# Patient Record
Sex: FEMALE | Race: WHITE | NOT HISPANIC OR LATINO | Employment: OTHER | URBAN - METROPOLITAN AREA
[De-identification: names, ages, dates, MRNs, and addresses within clinical notes are randomized per-mention and may not be internally consistent; named-entity substitution may affect disease eponyms.]

---

## 2017-02-08 ENCOUNTER — LAB VISIT (OUTPATIENT)
Dept: LAB | Facility: HOSPITAL | Age: 77
End: 2017-02-08
Attending: INTERNAL MEDICINE
Payer: MEDICARE

## 2017-02-08 DIAGNOSIS — E78.2 MIXED HYPERLIPIDEMIA: ICD-10-CM

## 2017-02-08 LAB
ALBUMIN SERPL BCP-MCNC: 4 G/DL
ALP SERPL-CCNC: 71 U/L
ALT SERPL W/O P-5'-P-CCNC: 15 U/L
ANION GAP SERPL CALC-SCNC: 8 MMOL/L
AST SERPL-CCNC: 25 U/L
BILIRUB SERPL-MCNC: 0.4 MG/DL
BUN SERPL-MCNC: 18 MG/DL
CALCIUM SERPL-MCNC: 9.9 MG/DL
CHLORIDE SERPL-SCNC: 101 MMOL/L
CHOLEST/HDLC SERPL: 3.5 {RATIO}
CO2 SERPL-SCNC: 27 MMOL/L
CREAT SERPL-MCNC: 0.8 MG/DL
EST. GFR  (AFRICAN AMERICAN): >60 ML/MIN/1.73 M^2
EST. GFR  (NON AFRICAN AMERICAN): >60 ML/MIN/1.73 M^2
GLUCOSE SERPL-MCNC: 92 MG/DL
HDL/CHOLESTEROL RATIO: 28.9 %
HDLC SERPL-MCNC: 239 MG/DL
HDLC SERPL-MCNC: 69 MG/DL
LDLC SERPL CALC-MCNC: 152 MG/DL
NONHDLC SERPL-MCNC: 170 MG/DL
POTASSIUM SERPL-SCNC: 4.6 MMOL/L
PROT SERPL-MCNC: 7.8 G/DL
SODIUM SERPL-SCNC: 136 MMOL/L
TRIGL SERPL-MCNC: 90 MG/DL

## 2017-02-08 PROCEDURE — 36415 COLL VENOUS BLD VENIPUNCTURE: CPT | Mod: PO

## 2017-02-08 PROCEDURE — 80061 LIPID PANEL: CPT

## 2017-02-08 PROCEDURE — 80053 COMPREHEN METABOLIC PANEL: CPT

## 2017-02-14 RX ORDER — LATANOPROST 50 UG/ML
SOLUTION/ DROPS OPHTHALMIC
Qty: 2.5 ML | Refills: 12 | Status: SHIPPED | OUTPATIENT
Start: 2017-02-14 | End: 2018-03-05 | Stop reason: SDUPTHER

## 2017-02-15 ENCOUNTER — OFFICE VISIT (OUTPATIENT)
Dept: CARDIOLOGY | Facility: CLINIC | Age: 77
End: 2017-02-15
Payer: MEDICARE

## 2017-02-15 VITALS
HEIGHT: 65 IN | DIASTOLIC BLOOD PRESSURE: 82 MMHG | SYSTOLIC BLOOD PRESSURE: 122 MMHG | BODY MASS INDEX: 31.63 KG/M2 | WEIGHT: 189.81 LBS | OXYGEN SATURATION: 97 % | HEART RATE: 82 BPM

## 2017-02-15 DIAGNOSIS — I10 ESSENTIAL HYPERTENSION: Primary | ICD-10-CM

## 2017-02-15 PROCEDURE — 93005 ELECTROCARDIOGRAM TRACING: CPT | Mod: PBBFAC,PO | Performed by: INTERNAL MEDICINE

## 2017-02-15 PROCEDURE — 93010 ELECTROCARDIOGRAM REPORT: CPT | Mod: ,,, | Performed by: INTERNAL MEDICINE

## 2017-02-15 PROCEDURE — 99999 PR PBB SHADOW E&M-EST. PATIENT-LVL III: CPT | Mod: PBBFAC,,, | Performed by: INTERNAL MEDICINE

## 2017-02-15 PROCEDURE — 99213 OFFICE O/P EST LOW 20 MIN: CPT | Mod: S$PBB,,, | Performed by: INTERNAL MEDICINE

## 2017-02-15 PROCEDURE — 99213 OFFICE O/P EST LOW 20 MIN: CPT | Mod: PBBFAC,PO | Performed by: INTERNAL MEDICINE

## 2017-02-15 NOTE — PROGRESS NOTES
Subjective:   Patient ID:  Maria Del Carmen Spence is a 76 y.o. female who presents for follow up of Hypertension      HPI Comments: 74 yo female, PMH glaucoma, s/p knee surgery. Can not climb the stairs due to knee pain.   DURÁN is the same. Denied chest pain, palpitation, dizziness, syncope, orthopnea, PND, and claudication.   She gained 45 lbs in 9 years. Her TSH normal.  Most of activities limited by back pain and knee pain with 6/10 in severity. She is taking ASA for pain control.    Carotid US normal and JAMIA left 0.98 and right 0.96 in  by [facility] screening.  EKG in : NSR  ECHO in :  1 - Normal left ventricular systolic function (EF 55-60%).   2 - Normal left ventricular diastolic function.   3 - Normal right ventricular systolic function .   4 - Mild tricuspid regurgitation.   5 - Trivial to mild pulmonic regurgitation.         Past Medical History   Diagnosis Date    Arthritis     Chronic LBP      ESIs x 3 with Dr. Hicks, PT at [place], chiropractor    Glaucoma     Hypertension        Past Surgical History   Procedure Laterality Date    Cataract extraction w/  intraocular lens implant  OU    Tubal ligation         Social History   Substance Use Topics    Smoking status: Never Smoker    Smokeless tobacco: Never Used    Alcohol use No       Family History   Problem Relation Age of Onset    Glaucoma Mother     Cancer Mother     Cataracts Mother     Hypertension Mother     Hypertension Father     Diabetes Paternal Aunt     Strabismus Neg Hx     Retinal detachment Neg Hx     Macular degeneration Neg Hx     Blindness Neg Hx     Amblyopia Neg Hx     Stroke Neg Hx     Thyroid disease Neg Hx          Review of Systems   Constitution: Negative for decreased appetite, diaphoresis, fever, weakness, malaise/fatigue and night sweats.   HENT: Negative for headaches and nosebleeds.    Eyes: Negative for blurred vision and double vision.   Cardiovascular: Negative for chest pain, claudication,  dyspnea on exertion, irregular heartbeat, leg swelling, near-syncope, orthopnea, palpitations, paroxysmal nocturnal dyspnea and syncope.   Respiratory: Negative for cough, shortness of breath, sleep disturbances due to breathing, snoring, sputum production and wheezing.    Endocrine: Negative for cold intolerance and polyuria.   Hematologic/Lymphatic: Does not bruise/bleed easily.   Skin: Negative for rash.   Musculoskeletal: Positive for arthritis and joint pain. Negative for back pain, falls, joint swelling and neck pain.   Gastrointestinal: Negative for abdominal pain, heartburn, nausea and vomiting.   Genitourinary: Negative for dysuria, frequency and hematuria.   Neurological: Negative for difficulty with concentration, dizziness, focal weakness, light-headedness, numbness and seizures.   Psychiatric/Behavioral: Negative for depression, memory loss and substance abuse. The patient does not have insomnia.    Allergic/Immunologic: Negative for HIV exposure and hives.       Objective:   Physical Exam   Constitutional: She is oriented to person, place, and time. She appears well-nourished.   HENT:   Head: Normocephalic.   Eyes: Pupils are equal, round, and reactive to light.   Neck: Normal carotid pulses and no JVD present. Carotid bruit is not present. No thyromegaly present.   Cardiovascular: Normal rate, regular rhythm, normal heart sounds and normal pulses.   No extrasystoles are present. PMI is not displaced.  Exam reveals no gallop and no S3.    No murmur heard.  Pulses:       Radial pulses are 2+ on the right side, and 2+ on the left side.   S2 split   Pulmonary/Chest: Breath sounds normal. No stridor. No respiratory distress.   Abdominal: Soft. Bowel sounds are normal. There is no tenderness. There is no rebound.   Musculoskeletal: Normal range of motion.   Neurological: She is alert and oriented to person, place, and time.   Skin: Skin is intact. No rash noted.   Psychiatric: Her behavior is normal.        Lab Results   Component Value Date    CHOL 239 (H) 02/08/2017     Lab Results   Component Value Date    HDL 69 02/08/2017     Lab Results   Component Value Date    LDLCALC 152.0 02/08/2017     Lab Results   Component Value Date    TRIG 90 02/08/2017     Lab Results   Component Value Date    CHOLHDL 28.9 02/08/2017       Chemistry        Component Value Date/Time     02/08/2017 0859    K 4.6 02/08/2017 0859     02/08/2017 0859    CO2 27 02/08/2017 0859    BUN 18 02/08/2017 0859    CREATININE 0.8 02/08/2017 0859    GLU 92 02/08/2017 0859        Component Value Date/Time    CALCIUM 9.9 02/08/2017 0859    ALKPHOS 71 02/08/2017 0859    AST 25 02/08/2017 0859    ALT 15 02/08/2017 0859    BILITOT 0.4 02/08/2017 0859          Lab Results   Component Value Date    TSH 2.568 09/02/2014     No results found for: INR, PROTIME  Lab Results   Component Value Date    WBC 7.08 09/02/2014    HGB 13.1 09/02/2014    HCT 39.4 09/02/2014    MCV 93 09/02/2014     09/02/2014     BMP  Sodium   Date Value Ref Range Status   02/08/2017 136 136 - 145 mmol/L Final     Potassium   Date Value Ref Range Status   02/08/2017 4.6 3.5 - 5.1 mmol/L Final     Chloride   Date Value Ref Range Status   02/08/2017 101 95 - 110 mmol/L Final     CO2   Date Value Ref Range Status   02/08/2017 27 23 - 29 mmol/L Final     BUN, Bld   Date Value Ref Range Status   02/08/2017 18 8 - 23 mg/dL Final     Creatinine   Date Value Ref Range Status   02/08/2017 0.8 0.5 - 1.4 mg/dL Final     Calcium   Date Value Ref Range Status   02/08/2017 9.9 8.7 - 10.5 mg/dL Final     Anion Gap   Date Value Ref Range Status   02/08/2017 8 8 - 16 mmol/L Final     eGFR if    Date Value Ref Range Status   02/08/2017 >60.0 >60 mL/min/1.73 m^2 Final     eGFR if non    Date Value Ref Range Status   02/08/2017 >60.0 >60 mL/min/1.73 m^2 Final     Comment:     Calculation used to obtain the estimated glomerular filtration  rate (eGFR) is  the CKD-EPI equation. Since race is unknown   in our information system, the eGFR values for   -American and Non--American patients are given   for each creatinine result.       Estimated Creatinine Clearance: 63.8 mL/min (based on Cr of 0.8).     Assessment:      1. Essential hypertension        Plan:   Continue current meds.  Recommend heart-healthy diet, weight control and regular exercise.  Heather. Risk modification.   RTC in 1 yr    I have reviewed all pertinent labs and cardiac studies. Plans and recommendations have been formulated under my direct supervision. All questions answered and patient voiced understanding. Patient to continue current medications.

## 2017-02-15 NOTE — MR AVS SNAPSHOT
[facility]  [address]  [facility] [address]  Phone: [phone]  Fax: [phone]                  Maria Del Carmen Spence   2/15/2017 10:00 AM   Office Visit    Description:  Female : 1940   Provider:  Will Rosenthal MD   Department:  [facility]           Reason for Visit     Hypertension           Diagnoses this Visit        Comments    Essential hypertension    -  Primary            To Do List           Future Appointments        Provider Department Dept Phone    2017 9:45 AM Ed Denise MD [facility] [phone]      Goals (5 Years of Data)     None      Follow-Up and Disposition     Return in about 1 year (around 2/15/2018).      [facility] On Call     [facility] On Call Nurse Care Line -  Assistance  Registered nurses in the [facility] On Call Center provide clinical advisement, health education, appointment booking, and other advisory services.  Call for this free service at [phone].             Medications           Message regarding Medications     Verify the changes and/or additions to your medication regime listed below are the same as discussed with your clinician today.  If any of these changes or additions are incorrect, please notify your healthcare provider.             Verify that the below list of medications is an accurate representation of the medications you are currently taking.  If none reported, the list may be blank. If incorrect, please contact your healthcare provider. Carry this list with you in case of emergency.           Current Medications     aspirin 325 MG tablet Take 325 mg by mouth 2 (two) times daily. Patient states that she takes 2 tablets every morning    BACILLUS/PROTEASE/AMYLAS/LIPAS (DIGESTIVE ADVANTAGE INTENS BOW ORAL) Take 1 capsule by mouth once daily.    CALCIUM PHOSPHATE TRIB/VIT D3 (CITRACAL + D ORAL) Take by mouth.    CETIRIZINE HCL (ZYRTEC ORAL) Take by mouth.    fish oil-omega-3  "fatty acids 300-1,000 mg capsule Take 2 g by mouth once daily.    glucosamine-chondroitin 500-400 mg tablet Take 1 tablet by mouth 3 (three) times daily.    latanoprost 0.005 % ophthalmic solution instill ONE DROP BOTH EYES AT BEDTIME    lisinopril 10 MG tablet Take 1 tablet (10 mg total) by mouth once daily.    MULTIVITAMIN W-MINERALS/LUTEIN (CENTRUM SILVER ORAL) Take by mouth.    timolol maleate 0.5% (TIMOPTIC) 0.5 % Drop Place 1 drop into the right eye 2 (two) times daily.           Clinical Reference Information           Your Vitals Were     BP Pulse Height Weight SpO2 BMI    122/82 (BP Location: Right arm) 82 5' 5" (1.651 m) 86.1 kg (189 lb 13.1 oz) 97% 31.59 kg/m2      Blood Pressure          Most Recent Value    BP  122/82      Allergies as of 2/15/2017     No Known Drug Allergies      Immunizations Administered on Date of Encounter - 2/15/2017     None      Orders Placed During Today's Visit      Normal Orders This Visit    IN OFFICE EKG 12-LEAD (to [place])       [facility] Sign-Up     Activating your [facility] account is as easy as 1-2-3!     1) Visit [email or web address], select Sign Up Now, enter this activation code and your date of birth, then select Next.  [email or web address]  Expires: 4/1/2017 10:22 AM      2) Create a username and password to use when you visit [facility] in the future and select a security question in case you lose your password and select Next.    3) Enter your e-mail address and click Sign Up!    Additional Information  If you have questions, please e-mail [email or web address] or call [phone] to talk to our [facility] staff. Remember, [facility] is NOT to be used for urgent needs. For medical emergencies, dial 911.         Language Assistance Services     ATTENTION: Language assistance services are available, free of charge. Please call [phone].      ATENCIÓN: Si habla español, tiene a farah disposición servicios gratuitos de asistencia lingüística. Llame al [phone].     CHÚ " Ý: N?u b?n nói Ti?ng Vi?t, có các d?ch v? h? tr? ngôn ng? mi?n phí dành cho b?n. G?i s? [phone].         [facility] complies with applicable Federal civil rights laws and does not discriminate on the basis of race, color, national origin, age, disability, or sex.

## 2017-04-04 ENCOUNTER — OFFICE VISIT (OUTPATIENT)
Dept: OPHTHALMOLOGY | Facility: CLINIC | Age: 77
End: 2017-04-04
Payer: MEDICARE

## 2017-04-04 DIAGNOSIS — H40.1131 PRIMARY OPEN ANGLE GLAUCOMA OF BOTH EYES, MILD STAGE: Primary | ICD-10-CM

## 2017-04-04 PROCEDURE — 92012 INTRM OPH EXAM EST PATIENT: CPT | Mod: S$PBB,,, | Performed by: OPHTHALMOLOGY

## 2017-04-04 PROCEDURE — 99999 PR PBB SHADOW E&M-EST. PATIENT-LVL II: CPT | Mod: PBBFAC,,, | Performed by: OPHTHALMOLOGY

## 2017-04-04 PROCEDURE — 99212 OFFICE O/P EST SF 10 MIN: CPT | Mod: PBBFAC | Performed by: OPHTHALMOLOGY

## 2017-04-04 NOTE — PROGRESS NOTES
SUBJECTIVE:   Maria Del Carmen Spence is a 76 y.o. female   Corrected distance visual acuity was 20/20 in the right eye and 20/20 in the left eye.   Chief Complaint   Patient presents with    Glaucoma     4 month IOP check        HPI:  HPI     Glaucoma    Additional comments: 4 month IOP check           Comments   1. Mild COAG OD>OS (init 24/20) Goal <17  2. PCIOL OU (Sulcus OD)    Latanoprost QHS OU  Timolol BID OD  O3FO         Last edited by PAUL Davis on 4/4/2017  9:44 AM. (History)        Assessment /Plan :  1. Primary open angle glaucoma of both eyes, mild stage   Doing well, IOP within acceptable range relative to target IOP and no evidence of progression. Continue current treatment. Reviewed importance of continued compliance with treatment and follow up.       Return to clinic in 4 months  or as needed.  With IOP Check, 24-2 HVF, Dilation and SDP's

## 2017-04-04 NOTE — MR AVS SNAPSHOT
[facility]  [address] [place] [address]  Phone: [phone]  Fax: [phone]                  Maria Del Carmen Spence   2017 9:45 AM   Office Visit    Description:  Female : 1940   Provider:  Ed Denise MD   Department:  [facility]           Reason for Visit     Glaucoma           Diagnoses this Visit        Comments    Primary open angle glaucoma of both eyes, mild stage    -  Primary            To Do List           Goals (5 Years of Data)     None      [facility]     [facility] On Call Nurse Care Line -  Assistance  Unless otherwise directed by your provider, please contact [facility], our nurse care line that is available for  assistance.     Registered nurses in the [facility] Center provide: appointment scheduling, clinical advisement, health education, and other advisory services.  Call: [phone] (toll free)               Medications           Message regarding Medications     Verify the changes and/or additions to your medication regime listed below are the same as discussed with your clinician today.  If any of these changes or additions are incorrect, please notify your healthcare provider.             Verify that the below list of medications is an accurate representation of the medications you are currently taking.  If none reported, the list may be blank. If incorrect, please contact your healthcare provider. Carry this list with you in case of emergency.           Current Medications     aspirin 325 MG tablet Take 325 mg by mouth 2 (two) times daily. Patient states that she takes 2 tablets every morning    BACILLUS/PROTEASE/AMYLAS/LIPAS (DIGESTIVE ADVANTAGE INTENS BOW ORAL) Take 1 capsule by mouth once daily.    CALCIUM PHOSPHATE TRIB/VIT D3 (CITRACAL + D ORAL) Take by mouth.    CETIRIZINE HCL (ZYRTEC ORAL) Take by mouth.    fish oil-omega-3 fatty acids 300-1,000 mg capsule Take 2 g by mouth once daily.     glucosamine-chondroitin 500-400 mg tablet Take 1 tablet by mouth 3 (three) times daily.    latanoprost 0.005 % ophthalmic solution instill ONE DROP BOTH EYES AT BEDTIME    lisinopril 10 MG tablet Take 1 tablet (10 mg total) by mouth once daily.    MULTIVITAMIN W-MINERALS/LUTEIN (CENTRUM SILVER ORAL) Take by mouth.    timolol maleate 0.5% (TIMOPTIC) 0.5 % Drop Place 1 drop into the right eye 2 (two) times daily.           Clinical Reference Information           Allergies as of 4/4/2017     No Known Drug Allergies      Immunizations Administered on Date of Encounter - 4/4/2017     None      [facility] Sign-Up     Activating your [facility] account is as easy as 1-2-3!     1) Visit [email or web address], select Sign Up Now, enter this activation code and your date of birth, then select Next.  [email or web address]  Expires: 5/19/2017  9:53 AM      2) Create a username and password to use when you visit [facility] in the future and select a security question in case you lose your password and select Next.    3) Enter your e-mail address and click Sign Up!    Additional Information  If you have questions, please e-mail [email or web address] or call [phone] to talk to our [facility] staff. Remember, [facility] is NOT to be used for urgent needs. For medical emergencies, dial 911.         Language Assistance Services     ATTENTION: Language assistance services are available, free of charge. Please call [phone].      ATENCIÓN: Si habla español, tiene a farah disposición servicios gratuitos de asistencia lingüística. Llame al [phone].     [facility] Ý: N?u b?n nói Ti?ng Vi?t, có các d?ch v? h? tr? ngôn ng? mi?n phí dành cho b?n. G?i s? [phone].         [facility] complies with applicable Federal civil rights laws and does not discriminate on the basis of race, color, national origin, age, disability, or sex.

## 2017-04-11 DIAGNOSIS — H40.1194 INDETERMINATE STAGE CHRONIC OPEN ANGLE GLAUCOMA: ICD-10-CM

## 2017-04-12 RX ORDER — TIMOLOL MALEATE 5 MG/ML
SOLUTION/ DROPS OPHTHALMIC
Qty: 5 ML | Refills: 11 | Status: SHIPPED | OUTPATIENT
Start: 2017-04-12 | End: 2018-06-21 | Stop reason: SDUPTHER

## 2017-04-26 DIAGNOSIS — I10 ESSENTIAL HYPERTENSION: ICD-10-CM

## 2017-04-26 RX ORDER — LISINOPRIL 10 MG/1
10 TABLET ORAL DAILY
Qty: 30 TABLET | Refills: 6 | Status: SHIPPED | OUTPATIENT
Start: 2017-04-26 | End: 2017-06-21 | Stop reason: ALTCHOICE

## 2017-05-17 ENCOUNTER — OFFICE VISIT (OUTPATIENT)
Dept: INTERNAL MEDICINE | Facility: CLINIC | Age: 77
End: 2017-05-17
Payer: MEDICARE

## 2017-05-17 VITALS
SYSTOLIC BLOOD PRESSURE: 138 MMHG | OXYGEN SATURATION: 95 % | DIASTOLIC BLOOD PRESSURE: 72 MMHG | TEMPERATURE: 98 F | WEIGHT: 189.25 LBS | BODY MASS INDEX: 31.5 KG/M2 | HEART RATE: 63 BPM

## 2017-05-17 DIAGNOSIS — M25.561 CHRONIC PAIN OF RIGHT KNEE: ICD-10-CM

## 2017-05-17 DIAGNOSIS — K21.9 GASTROESOPHAGEAL REFLUX DISEASE, ESOPHAGITIS PRESENCE NOT SPECIFIED: Primary | ICD-10-CM

## 2017-05-17 DIAGNOSIS — G89.29 CHRONIC PAIN OF RIGHT KNEE: ICD-10-CM

## 2017-05-17 PROCEDURE — 99213 OFFICE O/P EST LOW 20 MIN: CPT | Mod: PBBFAC,PO | Performed by: FAMILY MEDICINE

## 2017-05-17 PROCEDURE — 99999 PR PBB SHADOW E&M-EST. PATIENT-LVL III: CPT | Mod: PBBFAC,,, | Performed by: FAMILY MEDICINE

## 2017-05-17 PROCEDURE — 99214 OFFICE O/P EST MOD 30 MIN: CPT | Mod: S$PBB,,, | Performed by: FAMILY MEDICINE

## 2017-05-17 NOTE — MR AVS SNAPSHOT
[facility]  [address]  [place] [address]  Phone: [phone]  Fax: [phone]                  Maria Del Carmen Spence   2017 10:40 AM   Office Visit    Description:  Female : 1940   Provider:  Elda Powers MD   Department:  [facility]           Reason for Visit     Heartburn           Diagnoses this Visit        Comments    Gastroesophageal reflux disease, esophagitis presence not specified    -  Primary     Chronic pain of right knee                To Do List           Future Appointments        Provider Department Dept Phone    2017 9:30 AM Elda Powers MD [facility] [phone]    2017 10:30 AM [facility] [phone]    2017 10:00 AM FIELDS, VISUAL-ONE [facility] [phone]    2017 10:45 AM Ed Denise MD [facility] [phone]      Goals (5 Years of Data)     None      Follow-Up and Disposition     Return in about 4 weeks (around 2017).      [facility]     [facility] Nurse Care [facility] -  Assistance  Unless otherwise directed by your provider, please contact [facility], our nurse care line that is available for  assistance.     Registered nurses in the [facility] Center provide: appointment scheduling, clinical advisement, health education, and other advisory services.  Call: [phone] (toll free)               Medications           Message regarding Medications     Verify the changes and/or additions to your medication regime listed below are the same as discussed with your clinician today.  If any of these changes or additions are incorrect, please notify your healthcare provider.             Verify that the below list of medications is an accurate representation of the medications you are currently taking.  If none reported, the list may be blank. If incorrect, please contact your healthcare provider. Carry  this list with you in case of emergency.           Current Medications     aspirin 325 MG tablet Take 325 mg by mouth 2 (two) times daily. Patient states that she takes 2 tablets every morning    BACILLUS/PROTEASE/AMYLAS/LIPAS (DIGESTIVE ADVANTAGE INTENS BOW ORAL) Take 1 capsule by mouth once daily.    CALCIUM PHOSPHATE TRIB/VIT D3 (CITRACAL + D ORAL) Take by mouth.    CETIRIZINE HCL (ZYRTEC ORAL) Take by mouth.    fish oil-omega-3 fatty acids 300-1,000 mg capsule Take 2 g by mouth once daily.    GLUCOSAMINE HCL/CHONDR HOOPER A NA (OSTEO BI-FLEX ORAL) Take by mouth.    latanoprost 0.005 % ophthalmic solution instill ONE DROP BOTH EYES AT BEDTIME    lisinopril 10 MG tablet Take 1 tablet (10 mg total) by mouth once daily.    MULTIVITAMIN W-MINERALS/LUTEIN (CENTRUM SILVER ORAL) Take by mouth.    timolol maleate 0.5% (TIMOPTIC) 0.5 % Drop Place 1 drop into the right eye 2 (two) times daily.    glucosamine-chondroitin 500-400 mg tablet Take 1 tablet by mouth 3 (three) times daily.           Clinical Reference Information           Your Vitals Were     BP Pulse Temp Weight SpO2 BMI    138/72 (BP Location: Right arm, Patient Position: Sitting, BP Method: Automatic) 63 98 °F (36.7 °C) (Tympanic) 85.9 kg (189 lb 4.2 oz) 95% 31.5 kg/m2      Blood Pressure          Most Recent Value    BP  138/72      Allergies as of 5/17/2017     No Known Drug Allergies      Immunizations Administered on Date of Encounter - 5/17/2017     None      Orders Placed During Today's Visit      Normal Orders This Visit    Ambulatory referral to Orthopedics     Future Labs/Procedures Expected by Expires    X-ray Knee Ortho Right  5/17/2017 5/17/2018      [facility] Sign-Up     Activating your [facility] account is as easy as 1-2-3!     1) Visit [email or web address], select Sign Up Now, enter this activation code and your date of birth, then select Next.  [email or web address]  Expires: 5/19/2017  9:53 AM      2) Create a username and password to use when you visit  [email or web address] in the future and select a security question in case you lose your password and select Next.    3) Enter your e-mail address and click Sign Up!    Additional Information  If you have questions, please e-mail [email or web address] or call [phone] to talk to our [facility] staff. Remember, [facility] is NOT to be used for urgent needs. For medical emergencies, dial 911.         Instructions      Lifestyle Changes for Controlling GERD    When you have GERD, stomach acid feels as if its backing up toward your mouth. Whether or not you take medicine to control your GERD, your symptoms can often be improved with lifestyle changes. Talk to your healthcare provider about the following suggestions. These suggestions may help you get relief from your symptoms.  Raise your head  Reflux is more likely to strike when youre lying down flat, because stomach fluid can flow backward more easily. Raising the head of your bed 4 to 6 inches can help. To do this:  · Slide blocks or books under the legs at the head of your bed. Or, place a wedge under the mattress. Many [employer] can make a suitable wedge for you. The wedge should run from your waist to the top of your head.  · Dont just prop your head on several pillows. This increases pressure on your stomach. It can make GERD worse.  Watch your eating habits  Certain foods may increase the acid in your stomach or relax the lower esophageal sphincter. This makes GERD more likely. Its best to avoid the following if they cause you symptoms:  · Coffee, tea, and carbonated drinks (with and without caffeine)  · Fatty, fried, or spicy food  · Mint, chocolate, onions, and tomatoes  · Peppermint  · Any other foods that seem to irritate your stomach or cause you pain  Relieve the pressure  Tips include the following:  · Eat smaller meals, even if you have to eat more often.  · Dont lie down right after you eat. Wait a few hours for your stomach to empty.  · Avoid tight belts  and tight-fitting clothes.  · Lose excess weight.  Tobacco and alcohol  Avoid smoking tobacco and drinking alcohol. They can make GERD symptoms worse.  Date Last Reviewed: 7/1/2016  © 2600-6261 [employer] [address] [place] [address], [place] [address]. All rights reserved. This information is not intended as a substitute for professional medical care. Always follow your healthcare professional's instructions.      Take Pepcid complete one daily recheck in 4 weeks, sooner if worse.         Language Assistance Services     ATTENTION: Language assistance services are available, free of charge. Please call [phone].      ATENCIÓN: Si habla español, tiene a farah disposición servicios gratuitos de asistencia lingüística. Llame al [phone].     ERIKA Ý: N?u b?n nói Ti?ng Vi?t, có các d?ch v? h? tr? ngôn ng? mi?n phí dành cho b?n. G?i s? [phone].         [facility] complies with applicable Federal civil rights laws and does not discriminate on the basis of race, color, national origin, age, disability, or sex.

## 2017-05-17 NOTE — PATIENT INSTRUCTIONS
Lifestyle Changes for Controlling GERD    When you have GERD, stomach acid feels as if its backing up toward your mouth. Whether or not you take medicine to control your GERD, your symptoms can often be improved with lifestyle changes. Talk to your healthcare provider about the following suggestions. These suggestions may help you get relief from your symptoms.  Raise your head  Reflux is more likely to strike when youre lying down flat, because stomach fluid can flow backward more easily. Raising the head of your bed 4 to 6 inches can help. To do this:  · Slide blocks or books under the legs at the head of your bed. Or, place a wedge under the mattress. Many [employer] can make a suitable wedge for you. The wedge should run from your waist to the top of your head.  · Dont just prop your head on several pillows. This increases pressure on your stomach. It can make GERD worse.  Watch your eating habits  Certain foods may increase the acid in your stomach or relax the lower esophageal sphincter. This makes GERD more likely. Its best to avoid the following if they cause you symptoms:  · Coffee, tea, and carbonated drinks (with and without caffeine)  · Fatty, fried, or spicy food  · Mint, chocolate, onions, and tomatoes  · Peppermint  · Any other foods that seem to irritate your stomach or cause you pain  Relieve the pressure  Tips include the following:  · Eat smaller meals, even if you have to eat more often.  · Dont lie down right after you eat. Wait a few hours for your stomach to empty.  · Avoid tight belts and tight-fitting clothes.  · Lose excess weight.  Tobacco and alcohol  Avoid smoking tobacco and drinking alcohol. They can make GERD symptoms worse.  Date Last Reviewed: 7/1/2016  © 8262-4807 [employer]. [address] [place] [address], [place] [address]. All rights reserved. This information is not intended as a substitute for professional medical care. Always follow your healthcare  professional's instructions.      Take Pepcid complete one daily recheck in 4 weeks, sooner if worse.

## 2017-05-17 NOTE — PROGRESS NOTES
Subjective:       Patient ID: Maria Del Carmen Spence is a 76 y.o. female.    Chief Complaint: Heartburn    HPI Comments: She is having heartburn for 4-5 weeks. In the past has had infrequent problems and used rollaids.  She has started using Pepcid Complete but now has started using Nexium for last few weeks. She will take it for 3 days and then stop and it returns.  She has changed some of her eating habits but still has 2 coffees, one reg tea and one green tea daily.   She has significant back pain for which she take two 325 ASA daily in AM.  C/O right knee pain feels like it will give out/not support her.      Heartburn   She complains of chest pain (substernal) and heartburn. This is a new problem. The current episode started more than 1 month ago. The problem occurs frequently. The problem has been waxing and waning. The heartburn duration is more than one hour. The heartburn is located in the substernum. The heartburn is of severe intensity. The heartburn does not wake her from sleep. The heartburn changes with position. Risk factors include caffeine use, obesity and NSAIDs (takes two 325 ASA qAM). She has tried an antacid, a histamine-2 antagonist, a PPI and a diet change for the symptoms. The treatment provided mild relief.     Review of Systems   Cardiovascular: Positive for chest pain (substernal).   Gastrointestinal: Positive for heartburn.       Objective:      Physical Exam   Constitutional: She is oriented to person, place, and time. She appears well-developed and well-nourished.   HENT:   Head: Normocephalic and atraumatic.   Right Ear: External ear normal.   Left Ear: External ear normal.   Mouth/Throat: Oropharynx is clear and moist. No oropharyngeal exudate.   Neck: Normal range of motion. Neck supple.   Cardiovascular: Normal rate, regular rhythm and normal heart sounds.    Pulmonary/Chest: Effort normal and breath sounds normal. She exhibits tenderness (TTp to midline sternum).   Abdominal: Soft. Bowel  sounds are normal. She exhibits no distension. There is no tenderness.   Musculoskeletal:   TTP to joint lines right knee medial>lateral. Mild swelling right compared to left. No erythema, no warmth.   Neurological: She is alert and oriented to person, place, and time.   MS 5/5 F/E B knees, neg SLR B   Skin: Skin is warm and dry.   Psychiatric: She has a normal mood and affect. Her behavior is normal.         Assessment/Plan:     1. Gastroesophageal reflux disease, esophagitis presence not specified     2. Chronic pain of right knee  Ambulatory referral to Orthopedics    X-ray Knee Ortho Right     Take Pepcid complete one daily and recheck in 4 weeks, sooner if worse.

## 2017-06-14 ENCOUNTER — OFFICE VISIT (OUTPATIENT)
Dept: INTERNAL MEDICINE | Facility: CLINIC | Age: 77
End: 2017-06-14
Payer: MEDICARE

## 2017-06-14 ENCOUNTER — APPOINTMENT (OUTPATIENT)
Dept: RADIOLOGY | Facility: HOSPITAL | Age: 77
End: 2017-06-14
Attending: FAMILY MEDICINE
Payer: MEDICARE

## 2017-06-14 VITALS
DIASTOLIC BLOOD PRESSURE: 82 MMHG | OXYGEN SATURATION: 95 % | WEIGHT: 190.38 LBS | TEMPERATURE: 99 F | HEART RATE: 80 BPM | BODY MASS INDEX: 31.68 KG/M2 | SYSTOLIC BLOOD PRESSURE: 134 MMHG

## 2017-06-14 DIAGNOSIS — M25.561 CHRONIC PAIN OF RIGHT KNEE: ICD-10-CM

## 2017-06-14 DIAGNOSIS — Z78.0 ASYMPTOMATIC MENOPAUSAL STATE: ICD-10-CM

## 2017-06-14 DIAGNOSIS — I10 HYPERTENSION, ESSENTIAL: ICD-10-CM

## 2017-06-14 DIAGNOSIS — J06.9 URI, ACUTE: ICD-10-CM

## 2017-06-14 DIAGNOSIS — G89.29 CHRONIC PAIN OF RIGHT KNEE: ICD-10-CM

## 2017-06-14 DIAGNOSIS — I49.9 IRREGULAR HEART BEATS: ICD-10-CM

## 2017-06-14 DIAGNOSIS — K21.9 GASTROESOPHAGEAL REFLUX DISEASE, ESOPHAGITIS PRESENCE NOT SPECIFIED: ICD-10-CM

## 2017-06-14 DIAGNOSIS — I48.91 ATRIAL FIBRILLATION, TRANSIENT: Primary | ICD-10-CM

## 2017-06-14 LAB
ANION GAP SERPL CALC-SCNC: 11 MMOL/L
BASOPHILS # BLD AUTO: 0.02 K/UL
BASOPHILS NFR BLD: 0.2 %
BUN SERPL-MCNC: 15 MG/DL
CALCIUM SERPL-MCNC: 10.3 MG/DL
CHLORIDE SERPL-SCNC: 105 MMOL/L
CO2 SERPL-SCNC: 24 MMOL/L
CREAT SERPL-MCNC: 0.8 MG/DL
DIFFERENTIAL METHOD: ABNORMAL
EOSINOPHIL # BLD AUTO: 0.1 K/UL
EOSINOPHIL NFR BLD: 1.5 %
ERYTHROCYTE [DISTWIDTH] IN BLOOD BY AUTOMATED COUNT: 13.3 %
EST. GFR  (AFRICAN AMERICAN): >60 ML/MIN/1.73 M^2
EST. GFR  (NON AFRICAN AMERICAN): >60 ML/MIN/1.73 M^2
GLUCOSE SERPL-MCNC: 81 MG/DL
HCT VFR BLD AUTO: 40.2 %
HGB BLD-MCNC: 13.1 G/DL
LYMPHOCYTES # BLD AUTO: 4 K/UL
LYMPHOCYTES NFR BLD: 43.9 %
MCH RBC QN AUTO: 31.3 PG
MCHC RBC AUTO-ENTMCNC: 32.6 %
MCV RBC AUTO: 96 FL
MONOCYTES # BLD AUTO: 0.7 K/UL
MONOCYTES NFR BLD: 7.8 %
NEUTROPHILS # BLD AUTO: 4.2 K/UL
NEUTROPHILS NFR BLD: 46.4 %
PLATELET # BLD AUTO: 348 K/UL
PMV BLD AUTO: 11.2 FL
POTASSIUM SERPL-SCNC: 5.3 MMOL/L
RBC # BLD AUTO: 4.19 M/UL
SODIUM SERPL-SCNC: 140 MMOL/L
TSH SERPL DL<=0.005 MIU/L-ACNC: 2.72 UIU/ML
WBC # BLD AUTO: 9.12 K/UL

## 2017-06-14 PROCEDURE — 93010 ELECTROCARDIOGRAM REPORT: CPT | Mod: ,,, | Performed by: INTERNAL MEDICINE

## 2017-06-14 PROCEDURE — 1159F MED LIST DOCD IN RCRD: CPT | Mod: ,,, | Performed by: FAMILY MEDICINE

## 2017-06-14 PROCEDURE — 99999 PR PBB SHADOW E&M-EST. PATIENT-LVL IV: CPT | Mod: PBBFAC,,, | Performed by: FAMILY MEDICINE

## 2017-06-14 PROCEDURE — 73562 X-RAY EXAM OF KNEE 3: CPT | Mod: 26,RT,, | Performed by: RADIOLOGY

## 2017-06-14 PROCEDURE — 1126F AMNT PAIN NOTED NONE PRSNT: CPT | Mod: ,,, | Performed by: FAMILY MEDICINE

## 2017-06-14 PROCEDURE — 73560 X-RAY EXAM OF KNEE 1 OR 2: CPT | Mod: 26,59,LT, | Performed by: RADIOLOGY

## 2017-06-14 PROCEDURE — 99214 OFFICE O/P EST MOD 30 MIN: CPT | Mod: S$PBB,,, | Performed by: FAMILY MEDICINE

## 2017-06-14 PROCEDURE — 73560 X-RAY EXAM OF KNEE 1 OR 2: CPT | Mod: TC,PO,LT

## 2017-06-14 RX ORDER — FAMOTIDINE 20 MG/1
20 TABLET, FILM COATED ORAL DAILY
Qty: 30 TABLET | Refills: 2 | Status: SHIPPED | OUTPATIENT
Start: 2017-06-14 | End: 2017-09-12 | Stop reason: SDUPTHER

## 2017-06-14 NOTE — PROGRESS NOTES
Subjective:       Patient ID: Maria Del Carmen Spence is a 76 y.o. female.    Chief Complaint: F/U on heartburn and right knee    Here for recheck on the GERD, heartburn. She stopped OJ but then restarted it after getting a cold - having URI symptoms for last 10 days. It is improving. Still coughing but less, no nasal congestion, no temps. She is taking her zyrtec daily prophylactically.  Cough/cold medications being taken other than cough drops.    She is taking 10mg BID of the famotidine OTC since last visit a month ago.  She did not feel that 20 mg at one time worked as well.  She has basically had resolution of her heartburn symptoms, even after restarting orange juice over the past week.    On exam, her heart rhythm is irregular.  She denies any symptoms of increased fatigue with exertion, shortness of breath, or sensation of change in her heart rhythm.  Her back pain is her main limiting factor.  She feels the fatigue she normally has is due to her back pain.    She has been seeing Dr. Rosenthal since 2015 initially for complaint of dyspnea on exertion.  Her last visit was in February and there was a normal EKG at that time.      Review of Systems   Constitutional: Positive for fatigue (from back pain). Negative for fever.   HENT: Negative for congestion and sore throat.    Respiratory: Positive for cough. Negative for shortness of breath and wheezing.    Cardiovascular: Negative for chest pain and palpitations.   Musculoskeletal: Positive for back pain.       Objective:      Physical Exam   Constitutional: She is oriented to person, place, and time. She appears well-developed and well-nourished.   HENT:   Head: Normocephalic and atraumatic.   Right Ear: Tympanic membrane, external ear and ear canal normal.   Left Ear: Tympanic membrane, external ear and ear canal normal.   Nose: Nose normal.   Mouth/Throat: Oropharynx is clear and moist. No oropharyngeal exudate.   Eyes: Conjunctivae and EOM are normal.   Neck: Neck  supple. No thyromegaly present.   Cardiovascular: Normal rate and normal heart sounds.    No murmur heard.  Irregular rhythym   Pulmonary/Chest: Effort normal and breath sounds normal. No respiratory distress. She has no wheezes.   Lymphadenopathy:     She has no cervical adenopathy.   Neurological: She is alert and oriented to person, place, and time.   Skin: Skin is warm and dry.   Psychiatric: She has a normal mood and affect. Her behavior is normal.         Assessment/Plan:     1. Atrial fibrillation, transient     2. Irregular heart beats  EKG 12-lead    TSH    CBC auto differential    Basic metabolic panel   3. URI, acute     4. Gastroesophageal reflux disease, esophagitis presence not specified  famotidine (PEPCID) 20 MG tablet   5. Hypertension, essential  Basic metabolic panel   6. Asymptomatic menopausal state  DXA Bone Density Spine And Hip    irregular heart rhythm noted on exam.  She had a regular rhythm at her visit one month ago.  EKG in February 2017 shows normal sinus rhythm.  EKG in office today shows atrial fibrillation.  The patient is asymptomatic. Lab check today and F/U with cards - will consult with Dr Rosenthal re starting anticoagulation.    Addendum: She had a knee x-ray scheduled to follow her clinic visit today (from her May visit).  I saw her later in clinic following her x-ray to discuss the implications of atrial fibrillation with her at that time including anticoagulation considerations.  I then listened to her heart one more time.  She was in normal sinus rhythm.  This was a little more than an hour after the time of the EKG.  No ectopy noted. F/U with cards has been in a week.

## 2017-06-19 ENCOUNTER — APPOINTMENT (OUTPATIENT)
Dept: RADIOLOGY | Facility: CLINIC | Age: 77
End: 2017-06-19
Attending: FAMILY MEDICINE
Payer: MEDICARE

## 2017-06-19 DIAGNOSIS — Z78.0 ASYMPTOMATIC MENOPAUSAL STATE: ICD-10-CM

## 2017-06-19 PROCEDURE — 77080 DXA BONE DENSITY AXIAL: CPT | Mod: TC

## 2017-06-19 PROCEDURE — 77080 DXA BONE DENSITY AXIAL: CPT | Mod: 26,,, | Performed by: INTERNAL MEDICINE

## 2017-06-21 ENCOUNTER — OFFICE VISIT (OUTPATIENT)
Dept: CARDIOLOGY | Facility: CLINIC | Age: 77
End: 2017-06-21
Payer: MEDICARE

## 2017-06-21 VITALS
OXYGEN SATURATION: 96 % | WEIGHT: 189.63 LBS | DIASTOLIC BLOOD PRESSURE: 82 MMHG | HEART RATE: 118 BPM | SYSTOLIC BLOOD PRESSURE: 122 MMHG | BODY MASS INDEX: 31.59 KG/M2 | HEIGHT: 65 IN

## 2017-06-21 DIAGNOSIS — I48.19 PERSISTENT ATRIAL FIBRILLATION: Primary | ICD-10-CM

## 2017-06-21 DIAGNOSIS — I10 ESSENTIAL HYPERTENSION: ICD-10-CM

## 2017-06-21 DIAGNOSIS — I48.0 PAROXYSMAL ATRIAL FIBRILLATION: ICD-10-CM

## 2017-06-21 PROCEDURE — 1159F MED LIST DOCD IN RCRD: CPT | Mod: ,,, | Performed by: INTERNAL MEDICINE

## 2017-06-21 PROCEDURE — 99999 PR PBB SHADOW E&M-EST. PATIENT-LVL III: CPT | Mod: PBBFAC,,, | Performed by: INTERNAL MEDICINE

## 2017-06-21 PROCEDURE — 99213 OFFICE O/P EST LOW 20 MIN: CPT | Mod: PBBFAC,PO | Performed by: INTERNAL MEDICINE

## 2017-06-21 PROCEDURE — 93010 ELECTROCARDIOGRAM REPORT: CPT | Mod: ,,, | Performed by: INTERNAL MEDICINE

## 2017-06-21 PROCEDURE — 99214 OFFICE O/P EST MOD 30 MIN: CPT | Mod: S$PBB,,, | Performed by: INTERNAL MEDICINE

## 2017-06-21 RX ORDER — METOPROLOL TARTRATE 25 MG/1
25 TABLET, FILM COATED ORAL 2 TIMES DAILY
Qty: 60 TABLET | Refills: 11 | Status: SHIPPED | OUTPATIENT
Start: 2017-06-21 | End: 2017-07-20 | Stop reason: SDUPTHER

## 2017-06-21 NOTE — PATIENT INSTRUCTIONS
Understanding Atrial Fibrillation    An arrhythmia is any problem with the speed or pattern of the heartbeat. Atrial fibrillation (AFib) is a common type of arrhythmia. It causes fast, chaotic electrical signals in the atria. This leads to poor functioning of the heart. It also affects how much blood your heart can pump out to the body.  Afib may occur once in a while and go away on its own. Or it may continue for longer periods and need treatment.  AFib can lead to serious problems, such as stroke. Your healthcare provider will need to monitor and manage it.  What happens during atrial fibrillation?   The heart has an electrical system that sends signals to control the heartbeat. As signals move through the heart, they tell the hearts upper chambers (atria) and lower chambers (ventricles) when to squeeze (contract) and relax. This lets blood move through the heart and out to the body and lungs.  With AFib, the atria receive abnormal signals. This causes them to contract in a fast and irregular way, and out of sync with the ventricles. When this happens, the atria also have a harder time moving blood into the ventricles. Blood may then pool in the atria, which increases the risk for blood clots and stroke. The ventricles also may contract too quickly and irregularly. As a result, they may not pump blood to the body and lungs as well as they should. This can weaken the heart muscle over time and cause heart failure.  What causes atrial fibrillation?  AFib is more common in older adults. It has many possible causes including:  · Coronary artery disease  · Heart valve disease  · Heart attack  · Heart surgery  · High blood pressure  · Thyroid disease  · Diabetes  · Lung disease  · Sleep apnea  · Heavy alcohol use  In some cases of AFib, doctors do not know the cause.  What are the symptoms of atrial fibrillation?  AFib may or may not cause symptoms. If symptoms do occur, they may include:  · A fast, pounding,  irregular heartbeat  · Shortness of breath  · Tiredness  · Dizziness or fainting  · Chest pain  How is atrial fibrillation treated?  Treatments for AFib can include any of the options below.  · Medicines. You may be prescribed:  ¨ Heart rate medicines to help slow down the heartbeat  ¨ Heart rhythm medicines to help the heart beat more regularly  ¨ Anti-clotting medicines to help reduce the risk for blood clots and stroke  · Electrical cardioversion. Your healthcare provider uses special pads or paddles to send one or more brief electrical shocks to the heart. This can help reset the heartbeat to normal.  · Ablation. Long, thin tubes called catheters are threaded through a blood vessel to the heart. There, the catheters send out hot or cold energy to the areas causing the abnormal signals. This energy destroys the problem tissue or cells. This improves the chances that your heart will stay in normal rhythm without using medicines. If your heart rate and rhythm cant be controlled, you may need ablation and a pacemaker. These will help control the heart rate and regularity of the heartbeat.  · Surgery. During surgery, your healthcare provider may use different methods to create scar tissue in the areas of the heart causing the abnormal signals. The scar tissue disrupts the abnormal signals and may stop AFib from occurring.  What are the complications of atrial fibrillation?  These can include:  · Blood clots  · Stroke  · Heart failure. This problem occurs when the heart muscle weakens so much that it can no longer pump blood well.  When should I call my healthcare provider?  Call your healthcare provider right away if you have any of these:  · Symptoms that dont get better with treatment, or get worse  · New symptoms   Date Last Reviewed: 5/1/2016  © 3191-8946 [employer]. [address] [place] [address], [place] [address]. All rights reserved. This information is not intended as a substitute for professional  medical care. Always follow your healthcare professional's instructions.

## 2017-06-21 NOTE — PROGRESS NOTES
Subjective:   Patient ID:  Maria Del Carmen Spence is a 76 y.o. female who presents for follow up of Atrial Fibrillation      72 yo female, PMH glaucoma, s/p knee surgery. Can not climb the stairs due to knee pain.   DURÁN is the same. Denied chest pain, palpitation, dizziness, syncope, orthopnea, PND, and claudication.   She gained 45 lbs in 9 years. Her TSH normal.  Most of activities limited by back pain and knee pain with 6/10 in severity. She is taking ASA for pain control.  Cam in today due to paroxysmal afib on EKG done on 06-. She had coughing and heart burning and saw PCP on 06/14 and had EKG.  She denied fluttering sensation, chest pain and dyspnea.  Today, EKG showed Afib    Carotid US normal and JAMIA left 0.98 and right 0.96 in  by Life [facility] screening.  EKG in : NSR  ECHO in :  1 - Normal left ventricular systolic function (EF 55-60%).   2 - Normal left ventricular diastolic function.   3 - Normal right ventricular systolic function .   4 - Mild tricuspid regurgitation.   5 - Trivial to mild pulmonic regurgitation.           Past Medical History:   Diagnosis Date    Arthritis     Chronic LBP     ESIs x 3 with Dr. Hicks, PT at [place], chiropractor    Glaucoma     Hypertension        Past Surgical History:   Procedure Laterality Date    CATARACT EXTRACTION W/  INTRAOCULAR LENS IMPLANT  OU    TUBAL LIGATION         Social History   Substance Use Topics    Smoking status: Never Smoker    Smokeless tobacco: Never Used    Alcohol use No       Family History   Problem Relation Age of Onset    Glaucoma Mother     Cancer Mother     Cataracts Mother     Hypertension Mother     Hypertension Father     Diabetes Paternal Aunt     Strabismus Neg Hx     Retinal detachment Neg Hx     Macular degeneration Neg Hx     Blindness Neg Hx     Amblyopia Neg Hx     Stroke Neg Hx     Thyroid disease Neg Hx          Review of Systems   Constitution: Negative for decreased appetite, diaphoresis,  fever, weakness, malaise/fatigue and night sweats.   HENT: Negative for headaches and nosebleeds.    Eyes: Negative for blurred vision and double vision.   Cardiovascular: Negative for chest pain, claudication, dyspnea on exertion, irregular heartbeat, leg swelling, near-syncope, orthopnea, palpitations, paroxysmal nocturnal dyspnea and syncope.   Respiratory: Negative for cough, shortness of breath, sleep disturbances due to breathing, snoring, sputum production and wheezing.    Endocrine: Negative for cold intolerance and polyuria.   Hematologic/Lymphatic: Does not bruise/bleed easily.   Skin: Negative for rash.   Musculoskeletal: Positive for arthritis and joint pain. Negative for back pain, falls, joint swelling and neck pain.   Gastrointestinal: Negative for abdominal pain, heartburn, nausea and vomiting.   Genitourinary: Negative for dysuria, frequency and hematuria.   Neurological: Negative for difficulty with concentration, dizziness, focal weakness, light-headedness, numbness and seizures.   Psychiatric/Behavioral: Negative for depression, memory loss and substance abuse. The patient does not have insomnia.    Allergic/Immunologic: Negative for HIV exposure and hives.       Objective:   Physical Exam   Constitutional: She is oriented to person, place, and time. She appears well-nourished.   HENT:   Head: Normocephalic.   Eyes: Pupils are equal, round, and reactive to light.   Neck: Normal carotid pulses and no JVD present. Carotid bruit is not present. No thyromegaly present.   Cardiovascular: Normal rate, normal heart sounds and normal pulses.  An irregularly irregular rhythm present.  No extrasystoles are present. PMI is not displaced.  Exam reveals no gallop and no S3.    No murmur heard.  Pulses:       Radial pulses are 2+ on the right side, and 2+ on the left side.   S2 split   Pulmonary/Chest: Breath sounds normal. No stridor. No respiratory distress.   Abdominal: Soft. Bowel sounds are normal. There  is no tenderness. There is no rebound.   Musculoskeletal: Normal range of motion.   Neurological: She is alert and oriented to person, place, and time.   Skin: Skin is intact. No rash noted.   Psychiatric: Her behavior is normal.       Lab Results   Component Value Date    CHOL 239 (H) 02/08/2017     Lab Results   Component Value Date    HDL 69 02/08/2017     Lab Results   Component Value Date    LDLCALC 152.0 02/08/2017     Lab Results   Component Value Date    TRIG 90 02/08/2017     Lab Results   Component Value Date    CHOLHDL 28.9 02/08/2017       Chemistry        Component Value Date/Time     06/14/2017 1116    K 5.3 (H) 06/14/2017 1116     06/14/2017 1116    CO2 24 06/14/2017 1116    BUN 15 06/14/2017 1116    CREATININE 0.8 06/14/2017 1116    GLU 81 06/14/2017 1116        Component Value Date/Time    CALCIUM 10.3 06/14/2017 1116    ALKPHOS 71 02/08/2017 0859    AST 25 02/08/2017 0859    ALT 15 02/08/2017 0859    BILITOT 0.4 02/08/2017 0859          Lab Results   Component Value Date    TSH 2.716 06/14/2017     No results found for: INR, PROTIME  Lab Results   Component Value Date    WBC 9.12 06/14/2017    HGB 13.1 06/14/2017    HCT 40.2 06/14/2017    MCV 96 06/14/2017     06/14/2017     BMP  Sodium   Date Value Ref Range Status   06/14/2017 140 136 - 145 mmol/L Final     Potassium   Date Value Ref Range Status   06/14/2017 5.3 (H) 3.5 - 5.1 mmol/L Final     Chloride   Date Value Ref Range Status   06/14/2017 105 95 - 110 mmol/L Final     CO2   Date Value Ref Range Status   06/14/2017 24 23 - 29 mmol/L Final     BUN, Bld   Date Value Ref Range Status   06/14/2017 15 8 - 23 mg/dL Final     Creatinine   Date Value Ref Range Status   06/14/2017 0.8 0.5 - 1.4 mg/dL Final     Calcium   Date Value Ref Range Status   06/14/2017 10.3 8.7 - 10.5 mg/dL Final     Anion Gap   Date Value Ref Range Status   06/14/2017 11 8 - 16 mmol/L Final     eGFR if    Date Value Ref Range Status    06/14/2017 >60.0 >60 mL/min/1.73 m^2 Final     eGFR if non    Date Value Ref Range Status   06/14/2017 >60.0 >60 mL/min/1.73 m^2 Final     Comment:     Calculation used to obtain the estimated glomerular filtration  rate (eGFR) is the CKD-EPI equation. Since race is unknown   in our information system, the eGFR values for   -American and Non--American patients are given   for each creatinine result.       Estimated Creatinine Clearance: 63.8 mL/min (based on Cr of 0.8).     Assessment:      1. Persistent atrial fibrillation    2.    3. Essential hypertension      Normal TSH  Plan:   Echo  D/c ASA and Lisinopirl  Add Metoprolol 25 mg bid and Eliuis 5 mg bid  Discussed the benefits and risks of the novel oral anticoagulants. Her  had hematuria when he was on Xarelto.   RTC in 4 weeks.

## 2017-06-27 ENCOUNTER — CLINICAL SUPPORT (OUTPATIENT)
Dept: CARDIOLOGY | Facility: CLINIC | Age: 77
End: 2017-06-27
Payer: MEDICARE

## 2017-06-27 DIAGNOSIS — I48.19 PERSISTENT ATRIAL FIBRILLATION: ICD-10-CM

## 2017-06-27 LAB
DIASTOLIC DYSFUNCTION: NO
ESTIMATED PA SYSTOLIC PRESSURE: 34.14
RETIRED EF AND QEF - SEE NOTES: 60 (ref 55–65)
TRICUSPID VALVE REGURGITATION: NORMAL

## 2017-06-27 PROCEDURE — 93306 TTE W/DOPPLER COMPLETE: CPT | Mod: PBBFAC | Performed by: NUCLEAR MEDICINE

## 2017-06-30 ENCOUNTER — OFFICE VISIT (OUTPATIENT)
Dept: INTERNAL MEDICINE | Facility: CLINIC | Age: 77
End: 2017-06-30
Payer: MEDICARE

## 2017-06-30 VITALS
TEMPERATURE: 98 F | OXYGEN SATURATION: 98 % | HEART RATE: 66 BPM | BODY MASS INDEX: 31.81 KG/M2 | HEIGHT: 65 IN | DIASTOLIC BLOOD PRESSURE: 74 MMHG | WEIGHT: 190.94 LBS | SYSTOLIC BLOOD PRESSURE: 139 MMHG

## 2017-06-30 DIAGNOSIS — B37.2 YEAST DERMATITIS: ICD-10-CM

## 2017-06-30 DIAGNOSIS — Z23 IMMUNIZATION DUE: ICD-10-CM

## 2017-06-30 DIAGNOSIS — M85.80 OSTEOPENIA, UNSPECIFIED LOCATION: Primary | ICD-10-CM

## 2017-06-30 LAB — 25(OH)D3+25(OH)D2 SERPL-MCNC: 77 NG/ML

## 2017-06-30 PROCEDURE — 99214 OFFICE O/P EST MOD 30 MIN: CPT | Mod: S$PBB,,, | Performed by: FAMILY MEDICINE

## 2017-06-30 PROCEDURE — 1125F AMNT PAIN NOTED PAIN PRSNT: CPT | Mod: ,,, | Performed by: FAMILY MEDICINE

## 2017-06-30 PROCEDURE — 90670 PCV13 VACCINE IM: CPT | Mod: PBBFAC,PO

## 2017-06-30 PROCEDURE — 99215 OFFICE O/P EST HI 40 MIN: CPT | Mod: PBBFAC,PO | Performed by: FAMILY MEDICINE

## 2017-06-30 PROCEDURE — 99999 PR PBB SHADOW E&M-EST. PATIENT-LVL V: CPT | Mod: PBBFAC,,, | Performed by: FAMILY MEDICINE

## 2017-06-30 PROCEDURE — 82306 VITAMIN D 25 HYDROXY: CPT

## 2017-06-30 PROCEDURE — 1159F MED LIST DOCD IN RCRD: CPT | Mod: ,,, | Performed by: FAMILY MEDICINE

## 2017-06-30 RX ORDER — FLUCONAZOLE 150 MG/1
TABLET ORAL
Qty: 2 TABLET | Refills: 0 | Status: SHIPPED | OUTPATIENT
Start: 2017-06-30 | End: 2017-11-29

## 2017-06-30 RX ORDER — ACETAMINOPHEN 500 MG
1000 TABLET ORAL 3 TIMES DAILY
COMMUNITY
End: 2024-02-26

## 2017-06-30 RX ORDER — CLOTRIMAZOLE AND BETAMETHASONE DIPROPIONATE 10; .64 MG/G; MG/G
CREAM TOPICAL 2 TIMES DAILY
Qty: 15 G | Refills: 0 | Status: SHIPPED | OUTPATIENT
Start: 2017-06-30 | End: 2017-11-29

## 2017-06-30 NOTE — PATIENT INSTRUCTIONS
A & D Ointment  Candida Skin Infection (Adult)  Candida is type of yeast. It grows naturally on the skin and in the mouth. If it grows out of control, it can cause an infection. Candida can cause infections in the genital area, skin folds, in the mouth, and under the breasts. Anyone can get this infection. It is more common in a person with a weak immune system, such as from diabetes, HIV, or cancer. Its also more common in someone who has been on antibiotic therapy. And its more common people who are overweight or who have incontinence. Wearing tight-fitting clothing and taking part in activities with lots of skin-to-skin contact can also put you at risk.  Candida causes the skin to become bright red and inflamed. The border of the infected part of the skin is often raised. The infection causes pain and itching. Sometimes the skin peels and bleeds. In the mouth, candida is called thrush, and may cause white thickened areas.  A Candida rash is most often treated with an antifungal cream or ointment. The rash will clear a few days after starting the medicine. Infections that dont go away may need a prescription medicine. In rare cases, a bacterial infection can also occur.  Home care  Your healthcare provider will recommend an antifungal cream or ointment for the rash. He or she may also prescribe a medicine for the itch. Follow all instructions for using these medicines. Dont use cornstarch powder. Cornstarch can cause the Candida infection to get worse.  General care:  · Keep your skin clean by washing the area twice a day.  · Use the cream as directed until your rash is gone. Once the skin has healed, keep it dry to prevent another infection.   · If you are overweight, talk with your healthcare provider about a plan to lose excess weight.  · Avoid clothes that fit tightly.  Follow-up care  Follow up with your healthcare provider, or as advised. Your rash will clear in 7 to 14 days. Call your healthcare  provider if the rash is not gone after 14 days.  When to seek medical advice  Call your healthcare provider right away if any of these occur:  · Pain or redness that gets worse or spreads  · Fluid coming from the skin  · Yellow crusts on the skin  · Fever of 100.4°F (38°C) or higher, or as directed by your healthcare provider  Date Last Reviewed: 9/1/2016  © 1352-4227 [employer]. [address] [place] [address], [place] [id number]. All rights reserved. This information is not intended as a substitute for professional medical care. Always follow your healthcare professional's instructions.

## 2017-06-30 NOTE — PROGRESS NOTES
Subjective:       Patient ID: Maria Del Carmen Spence is a 76 y.o. female.    Chief Complaint: Rash    She has an itching burning rash to inguinal fold last few weeks - she did have a new pair of underwear which may have started the problem. The elastic was a little different than usual.    Also results of BMD are available - these are reviewed in full      Rash   This is a new problem. The current episode started 1 to 4 weeks ago. The rash is characterized by itchiness, burning and redness. Associated with: new pair of underwear. Pertinent negatives include no diarrhea or fever. Treatments tried: caldecene lotion. The treatment provided moderate relief.     Review of Systems   Constitutional: Negative for fever.   Gastrointestinal: Negative for diarrhea.   Skin: Positive for rash.       Objective:      Physical Exam   Constitutional: She is oriented to person, place, and time. She appears well-developed.   HENT:   Head: Normocephalic and atraumatic.   Cardiovascular: Normal rate, regular rhythm and normal heart sounds.    Pulmonary/Chest: Effort normal and breath sounds normal.   Neurological: She is alert and oriented to person, place, and time.   Skin: Skin is warm and dry.   To B inguinal folds erythematous macular rash with some outlying patches   Psychiatric: She has a normal mood and affect. Her behavior is normal.         Assessment/Plan:     1. Osteopenia, unspecified location  Vitamin D   2. Immunization due  Pneumococcal Conjugate Vaccine (13 Valent) (IM)   3. Yeast dermatitis  fluconazole (DIFLUCAN) 150 MG Tab    clotrimazole-betamethasone 1-0.05% (LOTRISONE) cream   [place], loose clothing. Will give brief course of combination steroid/antifungal due to possible contact dermatitis origin of the rash.  BMD results reviewed in detail - no meds at this time - will repeat in a year on Vitamin D at appropriate level - check today - and calcium, weight bearing exercise.  More than 50% of visit was spent in counseling  regarding disease entity, test results and implications, options, recommendations, medication use, side effects, expectations, and precautions.  Total time spent face-to-face was 25 minutes.

## 2017-07-05 ENCOUNTER — TELEPHONE (OUTPATIENT)
Dept: INTERNAL MEDICINE | Facility: CLINIC | Age: 77
End: 2017-07-05

## 2017-07-05 NOTE — TELEPHONE ENCOUNTER
Spoke to the patient  // notified of the results/ message // patient verbalized understanding// lab results

## 2017-07-05 NOTE — TELEPHONE ENCOUNTER
----- Message from Maryam Sharma sent at 7/5/2017  4:01 PM CDT -----  Patient returning nurse call. Please adv/call [phone].//thanks. cw

## 2017-07-11 ENCOUNTER — TELEPHONE (OUTPATIENT)
Dept: CARDIOLOGY | Facility: CLINIC | Age: 77
End: 2017-07-11

## 2017-07-11 NOTE — TELEPHONE ENCOUNTER
----- Message from Grayson Olson sent at 7/11/2017  9:51 AM CDT -----  Pt is requesting a call from nurse to discuss her medications.          Please call pt back at [phone]

## 2017-07-15 ENCOUNTER — NURSE TRIAGE (OUTPATIENT)
Dept: ADMINISTRATIVE | Facility: CLINIC | Age: 77
End: 2017-07-15

## 2017-07-15 ENCOUNTER — HOSPITAL ENCOUNTER (EMERGENCY)
Facility: HOSPITAL | Age: 77
Discharge: HOME | End: 2017-07-15
Attending: EMERGENCY MEDICINE
Payer: MEDICARE

## 2017-07-15 VITALS
TEMPERATURE: 98 F | SYSTOLIC BLOOD PRESSURE: 165 MMHG | WEIGHT: 189 LBS | HEIGHT: 65 IN | DIASTOLIC BLOOD PRESSURE: 80 MMHG | HEART RATE: 75 BPM | OXYGEN SATURATION: 99 % | BODY MASS INDEX: 31.49 KG/M2 | RESPIRATION RATE: 18 BRPM

## 2017-07-15 DIAGNOSIS — Z86.69 HISTORY OF OPEN-ANGLE GLAUCOMA: ICD-10-CM

## 2017-07-15 DIAGNOSIS — Z79.01 CHRONIC ANTICOAGULATION: ICD-10-CM

## 2017-07-15 DIAGNOSIS — R20.0 NUMBNESS: ICD-10-CM

## 2017-07-15 DIAGNOSIS — H57.13 EYE PAIN, BILATERAL: ICD-10-CM

## 2017-07-15 DIAGNOSIS — R51.9 NONINTRACTABLE EPISODIC HEADACHE, UNSPECIFIED HEADACHE TYPE: Primary | ICD-10-CM

## 2017-07-15 DIAGNOSIS — I10 ESSENTIAL HYPERTENSION: ICD-10-CM

## 2017-07-15 PROCEDURE — 25000003 PHARM REV CODE 250: Performed by: EMERGENCY MEDICINE

## 2017-07-15 PROCEDURE — 99284 EMERGENCY DEPT VISIT MOD MDM: CPT | Mod: 25

## 2017-07-15 PROCEDURE — 93010 ELECTROCARDIOGRAM REPORT: CPT | Mod: ,,, | Performed by: INTERNAL MEDICINE

## 2017-07-15 PROCEDURE — 93005 ELECTROCARDIOGRAM TRACING: CPT

## 2017-07-15 RX ORDER — ASPIRIN 325 MG
325 TABLET ORAL DAILY
COMMUNITY
End: 2017-07-20

## 2017-07-15 RX ORDER — LISINOPRIL 10 MG/1
10 TABLET ORAL DAILY
COMMUNITY
End: 2017-07-20

## 2017-07-15 RX ORDER — TETRACAINE HYDROCHLORIDE 5 MG/ML
2 SOLUTION OPHTHALMIC
Status: COMPLETED | OUTPATIENT
Start: 2017-07-15 | End: 2017-07-15

## 2017-07-15 RX ORDER — BUTALBITAL, ACETAMINOPHEN AND CAFFEINE 50; 325; 40 MG/1; MG/1; MG/1
1 TABLET ORAL EVERY 4 HOURS PRN
Qty: 20 TABLET | Refills: 0 | Status: SHIPPED | OUTPATIENT
Start: 2017-07-15 | End: 2017-08-14

## 2017-07-15 RX ORDER — BUTALBITAL, ACETAMINOPHEN AND CAFFEINE 50; 325; 40 MG/1; MG/1; MG/1
1 TABLET ORAL
Status: COMPLETED | OUTPATIENT
Start: 2017-07-15 | End: 2017-07-15

## 2017-07-15 RX ADMIN — TETRACAINE HYDROCHLORIDE 2 DROP: 5 SOLUTION OPHTHALMIC at 11:07

## 2017-07-15 RX ADMIN — BUTALBITAL, ACETAMINOPHEN, AND CAFFEINE 1 TABLET: 50; 325; 40 TABLET ORAL at 10:07

## 2017-07-15 NOTE — ED PROVIDER NOTES
SCRIBE #1 NOTE: I, Rosalia Castanon, am scribing for, and in the presence of, Gianna Sierra MD. I have scribed the entire note.      History      Chief Complaint   Patient presents with    Headache     with eye pressure, confusion. Started gradually worsening over past 2  weeks. States numbness to face started last night.        Review of patient's allergies indicates:   Allergen Reactions    No known drug allergies         HPI   HPI    7/15/2017, 10:29 AM   History obtained from the patient      History of Present Illness: Maria Del Carmen Spence is a 76 y.o. female patient with PMHx of glaucoma who presents to the Emergency Department for HA which onset gradually 2 weeks ago, reports headache is not severe it is nagging. Symptoms are constant and moderate in severity. Pt describes pain as a pressure. Pt believes she is having a reaction to eliquis but denies any swelling, rash, bleeding from anywhere. No mitigating or exacerbating factors reported. Associated sxs include facial tingling and bilateral eye pain. Patient denies any visual disturbance, blurry vision or double vision, slurred speech, unilateral numbness or weakness, n/v/d, abd pain, CP, SOB, weakness, numbness, dizziness, lightheadedness, back pain, neck pain, trauma, falls, and all other sxs at this time. No further complaints or concerns at this time.       Arrival mode: Personal vehicle    PCP: Elda Powers MD       Past Medical History:  Past Medical History:   Diagnosis Date    A-fib     Arthritis     Chronic LBP     ESIs x 3 with Dr. Hicks, PT at [facility], chiropractor    Eye pain     Frequent headaches     GERD (gastroesophageal reflux disease)     Glaucoma     Hyperlipemia     Hypertension        Past Surgical History:  Past Surgical History:   Procedure Laterality Date    CATARACT EXTRACTION W/  INTRAOCULAR LENS IMPLANT  OU    TUBAL LIGATION           Family History:  Family History   Problem Relation Age of Onset    Glaucoma Mother      Cancer Mother     Cataracts Mother     Hypertension Mother     Hypertension Father     Diabetes Paternal Aunt     Strabismus Neg Hx     Retinal detachment Neg Hx     Macular degeneration Neg Hx     Blindness Neg Hx     Amblyopia Neg Hx     Stroke Neg Hx     Thyroid disease Neg Hx        Social History:  Social History     Social History Main Topics    Smoking status: Never Smoker    Smokeless tobacco: Never Used    Alcohol use No    Drug use: No    Sexual activity: Yes     Partners: Male       ROS   Review of Systems   Constitutional: Negative for fever.   HENT: Negative for sore throat.    Eyes: Positive for pain (bilateral). Negative for photophobia, redness and visual disturbance.   Respiratory: Negative for shortness of breath.    Cardiovascular: Negative for chest pain.   Gastrointestinal: Negative for abdominal pain, blood in stool, constipation, diarrhea, nausea and vomiting.   Genitourinary: Negative for decreased urine volume, difficulty urinating, dysuria, flank pain and hematuria.   Musculoskeletal: Negative for back pain and neck pain.   Skin: Negative for rash.   Neurological: Positive for headaches. Negative for dizziness, weakness, light-headedness and numbness.        (+) facial tingling   Hematological: Does not bruise/bleed easily.       Physical Exam      Initial Vitals [07/15/17 1010]   BP Pulse Resp Temp SpO2   (!) 190/80 87 20 97.9 °F (36.6 °C) 98 %      MAP       116.67          Physical Exam  Nursing Notes and Vital Signs Reviewed.  Constitutional: Patient is in no acute distress. Well-developed and well-nourished.  Head: Atraumatic. Normocephalic.  Eyes: Visual Acuity: Right eye . Left eye .   External: Lids are normal without edema or erythema. No conjunctival injection or edema. Normal sclera. No drainage. No proptosis.   EOM: Normal. Conjugate gaze.   Pupils: PERRL. No photophobia.  Visual fields are intact.  Intraocular Pressure: Right eye 11 mmHg. Left eye 12 mmHg.  "  ENT: Mucous membranes are moist. Oropharynx is clear and symmetric.   Neck: Supple. Full ROM. No lymphadenopathy.  Cardiovascular: Regular rate. Regular rhythm. No murmurs, rubs, or gallops. Distal pulses are 2+ and symmetric.  Pulmonary/Chest: No respiratory distress. Clear to auscultation bilaterally. No wheezing, rales, or rhonchi.  Abdominal: Soft and non-distended.  There is no tenderness.  No rebound, guarding, or rigidity. Good bowel sounds.  Genitourinary: No CVA tenderness  Musculoskeletal: Moves all extremities. No obvious deformities. No edema. No calf tenderness.  Skin: Warm and dry.  Neurological:  Alert, awake, and appropriate. No facial droop.  Normal speech.  No acute focal neurological deficits are appreciated. Equal  strength bilaterally. No pronator drift.  Psychiatric: Normal affect. Good eye contact. Appropriate in content.    ED Course    Procedures  ED Vital Signs:  Vitals:    07/15/17 1010 07/15/17 1020 07/15/17 1035 07/15/17 1050   BP: (!) 190/80 (!) 154/73 (!) 157/69 (!) 164/69   Pulse: 87 76 70 72   Resp: 20 13 18 14   Temp: 97.9 °F (36.6 °C)      TempSrc: Oral      SpO2: 98% 98% 99% 97%   Weight: 85.7 kg (189 lb)      Height: 5' 5" (1.651 m)       07/15/17 1052 07/15/17 1104 07/15/17 1119 07/15/17 1134   BP:  (!) 151/85 (!) 160/87 (!) 165/80   Pulse: 72 75 75 75   Resp: 16 20 17 18   Temp:       TempSrc:       SpO2: 99% 99% 100% 99%   Weight:       Height:        07/15/17 1151   BP:    Pulse:    Resp:    Temp: 97.7 °F (36.5 °C)   TempSrc: Oral   SpO2:    Weight:    Height:        Imaging Results:  Imaging Results          CT Head Without Contrast (Final result)  Result time 07/15/17 11:04:15    Final result by Ryan Eason III, MD (07/15/17 11:04:15)                 Impression:     No bleed or other acute intracranial event suggested.    All CT scans at this facility use dose modulation, iterative reconstruction, and/or weight based dosing when appropriate to reduce radiation " dose to as low as reasonably achievable.      Electronically signed by: JOSE VALDEZ MD  Date:     07/15/17  Time:    11:04              Narrative:    CT of the head without contrast.    Clinical indication: headache.    Standard noncontrast CT scan of the brain. No previous for comparison.    The brain and ventricles are of normal appearance for patient's age. Her No hemorrhage, mass lesion, or hydrocephalus.   No depressed skull fracture.                             The EKG was ordered, reviewed, and independently interpreted by the ED provider.  Interpretation time: 10:24  Rate: 71 BPM  Rhythm: sinus rhythm with occasional premature ventricular complexes  Interpretation: No STEMI.           The Emergency Provider reviewed the vital signs and test results, which are outlined above.    ED Discussion     11:35 AM: Reassessed pt at this time.  Pt states her condition has improved at this time. Discussed with pt all pertinent ED information and results. Discussed pt dx and plan of tx. Gave pt all f/u and return to the ED instructions. All questions and concerns were addressed at this time. Pt expresses understanding of information and instructions, and is comfortable with plan to discharge. Pt is stable for discharge.    ED Medication(s):  Medications   tetracaine HCl (PF) 0.5 % Drop 2 drop (2 drops Both Eyes Given 7/15/17 1112)   butalbital-acetaminophen-caffeine -40 mg per tablet 1 tablet (1 tablet Oral Given 7/15/17 1050)       Discharge Medication List as of 7/15/2017 11:36 AM      START taking these medications    Details   butalbital-acetaminophen-caffeine -40 mg (FIORICET, ESGIC) -40 mg per tablet Take 1 tablet by mouth every 4 (four) hours as needed for Pain or Headaches., Starting Sat 7/15/2017, Until Mon 8/14/2017, Print             Follow-up Information     Elda Powers MD. Schedule an appointment as soon as possible for a visit in 2 days.    Specialty:  Family Medicine  Why:   Return to the Emergency room, If symptoms worsen  Contact information:  [place] [id number]  [phone]                     Medical Decision Making    Medical Decision Making:   Clinical Tests:   Radiological Study: Reviewed and Ordered           Scribe Attestation:   Scribe #1: I performed the above scribed service and the documentation accurately describes the services I performed. I attest to the accuracy of the note.    Attending:   Physician Attestation Statement for Scribe #1: I, Gianna Sierra MD, personally performed the services described in this documentation, as scribed by Rosalia Castanon, in my presence, and it is both accurate and complete.          Clinical Impression       ICD-10-CM ICD-9-CM   1. Nonintractable episodic headache, unspecified headache type R51 784.0   2. Numbness R20.0 782.0   3. Chronic anticoagulation Z79.01 V58.61   4. Essential hypertension I10 401.9   5. Eye pain, bilateral H57.13 379.91   6. History of open-angle glaucoma Z86.69 V12.49       Disposition:   Disposition: Discharged  Condition: Stable         Gianna Sierra MD  07/15/17 6946

## 2017-07-15 NOTE — TELEPHONE ENCOUNTER
Reason for Disposition   [1] Numbness (i.e., loss of sensation) of the face, arm / hand, or leg / foot on one side of the body AND [2] sudden onset AND [3] present now    Protocols used: [facility] NEUROLOGIC DEFICIT-A-AH    Started elquis and metoprolol for a fib in jun. Since then has not felt good, reports headache.  yesterday states her  Face started going numb yesterday-  Tip of nose, lips and cheeks.  She tried calling office for advise but states but states it was late and did not get call back.   She stopped taking the eleiquis and metoprolol yesterday.   Wanted to know if she should restart lisinopril and asa that she was on before.  Advised she needs to be evaluated in the ED.  Advised protocol recommends she go by ambulance.  She states she will have her  take her by car.  encouraged to go now.  Voiced understanding.

## 2017-07-20 ENCOUNTER — OFFICE VISIT (OUTPATIENT)
Dept: CARDIOLOGY | Facility: CLINIC | Age: 77
End: 2017-07-20
Payer: MEDICARE

## 2017-07-20 VITALS
HEIGHT: 65 IN | BODY MASS INDEX: 31.74 KG/M2 | HEART RATE: 60 BPM | SYSTOLIC BLOOD PRESSURE: 114 MMHG | WEIGHT: 190.5 LBS | DIASTOLIC BLOOD PRESSURE: 78 MMHG

## 2017-07-20 DIAGNOSIS — I48.0 PAROXYSMAL ATRIAL FIBRILLATION: Primary | ICD-10-CM

## 2017-07-20 PROCEDURE — 99213 OFFICE O/P EST LOW 20 MIN: CPT | Mod: PBBFAC,PO | Performed by: INTERNAL MEDICINE

## 2017-07-20 PROCEDURE — 99999 PR PBB SHADOW E&M-EST. PATIENT-LVL III: CPT | Mod: PBBFAC,,, | Performed by: INTERNAL MEDICINE

## 2017-07-20 PROCEDURE — 99215 OFFICE O/P EST HI 40 MIN: CPT | Mod: S$PBB,,, | Performed by: INTERNAL MEDICINE

## 2017-07-20 PROCEDURE — 1125F AMNT PAIN NOTED PAIN PRSNT: CPT | Mod: ,,, | Performed by: INTERNAL MEDICINE

## 2017-07-20 PROCEDURE — 1159F MED LIST DOCD IN RCRD: CPT | Mod: ,,, | Performed by: INTERNAL MEDICINE

## 2017-07-20 RX ORDER — AMIODARONE HYDROCHLORIDE 200 MG/1
200 TABLET ORAL DAILY
Qty: 30 TABLET | Refills: 5 | Status: SHIPPED | OUTPATIENT
Start: 2017-07-28 | End: 2017-12-19 | Stop reason: SDUPTHER

## 2017-07-20 RX ORDER — METOPROLOL TARTRATE 25 MG/1
25 TABLET, FILM COATED ORAL 2 TIMES DAILY
Qty: 60 TABLET | Refills: 11 | Status: SHIPPED | OUTPATIENT
Start: 2017-07-20 | End: 2018-05-30 | Stop reason: SDUPTHER

## 2017-07-20 RX ORDER — METOPROLOL TARTRATE 25 MG/1
25 TABLET, FILM COATED ORAL 2 TIMES DAILY
Qty: 60 TABLET | Refills: 11 | Status: SHIPPED | OUTPATIENT
Start: 2017-07-20 | End: 2017-07-20 | Stop reason: SDUPTHER

## 2017-07-20 RX ORDER — AMIODARONE HYDROCHLORIDE 200 MG/1
200 TABLET ORAL 2 TIMES DAILY
Qty: 14 TABLET | Refills: 0 | Status: SHIPPED | OUTPATIENT
Start: 2017-07-20 | End: 2017-07-27

## 2017-07-20 NOTE — PROGRESS NOTES
Subjective:   Patient ID:  Maria Del Carmen Spence is a 76 y.o. female who presents for follow up of Atrial Fibrillation; Edema (facial edema); and Itchy Eye      74 yo female, PMH glaucoma, s/p knee surgery. Can not climb the stairs due to knee pain.   Dx of PAF in  and Eliquis and Metoprolol were added.  Today, feels ichthy, face swelling, muscle ache. Also agitated, eyes blurred and DURÁN is worse.  No chest pain,   Feels sweating if moving around.  Carotid US normal and JAMIA left 0.98 and right 0.96 in  by Life line screening.  EKG in : NSR  ECHO in :  1 - Normal left ventricular systolic function (EF 55-60%).   2 - Normal left ventricular diastolic function.   3 - Normal right ventricular systolic function .   4 - Mild tricuspid regurgitation.   5 - Trivial to mild pulmonic regurgitation.           Past Medical History:   Diagnosis Date    A-fib     Arthritis     Chronic LBP     ESIs x 3 with Dr. Hicks, PT at [facility], chiropractor    Eye pain     Frequent headaches     GERD (gastroesophageal reflux disease)     Glaucoma     Hyperlipemia     Hypertension        Past Surgical History:   Procedure Laterality Date    CATARACT EXTRACTION W/  INTRAOCULAR LENS IMPLANT  OU    TUBAL LIGATION         Social History   Substance Use Topics    Smoking status: Never Smoker    Smokeless tobacco: Never Used    Alcohol use No       Family History   Problem Relation Age of Onset    Glaucoma Mother     Cancer Mother     Cataracts Mother     Hypertension Mother     Hypertension Father     Diabetes Paternal Aunt     Strabismus Neg Hx     Retinal detachment Neg Hx     Macular degeneration Neg Hx     Blindness Neg Hx     Amblyopia Neg Hx     Stroke Neg Hx     Thyroid disease Neg Hx          Review of Systems   Constitution: Negative for decreased appetite, diaphoresis, fever, weakness, malaise/fatigue and night sweats.   HENT: Negative for headaches and nosebleeds.    Eyes: Positive for  blurred vision. Negative for double vision.   Cardiovascular: Positive for dyspnea on exertion. Negative for chest pain, claudication, irregular heartbeat, leg swelling, near-syncope, orthopnea, palpitations, paroxysmal nocturnal dyspnea and syncope.   Respiratory: Negative for cough, shortness of breath, sleep disturbances due to breathing, snoring, sputum production and wheezing.    Endocrine: Negative for cold intolerance and polyuria.   Hematologic/Lymphatic: Does not bruise/bleed easily.   Skin: Negative for rash.   Musculoskeletal: Positive for arthritis and joint pain. Negative for back pain, falls, joint swelling and neck pain.   Gastrointestinal: Negative for abdominal pain, heartburn, nausea and vomiting.   Genitourinary: Negative for dysuria, frequency and hematuria.   Neurological: Negative for difficulty with concentration, dizziness, focal weakness, light-headedness, numbness and seizures.   Psychiatric/Behavioral: Negative for depression, memory loss and substance abuse. The patient does not have insomnia.    Allergic/Immunologic: Negative for HIV exposure and hives.       Objective:   Physical Exam   Constitutional: She is oriented to person, place, and time. She appears well-nourished.   HENT:   Head: Normocephalic.   Eyes: Pupils are equal, round, and reactive to light.   Neck: Normal carotid pulses and no JVD present. Carotid bruit is not present. No thyromegaly present.   Cardiovascular: Normal rate, normal heart sounds and normal pulses.  An irregularly irregular rhythm present.  No extrasystoles are present. PMI is not displaced.  Exam reveals no gallop and no S3.    No murmur heard.  Pulses:       Radial pulses are 2+ on the right side, and 2+ on the left side.   S2 split   Pulmonary/Chest: Breath sounds normal. No stridor. No respiratory distress.   Abdominal: Soft. Bowel sounds are normal. There is no tenderness. There is no rebound.   Musculoskeletal: Normal range of motion.   Neurological:  She is alert and oriented to person, place, and time.   Skin: Skin is intact. No rash noted.   Psychiatric: Her behavior is normal.       Lab Results   Component Value Date    CHOL 239 (H) 02/08/2017     Lab Results   Component Value Date    HDL 69 02/08/2017     Lab Results   Component Value Date    LDLCALC 152.0 02/08/2017     Lab Results   Component Value Date    TRIG 90 02/08/2017     Lab Results   Component Value Date    CHOLHDL 28.9 02/08/2017       Chemistry        Component Value Date/Time     06/14/2017 1116    K 5.3 (H) 06/14/2017 1116     06/14/2017 1116    CO2 24 06/14/2017 1116    BUN 15 06/14/2017 1116    CREATININE 0.8 06/14/2017 1116    GLU 81 06/14/2017 1116        Component Value Date/Time    CALCIUM 10.3 06/14/2017 1116    ALKPHOS 71 02/08/2017 0859    AST 25 02/08/2017 0859    ALT 15 02/08/2017 0859    BILITOT 0.4 02/08/2017 0859    ESTGFRAFRICA >60.0 06/14/2017 1116    EGFRNONAA >60.0 06/14/2017 1116          Lab Results   Component Value Date    TSH 2.716 06/14/2017     No results found for: INR, PROTIME  Lab Results   Component Value Date    WBC 9.12 06/14/2017    HGB 13.1 06/14/2017    HCT 40.2 06/14/2017    MCV 96 06/14/2017     06/14/2017     BMP  Sodium   Date Value Ref Range Status   06/14/2017 140 136 - 145 mmol/L Final     Potassium   Date Value Ref Range Status   06/14/2017 5.3 (H) 3.5 - 5.1 mmol/L Final     Chloride   Date Value Ref Range Status   06/14/2017 105 95 - 110 mmol/L Final     CO2   Date Value Ref Range Status   06/14/2017 24 23 - 29 mmol/L Final     BUN, Bld   Date Value Ref Range Status   06/14/2017 15 8 - 23 mg/dL Final     Creatinine   Date Value Ref Range Status   06/14/2017 0.8 0.5 - 1.4 mg/dL Final     Calcium   Date Value Ref Range Status   06/14/2017 10.3 8.7 - 10.5 mg/dL Final     Anion Gap   Date Value Ref Range Status   06/14/2017 11 8 - 16 mmol/L Final     eGFR if    Date Value Ref Range Status   06/14/2017 >60.0 >60  mL/min/1.73 m^2 Final     eGFR if non    Date Value Ref Range Status   06/14/2017 >60.0 >60 mL/min/1.73 m^2 Final     Comment:     Calculation used to obtain the estimated glomerular filtration  rate (eGFR) is the CKD-EPI equation. Since race is unknown   in our information system, the eGFR values for   -American and Non--American patients are given   for each creatinine result.       CrCl cannot be calculated (Patient's most recent sCr result is older than the maximum 7 days allowed.).     Assessment:      1. Paroxysmal atrial fibrillation      In the office, initial exam showed afib and then back to sinus with premature beats.    Plan:   Switched from Eliquis to Xarelto due to cost and itching  Add Amio 200 mg bid for 7 days, then 200 mg daily. CMP and TSH WNL  Continue metoprolol   RTC in 4 week

## 2017-08-14 ENCOUNTER — OFFICE VISIT (OUTPATIENT)
Dept: OPHTHALMOLOGY | Facility: CLINIC | Age: 77
End: 2017-08-14
Payer: MEDICARE

## 2017-08-14 ENCOUNTER — APPOINTMENT (OUTPATIENT)
Dept: OPHTHALMOLOGY | Facility: CLINIC | Age: 77
End: 2017-08-14
Payer: MEDICARE

## 2017-08-14 DIAGNOSIS — H40.1131 PRIMARY OPEN ANGLE GLAUCOMA OF BOTH EYES, MILD STAGE: Primary | ICD-10-CM

## 2017-08-14 DIAGNOSIS — Z96.1 PSEUDOPHAKIA: ICD-10-CM

## 2017-08-14 DIAGNOSIS — K21.9 GASTROESOPHAGEAL REFLUX DISEASE, ESOPHAGITIS PRESENCE NOT SPECIFIED: ICD-10-CM

## 2017-08-14 PROCEDURE — 99999 PR PBB SHADOW E&M-EST. PATIENT-LVL II: CPT | Mod: PBBFAC,,, | Performed by: OPHTHALMOLOGY

## 2017-08-14 PROCEDURE — 92250 FUNDUS PHOTOGRAPHY W/I&R: CPT | Mod: PBBFAC,PO | Performed by: OPHTHALMOLOGY

## 2017-08-14 PROCEDURE — 92014 COMPRE OPH EXAM EST PT 1/>: CPT | Mod: S$PBB,,, | Performed by: OPHTHALMOLOGY

## 2017-08-14 PROCEDURE — 99212 OFFICE O/P EST SF 10 MIN: CPT | Mod: PBBFAC,PO | Performed by: OPHTHALMOLOGY

## 2017-08-14 PROCEDURE — 92083 EXTENDED VISUAL FIELD XM: CPT | Mod: PBBFAC,PO | Performed by: OPHTHALMOLOGY

## 2017-08-14 RX ORDER — FAMOTIDINE 20 MG/1
TABLET, FILM COATED ORAL
Qty: 30 TABLET | OUTPATIENT
Start: 2017-08-14

## 2017-08-14 NOTE — PROGRESS NOTES
SUBJECTIVE:   Maria Del Carmen pSence is a 76 y.o. female   Corrected distance visual acuity was 20/20 in the right eye and 20/20 in the left eye.   Chief Complaint   Patient presents with    Glaucoma     4m HVF SDP chk; Latanoprost QHS OU, Timolol BID OD        HPI:  HPI     Glaucoma    Additional comments: 4m HVF SDP chk; Latanoprost QHS OU, Timolol BID OD           Comments   Pt states her eyes are a little blurry, but that's because of allergies.   No pain or discomfort. Pt states that she was taking a blood thinner and   she thinks she had a reaction to sarah few weeks ago. 100% compliant with   gtts.     1. Mild COAG OD>OS (init 24/20) Goal <17  2. PCIOL OU (Sulcus OD)    Latanoprost QHS OU  Timolol BID OD  O3FO       Last edited by Everett Hoffmann, Patient Care Assistant on 8/14/2017 11:03   AM. (History)        Assessment /Plan :  1. Primary open angle glaucoma of both eyes, mild stage Doing well, IOP within acceptable range relative to target IOP and no evidence of progression. Continue current treatment. Reviewed importance of continued compliance with treatment and follow up.     2. Pseudophakia  dislocation of the implant OD stable  OS stable     Return to clinic in 4 months  or as needed.  With IOP Check and GOCT

## 2017-08-21 ENCOUNTER — OFFICE VISIT (OUTPATIENT)
Dept: ORTHOPEDICS | Facility: CLINIC | Age: 77
End: 2017-08-21
Payer: MEDICARE

## 2017-08-21 VITALS
BODY MASS INDEX: 31.65 KG/M2 | RESPIRATION RATE: 18 BRPM | HEART RATE: 71 BPM | HEIGHT: 65 IN | DIASTOLIC BLOOD PRESSURE: 87 MMHG | SYSTOLIC BLOOD PRESSURE: 157 MMHG | WEIGHT: 190 LBS

## 2017-08-21 DIAGNOSIS — M17.11 ARTHRITIS OF KNEE, RIGHT: Primary | ICD-10-CM

## 2017-08-21 PROCEDURE — 1159F MED LIST DOCD IN RCRD: CPT | Mod: ,,, | Performed by: ORTHOPAEDIC SURGERY

## 2017-08-21 PROCEDURE — 1125F AMNT PAIN NOTED PAIN PRSNT: CPT | Mod: ,,, | Performed by: ORTHOPAEDIC SURGERY

## 2017-08-21 PROCEDURE — 99204 OFFICE O/P NEW MOD 45 MIN: CPT | Mod: S$PBB,,, | Performed by: ORTHOPAEDIC SURGERY

## 2017-08-21 PROCEDURE — 99999 PR PBB SHADOW E&M-EST. PATIENT-LVL III: CPT | Mod: PBBFAC,,, | Performed by: ORTHOPAEDIC SURGERY

## 2017-08-21 PROCEDURE — 3079F DIAST BP 80-89 MM HG: CPT | Mod: ,,, | Performed by: ORTHOPAEDIC SURGERY

## 2017-08-21 PROCEDURE — 99213 OFFICE O/P EST LOW 20 MIN: CPT | Mod: PBBFAC | Performed by: ORTHOPAEDIC SURGERY

## 2017-08-21 PROCEDURE — 3077F SYST BP >= 140 MM HG: CPT | Mod: ,,, | Performed by: ORTHOPAEDIC SURGERY

## 2017-08-21 RX ORDER — MELOXICAM 7.5 MG/1
7.5 TABLET ORAL
Qty: 30 TABLET | Refills: 2 | Status: SHIPPED | OUTPATIENT
Start: 2017-08-21 | End: 2017-11-21 | Stop reason: SDUPTHER

## 2017-08-21 NOTE — LETTER
August 25, 2017      Elda Powers MD  [address] [place] [address] [address] [place] [address]           [facility]  [address] [place] [address]  [place] [address]  Phone: [phone]  Fax: [phone]          Patient: Maria Del Carmen Spence   MR Number: [id number]   YOB: 1940   Date of Visit: 8/21/2017       Dear Dr. Elda Powers:    Thank you for referring Maria Del Carmen Spence to me for evaluation. Attached you will find relevant portions of my assessment and plan of care.    If you have questions, please do not hesitate to call me. I look forward to following Maria Del Carmen Spence along with you.    Sincerely,    Fatimah Zapien  CC:  No Recipients    If you would like to receive this communication electronically, please contact [email or web address] or [phone] to request more information on [employer] Link access.    For providers and/or their staff who would like to refer a patient to [facility], please contact us through our one-stop-shop provider referral line, Dana Duvall, at [phone].    If you feel you have received this communication in error or would no longer like to receive these types of communications, please e-mail [email or web address]

## 2017-08-23 ENCOUNTER — OFFICE VISIT (OUTPATIENT)
Dept: CARDIOLOGY | Facility: CLINIC | Age: 77
End: 2017-08-23
Payer: MEDICARE

## 2017-08-23 VITALS
WEIGHT: 192.81 LBS | DIASTOLIC BLOOD PRESSURE: 90 MMHG | BODY MASS INDEX: 32.12 KG/M2 | HEART RATE: 71 BPM | HEIGHT: 65 IN | OXYGEN SATURATION: 97 % | SYSTOLIC BLOOD PRESSURE: 142 MMHG

## 2017-08-23 DIAGNOSIS — I10 ESSENTIAL HYPERTENSION: ICD-10-CM

## 2017-08-23 DIAGNOSIS — I48.0 PAROXYSMAL ATRIAL FIBRILLATION: Primary | ICD-10-CM

## 2017-08-23 PROCEDURE — 3077F SYST BP >= 140 MM HG: CPT | Mod: ,,, | Performed by: INTERNAL MEDICINE

## 2017-08-23 PROCEDURE — 3080F DIAST BP >= 90 MM HG: CPT | Mod: ,,, | Performed by: INTERNAL MEDICINE

## 2017-08-23 PROCEDURE — 99214 OFFICE O/P EST MOD 30 MIN: CPT | Mod: S$PBB,,, | Performed by: INTERNAL MEDICINE

## 2017-08-23 PROCEDURE — 1159F MED LIST DOCD IN RCRD: CPT | Mod: ,,, | Performed by: INTERNAL MEDICINE

## 2017-08-23 PROCEDURE — 99213 OFFICE O/P EST LOW 20 MIN: CPT | Mod: PBBFAC,PO | Performed by: INTERNAL MEDICINE

## 2017-08-23 PROCEDURE — 99999 PR PBB SHADOW E&M-EST. PATIENT-LVL III: CPT | Mod: PBBFAC,,, | Performed by: INTERNAL MEDICINE

## 2017-08-23 NOTE — PROGRESS NOTES
Subjective:   Patient ID:  Maria Del Carmen Spence is a 76 y.o. female who presents for follow up of Atrial Fibrillation      72 yo female, PMH glaucoma, s/p knee surgery. Can not climb the stairs due to knee pain.   Dx of PAF in  and Eliquis and Metoprolol were added.  Today, feels ichthy, face swelling, muscle ache. Also agitated, eyes blurred and DURÁN is worse.  No chest pain,   Feels sweating if moving around.  Carotid US normal and JAMIA left 0.98 and right 0.96 in  by [facility] screening.  07/20 visit, had PAF. Add Amiodarone and continue BB and xeralto.  08/23 visit, still has few times of palpitation daily. Lasted for few minutes and HR was up to > 110 bpm and back to normal in few minutes. Fell lightheadedness and dizziness few times today. No chest pain.     EKG in : NSR  ECHO in :  1 - Normal left ventricular systolic function (EF 55-60%).   2 - Normal left ventricular diastolic function.   3 - Normal right ventricular systolic function .   4 - Mild tricuspid regurgitation.   5 - Trivial to mild pulmonic regurgitation.           Past Medical History:   Diagnosis Date    A-fib     Arthritis     Chronic LBP     ESIs x 3 with Dr. Hicks, PT at [facility], chiropractor    Eye pain     Frequent headaches     GERD (gastroesophageal reflux disease)     Glaucoma     Hyperlipemia     Hypertension        Past Surgical History:   Procedure Laterality Date    CATARACT EXTRACTION W/  INTRAOCULAR LENS IMPLANT  OU    TUBAL LIGATION         Social History   Substance Use Topics    Smoking status: Never Smoker    Smokeless tobacco: Never Used    Alcohol use No       Family History   Problem Relation Age of Onset    Glaucoma Mother     Cancer Mother     Cataracts Mother     Hypertension Mother     Hypertension Father     Diabetes Paternal Aunt     Strabismus Neg Hx     Retinal detachment Neg Hx     Macular degeneration Neg Hx     Blindness Neg Hx     Amblyopia Neg Hx     Stroke Neg Hx      Thyroid disease Neg Hx          Review of Systems   Constitution: Negative for decreased appetite, diaphoresis, fever, weakness, malaise/fatigue and night sweats.   HENT: Negative for headaches and nosebleeds.    Eyes: Positive for blurred vision. Negative for double vision.   Cardiovascular: Positive for dyspnea on exertion. Negative for chest pain, claudication, irregular heartbeat, leg swelling, near-syncope, orthopnea, palpitations, paroxysmal nocturnal dyspnea and syncope.   Respiratory: Negative for cough, shortness of breath, sleep disturbances due to breathing, snoring, sputum production and wheezing.    Endocrine: Negative for cold intolerance and polyuria.   Hematologic/Lymphatic: Does not bruise/bleed easily.   Skin: Negative for rash.   Musculoskeletal: Positive for arthritis and joint pain. Negative for back pain, falls, joint swelling and neck pain.   Gastrointestinal: Negative for abdominal pain, heartburn, nausea and vomiting.   Genitourinary: Negative for dysuria, frequency and hematuria.   Neurological: Negative for difficulty with concentration, dizziness, focal weakness, light-headedness, numbness and seizures.   Psychiatric/Behavioral: Negative for depression, memory loss and substance abuse. The patient does not have insomnia.    Allergic/Immunologic: Negative for HIV exposure and hives.       Objective:   Physical Exam   Constitutional: She is oriented to person, place, and time. She appears well-nourished.   HENT:   Head: Normocephalic.   Eyes: Pupils are equal, round, and reactive to light.   Neck: Normal carotid pulses and no JVD present. Carotid bruit is not present. No thyromegaly present.   Cardiovascular: Normal rate, regular rhythm, normal heart sounds and normal pulses.   No extrasystoles are present. PMI is not displaced.  Exam reveals no gallop and no S3.    No murmur heard.  Pulses:       Radial pulses are 2+ on the right side, and 2+ on the left side.   S2 split    Pulmonary/Chest: Breath sounds normal. No stridor. No respiratory distress.   Abdominal: Soft. Bowel sounds are normal. There is no tenderness. There is no rebound.   Musculoskeletal: Normal range of motion.   Neurological: She is alert and oriented to person, place, and time.   Skin: Skin is intact. No rash noted.   Psychiatric: Her behavior is normal.       Lab Results   Component Value Date    CHOL 239 (H) 02/08/2017     Lab Results   Component Value Date    HDL 69 02/08/2017     Lab Results   Component Value Date    LDLCALC 152.0 02/08/2017     Lab Results   Component Value Date    TRIG 90 02/08/2017     Lab Results   Component Value Date    CHOLHDL 28.9 02/08/2017       Chemistry        Component Value Date/Time     06/14/2017 1116    K 5.3 (H) 06/14/2017 1116     06/14/2017 1116    CO2 24 06/14/2017 1116    BUN 15 06/14/2017 1116    CREATININE 0.8 06/14/2017 1116    GLU 81 06/14/2017 1116        Component Value Date/Time    CALCIUM 10.3 06/14/2017 1116    ALKPHOS 71 02/08/2017 0859    AST 25 02/08/2017 0859    ALT 15 02/08/2017 0859    BILITOT 0.4 02/08/2017 0859    ESTGFRAFRICA >60.0 06/14/2017 1116    EGFRNONAA >60.0 06/14/2017 1116          Lab Results   Component Value Date    TSH 2.716 06/14/2017     No results found for: INR, PROTIME  Lab Results   Component Value Date    WBC 9.12 06/14/2017    HGB 13.1 06/14/2017    HCT 40.2 06/14/2017    MCV 96 06/14/2017     06/14/2017     BMP  Sodium   Date Value Ref Range Status   06/14/2017 140 136 - 145 mmol/L Final     Potassium   Date Value Ref Range Status   06/14/2017 5.3 (H) 3.5 - 5.1 mmol/L Final     Chloride   Date Value Ref Range Status   06/14/2017 105 95 - 110 mmol/L Final     CO2   Date Value Ref Range Status   06/14/2017 24 23 - 29 mmol/L Final     BUN, Bld   Date Value Ref Range Status   06/14/2017 15 8 - 23 mg/dL Final     Creatinine   Date Value Ref Range Status   06/14/2017 0.8 0.5 - 1.4 mg/dL Final     Calcium   Date Value Ref  Range Status   06/14/2017 10.3 8.7 - 10.5 mg/dL Final     Anion Gap   Date Value Ref Range Status   06/14/2017 11 8 - 16 mmol/L Final     eGFR if    Date Value Ref Range Status   06/14/2017 >60.0 >60 mL/min/1.73 m^2 Final     eGFR if non    Date Value Ref Range Status   06/14/2017 >60.0 >60 mL/min/1.73 m^2 Final     Comment:     Calculation used to obtain the estimated glomerular filtration  rate (eGFR) is the CKD-EPI equation. Since race is unknown   in our information system, the eGFR values for   -American and Non--American patients are given   for each creatinine result.       CrCl cannot be calculated (Patient's most recent sCr result is older than the maximum 7 days allowed.).     Assessment:      1. Paroxysmal atrial fibrillation    2. Essential hypertension      On sinus rhythm.  Plan:   Continue Xeralto 20 mg daily, continue Metoprolol and amiodarone 200 mg daily  RTC in 3 months, sooner if indicated

## 2017-09-04 NOTE — PROGRESS NOTES
Subjective:     Patient ID: Maria Del Carmen Spence is a 76 y.o. female.    Chief Complaint: Right knee pain  HPI: The patient is seen in consultation at the request of Dr. Elda Khan for evaluation of right knee pain.  She fell on August 11.  Her knee has been swollen and painful.  She rates her level of discomfort at 8 on a pain scale of 1-10.  The patient also has a history of low back pain.    Family History   Problem Relation Age of Onset    Glaucoma Mother     Cancer Mother     Cataracts Mother     Hypertension Mother     Hypertension Father     Diabetes Paternal Aunt     Strabismus Neg Hx     Retinal detachment Neg Hx     Macular degeneration Neg Hx     Blindness Neg Hx     Amblyopia Neg Hx     Stroke Neg Hx     Thyroid disease Neg Hx      Past Medical History:   Diagnosis Date    A-fib     Arthritis     Chronic LBP     ESIs x 3 with Dr. Hicks, PT at [facility], chiropractor    Eye pain     Frequent headaches     GERD (gastroesophageal reflux disease)     Glaucoma     Hyperlipemia     Hypertension      Social History     Social History    Marital status:      Spouse name: N/A    Number of children: N/A    Years of education: N/A     Social History Main Topics    Smoking status: Never Smoker    Smokeless tobacco: Never Used    Alcohol use No    Drug use: No    Sexual activity: Yes     Partners: Male     Other Topics Concern    None     Social History Narrative    None     Past Surgical History:   Procedure Laterality Date    CATARACT EXTRACTION W/  INTRAOCULAR LENS IMPLANT  OU    TUBAL LIGATION       Review of patient's allergies indicates:   Allergen Reactions    No known drug allergies          Medication List with Changes/Refills   New Medications    MELOXICAM (MOBIC) 7.5 MG TABLET    Take 1 tablet (7.5 mg total) by mouth daily with breakfast.   Current Medications    ACETAMINOPHEN (TYLENOL) 500 MG TABLET    Take 500 mg by mouth every 6 (six) hours as needed for Pain.     AMIODARONE (PACERONE) 200 MG TAB    Take 1 tablet (200 mg total) by mouth once daily.    BACILLUS/PROTEASE/AMYLAS/LIPAS (DIGESTIVE ADVANTAGE INTENS BOW ORAL)    Take 1 capsule by mouth once daily.    CALCIUM PHOSPHATE TRIB/VIT D3 (CITRACAL + D ORAL)    Take 1 tablet by mouth once daily at 6am.     CETIRIZINE HCL (ZYRTEC ORAL)    Take by mouth.    CLOTRIMAZOLE-BETAMETHASONE 1-0.05% (LOTRISONE) CREAM    Apply topically 2 (two) times daily. Up to 2 weeks max.    FAMOTIDINE (PEPCID) 20 MG TABLET    Take 1 tablet (20 mg total) by mouth once daily.    FISH OIL-OMEGA-3 FATTY ACIDS 300-1,000 MG CAPSULE    Take 2 g by mouth once daily.    FLUCONAZOLE (DIFLUCAN) 150 MG TAB    Take one now and may repeat in a week    GLUCOSAMINE-CHONDROITIN 500-400 MG TABLET    Take 2 tablets by mouth once daily at 6am.     LATANOPROST 0.005 % OPHTHALMIC SOLUTION    instill ONE DROP BOTH EYES AT BEDTIME    METOPROLOL TARTRATE (LOPRESSOR) 25 MG TABLET    Take 1 tablet (25 mg total) by mouth 2 (two) times daily.    MULTIVITAMIN W-MINERALS/LUTEIN (CENTRUM SILVER ORAL)    Take by mouth.    RIVAROXABAN (XARELTO) 20 MG TAB    Take 1 tablet (20 mg total) by mouth daily with dinner or evening meal.    TIMOLOL MALEATE 0.5% (TIMOPTIC) 0.5 % DROP    Place 1 drop into the right eye 2 (two) times daily.       Review of Systems   Constitution: Negative for chills, decreased appetite, weight gain and weight loss.   HENT: Negative for congestion, ear pain, hearing loss and sore throat.    Eyes: Negative for blurred vision, double vision, vision loss in left eye, vision loss in right eye and visual disturbance.   Cardiovascular: Negative for chest pain, irregular heartbeat, leg swelling and palpitations.   Respiratory: Negative for cough and shortness of breath.    Endocrine: Negative for cold intolerance and heat intolerance.   Hematologic/Lymphatic: Negative for adenopathy. Does not bruise/bleed easily.   Skin: Negative for color change, nail changes, rash  "and suspicious lesions.   Musculoskeletal: Positive for arthritis, back pain, joint pain and joint swelling.   Gastrointestinal: Negative for abdominal pain, constipation, diarrhea, heartburn, nausea and vomiting.   Genitourinary: Negative for bladder incontinence and dysuria.   Neurological: Negative for dizziness, paresthesias, sensory change and tremors.   Psychiatric/Behavioral: Negative for altered mental status, depression, memory loss and substance abuse.   Allergic/Immunologic: Negative for hives and persistent infections.       Objective:   BP (!) 157/87   Pulse 71   Resp 18   Ht 5' 5" (1.651 m)   Wt 86.2 kg (190 lb)   BMI 31.62 kg/m²       General    Nursing note and vitals reviewed.  Constitutional: She is oriented to person, place, and time. She appears well-developed and well-nourished.   HENT:   Head: Normocephalic.   Nose: Nose normal.   Eyes: EOM are normal. Pupils are equal, round, and reactive to light.   Neck: Normal range of motion. Neck supple.   Cardiovascular: Normal rate and regular rhythm.    Pulmonary/Chest: Effort normal.   Abdominal: Soft. She exhibits no distension. There is no tenderness.   Neurological: She is alert and oriented to person, place, and time.   Psychiatric: She has a normal mood and affect. Her behavior is normal.     General Musculoskeletal Exam   Gait: antalgic and abnormal   Pelvic Obliquity: none    Right Ankle/Foot Exam   Right ankle exam is normal.    Range of Motion   The patient has normal right ankle ROM.    Alignment   Forefoot Alignment: normal    Muscle Strength   The patient has normal right ankle strength.    Tests   Anterior drawer: negative  Varus tilt: negative  Heel Walk: able to perform  Tiptoe Walk: able to perform    Other   Sensation: normal  Peroneal Subluxation: negative    Left Ankle/Foot Exam   Left ankle exam is normal.    Range of Motion   The patient has normal left ankle ROM.     Alignment   Forefoot Alignment: normal    Muscle Strength "   The patient has normal left ankle strength.    Tests   Anterior drawer: negative  Varus tilt: negative  Heel Walk: able to perform  Tiptoe Walk: able to perform    Other   Sensation: normal  Peroneal Subluxation: negative    Right Knee Exam     Inspection   Erythema: absent  Swelling: present  Effusion: effusion  Deformity: deformity  Bruising: absent    Tenderness   The patient is tender to palpation of the condyle and patella.    Crepitus   The patient has crepitus of the patella.    Range of Motion   The patient has normal right knee ROM.    Tests   Meniscus   Charly:  Medial - negative Lateral - negative  Ligament Examination Lachman: normal (-1 to 2mm) PCL-Posterior Drawer: normal (0 to 2mm)     MCL - Valgus: normal (0 to 2mm)  LCL - Varus: normalPivot Shift: normal (Equal)  Posterolateral Corner: unstable (>15 degrees difference)  Patella   Patellar Apprehension: negative  Passive Patellar Tilt: neutral  Patellar Tracking: normal  Patellar Grind: positive    Other   Sensation: normal    Comments:  The patient is a mild valgus deformity of the left knee.    Left Knee Exam   Left knee exam is normal.    Inspection   Erythema: absent  Swelling: absent  Effusion: absent  Deformity: deformity  Bruising: absent    Range of Motion   The patient has normal left knee ROM.    Tests   Meniscus   Charly:  Medial - negative Lateral - negative  Stability Lachman: normal (-1 to 2mm) PCL-Posterior Drawer: normal (0 to 2mm)  MCL - Valgus: normal (0 to 2mm)  LCL - Varus: normal (0 to 2mm)Pivot Shift: normal (Equal)  Posterolateral Corner: unstable (>15 degrees difference)  Patella   Patellar Apprehension: negative  Passive Patellar Tilt: neutral  Patellar Tracking: normal  Patellar Grind: negative    Other   Sensation: normal    Right Hip Exam   Right hip exam is normal.     Inspection   Swelling: absent  Bruising: absent    Muscle Strength   The patient has normal right hip strength.    Other   Sensation: normal  Left  Hip Exam   Left hip exam is normal.    Inspection   Swelling: absent  Bruising: absent    Range of Motion   The patient has normal left hip ROM.    Muscle Strength   The patient has normal left hip strength.     Other   Sensation: normal      Back (L-Spine & T-Spine) / Neck (C-Spine) Exam   Back exam is normal.    Neck (C-Spine) Range of Motion   Flexion:     Normal  Extension: Normal  Right Lateral Bend: normal  Left Lateral Bend: normal  Right Rotation: normal  Left Rotation: normal    Spinal Sensation   Right Side Sensation  C-Spine Level: normal   L-Spine Level: normal  S-Spine Level: normal  T-Spine Level: normal  Left Side Sensation  C-Spine Level: normal  L-Spine Level: normal  S-Spine Level: normal  T-Spine Level: normal    Other She has no scoliosis .  Head Tilt:  Negative      Right Hand/Wrist Exam   Right hand exam is normal.    Inspection   Deformity: Wrist - deformity     Range of Motion   The patient has normal right hand/wrist ROM.    Tests   Phalens Sign: negative  Tinels Sign (Medial Nerve): negative  Finkelstein: negative  Cubital Tunnel Compression Test: negative      Other     Neuorologic Exam    Median Distribution: normal  Ulnar Distribution: normal  Radial Distribution: normal      Left Hand/Wrist Exam   Left hand exam is normal.    Inspection   Deformity: Wrist - absent     Range of Motion   The patient has normal left hand/wrist ROM.    Tests   Phalens Sign: negative  Tinels Sign (Medial Nerve): negative  Finkelstein: negative  Cubital Tunnel Compression Test: negative      Other     Sensory Exam  Median Distribution: normal  Ulnar Distribution: normal  Radial Distribution: normal      Right Elbow Exam   Right elbow exam is normal.    Inspection   Effusion: absent  Bruising: absent  Deformity: absent    Range of Motion   The patient has normal right elbow ROM.    Tests Tinel's Sign (cubital tunnel): negative    Other   Sensation: normal      Left Elbow Exam   Left elbow exam is  normal.    Inspection   Effusion: absent  Bruising: absent  Deformity: absent    Range of Motion   The patient has normal left elbow ROM.    Tests Tinel's Sign (cubital tunnel): negative    Other   Sensation: normal    Right Shoulder Exam   Right shoulder exam is normal.    Tenderness   The patient is tender to palpation of the greater tuberosity.    Range of Motion   Active Abduction: normal   Passive Abduction: normal   Extension: normal   Forward Flexion: normal   Forward Elevation: normal  Adduction: normal  External Rotation 0 degrees: normal   Internal Rotation 0 degrees: normal     Muscle Strength   The patient has normal right shoulder strength.    Tests & Signs   Apprehension: negative  Cross Arm: negative  Impingement: negative    Other   Sensation: normal    Left Shoulder Exam   Left shoulder exam is normal.    Range of Motion   Active Abduction: normal   Passive Abduction: normal   Extension: normal   Forward Flexion: normal   Forward Elevation: normal  Adduction: normal  External Rotation 0 degrees: normal   Internal Rotation 0 degrees: normal     Muscle Strength   The patient has normal left shoulder strength.    Tests & Signs   Apprehension: negative  Cross Arm: negative  Impingement: negative    Other   Sensation: normal       Muscle Strength   Right Upper Extremity   Wrist Extension: 5/5/5   Wrist Flexion: 5/5/5   : 5/5/5   Elbow Pronation:  5/5   Elbow Supination:  5/5   Elbow Extension: 5/5  Elbow Flexion: 5/5  Intrinsics: 5/5  Left Upper Extremity  Wrist Extension: 5/5   Wrist Flexion: 5/5   :  /5/5   Elbow Pronation:  5/5   Elbow Supination:  5/5   Elbow Extension: 5/5  Elbow Flexion: 5/5  Intrinsics: 5/5  Right Lower Extremity   Hip Abduction: 5/5   Quadriceps:  5/5   Hamstrin/5   Left Lower Extremity   Hip Abduction: 5/5   Quadriceps:  5/5   Hamstrin/5     Reflexes     Left Side  Biceps:  2+  Triceps:  2+  Quadriceps:  2+  Achilles:  2+    Right Side   Biceps:   2+  Triceps:  2+  Quadriceps:  2+  Achilles:  2+    Vascular Exam     Right Pulses  Dorsalis Pedis:      2+  Posterior Tibial:      2+  Radial:                    2+      Left Pulses  Dorsalis Pedis:      2+  Posterior Tibial:      2+  Radial:                    2+      Capillary Refill  Right Hand: normal capillary refill  Left Hand: normal capillary refill        X-RAY:     Comparison: None     Bilateral standing ap knees  RIGHT sunrise views  right lateral  left lateral     Findings:    Generalized osteopenia with slight increased prominence on the RIGHT.  Tricompartment degenerative change.  Increased narrowing of the LEFT medial and RIGHT lateral compartments with marginal spurring.  Prominent osteophyte along the posterior superior and inferior articular margins RIGHT patella.  Large well-corticated bony density projects along the medial margin of the RIGHT patella on the sunrise view.  Probable bilateral fabellae.  Heterogeneous density within the inner margin of the RIGHT medial femoral condyle.  No fracture or dislocation.  No definite RIGHT effusion.   Impression           As above.  Followup and/or further evaluation as warranted.              Assessment:         Encounter Diagnosis   Name Primary?    Arthritis of knee, right Yes          Plan:       The patient was placed on Mobic 7.5 mg daily.  She'll apply moist heat to her knee.  She was issued a handicapped parking permit.  She understands that at some point her arthritic discomfort may advance such that she desires to have a right total knee replacement.  The patient will be seen back in 4 months for follow-up and repeat x-rays of her knees.           Disclaimer: This note is generated with speech recognition software and is subject to transcription error and sound alike phrases that may be missed by proofreading.

## 2017-09-12 DIAGNOSIS — K21.9 GASTROESOPHAGEAL REFLUX DISEASE, ESOPHAGITIS PRESENCE NOT SPECIFIED: ICD-10-CM

## 2017-09-12 RX ORDER — FAMOTIDINE 20 MG/1
TABLET, FILM COATED ORAL
Qty: 30 TABLET | Refills: 2 | Status: SHIPPED | OUTPATIENT
Start: 2017-09-12 | End: 2017-12-19 | Stop reason: SDUPTHER

## 2017-11-02 ENCOUNTER — PATIENT OUTREACH (OUTPATIENT)
Dept: ADMINISTRATIVE | Facility: HOSPITAL | Age: 77
End: 2017-11-02

## 2017-11-14 DIAGNOSIS — K21.9 GASTROESOPHAGEAL REFLUX DISEASE, ESOPHAGITIS PRESENCE NOT SPECIFIED: ICD-10-CM

## 2017-11-14 RX ORDER — FAMOTIDINE 20 MG/1
TABLET, FILM COATED ORAL
Qty: 30 TABLET | OUTPATIENT
Start: 2017-11-14

## 2017-11-21 DIAGNOSIS — M17.11 ARTHRITIS OF KNEE, RIGHT: ICD-10-CM

## 2017-11-22 RX ORDER — MELOXICAM 7.5 MG/1
7.5 TABLET ORAL
Qty: 30 TABLET | Refills: 4 | Status: SHIPPED | OUTPATIENT
Start: 2017-11-22 | End: 2018-04-13 | Stop reason: SDUPTHER

## 2017-11-29 ENCOUNTER — OFFICE VISIT (OUTPATIENT)
Dept: CARDIOLOGY | Facility: CLINIC | Age: 77
End: 2017-11-29
Payer: MEDICARE

## 2017-11-29 VITALS
HEART RATE: 64 BPM | HEIGHT: 65 IN | SYSTOLIC BLOOD PRESSURE: 152 MMHG | BODY MASS INDEX: 32.21 KG/M2 | DIASTOLIC BLOOD PRESSURE: 84 MMHG | WEIGHT: 193.31 LBS

## 2017-11-29 DIAGNOSIS — M17.11 ARTHRITIS OF KNEE, RIGHT: ICD-10-CM

## 2017-11-29 DIAGNOSIS — I48.0 PAROXYSMAL ATRIAL FIBRILLATION: Primary | ICD-10-CM

## 2017-11-29 DIAGNOSIS — I10 ESSENTIAL HYPERTENSION: ICD-10-CM

## 2017-11-29 PROCEDURE — 99999 PR PBB SHADOW E&M-EST. PATIENT-LVL III: CPT | Mod: PBBFAC,,, | Performed by: INTERNAL MEDICINE

## 2017-11-29 PROCEDURE — 99214 OFFICE O/P EST MOD 30 MIN: CPT | Mod: S$PBB,,, | Performed by: INTERNAL MEDICINE

## 2017-11-29 PROCEDURE — 99213 OFFICE O/P EST LOW 20 MIN: CPT | Mod: PBBFAC,PO | Performed by: INTERNAL MEDICINE

## 2017-11-29 NOTE — PROGRESS NOTES
Subjective:   Patient ID:  Maria Del Carmen Spence is a 77 y.o. female who presents for follow up of Atrial Fibrillation and Hypertension      74 yo female, PMH PAF, glaucoma, s/p knee surgery. Can not climb the stairs due to knee pain.   Dx of PAF in  and Eliquis and Metoprolol were added.  Carotid US normal and JAMIA left 0.98 and right 0.96 in  by Life line screening.  07/20 visit, had PAF. Add Amiodarone and continue BB and xeralto. She could not tolerate Eliquis due to dizziness.  08/23 visit, still has few times of palpitation daily. Lasted for few minutes and HR was up to > 110 bpm and back to normal in few minutes. Fell lightheadedness and dizziness few times today. No chest pain.   Taking Tylenol for back pain. Pt states that  mg works well for her knee and lower back pain. C/o the price of xarelto.    EKG in : NSR  ECHO in :  1 - Normal left ventricular systolic function (EF 55-60%).   2 - Normal left ventricular diastolic function.   3 - Normal right ventricular systolic function .   4 - Mild tricuspid regurgitation.   5 - Trivial to mild pulmonic regurgitation.           Past Medical History:   Diagnosis Date    A-fib     Arthritis     Chronic LBP     ESIs x 3 with Dr. Hicks, PT at [facility], chiropractor    Eye pain     Frequent headaches     GERD (gastroesophageal reflux disease)     Glaucoma     Hyperlipemia     Hypertension        Past Surgical History:   Procedure Laterality Date    CATARACT EXTRACTION W/  INTRAOCULAR LENS IMPLANT  OU    TUBAL LIGATION         Social History   Substance Use Topics    Smoking status: Never Smoker    Smokeless tobacco: Never Used    Alcohol use No       Family History   Problem Relation Age of Onset    Glaucoma Mother     Cancer Mother     Cataracts Mother     Hypertension Mother     Hypertension Father     Diabetes Paternal Aunt     Strabismus Neg Hx     Retinal detachment Neg Hx     Macular degeneration Neg Hx      Blindness Neg Hx     Amblyopia Neg Hx     Stroke Neg Hx     Thyroid disease Neg Hx          Review of Systems   Constitution: Negative for decreased appetite, diaphoresis, fever, weakness, malaise/fatigue and night sweats.   HENT: Negative for nosebleeds.    Eyes: Positive for blurred vision. Negative for double vision.   Cardiovascular: Positive for dyspnea on exertion and palpitations. Negative for chest pain, claudication, irregular heartbeat, leg swelling, near-syncope, orthopnea, paroxysmal nocturnal dyspnea and syncope.   Respiratory: Negative for cough, shortness of breath, sleep disturbances due to breathing, snoring, sputum production and wheezing.    Endocrine: Negative for cold intolerance and polyuria.   Hematologic/Lymphatic: Does not bruise/bleed easily.   Skin: Negative for rash.   Musculoskeletal: Positive for arthritis and joint pain. Negative for back pain, falls, joint swelling and neck pain.   Gastrointestinal: Negative for abdominal pain, heartburn, nausea and vomiting.   Genitourinary: Negative for dysuria, frequency and hematuria.   Neurological: Negative for difficulty with concentration, dizziness, focal weakness, headaches, light-headedness, numbness and seizures.   Psychiatric/Behavioral: Negative for depression, memory loss and substance abuse. The patient does not have insomnia.    Allergic/Immunologic: Negative for HIV exposure and hives.       Objective:   Physical Exam   Constitutional: She is oriented to person, place, and time. She appears well-nourished.   HENT:   Head: Normocephalic.   Eyes: Pupils are equal, round, and reactive to light.   Neck: Normal carotid pulses and no JVD present. Carotid bruit is not present. No thyromegaly present.   Cardiovascular: Normal rate, regular rhythm, normal heart sounds and normal pulses.   No extrasystoles are present. PMI is not displaced.  Exam reveals no gallop and no S3.    No murmur heard.  Pulses:       Radial pulses are 2+ on the  right side, and 2+ on the left side.   S2 split   Pulmonary/Chest: Breath sounds normal. No stridor. No respiratory distress.   Abdominal: Soft. Bowel sounds are normal. There is no tenderness. There is no rebound.   Musculoskeletal: Normal range of motion.   Neurological: She is alert and oriented to person, place, and time.   Skin: Skin is intact. No rash noted.   Psychiatric: Her behavior is normal.       Lab Results   Component Value Date    CHOL 239 (H) 02/08/2017     Lab Results   Component Value Date    HDL 69 02/08/2017     Lab Results   Component Value Date    LDLCALC 152.0 02/08/2017     Lab Results   Component Value Date    TRIG 90 02/08/2017     Lab Results   Component Value Date    CHOLHDL 28.9 02/08/2017       Chemistry        Component Value Date/Time     06/14/2017 1116    K 5.3 (H) 06/14/2017 1116     06/14/2017 1116    CO2 24 06/14/2017 1116    BUN 15 06/14/2017 1116    CREATININE 0.8 06/14/2017 1116    GLU 81 06/14/2017 1116        Component Value Date/Time    CALCIUM 10.3 06/14/2017 1116    ALKPHOS 71 02/08/2017 0859    AST 25 02/08/2017 0859    ALT 15 02/08/2017 0859    BILITOT 0.4 02/08/2017 0859    ESTGFRAFRICA >60.0 06/14/2017 1116    EGFRNONAA >60.0 06/14/2017 1116          Lab Results   Component Value Date    TSH 2.716 06/14/2017     No results found for: INR, PROTIME  Lab Results   Component Value Date    WBC 9.12 06/14/2017    HGB 13.1 06/14/2017    HCT 40.2 06/14/2017    MCV 96 06/14/2017     06/14/2017     BMP  Sodium   Date Value Ref Range Status   06/14/2017 140 136 - 145 mmol/L Final     Potassium   Date Value Ref Range Status   06/14/2017 5.3 (H) 3.5 - 5.1 mmol/L Final     Chloride   Date Value Ref Range Status   06/14/2017 105 95 - 110 mmol/L Final     CO2   Date Value Ref Range Status   06/14/2017 24 23 - 29 mmol/L Final     BUN, Bld   Date Value Ref Range Status   06/14/2017 15 8 - 23 mg/dL Final     Creatinine   Date Value Ref Range Status   06/14/2017 0.8  0.5 - 1.4 mg/dL Final     Calcium   Date Value Ref Range Status   06/14/2017 10.3 8.7 - 10.5 mg/dL Final     Anion Gap   Date Value Ref Range Status   06/14/2017 11 8 - 16 mmol/L Final     eGFR if    Date Value Ref Range Status   06/14/2017 >60.0 >60 mL/min/1.73 m^2 Final     eGFR if non    Date Value Ref Range Status   06/14/2017 >60.0 >60 mL/min/1.73 m^2 Final     Comment:     Calculation used to obtain the estimated glomerular filtration  rate (eGFR) is the CKD-EPI equation. Since race is unknown   in our information system, the eGFR values for   -American and Non--American patients are given   for each creatinine result.       CrCl cannot be calculated (Patient's most recent lab result is older than the maximum 7 days allowed.).     Assessment:      1. Paroxysmal atrial fibrillation    2. Essential hypertension    3. Arthritis of knee, right      Reviewed BP records, controlled at home    Plan:   Continue Tylenol and Mobic for OA  Xarelto, amiodarone and BB for PAF and HTN  DASH  continue Fish oil and healthy diet  Repeat lipid profile in 6 months  RTC in 6 months.

## 2017-12-12 ENCOUNTER — OFFICE VISIT (OUTPATIENT)
Dept: OPHTHALMOLOGY | Facility: CLINIC | Age: 77
End: 2017-12-12
Payer: MEDICARE

## 2017-12-12 DIAGNOSIS — I48.0 PAROXYSMAL ATRIAL FIBRILLATION: ICD-10-CM

## 2017-12-12 DIAGNOSIS — Z96.1 PSEUDOPHAKIA: ICD-10-CM

## 2017-12-12 DIAGNOSIS — H40.1131 PRIMARY OPEN ANGLE GLAUCOMA OF BOTH EYES, MILD STAGE: Primary | ICD-10-CM

## 2017-12-12 PROCEDURE — 99999 PR PBB SHADOW E&M-EST. PATIENT-LVL II: CPT | Mod: PBBFAC,,, | Performed by: OPHTHALMOLOGY

## 2017-12-12 PROCEDURE — 99212 OFFICE O/P EST SF 10 MIN: CPT | Mod: PBBFAC | Performed by: OPHTHALMOLOGY

## 2017-12-12 PROCEDURE — 92012 INTRM OPH EXAM EST PATIENT: CPT | Mod: S$PBB,,, | Performed by: OPHTHALMOLOGY

## 2017-12-12 NOTE — PROGRESS NOTES
SUBJECTIVE:   Maria Del Carmen Spence is a 77 y.o. female   Corrected distance visual acuity was 20/20 in the right eye and 20/20 in the left eye.   Chief Complaint   Patient presents with    Glaucoma     pt here for 4 month iop check no changes or concerns        HPI:  HPI     Glaucoma    Additional comments: pt here for 4 month iop check no changes or concerns             Comments   1. Mild COAG OD>OS (init 24/20) Goal <17  2. PCIOL OU (Sulcus OD) (partial dislocation OD)    Latanoprost QHS OU  Timolol BID OD  O3FO       Last edited by Marissa Harris MA on 12/12/2017  9:45 AM. (History)        Assessment /Plan :  1. Primary open angle glaucoma of both eyes, mild stage Doing well, IOP within acceptable range relative to target IOP and no evidence of progression. Continue current treatment. Reviewed importance of continued compliance with treatment and follow up.     2. Pseudophakia  -- Condition stable, no therapeutic change required. Monitoring routinely.       Return to clinic in 4 months  or as needed.  With IOP Check

## 2017-12-19 DIAGNOSIS — K21.9 GASTROESOPHAGEAL REFLUX DISEASE, ESOPHAGITIS PRESENCE NOT SPECIFIED: ICD-10-CM

## 2017-12-19 DIAGNOSIS — I48.0 PAROXYSMAL ATRIAL FIBRILLATION: ICD-10-CM

## 2017-12-19 RX ORDER — FAMOTIDINE 20 MG/1
TABLET, FILM COATED ORAL
Qty: 90 TABLET | Refills: 1 | Status: SHIPPED | OUTPATIENT
Start: 2017-12-19 | End: 2018-06-19 | Stop reason: SDUPTHER

## 2017-12-19 RX ORDER — AMIODARONE HYDROCHLORIDE 200 MG/1
200 TABLET ORAL DAILY
Qty: 30 TABLET | Refills: 5 | Status: SHIPPED | OUTPATIENT
Start: 2017-12-19 | End: 2018-06-20 | Stop reason: SDUPTHER

## 2018-01-03 DIAGNOSIS — M17.11 ARTHRITIS OF KNEE, RIGHT: Primary | ICD-10-CM

## 2018-01-07 ENCOUNTER — TELEPHONE (OUTPATIENT)
Dept: ORTHOPEDICS | Facility: CLINIC | Age: 78
End: 2018-01-07

## 2018-01-08 NOTE — TELEPHONE ENCOUNTER
Left patient a message advising that appointment tomorrow is canceled due to provider having emergent surgery. Staff will call again tomorrow to reschedule.

## 2018-03-05 RX ORDER — LATANOPROST 50 UG/ML
SOLUTION/ DROPS OPHTHALMIC
Qty: 2.5 ML | Refills: 12 | Status: ON HOLD | OUTPATIENT
Start: 2018-03-05 | End: 2019-03-06 | Stop reason: SDUPTHER

## 2018-04-13 DIAGNOSIS — M17.11 ARTHRITIS OF KNEE, RIGHT: ICD-10-CM

## 2018-04-13 DIAGNOSIS — K21.9 GASTROESOPHAGEAL REFLUX DISEASE, ESOPHAGITIS PRESENCE NOT SPECIFIED: ICD-10-CM

## 2018-04-13 RX ORDER — MELOXICAM 7.5 MG/1
TABLET ORAL
Qty: 30 TABLET | Refills: 1 | Status: SHIPPED | OUTPATIENT
Start: 2018-04-13 | End: 2018-05-14 | Stop reason: SDUPTHER

## 2018-04-13 RX ORDER — FAMOTIDINE 20 MG/1
TABLET, FILM COATED ORAL
Qty: 90 TABLET | OUTPATIENT
Start: 2018-04-13

## 2018-04-17 ENCOUNTER — OFFICE VISIT (OUTPATIENT)
Dept: OPHTHALMOLOGY | Facility: CLINIC | Age: 78
End: 2018-04-17
Payer: MEDICARE

## 2018-04-17 DIAGNOSIS — Z96.1 PSEUDOPHAKIA: ICD-10-CM

## 2018-04-17 DIAGNOSIS — H40.1131 PRIMARY OPEN ANGLE GLAUCOMA OF BOTH EYES, MILD STAGE: Primary | ICD-10-CM

## 2018-04-17 PROCEDURE — 99212 OFFICE O/P EST SF 10 MIN: CPT | Mod: PBBFAC | Performed by: OPHTHALMOLOGY

## 2018-04-17 PROCEDURE — 99999 PR PBB SHADOW E&M-EST. PATIENT-LVL II: CPT | Mod: PBBFAC,,, | Performed by: OPHTHALMOLOGY

## 2018-04-17 PROCEDURE — 92012 INTRM OPH EXAM EST PATIENT: CPT | Mod: S$PBB,,, | Performed by: OPHTHALMOLOGY

## 2018-04-17 NOTE — PROGRESS NOTES
SUBJECTIVE:   Maria Del Carmen Spence is a 77 y.o. female   Corrected distance visual acuity was 20/20 in the right eye and 20/20 in the left eye.   Chief Complaint   Patient presents with    Glaucoma        HPI:  HPI     Pt here for 4m IOP chk. No pain or discomfort. VA stable. 100% compliant   with gtts.     1. Mild COAG OD>OS (init 24/20) Goal <17  2. PCIOL OU (Sulcus OD) (partial dislocation OD)    Latanoprost QHS OU  Timolol BID OD  O3FO    Last edited by Everett Hoffmann, Patient Care Assistant on 4/17/2018  9:54   AM. (History)        Assessment /Plan :  1. Primary open angle glaucoma of both eyes, mild stage Doing well, IOP within acceptable range relative to target IOP and no evidence of progression. Continue current treatment. Reviewed importance of continued compliance with treatment and follow up.     2. Pseudophakia monitor for now       Return to clinic in 4 months  or as needed.  With GOCT, 24-2 HVF and Dilation

## 2018-04-24 ENCOUNTER — TELEPHONE (OUTPATIENT)
Dept: CARDIOLOGY | Facility: CLINIC | Age: 78
End: 2018-04-24

## 2018-04-24 NOTE — TELEPHONE ENCOUNTER
----- Message from Lili Richmond sent at 4/24/2018 10:28 AM CDT -----  Contact: self   Patient would like to know next appointment date. Please call back at [phone].      Thanks,  Lili Richmond

## 2018-04-26 ENCOUNTER — TELEPHONE (OUTPATIENT)
Dept: CARDIOLOGY | Facility: CLINIC | Age: 78
End: 2018-04-26

## 2018-04-26 NOTE — TELEPHONE ENCOUNTER
----- Message from Grant Schroeder sent at 4/26/2018  8:57 AM CDT -----  Contact: pt  Call pt at [phone] (home)   Pt was returning a missed call from the nurse

## 2018-05-14 DIAGNOSIS — M17.11 ARTHRITIS OF KNEE, RIGHT: ICD-10-CM

## 2018-05-14 RX ORDER — MELOXICAM 7.5 MG/1
TABLET ORAL
Qty: 30 TABLET | Refills: 2 | Status: SHIPPED | OUTPATIENT
Start: 2018-05-14 | End: 2018-09-14 | Stop reason: SDUPTHER

## 2018-05-30 ENCOUNTER — OFFICE VISIT (OUTPATIENT)
Dept: CARDIOLOGY | Facility: CLINIC | Age: 78
End: 2018-05-30
Payer: MEDICARE

## 2018-05-30 VITALS
BODY MASS INDEX: 30.89 KG/M2 | HEIGHT: 65 IN | SYSTOLIC BLOOD PRESSURE: 132 MMHG | WEIGHT: 185.44 LBS | HEART RATE: 104 BPM | DIASTOLIC BLOOD PRESSURE: 82 MMHG

## 2018-05-30 DIAGNOSIS — I48.91 ATRIAL FIBRILLATION, UNSPECIFIED TYPE: ICD-10-CM

## 2018-05-30 DIAGNOSIS — I10 ESSENTIAL HYPERTENSION: ICD-10-CM

## 2018-05-30 DIAGNOSIS — I48.21 PERMANENT ATRIAL FIBRILLATION: Primary | ICD-10-CM

## 2018-05-30 PROCEDURE — 99213 OFFICE O/P EST LOW 20 MIN: CPT | Mod: PBBFAC,PO | Performed by: INTERNAL MEDICINE

## 2018-05-30 PROCEDURE — 99999 PR PBB SHADOW E&M-EST. PATIENT-LVL III: CPT | Mod: PBBFAC,,, | Performed by: INTERNAL MEDICINE

## 2018-05-30 PROCEDURE — 99214 OFFICE O/P EST MOD 30 MIN: CPT | Mod: S$PBB,,, | Performed by: INTERNAL MEDICINE

## 2018-05-30 RX ORDER — METOPROLOL TARTRATE 50 MG/1
50 TABLET ORAL 2 TIMES DAILY
Qty: 60 TABLET | Refills: 11 | Status: ON HOLD | OUTPATIENT
Start: 2018-05-30 | End: 2018-07-25 | Stop reason: HOSPADM

## 2018-05-30 RX ORDER — METOPROLOL TARTRATE 50 MG/1
50 TABLET ORAL 2 TIMES DAILY
Qty: 60 TABLET | Refills: 11 | Status: SHIPPED | OUTPATIENT
Start: 2018-05-30 | End: 2018-05-30 | Stop reason: SDUPTHER

## 2018-05-30 NOTE — PROGRESS NOTES
Subjective:   Patient ID:  Maria Del Carmen Spence is a 77 y.o. female who presents for follow up of Follow-up and Atrial Fibrillation      72 yo female, PMH PAF, glaucoma, s/p knee surgery. Can not climb the stairs due to knee pain.   Dx of PAF in  and Eliquis and Metoprolol were added.  Carotid US normal and JAMIA left 0.98 and right 0.96 in  by Life line screening.  07/20 visit, had PAF. Add Amiodarone and continue BB and xeralto. She could not tolerate Eliquis due to dizziness. Switched to Xeralto.  Today, no chest pain, faint and dizziness.  Still Afib in-and-out daily with dyspnea and by checking radial pulse. Tolerates ok.    EKG in : NSR  ECHO in :  1 - Normal left ventricular systolic function (EF 55-60%).   2 - Normal left ventricular diastolic function.   3 - Normal right ventricular systolic function .   4 - Mild tricuspid regurgitation.   5 - Trivial to mild pulmonic regurgitation.           Past Medical History:   Diagnosis Date    A-fib     Arthritis     Chronic LBP     ESIs x 3 with Dr. Hicks, PT at [facility], chiropractor    Eye pain     Frequent headaches     GERD (gastroesophageal reflux disease)     Glaucoma     Hyperlipemia     Hypertension        Past Surgical History:   Procedure Laterality Date    CATARACT EXTRACTION W/  INTRAOCULAR LENS IMPLANT  OU    TUBAL LIGATION         Social History   Substance Use Topics    Smoking status: Never Smoker    Smokeless tobacco: Never Used    Alcohol use No       Family History   Problem Relation Age of Onset    Glaucoma Mother     Cancer Mother     Cataracts Mother     Hypertension Mother     Hypertension Father     Diabetes Paternal Aunt     Strabismus Neg Hx     Retinal detachment Neg Hx     Macular degeneration Neg Hx     Blindness Neg Hx     Amblyopia Neg Hx     Stroke Neg Hx     Thyroid disease Neg Hx          Review of Systems   Constitution: Negative for decreased appetite, diaphoresis, fever, weakness,  malaise/fatigue and night sweats.   HENT: Negative for nosebleeds.    Eyes: Negative for double vision.   Cardiovascular: Positive for dyspnea on exertion and palpitations. Negative for chest pain, claudication, irregular heartbeat, leg swelling, near-syncope, orthopnea, paroxysmal nocturnal dyspnea and syncope.   Respiratory: Negative for cough, shortness of breath, sleep disturbances due to breathing, snoring, sputum production and wheezing.    Endocrine: Negative for cold intolerance and polyuria.   Hematologic/Lymphatic: Does not bruise/bleed easily.   Skin: Negative for rash.   Musculoskeletal: Positive for arthritis and joint pain. Negative for back pain, falls, joint swelling and neck pain.   Gastrointestinal: Negative for abdominal pain, heartburn, nausea and vomiting.   Genitourinary: Negative for dysuria, frequency and hematuria.   Neurological: Negative for difficulty with concentration, dizziness, focal weakness, headaches, light-headedness, numbness and seizures.   Psychiatric/Behavioral: Negative for depression, memory loss and substance abuse. The patient does not have insomnia.    Allergic/Immunologic: Negative for HIV exposure and hives.       Objective:   Physical Exam   Constitutional: She is oriented to person, place, and time. She appears well-nourished.   HENT:   Head: Normocephalic.   Eyes: Pupils are equal, round, and reactive to light.   Neck: Normal carotid pulses and no JVD present. Carotid bruit is not present. No thyromegaly present.   Cardiovascular: Normal rate, normal heart sounds and normal pulses.  An irregularly irregular rhythm present.  No extrasystoles are present. PMI is not displaced.  Exam reveals no gallop and no S3.    No murmur heard.  Pulses:       Radial pulses are 2+ on the right side, and 2+ on the left side.   S2 split   Pulmonary/Chest: Breath sounds normal. No stridor. No respiratory distress.   Abdominal: Soft. Bowel sounds are normal. There is no tenderness. There  is no rebound.   Musculoskeletal: Normal range of motion.   Neurological: She is alert and oriented to person, place, and time.   Skin: Skin is intact. No rash noted.   Psychiatric: Her behavior is normal.       Lab Results   Component Value Date    CHOL 239 (H) 02/08/2017     Lab Results   Component Value Date    HDL 69 02/08/2017     Lab Results   Component Value Date    LDLCALC 152.0 02/08/2017     Lab Results   Component Value Date    TRIG 90 02/08/2017     Lab Results   Component Value Date    CHOLHDL 28.9 02/08/2017       Chemistry        Component Value Date/Time     06/14/2017 1116    K 5.3 (H) 06/14/2017 1116     06/14/2017 1116    CO2 24 06/14/2017 1116    BUN 15 06/14/2017 1116    CREATININE 0.8 06/14/2017 1116    GLU 81 06/14/2017 1116        Component Value Date/Time    CALCIUM 10.3 06/14/2017 1116    ALKPHOS 71 02/08/2017 0859    AST 25 02/08/2017 0859    ALT 15 02/08/2017 0859    BILITOT 0.4 02/08/2017 0859    ESTGFRAFRICA >60.0 06/14/2017 1116    EGFRNONAA >60.0 06/14/2017 1116          Lab Results   Component Value Date    TSH 2.716 06/14/2017     No results found for: INR, PROTIME  Lab Results   Component Value Date    WBC 9.12 06/14/2017    HGB 13.1 06/14/2017    HCT 40.2 06/14/2017    MCV 96 06/14/2017     06/14/2017     BMP  Sodium   Date Value Ref Range Status   06/14/2017 140 136 - 145 mmol/L Final     Potassium   Date Value Ref Range Status   06/14/2017 5.3 (H) 3.5 - 5.1 mmol/L Final     Chloride   Date Value Ref Range Status   06/14/2017 105 95 - 110 mmol/L Final     CO2   Date Value Ref Range Status   06/14/2017 24 23 - 29 mmol/L Final     BUN, Bld   Date Value Ref Range Status   06/14/2017 15 8 - 23 mg/dL Final     Creatinine   Date Value Ref Range Status   06/14/2017 0.8 0.5 - 1.4 mg/dL Final     Calcium   Date Value Ref Range Status   06/14/2017 10.3 8.7 - 10.5 mg/dL Final     Anion Gap   Date Value Ref Range Status   06/14/2017 11 8 - 16 mmol/L Final     eGFR if     Date Value Ref Range Status   06/14/2017 >60.0 >60 mL/min/1.73 m^2 Final     eGFR if non    Date Value Ref Range Status   06/14/2017 >60.0 >60 mL/min/1.73 m^2 Final     Comment:     Calculation used to obtain the estimated glomerular filtration  rate (eGFR) is the CKD-EPI equation. Since race is unknown   in our information system, the eGFR values for   -American and Non--American patients are given   for each creatinine result.       CrCl cannot be calculated (Patient's most recent lab result is older than the maximum 7 days allowed.).     Assessment:      1. Permanent atrial fibrillation    2. Atrial fibrillation, unspecified type    3. Essential hypertension      EKG showed Afib today.  Plan:   Holter 48 for afib burden  Increase Lopressor to 50 mg bid  Continue Xeralto and Amiodarone  DASH   RTC in 6 month, sooner if indicated

## 2018-06-18 ENCOUNTER — APPOINTMENT (OUTPATIENT)
Dept: CARDIOLOGY | Facility: CLINIC | Age: 78
End: 2018-06-18
Attending: INTERNAL MEDICINE
Payer: MEDICARE

## 2018-06-18 PROCEDURE — 93226 XTRNL ECG REC<48 HR SCAN A/R: CPT | Mod: PBBFAC | Performed by: INTERNAL MEDICINE

## 2018-06-18 PROCEDURE — 93227 XTRNL ECG REC<48 HR R&I: CPT | Mod: S$PBB,,, | Performed by: INTERNAL MEDICINE

## 2018-06-19 DIAGNOSIS — H40.1194 INDETERMINATE STAGE CHRONIC OPEN ANGLE GLAUCOMA: ICD-10-CM

## 2018-06-19 DIAGNOSIS — K21.9 GASTROESOPHAGEAL REFLUX DISEASE, ESOPHAGITIS PRESENCE NOT SPECIFIED: ICD-10-CM

## 2018-06-19 RX ORDER — TIMOLOL MALEATE 5 MG/ML
SOLUTION/ DROPS OPHTHALMIC
Qty: 5 ML | Status: CANCELLED | OUTPATIENT
Start: 2018-06-19

## 2018-06-19 RX ORDER — FAMOTIDINE 20 MG/1
TABLET, FILM COATED ORAL
Qty: 90 TABLET | Refills: 1 | Status: SHIPPED | OUTPATIENT
Start: 2018-06-19 | End: 2018-11-14 | Stop reason: SDUPTHER

## 2018-06-20 DIAGNOSIS — I48.0 PAROXYSMAL ATRIAL FIBRILLATION: ICD-10-CM

## 2018-06-20 RX ORDER — AMIODARONE HYDROCHLORIDE 200 MG/1
200 TABLET ORAL DAILY
Qty: 30 TABLET | Refills: 11 | Status: ON HOLD | OUTPATIENT
Start: 2018-06-20 | End: 2018-07-25 | Stop reason: HOSPADM

## 2018-06-21 DIAGNOSIS — H40.1194 INDETERMINATE STAGE CHRONIC OPEN ANGLE GLAUCOMA: ICD-10-CM

## 2018-06-21 RX ORDER — TIMOLOL MALEATE 5 MG/ML
1 SOLUTION/ DROPS OPHTHALMIC 2 TIMES DAILY
Qty: 5 ML | Refills: 11 | Status: ON HOLD | OUTPATIENT
Start: 2018-06-21 | End: 2018-07-25 | Stop reason: HOSPADM

## 2018-06-25 ENCOUNTER — TELEPHONE (OUTPATIENT)
Dept: CARDIOLOGY | Facility: CLINIC | Age: 78
End: 2018-06-25

## 2018-06-25 NOTE — TELEPHONE ENCOUNTER
Spoke with pt with Holter results.  Made appt with Dr. Rosenthal to discuss DCCV    ----- Message from Zo Gonzalez sent at 6/25/2018  2:43 PM CDT -----  Contact: pt  Pt returning nurse call, please call pt @ [phone].

## 2018-07-06 ENCOUNTER — OFFICE VISIT (OUTPATIENT)
Dept: CARDIOLOGY | Facility: CLINIC | Age: 78
End: 2018-07-06
Payer: MEDICARE

## 2018-07-06 VITALS
DIASTOLIC BLOOD PRESSURE: 76 MMHG | HEART RATE: 89 BPM | BODY MASS INDEX: 31.44 KG/M2 | SYSTOLIC BLOOD PRESSURE: 116 MMHG | HEIGHT: 65 IN | WEIGHT: 188.69 LBS

## 2018-07-06 DIAGNOSIS — I48.0 PAROXYSMAL ATRIAL FIBRILLATION: ICD-10-CM

## 2018-07-06 DIAGNOSIS — I10 ESSENTIAL HYPERTENSION: ICD-10-CM

## 2018-07-06 DIAGNOSIS — I48.3 TYPICAL ATRIAL FLUTTER: Primary | ICD-10-CM

## 2018-07-06 PROBLEM — I48.92 ATRIAL FLUTTER: Status: ACTIVE | Noted: 2018-07-06

## 2018-07-06 PROCEDURE — 93010 ELECTROCARDIOGRAM REPORT: CPT | Mod: S$PBB,,, | Performed by: INTERNAL MEDICINE

## 2018-07-06 PROCEDURE — 99214 OFFICE O/P EST MOD 30 MIN: CPT | Mod: S$PBB,,, | Performed by: INTERNAL MEDICINE

## 2018-07-06 PROCEDURE — 99999 PR PBB SHADOW E&M-EST. PATIENT-LVL III: CPT | Mod: PBBFAC,,, | Performed by: INTERNAL MEDICINE

## 2018-07-06 PROCEDURE — 99213 OFFICE O/P EST LOW 20 MIN: CPT | Mod: PBBFAC,25 | Performed by: INTERNAL MEDICINE

## 2018-07-06 PROCEDURE — 93005 ELECTROCARDIOGRAM TRACING: CPT | Mod: PBBFAC | Performed by: INTERNAL MEDICINE

## 2018-07-06 NOTE — PROGRESS NOTES
Subjective:   Patient ID:  Maria Del Carmen Spence is a 77 y.o. female who presents for follow up of Follow-up      74 yo female, PMH PAF, glaucoma, s/p knee surgery. Can not climb the stairs due to knee pain.   Dx of PAF in  and Eliquis and Metoprolol were added.  Carotid US normal and JAMIA left 0.98 and right 0.96 in  by Life line screening.  07/20 visit, had PAF. Added Amiodarone and continue BB and xeralto. She could not tolerate Eliquis due to dizziness. Switched to Xeralto. Had discussion of the Afib strategy. Pt preferred to take medication for rhythm control and was not ready for ablation.    05/18 visit, EKG showed afib (EKG is not available now due to temporary EPIC shutdown) and subsequent holter showed persisitent aflutter. Pt states that DURÁN worse with shorttening distance walking.   Today EKG showed aflutter. Still DURÁN. No chest pain, dizziness, faint and orthopnea.   ECHO in  normal BIV function. No LAE          Past Medical History:   Diagnosis Date    A-fib     Arthritis     Chronic LBP     ESIs x 3 with Dr. Hicks, PT at [facility], chiropractor    Eye pain     Frequent headaches     GERD (gastroesophageal reflux disease)     Glaucoma     Hyperlipemia     Hypertension        Past Surgical History:   Procedure Laterality Date    CATARACT EXTRACTION W/  INTRAOCULAR LENS IMPLANT  OU    TUBAL LIGATION         Social History   Substance Use Topics    Smoking status: Never Smoker    Smokeless tobacco: Never Used    Alcohol use No       Family History   Problem Relation Age of Onset    Glaucoma Mother     Cancer Mother     Cataracts Mother     Hypertension Mother     Hypertension Father     Diabetes Paternal Aunt     Strabismus Neg Hx     Retinal detachment Neg Hx     Macular degeneration Neg Hx     Blindness Neg Hx     Amblyopia Neg Hx     Stroke Neg Hx     Thyroid disease Neg Hx          Review of Systems   Constitution: Negative for decreased appetite, diaphoresis,  fever, weakness, malaise/fatigue and night sweats.   HENT: Negative for nosebleeds.    Eyes: Negative for blurred vision and double vision.   Cardiovascular: Positive for dyspnea on exertion. Negative for chest pain, claudication, irregular heartbeat, leg swelling, near-syncope, orthopnea, palpitations, paroxysmal nocturnal dyspnea and syncope.   Respiratory: Negative for cough, shortness of breath, sleep disturbances due to breathing, snoring, sputum production and wheezing.    Endocrine: Negative for cold intolerance and polyuria.   Hematologic/Lymphatic: Does not bruise/bleed easily.   Skin: Negative for rash.   Musculoskeletal: Positive for arthritis. Negative for back pain, falls, joint pain, joint swelling and neck pain.   Gastrointestinal: Negative for abdominal pain, heartburn, nausea and vomiting.   Genitourinary: Negative for dysuria, frequency and hematuria.   Neurological: Positive for dizziness. Negative for difficulty with concentration, focal weakness, headaches, light-headedness, numbness and seizures.   Psychiatric/Behavioral: Negative for depression, memory loss and substance abuse. The patient does not have insomnia.    Allergic/Immunologic: Negative for HIV exposure and hives.       Objective:   Physical Exam   Constitutional: She is oriented to person, place, and time. She appears well-nourished.   HENT:   Head: Normocephalic.   Eyes: Pupils are equal, round, and reactive to light.   Neck: Normal carotid pulses and no JVD present. Carotid bruit is not present. No thyromegaly present.   Cardiovascular: Normal rate, normal heart sounds and normal pulses.  An irregularly irregular rhythm present.  No extrasystoles are present. PMI is not displaced.  Exam reveals no gallop and no S3.    No murmur heard.  Pulses:       Radial pulses are 2+ on the right side, and 2+ on the left side.   S2 split   Pulmonary/Chest: Breath sounds normal. No stridor. No respiratory distress.   Abdominal: Soft. Bowel  sounds are normal. There is no tenderness. There is no rebound.   Musculoskeletal: Normal range of motion.   Neurological: She is alert and oriented to person, place, and time.   Skin: Skin is intact. No rash noted.   Psychiatric: Her behavior is normal.       Lab Results   Component Value Date    CHOL 239 (H) 02/08/2017     Lab Results   Component Value Date    HDL 69 02/08/2017     Lab Results   Component Value Date    LDLCALC 152.0 02/08/2017     Lab Results   Component Value Date    TRIG 90 02/08/2017     Lab Results   Component Value Date    CHOLHDL 28.9 02/08/2017       Chemistry        Component Value Date/Time     06/14/2017 1116    K 5.3 (H) 06/14/2017 1116     06/14/2017 1116    CO2 24 06/14/2017 1116    BUN 15 06/14/2017 1116    CREATININE 0.8 06/14/2017 1116    GLU 81 06/14/2017 1116        Component Value Date/Time    CALCIUM 10.3 06/14/2017 1116    ALKPHOS 71 02/08/2017 0859    AST 25 02/08/2017 0859    ALT 15 02/08/2017 0859    BILITOT 0.4 02/08/2017 0859    ESTGFRAFRICA >60.0 06/14/2017 1116    EGFRNONAA >60.0 06/14/2017 1116          No results found for: LABA1C, HGBA1C  Lab Results   Component Value Date    TSH 2.716 06/14/2017     No results found for: INR, PROTIME  Lab Results   Component Value Date    WBC 9.12 06/14/2017    HGB 13.1 06/14/2017    HCT 40.2 06/14/2017    MCV 96 06/14/2017     06/14/2017     BMP  Sodium   Date Value Ref Range Status   06/14/2017 140 136 - 145 mmol/L Final     Potassium   Date Value Ref Range Status   06/14/2017 5.3 (H) 3.5 - 5.1 mmol/L Final     Chloride   Date Value Ref Range Status   06/14/2017 105 95 - 110 mmol/L Final     CO2   Date Value Ref Range Status   06/14/2017 24 23 - 29 mmol/L Final     BUN, Bld   Date Value Ref Range Status   06/14/2017 15 8 - 23 mg/dL Final     Creatinine   Date Value Ref Range Status   06/14/2017 0.8 0.5 - 1.4 mg/dL Final     Calcium   Date Value Ref Range Status   06/14/2017 10.3 8.7 - 10.5 mg/dL Final     Anion  Gap   Date Value Ref Range Status   06/14/2017 11 8 - 16 mmol/L Final     eGFR if    Date Value Ref Range Status   06/14/2017 >60.0 >60 mL/min/1.73 m^2 Final     eGFR if non    Date Value Ref Range Status   06/14/2017 >60.0 >60 mL/min/1.73 m^2 Final     Comment:     Calculation used to obtain the estimated glomerular filtration  rate (eGFR) is the CKD-EPI equation. Since race is unknown   in our information system, the eGFR values for   -American and Non--American patients are given   for each creatinine result.       CrCl cannot be calculated (Patient's most recent lab result is older than the maximum 7 days allowed.).  No results found in the last 24 hours.  No results found in the last 24 hours.  No results found in the last 24 hours.    Assessment:      1. Typical atrial flutter    2. Paroxysmal atrial fibrillation    3. Essential hypertension      Symptomatic recurrent A flutter    Plan:   continue xarelto, Metoprolol and Amiodarone  Arrange Nick/CCV next week.  RTC after dccv

## 2018-07-09 ENCOUNTER — SURGERY (OUTPATIENT)
Age: 78
End: 2018-07-09

## 2018-07-09 ENCOUNTER — ANESTHESIA EVENT (OUTPATIENT)
Dept: CARDIOLOGY | Facility: HOSPITAL | Age: 78
End: 2018-07-09
Payer: MEDICARE

## 2018-07-09 ENCOUNTER — ANESTHESIA (OUTPATIENT)
Dept: CARDIOLOGY | Facility: HOSPITAL | Age: 78
End: 2018-07-09
Payer: MEDICARE

## 2018-07-09 ENCOUNTER — HOSPITAL ENCOUNTER (OUTPATIENT)
Facility: HOSPITAL | Age: 78
Discharge: HOME | End: 2018-07-09
Attending: INTERNAL MEDICINE | Admitting: INTERNAL MEDICINE
Payer: MEDICARE

## 2018-07-09 VITALS
HEART RATE: 92 BPM | BODY MASS INDEX: 29.73 KG/M2 | RESPIRATION RATE: 12 BRPM | SYSTOLIC BLOOD PRESSURE: 140 MMHG | TEMPERATURE: 98 F | WEIGHT: 185 LBS | DIASTOLIC BLOOD PRESSURE: 80 MMHG | HEIGHT: 66 IN | OXYGEN SATURATION: 98 %

## 2018-07-09 DIAGNOSIS — I48.91 ATRIAL FIBRILLATION STATUS POST CARDIOVERSION: ICD-10-CM

## 2018-07-09 DIAGNOSIS — I48.0 PAROXYSMAL ATRIAL FIBRILLATION: ICD-10-CM

## 2018-07-09 DIAGNOSIS — I48.3 TYPICAL ATRIAL FLUTTER: Primary | ICD-10-CM

## 2018-07-09 DIAGNOSIS — I48.92 ATRIAL FLUTTER: ICD-10-CM

## 2018-07-09 LAB
AORTIC VALVE REGURGITATION: ABNORMAL
DIASTOLIC DYSFUNCTION: NO
MITRAL VALVE REGURGITATION: ABNORMAL
RETIRED EF AND QEF - SEE NOTES: 60 (ref 55–65)
TRICUSPID VALVE REGURGITATION: ABNORMAL

## 2018-07-09 PROCEDURE — 93010 ELECTROCARDIOGRAM REPORT: CPT | Mod: ,,, | Performed by: INTERNAL MEDICINE

## 2018-07-09 PROCEDURE — 63600175 PHARM REV CODE 636 W HCPCS

## 2018-07-09 PROCEDURE — 93312 ECHO TRANSESOPHAGEAL: CPT | Performed by: INTERNAL MEDICINE

## 2018-07-09 PROCEDURE — 37000009 HC ANESTHESIA EA ADD 15 MINS: Performed by: INTERNAL MEDICINE

## 2018-07-09 PROCEDURE — 93312 ECHO TRANSESOPHAGEAL: CPT | Mod: 26,,, | Performed by: INTERNAL MEDICINE

## 2018-07-09 PROCEDURE — 63600175 PHARM REV CODE 636 W HCPCS: Performed by: ANESTHESIOLOGY

## 2018-07-09 PROCEDURE — 93320 DOPPLER ECHO COMPLETE: CPT | Mod: 26,,, | Performed by: INTERNAL MEDICINE

## 2018-07-09 PROCEDURE — 92960 CARDIOVERSION ELECTRIC EXT: CPT

## 2018-07-09 PROCEDURE — 93320 DOPPLER ECHO COMPLETE: CPT | Performed by: INTERNAL MEDICINE

## 2018-07-09 PROCEDURE — 25000003 PHARM REV CODE 250: Performed by: ANESTHESIOLOGY

## 2018-07-09 PROCEDURE — 92960 CARDIOVERSION ELECTRIC EXT: CPT | Mod: ,,, | Performed by: INTERNAL MEDICINE

## 2018-07-09 PROCEDURE — 25000003 PHARM REV CODE 250

## 2018-07-09 PROCEDURE — 37000008 HC ANESTHESIA 1ST 15 MINUTES: Performed by: INTERNAL MEDICINE

## 2018-07-09 RX ORDER — SODIUM CHLORIDE 9 MG/ML
INJECTION, SOLUTION INTRAVENOUS CONTINUOUS PRN
Status: DISCONTINUED | OUTPATIENT
Start: 2018-07-09 | End: 2018-07-09

## 2018-07-09 RX ORDER — PROPOFOL 10 MG/ML
INJECTION, EMULSION INTRAVENOUS
Status: DISCONTINUED | OUTPATIENT
Start: 2018-07-09 | End: 2018-07-09

## 2018-07-09 RX ORDER — SODIUM CHLORIDE 0.9 % (FLUSH) 0.9 %
5 SYRINGE (ML) INJECTION
Status: DISCONTINUED | OUTPATIENT
Start: 2018-07-09 | End: 2018-07-09 | Stop reason: HOSPADM

## 2018-07-09 RX ORDER — LIDOCAINE HYDROCHLORIDE 10 MG/ML
INJECTION INFILTRATION; PERINEURAL
Status: DISCONTINUED | OUTPATIENT
Start: 2018-07-09 | End: 2018-07-09

## 2018-07-09 RX ORDER — SODIUM CHLORIDE 9 MG/ML
INJECTION, SOLUTION INTRAVENOUS ONCE
Status: DISCONTINUED | OUTPATIENT
Start: 2018-07-09 | End: 2018-07-09 | Stop reason: HOSPADM

## 2018-07-09 RX ADMIN — SODIUM CHLORIDE: 9 INJECTION, SOLUTION INTRAVENOUS at 12:07

## 2018-07-09 RX ADMIN — PROPOFOL 50 MG: 10 INJECTION, EMULSION INTRAVENOUS at 12:07

## 2018-07-09 RX ADMIN — LIDOCAINE HYDROCHLORIDE 30 MG: 10 INJECTION, SOLUTION INFILTRATION; PERINEURAL at 12:07

## 2018-07-09 RX ADMIN — PROPOFOL 60 MG: 10 INJECTION, EMULSION INTRAVENOUS at 12:07

## 2018-07-09 NOTE — PLAN OF CARE
1322 VSS PT AWAKE AND ALERT AMB IN ROOM DISCHARGE INSTRUCTIONS GIVEN TO PT AND  CM AND PIV DC'D REDRESSED. 1340 DISCHARGED PER W/C WITH BELONGINGS ACCOMPANIED BY THIS RN AND .

## 2018-07-09 NOTE — ANESTHESIA PREPROCEDURE EVALUATION
07/09/2018  Maria Del Carmen Spence is a 77 y.o., female.    Pre-op Assessment    I have reviewed the Patient Summary Reports.     I have reviewed the Nursing Notes.   I have reviewed the Medications.     Review of Systems  Anesthesia Hx:  No problems with previous Anesthesia    Cardiovascular:   Hypertension Dysrhythmias atrial fibrillation EF nl   Hepatic/GI:   GERD    Neurological:  Neurology Normal        Physical Exam  General:  Well nourished    Airway/Jaw/Neck:  Airway Findings: Mouth Opening: Normal Tongue: Normal  Mallampati: III  Improves to II with phonation.  TM Distance: 4 - 6 cm       Chest/Lungs:  Chest/Lungs Findings: Clear to auscultation, Normal Respiratory Rate     Heart/Vascular:  Heart Findings: Rate: Normal  Rhythm: Irregularly Irregular  Sounds: Normal        Mental Status:  Mental Status Findings:  Cooperative         Anesthesia Plan  Type of Anesthesia, risks & benefits discussed:  Anesthesia Type:  MAC  Patient's Preference:   Intra-op Monitoring Plan: standard ASA monitors  Intra-op Monitoring Plan Comments:   Post Op Pain Control Plan:   Post Op Pain Control Plan Comments:   Induction:   IV  Beta Blocker:  Patient is on a Beta-Blocker and has received one dose within the past 24 hours (No further documentation required).       Informed Consent: Patient understands risks and agrees with Anesthesia plan.  Questions answered. Anesthesia consent signed with patient.  ASA Score: 3     Day of Surgery Review of History & Physical: I have interviewed and examined the patient. I have reviewed the patient's H&P dated:

## 2018-07-09 NOTE — ANESTHESIA POSTPROCEDURE EVALUATION
"Anesthesia Post Evaluation    Patient: Maria Del Carmen Spence    Procedure(s) Performed: Procedure(s) (LRB):  ECHOCARDIOGRAM,TRANSESOPHAGEAL (N/A)    Final Anesthesia Type: MAC  Patient location during evaluation: Cath Lab  Patient participation: Yes- Able to Participate  Level of consciousness: awake and alert  Post-procedure vital signs: reviewed and stable  Pain management: adequate  Airway patency: patent  PONV status at discharge: No PONV  Anesthetic complications: no      Cardiovascular status: hemodynamically stable  Respiratory status: spontaneous ventilation and unassisted  Hydration status: euvolemic  Follow-up not needed.        Visit Vitals  BP (!) 140/80 (BP Location: Left arm, Patient Position: Sitting)   Pulse 92   Temp 36.7 °C (98.1 °F) (Oral)   Resp 12   Ht 5' 5.5" (1.664 m)   Wt 83.9 kg (185 lb)   SpO2 98%   Breastfeeding? No   BMI 30.32 kg/m²       Pain/Karie Score: Pain Assessment Performed: Yes (7/9/2018 11:33 AM)  Presence of Pain: denies (7/9/2018 11:33 AM)  Karie Score: 10 (7/9/2018 11:33 AM)      "

## 2018-07-09 NOTE — BRIEF OP NOTE
[facility]  Brief Operative Note     SUMMARY     Surgery Date: 7/9/2018     Surgeon(s) and Role:     * Will Rosenthal MD - Primary    Assisting Surgeon: None    Pre-op Diagnosis:  PAF (paroxysmal atrial fibrillation) [I48.0]    Post-op Diagnosis:  Post-Op Diagnosis Codes:     * PAF (paroxysmal atrial fibrillation) [I48.0]    Procedure(s) (LRB):  ECHOCARDIOGRAM,TRANSESOPHAGEAL (N/A)  DCCV    Anesthesia: Monitor Anesthesia Care      Procedure Description: The risks, benefits, and alternatives of the procedure expalined in detail with patient and family. The patient voices understanding and all questions have been addressed. The patient agrees to proceed as planned.   The patient was sedated by anesthesiology service. The probe was advanced via throat into esophagus smoothly. The patient tolerated the procedure well and there was no complications. The probed was withdrawal at the end of study. The patient was hemodynamically stable.   Estimated Blood Loss: none.   Findings / Operative Note:   No thrombus in LA and NAOMIE. Normal EF and moderate MR. Mild RE.  AFTER TOBY, HAD DCCV X1 WITH 100 JOULES ONCE. THE RHYTHM WAS CONVERTED TO SINUS AT 65 BPM.     Ok to discharge to home once hemodynamically stable.  F/U with me in TWO WEEKS at cardiology clinic.  DIET DASH  ACTIVITY AS TOLERATED      Estimated Blood Loss: * No values recorded between 7/9/2018 12:00 AM and 7/9/2018 12:40 PM *         Specimens:   Specimen (12h ago through future)    None          Discharge Note    SUMMARY     Admit Date: 7/9/2018    Discharge Date and Time:  07/09/2018 12:42 PM      Final Diagnosis: Post-Op Diagnosis Codes:     * PAF (paroxysmal atrial fibrillation) [I48.0]    Disposition: Home or Self Care    Follow Up/Patient Instructions:     Medications:  Reconciled Home Medications:      Medication List      ASK your doctor about these medications    acetaminophen 500 MG tablet  Commonly known as:  TYLENOL  Take 500 mg by mouth every  6 (six) hours as needed for Pain.     amiodarone 200 MG Tab  Commonly known as:  PACERONE  Take 1 tablet (200 mg total) by mouth once daily.     CENTRUM SILVER ORAL  Take by mouth.     CITRACAL + D ORAL  Take 1 tablet by mouth once daily at 6am.     famotidine 20 MG tablet  Commonly known as:  PEPCID  TAKE ONE TABLET BY MOUTH EVERY DAY     fish oil-omega-3 fatty acids 300-1,000 mg capsule  Take 2 g by mouth once daily.     glucosamine-chondroitin 500-400 mg tablet  Take 2 tablets by mouth once daily at 6am.     latanoprost 0.005 % ophthalmic solution  instill ONE DROP BOTH EYES AT BEDTIME     meloxicam 7.5 MG tablet  Commonly known as:  MOBIC  TAKE ONE TABLET BY MOUTH EVERY DAY WITH BREAKFAST     metoprolol tartrate 50 MG tablet  Commonly known as:  LOPRESSOR  Take 1 tablet (50 mg total) by mouth 2 (two) times daily.     rivaroxaban 20 mg Tab  Commonly known as:  XARELTO  Take 1 tablet (20 mg total) by mouth daily with dinner or evening meal.     timolol maleate 0.5% 0.5 % Drop  Commonly known as:  TIMOPTIC  Place 1 drop into the right eye 2 (two) times daily.     ZYRTEC ORAL  Take by mouth.          No discharge procedures on file.

## 2018-07-09 NOTE — ANESTHESIA RELEASE NOTE
"Anesthesia Release from PACU Note    Patient: Maria Del Carmen Spence    Procedure(s) Performed: Procedure(s) (LRB):  ECHOCARDIOGRAM,TRANSESOPHAGEAL (N/A)    Anesthesia type: MAC    Post pain: Adequate analgesia    Post assessment: no apparent anesthetic complications, tolerated procedure well and no evidence of recall    Last Vitals:   Visit Vitals  BP (!) 140/80 (BP Location: Left arm, Patient Position: Sitting)   Pulse 92   Temp 36.7 °C (98.1 °F) (Oral)   Resp 12   Ht 5' 5.5" (1.664 m)   Wt 83.9 kg (185 lb)   SpO2 98%   Breastfeeding? No   BMI 30.32 kg/m²       Post vital signs: stable    Level of consciousness: awake, alert  and oriented    Nausea/Vomiting: no nausea/no vomiting    Complications: none    Airway Patency: patent    Respiratory: unassisted, spontaneous ventilation    Cardiovascular: stable and blood pressure at baseline    Hydration: euvolemic  "

## 2018-07-09 NOTE — INTERVAL H&P NOTE
The patient has been examined and the H&P has been reviewed:    I concur with the findings and no changes have occurred since H&P was written.     Recurrent a flutter. Symptomatic  Plan TOBY/DCCV      Anesthesia/Surgery risks, benefits and alternative options discussed and understood by patient/family.          Active Hospital Problems    Diagnosis  POA    Atrial flutter [I48.92]  Yes     Priority: Low      Resolved Hospital Problems    Diagnosis Date Resolved POA   No resolved problems to display.

## 2018-07-09 NOTE — TRANSFER OF CARE
"Anesthesia Transfer of Care Note    Patient: Maria Del Carmen Spence    Procedure(s) Performed: Procedure(s) (LRB):  ECHOCARDIOGRAM,TRANSESOPHAGEAL (N/A)    Patient location: Cath Lab    Anesthesia Type: MAC    Transport from OR: Transported from OR on 2-3 L/min O2 by NC with adequate spontaneous ventilation    Post pain: adequate analgesia    Post assessment: no apparent anesthetic complications    Post vital signs: stable    Level of consciousness: awake, alert and oriented    Nausea/Vomiting: no nausea/vomiting    Complications: none    Transfer of care protocol was followed      Last vitals:   Visit Vitals  BP (!) 140/80 (BP Location: Left arm, Patient Position: Sitting)   Pulse 92   Temp 36.7 °C (98.1 °F) (Oral)   Resp 12   Ht 5' 5.5" (1.664 m)   Wt 83.9 kg (185 lb)   SpO2 98%   Breastfeeding? No   BMI 30.32 kg/m²     "

## 2018-07-09 NOTE — H&P (VIEW-ONLY)
Subjective:   Patient ID:  Maria Del Carmen Spence is a 77 y.o. female who presents for follow up of Follow-up      72 yo female, PMH PAF, glaucoma, s/p knee surgery. Can not climb the stairs due to knee pain.   Dx of PAF in  and Eliquis and Metoprolol were added.  Carotid US normal and JAMIA left 0.98 and right 0.96 in  by Life line screening.  07/20 visit, had PAF. Added Amiodarone and continue BB and xeralto. She could not tolerate Eliquis due to dizziness. Switched to Xeralto. Had discussion of the Afib strategy. Pt preferred to take medication for rhythm control and was not ready for ablation.    05/18 visit, EKG showed afib (EKG is not available now due to temporary EPIC shutdown) and subsequent holter showed persisitent aflutter. Pt states that DURÁN worse with shorttening distance walking.   Today EKG showed aflutter. Still DURÁN. No chest pain, dizziness, faint and orthopnea.   ECHO in  normal BIV function. No LAE          Past Medical History:   Diagnosis Date    A-fib     Arthritis     Chronic LBP     ESIs x 3 with Dr. Hicks, PT at [facility], chiropractor    Eye pain     Frequent headaches     GERD (gastroesophageal reflux disease)     Glaucoma     Hyperlipemia     Hypertension        Past Surgical History:   Procedure Laterality Date    CATARACT EXTRACTION W/  INTRAOCULAR LENS IMPLANT  OU    TUBAL LIGATION         Social History   Substance Use Topics    Smoking status: Never Smoker    Smokeless tobacco: Never Used    Alcohol use No       Family History   Problem Relation Age of Onset    Glaucoma Mother     Cancer Mother     Cataracts Mother     Hypertension Mother     Hypertension Father     Diabetes Paternal Aunt     Strabismus Neg Hx     Retinal detachment Neg Hx     Macular degeneration Neg Hx     Blindness Neg Hx     Amblyopia Neg Hx     Stroke Neg Hx     Thyroid disease Neg Hx          Review of Systems   Constitution: Negative for decreased appetite, diaphoresis,  fever, weakness, malaise/fatigue and night sweats.   HENT: Negative for nosebleeds.    Eyes: Negative for blurred vision and double vision.   Cardiovascular: Positive for dyspnea on exertion. Negative for chest pain, claudication, irregular heartbeat, leg swelling, near-syncope, orthopnea, palpitations, paroxysmal nocturnal dyspnea and syncope.   Respiratory: Negative for cough, shortness of breath, sleep disturbances due to breathing, snoring, sputum production and wheezing.    Endocrine: Negative for cold intolerance and polyuria.   Hematologic/Lymphatic: Does not bruise/bleed easily.   Skin: Negative for rash.   Musculoskeletal: Positive for arthritis. Negative for back pain, falls, joint pain, joint swelling and neck pain.   Gastrointestinal: Negative for abdominal pain, heartburn, nausea and vomiting.   Genitourinary: Negative for dysuria, frequency and hematuria.   Neurological: Positive for dizziness. Negative for difficulty with concentration, focal weakness, headaches, light-headedness, numbness and seizures.   Psychiatric/Behavioral: Negative for depression, memory loss and substance abuse. The patient does not have insomnia.    Allergic/Immunologic: Negative for HIV exposure and hives.       Objective:   Physical Exam   Constitutional: She is oriented to person, place, and time. She appears well-nourished.   HENT:   Head: Normocephalic.   Eyes: Pupils are equal, round, and reactive to light.   Neck: Normal carotid pulses and no JVD present. Carotid bruit is not present. No thyromegaly present.   Cardiovascular: Normal rate, normal heart sounds and normal pulses.  An irregularly irregular rhythm present.  No extrasystoles are present. PMI is not displaced.  Exam reveals no gallop and no S3.    No murmur heard.  Pulses:       Radial pulses are 2+ on the right side, and 2+ on the left side.   S2 split   Pulmonary/Chest: Breath sounds normal. No stridor. No respiratory distress.   Abdominal: Soft. Bowel  sounds are normal. There is no tenderness. There is no rebound.   Musculoskeletal: Normal range of motion.   Neurological: She is alert and oriented to person, place, and time.   Skin: Skin is intact. No rash noted.   Psychiatric: Her behavior is normal.       Lab Results   Component Value Date    CHOL 239 (H) 02/08/2017     Lab Results   Component Value Date    HDL 69 02/08/2017     Lab Results   Component Value Date    LDLCALC 152.0 02/08/2017     Lab Results   Component Value Date    TRIG 90 02/08/2017     Lab Results   Component Value Date    CHOLHDL 28.9 02/08/2017       Chemistry        Component Value Date/Time     06/14/2017 1116    K 5.3 (H) 06/14/2017 1116     06/14/2017 1116    CO2 24 06/14/2017 1116    BUN 15 06/14/2017 1116    CREATININE 0.8 06/14/2017 1116    GLU 81 06/14/2017 1116        Component Value Date/Time    CALCIUM 10.3 06/14/2017 1116    ALKPHOS 71 02/08/2017 0859    AST 25 02/08/2017 0859    ALT 15 02/08/2017 0859    BILITOT 0.4 02/08/2017 0859    ESTGFRAFRICA >60.0 06/14/2017 1116    EGFRNONAA >60.0 06/14/2017 1116          No results found for: LABA1C, HGBA1C  Lab Results   Component Value Date    TSH 2.716 06/14/2017     No results found for: INR, PROTIME  Lab Results   Component Value Date    WBC 9.12 06/14/2017    HGB 13.1 06/14/2017    HCT 40.2 06/14/2017    MCV 96 06/14/2017     06/14/2017     BMP  Sodium   Date Value Ref Range Status   06/14/2017 140 136 - 145 mmol/L Final     Potassium   Date Value Ref Range Status   06/14/2017 5.3 (H) 3.5 - 5.1 mmol/L Final     Chloride   Date Value Ref Range Status   06/14/2017 105 95 - 110 mmol/L Final     CO2   Date Value Ref Range Status   06/14/2017 24 23 - 29 mmol/L Final     BUN, Bld   Date Value Ref Range Status   06/14/2017 15 8 - 23 mg/dL Final     Creatinine   Date Value Ref Range Status   06/14/2017 0.8 0.5 - 1.4 mg/dL Final     Calcium   Date Value Ref Range Status   06/14/2017 10.3 8.7 - 10.5 mg/dL Final     Anion  Gap   Date Value Ref Range Status   06/14/2017 11 8 - 16 mmol/L Final     eGFR if    Date Value Ref Range Status   06/14/2017 >60.0 >60 mL/min/1.73 m^2 Final     eGFR if non    Date Value Ref Range Status   06/14/2017 >60.0 >60 mL/min/1.73 m^2 Final     Comment:     Calculation used to obtain the estimated glomerular filtration  rate (eGFR) is the CKD-EPI equation. Since race is unknown   in our information system, the eGFR values for   -American and Non--American patients are given   for each creatinine result.       CrCl cannot be calculated (Patient's most recent lab result is older than the maximum 7 days allowed.).  No results found in the last 24 hours.  No results found in the last 24 hours.  No results found in the last 24 hours.    Assessment:      1. Typical atrial flutter    2. Paroxysmal atrial fibrillation    3. Essential hypertension      Symptomatic recurrent A flutter    Plan:   continue xarelto, Metoprolol and Amiodarone  Arrange Nick/CCV next week.  RTC after dccv

## 2018-07-22 ENCOUNTER — HOSPITAL ENCOUNTER (INPATIENT)
Facility: HOSPITAL | Age: 78
LOS: 2 days | Discharge: HOME | DRG: 310 | End: 2018-07-25
Attending: EMERGENCY MEDICINE | Admitting: HOSPITALIST
Payer: MEDICARE

## 2018-07-22 DIAGNOSIS — I48.91 A-FIB: ICD-10-CM

## 2018-07-22 DIAGNOSIS — R00.1 SYMPTOMATIC BRADYCARDIA: Primary | ICD-10-CM

## 2018-07-22 DIAGNOSIS — I48.0 PAROXYSMAL ATRIAL FIBRILLATION: ICD-10-CM

## 2018-07-22 DIAGNOSIS — R00.1 BRADYCARDIA: ICD-10-CM

## 2018-07-22 DIAGNOSIS — R55 NEAR SYNCOPE: ICD-10-CM

## 2018-07-22 LAB
ALBUMIN SERPL BCP-MCNC: 3.7 G/DL
ALP SERPL-CCNC: 104 U/L
ALT SERPL W/O P-5'-P-CCNC: 65 U/L
ANION GAP SERPL CALC-SCNC: 11 MMOL/L
AST SERPL-CCNC: 45 U/L
BASOPHILS # BLD AUTO: 0.02 K/UL
BASOPHILS NFR BLD: 0.2 %
BILIRUB SERPL-MCNC: 0.4 MG/DL
BNP SERPL-MCNC: 1076 PG/ML
BUN SERPL-MCNC: 24 MG/DL
CALCIUM SERPL-MCNC: 9.4 MG/DL
CHLORIDE SERPL-SCNC: 108 MMOL/L
CO2 SERPL-SCNC: 20 MMOL/L
CREAT SERPL-MCNC: 1.3 MG/DL
DIFFERENTIAL METHOD: ABNORMAL
EOSINOPHIL # BLD AUTO: 0.2 K/UL
EOSINOPHIL NFR BLD: 1.3 %
ERYTHROCYTE [DISTWIDTH] IN BLOOD BY AUTOMATED COUNT: 14.2 %
EST. GFR  (AFRICAN AMERICAN): 45 ML/MIN/1.73 M^2
EST. GFR  (NON AFRICAN AMERICAN): 39 ML/MIN/1.73 M^2
GLUCOSE SERPL-MCNC: 104 MG/DL
HCT VFR BLD AUTO: 38.8 %
HGB BLD-MCNC: 13.2 G/DL
LYMPHOCYTES # BLD AUTO: 3.6 K/UL
LYMPHOCYTES NFR BLD: 30.2 %
MCH RBC QN AUTO: 33.6 PG
MCHC RBC AUTO-ENTMCNC: 34 G/DL
MCV RBC AUTO: 99 FL
MONOCYTES # BLD AUTO: 1.1 K/UL
MONOCYTES NFR BLD: 9 %
NEUTROPHILS # BLD AUTO: 7.1 K/UL
NEUTROPHILS NFR BLD: 59.3 %
PLATELET # BLD AUTO: 288 K/UL
PMV BLD AUTO: 10.4 FL
POTASSIUM SERPL-SCNC: 4.7 MMOL/L
PROT SERPL-MCNC: 7.3 G/DL
RBC # BLD AUTO: 3.93 M/UL
SODIUM SERPL-SCNC: 139 MMOL/L
TROPONIN I SERPL DL<=0.01 NG/ML-MCNC: <0.006 NG/ML
WBC # BLD AUTO: 11.87 K/UL

## 2018-07-22 PROCEDURE — 96375 TX/PRO/DX INJ NEW DRUG ADDON: CPT

## 2018-07-22 PROCEDURE — 63600175 PHARM REV CODE 636 W HCPCS: Performed by: EMERGENCY MEDICINE

## 2018-07-22 PROCEDURE — 96361 HYDRATE IV INFUSION ADD-ON: CPT

## 2018-07-22 PROCEDURE — 83880 ASSAY OF NATRIURETIC PEPTIDE: CPT

## 2018-07-22 PROCEDURE — 84484 ASSAY OF TROPONIN QUANT: CPT

## 2018-07-22 PROCEDURE — 96374 THER/PROPH/DIAG INJ IV PUSH: CPT

## 2018-07-22 PROCEDURE — 80053 COMPREHEN METABOLIC PANEL: CPT

## 2018-07-22 PROCEDURE — 93010 ELECTROCARDIOGRAM REPORT: CPT | Mod: ,,, | Performed by: INTERNAL MEDICINE

## 2018-07-22 PROCEDURE — 99285 EMERGENCY DEPT VISIT HI MDM: CPT | Mod: 25

## 2018-07-22 PROCEDURE — 85025 COMPLETE CBC W/AUTO DIFF WBC: CPT

## 2018-07-22 PROCEDURE — 25000003 PHARM REV CODE 250: Performed by: EMERGENCY MEDICINE

## 2018-07-22 RX ORDER — ATROPINE SULFATE 0.1 MG/ML
0.5 INJECTION INTRAVENOUS
Status: COMPLETED | OUTPATIENT
Start: 2018-07-23 | End: 2018-07-22

## 2018-07-22 RX ORDER — ONDANSETRON 2 MG/ML
4 INJECTION INTRAMUSCULAR; INTRAVENOUS
Status: COMPLETED | OUTPATIENT
Start: 2018-07-22 | End: 2018-07-22

## 2018-07-22 RX ADMIN — SODIUM CHLORIDE 500 ML: 0.9 INJECTION, SOLUTION INTRAVENOUS at 10:07

## 2018-07-22 RX ADMIN — ONDANSETRON 4 MG: 2 INJECTION, SOLUTION INTRAMUSCULAR; INTRAVENOUS at 10:07

## 2018-07-22 RX ADMIN — ATROPINE SULFATE 0.5 MG: 0.1 INJECTION, SOLUTION INTRAVENOUS at 11:07

## 2018-07-23 PROBLEM — R55 NEAR SYNCOPE: Status: ACTIVE | Noted: 2018-07-23

## 2018-07-23 PROBLEM — R00.1 SYMPTOMATIC BRADYCARDIA: Status: ACTIVE | Noted: 2018-07-23

## 2018-07-23 LAB
ANION GAP SERPL CALC-SCNC: 8 MMOL/L
BASOPHILS # BLD AUTO: 0.01 K/UL
BASOPHILS NFR BLD: 0.1 %
BUN SERPL-MCNC: 22 MG/DL
CALCIUM SERPL-MCNC: 9.1 MG/DL
CHLORIDE SERPL-SCNC: 111 MMOL/L
CO2 SERPL-SCNC: 24 MMOL/L
CREAT SERPL-MCNC: 1.1 MG/DL
DIASTOLIC DYSFUNCTION: NO
DIFFERENTIAL METHOD: ABNORMAL
EOSINOPHIL # BLD AUTO: 0.1 K/UL
EOSINOPHIL NFR BLD: 1 %
ERYTHROCYTE [DISTWIDTH] IN BLOOD BY AUTOMATED COUNT: 14.4 %
EST. GFR  (AFRICAN AMERICAN): 56 ML/MIN/1.73 M^2
EST. GFR  (NON AFRICAN AMERICAN): 48 ML/MIN/1.73 M^2
ESTIMATED PA SYSTOLIC PRESSURE: 28.73
GLUCOSE SERPL-MCNC: 92 MG/DL
HCT VFR BLD AUTO: 36.2 %
HGB BLD-MCNC: 11.8 G/DL
INR PPP: 1.5
LYMPHOCYTES # BLD AUTO: 3.4 K/UL
LYMPHOCYTES NFR BLD: 39.5 %
MAGNESIUM SERPL-MCNC: 2.2 MG/DL
MCH RBC QN AUTO: 32.9 PG
MCHC RBC AUTO-ENTMCNC: 32.6 G/DL
MCV RBC AUTO: 101 FL
MITRAL VALVE REGURGITATION: NORMAL
MONOCYTES # BLD AUTO: 1.1 K/UL
MONOCYTES NFR BLD: 12.8 %
NEUTROPHILS # BLD AUTO: 4 K/UL
NEUTROPHILS NFR BLD: 46.6 %
PHOSPHATE SERPL-MCNC: 4 MG/DL
PLATELET # BLD AUTO: 256 K/UL
PMV BLD AUTO: 10.2 FL
POTASSIUM SERPL-SCNC: 5.5 MMOL/L
PROTHROMBIN TIME: 15.7 SEC
RBC # BLD AUTO: 3.59 M/UL
RETIRED EF AND QEF - SEE NOTES: 60 (ref 55–65)
SODIUM SERPL-SCNC: 143 MMOL/L
TRICUSPID VALVE REGURGITATION: NORMAL
WBC # BLD AUTO: 8.6 K/UL

## 2018-07-23 PROCEDURE — 21400001 HC TELEMETRY ROOM

## 2018-07-23 PROCEDURE — 93005 ELECTROCARDIOGRAM TRACING: CPT

## 2018-07-23 PROCEDURE — 84100 ASSAY OF PHOSPHORUS: CPT

## 2018-07-23 PROCEDURE — 36415 COLL VENOUS BLD VENIPUNCTURE: CPT

## 2018-07-23 PROCEDURE — 85610 PROTHROMBIN TIME: CPT

## 2018-07-23 PROCEDURE — 93306 TTE W/DOPPLER COMPLETE: CPT | Mod: 26,,, | Performed by: INTERNAL MEDICINE

## 2018-07-23 PROCEDURE — 25000003 PHARM REV CODE 250: Performed by: HOSPITALIST

## 2018-07-23 PROCEDURE — 93306 TTE W/DOPPLER COMPLETE: CPT

## 2018-07-23 PROCEDURE — 80048 BASIC METABOLIC PNL TOTAL CA: CPT

## 2018-07-23 PROCEDURE — 85025 COMPLETE CBC W/AUTO DIFF WBC: CPT

## 2018-07-23 PROCEDURE — 83735 ASSAY OF MAGNESIUM: CPT

## 2018-07-23 PROCEDURE — 63600175 PHARM REV CODE 636 W HCPCS: Performed by: EMERGENCY MEDICINE

## 2018-07-23 PROCEDURE — 25000003 PHARM REV CODE 250: Performed by: EMERGENCY MEDICINE

## 2018-07-23 PROCEDURE — 93010 ELECTROCARDIOGRAM REPORT: CPT | Mod: ,,, | Performed by: INTERNAL MEDICINE

## 2018-07-23 PROCEDURE — 99222 1ST HOSP IP/OBS MODERATE 55: CPT | Mod: ,,, | Performed by: INTERNAL MEDICINE

## 2018-07-23 RX ORDER — IBUPROFEN 200 MG
24 TABLET ORAL
Status: DISCONTINUED | OUTPATIENT
Start: 2018-07-23 | End: 2018-07-25 | Stop reason: HOSPADM

## 2018-07-23 RX ORDER — IBUPROFEN 200 MG
16 TABLET ORAL
Status: DISCONTINUED | OUTPATIENT
Start: 2018-07-23 | End: 2018-07-25 | Stop reason: HOSPADM

## 2018-07-23 RX ORDER — LATANOPROST 50 UG/ML
1 SOLUTION/ DROPS OPHTHALMIC NIGHTLY
Status: DISCONTINUED | OUTPATIENT
Start: 2018-07-23 | End: 2018-07-25 | Stop reason: HOSPADM

## 2018-07-23 RX ORDER — GLUCAGON 1 MG
1 KIT INJECTION
Status: DISCONTINUED | OUTPATIENT
Start: 2018-07-23 | End: 2018-07-25 | Stop reason: HOSPADM

## 2018-07-23 RX ORDER — HYDRALAZINE HYDROCHLORIDE 20 MG/ML
10 INJECTION INTRAMUSCULAR; INTRAVENOUS EVERY 8 HOURS PRN
Status: DISCONTINUED | OUTPATIENT
Start: 2018-07-23 | End: 2018-07-25 | Stop reason: HOSPADM

## 2018-07-23 RX ORDER — SODIUM CHLORIDE 0.9 % (FLUSH) 0.9 %
5 SYRINGE (ML) INJECTION
Status: DISCONTINUED | OUTPATIENT
Start: 2018-07-23 | End: 2018-07-25 | Stop reason: HOSPADM

## 2018-07-23 RX ORDER — ASPIRIN 81 MG/1
81 TABLET ORAL DAILY
Status: DISCONTINUED | OUTPATIENT
Start: 2018-07-23 | End: 2018-07-25 | Stop reason: HOSPADM

## 2018-07-23 RX ORDER — FAMOTIDINE 20 MG/1
20 TABLET, FILM COATED ORAL DAILY
Status: DISCONTINUED | OUTPATIENT
Start: 2018-07-23 | End: 2018-07-25 | Stop reason: HOSPADM

## 2018-07-23 RX ORDER — ATROPINE SULFATE 0.1 MG/ML
0.5 INJECTION INTRAVENOUS
Status: DISCONTINUED | OUTPATIENT
Start: 2018-07-23 | End: 2018-07-25 | Stop reason: HOSPADM

## 2018-07-23 RX ORDER — TIMOLOL MALEATE 5 MG/ML
1 SOLUTION/ DROPS OPHTHALMIC 2 TIMES DAILY
Status: DISCONTINUED | OUTPATIENT
Start: 2018-07-23 | End: 2018-07-23

## 2018-07-23 RX ORDER — ACETAMINOPHEN 325 MG/1
650 TABLET ORAL EVERY 4 HOURS PRN
Status: DISCONTINUED | OUTPATIENT
Start: 2018-07-23 | End: 2018-07-25 | Stop reason: HOSPADM

## 2018-07-23 RX ADMIN — LATANOPROST 1 DROP: 50 SOLUTION OPHTHALMIC at 08:07

## 2018-07-23 RX ADMIN — FAMOTIDINE 20 MG: 20 TABLET ORAL at 09:07

## 2018-07-23 RX ADMIN — RIVAROXABAN 20 MG: 20 TABLET, FILM COATED ORAL at 05:07

## 2018-07-23 RX ADMIN — ASPIRIN 81 MG: 81 TABLET, COATED ORAL at 09:07

## 2018-07-23 NOTE — H&P
[facility] Hospital Medicine  History & Physical    Patient Name: Maira Del Carmen Spence  MRN: [id number]  Admission Date: 7/22/2018  Attending Physician: Yo Weller MD  Primary Care Provider: Elda Powers MD         Patient information was obtained from patient and ER records.     Subjective:     Principal Problem:Symptomatic bradycardia    Chief Complaint:   Chief Complaint   Patient presents with    Dizziness     patient states she had two times at home today where she would black out. denies loosing consciousness        HPI: 77F h/o PAF s/p TOBY/DCCV 2 weeks ago presents with presyncopal episode x 1. Onset suddenly when pt was sitting down working on a puzzle. Symptoms are episodic and moderate in severity. No mitigating or exacerbating factors reported. Associated sxs include light-headedness and nausea. Patient denies any fever, chills, CP, SOB, vomiting, diarrhea, abd pain, fall, injuries, LOC, and all other sxs at this time.   In ER, HR 36. EKG show junctional bradycardia.  Patient given 0.5 mg atropine x 1. HR improved to >40s. Cardiology called and recommended to dc all AV gemma blocking agents and will follow up.  Hospital medicine called for admission.     Past Medical History:   Diagnosis Date    A-fib     Arthritis     Chronic LBP     ESIs x 3 with Dr. Hicks, PT at [facility], chiropractor    Eye pain     Frequent headaches     GERD (gastroesophageal reflux disease)     Glaucoma     Hyperlipemia     Hypertension        Past Surgical History:   Procedure Laterality Date    CARDIOVERSION N/A 7/9/2018    Procedure: CARDIOVERSION;  Surgeon: Will Rosenthal MD;  Location: [facility] CATH LAB;  Service: Cardiology;  Laterality: N/A;    CATARACT EXTRACTION W/  INTRAOCULAR LENS IMPLANT  OU    TUBAL LIGATION         Review of patient's allergies indicates:   Allergen Reactions    Eliquis [apixaban] Other (See Comments)     Headache, numbness in lip        No current facility-administered  medications on file prior to encounter.      Current Outpatient Prescriptions on File Prior to Encounter   Medication Sig    acetaminophen (TYLENOL) 500 MG tablet Take 500 mg by mouth every 6 (six) hours as needed for Pain.    amiodarone (PACERONE) 200 MG Tab Take 1 tablet (200 mg total) by mouth once daily.    CALCIUM PHOSPHATE TRIB/VIT D3 (CITRACAL + D ORAL) Take 1 tablet by mouth once daily at 6am.     CETIRIZINE HCL (ZYRTEC ORAL) Take by mouth.    famotidine (PEPCID) 20 MG tablet TAKE ONE TABLET BY MOUTH EVERY DAY    fish oil-omega-3 fatty acids 300-1,000 mg capsule Take 2 g by mouth once daily.    glucosamine-chondroitin 500-400 mg tablet Take 2 tablets by mouth once daily at 6am.     latanoprost 0.005 % ophthalmic solution instill ONE DROP BOTH EYES AT BEDTIME    meloxicam (MOBIC) 7.5 MG tablet TAKE ONE TABLET BY MOUTH EVERY DAY WITH BREAKFAST    metoprolol tartrate (LOPRESSOR) 50 MG tablet Take 1 tablet (50 mg total) by mouth 2 (two) times daily.    MULTIVITAMIN W-MINERALS/LUTEIN (CENTRUM SILVER ORAL) Take by mouth.    rivaroxaban (XARELTO) 20 mg Tab Take 1 tablet (20 mg total) by mouth daily with dinner or evening meal.    timolol maleate 0.5% (TIMOPTIC) 0.5 % Drop Place 1 drop into the right eye 2 (two) times daily.     Family History     Problem Relation (Age of Onset)    Cancer Mother    Cataracts Mother    Diabetes Paternal Aunt    Glaucoma Mother    Hypertension Mother, Father        Social History Main Topics    Smoking status: Never Smoker    Smokeless tobacco: Never Used    Alcohol use No    Drug use: No    Sexual activity: Yes     Partners: Male     Review of Systems   Constitutional: Negative for chills, diaphoresis, fatigue, fever and unexpected weight change.   HENT: Negative for congestion, facial swelling, sore throat and trouble swallowing.    Eyes: Negative for photophobia, redness and visual disturbance.   Respiratory: Negative for apnea, cough, chest tightness, shortness  of breath and wheezing.    Cardiovascular: Negative for chest pain, palpitations and leg swelling.   Gastrointestinal: Positive for nausea. Negative for abdominal distention, abdominal pain, blood in stool, constipation, diarrhea and vomiting.   Endocrine: Negative for polydipsia, polyphagia and polyuria.   Genitourinary: Negative for difficulty urinating, dysuria, flank pain, frequency, hematuria and urgency.   Musculoskeletal: Negative for arthralgias, back pain, joint swelling, myalgias and neck stiffness.   Skin: Negative for pallor and rash.   Allergic/Immunologic: Negative for immunocompromised state.   Neurological: Positive for light-headedness. Negative for dizziness, tremors, syncope, weakness and headaches.   Psychiatric/Behavioral: Negative for agitation, confusion and suicidal ideas.   All other systems reviewed and are negative.    Objective:     Vital Signs (Most Recent):  Temp: 97.8 °F (36.6 °C) (07/23/18 0116)  Pulse: (!) 56 (07/23/18 0116)  Resp: 19 (07/23/18 0116)  BP: (!) 153/70 (07/23/18 0116)  SpO2: 95 % (07/23/18 0116) Vital Signs (24h Range):  Temp:  [97.8 °F (36.6 °C)] 97.8 °F (36.6 °C)  Pulse:  [36-56] 56  Resp:  [15-20] 19  SpO2:  [92 %-95 %] 95 %  BP: (113-153)/(58-70) 153/70     Weight: 86.1 kg (189 lb 13.1 oz)  Body mass index is 31.59 kg/m².    Physical Exam   Constitutional: She is oriented to person, place, and time. She appears well-developed and well-nourished. No distress.   HENT:   Head: Normocephalic and atraumatic.   Eyes: Conjunctivae and EOM are normal. Pupils are equal, round, and reactive to light. No scleral icterus.   Neck: Normal range of motion. Neck supple. No JVD present. No thyromegaly present.   Cardiovascular: Regular rhythm and intact distal pulses.  Exam reveals no gallop and no friction rub.    No murmur heard.  bradycardic   Pulmonary/Chest: Effort normal and breath sounds normal. No respiratory distress. She has no wheezes. She has no rales. She exhibits no  tenderness.   Abdominal: Soft. Bowel sounds are normal. She exhibits no distension and no mass. There is no tenderness. There is no guarding.   Musculoskeletal: Normal range of motion. She exhibits no tenderness.   Neurological: She is alert and oriented to person, place, and time. No cranial nerve deficit.   Skin: Skin is warm and dry. Capillary refill takes 2 to 3 seconds. No rash noted. She is not diaphoretic. No erythema.   Psychiatric: She has a normal mood and affect.   Nursing note and vitals reviewed.        CRANIAL NERVES     CN III, IV, VI   Pupils are equal, round, and reactive to light.  Extraocular motions are normal.        Significant Labs:   CBC:   Recent Labs  Lab 07/22/18 2221   WBC 11.87   HGB 13.2   HCT 38.8        CMP:   Recent Labs  Lab 07/22/18 2221      K 4.7      CO2 20*      BUN 24*   CREATININE 1.3   CALCIUM 9.4   PROT 7.3   ALBUMIN 3.7   BILITOT 0.4   ALKPHOS 104   AST 45*   ALT 65*   ANIONGAP 11   EGFRNONAA 39*     Troponin:   Recent Labs  Lab 07/22/18 2221   TROPONINI <0.006     Urine Studies: No results for input(s): COLORU, APPEARANCEUA, PHUR, SPECGRAV, PROTEINUA, GLUCUA, KETONESU, BILIRUBINUA, OCCULTUA, NITRITE, UROBILINOGEN, LEUKOCYTESUR, RBCUA, WBCUA, BACTERIA, SQUAMEPITHEL, HYALINECASTS in the last 48 hours.    Invalid input(s): WRIGHTSUR  All pertinent labs within the past 24 hours have been reviewed.    Significant Imaging: I have reviewed all pertinent imaging results/findings within the past 24 hours.   Imaging Results          X-Ray Chest AP Portable (Final result)  Result time 07/22/18 22:34:36    Final result by Andreas Cortes MD (07/22/18 22:34:36)                 Impression:      Borderline cardiomegaly.  Questionable small right pleural effusion.  Bilateral shoulder arthropathy.      Electronically signed by: Andreas Cortes MD  Date:    07/22/2018  Time:    22:34             Narrative:    EXAMINATION:  XR CHEST AP  PORTABLE    CLINICAL HISTORY:  Shortness of breath., Near syncope, bradycardia;    COMPARISON:  None    FINDINGS:  Large patient.  Borderline cardiomegaly.  Decreased lung volumes with blunting of the right costophrenic angle.    Bilateral shoulder arthropathy.                                  Assessment/Plan:     * Symptomatic bradycardia    - 2/2 s/p cardioversion and amiodarone/metoprolol use    - dc amiodarone/metoprolol  - monitor tele  - atropine 0.5 mg prn HR < 40  - follow up cardiology recs        Paroxysmal atrial fibrillation    - S/p TOBY/DCCV  - EKG junctional bradycardia  - continue ASA/rivaroxaban  - follow up cardiology recs          HTN (hypertension) borderline    - hold amiodarone/metoprolol due to bradycardia  - add hydralazine 10mg IV q8h prn sbp >180              VTE Risk Mitigation         Ordered     rivaroxaban tablet 20 mg  With dinner      07/23/18 0137             Yo Weller MD  Department of Hospital Medicine   [facility]

## 2018-07-23 NOTE — ED PROVIDER NOTES
SCRIBE #1 NOTE: I, David So, am scribing for, and in the presence of, Hilario Diana Jr., MD. I have scribed the entire note.      History      Chief Complaint   Patient presents with    Dizziness     patient states she had two times at home today where she would black out. denies loosing consciousness       Review of patient's allergies indicates:   Allergen Reactions    Eliquis [apixaban] Other (See Comments)     Headache, numbness in lip         HPI   HPI    7/22/2018, 9:36 PM   History obtained from the patient      History of Present Illness: Maria Del Carmen Spence is a 77 y.o. female patient who presents to the Emergency Department for near syncope which onset suddenly when pt was sitting down working on a puzzle. Symptoms are episodic and moderate in severity. No mitigating or exacerbating factors reported. Associated sxs include light-headedness and nausea. Patient denies any fever, chills, CP, SOB, vomiting, diarrhea, abd pain, fall, injuries, LOC, and all other sxs at this time. No prior Tx. Pt had a cardioversion 2 weeks ago and recently had her HTN medication dosage increased. No further complaints or concerns at this time.     Arrival mode: Personal vehicle    PCP: Elda Powers MD       Past Medical History:  Past Medical History:   Diagnosis Date    A-fib     Arthritis     Chronic LBP     ESIs x 3 with Dr. Hicks, PT at [facility], chiropractor    Eye pain     Frequent headaches     GERD (gastroesophageal reflux disease)     Glaucoma     Hyperlipemia     Hypertension        Past Surgical History:  Past Surgical History:   Procedure Laterality Date    CARDIOVERSION N/A 7/9/2018    Procedure: CARDIOVERSION;  Surgeon: Will Rosenthal MD;  Location: [facility] CATH LAB;  Service: Cardiology;  Laterality: N/A;    CATARACT EXTRACTION W/  INTRAOCULAR LENS IMPLANT  OU    TUBAL LIGATION           Family History:  Family History   Problem Relation Age of Onset    Glaucoma Mother     Cancer Mother     Cataracts  Mother     Hypertension Mother     Hypertension Father     Diabetes Paternal Aunt     Strabismus Neg Hx     Retinal detachment Neg Hx     Macular degeneration Neg Hx     Blindness Neg Hx     Amblyopia Neg Hx     Stroke Neg Hx     Thyroid disease Neg Hx        Social History:  Social History     Social History Main Topics    Smoking status: Never Smoker    Smokeless tobacco: Never Used    Alcohol use No    Drug use: No    Sexual activity: Yes     Partners: Male       ROS   Review of Systems   Constitutional: Negative for chills, diaphoresis and fever.        (-) fall  (-) injuries   HENT: Negative for congestion, rhinorrhea and sore throat.    Respiratory: Negative for cough and shortness of breath.    Cardiovascular: Negative for chest pain.   Gastrointestinal: Positive for nausea. Negative for abdominal pain, diarrhea and vomiting.   Genitourinary: Negative for dysuria, frequency and hematuria.   Musculoskeletal: Negative for back pain and neck pain.   Skin: Negative for rash.   Neurological: Positive for light-headedness. Negative for dizziness, syncope, weakness, numbness and headaches.        (+) near syncope   Hematological: Does not bruise/bleed easily.     Physical Exam      Initial Vitals [07/22/18 2110]   BP Pulse Resp Temp SpO2   113/61 (!) 36 20 97.8 °F (36.6 °C) 95 %      MAP       --          Physical Exam  Nursing Notes and Vital Signs Reviewed.  Constitutional: Patient is in no acute distress. Well-developed and well-nourished. Pt became light-headed when sitting up in the bed for an exam.  Head: Atraumatic. Normocephalic.  Eyes: PERRL. EOM intact. Conjunctivae are not pale. No scleral icterus.  ENT: Mucous membranes are moist. Oropharynx is clear and symmetric.    Neck: Supple. Full ROM. No lymphadenopathy.  Cardiovascular: Bradycardic. Regular rhythm. No murmurs, rubs, or gallops. Distal pulses are 2+ and symmetric.  Pulmonary/Chest: No respiratory distress. Clear to auscultation  "bilaterally. No wheezing or rales.  Abdominal: Soft and non-distended.  There is no tenderness.  No rebound, guarding, or rigidity.   Musculoskeletal: Moves all extremities. No obvious deformities. No edema.  Skin: Warm and dry.  Neurological:  Alert, awake, and appropriate.  Normal speech.  No acute focal neurological deficits are appreciated.  Psychiatric: Normal affect. Good eye contact. Appropriate in content.    ED Course    Procedures  ED Vital Signs:  Vitals:    07/23/18 0031 07/23/18 0116 07/23/18 0300 07/23/18 0316   BP: (!) 128/59 (!) 153/70  (!) 141/64   Pulse: (!) 47 (!) 56 (!) 51 (!) 53   Resp: 15 19  18   Temp:  97.8 °F (36.6 °C)  97.6 °F (36.4 °C)   TempSrc:  Oral  Oral   SpO2: (!) 93% 95%  (!) 93%   Weight:  86.1 kg (189 lb 13.1 oz)  85.1 kg (187 lb 9.8 oz)   Height:  5' 5" (1.651 m)      07/23/18 0500 07/23/18 0746 07/23/18 0900 07/23/18 1100   BP:  133/60     Pulse: (!) 51 (!) 51 (!) 48 (!) 49   Resp:  18     Temp:  96.7 °F (35.9 °C)     TempSrc:  Oral     SpO2:       Weight:       Height:        07/23/18 1122 07/23/18 1300 07/23/18 1500 07/23/18 1517   BP: (!) 113/53   (!) 142/62   Pulse: (!) 54 (!) 48 (!) 46 (!) 51   Resp: 18   18   Temp: 98.1 °F (36.7 °C)   98.2 °F (36.8 °C)   TempSrc: Oral   Oral   SpO2: 96%   (!) 94%   Weight:       Height:        07/23/18 1700 07/23/18 1904 07/23/18 1956   BP:   122/60   Pulse: (!) 50 (!) 47 (!) 56   Resp:   18   Temp:   98.6 °F (37 °C)   TempSrc:   Oral   SpO2:   (!) 94%   Weight:      Height:          Abnormal Lab Results:  Labs Reviewed   CBC W/ AUTO DIFFERENTIAL - Abnormal; Notable for the following:        Result Value    RBC 3.93 (*)     MCV 99 (*)     MCH 33.6 (*)     Mono # 1.1 (*)     All other components within normal limits   COMPREHENSIVE METABOLIC PANEL - Abnormal; Notable for the following:     CO2 20 (*)     BUN, Bld 24 (*)     AST 45 (*)     ALT 65 (*)     eGFR if  45 (*)     eGFR if non  39 (*)     All other " components within normal limits   B-TYPE NATRIURETIC PEPTIDE - Abnormal; Notable for the following:     BNP 1,076 (*)     All other components within normal limits   TROPONIN I        All Lab Results:  Results for orders placed or performed during the hospital encounter of 07/22/18   CBC auto differential   Result Value Ref Range    WBC 11.87 3.90 - 12.70 K/uL    RBC 3.93 (L) 4.00 - 5.40 M/uL    Hemoglobin 13.2 12.0 - 16.0 g/dL    Hematocrit 38.8 37.0 - 48.5 %    MCV 99 (H) 82 - 98 fL    MCH 33.6 (H) 27.0 - 31.0 pg    MCHC 34.0 32.0 - 36.0 g/dL    RDW 14.2 11.5 - 14.5 %    Platelets 288 150 - 350 K/uL    MPV 10.4 9.2 - 12.9 fL    Gran # (ANC) 7.1 1.8 - 7.7 K/uL    Lymph # 3.6 1.0 - 4.8 K/uL    Mono # 1.1 (H) 0.3 - 1.0 K/uL    Eos # 0.2 0.0 - 0.5 K/uL    Baso # 0.02 0.00 - 0.20 K/uL    Gran% 59.3 38.0 - 73.0 %    Lymph% 30.2 18.0 - 48.0 %    Mono% 9.0 4.0 - 15.0 %    Eosinophil% 1.3 0.0 - 8.0 %    Basophil% 0.2 0.0 - 1.9 %    Differential Method Automated    Comprehensive metabolic panel   Result Value Ref Range    Sodium 139 136 - 145 mmol/L    Potassium 4.7 3.5 - 5.1 mmol/L    Chloride 108 95 - 110 mmol/L    CO2 20 (L) 23 - 29 mmol/L    Glucose 104 70 - 110 mg/dL    BUN, Bld 24 (H) 8 - 23 mg/dL    Creatinine 1.3 0.5 - 1.4 mg/dL    Calcium 9.4 8.7 - 10.5 mg/dL    Total Protein 7.3 6.0 - 8.4 g/dL    Albumin 3.7 3.5 - 5.2 g/dL    Total Bilirubin 0.4 0.1 - 1.0 mg/dL    Alkaline Phosphatase 104 55 - 135 U/L    AST 45 (H) 10 - 40 U/L    ALT 65 (H) 10 - 44 U/L    Anion Gap 11 8 - 16 mmol/L    eGFR if African American 45 (A) >60 mL/min/1.73 m^2    eGFR if non African American 39 (A) >60 mL/min/1.73 m^2   Troponin I #1   Result Value Ref Range    Troponin I <0.006 0.000 - 0.026 ng/mL   B-Type natriuretic peptide (BNP)   Result Value Ref Range    BNP 1,076 (H) 0 - 99 pg/mL   PT/INR   Result Value Ref Range    Prothrombin Time 15.7 (H) 9.0 - 12.5 sec    INR 1.5 (H) 0.8 - 1.2   Magnesium   Result Value Ref Range    Magnesium  2.2 1.6 - 2.6 mg/dL   Phosphorus   Result Value Ref Range    Phosphorus 4.0 2.7 - 4.5 mg/dL   CBC with Automated Differential   Result Value Ref Range    WBC 8.60 3.90 - 12.70 K/uL    RBC 3.59 (L) 4.00 - 5.40 M/uL    Hemoglobin 11.8 (L) 12.0 - 16.0 g/dL    Hematocrit 36.2 (L) 37.0 - 48.5 %     (H) 82 - 98 fL    MCH 32.9 (H) 27.0 - 31.0 pg    MCHC 32.6 32.0 - 36.0 g/dL    RDW 14.4 11.5 - 14.5 %    Platelets 256 150 - 350 K/uL    MPV 10.2 9.2 - 12.9 fL    Gran # (ANC) 4.0 1.8 - 7.7 K/uL    Lymph # 3.4 1.0 - 4.8 K/uL    Mono # 1.1 (H) 0.3 - 1.0 K/uL    Eos # 0.1 0.0 - 0.5 K/uL    Baso # 0.01 0.00 - 0.20 K/uL    Gran% 46.6 38.0 - 73.0 %    Lymph% 39.5 18.0 - 48.0 %    Mono% 12.8 4.0 - 15.0 %    Eosinophil% 1.0 0.0 - 8.0 %    Basophil% 0.1 0.0 - 1.9 %    Differential Method Automated    Basic Metabolic Panel (BMP)   Result Value Ref Range    Sodium 143 136 - 145 mmol/L    Potassium 5.5 (H) 3.5 - 5.1 mmol/L    Chloride 111 (H) 95 - 110 mmol/L    CO2 24 23 - 29 mmol/L    Glucose 92 70 - 110 mg/dL    BUN, Bld 22 8 - 23 mg/dL    Creatinine 1.1 0.5 - 1.4 mg/dL    Calcium 9.1 8.7 - 10.5 mg/dL    Anion Gap 8 8 - 16 mmol/L    eGFR if African American 56 (A) >60 mL/min/1.73 m^2    eGFR if non African American 48 (A) >60 mL/min/1.73 m^2   2D echo with color flow doppler   Result Value Ref Range    EF 60 55 - 65    Mitral Valve Regurgitation MILD     Diastolic Dysfunction No     Est. PA Systolic Pressure 28.73     Tricuspid Valve Regurgitation MILD        Imaging Results:  Imaging Results          X-Ray Chest AP Portable (Final result)  Result time 07/22/18 22:34:36    Final result by Andreas Cortes MD (07/22/18 22:34:36)                 Impression:      Borderline cardiomegaly.  Questionable small right pleural effusion.  Bilateral shoulder arthropathy.      Electronically signed by: Andreas Cortes MD  Date:    07/22/2018  Time:    22:34             Narrative:    EXAMINATION:  XR CHEST AP PORTABLE    CLINICAL  HISTORY:  Shortness of breath., Near syncope, bradycardia;    COMPARISON:  None    FINDINGS:  Large patient.  Borderline cardiomegaly.  Decreased lung volumes with blunting of the right costophrenic angle.    Bilateral shoulder arthropathy.                                        The EKG was ordered, reviewed, and independently interpreted by the ED provider.  Interpretation time: 2210  Rate: 37 BPM  Rhythm: Junctional bradycardia  Interpretation: Low voltage QRS. Cannot rule out Anterior infarct. Abnormal ECG. No STEMI.    The Emergency Provider reviewed the vital signs and test results, which are outlined above.    ED Discussion     12:34 AM: Dr. Diana discussed the pt's case with Dr. Rosenthal (Cardiology) who recommends admitting pt and monitoring.    12:36 AM: Discussed case with Yokasta Curiel NP (Hospital Medicine). Dr. Weller agrees with current care and management of pt and accepts admission.   Admitting Service: Hospital medicine   Admitting Physician: Dr. Weller  Admit to: In-patient Tele    12:36 AM: Re-evaluated pt. I have discussed test results, shared treatment plan, and the need for admission with patient and family at bedside. Pt and family express understanding at this time and agree with all information. All questions answered. Pt and family have no further questions or concerns at this time. Pt is ready for admit.    ED Medication(s):  Medications   famotidine tablet 20 mg (20 mg Oral Given 7/23/18 6617)   sodium chloride 0.9% flush 5 mL (not administered)   glucose chewable tablet 16 g (not administered)   glucose chewable tablet 24 g (not administered)   dextrose 50% injection 12.5 g (not administered)   dextrose 50% injection 25 g (not administered)   glucagon (human recombinant) injection 1 mg (not administered)   acetaminophen tablet 650 mg (not administered)   atropine injection 0.5 mg (not administered)   rivaroxaban tablet 20 mg (20 mg Oral Given 7/23/18 1722)   aspirin EC tablet 81 mg (81 mg  Oral Given 7/23/18 0917)   hydrALAZINE injection 10 mg (not administered)   latanoprost 0.005 % ophthalmic solution 1 drop (1 drop Both Eyes Given 7/23/18 2053)   sodium chloride 0.9% bolus 500 mL (0 mLs Intravenous Stopped 7/23/18 0031)   ondansetron injection 4 mg (4 mg Intravenous Given 7/22/18 2248)   atropine injection 0.5 mg (0.5 mg Intravenous Given 7/22/18 1598)       Current Discharge Medication List                Medical Decision Making    Medical Decision Making:   Clinical Tests:   Lab Tests: Reviewed and Ordered  Radiological Study: Reviewed and Ordered  Medical Tests: Reviewed and Ordered           Scribe Attestation:   Scribe #1: I performed the above scribed service and the documentation accurately describes the services I performed. I attest to the accuracy of the note.    Attending:   Physician Attestation Statement for Scribe #1: I, Hilario Diana Jr., MD, personally performed the services described in this documentation, as scribed by David So, in my presence, and it is both accurate and complete.          Clinical Impression       ICD-10-CM ICD-9-CM   1. Symptomatic bradycardia R00.1 427.89   2. Near syncope R55 780.2   3. Bradycardia R00.1 427.89       Disposition:   Disposition: Admitted  Condition: Fair         Hilario Diana Jr., MD  07/23/18 8428

## 2018-07-23 NOTE — CONSULTS
"[facility]  Cardiology  Consult Note    Patient Name: Maria Del Carmen Spence  MRN: [id number]  Admission Date: 7/22/2018  Hospital Length of Stay: 0 days  Code Status: Full Code   Attending Provider: Heena Bui MD   Consulting Provider: Khushboo Fernandes PA-C  Primary Care Physician: Elda Powers MD  Principal Problem:Symptomatic bradycardia    Patient information was obtained from patient, past medical records and ER records.     Inpatient consult to Cardiology  Consult performed by: KHUSHBOO FERNANDES  Consult ordered by: AMI LAKE JR        Subjective:     Chief Complaint:  Bradycardia/Near Syncope     HPI:   Ms. Spence is a 77 year old female patient with a PMHx of PAF/PAFL s/p recent DCCV two weeks ago, HTN, hyperlipidemia, and glaucoma who presented to [facility] ED yesterday with a chief complaint of near syncope. Patient was sitting down watching TV and working on a puzzle when she became lightheaded and dizzy. Her vision also went "black for a minute or two" but then returned to normal. A few minutes later, a second episode occurred with similar symptoms, which prompted her to seek further treatment. She denied any associated syncope, chest pain, palpitations, nausea, vomiting, fever, or chills. EKG performed in ED showed junctional bradycardia, rate 36 bpm and patient subsequently admitted for further evaluation and treatment. Cardiology consulted to assist with management. Patient seen and examined today. Feels ok. States lightheadedness and dizziness have improved. Remains chest pain free. Chronic DURÁN. States HR has been in 30's-40's s/p cardioversion. She reports compliance with her medications, on Xarelto for CVA prophylaxis. Chart reviewed. K slightly high this AM at 5.5. BNP 1076. HR in 50's at time of exam. 2D echo pending.    Past Medical History:   Diagnosis Date    A-fib     Arthritis     Chronic LBP     ESIs x 3 with Dr. Hicks, PT at [facility], chiropractor    Eye pain     " Frequent headaches     GERD (gastroesophageal reflux disease)     Glaucoma     Hyperlipemia     Hypertension        Past Surgical History:   Procedure Laterality Date    CARDIOVERSION N/A 7/9/2018    Procedure: CARDIOVERSION;  Surgeon: Will Rosenthal MD;  Location: [facility] CATH LAB;  Service: Cardiology;  Laterality: N/A;    CATARACT EXTRACTION W/  INTRAOCULAR LENS IMPLANT  OU    TUBAL LIGATION         Review of patient's allergies indicates:   Allergen Reactions    Eliquis [apixaban] Other (See Comments)     Headache, numbness in lip        No current facility-administered medications on file prior to encounter.      Current Outpatient Prescriptions on File Prior to Encounter   Medication Sig    acetaminophen (TYLENOL) 500 MG tablet Take 500 mg by mouth every 6 (six) hours as needed for Pain.    amiodarone (PACERONE) 200 MG Tab Take 1 tablet (200 mg total) by mouth once daily.    CALCIUM PHOSPHATE TRIB/VIT D3 (CITRACAL + D ORAL) Take 1 tablet by mouth once daily at 6am.     CETIRIZINE HCL (ZYRTEC ORAL) Take by mouth.    famotidine (PEPCID) 20 MG tablet TAKE ONE TABLET BY MOUTH EVERY DAY    fish oil-omega-3 fatty acids 300-1,000 mg capsule Take 2 g by mouth once daily.    glucosamine-chondroitin 500-400 mg tablet Take 2 tablets by mouth once daily at 6am.     latanoprost 0.005 % ophthalmic solution instill ONE DROP BOTH EYES AT BEDTIME    meloxicam (MOBIC) 7.5 MG tablet TAKE ONE TABLET BY MOUTH EVERY DAY WITH BREAKFAST    metoprolol tartrate (LOPRESSOR) 50 MG tablet Take 1 tablet (50 mg total) by mouth 2 (two) times daily.    MULTIVITAMIN W-MINERALS/LUTEIN (CENTRUM SILVER ORAL) Take by mouth.    rivaroxaban (XARELTO) 20 mg Tab Take 1 tablet (20 mg total) by mouth daily with dinner or evening meal.    timolol maleate 0.5% (TIMOPTIC) 0.5 % Drop Place 1 drop into the right eye 2 (two) times daily.     Family History     Problem Relation (Age of Onset)    Cancer Mother    Cataracts Mother    Diabetes  Paternal Aunt    Glaucoma Mother    Hypertension Mother, Father        Social History Main Topics    Smoking status: Never Smoker    Smokeless tobacco: Never Used    Alcohol use No    Drug use: No    Sexual activity: Yes     Partners: Male     Review of Systems   Constitution: Negative.   HENT: Negative.    Eyes: Negative.    Cardiovascular: Positive for dyspnea on exertion and near-syncope.   Respiratory: Negative.    Endocrine: Negative.    Hematologic/Lymphatic: Bruises/bleeds easily.   Skin: Negative.    Musculoskeletal: Negative.    Gastrointestinal: Negative.    Genitourinary: Negative.    Neurological: Positive for dizziness and light-headedness.   Psychiatric/Behavioral: Negative.    Allergic/Immunologic: Negative.      Objective:     Vital Signs (Most Recent):  Temp: 96.7 °F (35.9 °C) (07/23/18 0746)  Pulse: (!) 51 (07/23/18 0746)  Resp: 18 (07/23/18 0746)  BP: 133/60 (07/23/18 0746)  SpO2: (!) 93 % (07/23/18 0316) Vital Signs (24h Range):  Temp:  [96.7 °F (35.9 °C)-97.8 °F (36.6 °C)] 96.7 °F (35.9 °C)  Pulse:  [36-56] 51  Resp:  [15-20] 18  SpO2:  [92 %-95 %] 93 %  BP: (113-153)/(58-70) 133/60     Weight: 85.1 kg (187 lb 9.8 oz)  Body mass index is 31.22 kg/m².    SpO2: (!) 93 %  O2 Device (Oxygen Therapy): room air      Intake/Output Summary (Last 24 hours) at 07/23/18 0939  Last data filed at 07/23/18 0031   Gross per 24 hour   Intake              500 ml   Output                0 ml   Net              500 ml       Lines/Drains/Airways     Peripheral Intravenous Line                 Peripheral IV - Single Lumen Right Forearm -- days         Peripheral IV - Single Lumen 07/22/18 2221 Right Wrist less than 1 day                Physical Exam   Constitutional: She is oriented to person, place, and time. She appears well-developed and well-nourished. No distress.   HENT:   Head: Normocephalic and atraumatic.   Eyes: Pupils are equal, round, and reactive to light. Right eye exhibits no discharge. Left eye  exhibits no discharge.   Neck: Neck supple. No JVD present. No thyromegaly present.   Cardiovascular: Regular rhythm, S1 normal and S2 normal.  Bradycardia present.    No murmur heard.  Pulmonary/Chest: Effort normal and breath sounds normal. No respiratory distress. She has no wheezes. She has no rales.   Abdominal: Soft. She exhibits no distension. There is no tenderness. There is no rebound.   Musculoskeletal: She exhibits no edema.   Neurological: She is alert and oriented to person, place, and time.   Skin: Skin is warm and dry. She is not diaphoretic. No erythema.   Psychiatric: She has a normal mood and affect. Her behavior is normal. Thought content normal.   Nursing note and vitals reviewed.      Significant Labs:   CMP   Recent Labs  Lab 07/22/18 2221 07/23/18  0432    143   K 4.7 5.5*    111*   CO2 20* 24    92   BUN 24* 22   CREATININE 1.3 1.1   CALCIUM 9.4 9.1   PROT 7.3  --    ALBUMIN 3.7  --    BILITOT 0.4  --    ALKPHOS 104  --    AST 45*  --    ALT 65*  --    ANIONGAP 11 8   ESTGFRAFRICA 45* 56*   EGFRNONAA 39* 48*   , CBC   Recent Labs  Lab 07/22/18 2221 07/23/18  0432   WBC 11.87 8.60   HGB 13.2 11.8*   HCT 38.8 36.2*    256   , Troponin   Recent Labs  Lab 07/22/18 2221   TROPONINI <0.006    and All pertinent lab results from the last 24 hours have been reviewed.    Significant Imaging: Echocardiogram:   2D echo with color flow doppler:   Results for orders placed or performed in visit on 06/27/17   2D Echo w/ Color Flow Doppler   Result Value Ref Range    EF 60 55 - 65    Diastolic Dysfunction No     Est. PA Systolic Pressure 34.14     Tricuspid Valve Regurgitation MILD    , EKG: Reviewed and X-Ray: CXR: X-Ray Chest 1 View (CXR): No results found for this visit on 07/22/18. and X-Ray Chest PA and Lateral (CXR): No results found for this visit on 07/22/18.    Assessment and Plan:   Patient with history of PAFL/PAF s/p recent DCCV who presented to ED with symptomatic  bradycardia. Will hold AV gemma blocking agents for now and monitor HR response. If HR fails to recover, PPM will be indicated.     * Symptomatic bradycardia    -Presented to ED with lightheadedness/dizziness/near syncope with HR in 30's  -Report HR has been 30's-40's at home s/p DCCV  -Hold all AV gemma blocking agents and monitor for now        Paroxysmal atrial fibrillation    -s/p recent TOBY/DCCV 2 weeks ago  -Remains in SR but bradycardic  -Hold amiodarone and Metoprolol for now  -Continue to observe for 48 hours  -If HR fails to recover, will need PPM for tachy-miko/SSS  -Continue Xarelto for CVA prophylaxis         HTN (hypertension) borderline    -Monitor  -Consider addition of ACEI/ARB if needed            VTE Risk Mitigation         Ordered     rivaroxaban tablet 20 mg  With dinner      07/23/18 3309          Thank you for your consult. I will follow-up with patient. Please contact us if you have any additional questions.    Khushboo Lorenzo PA-C  Cardiology   [facility]    Chart reviewed. Dr. Walters examined patient and agrees with plan as outlined above.

## 2018-07-23 NOTE — ASSESSMENT & PLAN NOTE
-s/p recent TOBY/DCCV 2 weeks ago  -Remains in SR but bradycardic  -Hold amiodarone and Metoprolol for now  -Continue to observe for 48 hours  -If HR fails to recover, will need PPM for tachy-miko/SSS  -Continue Xarelto for CVA prophylaxis

## 2018-07-23 NOTE — PLAN OF CARE
Problem: Patient Care Overview  Goal: Plan of Care Review  Outcome: Ongoing (interventions implemented as appropriate)  Shift assessment completed. Patient updated on POC for the day. Complains of chronic pain in her knees. IV saline locked with no redness at the site. Dressing is clean,dry & intact. Neuro: Alert & oriented x4. CVR: Continuous telemetry monitoring maintained, patient's rhythm bradycardia, HR= 51-56. Sats=greater than 95% on room air. Patient ambulates with assistance to the restroom. Reviewed fall precautions with patient. Will continue to monitor.

## 2018-07-23 NOTE — HPI
77F h/o PAF s/p TOBY/DCCV 2 weeks ago presents with presyncopal episode x 1. Onset suddenly when pt was sitting down working on a puzzle. Symptoms are episodic and moderate in severity. No mitigating or exacerbating factors reported. Associated sxs include light-headedness and nausea. Patient denies any fever, chills, CP, SOB, vomiting, diarrhea, abd pain, fall, injuries, LOC, and all other sxs at this time.   In ER, HR 36. EKG show junctional bradycardia.  Patient given 0.5 mg atropine x 1. HR improved to >40s. Cardiology called and recommended to dc all AV gemma blocking agents and will follow up.  Hospital medicine called for admission.

## 2018-07-23 NOTE — SUBJECTIVE & OBJECTIVE
Past Medical History:   Diagnosis Date    A-fib     Arthritis     Chronic LBP     ESIs x 3 with Dr. Hicks, PT at [facility], chiropractor    Eye pain     Frequent headaches     GERD (gastroesophageal reflux disease)     Glaucoma     Hyperlipemia     Hypertension        Past Surgical History:   Procedure Laterality Date    CARDIOVERSION N/A 7/9/2018    Procedure: CARDIOVERSION;  Surgeon: Will Rosenthal MD;  Location: [facility] CATH LAB;  Service: Cardiology;  Laterality: N/A;    CATARACT EXTRACTION W/  INTRAOCULAR LENS IMPLANT  OU    TUBAL LIGATION         Review of patient's allergies indicates:   Allergen Reactions    Eliquis [apixaban] Other (See Comments)     Headache, numbness in lip        No current facility-administered medications on file prior to encounter.      Current Outpatient Prescriptions on File Prior to Encounter   Medication Sig    acetaminophen (TYLENOL) 500 MG tablet Take 500 mg by mouth every 6 (six) hours as needed for Pain.    amiodarone (PACERONE) 200 MG Tab Take 1 tablet (200 mg total) by mouth once daily.    CALCIUM PHOSPHATE TRIB/VIT D3 (CITRACAL + D ORAL) Take 1 tablet by mouth once daily at 6am.     CETIRIZINE HCL (ZYRTEC ORAL) Take by mouth.    famotidine (PEPCID) 20 MG tablet TAKE ONE TABLET BY MOUTH EVERY DAY    fish oil-omega-3 fatty acids 300-1,000 mg capsule Take 2 g by mouth once daily.    glucosamine-chondroitin 500-400 mg tablet Take 2 tablets by mouth once daily at 6am.     latanoprost 0.005 % ophthalmic solution instill ONE DROP BOTH EYES AT BEDTIME    meloxicam (MOBIC) 7.5 MG tablet TAKE ONE TABLET BY MOUTH EVERY DAY WITH BREAKFAST    metoprolol tartrate (LOPRESSOR) 50 MG tablet Take 1 tablet (50 mg total) by mouth 2 (two) times daily.    MULTIVITAMIN W-MINERALS/LUTEIN (CENTRUM SILVER ORAL) Take by mouth.    rivaroxaban (XARELTO) 20 mg Tab Take 1 tablet (20 mg total) by mouth daily with dinner or evening meal.    timolol maleate 0.5% (TIMOPTIC) 0.5 % Drop Place 1  drop into the right eye 2 (two) times daily.     Family History     Problem Relation (Age of Onset)    Cancer Mother    Cataracts Mother    Diabetes Paternal Aunt    Glaucoma Mother    Hypertension Mother, Father        Social History Main Topics    Smoking status: Never Smoker    Smokeless tobacco: Never Used    Alcohol use No    Drug use: No    Sexual activity: Yes     Partners: Male     Review of Systems   Constitution: Negative.   HENT: Negative.    Eyes: Negative.    Cardiovascular: Positive for dyspnea on exertion and near-syncope.   Respiratory: Negative.    Endocrine: Negative.    Hematologic/Lymphatic: Bruises/bleeds easily.   Skin: Negative.    Musculoskeletal: Negative.    Gastrointestinal: Negative.    Genitourinary: Negative.    Neurological: Positive for dizziness and light-headedness.   Psychiatric/Behavioral: Negative.    Allergic/Immunologic: Negative.      Objective:     Vital Signs (Most Recent):  Temp: 96.7 °F (35.9 °C) (07/23/18 0746)  Pulse: (!) 51 (07/23/18 0746)  Resp: 18 (07/23/18 0746)  BP: 133/60 (07/23/18 0746)  SpO2: (!) 93 % (07/23/18 0316) Vital Signs (24h Range):  Temp:  [96.7 °F (35.9 °C)-97.8 °F (36.6 °C)] 96.7 °F (35.9 °C)  Pulse:  [36-56] 51  Resp:  [15-20] 18  SpO2:  [92 %-95 %] 93 %  BP: (113-153)/(58-70) 133/60     Weight: 85.1 kg (187 lb 9.8 oz)  Body mass index is 31.22 kg/m².    SpO2: (!) 93 %  O2 Device (Oxygen Therapy): room air      Intake/Output Summary (Last 24 hours) at 07/23/18 0939  Last data filed at 07/23/18 0031   Gross per 24 hour   Intake              500 ml   Output                0 ml   Net              500 ml       Lines/Drains/Airways     Peripheral Intravenous Line                 Peripheral IV - Single Lumen Right Forearm -- days         Peripheral IV - Single Lumen 07/22/18 2221 Right Wrist less than 1 day                Physical Exam   Constitutional: She is oriented to person, place, and time. She appears well-developed and well-nourished. No  distress.   HENT:   Head: Normocephalic and atraumatic.   Eyes: Pupils are equal, round, and reactive to light. Right eye exhibits no discharge. Left eye exhibits no discharge.   Neck: Neck supple. No JVD present. No thyromegaly present.   Cardiovascular: Regular rhythm, S1 normal and S2 normal.  Bradycardia present.    No murmur heard.  Pulmonary/Chest: Effort normal and breath sounds normal. No respiratory distress. She has no wheezes. She has no rales.   Abdominal: Soft. She exhibits no distension. There is no tenderness. There is no rebound.   Musculoskeletal: She exhibits no edema.   Neurological: She is alert and oriented to person, place, and time.   Skin: Skin is warm and dry. She is not diaphoretic. No erythema.   Psychiatric: She has a normal mood and affect. Her behavior is normal. Thought content normal.   Nursing note and vitals reviewed.      Significant Labs:   CMP   Recent Labs  Lab 07/22/18 2221 07/23/18  0432    143   K 4.7 5.5*    111*   CO2 20* 24    92   BUN 24* 22   CREATININE 1.3 1.1   CALCIUM 9.4 9.1   PROT 7.3  --    ALBUMIN 3.7  --    BILITOT 0.4  --    ALKPHOS 104  --    AST 45*  --    ALT 65*  --    ANIONGAP 11 8   ESTGFRAFRICA 45* 56*   EGFRNONAA 39* 48*   , CBC   Recent Labs  Lab 07/22/18 2221 07/23/18  0432   WBC 11.87 8.60   HGB 13.2 11.8*   HCT 38.8 36.2*    256   , Troponin   Recent Labs  Lab 07/22/18 2221   TROPONINI <0.006    and All pertinent lab results from the last 24 hours have been reviewed.    Significant Imaging: Echocardiogram:   2D echo with color flow doppler:   Results for orders placed or performed in visit on 06/27/17   2D Echo w/ Color Flow Doppler   Result Value Ref Range    EF 60 55 - 65    Diastolic Dysfunction No     Est. PA Systolic Pressure 34.14     Tricuspid Valve Regurgitation MILD    , EKG: Reviewed and X-Ray: CXR: X-Ray Chest 1 View (CXR): No results found for this visit on 07/22/18. and X-Ray Chest PA and Lateral (CXR): No  results found for this visit on 07/22/18.

## 2018-07-23 NOTE — ASSESSMENT & PLAN NOTE
- 2/2 s/p cardioversion and amiodarone/metoprolol use    - dc amiodarone/metoprolol  - monitor tele  - atropine 0.5 mg prn HR < 40  - follow up cardiology recs

## 2018-07-23 NOTE — ASSESSMENT & PLAN NOTE
-Presented to ED with lightheadedness/dizziness/near syncope with HR in 30's  -Report HR has been 30's-40's at home s/p DCCV  -Hold all AV gemma blocking agents and monitor for now

## 2018-07-23 NOTE — PLAN OF CARE
CM met with patient at bedside. Patient is independent and lives with spouse, Shiela Spenec [phone]. No needs identified at this time. DC Plan: Home. Pt agrees to d/c plan. CM provided a transitional care folder, information on advanced directives, information on pharmacy bedside delivery, and discharge planning begins on admission with contact information for any needs/questions.       07/23/18 1327   Discharge Assessment   Assessment Type Discharge Planning Assessment   Confirmed/corrected address and phone number on facesheet? Yes   Assessment information obtained from? Patient   Prior to hospitilization cognitive status: Alert/Oriented   Prior to hospitalization functional status: Independent   Current cognitive status: Alert/Oriented   Current Functional Status: Independent   Lives With spouse  (Shiela Spence [phone])   Able to Return to Prior Arrangements yes   Is patient able to care for self after discharge? Yes   Patient's perception of discharge disposition home or selfcare   Readmission Within The Last 30 Days no previous admission in last 30 days   Patient currently being followed by outpatient case management? No   Patient currently receives any other outside agency services? No   Equipment Currently Used at Home none   Do you have any problems affording any of your prescribed medications? No   Is the patient taking medications as prescribed? yes   Does the patient have transportation home? Yes   Transportation Available family or friend will provide   Does the patient receive services at the [facility]? No   Discharge Plan A Home

## 2018-07-23 NOTE — HPI
"Ms. Spence is a 77 year old female patient with a PMHx of PAF/PAFL s/p recent DCCV two weeks ago, HTN, hyperlipidemia, and glaucoma who presented to [facility] ED yesterday with a chief complaint of near syncope. Patient was sitting down watching TV and working on a puzzle when she became lightheaded and dizzy. Her vision also went "black for a minute or two" but then returned to normal. A few minutes later, a second episode occurred with similar symptoms, which prompted her to seek further treatment. She denied any associated syncope, chest pain, palpitations, nausea, vomiting, fever, or chills. EKG performed in ED showed junctional bradycardia, rate 36 bpm and patient subsequently admitted for further evaluation and treatment. Cardiology consulted to assist with management. Patient seen and examined today. Feels ok. States lightheadedness and dizziness have improved. Remains chest pain free. Chronic DURÁN. States HR has been in 30's-40's s/p cardioversion. She reports compliance with her medications, on Xarelto for CVA prophylaxis. Chart reviewed. K slightly high this AM at 5.5. BNP 1076. HR in 50's at time of exam. 2D echo pending.  "

## 2018-07-23 NOTE — SUBJECTIVE & OBJECTIVE
Past Medical History:   Diagnosis Date    A-fib     Arthritis     Chronic LBP     ESIs x 3 with Dr. Hicks, PT at [facility], chiropractor    Eye pain     Frequent headaches     GERD (gastroesophageal reflux disease)     Glaucoma     Hyperlipemia     Hypertension        Past Surgical History:   Procedure Laterality Date    CARDIOVERSION N/A 7/9/2018    Procedure: CARDIOVERSION;  Surgeon: Will Rosenthal MD;  Location: [facility] CATH LAB;  Service: Cardiology;  Laterality: N/A;    CATARACT EXTRACTION W/  INTRAOCULAR LENS IMPLANT  OU    TUBAL LIGATION         Review of patient's allergies indicates:   Allergen Reactions    Eliquis [apixaban] Other (See Comments)     Headache, numbness in lip        No current facility-administered medications on file prior to encounter.      Current Outpatient Prescriptions on File Prior to Encounter   Medication Sig    acetaminophen (TYLENOL) 500 MG tablet Take 500 mg by mouth every 6 (six) hours as needed for Pain.    amiodarone (PACERONE) 200 MG Tab Take 1 tablet (200 mg total) by mouth once daily.    CALCIUM PHOSPHATE TRIB/VIT D3 (CITRACAL + D ORAL) Take 1 tablet by mouth once daily at 6am.     CETIRIZINE HCL (ZYRTEC ORAL) Take by mouth.    famotidine (PEPCID) 20 MG tablet TAKE ONE TABLET BY MOUTH EVERY DAY    fish oil-omega-3 fatty acids 300-1,000 mg capsule Take 2 g by mouth once daily.    glucosamine-chondroitin 500-400 mg tablet Take 2 tablets by mouth once daily at 6am.     latanoprost 0.005 % ophthalmic solution instill ONE DROP BOTH EYES AT BEDTIME    meloxicam (MOBIC) 7.5 MG tablet TAKE ONE TABLET BY MOUTH EVERY DAY WITH BREAKFAST    metoprolol tartrate (LOPRESSOR) 50 MG tablet Take 1 tablet (50 mg total) by mouth 2 (two) times daily.    MULTIVITAMIN W-MINERALS/LUTEIN (CENTRUM SILVER ORAL) Take by mouth.    rivaroxaban (XARELTO) 20 mg Tab Take 1 tablet (20 mg total) by mouth daily with dinner or evening meal.    timolol maleate 0.5% (TIMOPTIC) 0.5 % Drop Place 1  drop into the right eye 2 (two) times daily.     Family History     Problem Relation (Age of Onset)    Cancer Mother    Cataracts Mother    Diabetes Paternal Aunt    Glaucoma Mother    Hypertension Mother, Father        Social History Main Topics    Smoking status: Never Smoker    Smokeless tobacco: Never Used    Alcohol use No    Drug use: No    Sexual activity: Yes     Partners: Male     Review of Systems   Constitutional: Negative for chills, diaphoresis, fatigue, fever and unexpected weight change.   HENT: Negative for congestion, facial swelling, sore throat and trouble swallowing.    Eyes: Negative for photophobia, redness and visual disturbance.   Respiratory: Negative for apnea, cough, chest tightness, shortness of breath and wheezing.    Cardiovascular: Negative for chest pain, palpitations and leg swelling.   Gastrointestinal: Positive for nausea. Negative for abdominal distention, abdominal pain, blood in stool, constipation, diarrhea and vomiting.   Endocrine: Negative for polydipsia, polyphagia and polyuria.   Genitourinary: Negative for difficulty urinating, dysuria, flank pain, frequency, hematuria and urgency.   Musculoskeletal: Negative for arthralgias, back pain, joint swelling, myalgias and neck stiffness.   Skin: Negative for pallor and rash.   Allergic/Immunologic: Negative for immunocompromised state.   Neurological: Positive for light-headedness. Negative for dizziness, tremors, syncope, weakness and headaches.   Psychiatric/Behavioral: Negative for agitation, confusion and suicidal ideas.   All other systems reviewed and are negative.    Objective:     Vital Signs (Most Recent):  Temp: 97.8 °F (36.6 °C) (07/23/18 0116)  Pulse: (!) 56 (07/23/18 0116)  Resp: 19 (07/23/18 0116)  BP: (!) 153/70 (07/23/18 0116)  SpO2: 95 % (07/23/18 0116) Vital Signs (24h Range):  Temp:  [97.8 °F (36.6 °C)] 97.8 °F (36.6 °C)  Pulse:  [36-56] 56  Resp:  [15-20] 19  SpO2:  [92 %-95 %] 95 %  BP:  (113-153)/(58-70) 153/70     Weight: 86.1 kg (189 lb 13.1 oz)  Body mass index is 31.59 kg/m².    Physical Exam   Constitutional: She is oriented to person, place, and time. She appears well-developed and well-nourished. No distress.   HENT:   Head: Normocephalic and atraumatic.   Eyes: Conjunctivae and EOM are normal. Pupils are equal, round, and reactive to light. No scleral icterus.   Neck: Normal range of motion. Neck supple. No JVD present. No thyromegaly present.   Cardiovascular: Regular rhythm and intact distal pulses.  Exam reveals no gallop and no friction rub.    No murmur heard.  bradycardic   Pulmonary/Chest: Effort normal and breath sounds normal. No respiratory distress. She has no wheezes. She has no rales. She exhibits no tenderness.   Abdominal: Soft. Bowel sounds are normal. She exhibits no distension and no mass. There is no tenderness. There is no guarding.   Musculoskeletal: Normal range of motion. She exhibits no tenderness.   Neurological: She is alert and oriented to person, place, and time. No cranial nerve deficit.   Skin: Skin is warm and dry. Capillary refill takes 2 to 3 seconds. No rash noted. She is not diaphoretic. No erythema.   Psychiatric: She has a normal mood and affect.   Nursing note and vitals reviewed.        CRANIAL NERVES     CN III, IV, VI   Pupils are equal, round, and reactive to light.  Extraocular motions are normal.        Significant Labs:   CBC:   Recent Labs  Lab 07/22/18 2221   WBC 11.87   HGB 13.2   HCT 38.8        CMP:   Recent Labs  Lab 07/22/18 2221      K 4.7      CO2 20*      BUN 24*   CREATININE 1.3   CALCIUM 9.4   PROT 7.3   ALBUMIN 3.7   BILITOT 0.4   ALKPHOS 104   AST 45*   ALT 65*   ANIONGAP 11   EGFRNONAA 39*     Troponin:   Recent Labs  Lab 07/22/18 2221   TROPONINI <0.006     Urine Studies: No results for input(s): COLORU, APPEARANCEUA, PHUR, SPECGRAV, PROTEINUA, GLUCUA, KETONESU, BILIRUBINUA, OCCULTUA, NITRITE,  UROBILINOGEN, LEUKOCYTESUR, RBCUA, WBCUA, BACTERIA, SQUAMEPITHEL, HYALINECASTS in the last 48 hours.    Invalid input(s): WRIGHTSUR  All pertinent labs within the past 24 hours have been reviewed.    Significant Imaging: I have reviewed all pertinent imaging results/findings within the past 24 hours.   Imaging Results          X-Ray Chest AP Portable (Final result)  Result time 07/22/18 22:34:36    Final result by Andreas Cortes MD (07/22/18 22:34:36)                 Impression:      Borderline cardiomegaly.  Questionable small right pleural effusion.  Bilateral shoulder arthropathy.      Electronically signed by: Andreas Cortes MD  Date:    07/22/2018  Time:    22:34             Narrative:    EXAMINATION:  XR CHEST AP PORTABLE    CLINICAL HISTORY:  Shortness of breath., Near syncope, bradycardia;    COMPARISON:  None    FINDINGS:  Large patient.  Borderline cardiomegaly.  Decreased lung volumes with blunting of the right costophrenic angle.    Bilateral shoulder arthropathy.

## 2018-07-23 NOTE — PROGRESS NOTES
Pt seen and examined, chart reviewed. Ms Spence is admitted with Symptomatic Bradycardia requiring a dose of Atropine. Also got a dose of  cc. She feels much better, resting comfortably. She was on oral Lopressor 50 Bid and Timolol eye drops and recently underwent DCCV for Afib/fluttter. Cards is evaluating her, lopressor held. Will continue to monitor her closely. At present she is HD stable with a HR of 51 in NSR.

## 2018-07-23 NOTE — ASSESSMENT & PLAN NOTE
- S/p TOBY/DCCV  - EKG junctional bradycardia  - continue ASA/rivaroxaban  - follow up cardiology recs

## 2018-07-24 LAB
ANION GAP SERPL CALC-SCNC: 8 MMOL/L
BASOPHILS # BLD AUTO: 0.01 K/UL
BASOPHILS NFR BLD: 0.1 %
BUN SERPL-MCNC: 20 MG/DL
CALCIUM SERPL-MCNC: 9.5 MG/DL
CHLORIDE SERPL-SCNC: 110 MMOL/L
CO2 SERPL-SCNC: 23 MMOL/L
CREAT SERPL-MCNC: 1 MG/DL
DIFFERENTIAL METHOD: ABNORMAL
EOSINOPHIL # BLD AUTO: 0.2 K/UL
EOSINOPHIL NFR BLD: 1.8 %
ERYTHROCYTE [DISTWIDTH] IN BLOOD BY AUTOMATED COUNT: 14.6 %
EST. GFR  (AFRICAN AMERICAN): >60 ML/MIN/1.73 M^2
EST. GFR  (NON AFRICAN AMERICAN): 54 ML/MIN/1.73 M^2
GLUCOSE SERPL-MCNC: 81 MG/DL
HCT VFR BLD AUTO: 40.1 %
HGB BLD-MCNC: 13.1 G/DL
LYMPHOCYTES # BLD AUTO: 3.7 K/UL
LYMPHOCYTES NFR BLD: 42.9 %
MAGNESIUM SERPL-MCNC: 2.2 MG/DL
MCH RBC QN AUTO: 32.8 PG
MCHC RBC AUTO-ENTMCNC: 32.7 G/DL
MCV RBC AUTO: 101 FL
MONOCYTES # BLD AUTO: 1 K/UL
MONOCYTES NFR BLD: 11.4 %
NEUTROPHILS # BLD AUTO: 3.8 K/UL
NEUTROPHILS NFR BLD: 43.8 %
PHOSPHATE SERPL-MCNC: 3.5 MG/DL
PLATELET # BLD AUTO: 282 K/UL
PMV BLD AUTO: 10.4 FL
POTASSIUM SERPL-SCNC: 4.4 MMOL/L
RBC # BLD AUTO: 3.99 M/UL
SODIUM SERPL-SCNC: 141 MMOL/L
WBC # BLD AUTO: 8.67 K/UL

## 2018-07-24 PROCEDURE — 85025 COMPLETE CBC W/AUTO DIFF WBC: CPT

## 2018-07-24 PROCEDURE — 36415 COLL VENOUS BLD VENIPUNCTURE: CPT

## 2018-07-24 PROCEDURE — 80048 BASIC METABOLIC PNL TOTAL CA: CPT

## 2018-07-24 PROCEDURE — 83735 ASSAY OF MAGNESIUM: CPT

## 2018-07-24 PROCEDURE — 25000003 PHARM REV CODE 250: Performed by: PHYSICIAN ASSISTANT

## 2018-07-24 PROCEDURE — 99232 SBSQ HOSP IP/OBS MODERATE 35: CPT | Mod: ,,, | Performed by: INTERNAL MEDICINE

## 2018-07-24 PROCEDURE — 21400001 HC TELEMETRY ROOM

## 2018-07-24 PROCEDURE — 84100 ASSAY OF PHOSPHORUS: CPT

## 2018-07-24 PROCEDURE — 25000003 PHARM REV CODE 250: Performed by: HOSPITALIST

## 2018-07-24 RX ORDER — METOPROLOL TARTRATE 25 MG/1
25 TABLET, FILM COATED ORAL 2 TIMES DAILY
Status: DISCONTINUED | OUTPATIENT
Start: 2018-07-24 | End: 2018-07-25 | Stop reason: HOSPADM

## 2018-07-24 RX ADMIN — METOPROLOL TARTRATE 25 MG: 25 TABLET, FILM COATED ORAL at 08:07

## 2018-07-24 RX ADMIN — FAMOTIDINE 20 MG: 20 TABLET ORAL at 08:07

## 2018-07-24 RX ADMIN — LATANOPROST 1 DROP: 50 SOLUTION OPHTHALMIC at 08:07

## 2018-07-24 RX ADMIN — RIVAROXABAN 20 MG: 20 TABLET, FILM COATED ORAL at 06:07

## 2018-07-24 RX ADMIN — ASPIRIN 81 MG: 81 TABLET, COATED ORAL at 08:07

## 2018-07-24 NOTE — ASSESSMENT & PLAN NOTE
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

## 2018-07-24 NOTE — ASSESSMENT & PLAN NOTE
-Presented to ED with lightheadedness/dizziness/near syncope with HR in 30's  -Report HR has been 30's-40's at home s/p DCCV  -HR improved with holding meds, now in 90's

## 2018-07-24 NOTE — PROGRESS NOTES
[facility] Hospital Medicine  Progress Note    Patient Name: Maria Del Carmen Spence  MRN: [id number]  Patient Class: IP- Inpatient   Admission Date: 7/22/2018  Length of Stay: 1 days  Attending Physician: Heena Bui MD  Primary Care Provider: Elda Powers MD        Subjective:     Principal Problem:Symptomatic bradycardia    HPI:  77F h/o PAF s/p TOBY/DCCV 2 weeks ago presents with presyncopal episode x 1. Onset suddenly when pt was sitting down working on a puzzle. Symptoms are episodic and moderate in severity. No mitigating or exacerbating factors reported. Associated sxs include light-headedness and nausea. Patient denies any fever, chills, CP, SOB, vomiting, diarrhea, abd pain, fall, injuries, LOC, and all other sxs at this time.   In ER, HR 36. EKG show junctional bradycardia.  Patient given 0.5 mg atropine x 1. HR improved to >40s. Cardiology called and recommended to dc all AV gemma blocking agents and will follow up.  Hospital medicine called for admission.     Hospital Course:  Ms Spence is admitted with Symptomatic Bradycardia requiring a dose of Atropine. Also got a dose of  cc. She feels much better, resting comfortably. She was on oral Lopressor 50 Bid and Timolol eye drops and recently underwent DCCV for Afib/fluttter. Cards held her Lopressor held.     7/24- she converted to Afib last night but feels much better, no weakness or SOB. Her pulse is 91 irreg irregular. Lopressor 25 BID resumed along with Xarelto. No CP or SOB. May need PPM as per cards.     Interval History: She converted to Afib last night but feels much better, no weakness or SOB. Her pulse is 91 irreg irregular. Lopressor 25 BID resumed along with Xarelto. No CP or SOB. May need PPM as per cards.     Review of Systems   Constitutional: Negative for chills, diaphoresis, fatigue, fever and unexpected weight change.   HENT: Negative for congestion, facial swelling, sore throat and trouble swallowing.    Eyes:  Negative for photophobia, redness and visual disturbance.   Respiratory: Negative for apnea, cough, chest tightness, shortness of breath and wheezing.    Cardiovascular: Negative for chest pain, palpitations and leg swelling.   Gastrointestinal: Negative for abdominal distention, abdominal pain, blood in stool, constipation, diarrhea, nausea and vomiting.   Endocrine: Negative for polydipsia, polyphagia and polyuria.   Genitourinary: Negative for difficulty urinating, dysuria, flank pain, frequency, hematuria and urgency.   Musculoskeletal: Negative for arthralgias, back pain, joint swelling, myalgias and neck stiffness.   Skin: Negative for pallor and rash.   Allergic/Immunologic: Negative for immunocompromised state.   Neurological: Negative for dizziness, tremors, syncope, weakness, light-headedness and headaches.   Psychiatric/Behavioral: Negative for agitation, confusion and suicidal ideas.   All other systems reviewed and are negative.    Objective:     Vital Signs (Most Recent):  Temp: 98.7 °F (37.1 °C) (07/24/18 1616)  Pulse: 84 (07/24/18 1616)  Resp: 18 (07/24/18 0829)  BP: (!) 142/84 (07/24/18 1616)  SpO2: (!) 94 % (07/24/18 1616) Vital Signs (24h Range):  Temp:  [97.8 °F (36.6 °C)-98.8 °F (37.1 °C)] 98.7 °F (37.1 °C)  Pulse:  [] 84  Resp:  [16-18] 18  SpO2:  [93 %-96 %] 94 %  BP: (122-178)/(60-87) 142/84     Weight: 85.1 kg (187 lb 9.8 oz)  Body mass index is 31.22 kg/m².    Intake/Output Summary (Last 24 hours) at 07/24/18 1829  Last data filed at 07/24/18 1700   Gross per 24 hour   Intake             1320 ml   Output              900 ml   Net              420 ml      Physical Exam   Constitutional: She is oriented to person, place, and time. She appears well-developed and well-nourished. No distress.   HENT:   Head: Normocephalic and atraumatic.   Eyes: Conjunctivae and EOM are normal. Pupils are equal, round, and reactive to light. No scleral icterus.   Neck: Normal range of motion. Neck supple.  No JVD present. No thyromegaly present.   Cardiovascular: Intact distal pulses.  An irregularly irregular rhythm present. Exam reveals no gallop and no friction rub.    No murmur heard.  Pulmonary/Chest: Effort normal and breath sounds normal. No respiratory distress. She has no wheezes. She has no rales. She exhibits no tenderness.   Abdominal: Soft. Bowel sounds are normal. She exhibits no distension and no mass. There is no tenderness. There is no guarding.   Musculoskeletal: Normal range of motion. She exhibits no tenderness.   Neurological: She is alert and oriented to person, place, and time. No cranial nerve deficit.   Skin: Skin is warm and dry. Capillary refill takes 2 to 3 seconds. No rash noted. She is not diaphoretic. No erythema.   Psychiatric: She has a normal mood and affect.   Nursing note and vitals reviewed.      Significant Labs:   BMP:   Recent Labs  Lab 07/24/18  0605   GLU 81      K 4.4      CO2 23   BUN 20   CREATININE 1.0   CALCIUM 9.5   MG 2.2     CBC:   Recent Labs  Lab 07/22/18 2221 07/23/18  0432 07/24/18  0605   WBC 11.87 8.60 8.67   HGB 13.2 11.8* 13.1   HCT 38.8 36.2* 40.1    256 282     CMP:   Recent Labs  Lab 07/22/18 2221 07/23/18 0432 07/24/18  0605    143 141   K 4.7 5.5* 4.4    111* 110   CO2 20* 24 23    92 81   BUN 24* 22 20   CREATININE 1.3 1.1 1.0   CALCIUM 9.4 9.1 9.5   PROT 7.3  --   --    ALBUMIN 3.7  --   --    BILITOT 0.4  --   --    ALKPHOS 104  --   --    AST 45*  --   --    ALT 65*  --   --    ANIONGAP 11 8 8   EGFRNONAA 39* 48* 54*     Coagulation:   Recent Labs  Lab 07/23/18  0100   INR 1.5*     Magnesium:   Recent Labs  Lab 07/23/18  0432 07/24/18  0605   MG 2.2 2.2     Troponin:   Recent Labs  Lab 07/22/18 2221   TROPONINI <0.006     TSH: No results for input(s): TSH in the last 4320 hours.  All pertinent labs within the past 24 hours have been reviewed.    Significant Imaging: I have reviewed all pertinent imaging  results/findings within the past 24 hours.    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

## 2018-07-24 NOTE — ASSESSMENT & PLAN NOTE
-s/p recent TOBY/DCCV 2 weeks ago  -Converted to afib with HR in 90's this AM  -Re-start Lopressor 25 mg BID and assess response  -Continue Xarelto for CVA prophylaxis   -If HR fails to recover, will need PPM for tachy-miko/SSS

## 2018-07-24 NOTE — HOSPITAL COURSE
Ms Spence is admitted with Symptomatic Bradycardia requiring a dose of Atropine. Also got a dose of  cc. She feels much better, resting comfortably. She was on oral Lopressor 50 Bid and Timolol eye drops and recently underwent DCCV for Afib/fluttter. Cards held her Lopressor held.     7/24- she converted to Afib last night but feels much better, no weakness or SOB. Her pulse is 91 irreg irregular. Lopressor 25 BID resumed along with Xarelto. No CP or SOB. May need PPM as per cards.

## 2018-07-24 NOTE — PROGRESS NOTES
"[facility]  Cardiology  Progress Note    Patient Name: Maria Del Carmen Spence  MRN: [id number]  Admission Date: 7/22/2018  Hospital Length of Stay: 1 days  Code Status: Full Code   Attending Physician: Heena Bui MD   Primary Care Physician: Elda Powers MD  Expected Discharge Date:   Principal Problem:Symptomatic bradycardia    Subjective:   HPI:  Ms. Spence is a 77 year old female patient with a PMHx of PAF/PAFL s/p recent DCCV two weeks ago, HTN, hyperlipidemia, and glaucoma who presented to [facility] ED yesterday with a chief complaint of near syncope. Patient was sitting down watching TV and working on a puzzle when she became lightheaded and dizzy. Her vision also went "black for a minute or two" but then returned to normal. A few minutes later, a second episode occurred with similar symptoms, which prompted her to seek further treatment. She denied any associated syncope, chest pain, palpitations, nausea, vomiting, fever, or chills. EKG performed in ED showed junctional bradycardia, rate 36 bpm and patient subsequently admitted for further evaluation and treatment. Cardiology consulted to assist with management. Patient seen and examined today. Feels ok. States lightheadedness and dizziness have improved. Remains chest pain free. Chronic DURÁN. States HR has been in 30's-40's s/p cardioversion. She reports compliance with her medications, on Xarelto for CVA prophylaxis. Chart reviewed. K slightly high this AM at 5.5. BNP 1076. HR in 50's at time of exam. 2D echo pending.    Hospital Course:   7/24/18-Patient seen and examined today, resting in bed. Converted to afib this AM, HR in 90's. Denies chest pain or SOB. Labs reviewed. 2D echo showed normal EF.        Review of Systems   Constitution: Negative.   HENT: Negative.    Eyes: Negative.    Cardiovascular: Negative.    Respiratory: Negative.    Endocrine: Negative.    Hematologic/Lymphatic: Negative.    Skin: Negative.    Musculoskeletal: " Negative.    Gastrointestinal: Negative.    Genitourinary: Negative.    Neurological: Negative.    Psychiatric/Behavioral: Negative.    Allergic/Immunologic: Negative.      Objective:     Vital Signs (Most Recent):  Temp: 98.8 °F (37.1 °C) (07/24/18 1157)  Pulse: 92 (07/24/18 1157)  Resp: 18 (07/24/18 0829)  BP: (!) 178/87 (07/24/18 1157)  SpO2: (!) 93 % (07/24/18 1157) Vital Signs (24h Range):  Temp:  [97.8 °F (36.6 °C)-98.8 °F (37.1 °C)] 98.8 °F (37.1 °C)  Pulse:  [] 92  Resp:  [16-18] 18  SpO2:  [93 %-96 %] 93 %  BP: (122-178)/(60-87) 178/87     Weight: 85.1 kg (187 lb 9.8 oz)  Body mass index is 31.22 kg/m².     SpO2: (!) 93 %  O2 Device (Oxygen Therapy): room air      Intake/Output Summary (Last 24 hours) at 07/24/18 1418  Last data filed at 07/24/18 0830   Gross per 24 hour   Intake              840 ml   Output             1000 ml   Net             -160 ml       Lines/Drains/Airways     Peripheral Intravenous Line                 Peripheral IV - Single Lumen Right Forearm -- days         Peripheral IV - Single Lumen 07/22/18 2221 Right Wrist 1 day                Physical Exam   Constitutional: She is oriented to person, place, and time. She appears well-developed and well-nourished. No distress.   HENT:   Head: Normocephalic and atraumatic.   Eyes: Pupils are equal, round, and reactive to light. Right eye exhibits no discharge. Left eye exhibits no discharge.   Neck: Neck supple. No JVD present.   Cardiovascular: Normal rate, S1 normal, S2 normal, normal heart sounds and intact distal pulses.  An irregularly irregular rhythm present.   No murmur heard.  Pulmonary/Chest: Effort normal and breath sounds normal. No respiratory distress. She has no wheezes. She has no rales.   Abdominal: Soft. She exhibits no distension. There is no tenderness. There is no rebound.   Musculoskeletal: She exhibits no edema.   Neurological: She is alert and oriented to person, place, and time.   Skin: Skin is warm and dry. She  is not diaphoretic. No erythema.   Psychiatric: She has a normal mood and affect. Her behavior is normal.   Nursing note and vitals reviewed.      Significant Labs:   CMP   Recent Labs  Lab 07/22/18 2221 07/23/18  0432 07/24/18  0605    143 141   K 4.7 5.5* 4.4    111* 110   CO2 20* 24 23    92 81   BUN 24* 22 20   CREATININE 1.3 1.1 1.0   CALCIUM 9.4 9.1 9.5   PROT 7.3  --   --    ALBUMIN 3.7  --   --    BILITOT 0.4  --   --    ALKPHOS 104  --   --    AST 45*  --   --    ALT 65*  --   --    ANIONGAP 11 8 8   ESTGFRAFRICA 45* 56* >60   EGFRNONAA 39* 48* 54*   , CBC   Recent Labs  Lab 07/22/18 2221 07/23/18 0432 07/24/18  0605   WBC 11.87 8.60 8.67   HGB 13.2 11.8* 13.1   HCT 38.8 36.2* 40.1    256 282   , Troponin   Recent Labs  Lab 07/22/18 2221   TROPONINI <0.006    and All pertinent lab results from the last 24 hours have been reviewed.    Significant Imaging: Echocardiogram:   2D echo with color flow doppler:   Results for orders placed or performed during the hospital encounter of 07/22/18   2D echo with color flow doppler   Result Value Ref Range    EF 60 55 - 65    Mitral Valve Regurgitation MILD     Diastolic Dysfunction No     Est. PA Systolic Pressure 28.73     Tricuspid Valve Regurgitation MILD    , EKG: Reviewed and X-Ray: CXR: X-Ray Chest 1 View (CXR): No results found for this visit on 07/22/18. and X-Ray Chest PA and Lateral (CXR): No results found for this visit on 07/22/18.    Assessment and Plan:   Patient who presents with symptomatic bradycardia, has since converted back to afib. BB re-started will assess response. Monitor overnight. May have underlying SSS.    * Symptomatic bradycardia    -Presented to ED with lightheadedness/dizziness/near syncope with HR in 30's  -Report HR has been 30's-40's at home s/p DCCV  -HR improved with holding meds, now in 90's        Paroxysmal atrial fibrillation    -s/p recent TOBY/DCCV 2 weeks ago  -Converted to afib with HR in 90's  this AM  -Re-start Lopressor 25 mg BID and assess response  -Continue Xarelto for CVA prophylaxis   -If HR fails to recover, will need PPM for tachy-miko/SSS          HTN (hypertension) borderline    -Monitor  -Consider addition of ACEI/ARB if needed            VTE Risk Mitigation         Ordered     rivaroxaban tablet 20 mg  With dinner      07/23/18 0137          Khushboo Lorenzo PA-C  Cardiology  [facility]    Chart reviewed. Dr. Walters examined patient and agrees with plan as outlined above.

## 2018-07-24 NOTE — SUBJECTIVE & OBJECTIVE
Review of Systems   Constitution: Negative.   HENT: Negative.    Eyes: Negative.    Cardiovascular: Negative.    Respiratory: Negative.    Endocrine: Negative.    Hematologic/Lymphatic: Negative.    Skin: Negative.    Musculoskeletal: Negative.    Gastrointestinal: Negative.    Genitourinary: Negative.    Neurological: Negative.    Psychiatric/Behavioral: Negative.    Allergic/Immunologic: Negative.      Objective:     Vital Signs (Most Recent):  Temp: 98.8 °F (37.1 °C) (07/24/18 1157)  Pulse: 92 (07/24/18 1157)  Resp: 18 (07/24/18 0829)  BP: (!) 178/87 (07/24/18 1157)  SpO2: (!) 93 % (07/24/18 1157) Vital Signs (24h Range):  Temp:  [97.8 °F (36.6 °C)-98.8 °F (37.1 °C)] 98.8 °F (37.1 °C)  Pulse:  [] 92  Resp:  [16-18] 18  SpO2:  [93 %-96 %] 93 %  BP: (122-178)/(60-87) 178/87     Weight: 85.1 kg (187 lb 9.8 oz)  Body mass index is 31.22 kg/m².     SpO2: (!) 93 %  O2 Device (Oxygen Therapy): room air      Intake/Output Summary (Last 24 hours) at 07/24/18 1418  Last data filed at 07/24/18 0830   Gross per 24 hour   Intake              840 ml   Output             1000 ml   Net             -160 ml       Lines/Drains/Airways     Peripheral Intravenous Line                 Peripheral IV - Single Lumen Right Forearm -- days         Peripheral IV - Single Lumen 07/22/18 2221 Right Wrist 1 day                Physical Exam   Constitutional: She is oriented to person, place, and time. She appears well-developed and well-nourished. No distress.   HENT:   Head: Normocephalic and atraumatic.   Eyes: Pupils are equal, round, and reactive to light. Right eye exhibits no discharge. Left eye exhibits no discharge.   Neck: Neck supple. No JVD present.   Cardiovascular: Normal rate, S1 normal, S2 normal, normal heart sounds and intact distal pulses.  An irregularly irregular rhythm present.   No murmur heard.  Pulmonary/Chest: Effort normal and breath sounds normal. No respiratory distress. She has no wheezes. She has no  rales.   Abdominal: Soft. She exhibits no distension. There is no tenderness. There is no rebound.   Musculoskeletal: She exhibits no edema.   Neurological: She is alert and oriented to person, place, and time.   Skin: Skin is warm and dry. She is not diaphoretic. No erythema.   Psychiatric: She has a normal mood and affect. Her behavior is normal.   Nursing note and vitals reviewed.      Significant Labs:   CMP   Recent Labs  Lab 07/22/18 2221 07/23/18  0432 07/24/18  0605    143 141   K 4.7 5.5* 4.4    111* 110   CO2 20* 24 23    92 81   BUN 24* 22 20   CREATININE 1.3 1.1 1.0   CALCIUM 9.4 9.1 9.5   PROT 7.3  --   --    ALBUMIN 3.7  --   --    BILITOT 0.4  --   --    ALKPHOS 104  --   --    AST 45*  --   --    ALT 65*  --   --    ANIONGAP 11 8 8   ESTGFRAFRICA 45* 56* >60   EGFRNONAA 39* 48* 54*   , CBC   Recent Labs  Lab 07/22/18 2221 07/23/18  0432 07/24/18  0605   WBC 11.87 8.60 8.67   HGB 13.2 11.8* 13.1   HCT 38.8 36.2* 40.1    256 282   , Troponin   Recent Labs  Lab 07/22/18 2221   TROPONINI <0.006    and All pertinent lab results from the last 24 hours have been reviewed.    Significant Imaging: Echocardiogram:   2D echo with color flow doppler:   Results for orders placed or performed during the hospital encounter of 07/22/18   2D echo with color flow doppler   Result Value Ref Range    EF 60 55 - 65    Mitral Valve Regurgitation MILD     Diastolic Dysfunction No     Est. PA Systolic Pressure 28.73     Tricuspid Valve Regurgitation MILD    , EKG: Reviewed and X-Ray: CXR: X-Ray Chest 1 View (CXR): No results found for this visit on 07/22/18. and X-Ray Chest PA and Lateral (CXR): No results found for this visit on 07/22/18.

## 2018-07-24 NOTE — NURSING
Patient now Afib on monitor in 80-90's, asymptomatic, VSS, provider notified.  EKG ordered and will continue to monitor.

## 2018-07-24 NOTE — PLAN OF CARE
Problem: Patient Care Overview  Goal: Plan of Care Review  Outcome: Ongoing (interventions implemented as appropriate)  Patient remained free from injury. No c/o pain at this time. Calm. Resting with eyes closed. No distress noted. Oriented x3. Respirations even and non labored. IV  patent and saline locked. Bed locked and low. Call light in reach. Safety measures in place. Will continue to monitor. Reviewed plan of care. Patient verbalized understanding and teach back.    12hr chart check complete.

## 2018-07-24 NOTE — SUBJECTIVE & OBJECTIVE
Interval History: She converted to Afib last night but feels much better, no weakness or SOB. Her pulse is 91 irreg irregular. Lopressor 25 BID resumed along with Xarelto. No CP or SOB. May need PPM as per cards.     Review of Systems   Constitutional: Negative for chills, diaphoresis, fatigue, fever and unexpected weight change.   HENT: Negative for congestion, facial swelling, sore throat and trouble swallowing.    Eyes: Negative for photophobia, redness and visual disturbance.   Respiratory: Negative for apnea, cough, chest tightness, shortness of breath and wheezing.    Cardiovascular: Negative for chest pain, palpitations and leg swelling.   Gastrointestinal: Negative for abdominal distention, abdominal pain, blood in stool, constipation, diarrhea, nausea and vomiting.   Endocrine: Negative for polydipsia, polyphagia and polyuria.   Genitourinary: Negative for difficulty urinating, dysuria, flank pain, frequency, hematuria and urgency.   Musculoskeletal: Negative for arthralgias, back pain, joint swelling, myalgias and neck stiffness.   Skin: Negative for pallor and rash.   Allergic/Immunologic: Negative for immunocompromised state.   Neurological: Negative for dizziness, tremors, syncope, weakness, light-headedness and headaches.   Psychiatric/Behavioral: Negative for agitation, confusion and suicidal ideas.   All other systems reviewed and are negative.    Objective:     Vital Signs (Most Recent):  Temp: 98.7 °F (37.1 °C) (07/24/18 1616)  Pulse: 84 (07/24/18 1616)  Resp: 18 (07/24/18 0829)  BP: (!) 142/84 (07/24/18 1616)  SpO2: (!) 94 % (07/24/18 1616) Vital Signs (24h Range):  Temp:  [97.8 °F (36.6 °C)-98.8 °F (37.1 °C)] 98.7 °F (37.1 °C)  Pulse:  [] 84  Resp:  [16-18] 18  SpO2:  [93 %-96 %] 94 %  BP: (122-178)/(60-87) 142/84     Weight: 85.1 kg (187 lb 9.8 oz)  Body mass index is 31.22 kg/m².    Intake/Output Summary (Last 24 hours) at 07/24/18 6389  Last data filed at 07/24/18 1700   Gross per 24  hour   Intake             1320 ml   Output              900 ml   Net              420 ml      Physical Exam   Constitutional: She is oriented to person, place, and time. She appears well-developed and well-nourished. No distress.   HENT:   Head: Normocephalic and atraumatic.   Eyes: Conjunctivae and EOM are normal. Pupils are equal, round, and reactive to light. No scleral icterus.   Neck: Normal range of motion. Neck supple. No JVD present. No thyromegaly present.   Cardiovascular: Intact distal pulses.  An irregularly irregular rhythm present. Exam reveals no gallop and no friction rub.    No murmur heard.  Pulmonary/Chest: Effort normal and breath sounds normal. No respiratory distress. She has no wheezes. She has no rales. She exhibits no tenderness.   Abdominal: Soft. Bowel sounds are normal. She exhibits no distension and no mass. There is no tenderness. There is no guarding.   Musculoskeletal: Normal range of motion. She exhibits no tenderness.   Neurological: She is alert and oriented to person, place, and time. No cranial nerve deficit.   Skin: Skin is warm and dry. Capillary refill takes 2 to 3 seconds. No rash noted. She is not diaphoretic. No erythema.   Psychiatric: She has a normal mood and affect.   Nursing note and vitals reviewed.      Significant Labs:   BMP:   Recent Labs  Lab 07/24/18  0605   GLU 81      K 4.4      CO2 23   BUN 20   CREATININE 1.0   CALCIUM 9.5   MG 2.2     CBC:   Recent Labs  Lab 07/22/18  2221 07/23/18  0432 07/24/18  0605   WBC 11.87 8.60 8.67   HGB 13.2 11.8* 13.1   HCT 38.8 36.2* 40.1    256 282     CMP:   Recent Labs  Lab 07/22/18  2221 07/23/18  0432 07/24/18  0605    143 141   K 4.7 5.5* 4.4    111* 110   CO2 20* 24 23    92 81   BUN 24* 22 20   CREATININE 1.3 1.1 1.0   CALCIUM 9.4 9.1 9.5   PROT 7.3  --   --    ALBUMIN 3.7  --   --    BILITOT 0.4  --   --    ALKPHOS 104  --   --    AST 45*  --   --    ALT 65*  --   --    ANIONGAP 11  8 8   EGFRNONAA 39* 48* 54*     Coagulation:   Recent Labs  Lab 07/23/18  0100   INR 1.5*     Magnesium:   Recent Labs  Lab 07/23/18  0432 07/24/18  0605   MG 2.2 2.2     Troponin:   Recent Labs  Lab 07/22/18  2221   TROPONINI <0.006     TSH: No results for input(s): TSH in the last 4320 hours.  All pertinent labs within the past 24 hours have been reviewed.    Significant Imaging: I have reviewed all pertinent imaging results/findings within the past 24 hours.

## 2018-07-24 NOTE — HOSPITAL COURSE
7/24/18-Patient seen and examined today, resting in bed. Converted to afib this AM, HR in 90's. Denies chest pain or SOB. Labs reviewed. 2D echo showed normal EF.    7/25/18-Patient seen and examined today. Feels well. No acute events overnight. Remains in afib with HR in 80's-90's. Denies chest pain or SOB. Labs stable.

## 2018-07-24 NOTE — PLAN OF CARE
Problem: Patient Care Overview  Goal: Plan of Care Review  Outcome: Ongoing (interventions implemented as appropriate)  Patient had a restful night, was SB in the 40's at beginning of shift, converted to AFIB in the 80-90's around 2200 and has remained fib since.  VSS, no falls, bed alarm active, up with assistance, no complaints of dizziness, will continue to monitor.

## 2018-07-25 ENCOUNTER — TELEPHONE (OUTPATIENT)
Dept: INTERNAL MEDICINE | Facility: CLINIC | Age: 78
End: 2018-07-25

## 2018-07-25 ENCOUNTER — TELEPHONE (OUTPATIENT)
Dept: CARDIOLOGY | Facility: CLINIC | Age: 78
End: 2018-07-25

## 2018-07-25 VITALS
TEMPERATURE: 98 F | RESPIRATION RATE: 18 BRPM | SYSTOLIC BLOOD PRESSURE: 139 MMHG | HEART RATE: 88 BPM | DIASTOLIC BLOOD PRESSURE: 78 MMHG | HEIGHT: 65 IN | OXYGEN SATURATION: 94 % | BODY MASS INDEX: 31.26 KG/M2 | WEIGHT: 187.63 LBS

## 2018-07-25 LAB
ANION GAP SERPL CALC-SCNC: 8 MMOL/L
BASOPHILS # BLD AUTO: 0.02 K/UL
BASOPHILS NFR BLD: 0.2 %
BUN SERPL-MCNC: 19 MG/DL
CALCIUM SERPL-MCNC: 9.5 MG/DL
CHLORIDE SERPL-SCNC: 108 MMOL/L
CO2 SERPL-SCNC: 24 MMOL/L
CREAT SERPL-MCNC: 1 MG/DL
DIFFERENTIAL METHOD: ABNORMAL
EOSINOPHIL # BLD AUTO: 0.2 K/UL
EOSINOPHIL NFR BLD: 2 %
ERYTHROCYTE [DISTWIDTH] IN BLOOD BY AUTOMATED COUNT: 14.3 %
EST. GFR  (AFRICAN AMERICAN): >60 ML/MIN/1.73 M^2
EST. GFR  (NON AFRICAN AMERICAN): 54 ML/MIN/1.73 M^2
GLUCOSE SERPL-MCNC: 88 MG/DL
HCT VFR BLD AUTO: 40.3 %
HGB BLD-MCNC: 13.6 G/DL
LYMPHOCYTES # BLD AUTO: 3.5 K/UL
LYMPHOCYTES NFR BLD: 36.4 %
MAGNESIUM SERPL-MCNC: 2.1 MG/DL
MCH RBC QN AUTO: 33.5 PG
MCHC RBC AUTO-ENTMCNC: 33.7 G/DL
MCV RBC AUTO: 99 FL
MONOCYTES # BLD AUTO: 1.3 K/UL
MONOCYTES NFR BLD: 13.1 %
NEUTROPHILS # BLD AUTO: 4.6 K/UL
NEUTROPHILS NFR BLD: 48.3 %
PHOSPHATE SERPL-MCNC: 3.2 MG/DL
PLATELET # BLD AUTO: 279 K/UL
PMV BLD AUTO: 10.4 FL
POTASSIUM SERPL-SCNC: 4.4 MMOL/L
RBC # BLD AUTO: 4.06 M/UL
SODIUM SERPL-SCNC: 140 MMOL/L
WBC # BLD AUTO: 9.51 K/UL

## 2018-07-25 PROCEDURE — 85025 COMPLETE CBC W/AUTO DIFF WBC: CPT

## 2018-07-25 PROCEDURE — 84100 ASSAY OF PHOSPHORUS: CPT

## 2018-07-25 PROCEDURE — 25000003 PHARM REV CODE 250: Performed by: PHYSICIAN ASSISTANT

## 2018-07-25 PROCEDURE — 83735 ASSAY OF MAGNESIUM: CPT

## 2018-07-25 PROCEDURE — 80048 BASIC METABOLIC PNL TOTAL CA: CPT

## 2018-07-25 PROCEDURE — 36415 COLL VENOUS BLD VENIPUNCTURE: CPT

## 2018-07-25 PROCEDURE — 99232 SBSQ HOSP IP/OBS MODERATE 35: CPT | Mod: ,,, | Performed by: INTERNAL MEDICINE

## 2018-07-25 PROCEDURE — 25000003 PHARM REV CODE 250: Performed by: HOSPITALIST

## 2018-07-25 RX ORDER — METOPROLOL TARTRATE 25 MG/1
25 TABLET, FILM COATED ORAL 3 TIMES DAILY
Qty: 90 TABLET | Refills: 11 | Status: ON HOLD | OUTPATIENT
Start: 2018-07-25 | End: 2019-03-07 | Stop reason: SDUPTHER

## 2018-07-25 RX ADMIN — FAMOTIDINE 20 MG: 20 TABLET ORAL at 08:07

## 2018-07-25 RX ADMIN — ACETAMINOPHEN 650 MG: 325 TABLET, FILM COATED ORAL at 11:07

## 2018-07-25 RX ADMIN — METOPROLOL TARTRATE 25 MG: 25 TABLET, FILM COATED ORAL at 08:07

## 2018-07-25 RX ADMIN — ASPIRIN 81 MG: 81 TABLET, COATED ORAL at 08:07

## 2018-07-25 NOTE — PROGRESS NOTES
"[facility]  Cardiology  Progress Note    Patient Name: Maria Del Carmen Spence  MRN: [id number]  Admission Date: 7/22/2018  Hospital Length of Stay: 2 days  Code Status: Full Code   Attending Physician: Annabel Canales MD   Primary Care Physician: Elda Powers MD  Expected Discharge Date: 7/25/2018  Principal Problem:Symptomatic bradycardia    Subjective:   HPI:  Ms. Spence is a 77 year old female patient with a PMHx of PAF/PAFL s/p recent DCCV two weeks ago, HTN, hyperlipidemia, and glaucoma who presented to [facility] ED yesterday with a chief complaint of near syncope. Patient was sitting down watching TV and working on a puzzle when she became lightheaded and dizzy. Her vision also went "black for a minute or two" but then returned to normal. A few minutes later, a second episode occurred with similar symptoms, which prompted her to seek further treatment. She denied any associated syncope, chest pain, palpitations, nausea, vomiting, fever, or chills. EKG performed in ED showed junctional bradycardia, rate 36 bpm and patient subsequently admitted for further evaluation and treatment. Cardiology consulted to assist with management. Patient seen and examined today. Feels ok. States lightheadedness and dizziness have improved. Remains chest pain free. Chronic DURÁN. States HR has been in 30's-40's s/p cardioversion. She reports compliance with her medications, on Xarelto for CVA prophylaxis. Chart reviewed. K slightly high this AM at 5.5. BNP 1076. HR in 50's at time of exam. 2D echo pending.    Hospital Course:   7/24/18-Patient seen and examined today, resting in bed. Converted to afib this AM, HR in 90's. Denies chest pain or SOB. Labs reviewed. 2D echo showed normal EF.    7/25/18-Patient seen and examined today. Feels well. No acute events overnight. Remains in afib with HR in 80's-90's. Denies chest pain or SOB. Labs stable.        Review of Systems   Constitution: Negative.   HENT: Negative.  "   Eyes: Negative.    Cardiovascular: Negative.    Respiratory: Negative.    Endocrine: Negative.    Hematologic/Lymphatic: Negative.    Skin: Negative.    Musculoskeletal: Negative.    Gastrointestinal: Negative.    Genitourinary: Negative.    Neurological: Negative.    Psychiatric/Behavioral: Negative.    Allergic/Immunologic: Negative.      Objective:     Vital Signs (Most Recent):  Temp: 98 °F (36.7 °C) (07/25/18 0814)  Pulse: 99 (07/25/18 0814)  Resp: 18 (07/25/18 0814)  BP: (!) 133/92 (07/25/18 0814)  SpO2: 97 % (07/25/18 0814) Vital Signs (24h Range):  Temp:  [97.6 °F (36.4 °C)-98.8 °F (37.1 °C)] 98 °F (36.7 °C)  Pulse:  [84-99] 99  Resp:  [16-20] 18  SpO2:  [91 %-97 %] 97 %  BP: (133-178)/(65-92) 133/92     Weight: 85.1 kg (187 lb 9.8 oz)  Body mass index is 31.22 kg/m².     SpO2: 97 %  O2 Device (Oxygen Therapy): room air      Intake/Output Summary (Last 24 hours) at 07/25/18 1010  Last data filed at 07/25/18 0500   Gross per 24 hour   Intake              730 ml   Output              300 ml   Net              430 ml       Lines/Drains/Airways     Peripheral Intravenous Line                 Peripheral IV - Single Lumen Right Forearm -- days         Peripheral IV - Single Lumen 07/22/18 2221 Right Wrist 2 days                Physical Exam   Constitutional: She is oriented to person, place, and time. She appears well-developed and well-nourished. No distress.   HENT:   Head: Normocephalic and atraumatic.   Eyes: Pupils are equal, round, and reactive to light. Right eye exhibits no discharge. Left eye exhibits no discharge.   Neck: Neck supple. No JVD present.   Cardiovascular: Normal rate, S1 normal, S2 normal and normal heart sounds.  An irregularly irregular rhythm present.   No murmur heard.  Pulmonary/Chest: Effort normal and breath sounds normal. No respiratory distress. She has no wheezes. She has no rales.   Abdominal: Soft. She exhibits no distension. There is no tenderness. There is no rebound.    Musculoskeletal: She exhibits no edema.   Neurological: She is alert and oriented to person, place, and time.   Skin: Skin is warm and dry. She is not diaphoretic. No erythema.   Psychiatric: She has a normal mood and affect. Her behavior is normal. Thought content normal.   Nursing note and vitals reviewed.      Significant Labs:   CMP   Recent Labs  Lab 07/24/18  0605 07/25/18  0507    140   K 4.4 4.4    108   CO2 23 24   GLU 81 88   BUN 20 19   CREATININE 1.0 1.0   CALCIUM 9.5 9.5   ANIONGAP 8 8   ESTGFRAFRICA >60 >60   EGFRNONAA 54* 54*   , CBC   Recent Labs  Lab 07/24/18  0605 07/25/18  0507   WBC 8.67 9.51   HGB 13.1 13.6   HCT 40.1 40.3    279   , Troponin No results for input(s): TROPONINI in the last 48 hours. and All pertinent lab results from the last 24 hours have been reviewed.    Significant Imaging: Echocardiogram:   2D echo with color flow doppler:   Results for orders placed or performed during the hospital encounter of 07/22/18   2D echo with color flow doppler   Result Value Ref Range    EF 60 55 - 65    Mitral Valve Regurgitation MILD     Diastolic Dysfunction No     Est. PA Systolic Pressure 28.73     Tricuspid Valve Regurgitation MILD    , EKG: Reviewed and X-Ray: CXR: X-Ray Chest 1 View (CXR): No results found for this visit on 07/22/18. and X-Ray Chest PA and Lateral (CXR): No results found for this visit on 07/22/18.    Assessment and Plan:   Patient with history of PAF s/p recent DDCV who presented with symptomatic bradycardia. HR improved since admission however she converted back to afib. Will d/c home on BB therapy with OP Holter and EP follow-up.    * Symptomatic bradycardia    -Presented to ED with lightheadedness/dizziness/near syncope with HR in 30's  -HR improved since admission  -Per MD, d/c amiodarone, send home on lopressor 25 mg TID            Paroxysmal atrial fibrillation    -s/p recent TOBY/DCCV 2 weeks ago  -Remains in afib with HR in 80's-90['s  -Per MD,  increase lopressor to 25 mg TID; no amiodarone  -Continue Xarelto for CVA prophylaxis  -Close f/u OP with Holter monitor  -OP EP referral            HTN (hypertension) borderline    -Monitor  -Consider addition of ACEI/ARB if needed            VTE Risk Mitigation         Ordered     rivaroxaban tablet 20 mg  With dinner      07/23/18 0137          Khushboo Lorenzo PA-C  Cardiology  [facility]     Chart reviewed. Dr. Walters examined patient and agrees with plan as outlined above.

## 2018-07-25 NOTE — PLAN OF CARE
Problem: Patient Care Overview  Goal: Plan of Care Review  Outcome: Ongoing (interventions implemented as appropriate)  Pt in bed AAOx4. Plan of Care reviewed, Verbalized understanding.   Patient remained free from falls, fall precautions in place.  Patient is AFib on monitor.VSS.  PIV CDI.   Call bell and personal belongings within reach. Hourly rounding complete. Reminded to call for assistance. No complaints at this time. Will continue to monitor.

## 2018-07-25 NOTE — PLAN OF CARE
Aug6 Hospital Follow Up with Will Rosenthal MD   Monday Aug 6, 2018 4:00 PM  [facility]   [id number] [address] [facility] [id number] [phone]      CM sent a message via EPIC for hospital follow up with Dr Powers     07/25/18 1033   Final Note   Assessment Type Final Discharge Note   Discharge Disposition Home   Hospital Follow Up  Appt(s) scheduled? Yes   Right [facility] Referral Info   Post Acute Recommendation No Care

## 2018-07-25 NOTE — PLAN OF CARE
Problem: Patient Care Overview  Goal: Plan of Care Review  Outcome: Outcome(s) achieved Date Met: 07/25/18  Discharged. Remained free from injury. Discharge instructions given. Verbalized understanding. IV removed. Cath tip intact.

## 2018-07-25 NOTE — PLAN OF CARE
07/25/18 1211   Medicare Message   Important Message from Medicare regarding Discharge Appeal Rights Given to patient/caregiver;Explained to patient/caregiver;Signed/date by patient/caregiver   Date IMM was signed 07/25/18   Time IMM was signed 1208

## 2018-07-25 NOTE — ASSESSMENT & PLAN NOTE
-s/p recent TOBY/DCCV 2 weeks ago  -Remains in afib with HR in 80's-90['s  -Per MD, increase lopressor to 25 mg TID; no amiodarone  -Continue Xarelto for CVA prophylaxis  -Close f/u OP with Holter monitor  -OP EP referral

## 2018-07-25 NOTE — SUBJECTIVE & OBJECTIVE
Review of Systems   Constitution: Negative.   HENT: Negative.    Eyes: Negative.    Cardiovascular: Negative.    Respiratory: Negative.    Endocrine: Negative.    Hematologic/Lymphatic: Negative.    Skin: Negative.    Musculoskeletal: Negative.    Gastrointestinal: Negative.    Genitourinary: Negative.    Neurological: Negative.    Psychiatric/Behavioral: Negative.    Allergic/Immunologic: Negative.      Objective:     Vital Signs (Most Recent):  Temp: 98 °F (36.7 °C) (07/25/18 0814)  Pulse: 99 (07/25/18 0814)  Resp: 18 (07/25/18 0814)  BP: (!) 133/92 (07/25/18 0814)  SpO2: 97 % (07/25/18 0814) Vital Signs (24h Range):  Temp:  [97.6 °F (36.4 °C)-98.8 °F (37.1 °C)] 98 °F (36.7 °C)  Pulse:  [84-99] 99  Resp:  [16-20] 18  SpO2:  [91 %-97 %] 97 %  BP: (133-178)/(65-92) 133/92     Weight: 85.1 kg (187 lb 9.8 oz)  Body mass index is 31.22 kg/m².     SpO2: 97 %  O2 Device (Oxygen Therapy): room air      Intake/Output Summary (Last 24 hours) at 07/25/18 1010  Last data filed at 07/25/18 0500   Gross per 24 hour   Intake              730 ml   Output              300 ml   Net              430 ml       Lines/Drains/Airways     Peripheral Intravenous Line                 Peripheral IV - Single Lumen Right Forearm -- days         Peripheral IV - Single Lumen 07/22/18 2221 Right Wrist 2 days                Physical Exam   Constitutional: She is oriented to person, place, and time. She appears well-developed and well-nourished. No distress.   HENT:   Head: Normocephalic and atraumatic.   Eyes: Pupils are equal, round, and reactive to light. Right eye exhibits no discharge. Left eye exhibits no discharge.   Neck: Neck supple. No JVD present.   Cardiovascular: Normal rate, S1 normal, S2 normal and normal heart sounds.  An irregularly irregular rhythm present.   No murmur heard.  Pulmonary/Chest: Effort normal and breath sounds normal. No respiratory distress. She has no wheezes. She has no rales.   Abdominal: Soft. She exhibits  no distension. There is no tenderness. There is no rebound.   Musculoskeletal: She exhibits no edema.   Neurological: She is alert and oriented to person, place, and time.   Skin: Skin is warm and dry. She is not diaphoretic. No erythema.   Psychiatric: She has a normal mood and affect. Her behavior is normal. Thought content normal.   Nursing note and vitals reviewed.      Significant Labs:   CMP   Recent Labs  Lab 07/24/18  0605 07/25/18  0507    140   K 4.4 4.4    108   CO2 23 24   GLU 81 88   BUN 20 19   CREATININE 1.0 1.0   CALCIUM 9.5 9.5   ANIONGAP 8 8   ESTGFRAFRICA >60 >60   EGFRNONAA 54* 54*   , CBC   Recent Labs  Lab 07/24/18  0605 07/25/18  0507   WBC 8.67 9.51   HGB 13.1 13.6   HCT 40.1 40.3    279   , Troponin No results for input(s): TROPONINI in the last 48 hours. and All pertinent lab results from the last 24 hours have been reviewed.    Significant Imaging: Echocardiogram:   2D echo with color flow doppler:   Results for orders placed or performed during the hospital encounter of 07/22/18   2D echo with color flow doppler   Result Value Ref Range    EF 60 55 - 65    Mitral Valve Regurgitation MILD     Diastolic Dysfunction No     Est. PA Systolic Pressure 28.73     Tricuspid Valve Regurgitation MILD    , EKG: Reviewed and X-Ray: CXR: X-Ray Chest 1 View (CXR): No results found for this visit on 07/22/18. and X-Ray Chest PA and Lateral (CXR): No results found for this visit on 07/22/18.

## 2018-07-25 NOTE — TELEPHONE ENCOUNTER
----- Message from Khushboo Lorenzo PA-C sent at 7/25/2018 10:14 AM CDT -----  In room 204, needs f/u with me next week     Please arrange    Thanks

## 2018-07-25 NOTE — TELEPHONE ENCOUNTER
Called and left the patient a detailed message that the only appointment I have available would be Monday at 2 pm

## 2018-07-25 NOTE — ASSESSMENT & PLAN NOTE
-Presented to ED with lightheadedness/dizziness/near syncope with HR in 30's  -HR improved since admission  -Per MD, d/c amiodarone, send home on lopressor 25 mg TID

## 2018-07-25 NOTE — TELEPHONE ENCOUNTER
----- Message from Shikha Diaz RN sent at 7/25/2018 10:30 AM CDT -----  Patient needs 3 day follow up appointment.  Patient discharging to day.  Please contact patient with appointment date and time

## 2018-07-30 ENCOUNTER — OFFICE VISIT (OUTPATIENT)
Dept: INTERNAL MEDICINE | Facility: CLINIC | Age: 78
End: 2018-07-30
Payer: MEDICARE

## 2018-07-30 VITALS
HEIGHT: 65 IN | WEIGHT: 186.94 LBS | OXYGEN SATURATION: 98 % | BODY MASS INDEX: 31.14 KG/M2 | HEART RATE: 83 BPM | TEMPERATURE: 98 F | DIASTOLIC BLOOD PRESSURE: 78 MMHG | SYSTOLIC BLOOD PRESSURE: 138 MMHG

## 2018-07-30 DIAGNOSIS — Z09 HOSPITAL DISCHARGE FOLLOW-UP: Primary | ICD-10-CM

## 2018-07-30 DIAGNOSIS — M54.41 CHRONIC BILATERAL LOW BACK PAIN WITH RIGHT-SIDED SCIATICA: ICD-10-CM

## 2018-07-30 DIAGNOSIS — G89.29 CHRONIC BILATERAL LOW BACK PAIN WITH RIGHT-SIDED SCIATICA: ICD-10-CM

## 2018-07-30 DIAGNOSIS — G89.29 CHRONIC PAIN OF RIGHT KNEE: ICD-10-CM

## 2018-07-30 DIAGNOSIS — M25.561 CHRONIC PAIN OF RIGHT KNEE: ICD-10-CM

## 2018-07-30 PROCEDURE — 99213 OFFICE O/P EST LOW 20 MIN: CPT | Mod: S$PBB,,, | Performed by: FAMILY MEDICINE

## 2018-07-30 PROCEDURE — 99999 PR PBB SHADOW E&M-EST. PATIENT-LVL III: CPT | Mod: PBBFAC,,, | Performed by: FAMILY MEDICINE

## 2018-07-30 PROCEDURE — 99213 OFFICE O/P EST LOW 20 MIN: CPT | Mod: PBBFAC,PO | Performed by: FAMILY MEDICINE

## 2018-07-30 RX ORDER — ASPIRIN 81 MG/1
81 TABLET ORAL DAILY
COMMUNITY
End: 2018-08-06 | Stop reason: ALTCHOICE

## 2018-07-30 RX ORDER — GABAPENTIN 100 MG/1
100 CAPSULE ORAL 3 TIMES DAILY
Qty: 90 CAPSULE | Refills: 2 | Status: SHIPPED | OUTPATIENT
Start: 2018-07-30 | End: 2018-09-14 | Stop reason: SDUPTHER

## 2018-07-30 NOTE — PROGRESS NOTES
Subjective:       Patient ID: Maria Del Carmen Spence is a 77 y.o. female.    Chief Complaint: Hospital Follow Up and Back Pain    Here for hospital F/U - inpt 7/22 -25 for syncope. Did not lose consciousness, just couple seconds vision went black. Her a fib came back while at hospital. meds were adjusted - found to have hypotension and meds were adjusted. Denies any further episodes since DC.labs reviewed.  Lab Results       Component                Value               Date                       WBC                      9.51                07/25/2018                 HGB                      13.6                07/25/2018                 HCT                      40.3                07/25/2018                 PLT                      279                 07/25/2018                 CHOL                     239 (H)             02/08/2017                 TRIG                     90                  02/08/2017                 HDL                      69                  02/08/2017                 LDLCALC                  152.0               02/08/2017                 ALT                      65 (H)              07/22/2018                 AST                      45 (H)              07/22/2018                 NA                       140                 07/25/2018                 K                        4.4                 07/25/2018                 CL                       108                 07/25/2018                 CALCIUM                  9.5                 07/25/2018                 CREATININE               1.0                 07/25/2018                 BUN                      19                  07/25/2018                 CO2                      24                  07/25/2018                 TSH                      2.716               06/14/2017                 INR                      1.5 (H)             07/23/2018                 GLU                      88                  07/25/2018                 ESTGFRAFRICA              >60                 07/25/2018                 EGFRNONAA                54 (A)              07/25/2018            Has F/U with cards scheduled.     Chronic LBP with radiation right, numbness tingling to ulnar foot. , chronic R knee pain. Wants to know what she can take for pain as she is on Xarelto. 2 tylenol TID not helpful.  Has had ROSE to LS x 3 prior to 2009. Had PT at [place] many years ago. She does do some exercises for the knees.      Review of Systems   Respiratory: Positive for shortness of breath (with walking).    Cardiovascular: Negative for chest pain, palpitations and leg swelling.   Neurological: Negative for light-headedness.       Objective:      Physical Exam   Constitutional: She is oriented to person, place, and time. She appears well-developed.   HENT:   Head: Normocephalic and atraumatic.   Cardiovascular: Normal rate and normal heart sounds.    Irregularly irregular.   Pulmonary/Chest: Effort normal and breath sounds normal.   Neurological: She is alert and oriented to person, place, and time.   Skin: Skin is warm and dry.   Psychiatric: She has a normal mood and affect. Her behavior is normal.         Assessment/Plan:     1. Hospital discharge follow-up     2. Chronic pain of right knee     3. Chronic bilateral low back pain with right-sided sciatica  gabapentin (NEURONTIN) 100 MG capsule    New start gabapentin.  She will follow up with Ortho for knee pain. Okay to use Tylenol up to 1000 mg t.i.d. max.

## 2018-07-30 NOTE — PATIENT INSTRUCTIONS
Contact general scheduling dept to schedule recheck for knee with ortho.    OK to take up to 1000 mg acetaminophen (Tylenol) 3 times a day    Start new gabapentin - taper up gradually over a week to 3 x daily.   If this dose causes too much sleepiness, try taking up to three nightly.

## 2018-07-31 NOTE — PHYSICIAN QUERY
PT Name: Maria Del Carmen Spence  MR #: [id number]    Physician Query Form - Atrial Flutter Specificity Clarification     CDS/: Gonzalez Duffy Jr, RN               Contact information:[phone]  This form is a permanent document in the medical record.     Query Date: July 31, 2018    By submitting this query, we are merely seeking further clarification of documentation. Please utilize your independent clinical judgment when addressing the question(s) below.    The medical record contains the following:   Indicators     Supporting Clinical Findings Location in Medical Record   x Atrial Flutter Now converted to Afib/ flutter with controlled HR of 90s  May need PPM due to tachy/miko synd 7/24 Hospital Medicine Progress Note   x EKG results Atrial fibrillation  Left axis deviation  Low voltage QRS 7/23 EKG Report    Medication      Treatment     x Other Ms. Spence is a 77 year old female patient with a PMHx of PAF/PAFL s/p recent DCCV two weeks ago 7/23 Cardiology Consult Note     Provider, please further specify the Atrial Flutter diagnosis.    [  ] Atypical  [ x ] Typical  [  ] Other (please specify): ___________________________________  [  ] Clinically Undetermined    Please document in your progress notes daily for the duration of treatment until resolved, and include in your discharge summary.

## 2018-08-06 ENCOUNTER — OFFICE VISIT (OUTPATIENT)
Dept: CARDIOLOGY | Facility: CLINIC | Age: 78
End: 2018-08-06
Payer: MEDICARE

## 2018-08-06 VITALS
HEART RATE: 78 BPM | BODY MASS INDEX: 31.48 KG/M2 | SYSTOLIC BLOOD PRESSURE: 118 MMHG | WEIGHT: 188.94 LBS | DIASTOLIC BLOOD PRESSURE: 72 MMHG | HEIGHT: 65 IN

## 2018-08-06 DIAGNOSIS — I10 ESSENTIAL HYPERTENSION: ICD-10-CM

## 2018-08-06 DIAGNOSIS — I48.3 TYPICAL ATRIAL FLUTTER: ICD-10-CM

## 2018-08-06 DIAGNOSIS — I48.19 PERSISTENT ATRIAL FIBRILLATION: Primary | ICD-10-CM

## 2018-08-06 PROCEDURE — 99214 OFFICE O/P EST MOD 30 MIN: CPT | Mod: S$PBB,,, | Performed by: INTERNAL MEDICINE

## 2018-08-06 PROCEDURE — 99213 OFFICE O/P EST LOW 20 MIN: CPT | Mod: PBBFAC | Performed by: INTERNAL MEDICINE

## 2018-08-06 PROCEDURE — 99999 PR PBB SHADOW E&M-EST. PATIENT-LVL III: CPT | Mod: PBBFAC,,, | Performed by: INTERNAL MEDICINE

## 2018-08-06 NOTE — PROGRESS NOTES
Subjective:   Patient ID:  Maria Del Carmen Spence is a 77 y.o. female who presents for follow up of Atrial Fibrillation and Hospital Follow Up      74 yo female, PMH PAF, glaucoma, s/p knee surgery. Can not climb the stairs due to knee pain.   Dx of PAF in  and Eliquis and Metoprolol were added.  Carotid US normal and JAMIA left 0.98 and right 0.96 in  by [facility] screening.  07/20/17 visit, had PAF. Added Amiodarone and continue BB and xeralto. She could not tolerate Eliquis due to dizziness. Switched to Xeralto. Had discussion of the Afib strategy. Pt preferred to take medication for rhythm control and was not ready for ablation.    05/18 visit, EKG showed afib (EKG is not available now due to temporary EPIC shutdown) and subsequent holter showed persisitent aflutter. Pt states that DURÁN worse with shorttening distance walking.   ECHO in  normal BIV function. No LAE  Pt had DCCV on 07/09/2018 07/22, admitted for junctional bradycardia with fatigue and dizziness. HR was 40's. renakl function stable. Held Amiodarone and BB. Back to afib with VR at 80 to 90. Resumed Metoprolol.  Today, feels fine. And SOB is chronic. No dizziness.          Past Medical History:   Diagnosis Date    A-fib     Arthritis     Chronic LBP     ESIs x 3 with Dr. Hicks, PT at [facility], chiropractor    Eye pain     Frequent headaches     GERD (gastroesophageal reflux disease)     Glaucoma     Hyperlipemia     Hypertension        Past Surgical History:   Procedure Laterality Date    CARDIOVERSION N/A 7/9/2018    Procedure: CARDIOVERSION;  Surgeon: Will Rosenthal MD;  Location: [facility] C[facility] LAB;  Service: Cardiology;  Laterality: N/A;    CATARACT EXTRACTION W/  INTRAOCULAR LENS IMPLANT  OU    TUBAL LIGATION         Social History   Substance Use Topics    Smoking status: Never Smoker    Smokeless tobacco: Never Used    Alcohol use No       Family History   Problem Relation Age of Onset    Glaucoma Mother     Cancer Mother      Cataracts Mother     Hypertension Mother     Hypertension Father     Diabetes Paternal Aunt     Strabismus Neg Hx     Retinal detachment Neg Hx     Macular degeneration Neg Hx     Blindness Neg Hx     Amblyopia Neg Hx     Stroke Neg Hx     Thyroid disease Neg Hx          Review of Systems   Constitution: Negative for decreased appetite, diaphoresis, fever, weakness, malaise/fatigue and night sweats.   HENT: Negative for nosebleeds.    Eyes: Negative for blurred vision and double vision.   Cardiovascular: Positive for dyspnea on exertion. Negative for chest pain, claudication, irregular heartbeat, leg swelling, near-syncope, orthopnea, palpitations, paroxysmal nocturnal dyspnea and syncope.   Respiratory: Negative for cough, shortness of breath, sleep disturbances due to breathing, snoring, sputum production and wheezing.    Endocrine: Negative for cold intolerance and polyuria.   Hematologic/Lymphatic: Does not bruise/bleed easily.   Skin: Negative for rash.   Musculoskeletal: Positive for arthritis. Negative for back pain, falls, joint pain, joint swelling and neck pain.   Gastrointestinal: Negative for abdominal pain, heartburn, nausea and vomiting.   Genitourinary: Negative for dysuria, frequency and hematuria.   Neurological: Positive for dizziness. Negative for difficulty with concentration, focal weakness, headaches, light-headedness, numbness and seizures.   Psychiatric/Behavioral: Negative for depression, memory loss and substance abuse. The patient does not have insomnia.    Allergic/Immunologic: Negative for HIV exposure and hives.       Objective:   Physical Exam   Constitutional: She is oriented to person, place, and time. She appears well-nourished.   HENT:   Head: Normocephalic.   Eyes: Pupils are equal, round, and reactive to light.   Neck: Normal carotid pulses and no JVD present. Carotid bruit is not present. No thyromegaly present.   Cardiovascular: Normal rate, normal heart sounds and  normal pulses.  An irregularly irregular rhythm present.  No extrasystoles are present. PMI is not displaced.  Exam reveals no gallop and no S3.    No murmur heard.  Pulses:       Radial pulses are 2+ on the right side, and 2+ on the left side.   S2 split   Pulmonary/Chest: Breath sounds normal. No stridor. No respiratory distress.   Abdominal: Soft. Bowel sounds are normal. There is no tenderness. There is no rebound.   Musculoskeletal: Normal range of motion.   Neurological: She is alert and oriented to person, place, and time.   Skin: Skin is intact. No rash noted.   Psychiatric: Her behavior is normal.       Lab Results   Component Value Date    CHOL 239 (H) 02/08/2017     Lab Results   Component Value Date    HDL 69 02/08/2017     Lab Results   Component Value Date    LDLCALC 152.0 02/08/2017     Lab Results   Component Value Date    TRIG 90 02/08/2017     Lab Results   Component Value Date    CHOLHDL 28.9 02/08/2017       Chemistry        Component Value Date/Time     07/25/2018 0507    K 4.4 07/25/2018 0507     07/25/2018 0507    CO2 24 07/25/2018 0507    BUN 19 07/25/2018 0507    CREATININE 1.0 07/25/2018 0507    GLU 88 07/25/2018 0507        Component Value Date/Time    CALCIUM 9.5 07/25/2018 0507    ALKPHOS 104 07/22/2018 2221    AST 45 (H) 07/22/2018 2221    ALT 65 (H) 07/22/2018 2221    BILITOT 0.4 07/22/2018 2221    ESTGFRAFRICA >60 07/25/2018 0507    EGFRNONAA 54 (A) 07/25/2018 0507          No results found for: LABA1C, HGBA1C  Lab Results   Component Value Date    TSH 2.716 06/14/2017     Lab Results   Component Value Date    INR 1.5 (H) 07/23/2018     Lab Results   Component Value Date    WBC 9.51 07/25/2018    HGB 13.6 07/25/2018    HCT 40.3 07/25/2018    MCV 99 (H) 07/25/2018     07/25/2018     BMP  Sodium   Date Value Ref Range Status   07/25/2018 140 136 - 145 mmol/L Final     Potassium   Date Value Ref Range Status   07/25/2018 4.4 3.5 - 5.1 mmol/L Final     Chloride   Date  Value Ref Range Status   07/25/2018 108 95 - 110 mmol/L Final     CO2   Date Value Ref Range Status   07/25/2018 24 23 - 29 mmol/L Final     BUN, Bld   Date Value Ref Range Status   07/25/2018 19 8 - 23 mg/dL Final     Creatinine   Date Value Ref Range Status   07/25/2018 1.0 0.5 - 1.4 mg/dL Final     Calcium   Date Value Ref Range Status   07/25/2018 9.5 8.7 - 10.5 mg/dL Final     Anion Gap   Date Value Ref Range Status   07/25/2018 8 8 - 16 mmol/L Final     eGFR if    Date Value Ref Range Status   07/25/2018 >60 >60 mL/min/1.73 m^2 Final     eGFR if non    Date Value Ref Range Status   07/25/2018 54 (A) >60 mL/min/1.73 m^2 Final     Comment:     Calculation used to obtain the estimated glomerular filtration  rate (eGFR) is the CKD-EPI equation.        BNP  @LABRCNTIP(BNP,BNPTRIAGEBLO)@  @LABRCNTIP(troponini)@  CrCl cannot be calculated (Patient's most recent lab result is older than the maximum 7 days allowed.).  No results found in the last 24 hours.  No results found in the last 24 hours.  No results found in the last 24 hours.    Assessment:      1. Persistent atrial fibrillation    2. Essential hypertension    3. Typical atrial flutter      Developed symptomatic junctional bradycardia when she was on Metoprolol and amiodarone ,  Now afib with controlled VR    Plan:   Discussed the strategy of rhythm control vs rate control  Will continue current Metoprolol and Xarelto  RTC in 3 months

## 2018-08-13 ENCOUNTER — OFFICE VISIT (OUTPATIENT)
Dept: OPHTHALMOLOGY | Facility: CLINIC | Age: 78
End: 2018-08-13
Payer: MEDICARE

## 2018-08-13 DIAGNOSIS — Z96.1 PSEUDOPHAKIA: ICD-10-CM

## 2018-08-13 DIAGNOSIS — H40.1131 PRIMARY OPEN ANGLE GLAUCOMA OF BOTH EYES, MILD STAGE: Primary | ICD-10-CM

## 2018-08-13 PROCEDURE — 92133 CPTRZD OPH DX IMG PST SGM ON: CPT | Mod: PBBFAC,PO | Performed by: OPHTHALMOLOGY

## 2018-08-13 PROCEDURE — 99212 OFFICE O/P EST SF 10 MIN: CPT | Mod: PBBFAC,PO | Performed by: OPHTHALMOLOGY

## 2018-08-13 PROCEDURE — 99999 PR PBB SHADOW E&M-EST. PATIENT-LVL II: CPT | Mod: PBBFAC,,, | Performed by: OPHTHALMOLOGY

## 2018-08-13 PROCEDURE — 92014 COMPRE OPH EXAM EST PT 1/>: CPT | Mod: S$PBB,,, | Performed by: OPHTHALMOLOGY

## 2018-08-13 PROCEDURE — 92083 EXTENDED VISUAL FIELD XM: CPT | Mod: PBBFAC,PO | Performed by: OPHTHALMOLOGY

## 2018-08-13 NOTE — PROGRESS NOTES
SUBJECTIVE:   Maria Del Carmen Spence is a 77 y.o. female   Corrected distance visual acuity was 20/25 +2 in the right eye and 20/20 -1 in the left eye.   Chief Complaint   Patient presents with    Glaucoma     4 month GOCT and FD, Latanoprost QHS OU, Timolol BID OD(d/c 7/23 due to low heart rate)        HPI:  HPI     Glaucoma      Additional comments: 4 month GOCT and FD, Latanoprost QHS OU, Timolol   BID OD(d/c 7/23 due to low heart rate)              Comments     Pt states she was in the hospital in July for heart issues, her doctor   told her to stop the Timolol drops because it was lowering her heart rate.   She's only using the Latanoprost both eyes.    1. Mild COAG OD>OS (init 24/20) Goal <17  2. PCIOL OU (Sulcus OD) (partial dislocation OD)    Latanoprost QHS OU  Timolol BID OD(d/c 7/23 due to low heart rate)  O3FO          Last edited by Bang Mistry on 8/13/2018 10:22 AM. (History)        Assessment /Plan :  1. Primary open angle glaucoma of both eyes, mild stage IOP not within acceptable range relative to target IOP with risk of irreversible visual loss. Better IOP control is recommended. Discussed options, risks, and benefits of additional medication, SLT laser, and/or incisional glaucoma surgery. Reviewed importance of continued compliance with treatment and follow up.     Patient chooses to add Rhopressa OU qhs  Continue Latanoprost OU qhs   2. Pseudophakia  -- Condition stable, no therapeutic change required. Monitoring routinely.       Return to clinic in 2 weeks  or as needed.  With IOP Check (Rhopressa trial)

## 2018-08-15 ENCOUNTER — TELEPHONE (OUTPATIENT)
Dept: OPHTHALMOLOGY | Facility: CLINIC | Age: 78
End: 2018-08-15

## 2018-08-15 DIAGNOSIS — M17.11 ARTHRITIS OF KNEE, RIGHT: ICD-10-CM

## 2018-08-15 RX ORDER — MELOXICAM 7.5 MG/1
TABLET ORAL
Qty: 30 TABLET | Refills: 2 | OUTPATIENT
Start: 2018-08-15

## 2018-08-17 ENCOUNTER — TELEPHONE (OUTPATIENT)
Dept: OPHTHALMOLOGY | Facility: CLINIC | Age: 78
End: 2018-08-17

## 2018-08-17 NOTE — TELEPHONE ENCOUNTER
----- Message from Maria Guadalupe Dee sent at 8/17/2018  9:03 AM CDT -----  Contact: pt  States she's returning a call. Please call pt at [phone]. Thank you

## 2018-08-17 NOTE — TELEPHONE ENCOUNTER
Spoke with pt.  Advised we are still working on auth for Ropressa.  Advised pt to call us if she runs out of drops.

## 2018-08-28 ENCOUNTER — OFFICE VISIT (OUTPATIENT)
Dept: OPHTHALMOLOGY | Facility: CLINIC | Age: 78
End: 2018-08-28
Payer: MEDICARE

## 2018-08-28 DIAGNOSIS — Z96.1 PSEUDOPHAKIA: ICD-10-CM

## 2018-08-28 DIAGNOSIS — H40.1131 PRIMARY OPEN ANGLE GLAUCOMA OF BOTH EYES, MILD STAGE: Primary | ICD-10-CM

## 2018-08-28 PROCEDURE — 92012 INTRM OPH EXAM EST PATIENT: CPT | Mod: S$PBB,,, | Performed by: OPHTHALMOLOGY

## 2018-08-28 PROCEDURE — 99999 PR PBB SHADOW E&M-EST. PATIENT-LVL II: CPT | Mod: PBBFAC,,, | Performed by: OPHTHALMOLOGY

## 2018-08-28 PROCEDURE — 99212 OFFICE O/P EST SF 10 MIN: CPT | Mod: PBBFAC | Performed by: OPHTHALMOLOGY

## 2018-08-28 RX ORDER — DORZOLAMIDE HCL 20 MG/ML
1 SOLUTION/ DROPS OPHTHALMIC 2 TIMES DAILY
Qty: 10 ML | Refills: 12 | Status: SHIPPED | OUTPATIENT
Start: 2018-08-28 | End: 2020-01-13 | Stop reason: ALTCHOICE

## 2018-08-28 NOTE — PROGRESS NOTES
SUBJECTIVE:   Maria Del Carmen Spence is a 77 y.o. female   Corrected distance visual acuity was 20/25 +1 in the right eye and 20/20 -1 in the left eye.   Chief Complaint   Patient presents with    Glaucoma     Rhopressa trial QHS OU, Latanoprost QHS OU        HPI:  HPI     Glaucoma      Additional comments: Rhopressa trial QHS OU, Latanoprost QHS OU              Comments     Pt states the Rhopressa has been causing redness, irritation and matted   lids OS>OD. She also stated her insurance won't cover her drop. She's   still using the Latanoprost QHS OU. Vision is a little blurry from the   redness and irritation.    1. Mild COAG OD>OS (init 24/20) Goal <17  Timolol BID OD(d/c 7/23 due to heart issues)  2. PCIOL OU (Sulcus OD) (partial dislocation OD)    Latanoprost QHS OU  Rhopressa QHS OU    O3FO          Last edited by Bang Mistry on 8/28/2018  9:09 AM. (History)        Assessment /Plan :  1. Primary open angle glaucoma of both eyes, mild stage Doing well, IOP within acceptable range relative to target IOP and no evidence of progression. Continue current treatment. Reviewed importance of continued compliance with treatment and follow up.  Rhopressa irritates her eyes, so change Rhopressa to Dorzolamide OU BID  Continue Latanoprost OU qhs   2. Pseudophakia stable     Return to clinic in 1 month for IOP check  or as needed

## 2018-09-14 DIAGNOSIS — G89.29 CHRONIC BILATERAL LOW BACK PAIN WITH RIGHT-SIDED SCIATICA: ICD-10-CM

## 2018-09-14 DIAGNOSIS — M17.11 ARTHRITIS OF KNEE, RIGHT: ICD-10-CM

## 2018-09-14 DIAGNOSIS — M54.41 CHRONIC BILATERAL LOW BACK PAIN WITH RIGHT-SIDED SCIATICA: ICD-10-CM

## 2018-09-14 RX ORDER — MELOXICAM 7.5 MG/1
TABLET ORAL
Qty: 30 TABLET | Refills: 2 | Status: SHIPPED | OUTPATIENT
Start: 2018-09-14 | End: 2018-12-12 | Stop reason: SDUPTHER

## 2018-09-14 RX ORDER — GABAPENTIN 100 MG/1
CAPSULE ORAL
Qty: 90 CAPSULE | Refills: 5 | Status: SHIPPED | OUTPATIENT
Start: 2018-09-14 | End: 2019-03-05 | Stop reason: SDUPTHER

## 2018-09-24 ENCOUNTER — OFFICE VISIT (OUTPATIENT)
Dept: INTERNAL MEDICINE | Facility: CLINIC | Age: 78
End: 2018-09-24
Payer: MEDICARE

## 2018-09-24 VITALS
OXYGEN SATURATION: 96 % | WEIGHT: 190.38 LBS | HEART RATE: 88 BPM | BODY MASS INDEX: 31.68 KG/M2 | DIASTOLIC BLOOD PRESSURE: 78 MMHG | SYSTOLIC BLOOD PRESSURE: 133 MMHG | TEMPERATURE: 98 F

## 2018-09-24 DIAGNOSIS — G89.29 CHRONIC BILATERAL LOW BACK PAIN WITH RIGHT-SIDED SCIATICA: Primary | ICD-10-CM

## 2018-09-24 DIAGNOSIS — K21.9 GASTROESOPHAGEAL REFLUX DISEASE, ESOPHAGITIS PRESENCE NOT SPECIFIED: ICD-10-CM

## 2018-09-24 DIAGNOSIS — I48.19 PERSISTENT ATRIAL FIBRILLATION: ICD-10-CM

## 2018-09-24 DIAGNOSIS — I77.1 TORTUOUS AORTA: ICD-10-CM

## 2018-09-24 DIAGNOSIS — M54.41 CHRONIC BILATERAL LOW BACK PAIN WITH RIGHT-SIDED SCIATICA: Primary | ICD-10-CM

## 2018-09-24 PROCEDURE — 99213 OFFICE O/P EST LOW 20 MIN: CPT | Mod: S$PBB,,, | Performed by: FAMILY MEDICINE

## 2018-09-24 PROCEDURE — 99999 PR PBB SHADOW E&M-EST. PATIENT-LVL III: CPT | Mod: PBBFAC,,, | Performed by: FAMILY MEDICINE

## 2018-09-24 PROCEDURE — 99213 OFFICE O/P EST LOW 20 MIN: CPT | Mod: PBBFAC,PO | Performed by: FAMILY MEDICINE

## 2018-09-24 NOTE — PROGRESS NOTES
Subjective:       Patient ID: Maria Del Carmen Spence is a 77 y.o. female.    Chief Complaint: 8 week f/u (low heart rate)    Here for 8 week F/U for new start gabapentin. She thinks her right sciatica is helped - no longer daily pain - just when she has been very active, riding in car.  Taking it TID 100mg - no problems with SEs of any sort - she wants to stay on this dose.    Her afib does cause her to have DURÁN.      Review of Systems   Constitutional: Negative for fever.   Respiratory: Positive for shortness of breath.    Cardiovascular: Negative for chest pain, palpitations and leg swelling.   Psychiatric/Behavioral: Negative for sleep disturbance.       Objective:      Physical Exam   Constitutional: She is oriented to person, place, and time. She appears well-developed.   HENT:   Head: Normocephalic and atraumatic.   Cardiovascular: Normal rate and normal heart sounds.   Irregularly irregular   Pulmonary/Chest: Effort normal and breath sounds normal.   Musculoskeletal: She exhibits no edema.   Neurological: She is alert and oriented to person, place, and time.   Skin: Skin is warm and dry.   Psychiatric: She has a normal mood and affect. Her behavior is normal.         Assessment/Plan:     1. Chronic bilateral low back pain with right-sided sciatica     2. Gastroesophageal reflux disease, esophagitis presence not specified     3. Persistent atrial fibrillation     4. Tortuous aorta       Will continue to fill H2 blockers and gabapentin as needed. Recheck 6 months.

## 2018-09-26 ENCOUNTER — OFFICE VISIT (OUTPATIENT)
Dept: OPHTHALMOLOGY | Facility: CLINIC | Age: 78
End: 2018-09-26
Payer: MEDICARE

## 2018-09-26 DIAGNOSIS — H40.1131 PRIMARY OPEN ANGLE GLAUCOMA OF BOTH EYES, MILD STAGE: Primary | ICD-10-CM

## 2018-09-26 PROCEDURE — 99213 OFFICE O/P EST LOW 20 MIN: CPT | Mod: PBBFAC | Performed by: OPTOMETRIST

## 2018-09-26 PROCEDURE — 99999 PR PBB SHADOW E&M-EST. PATIENT-LVL III: CPT | Mod: PBBFAC,,, | Performed by: OPTOMETRIST

## 2018-09-26 PROCEDURE — 92012 INTRM OPH EXAM EST PATIENT: CPT | Mod: S$PBB,,, | Performed by: OPTOMETRIST

## 2018-09-26 NOTE — PROGRESS NOTES
HPI     Glaucoma      Additional comments: IOP check              Comments     NP to TRF  Last seen by [facility] on 8/28/18 for IOP check  Vision stable  No pain or discomfort  Using drops as directed  1. Mild COAG OD>OS (init 24/20) Goal <17  Timolol BID OD(d/c 7/23 due to heart issues)  Rhopressa (irritated but exc IOP 11/11)  2. PCIOL OU (Sulcus OD) (partial dislocation OD)    Latanoprost QHS OU  Dorzolamide OU BID  O3FO          Last edited by Tod White, OD on 9/26/2018  9:58 AM. (History)            Assessment /Plan     For exam results, see Encounter Report.    Primary open angle glaucoma of both eyes, mild stage      New drops also irritate eyes, but tolerable.    IOP higher than with Rhopressa but below goal.    Continue Latanoprost at bedtime in both eyes.  Continue dorzolamide twice daily in both eyes.  Restart Omega 3 fish oils, 2000 mg daily.    RTC 3 months IOP check

## 2018-09-26 NOTE — PATIENT INSTRUCTIONS
Primary open angle glaucoma of both eyes, mild stage        New drops also irritate eyes, but tolerable.     IOP higher than with Rhopressa but below goal.     Continue Latanoprost at bedtime in both eyes.  Continue dorzolamide twice daily in both eyes.  Restart Omega 3 fish oils, 2000 mg daily.     RTC 3 months IOP check

## 2018-09-30 PROBLEM — I77.1 TORTUOUS AORTA: Status: ACTIVE | Noted: 2018-09-30

## 2018-11-14 DIAGNOSIS — K21.9 GASTROESOPHAGEAL REFLUX DISEASE, ESOPHAGITIS PRESENCE NOT SPECIFIED: ICD-10-CM

## 2018-11-14 DIAGNOSIS — M17.11 ARTHRITIS OF KNEE, RIGHT: ICD-10-CM

## 2018-11-14 RX ORDER — MELOXICAM 7.5 MG/1
TABLET ORAL
Qty: 30 TABLET | Refills: 2 | OUTPATIENT
Start: 2018-11-14

## 2018-11-14 RX ORDER — FAMOTIDINE 20 MG/1
TABLET, FILM COATED ORAL
Qty: 90 TABLET | Refills: 1 | Status: SHIPPED | OUTPATIENT
Start: 2018-11-14 | End: 2019-04-30 | Stop reason: SDUPTHER

## 2018-12-05 ENCOUNTER — OFFICE VISIT (OUTPATIENT)
Dept: CARDIOLOGY | Facility: CLINIC | Age: 78
End: 2018-12-05
Payer: MEDICARE

## 2018-12-05 VITALS
SYSTOLIC BLOOD PRESSURE: 106 MMHG | HEIGHT: 65 IN | WEIGHT: 193.56 LBS | BODY MASS INDEX: 32.25 KG/M2 | HEART RATE: 70 BPM | DIASTOLIC BLOOD PRESSURE: 74 MMHG

## 2018-12-05 DIAGNOSIS — I48.0 PAROXYSMAL ATRIAL FIBRILLATION: ICD-10-CM

## 2018-12-05 DIAGNOSIS — I48.3 TYPICAL ATRIAL FLUTTER: Primary | ICD-10-CM

## 2018-12-05 DIAGNOSIS — I10 ESSENTIAL HYPERTENSION: ICD-10-CM

## 2018-12-05 DIAGNOSIS — I48.92 ATRIAL FLUTTER: ICD-10-CM

## 2018-12-05 PROCEDURE — 93010 ELECTROCARDIOGRAM REPORT: CPT | Mod: ,,, | Performed by: INTERNAL MEDICINE

## 2018-12-05 PROCEDURE — 99999 PR PBB SHADOW E&M-EST. PATIENT-LVL III: CPT | Mod: PBBFAC,,, | Performed by: INTERNAL MEDICINE

## 2018-12-05 PROCEDURE — 99214 OFFICE O/P EST MOD 30 MIN: CPT | Mod: S$PBB,,, | Performed by: INTERNAL MEDICINE

## 2018-12-05 PROCEDURE — 93005 ELECTROCARDIOGRAM TRACING: CPT | Mod: PBBFAC,PO | Performed by: INTERNAL MEDICINE

## 2018-12-05 PROCEDURE — 99213 OFFICE O/P EST LOW 20 MIN: CPT | Mod: PBBFAC,PO | Performed by: INTERNAL MEDICINE

## 2018-12-05 NOTE — PROGRESS NOTES
Subjective:   Patient ID:  Maria Del Carmen Spence is a 78 y.o. female who presents for follow up of Follow-up and Atrial Fibrillation      74 yo female, PMH PAF, glaucoma, s/p knee surgery. Can not climb the stairs due to knee pain.   Dx of PAF in  and Eliquis and Metoprolol were added.  Carotid US normal and JAMIA left 0.98 and right 0.96 in  by Life line screening.  07/20/17 visit, had PAF. Added Amiodarone and continue BB and xeralto. She could not tolerate Eliquis due to dizziness. Switched to Xeralto. Had discussion of the Afib strategy. Pt preferred to take medication for rhythm control and was not ready for ablation.  05/18 visit, EKG showed afib (EKG is not available now due to temporary EPIC shutdown) and subsequent holter showed persisitent aflutter. Pt states that KAYE worse with shorttening distance walking.   ECHO in  normal BIV function. No LAE  Pt had DCCV on 07/09/2018 07/22, admitted for junctional bradycardia with fatigue and dizziness. HR was 40's. renal function stable. Held Amiodarone and BB. Back to afib with VR at 80 to 90. Resumed Metoprolol.  Today , wkg showed A flutter with variable AB block. And VR 70 bpm  Pt c/o fatigue, weakness, KAYE, dizziness. 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 Pt kaye snot like to go to [facility] for the ablation.          Past Medical History:   Diagnosis Date    A-fib     Arthritis     Chronic LBP     ESIs x 3 with Dr. Hicks, PT at [facility], chiropractor    Eye pain     Frequent headaches     GERD (gastroesophageal reflux disease)     Glaucoma     Hyperlipemia     Hypertension        Past Surgical History:   Procedure Laterality Date    CARDIOVERSION N/A 7/9/2018    Procedure: CARDIOVERSION;  Surgeon: Will Rosenthal MD;  Location: [facility] CATH LAB;  Service: Cardiology;  Laterality: N/A;    CARDIOVERSION N/A 7/9/2018    Performed by Will Rosenthal MD at [facility] CATH LAB    CATARACT EXTRACTION W/  INTRAOCULAR LENS IMPLANT  OU     ECHOCARDIOGRAM,TRANSESOPHAGEAL N/A 7/9/2018    Performed by [facility] at [facility] C[facility] LAB    TUBAL LIGATION         Social History     Tobacco Use    Smoking status: Never Smoker    Smokeless tobacco: Never Used   Substance Use Topics    Alcohol use: No    Drug use: No       Family History   Problem Relation Age of Onset    Glaucoma Mother     Cancer Mother     Cataracts Mother     Hypertension Mother     Hypertension Father     Diabetes Paternal Aunt     Strabismus Neg Hx     Retinal detachment Neg Hx     Macular degeneration Neg Hx     Blindness Neg Hx     Amblyopia Neg Hx     Stroke Neg Hx     Thyroid disease Neg Hx          Review of Systems   Constitution: Negative for decreased appetite, diaphoresis, fever, weakness, malaise/fatigue and night sweats.   HENT: Negative for nosebleeds.    Eyes: Negative for blurred vision and double vision.   Cardiovascular: Positive for dyspnea on exertion. Negative for chest pain, claudication, irregular heartbeat, leg swelling, near-syncope, orthopnea, palpitations, paroxysmal nocturnal dyspnea and syncope.   Respiratory: Negative for cough, shortness of breath, sleep disturbances due to breathing, snoring, sputum production and wheezing.    Endocrine: Negative for cold intolerance and polyuria.   Hematologic/Lymphatic: Does not bruise/bleed easily.   Skin: Negative for rash.   Musculoskeletal: Positive for arthritis. Negative for back pain, falls, joint pain, joint swelling and neck pain.   Gastrointestinal: Negative for abdominal pain, heartburn, nausea and vomiting.   Genitourinary: Negative for dysuria, frequency and hematuria.   Neurological: Positive for dizziness. Negative for difficulty with concentration, focal weakness, headaches, light-headedness, numbness and seizures.   Psychiatric/Behavioral: Negative for depression, memory loss and substance abuse. The patient does not have insomnia.    Allergic/Immunologic: Negative for HIV exposure and  hives.       Objective:   Physical Exam    Lab Results   Component Value Date    CHOL 239 (H) 02/08/2017     Lab Results   Component Value Date    HDL 69 02/08/2017     Lab Results   Component Value Date    LDLCALC 152.0 02/08/2017     Lab Results   Component Value Date    TRIG 90 02/08/2017     Lab Results   Component Value Date    CHOLHDL 28.9 02/08/2017       Chemistry        Component Value Date/Time     07/25/2018 0507    K 4.4 07/25/2018 0507     07/25/2018 0507    CO2 24 07/25/2018 0507    BUN 19 07/25/2018 0507    CREATININE 1.0 07/25/2018 0507    GLU 88 07/25/2018 0507        Component Value Date/Time    CALCIUM 9.5 07/25/2018 0507    ALKPHOS 104 07/22/2018 2221    AST 45 (H) 07/22/2018 2221    ALT 65 (H) 07/22/2018 2221    BILITOT 0.4 07/22/2018 2221    ESTGFRAFRICA >60 07/25/2018 0507    EGFRNONAA 54 (A) 07/25/2018 0507          No results found for: LABA1C, HGBA1C  Lab Results   Component Value Date    TSH 2.716 06/14/2017     Lab Results   Component Value Date    INR 1.5 (H) 07/23/2018     Lab Results   Component Value Date    WBC 9.51 07/25/2018    HGB 13.6 07/25/2018    HCT 40.3 07/25/2018    MCV 99 (H) 07/25/2018     07/25/2018     BMP  Sodium   Date Value Ref Range Status   07/25/2018 140 136 - 145 mmol/L Final     Potassium   Date Value Ref Range Status   07/25/2018 4.4 3.5 - 5.1 mmol/L Final     Chloride   Date Value Ref Range Status   07/25/2018 108 95 - 110 mmol/L Final     CO2   Date Value Ref Range Status   07/25/2018 24 23 - 29 mmol/L Final     BUN, Bld   Date Value Ref Range Status   07/25/2018 19 8 - 23 mg/dL Final     Creatinine   Date Value Ref Range Status   07/25/2018 1.0 0.5 - 1.4 mg/dL Final     Calcium   Date Value Ref Range Status   07/25/2018 9.5 8.7 - 10.5 mg/dL Final     Anion Gap   Date Value Ref Range Status   07/25/2018 8 8 - 16 mmol/L Final     eGFR if    Date Value Ref Range Status   07/25/2018 >60 >60 mL/min/1.73 m^2 Final     eGFR if non     Date Value Ref Range Status   07/25/2018 54 (A) >60 mL/min/1.73 m^2 Final     Comment:     Calculation used to obtain the estimated glomerular filtration  rate (eGFR) is the CKD-EPI equation.        BNP  @LABRCNTIP(BNP,BNPTRIAGEBLO)@  @LABRCNTIP(troponini)@  CrCl cannot be calculated (Patient's most recent lab result is older than the maximum 7 days allowed.).  No results found in the last 24 hours.  No results found in the last 24 hours.  No results found in the last 24 hours.    Assessment:      1. Typical atrial flutter    2. Essential hypertension    3. Paroxysmal atrial fibrillation      Persistent a flutter progressively worsening symptoms.  Pt did not want to go to [facility] for ablation      Plan:   Had long time conversation with pt and encouarge to discuss with our EP specialist.  Will arrnge f/u  Continue Xarelto and BB  Repeat Lipid profile  RTC in 6 months

## 2018-12-07 ENCOUNTER — LAB VISIT (OUTPATIENT)
Dept: LAB | Facility: HOSPITAL | Age: 78
End: 2018-12-07
Attending: INTERNAL MEDICINE
Payer: MEDICARE

## 2018-12-07 DIAGNOSIS — I10 ESSENTIAL HYPERTENSION: ICD-10-CM

## 2018-12-07 DIAGNOSIS — I48.3 TYPICAL ATRIAL FLUTTER: ICD-10-CM

## 2018-12-07 LAB
CHOLEST SERPL-MCNC: 252 MG/DL
CHOLEST/HDLC SERPL: 3.5 {RATIO}
HDLC SERPL-MCNC: 71 MG/DL
HDLC SERPL: 28.2 %
LDLC SERPL CALC-MCNC: 158.2 MG/DL
NONHDLC SERPL-MCNC: 181 MG/DL
TRIGL SERPL-MCNC: 114 MG/DL

## 2018-12-07 PROCEDURE — 36415 COLL VENOUS BLD VENIPUNCTURE: CPT | Mod: PO

## 2018-12-07 PROCEDURE — 80061 LIPID PANEL: CPT

## 2018-12-11 ENCOUNTER — TELEPHONE (OUTPATIENT)
Dept: CARDIOLOGY | Facility: CLINIC | Age: 78
End: 2018-12-11

## 2018-12-11 DIAGNOSIS — I48.0 PAROXYSMAL ATRIAL FIBRILLATION: ICD-10-CM

## 2018-12-11 DIAGNOSIS — E78.2 MIXED HYPERLIPIDEMIA: ICD-10-CM

## 2018-12-11 DIAGNOSIS — M17.11 ARTHRITIS OF KNEE, RIGHT: ICD-10-CM

## 2018-12-11 RX ORDER — RIVAROXABAN 20 MG/1
TABLET, FILM COATED ORAL
Qty: 30 TABLET | Refills: 11 | Status: SHIPPED | OUTPATIENT
Start: 2018-12-11 | End: 2019-12-20

## 2018-12-11 RX ORDER — ATORVASTATIN CALCIUM 10 MG/1
10 TABLET, FILM COATED ORAL NIGHTLY
Qty: 30 TABLET | Refills: 6 | Status: SHIPPED | OUTPATIENT
Start: 2018-12-11 | End: 2019-05-29 | Stop reason: SDUPTHER

## 2018-12-12 RX ORDER — MELOXICAM 7.5 MG/1
TABLET ORAL
Qty: 30 TABLET | Refills: 2 | Status: SHIPPED | OUTPATIENT
Start: 2018-12-12 | End: 2019-01-07

## 2018-12-18 ENCOUNTER — OFFICE VISIT (OUTPATIENT)
Dept: OPHTHALMOLOGY | Facility: CLINIC | Age: 78
End: 2018-12-18
Payer: MEDICARE

## 2018-12-18 DIAGNOSIS — Z96.1 PSEUDOPHAKIA: ICD-10-CM

## 2018-12-18 DIAGNOSIS — H40.1131 PRIMARY OPEN ANGLE GLAUCOMA OF BOTH EYES, MILD STAGE: Primary | ICD-10-CM

## 2018-12-18 PROCEDURE — 99999 PR PBB SHADOW E&M-EST. PATIENT-LVL II: CPT | Mod: PBBFAC,,, | Performed by: OPHTHALMOLOGY

## 2018-12-18 PROCEDURE — 99212 OFFICE O/P EST SF 10 MIN: CPT | Mod: PBBFAC | Performed by: OPHTHALMOLOGY

## 2018-12-18 PROCEDURE — 92012 INTRM OPH EXAM EST PATIENT: CPT | Mod: S$PBB,,, | Performed by: OPHTHALMOLOGY

## 2018-12-18 NOTE — PROGRESS NOTES
SUBJECTIVE:   Maria Del Carmen Spence is a 78 y.o. female   Corrected distance visual acuity was 20/25 +1 in the right eye and 20/25 +2 in the left eye.   Chief Complaint   Patient presents with    Glaucoma     3 Months IOP Check        HPI:  HPI     Glaucoma      Additional comments: 3 Months IOP Check              Comments     Patient Notice No Change In Vision, Doing Well    1. Mild COAG OD>OS (init 24/20) Goal <17  Timolol BID OD(d/c 7/23 due to heart issues)  Rhopressa (irritated but exc IOP 11/11)  2. PCIOL OU (Sulcus OD) (partial dislocation OD)    Latanoprost QHS OU  Dorzolamide OU BID (Patient States This Eye Drop Makes Her Eye Burn & Red)              Last edited by Kayy Farnsworth on 12/18/2018  9:13 AM. (History)        Assessment /Plan :  1. Primary open angle glaucoma of both eyes, mild stage Doing well, IOP within acceptable range relative to target IOP and no evidence of progression. Continue current treatment. Reviewed importance of continued compliance with treatment and follow up.     2. Pseudophakia monitor for now     Return to clinic in 4 months  or as needed.  With IOP Check

## 2019-01-07 ENCOUNTER — CLINICAL SUPPORT (OUTPATIENT)
Dept: CARDIOLOGY | Facility: CLINIC | Age: 79
End: 2019-01-07
Payer: MEDICARE

## 2019-01-07 ENCOUNTER — INITIAL CONSULT (OUTPATIENT)
Dept: CARDIOLOGY | Facility: CLINIC | Age: 79
End: 2019-01-07
Payer: MEDICARE

## 2019-01-07 VITALS
SYSTOLIC BLOOD PRESSURE: 110 MMHG | HEART RATE: 70 BPM | BODY MASS INDEX: 32.29 KG/M2 | WEIGHT: 193.81 LBS | DIASTOLIC BLOOD PRESSURE: 73 MMHG | HEIGHT: 65 IN

## 2019-01-07 DIAGNOSIS — I48.92 ATRIAL FLUTTER: ICD-10-CM

## 2019-01-07 DIAGNOSIS — I48.0 PAROXYSMAL ATRIAL FIBRILLATION: ICD-10-CM

## 2019-01-07 DIAGNOSIS — I48.3 TYPICAL ATRIAL FLUTTER: Primary | ICD-10-CM

## 2019-01-07 PROCEDURE — 93010 ELECTROCARDIOGRAM REPORT: CPT | Mod: S$PBB,,, | Performed by: NUCLEAR MEDICINE

## 2019-01-07 PROCEDURE — 99999 PR PBB SHADOW E&M-EST. PATIENT-LVL III: CPT | Mod: PBBFAC,,, | Performed by: INTERNAL MEDICINE

## 2019-01-07 PROCEDURE — 99205 OFFICE O/P NEW HI 60 MIN: CPT | Mod: S$PBB,,, | Performed by: INTERNAL MEDICINE

## 2019-01-07 PROCEDURE — 99205 PR OFFICE/OUTPT VISIT, NEW, LEVL V, 60-74 MIN: ICD-10-PCS | Mod: S$PBB,,, | Performed by: INTERNAL MEDICINE

## 2019-01-07 PROCEDURE — 93010 EKG 12-LEAD: ICD-10-PCS | Mod: S$PBB,,, | Performed by: NUCLEAR MEDICINE

## 2019-01-07 PROCEDURE — 99213 OFFICE O/P EST LOW 20 MIN: CPT | Mod: PBBFAC,25 | Performed by: INTERNAL MEDICINE

## 2019-01-07 PROCEDURE — 93005 ELECTROCARDIOGRAM TRACING: CPT | Mod: PBBFAC | Performed by: NUCLEAR MEDICINE

## 2019-01-07 PROCEDURE — 99999 PR PBB SHADOW E&M-EST. PATIENT-LVL III: ICD-10-PCS | Mod: PBBFAC,,, | Performed by: INTERNAL MEDICINE

## 2019-01-07 RX ORDER — AMOXICILLIN 500 MG
1 CAPSULE ORAL DAILY
COMMUNITY
End: 2020-01-13

## 2019-01-07 NOTE — LETTER
January 7, 2019      Will Rosenthal MD  [address] [facility] [address]           [facility]  [address] [place] [address] , [address]nd Floor  [facility] [address]  Phone: [phone]  Fax: [phone]          Patient: Maria Del Carmen Spence   MR Number: [id number]   YOB: 1940   Date of Visit: 1/7/2019       Dear Dr. Will Rosenthal:    Thank you for referring Maria Del Carmen Spence to me for evaluation. Attached you will find relevant portions of my assessment and plan of care.    If you have questions, please do not hesitate to call me. I look forward to following Maria Del Carmen Spence along with you.    Sincerely,    Abel Morillo MD    Enclosure  CC:  No Recipients    If you would like to receive this communication electronically, please contact [email or web address] or [phone] to request more information on [email or web address] Link access.    For providers and/or their staff who would like to refer a patient to [facility], please contact us through our one-stop-shop provider referral line, [facility], at [phone].    If you feel you have received this communication in error or would no longer like to receive these types of communications, please e-mail [email or web address]

## 2019-01-07 NOTE — PROGRESS NOTES
Subjective:    Patient ID:  Maria Del Carmen Spence is a 78 y.o. female who presents for evaluation of Atrial Flutter and Atrial Fibrillation      78 yoF here for AF and AFL management. She developed AF as well as AFL (most ECGs with AFL) since 6/18. With her episodes she has developed syncope and near syncope. Attempts at rate control have resulted in bradycardic symptoms. Amiodarone resulted in symptomatic bradycardia. She has had recurrent events August, October and December 2018- mostly AFL. She has been placed on xarelto for CVA prophylaxis.     Echo 7/18:  CONCLUSIONS     1 - Concentric remodeling.     2 - No wall motion abnormalities.     3 - Normal left ventricular systolic function (EF 60-65%).     4 - Normal left ventricular diastolic function.     5 - Normal right ventricular systolic function .     6 - The estimated PA systolic pressure is greater than 29 mmHg.     7 - Mild mitral regurgitation.     8 - Mild tricuspid regurgitation.     Past Medical History:  No date: A-fib  No date: Arthritis  No date: Chronic LBP      Comment:  ESIs x 3 with Dr. Hicks, PT at [facility]  No date: Eye pain  No date: Frequent headaches  No date: GERD (gastroesophageal reflux disease)  No date: Glaucoma  No date: Hyperlipemia  No date: Hypertension    Past Surgical History:  7/9/2018: CARDIOVERSION; N/A      Comment:  Performed by Will Rosenthal MD at [facility] CATH LAB  OU: CATARACT EXTRACTION W/  INTRAOCULAR LENS IMPLANT  7/9/2018: ECHOCARDIOGRAM,TRANSESOPHAGEAL; N/A      Comment:  Performed by Will Rosenthal MD at [facility] CATH LAB  No date: TUBAL LIGATION    Social History    Socioeconomic History      Marital status:       Spouse name: Not on file      Number of children: Not on file      Years of education: Not on file      Highest education level: Not on file    Social Needs      Financial resource strain: Not on file      Food insecurity - worry: Not on file      Food insecurity - inability: Not on file      Transportation  needs - medical: Not on file      Transportation needs - non-medical: Not on file    Occupational History      Not on file    Tobacco Use      Smoking status: Never Smoker      Smokeless tobacco: Never Used    Substance and Sexual Activity      Alcohol use: No      Drug use: No      Sexual activity: Yes        Partners: Male    Other Topics      Concerns:        Not on file    Social History Narrative      Not on file      Review of patient's family history indicates:  Problem: Glaucoma      Relation: Mother          Age of Onset: (Not Specified)  Problem: Cancer      Relation: Mother          Age of Onset: (Not Specified)  Problem: Cataracts      Relation: Mother          Age of Onset: (Not Specified)  Problem: Hypertension      Relation: Mother          Age of Onset: (Not Specified)  Problem: Hypertension      Relation: Father          Age of Onset: (Not Specified)  Problem: Diabetes      Relation: Paternal Aunt          Age of Onset: (Not Specified)  Problem: Strabismus      Relation: Neg Hx          Age of Onset: (Not Specified)  Problem: Retinal detachment      Relation: Neg Hx          Age of Onset: (Not Specified)  Problem: Macular degeneration      Relation: Neg Hx          Age of Onset: (Not Specified)  Problem: Blindness      Relation: Neg Hx          Age of Onset: (Not Specified)  Problem: Amblyopia      Relation: Neg Hx          Age of Onset: (Not Specified)  Problem: Stroke      Relation: Neg Hx          Age of Onset: (Not Specified)  Problem: Thyroid disease      Relation: Neg Hx          Age of Onset: (Not Specified)          Review of Systems   Constitution: Positive for malaise/fatigue.   HENT: Negative.    Eyes: Negative.    Cardiovascular: Positive for dyspnea on exertion, irregular heartbeat, near-syncope and syncope. Negative for chest pain, leg swelling and palpitations.   Respiratory: Negative.  Negative for shortness of breath.    Endocrine: Negative.    Hematologic/Lymphatic: Negative.     Skin: Negative.    Musculoskeletal: Negative.    Gastrointestinal: Negative.    Genitourinary: Negative.    Neurological: Negative for dizziness and light-headedness.   Psychiatric/Behavioral: Negative.    Allergic/Immunologic: Negative.         Objective:    Physical Exam   Constitutional: She is oriented to person, place, and time. She appears well-developed and well-nourished. No distress.   HENT:   Head: Normocephalic and atraumatic.   Eyes: EOM are normal. Pupils are equal, round, and reactive to light.   Neck: Normal range of motion. No JVD present. No thyromegaly present.   Cardiovascular: Normal rate, regular rhythm, S1 normal, S2 normal and normal heart sounds. PMI is not displaced. Exam reveals no gallop and no friction rub.   No murmur heard.  Pulmonary/Chest: Effort normal and breath sounds normal. No respiratory distress. She has no wheezes. She has no rales.   Abdominal: Soft. Bowel sounds are normal. She exhibits no distension. There is no tenderness. There is no rebound and no guarding.   Musculoskeletal: Normal range of motion. She exhibits no edema or tenderness.   Neurological: She is alert and oriented to person, place, and time. No cranial nerve deficit.   Skin: Skin is warm and dry. No rash noted. No erythema.   Psychiatric: She has a normal mood and affect. Her behavior is normal. Judgment and thought content normal.   Vitals reviewed.    ECG: NSR nl RI, QRS, QTc        Assessment:       1. Typical atrial flutter    2. Paroxysmal atrial fibrillation         Plan:       78 yoF here for AF and AFL management. I discussed AF, AFL and their basic pathophysiology, including their health implications and treatment options with the patient. Specifically, I addressed the need for CVA prophylaxis as well as the goal to reduce symptomatic arrhythmic episodes by pharmacologic and/or procedural methods. She has refractory AFL and AF and did not tolerate increased AVN agents or AAD therapy. I offered  combined PVI/CTI ablations. I had extensive discussion with patient regarding risks and benefits of PVI/WACA, and he would like to proceed. Risks of procedure include (but are not limited to) bleeding, stroke, perforation requiring emergency draining or surgery, AV block, death, AV fistula, AE fistula, PV stenosis.    Continue anti-coagulation until day of procedure.    PVI & CTI  Anesthesia  TOBY prior, cancel if NSR  ROSE

## 2019-01-08 DIAGNOSIS — I48.92 ATRIAL FLUTTER: Primary | ICD-10-CM

## 2019-01-22 ENCOUNTER — TELEPHONE (OUTPATIENT)
Dept: ELECTROPHYSIOLOGY | Facility: CLINIC | Age: 79
End: 2019-01-22

## 2019-01-22 ENCOUNTER — LAB VISIT (OUTPATIENT)
Dept: LAB | Facility: HOSPITAL | Age: 79
End: 2019-01-22
Attending: INTERNAL MEDICINE
Payer: MEDICARE

## 2019-01-22 DIAGNOSIS — E78.2 MIXED HYPERLIPIDEMIA: ICD-10-CM

## 2019-01-22 LAB
ALBUMIN SERPL BCP-MCNC: 3.8 G/DL
ALP SERPL-CCNC: 76 U/L
ALT SERPL W/O P-5'-P-CCNC: 14 U/L
ANION GAP SERPL CALC-SCNC: 8 MMOL/L
AST SERPL-CCNC: 26 U/L
BILIRUB SERPL-MCNC: 0.5 MG/DL
BUN SERPL-MCNC: 19 MG/DL
CALCIUM SERPL-MCNC: 9.8 MG/DL
CHLORIDE SERPL-SCNC: 108 MMOL/L
CHOLEST SERPL-MCNC: 166 MG/DL
CHOLEST/HDLC SERPL: 2.3 {RATIO}
CO2 SERPL-SCNC: 27 MMOL/L
CREAT SERPL-MCNC: 0.8 MG/DL
EST. GFR  (AFRICAN AMERICAN): >60 ML/MIN/1.73 M^2
EST. GFR  (NON AFRICAN AMERICAN): >60 ML/MIN/1.73 M^2
GLUCOSE SERPL-MCNC: 81 MG/DL
HDLC SERPL-MCNC: 72 MG/DL
HDLC SERPL: 43.4 %
LDLC SERPL CALC-MCNC: 78.2 MG/DL
NONHDLC SERPL-MCNC: 94 MG/DL
POTASSIUM SERPL-SCNC: 4.3 MMOL/L
PROT SERPL-MCNC: 7.7 G/DL
SODIUM SERPL-SCNC: 143 MMOL/L
TRIGL SERPL-MCNC: 79 MG/DL

## 2019-01-22 PROCEDURE — 80061 LIPID PANEL: CPT

## 2019-01-22 PROCEDURE — 36415 COLL VENOUS BLD VENIPUNCTURE: CPT | Mod: PO

## 2019-01-22 PROCEDURE — 80053 COMPREHEN METABOLIC PANEL: CPT

## 2019-01-22 NOTE — TELEPHONE ENCOUNTER
Spoke with patient and scheduled PVI ablation for 3/6/19. She does not want to come to [facility] for TOBY. Message sent to Yanet to see if Dr Rosenthal could do it on 3/4/2019 in [place]. Will mail all information, as requested.

## 2019-01-23 ENCOUNTER — TELEPHONE (OUTPATIENT)
Dept: CARDIOLOGY | Facility: CLINIC | Age: 79
End: 2019-01-23

## 2019-01-23 NOTE — TELEPHONE ENCOUNTER
Spoke with pt with normal lab results.  Pt verbalized understanding.     ----- Message from Will Rosenthal MD sent at 1/23/2019 11:54 AM CST -----  Lab showed controlled Lipid now

## 2019-01-29 ENCOUNTER — TELEPHONE (OUTPATIENT)
Dept: CARDIOLOGY | Facility: CLINIC | Age: 79
End: 2019-01-29

## 2019-01-29 NOTE — TELEPHONE ENCOUNTER
Telephoned patient to confirm Jose Daniel on March 4 with Dr. Rosenthal for 9:30 AM  Reviewed NPO status and arrival time and will mail written copy of instructions.

## 2019-02-20 DIAGNOSIS — I48.3 TYPICAL ATRIAL FLUTTER: Primary | ICD-10-CM

## 2019-02-20 DIAGNOSIS — I48.0 PAROXYSMAL ATRIAL FIBRILLATION: ICD-10-CM

## 2019-02-20 NOTE — PROGRESS NOTES
ABLATION EDUCATION CHECKLIST      PRE-PROCEDURE TESTIN/27/19 @ 8:30 AM  Pre-Procedure labs have been ordered for you at:  [facility]   · Be sure to arrive at your scheduled time.   · YOU DO NOT HAVE TO FAST FOR THIS LAB WORK!    3/04/19@ 9:30 AM - TOBY WITH Dr Galo Rosenthal's office will send instructions  Nothing to eat or drink after midnight      DAY OF PROCEDURE:    3/06/19 @ 10 AM - ABLATION  Report to Cardiology Waiting Room on 3rd floor of the Hospital    · Do not eat or drink anything after: 12 mn on the night before your procedure  · Please do not wear makeup (especially mascara) when arriving for your procedure    Medications:   · NIGHT PRIOR TO PROCEDURE TAKE XARELTO WITH EVENING MEAL as instructed.  · You may take all other morning medications with a sip of water      Directions to the Cardiology Waiting Room  If you park in the Parking Garage:  Take elevators to the 2nd floor  Walk up ramp and turn right by [employer] Elevators  Take elevator to the 3rd floor  Upon exiting the elevator, turn away from the clinic areas  Walk long [place] around to front of hospital to area with windows overlooking [facility]  Check in at Reception Desk  OR  If family is dropping you off:  Have them drop you off at the front of the Hospital  (Near the ER, where all the flags are hung).  Take the E elevators to the 3rd floor.  Check in at the Reception Desk in the waiting room.        · You will be spending the night after your procedure.  · You will need someone to drive you home the day after your procedure.  · Your pain during your procedure will be managed by the anesthesia team.     Any need to reschedule or cancel procedures, or any questions regarding your procedures should be addressed directly with the Arrhythmia Department Nurses at the following phone number: [phone]

## 2019-02-27 ENCOUNTER — LAB VISIT (OUTPATIENT)
Dept: LAB | Facility: HOSPITAL | Age: 79
End: 2019-02-27
Attending: INTERNAL MEDICINE
Payer: MEDICARE

## 2019-02-27 DIAGNOSIS — I48.3 TYPICAL ATRIAL FLUTTER: ICD-10-CM

## 2019-02-27 DIAGNOSIS — I48.0 PAROXYSMAL ATRIAL FIBRILLATION: ICD-10-CM

## 2019-02-27 LAB
ANION GAP SERPL CALC-SCNC: 7 MMOL/L
BASOPHILS # BLD AUTO: 0.02 K/UL
BASOPHILS NFR BLD: 0.3 %
BUN SERPL-MCNC: 20 MG/DL
CALCIUM SERPL-MCNC: 10.4 MG/DL
CHLORIDE SERPL-SCNC: 105 MMOL/L
CO2 SERPL-SCNC: 28 MMOL/L
CREAT SERPL-MCNC: 0.8 MG/DL
DIFFERENTIAL METHOD: ABNORMAL
EOSINOPHIL # BLD AUTO: 0.2 K/UL
EOSINOPHIL NFR BLD: 2.3 %
ERYTHROCYTE [DISTWIDTH] IN BLOOD BY AUTOMATED COUNT: 12.5 %
EST. GFR  (AFRICAN AMERICAN): >60 ML/MIN/1.73 M^2
EST. GFR  (NON AFRICAN AMERICAN): >60 ML/MIN/1.73 M^2
GLUCOSE SERPL-MCNC: 65 MG/DL
HCT VFR BLD AUTO: 39.8 %
HGB BLD-MCNC: 12.9 G/DL
IMM GRANULOCYTES # BLD AUTO: 0.01 K/UL
IMM GRANULOCYTES NFR BLD AUTO: 0.1 %
INR PPP: 1.1
LYMPHOCYTES # BLD AUTO: 3.2 K/UL
LYMPHOCYTES NFR BLD: 46 %
MCH RBC QN AUTO: 32.9 PG
MCHC RBC AUTO-ENTMCNC: 32.4 G/DL
MCV RBC AUTO: 102 FL
MONOCYTES # BLD AUTO: 0.9 K/UL
MONOCYTES NFR BLD: 13.3 %
NEUTROPHILS # BLD AUTO: 2.6 K/UL
NEUTROPHILS NFR BLD: 38 %
NRBC BLD-RTO: 0 /100 WBC
PLATELET # BLD AUTO: 294 K/UL
PMV BLD AUTO: 11.4 FL
POTASSIUM SERPL-SCNC: 4.5 MMOL/L
PROTHROMBIN TIME: 11.5 SEC
RBC # BLD AUTO: 3.92 M/UL
SODIUM SERPL-SCNC: 140 MMOL/L
WBC # BLD AUTO: 6.93 K/UL

## 2019-02-27 PROCEDURE — 85025 COMPLETE CBC W/AUTO DIFF WBC: CPT

## 2019-02-27 PROCEDURE — 85610 PROTHROMBIN TIME: CPT

## 2019-02-27 PROCEDURE — 36415 COLL VENOUS BLD VENIPUNCTURE: CPT | Mod: PO

## 2019-02-27 PROCEDURE — 80048 BASIC METABOLIC PNL TOTAL CA: CPT

## 2019-03-01 ENCOUNTER — TELEPHONE (OUTPATIENT)
Dept: ELECTROPHYSIOLOGY | Facility: CLINIC | Age: 79
End: 2019-03-01

## 2019-03-01 NOTE — TELEPHONE ENCOUNTER
Spoke to patient    CONFIRMED procedure arrival time of 10am, 3rd floor SSCU, for PVI  Reiterated instructions including:  -Directions to check in desk  -Confirmed that she is scheduled for TOBY with Dr Rosenthal on 3/4/19 in [facility] after midnight night prior to procedure   -Confirmed compliance of Xarelto. She confirms that she takes it with her evening meal and will take it until the day of the procedure. She will also take it the evening after the TOBY.  -Do not wear mascara day of procedure   - Patient verbalizes understanding of above and appreciates call.

## 2019-03-04 ENCOUNTER — ANESTHESIA (OUTPATIENT)
Dept: CARDIOLOGY | Facility: HOSPITAL | Age: 79
End: 2019-03-04
Payer: MEDICARE

## 2019-03-04 ENCOUNTER — HOSPITAL ENCOUNTER (OUTPATIENT)
Facility: HOSPITAL | Age: 79
Discharge: HOME | End: 2019-03-04
Attending: INTERNAL MEDICINE | Admitting: INTERNAL MEDICINE
Payer: MEDICARE

## 2019-03-04 ENCOUNTER — ANESTHESIA EVENT (OUTPATIENT)
Dept: CARDIOLOGY | Facility: HOSPITAL | Age: 79
End: 2019-03-04
Payer: MEDICARE

## 2019-03-04 VITALS
BODY MASS INDEX: 32.15 KG/M2 | TEMPERATURE: 98 F | DIASTOLIC BLOOD PRESSURE: 57 MMHG | OXYGEN SATURATION: 97 % | WEIGHT: 193 LBS | SYSTOLIC BLOOD PRESSURE: 147 MMHG | HEIGHT: 65 IN | HEART RATE: 66 BPM | RESPIRATION RATE: 18 BRPM

## 2019-03-04 DIAGNOSIS — I48.3 TYPICAL ATRIAL FLUTTER: ICD-10-CM

## 2019-03-04 DIAGNOSIS — I48.91 A-FIB: ICD-10-CM

## 2019-03-04 DIAGNOSIS — I48.0 PAROXYSMAL ATRIAL FIBRILLATION: Primary | ICD-10-CM

## 2019-03-04 DIAGNOSIS — I48.91 ATRIAL FIBRILLATION: ICD-10-CM

## 2019-03-04 LAB
AORTIC VALVE REGURGITATION: NORMAL
DIASTOLIC DYSFUNCTION: NO
MITRAL VALVE REGURGITATION: NORMAL
RETIRED EF AND QEF - SEE NOTES: 60 (ref 55–65)

## 2019-03-04 PROCEDURE — 93312 TRANSESOPHAGEAL ECHO: ICD-10-PCS | Mod: 26,,, | Performed by: INTERNAL MEDICINE

## 2019-03-04 PROCEDURE — 63600175 PHARM REV CODE 636 W HCPCS

## 2019-03-04 PROCEDURE — 37000008 HC ANESTHESIA 1ST 15 MINUTES: Performed by: INTERNAL MEDICINE

## 2019-03-04 PROCEDURE — 93320 DOPPLER ECHO COMPLETE: CPT | Performed by: INTERNAL MEDICINE

## 2019-03-04 PROCEDURE — 63600175 PHARM REV CODE 636 W HCPCS: Performed by: NURSE ANESTHETIST, CERTIFIED REGISTERED

## 2019-03-04 PROCEDURE — 93325 TRANSESOPHAGEAL ECHO: ICD-10-PCS | Mod: 26,,, | Performed by: INTERNAL MEDICINE

## 2019-03-04 PROCEDURE — 93320 DOPPLER ECHO COMPLETE: CPT | Mod: 26,,, | Performed by: INTERNAL MEDICINE

## 2019-03-04 PROCEDURE — 93325 DOPPLER ECHO COLOR FLOW MAPG: CPT | Performed by: INTERNAL MEDICINE

## 2019-03-04 PROCEDURE — 93325 DOPPLER ECHO COLOR FLOW MAPG: CPT | Mod: 26,,, | Performed by: INTERNAL MEDICINE

## 2019-03-04 PROCEDURE — 93312 ECHO TRANSESOPHAGEAL: CPT | Mod: 26,,, | Performed by: INTERNAL MEDICINE

## 2019-03-04 PROCEDURE — 93320 TRANSESOPHAGEAL ECHO: ICD-10-PCS | Mod: 26,,, | Performed by: INTERNAL MEDICINE

## 2019-03-04 PROCEDURE — 93312 ECHO TRANSESOPHAGEAL: CPT | Performed by: INTERNAL MEDICINE

## 2019-03-04 PROCEDURE — 25000003 PHARM REV CODE 250: Performed by: NURSE ANESTHETIST, CERTIFIED REGISTERED

## 2019-03-04 PROCEDURE — 25000003 PHARM REV CODE 250

## 2019-03-04 RX ORDER — SODIUM CHLORIDE 0.9 % (FLUSH) 0.9 %
5 SYRINGE (ML) INJECTION
Status: DISCONTINUED | OUTPATIENT
Start: 2019-03-04 | End: 2019-03-04 | Stop reason: HOSPADM

## 2019-03-04 RX ORDER — SODIUM CHLORIDE 9 MG/ML
INJECTION, SOLUTION INTRAVENOUS ONCE
Status: DISCONTINUED | OUTPATIENT
Start: 2019-03-04 | End: 2019-03-04 | Stop reason: HOSPADM

## 2019-03-04 RX ORDER — PROPOFOL 10 MG/ML
VIAL (ML) INTRAVENOUS
Status: DISCONTINUED | OUTPATIENT
Start: 2019-03-04 | End: 2019-03-04

## 2019-03-04 RX ORDER — SODIUM CHLORIDE 9 MG/ML
INJECTION, SOLUTION INTRAVENOUS CONTINUOUS PRN
Status: DISCONTINUED | OUTPATIENT
Start: 2019-03-04 | End: 2019-03-04

## 2019-03-04 RX ADMIN — SODIUM CHLORIDE: 900 INJECTION, SOLUTION INTRAVENOUS at 09:03

## 2019-03-04 RX ADMIN — PROPOFOL 130 MG: 10 INJECTION, EMULSION INTRAVENOUS at 09:03

## 2019-03-04 NOTE — ANESTHESIA PREPROCEDURE EVALUATION
03/04/2019  Maria Del Carmen Spence is a 78 y.o., female.    Pre-op Assessment    I have reviewed the Patient Summary Reports.     I have reviewed the Nursing Notes.   I have reviewed the Medications.     Review of Systems  Anesthesia Hx:  No problems with previous Anesthesia    Cardiovascular:   Hypertension Dysrhythmias atrial fibrillation EF nl   Pulmonary:   Shortness of breath (DURÁN)    Hepatic/GI:   GERD    Neurological:  Neurology Normal        Physical Exam  General:  Well nourished    Airway/Jaw/Neck:  Airway Findings: Mouth Opening: Normal Tongue: Normal  Mallampati: III  Improves to II with phonation.  TM Distance: 4 - 6 cm       Chest/Lungs:  Chest/Lungs Findings: Clear to auscultation, Normal Respiratory Rate     Heart/Vascular:  Heart Findings: Rate: Normal  Rhythm: Irregularly Irregular  Sounds: Normal        Mental Status:  Mental Status Findings:  Cooperative         Anesthesia Plan  Type of Anesthesia, risks & benefits discussed:  Anesthesia Type:  MAC  Patient's Preference:   Intra-op Monitoring Plan: standard ASA monitors  Intra-op Monitoring Plan Comments:   Post Op Pain Control Plan:   Post Op Pain Control Plan Comments:   Induction:   IV  Beta Blocker:  Patient is on a Beta-Blocker and has received one dose within the past 24 hours (No further documentation required).       Informed Consent: Patient understands risks and agrees with Anesthesia plan.  Questions answered. Anesthesia consent signed with patient.  ASA Score: 3     Day of Surgery Review of History & Physical: I have interviewed and examined the patient. I have reviewed the patient's H&P dated:              Vitals:    03/04/19 0806   BP: (!) 147/57   Pulse: 66   Resp: 18   Temp: 36.6 °C (97.8 °F)

## 2019-03-04 NOTE — ANESTHESIA POSTPROCEDURE EVALUATION
"Anesthesia Post Evaluation    Patient: Maria Del Carmen Spence    Procedure(s) Performed: Procedure(s) (LRB):  ECHOCARDIOGRAM,TRANSESOPHAGEAL (N/A)    Final Anesthesia Type: MAC  Patient location: [facility].  Patient participation: Yes- Able to Participate  Level of consciousness: awake and alert  Post-procedure vital signs: reviewed and stable  Pain management: adequate  Airway patency: patent  PONV status at discharge: No PONV  Anesthetic complications: no      Cardiovascular status: blood pressure returned to baseline  Respiratory status: unassisted  Hydration status: euvolemic  Follow-up not needed.        Visit Vitals  BP (!) 147/57 (BP Location: Left arm, Patient Position: Lying)   Pulse 66   Temp 36.6 °C (97.8 °F) (Oral)   Resp 18   Ht 5' 5" (1.651 m)   Wt 87.5 kg (193 lb)   SpO2 97%   Breastfeeding? No   BMI 32.12 kg/m²       Pain/Karie Score: No Data Recorded      "

## 2019-03-04 NOTE — ASSESSMENT & PLAN NOTE
TOBY today as scheduled  I have explained the risks, benefits, and alternatives of the procedure in detail with patient and family. The patient voices understanding and all questions have been addressed.  The patient agrees to proceed as planned.

## 2019-03-04 NOTE — TRANSFER OF CARE
"Anesthesia Transfer of Care Note    Patient: Maria Del Carmen Spence    Procedure(s) Performed: Procedure(s) (LRB):  ECHOCARDIOGRAM,TRANSESOPHAGEAL (N/A)    Patient location: [facility].    Anesthesia Type: MAC    Transport from OR: Transported from OR on room air with adequate spontaneous ventilation    Post pain: adequate analgesia    Post assessment: no apparent anesthetic complications    Post vital signs: stable    Level of consciousness: responds to stimulation    Nausea/Vomiting: no nausea/vomiting    Complications: none    Transfer of care protocol was followed      Last vitals:   Visit Vitals  BP (!) 147/57 (BP Location: Left arm, Patient Position: Lying)   Pulse 66   Temp 36.6 °C (97.8 °F) (Oral)   Resp 18   Ht 5' 5" (1.651 m)   Wt 87.5 kg (193 lb)   SpO2 97%   Breastfeeding? No   BMI 32.12 kg/m²     "

## 2019-03-04 NOTE — HPI
72 yo female, PMH PAF, glaucoma, s/p knee surgery. Can not climb the stairs due to knee pain.   Dx of PAF in  and Eliquis and Metoprolol were added.  Carotid US normal and JAMIA left 0.98 and right 0.96 in  by Life line screening.  07/20/17 visit, had PAF. Added Amiodarone and continue BB and xeralto. She could not tolerate Eliquis due to dizziness. Switched to Xeralto. Had discussion of the Afib strategy. Pt preferred to take medication for rhythm control and was not ready for ablation.  05/18 visit, EKG showed afib (EKG is not available now due to temporary EPIC shutdown) and subsequent holter showed persisitent aflutter. Pt states that DURÁN worse with shorttening distance walking.   ECHO in  normal BIV function. No LAE  Pt had DCCV on 07/09/2018 07/22, admitted for junctional bradycardia with fatigue and dizziness. HR was 40's. renal function stable. Held Amiodarone and BB. Back to afib with VR at 80 to 90. Resumed Metoprolol.    Recent evaluation at EP and planned to have Ablation done on 03/06.  Today, TOBY to r/o NAOMIE thrombus

## 2019-03-04 NOTE — ANESTHESIA RELEASE NOTE
"Anesthesia Release from PACU Note    Patient: Maria Del Carmen Spence    Procedure(s) Performed: Procedure(s) (LRB):  ECHOCARDIOGRAM,TRANSESOPHAGEAL (N/A)    Anesthesia type: MAC    Post pain: Adequate analgesia    Post assessment: no apparent anesthetic complications    Last Vitals:   Visit Vitals  BP (!) 147/57 (BP Location: Left arm, Patient Position: Lying)   Pulse 66   Temp 36.6 °C (97.8 °F) (Oral)   Resp 18   Ht 5' 5" (1.651 m)   Wt 87.5 kg (193 lb)   SpO2 97%   Breastfeeding? No   BMI 32.12 kg/m²       Post vital signs: stable    Level of consciousness: awake and alert     Nausea/Vomiting: no nausea/no vomiting    Complications: none    Airway Patency: patent    Respiratory: unassisted    Cardiovascular: stable and blood pressure at baseline    Hydration: euvolemic  "

## 2019-03-04 NOTE — BRIEF OP NOTE
[facility]  Brief Operative Note     SUMMARY     Surgery Date: 3/4/2019     Surgeon(s) and Role:     * Will Rosenthal MD - Primary    Assisting Surgeon: None    Pre-op Diagnosis:  Typical atrial flutter [I48.3]    Post-op Diagnosis:  Post-Op Diagnosis Codes:     * Typical atrial flutter [I48.3]    Procedure(s) (LRB):  ECHOCARDIOGRAM,TRANSESOPHAGEAL (N/A)    Anesthesia: Monitor Anesthesia Care      Procedure Description: The risks, benefits, and alternatives of the procedure expalined in detail with patient and family. The patient voices understanding and all questions have been addressed. The patient agrees to proceed as planned.   The patient was sedated by anesthesiology service. The probe was advanced via throat into esophagus smoothly. The patient tolerated the procedure well and there was no complications. The probed was withdrawal at the end of study. The patient was hemodynamically stable.   Estimated Blood Loss: none.   Findings / Operative Note:   No NAOMIE and LA thrombus.  Normal EF.Mild MR and trivial AI. Biatrial enlargement,    Dr. Morillo was notified.     Ok to discharge to home once hemodynamically stable.  F/U with me in one week at cardiology clinic.  DIET DASH  ACTIVITY as tolerated      Estimated Blood Loss: * No values recorded between 3/4/2019 12:00 AM and 3/4/2019  9:21 AM *         Specimens:   Specimen (12h ago, onward)    None          Discharge Note    SUMMARY     Admit Date: 3/4/2019    Discharge Date and Time:  03/04/2019 9:23 AM        Final Diagnosis: Post-Op Diagnosis Codes:     * Typical atrial flutter [I48.3]    Disposition: Home or Self Care    Follow Up/Patient Instructions:     Medications:  Reconciled Home Medications:      Medication List      CONTINUE taking these medications    acetaminophen 500 MG tablet  Commonly known as:  TYLENOL  Take 500 mg by mouth every 6 (six) hours as needed for Pain.     atorvastatin 10 MG tablet  Commonly known as:  LIPITOR  Take 1  tablet (10 mg total) by mouth every evening.     CENTRUM SILVER ORAL  Take by mouth.     CITRACAL + D ORAL  Take 1 tablet by mouth once daily at 6am.     dorzolamide 2 % ophthalmic solution  Commonly known as:  TRUSOPT  Place 1 drop into both eyes 2 (two) times daily.     famotidine 20 MG tablet  Commonly known as:  PEPCID  TAKE ONE TABLET BY MOUTH EVERY DAY     fish oil-omega-3 fatty acids 300-1,000 mg capsule  Take 1 capsule by mouth once daily.     gabapentin 100 MG capsule  Commonly known as:  NEURONTIN  TAKE ONE CAPSULE BY MOUTH THREE TIMES DAILY     glucosamine-chondroitin 500-400 mg tablet  Take 2 tablets by mouth once daily at 6am.     latanoprost 0.005 % ophthalmic solution  instill ONE DROP BOTH EYES AT BEDTIME     metoprolol tartrate 25 MG tablet  Commonly known as:  LOPRESSOR  Take 1 tablet (25 mg total) by mouth 3 (three) times daily.     XARELTO 20 mg Tab  Generic drug:  rivaroxaban  TAKE ONE TABLET BY MOUTH EVERY DAY WITH DINNER OR IN THE EVENING MEAL     ZYRTEC ORAL  Take by mouth.          No discharge procedures on file.

## 2019-03-04 NOTE — SUBJECTIVE & OBJECTIVE
Past Medical History:   Diagnosis Date    A-fib     Arthritis     Chronic LBP     ESIs x 3 with Dr. Hicks, PT at [facility], chiropractor    Eye pain     Frequent headaches     GERD (gastroesophageal reflux disease)     Glaucoma     Hyperlipemia     Hypertension        Past Surgical History:   Procedure Laterality Date    CARDIOVERSION N/A 7/9/2018    Performed by [facility] at [facility] CATH LAB    CATARACT EXTRACTION W/  INTRAOCULAR LENS IMPLANT  OU    ECHOCARDIOGRAM,TRANSESOPHAGEAL N/A 7/9/2018    Performed by Will Rosenthal MD at [facility] CATH LAB    TUBAL LIGATION         Review of patient's allergies indicates:   Allergen Reactions    Eliquis [apixaban] Other (See Comments)     Headache, numbness in lip        No current facility-administered medications on file prior to encounter.      Current Outpatient Medications on File Prior to Encounter   Medication Sig    acetaminophen (TYLENOL) 500 MG tablet Take 500 mg by mouth every 6 (six) hours as needed for Pain.    atorvastatin (LIPITOR) 10 MG tablet Take 1 tablet (10 mg total) by mouth every evening.    CALCIUM PHOSPHATE TRIB/VIT D3 (CITRACAL + D ORAL) Take 1 tablet by mouth once daily at 6am.     CETIRIZINE HCL (ZYRTEC ORAL) Take by mouth.    dorzolamide (TRUSOPT) 2 % ophthalmic solution Place 1 drop into both eyes 2 (two) times daily.    famotidine (PEPCID) 20 MG tablet TAKE ONE TABLET BY MOUTH EVERY DAY    fish oil-omega-3 fatty acids 300-1,000 mg capsule Take 1 capsule by mouth once daily.    gabapentin (NEURONTIN) 100 MG capsule TAKE ONE CAPSULE BY MOUTH THREE TIMES DAILY    glucosamine-chondroitin 500-400 mg tablet Take 2 tablets by mouth once daily at 6am.     latanoprost 0.005 % ophthalmic solution instill ONE DROP BOTH EYES AT BEDTIME    metoprolol tartrate (LOPRESSOR) 25 MG tablet Take 1 tablet (25 mg total) by mouth 3 (three) times daily.    MULTIVITAMIN W-MINERALS/LUTEIN (CENTRUM SILVER ORAL) Take by mouth.    XARELTO 20 mg Tab TAKE ONE  TABLET BY MOUTH EVERY DAY WITH DINNER OR IN THE EVENING MEAL     Family History     Problem Relation (Age of Onset)    Cancer Mother    Cataracts Mother    Diabetes Paternal Aunt    Glaucoma Mother    Hypertension Mother, Father        Tobacco Use    Smoking status: Never Smoker    Smokeless tobacco: Never Used   Substance and Sexual Activity    Alcohol use: No    Drug use: No    Sexual activity: Yes     Partners: Male     Review of Systems   Constitution: Negative for decreased appetite, diaphoresis, fever, weakness, malaise/fatigue and night sweats.   HENT: Negative for nosebleeds.    Eyes: Negative for blurred vision and double vision.   Cardiovascular: Negative for chest pain, claudication, dyspnea on exertion, irregular heartbeat, leg swelling, near-syncope, orthopnea, palpitations, paroxysmal nocturnal dyspnea and syncope.   Respiratory: Negative for cough, shortness of breath, sleep disturbances due to breathing, snoring, sputum production and wheezing.    Endocrine: Negative for cold intolerance and polyuria.   Hematologic/Lymphatic: Does not bruise/bleed easily.   Skin: Negative for rash.   Musculoskeletal: Negative for back pain, falls, joint pain, joint swelling and neck pain.   Gastrointestinal: Negative for abdominal pain, heartburn, nausea and vomiting.   Genitourinary: Negative for dysuria, frequency and hematuria.   Neurological: Negative for difficulty with concentration, dizziness, focal weakness, headaches, light-headedness, numbness and seizures.   Psychiatric/Behavioral: Negative for depression, memory loss and substance abuse. The patient does not have insomnia.    Allergic/Immunologic: Negative for HIV exposure and hives.     Objective:     Vital Signs (Most Recent):  Temp: 97.8 °F (36.6 °C) (03/04/19 0806)  Pulse: 66 (03/04/19 0806)  Resp: 18 (03/04/19 0806)  BP: (!) 147/57 (03/04/19 0806)  SpO2: 97 % (03/04/19 0806) Vital Signs (24h Range):  Temp:  [97.8 °F (36.6 °C)] 97.8 °F (36.6  °C)  Pulse:  [66] 66  Resp:  [18] 18  SpO2:  [97 %] 97 %  BP: (147)/(57) 147/57     Weight: 87.5 kg (193 lb)  Body mass index is 32.12 kg/m².    SpO2: 97 %       No intake or output data in the 24 hours ending 03/04/19 0903    Lines/Drains/Airways     Peripheral Intravenous Line                 Peripheral IV - Single Lumen 03/04/19 0812 Right Antecubital less than 1 day                Physical Exam   Constitutional: She is oriented to person, place, and time. She appears well-nourished.   HENT:   Head: Normocephalic.   Eyes: Pupils are equal, round, and reactive to light.   Neck: Normal carotid pulses and no JVD present. Carotid bruit is not present. No thyromegaly present.   Cardiovascular: Normal rate, regular rhythm, normal heart sounds and normal pulses.  No extrasystoles are present. PMI is not displaced. Exam reveals no gallop and no S3.   No murmur heard.  Pulmonary/Chest: Breath sounds normal. No stridor. No respiratory distress.   Abdominal: Soft. Bowel sounds are normal. There is no tenderness. There is no rebound.   Musculoskeletal: Normal range of motion.   Neurological: She is alert and oriented to person, place, and time.   Skin: Skin is intact. No rash noted.   Psychiatric: Her behavior is normal.       Significant Labs: ABG: No results for input(s): PH, PCO2, HCO3, POCSATURATED, BE in the last 48 hours., Blood Culture: No results for input(s): LABBLOO in the last 48 hours., BMP: No results for input(s): GLU, NA, K, CL, CO2, BUN, CREATININE, CALCIUM, MG in the last 48 hours., CMP No results for input(s): NA, K, CL, CO2, GLU, BUN, CREATININE, CALCIUM, PROT, ALBUMIN, BILITOT, ALKPHOS, AST, ALT, ANIONGAP, ESTGFRAFRICA, EGFRNONAA in the last 48 hours., CBC No results for input(s): WBC, HGB, HCT, PLT in the last 48 hours., INR No results for input(s): INR, PROTIME in the last 48 hours., Lipid Panel No results for input(s): CHOL, HDL, LDLCALC, TRIG, CHOLHDL in the last 48 hours. and Troponin No results for  input(s): TROPONINI in the last 48 hours.    Significant Imaging: EKG:

## 2019-03-04 NOTE — H&P
[facility]  Cardiology  History and Physical     Patient Name: Maria Del Carmen Spence  MRN: [id number]  Admission Date: 3/4/2019  Code Status: Full Code   Attending Provider: Will Rosenthal MD   Primary Care Physician: Elda Powers MD  Principal Problem:<principal problem not specified>    Patient information was obtained from patient and ER records.     Subjective:       HPI:    74 yo female, PMH PAF, glaucoma, s/p knee surgery. Can not climb the stairs due to knee pain.   Dx of PAF in  and Eliquis and Metoprolol were added.  Carotid US normal and JAMIA left 0.98 and right 0.96 in  by Life line screening.  07/20/17 visit, had PAF. Added Amiodarone and continue BB and xeralto. She could not tolerate Eliquis due to dizziness. Switched to Xeralto. Had discussion of the Afib strategy. Pt preferred to take medication for rhythm control and was not ready for ablation.  05/18 visit, EKG showed afib (EKG is not available now due to temporary [facility] shutdown) and subsequent holter showed persisitent aflutter. Pt states that DURÁN worse with shorttening distance walking.   ECHO in  normal BIV function. No LAE  Pt had DCCV on 07/09/2018 07/22, admitted for junctional bradycardia with fatigue and dizziness. HR was 40's. renal function stable. Held Amiodarone and BB. Back to afib with VR at 80 to 90. Resumed Metoprolol.    Recent evaluation at EP and planned to have Ablation done on 03/06.  Today, TOBY to r/o NAOMIE thrombus    Past Medical History:   Diagnosis Date    A-fib     Arthritis     Chronic LBP     ESIs x 3 with Dr. Hicks, PT at [facility], chiropractor    Eye pain     Frequent headaches     GERD (gastroesophageal reflux disease)     Glaucoma     Hyperlipemia     Hypertension        Past Surgical History:   Procedure Laterality Date    CARDIOVERSION N/A 7/9/2018    [facility] LAB    CATARACT EXTRACTION W/  INTRAOCULAR LENS IMPLANT  OU     ECHOCARDIOGRAM,TRANSESOPHAGEAL N/A 7/9/2018    [facility] CATH [facility]    TUBAL LIGATION         Review of patient's allergies indicates:   Allergen Reactions    Eliquis [apixaban] Other (See Comments)     Headache, numbness in lip        No current facility-administered medications on file prior to encounter.      Current Outpatient Medications on File Prior to Encounter   Medication Sig    acetaminophen (TYLENOL) 500 MG tablet Take 500 mg by mouth every 6 (six) hours as needed for Pain.    atorvastatin (LIPITOR) 10 MG tablet Take 1 tablet (10 mg total) by mouth every evening.    CALCIUM PHOSPHATE TRIB/VIT D3 (CITRACAL + D ORAL) Take 1 tablet by mouth once daily at 6am.     CETIRIZINE HCL (ZYRTEC ORAL) Take by mouth.    dorzolamide (TRUSOPT) 2 % ophthalmic solution Place 1 drop into both eyes 2 (two) times daily.    famotidine (PEPCID) 20 MG tablet TAKE ONE TABLET BY MOUTH EVERY DAY    fish oil-omega-3 fatty acids 300-1,000 mg capsule Take 1 capsule by mouth once daily.    gabapentin (NEURONTIN) 100 MG capsule TAKE ONE CAPSULE BY MOUTH THREE TIMES DAILY    glucosamine-chondroitin 500-400 mg tablet Take 2 tablets by mouth once daily at 6am.     latanoprost 0.005 % ophthalmic solution instill ONE DROP BOTH EYES AT BEDTIME    metoprolol tartrate (LOPRESSOR) 25 MG tablet Take 1 tablet (25 mg total) by mouth 3 (three) times daily.    MULTIVITAMIN W-MINERALS/LUTEIN (CENTRUM SILVER ORAL) Take by mouth.    XARELTO 20 mg Tab TAKE ONE TABLET BY MOUTH EVERY DAY WITH DINNER OR IN THE EVENING MEAL     Family History     Problem Relation (Age of Onset)    Cancer Mother    Cataracts Mother    Diabetes Paternal Aunt    Glaucoma Mother    Hypertension Mother, Father        Tobacco Use    Smoking status: Never Smoker    Smokeless tobacco: Never Used   Substance and Sexual Activity    Alcohol use: No    Drug use: No    Sexual activity: Yes     Partners: Male     Review of Systems    Constitution: Negative for decreased appetite, diaphoresis, fever, weakness, malaise/fatigue and night sweats.   HENT: Negative for nosebleeds.    Eyes: Negative for blurred vision and double vision.   Cardiovascular: Negative for chest pain, claudication, dyspnea on exertion, irregular heartbeat, leg swelling, near-syncope, orthopnea, palpitations, paroxysmal nocturnal dyspnea and syncope.   Respiratory: Negative for cough, shortness of breath, sleep disturbances due to breathing, snoring, sputum production and wheezing.    Endocrine: Negative for cold intolerance and polyuria.   Hematologic/Lymphatic: Does not bruise/bleed easily.   Skin: Negative for rash.   Musculoskeletal: Negative for back pain, falls, joint pain, joint swelling and neck pain.   Gastrointestinal: Negative for abdominal pain, heartburn, nausea and vomiting.   Genitourinary: Negative for dysuria, frequency and hematuria.   Neurological: Negative for difficulty with concentration, dizziness, focal weakness, headaches, light-headedness, numbness and seizures.   Psychiatric/Behavioral: Negative for depression, memory loss and substance abuse. The patient does not have insomnia.    Allergic/Immunologic: Negative for HIV exposure and hives.     Objective:     Vital Signs (Most Recent):  Temp: 97.8 °F (36.6 °C) (03/04/19 0806)  Pulse: 66 (03/04/19 0806)  Resp: 18 (03/04/19 0806)  BP: (!) 147/57 (03/04/19 0806)  SpO2: 97 % (03/04/19 0806) Vital Signs (24h Range):  Temp:  [97.8 °F (36.6 °C)] 97.8 °F (36.6 °C)  Pulse:  [66] 66  Resp:  [18] 18  SpO2:  [97 %] 97 %  BP: (147)/(57) 147/57     Weight: 87.5 kg (193 lb)  Body mass index is 32.12 kg/m².    SpO2: 97 %       No intake or output data in the 24 hours ending 03/04/19 0903    Lines/Drains/Airways     Peripheral Intravenous Line                 Peripheral IV - Single Lumen 03/04/19 0812 Right Antecubital less than 1 day                Physical Exam   Constitutional: She is oriented to person, place,  and time. She appears well-nourished.   HENT:   Head: Normocephalic.   Eyes: Pupils are equal, round, and reactive to light.   Neck: Normal carotid pulses and no JVD present. Carotid bruit is not present. No thyromegaly present.   Cardiovascular: Normal rate, regular rhythm, normal heart sounds and normal pulses.  No extrasystoles are present. PMI is not displaced. Exam reveals no gallop and no S3.   No murmur heard.  Pulmonary/Chest: Breath sounds normal. No stridor. No respiratory distress.   Abdominal: Soft. Bowel sounds are normal. There is no tenderness. There is no rebound.   Musculoskeletal: Normal range of motion.   Neurological: She is alert and oriented to person, place, and time.   Skin: Skin is intact. No rash noted.   Psychiatric: Her behavior is normal.       Significant Labs: ABG: No results for input(s): PH, PCO2, HCO3, POCSATURATED, BE in the last 48 hours., Blood Culture: No results for input(s): LABBLOO in the last 48 hours., BMP: No results for input(s): GLU, NA, K, CL, CO2, BUN, CREATININE, CALCIUM, MG in the last 48 hours., CMP No results for input(s): NA, K, CL, CO2, GLU, BUN, CREATININE, CALCIUM, PROT, ALBUMIN, BILITOT, ALKPHOS, AST, ALT, ANIONGAP, ESTGFRAFRICA, EGFRNONAA in the last 48 hours., CBC No results for input(s): WBC, HGB, HCT, PLT in the last 48 hours., INR No results for input(s): INR, PROTIME in the last 48 hours., Lipid Panel No results for input(s): CHOL, HDL, LDLCALC, TRIG, CHOLHDL in the last 48 hours. and Troponin No results for input(s): TROPONINI in the last 48 hours.    Significant Imaging: EKG:      Assessment and Plan:     A-fib    TOBY today as scheduled  I have explained the risks, benefits, and alternatives of the procedure in detail with patient and family. The patient voices understanding and all questions have been addressed.  The patient agrees to proceed as planned.         VTE Risk Mitigation (From admission, onward)    None          Will Rosenthal MD  Cardiology    [facility]

## 2019-03-05 DIAGNOSIS — G89.29 CHRONIC BILATERAL LOW BACK PAIN WITH RIGHT-SIDED SCIATICA: ICD-10-CM

## 2019-03-05 DIAGNOSIS — M54.41 CHRONIC BILATERAL LOW BACK PAIN WITH RIGHT-SIDED SCIATICA: ICD-10-CM

## 2019-03-05 RX ORDER — GABAPENTIN 100 MG/1
CAPSULE ORAL
Qty: 90 CAPSULE | Refills: 5 | Status: SHIPPED | OUTPATIENT
Start: 2019-03-05 | End: 2019-03-25 | Stop reason: SDUPTHER

## 2019-03-06 ENCOUNTER — ANESTHESIA EVENT (OUTPATIENT)
Dept: MEDSURG UNIT | Facility: HOSPITAL | Age: 79
End: 2019-03-06
Payer: MEDICARE

## 2019-03-06 ENCOUNTER — ANESTHESIA (OUTPATIENT)
Dept: MEDSURG UNIT | Facility: HOSPITAL | Age: 79
End: 2019-03-06
Payer: MEDICARE

## 2019-03-06 ENCOUNTER — HOSPITAL ENCOUNTER (OUTPATIENT)
Facility: HOSPITAL | Age: 79
Discharge: HOME | End: 2019-03-07
Attending: INTERNAL MEDICINE | Admitting: INTERNAL MEDICINE
Payer: MEDICARE

## 2019-03-06 DIAGNOSIS — I48.3 TYPICAL ATRIAL FLUTTER: ICD-10-CM

## 2019-03-06 DIAGNOSIS — I48.0 PAROXYSMAL ATRIAL FIBRILLATION: ICD-10-CM

## 2019-03-06 DIAGNOSIS — I48.0 PAF (PAROXYSMAL ATRIAL FIBRILLATION): Primary | ICD-10-CM

## 2019-03-06 LAB
ABO + RH BLD: NORMAL
BLD GP AB SCN CELLS X3 SERPL QL: NORMAL

## 2019-03-06 PROCEDURE — 93655 ICAR CATH ABLTJ DSCRT ARRHYT: CPT | Mod: ,,, | Performed by: INTERNAL MEDICINE

## 2019-03-06 PROCEDURE — 37000008 HC ANESTHESIA 1ST 15 MINUTES: Performed by: INTERNAL MEDICINE

## 2019-03-06 PROCEDURE — D9220A PRA ANESTHESIA: ICD-10-PCS | Mod: CRNA,,, | Performed by: NURSE ANESTHETIST, CERTIFIED REGISTERED

## 2019-03-06 PROCEDURE — C1766 INTRO/SHEATH,STRBLE,NON-PEEL: HCPCS | Performed by: INTERNAL MEDICINE

## 2019-03-06 PROCEDURE — 93662 PR INTRACARD ECHO, THER/DX INTERVENT: ICD-10-PCS | Mod: 26,,, | Performed by: INTERNAL MEDICINE

## 2019-03-06 PROCEDURE — 93656 COMPRE EP EVAL ABLTJ ATR FIB: CPT | Mod: ,,, | Performed by: INTERNAL MEDICINE

## 2019-03-06 PROCEDURE — D9220A PRA ANESTHESIA: Mod: CRNA,,, | Performed by: NURSE ANESTHETIST, CERTIFIED REGISTERED

## 2019-03-06 PROCEDURE — 93005 ELECTROCARDIOGRAM TRACING: CPT | Mod: 59

## 2019-03-06 PROCEDURE — D9220A PRA ANESTHESIA: ICD-10-PCS | Mod: ANES,,, | Performed by: ANESTHESIOLOGY

## 2019-03-06 PROCEDURE — 93655 ICAR CATH ABLTJ DSCRT ARRHYT: CPT

## 2019-03-06 PROCEDURE — 27000221 HC OXYGEN, UP TO 24 HOURS

## 2019-03-06 PROCEDURE — 93010 EKG 12-LEAD: ICD-10-PCS | Mod: 76,,, | Performed by: INTERNAL MEDICINE

## 2019-03-06 PROCEDURE — C1732 CATH, EP, DIAG/ABL, 3D/VECT: HCPCS | Performed by: INTERNAL MEDICINE

## 2019-03-06 PROCEDURE — 36620 PR INSERT CATH,ART,PERCUT,SHORTTERM: ICD-10-PCS | Mod: 59,,, | Performed by: ANESTHESIOLOGY

## 2019-03-06 PROCEDURE — C1730 CATH, EP, 19 OR FEW ELECT: HCPCS | Performed by: INTERNAL MEDICINE

## 2019-03-06 PROCEDURE — 25000003 PHARM REV CODE 250: Performed by: INTERNAL MEDICINE

## 2019-03-06 PROCEDURE — 27201423 OPTIME MED/SURG SUP & DEVICES STERILE SUPPLY: Performed by: INTERNAL MEDICINE

## 2019-03-06 PROCEDURE — 93656 PR ELECTROPHYS EVAL, COMPREHEN, W/ABLATION OF ATRIAL FIBR: ICD-10-PCS | Mod: ,,, | Performed by: INTERNAL MEDICINE

## 2019-03-06 PROCEDURE — C1753 CATH, INTRAVAS ULTRASOUND: HCPCS | Performed by: INTERNAL MEDICINE

## 2019-03-06 PROCEDURE — 86850 RBC ANTIBODY SCREEN: CPT

## 2019-03-06 PROCEDURE — 36620 INSERTION CATHETER ARTERY: CPT | Mod: 59,,, | Performed by: ANESTHESIOLOGY

## 2019-03-06 PROCEDURE — 25000003 PHARM REV CODE 250: Performed by: NURSE ANESTHETIST, CERTIFIED REGISTERED

## 2019-03-06 PROCEDURE — 93662 INTRACARDIAC ECG (ICE): CPT | Mod: 26,,, | Performed by: INTERNAL MEDICINE

## 2019-03-06 PROCEDURE — 93613 PR INTRACARD ELECTROPHYS 3-DIMENS MAPPING: ICD-10-PCS | Mod: ,,, | Performed by: INTERNAL MEDICINE

## 2019-03-06 PROCEDURE — C1894 INTRO/SHEATH, NON-LASER: HCPCS | Performed by: INTERNAL MEDICINE

## 2019-03-06 PROCEDURE — 63600175 PHARM REV CODE 636 W HCPCS: Performed by: NURSE ANESTHETIST, CERTIFIED REGISTERED

## 2019-03-06 PROCEDURE — D9220A PRA ANESTHESIA: Mod: ANES,,, | Performed by: ANESTHESIOLOGY

## 2019-03-06 PROCEDURE — 93613 INTRACARDIAC EPHYS 3D MAPG: CPT | Performed by: INTERNAL MEDICINE

## 2019-03-06 PROCEDURE — 93010 ELECTROCARDIOGRAM REPORT: CPT | Mod: 76,,, | Performed by: INTERNAL MEDICINE

## 2019-03-06 PROCEDURE — 93655 PR ABLATE ARRHYTHMIA ADD ON: ICD-10-PCS | Mod: ,,, | Performed by: INTERNAL MEDICINE

## 2019-03-06 PROCEDURE — 93656 COMPRE EP EVAL ABLTJ ATR FIB: CPT | Performed by: INTERNAL MEDICINE

## 2019-03-06 PROCEDURE — 94761 N-INVAS EAR/PLS OXIMETRY MLT: CPT

## 2019-03-06 PROCEDURE — C1751 CATH, INF, PER/CENT/MIDLINE: HCPCS | Performed by: NURSE ANESTHETIST, CERTIFIED REGISTERED

## 2019-03-06 PROCEDURE — 93005 ELECTROCARDIOGRAM TRACING: CPT

## 2019-03-06 PROCEDURE — 93662 INTRACARDIAC ECG (ICE): CPT | Performed by: INTERNAL MEDICINE

## 2019-03-06 PROCEDURE — 93010 ELECTROCARDIOGRAM REPORT: CPT | Mod: ,,, | Performed by: INTERNAL MEDICINE

## 2019-03-06 PROCEDURE — 25500020 PHARM REV CODE 255: Performed by: INTERNAL MEDICINE

## 2019-03-06 PROCEDURE — 37000009 HC ANESTHESIA EA ADD 15 MINS: Performed by: INTERNAL MEDICINE

## 2019-03-06 PROCEDURE — 93613 INTRACARDIAC EPHYS 3D MAPG: CPT | Mod: ,,, | Performed by: INTERNAL MEDICINE

## 2019-03-06 RX ORDER — ROCURONIUM BROMIDE 10 MG/ML
INJECTION, SOLUTION INTRAVENOUS
Status: DISCONTINUED | OUTPATIENT
Start: 2019-03-06 | End: 2019-03-06

## 2019-03-06 RX ORDER — SODIUM CHLORIDE 9 MG/ML
INJECTION, SOLUTION INTRAVENOUS CONTINUOUS PRN
Status: DISCONTINUED | OUTPATIENT
Start: 2019-03-06 | End: 2019-03-06

## 2019-03-06 RX ORDER — CETIRIZINE HYDROCHLORIDE 5 MG/5ML
5 SOLUTION ORAL DAILY PRN
Status: DISCONTINUED | OUTPATIENT
Start: 2019-03-06 | End: 2019-03-07 | Stop reason: HOSPADM

## 2019-03-06 RX ORDER — LIDOCAINE HCL/PF 100 MG/5ML
SYRINGE (ML) INTRAVENOUS
Status: DISCONTINUED | OUTPATIENT
Start: 2019-03-06 | End: 2019-03-06

## 2019-03-06 RX ORDER — PANTOPRAZOLE SODIUM 40 MG/1
40 TABLET, DELAYED RELEASE ORAL DAILY
Status: DISCONTINUED | OUTPATIENT
Start: 2019-03-07 | End: 2019-03-07 | Stop reason: HOSPADM

## 2019-03-06 RX ORDER — SODIUM CHLORIDE 0.9 % (FLUSH) 0.9 %
3 SYRINGE (ML) INJECTION
Status: DISCONTINUED | OUTPATIENT
Start: 2019-03-06 | End: 2019-03-07 | Stop reason: HOSPADM

## 2019-03-06 RX ORDER — GABAPENTIN 100 MG/1
100 CAPSULE ORAL 3 TIMES DAILY
Status: DISCONTINUED | OUTPATIENT
Start: 2019-03-06 | End: 2019-03-07 | Stop reason: HOSPADM

## 2019-03-06 RX ORDER — IBUPROFEN 400 MG/1
400 TABLET ORAL EVERY 6 HOURS PRN
Status: DISCONTINUED | OUTPATIENT
Start: 2019-03-06 | End: 2019-03-07 | Stop reason: HOSPADM

## 2019-03-06 RX ORDER — ONDANSETRON 2 MG/ML
INJECTION INTRAMUSCULAR; INTRAVENOUS
Status: DISCONTINUED | OUTPATIENT
Start: 2019-03-06 | End: 2019-03-06

## 2019-03-06 RX ORDER — SUCCINYLCHOLINE CHLORIDE 20 MG/ML
INJECTION INTRAMUSCULAR; INTRAVENOUS
Status: DISCONTINUED | OUTPATIENT
Start: 2019-03-06 | End: 2019-03-06

## 2019-03-06 RX ORDER — PHENYLEPHRINE HYDROCHLORIDE 10 MG/ML
INJECTION INTRAVENOUS
Status: DISCONTINUED | OUTPATIENT
Start: 2019-03-06 | End: 2019-03-06

## 2019-03-06 RX ORDER — PROPOFOL 10 MG/ML
VIAL (ML) INTRAVENOUS
Status: DISCONTINUED | OUTPATIENT
Start: 2019-03-06 | End: 2019-03-06

## 2019-03-06 RX ORDER — LATANOPROST 50 UG/ML
SOLUTION/ DROPS OPHTHALMIC
Qty: 2.5 ML | Refills: 12 | Status: SHIPPED | OUTPATIENT
Start: 2019-03-06 | End: 2020-03-19

## 2019-03-06 RX ORDER — FENTANYL CITRATE 50 UG/ML
INJECTION, SOLUTION INTRAMUSCULAR; INTRAVENOUS
Status: DISCONTINUED | OUTPATIENT
Start: 2019-03-06 | End: 2019-03-06

## 2019-03-06 RX ORDER — PROTAMINE SULFATE 10 MG/ML
INJECTION, SOLUTION INTRAVENOUS
Status: DISCONTINUED | OUTPATIENT
Start: 2019-03-06 | End: 2019-03-06

## 2019-03-06 RX ORDER — METOPROLOL TARTRATE 25 MG/1
25 TABLET, FILM COATED ORAL 3 TIMES DAILY
Status: DISCONTINUED | OUTPATIENT
Start: 2019-03-06 | End: 2019-03-07 | Stop reason: HOSPADM

## 2019-03-06 RX ORDER — DEXAMETHASONE SODIUM PHOSPHATE 4 MG/ML
INJECTION, SOLUTION INTRA-ARTICULAR; INTRALESIONAL; INTRAMUSCULAR; INTRAVENOUS; SOFT TISSUE
Status: DISCONTINUED | OUTPATIENT
Start: 2019-03-06 | End: 2019-03-06

## 2019-03-06 RX ORDER — GLYCOPYRROLATE 0.2 MG/ML
INJECTION INTRAMUSCULAR; INTRAVENOUS
Status: DISCONTINUED | OUTPATIENT
Start: 2019-03-06 | End: 2019-03-06

## 2019-03-06 RX ORDER — IODIXANOL 320 MG/ML
INJECTION, SOLUTION INTRAVASCULAR
Status: DISCONTINUED | OUTPATIENT
Start: 2019-03-06 | End: 2019-03-07 | Stop reason: HOSPADM

## 2019-03-06 RX ORDER — FENTANYL CITRATE 50 UG/ML
25 INJECTION, SOLUTION INTRAMUSCULAR; INTRAVENOUS EVERY 5 MIN PRN
Status: DISCONTINUED | OUTPATIENT
Start: 2019-03-06 | End: 2019-03-07 | Stop reason: HOSPADM

## 2019-03-06 RX ORDER — LATANOPROST 50 UG/ML
1 SOLUTION/ DROPS OPHTHALMIC NIGHTLY
Status: DISCONTINUED | OUTPATIENT
Start: 2019-03-06 | End: 2019-03-07 | Stop reason: HOSPADM

## 2019-03-06 RX ORDER — KETAMINE HCL IN 0.9 % NACL 50 MG/5 ML
SYRINGE (ML) INTRAVENOUS
Status: DISCONTINUED | OUTPATIENT
Start: 2019-03-06 | End: 2019-03-06

## 2019-03-06 RX ORDER — FUROSEMIDE 10 MG/ML
INJECTION INTRAMUSCULAR; INTRAVENOUS
Status: DISCONTINUED | OUTPATIENT
Start: 2019-03-06 | End: 2019-03-06

## 2019-03-06 RX ORDER — ONDANSETRON 2 MG/ML
4 INJECTION INTRAMUSCULAR; INTRAVENOUS DAILY PRN
Status: DISCONTINUED | OUTPATIENT
Start: 2019-03-06 | End: 2019-03-07 | Stop reason: HOSPADM

## 2019-03-06 RX ORDER — ACETAMINOPHEN 325 MG/1
650 TABLET ORAL EVERY 4 HOURS PRN
Status: DISCONTINUED | OUTPATIENT
Start: 2019-03-06 | End: 2019-03-07 | Stop reason: HOSPADM

## 2019-03-06 RX ORDER — LIDOCAINE HYDROCHLORIDE 20 MG/ML
INJECTION, SOLUTION INFILTRATION; PERINEURAL
Status: DISCONTINUED | OUTPATIENT
Start: 2019-03-06 | End: 2019-03-07 | Stop reason: HOSPADM

## 2019-03-06 RX ORDER — HEPARIN SODIUM 1000 [USP'U]/ML
INJECTION, SOLUTION INTRAVENOUS; SUBCUTANEOUS
Status: DISCONTINUED | OUTPATIENT
Start: 2019-03-06 | End: 2019-03-06

## 2019-03-06 RX ADMIN — HEPARIN SODIUM 2000 UNITS: 1000 INJECTION INTRAVENOUS; SUBCUTANEOUS at 02:03

## 2019-03-06 RX ADMIN — HEPARIN SODIUM 12000 UNITS: 1000 INJECTION INTRAVENOUS; SUBCUTANEOUS at 01:03

## 2019-03-06 RX ADMIN — LIDOCAINE HYDROCHLORIDE 30 MG: 20 INJECTION, SOLUTION INTRAVENOUS at 01:03

## 2019-03-06 RX ADMIN — PHENYLEPHRINE HYDROCHLORIDE 100 MCG: 10 INJECTION INTRAVENOUS at 12:03

## 2019-03-06 RX ADMIN — SODIUM CHLORIDE 0.25 MCG/KG/MIN: 9 INJECTION, SOLUTION INTRAVENOUS at 01:03

## 2019-03-06 RX ADMIN — FUROSEMIDE 40 MG: 10 INJECTION, SOLUTION INTRAMUSCULAR; INTRAVENOUS at 03:03

## 2019-03-06 RX ADMIN — Medication 10 MG: at 01:03

## 2019-03-06 RX ADMIN — ROCURONIUM BROMIDE 5 MG: 10 INJECTION, SOLUTION INTRAVENOUS at 12:03

## 2019-03-06 RX ADMIN — SUCCINYLCHOLINE CHLORIDE 120 MG: 20 INJECTION, SOLUTION INTRAMUSCULAR; INTRAVENOUS at 12:03

## 2019-03-06 RX ADMIN — PHENYLEPHRINE HYDROCHLORIDE 100 MCG: 10 INJECTION INTRAVENOUS at 01:03

## 2019-03-06 RX ADMIN — PROPOFOL 50 MG: 10 INJECTION, EMULSION INTRAVENOUS at 03:03

## 2019-03-06 RX ADMIN — PROTAMINE SULFATE 50 MG: 10 INJECTION, SOLUTION INTRAVENOUS at 03:03

## 2019-03-06 RX ADMIN — ONDANSETRON 4 MG: 2 INJECTION INTRAMUSCULAR; INTRAVENOUS at 04:03

## 2019-03-06 RX ADMIN — ACETAMINOPHEN 650 MG: 325 TABLET ORAL at 07:03

## 2019-03-06 RX ADMIN — FENTANYL CITRATE 100 MCG: 50 INJECTION, SOLUTION INTRAMUSCULAR; INTRAVENOUS at 12:03

## 2019-03-06 RX ADMIN — PROPOFOL 120 MG: 10 INJECTION, EMULSION INTRAVENOUS at 12:03

## 2019-03-06 RX ADMIN — PROPOFOL 30 MG: 10 INJECTION, EMULSION INTRAVENOUS at 12:03

## 2019-03-06 RX ADMIN — PROTAMINE SULFATE 10 MG: 10 INJECTION, SOLUTION INTRAVENOUS at 03:03

## 2019-03-06 RX ADMIN — Medication 10 MG: at 02:03

## 2019-03-06 RX ADMIN — Medication 20 MG: at 12:03

## 2019-03-06 RX ADMIN — DEXAMETHASONE SODIUM PHOSPHATE 8 MG: 4 INJECTION, SOLUTION INTRAMUSCULAR; INTRAVENOUS at 12:03

## 2019-03-06 RX ADMIN — SODIUM CHLORIDE: 9 INJECTION, SOLUTION INTRAVENOUS at 11:03

## 2019-03-06 RX ADMIN — PHENYLEPHRINE HYDROCHLORIDE 200 MCG: 10 INJECTION INTRAVENOUS at 01:03

## 2019-03-06 RX ADMIN — RIVAROXABAN 20 MG: 20 TABLET, FILM COATED ORAL at 11:03

## 2019-03-06 RX ADMIN — GABAPENTIN 100 MG: 100 CAPSULE ORAL at 09:03

## 2019-03-06 RX ADMIN — HEPARIN SODIUM 2000 UNITS: 1000 INJECTION INTRAVENOUS; SUBCUTANEOUS at 01:03

## 2019-03-06 RX ADMIN — Medication 10 MG: at 03:03

## 2019-03-06 RX ADMIN — LATANOPROST 1 DROP: 50 SOLUTION OPHTHALMIC at 09:03

## 2019-03-06 RX ADMIN — SODIUM CHLORIDE, SODIUM GLUCONATE, SODIUM ACETATE, POTASSIUM CHLORIDE, MAGNESIUM CHLORIDE, SODIUM PHOSPHATE, DIBASIC, AND POTASSIUM PHOSPHATE: .53; .5; .37; .037; .03; .012; .00082 INJECTION, SOLUTION INTRAVENOUS at 11:03

## 2019-03-06 RX ADMIN — PROPOFOL 30 MG: 10 INJECTION, EMULSION INTRAVENOUS at 02:03

## 2019-03-06 RX ADMIN — METOPROLOL TARTRATE 25 MG: 25 TABLET ORAL at 09:03

## 2019-03-06 RX ADMIN — LIDOCAINE HYDROCHLORIDE 100 MG: 20 INJECTION, SOLUTION INTRAVENOUS at 12:03

## 2019-03-06 RX ADMIN — ACETAMINOPHEN 650 MG: 325 TABLET ORAL at 11:03

## 2019-03-06 RX ADMIN — GLYCOPYRROLATE 0.1 MG: 0.2 INJECTION, SOLUTION INTRAMUSCULAR; INTRAVENOUS at 12:03

## 2019-03-06 NOTE — ANESTHESIA PROCEDURE NOTES
Arterial    Diagnosis: atrial fibrillation  Doctor requesting consult: Ilia    Patient location during procedure: done in OR  Procedure start time: 3/6/2019 12:15 PM  Timeout: 3/6/2019 12:15 PM  Procedure end time: 3/6/2019 12:18 PM  Staffing  Anesthesiologist: Dre Jean Baptiste MD  Performed: anesthesiologist   Anesthesiologist was present at the time of the procedure.  Preanesthetic Checklist  Completed: patient identified, site marked, surgical consent, pre-op evaluation, timeout performed, IV checked, risks and benefits discussed, monitors and equipment checked and anesthesia consent givenArterial  Skin Prep: chlorhexidine gluconate  Orientation: right  Location: radial  Catheter Size: 20 G  Catheter placement by Anatomical landmarks. Heme positive aspiration all ports.Insertion Attempts: 1  Assessment  Dressing: secured with tape and tegaderm  Patient: Tolerated well

## 2019-03-06 NOTE — BRIEF OP NOTE
Patient is s/p RFA PVI + CTI for sympt Afib and Afl:   Tolerated procedure well. No acute complication noted.  Post op care per protocol.  Will monitor in recovery on tele overnight  Motrin , PPI , lasix prn   Resume xeralto  6 hr after hemostasis  Cont BB  Access: b/l CFV, hemostasis b/l sutures - sutures to be removed in 4 hours   Fu EKG  EP service to follow

## 2019-03-06 NOTE — NURSING TRANSFER
Nursing Transfer Note      3/6/2019     Transfer to: [id number]    Transfer via: Stretcher    Transfer with: N/A    Transported by: PCT    Medicines sent: N/A    Chart send with patient: Yes    Notified: spouse; Report called to MARY LOU Glez    Patient reassessed at: [id number]

## 2019-03-06 NOTE — PLAN OF CARE
Pt AAOx4. No current complaints of pain to bilateral groin sites. Sutures remain in place. VSS. Denies nausea. Safety maintained.

## 2019-03-06 NOTE — TRANSFER OF CARE
"Anesthesia Transfer of Care Note    Patient: Maria Del Carmen Spence    Procedure(s) Performed: Procedure(s) (LRB):  ABLATION, ATRIAL FLUTTER, TYPICAL (N/A)  ABLATION, ARRHYTHMOGENIC FOCUS, FOR ATRIAL FIBRILLATION (N/A)    Patient location: PACU    Anesthesia Type: general    Transport from OR: Transported from OR on 6-10 L/min O2 by face mask with adequate spontaneous ventilation    Post pain: adequate analgesia    Post assessment: no apparent anesthetic complications    Post vital signs: stable    Level of consciousness: awake, alert and oriented    Nausea/Vomiting: no nausea/vomiting    Complications: none    Transfer of care protocol was followed      Last vitals:   Visit Vitals  BP (!) 147/100 (BP Location: Left arm, Patient Position: Lying)   Pulse 79   Temp 36.3 °C (97.3 °F) (Temporal)   Resp 14   Ht 5' 5" (1.651 m)   Wt 87.5 kg (193 lb)   SpO2 100%   Breastfeeding? No   BMI 32.12 kg/m²     "

## 2019-03-06 NOTE — H&P
Subjective:    Patient ID:  Maria Del Carmen Spence is a 78 y.o. female who presents for PVI .     78 yoF here for AF and AFL management. She developed AF as well as AFL (most ECGs with AFL) since 6/18. With her episodes she has developed syncope and near syncope. Attempts at rate control have resulted in bradycardic symptoms. Amiodarone resulted in symptomatic bradycardia. She has had recurrent events August, October and December 2018- mostly AFL. She has been placed on xarelto for CVA prophylaxis.     Echo 7/18:  CONCLUSIONS     1 - Concentric remodeling.     2 - No wall motion abnormalities.     3 - Normal left ventricular systolic function (EF 60-65%).     4 - Normal left ventricular diastolic function.     5 - Normal right ventricular systolic function .     6 - The estimated PA systolic pressure is greater than 29 mmHg.     7 - Mild mitral regurgitation.     8 - Mild tricuspid regurgitation.     Past Medical History:  No date: A-fib  No date: Arthritis  No date: Chronic LBP      Comment:  [facility]  No date: Eye pain  No date: Frequent headaches  No date: GERD (gastroesophageal reflux disease)  No date: Glaucoma  No date: Hyperlipemia  No date: Hypertension    Past Surgical History:  7/9/2018: CARDIOVERSION; N/A      Comment:  Performed by Will Rosenthal MD at [facility] CATH LAB  OU: CATARACT EXTRACTION W/  INTRAOCULAR LENS IMPLANT  7/9/2018: ECHOCARDIOGRAM,TRANSESOPHAGEAL; N/A      Comment:  Performed by Will Rosenhtal MD at [facility] CATH LAB  No date: TUBAL LIGATION    Social History    Socioeconomic History      Marital status:       Spouse name: Not on file      Number of children: Not on file      Years of education: Not on file      Highest education level: Not on file          Review of patient's family history indicates:  Problem: Glaucoma      Relation: Mother          Age of Onset: (Not Specified)  Problem: Cancer      Relation: Mother              Review of Systems    Constitution: Positive for malaise/fatigue.   HENT: Negative.    Eyes: Negative.    Cardiovascular: Positive for dyspnea on exertion, irregular heartbeat, near-syncope and syncope. Negative for chest pain, leg swelling and palpitations.   Respiratory: Negative.  Negative for shortness of breath.    Endocrine: Negative.    Hematologic/Lymphatic: Negative.    Skin: Negative.    Musculoskeletal: Negative.    Gastrointestinal: Negative.    Genitourinary: Negative.    Neurological: Negative for dizziness and light-headedness.   Psychiatric/Behavioral: Negative.    Allergic/Immunologic: Negative.          No current facility-administered medications on file prior to encounter.      Current Outpatient Medications on File Prior to Encounter   Medication Sig Dispense Refill    acetaminophen (TYLENOL) 500 MG tablet Take 500 mg by mouth every 6 (six) hours as needed for Pain.      CALCIUM PHOSPHATE TRIB/VIT D3 (CITRACAL + D ORAL) Take 1 tablet by mouth once daily at 6am.       CETIRIZINE HCL (ZYRTEC ORAL) Take by mouth.      famotidine (PEPCID) 20 MG tablet TAKE ONE TABLET BY MOUTH EVERY DAY 90 tablet 1    fish oil-omega-3 fatty acids 300-1,000 mg capsule Take 1 capsule by mouth once daily.      glucosamine-chondroitin 500-400 mg tablet Take 2 tablets by mouth once daily at 6am.       metoprolol tartrate (LOPRESSOR) 25 MG tablet Take 1 tablet (25 mg total) by mouth 3 (three) times daily. 90 tablet 11    MULTIVITAMIN W-MINERALS/LUTEIN (CENTRUM SILVER ORAL) Take by mouth.      XARELTO 20 mg Tab TAKE ONE TABLET BY MOUTH EVERY DAY WITH DINNER OR IN THE EVENING MEAL 30 tablet 11    atorvastatin (LIPITOR) 10 MG tablet Take 1 tablet (10 mg total) by mouth every evening. 30 tablet 6    dorzolamide (TRUSOPT) 2 % ophthalmic solution Place 1 drop into both eyes 2 (two) times daily. 10 mL 12    [DISCONTINUED] latanoprost 0.005 % ophthalmic solution instill ONE DROP BOTH EYES AT BEDTIME 2.5 mL 12     Objective:    Physical  Exam   Constitutional: She is oriented to person, place, and time. She appears well-developed and well-nourished. No distress.   HENT:   Head: Normocephalic and atraumatic.   Eyes: EOM are normal. Pupils are equal, round, and reactive to light.   Neck: Normal range of motion. No JVD present. No thyromegaly present.   Cardiovascular: Normal rate, regular rhythm, S1 normal, S2 normal and normal heart sounds. PMI is not displaced. Exam reveals no gallop and no friction rub.   No murmur heard.  Pulmonary/Chest: Effort normal and breath sounds normal. No respiratory distress. She has no wheezes. She has no rales.   Abdominal: Soft. Bowel sounds are normal. She exhibits no distension. There is no tenderness. There is no rebound and no guarding.   Musculoskeletal: Normal range of motion. She exhibits no edema or tenderness.   Neurological: She is alert and oriented to person, place, and time. No cranial nerve deficit.   Skin: Skin is warm and dry. No rash noted. No erythema.   Psychiatric: She has a normal mood and affect. Her behavior is normal. Judgment and thought content normal.   Vitals reviewed.    ECG: NSR nl  TOBY : 3/4/19     1 - Left atrium is enlarged with no visualized thrombus.     2 - Left atrial appendage is single lobed with no visualized thrombus.     3 - Normal left ventricular systolic function (EF 60-65%).     4 - Normal left ventricular diastolic function.     5 - Trivial aortic regurgitation.     6 - Mild mitral regurgitation.       Assessment:       1. PAF (paroxysmal atrial fibrillation)    2. Typical atrial flutter    3. Paroxysmal atrial fibrillation         Plan:       78 yoF here for AF and AFL management. I discussed AF, AFL and their basic pathophysiology, including their health implications and treatment options with the patient.  She has refractory AFL and AF and did not tolerate increased AVN agents or AAD therapy.   We  had extensive discussion with patient regarding risks and benefits and  alternatives  of PVI/WACA, and he would like to proceed. Risks of procedure include (but are not limited to) bleeding, stroke, perforation requiring emergency draining or surgery, AV block, death, AV fistula, AE fistula, PV stenosis.     Last OAC - last night  TOBY - 3/4/19 - negative for LAAT.

## 2019-03-06 NOTE — ANESTHESIA PREPROCEDURE EVALUATION
03/06/2019  Maria Del Carmen Spence is a 78 y.o., female presenting for a fib ablation.    Past Medical History:   Diagnosis Date    A-fib     Arthritis     Chronic LBP     ESIs x 3 with Dr. Hicks, PT at [place], chiropractor    Eye pain     Frequent headaches     GERD (gastroesophageal reflux disease)     Glaucoma     Hyperlipemia     Hypertension      Past Surgical History:   Procedure Laterality Date    CARDIOVERSION N/A 7/9/2018    Performed by [facility]al [facility] at [facility] CATH LAB    CATARACT EXTRACTION W/  INTRAOCULAR LENS IMPLANT  OU    ECHOCARDIOGRAM,TRANSESOPHAGEAL N/A 3/4/2019    Performed by [facility]enthal MD at [facility] CATH LAB    ECHOCARDIOGRAM,TRANSESOPHAGEAL N/A 7/9/2018    Performed by Will Rosenthal MD at [facility] CATH LAB    TUBAL LIGATION       Review of patient's allergies indicates:   Allergen Reactions    Eliquis [apixaban] Other (See Comments)     Headache, numbness in lip      No current facility-administered medications on file prior to encounter.      Current Outpatient Medications on File Prior to Encounter   Medication Sig Dispense Refill    acetaminophen (TYLENOL) 500 MG tablet Take 500 mg by mouth every 6 (six) hours as needed for Pain.      CALCIUM PHOSPHATE TRIB/VIT D3 (CITRACAL + D ORAL) Take 1 tablet by mouth once daily at 6am.       CETIRIZINE HCL (ZYRTEC ORAL) Take by mouth.      famotidine (PEPCID) 20 MG tablet TAKE ONE TABLET BY MOUTH EVERY DAY 90 tablet 1    fish oil-omega-3 fatty acids 300-1,000 mg capsule Take 1 capsule by mouth once daily.      glucosamine-chondroitin 500-400 mg tablet Take 2 tablets by mouth once daily at 6am.       metoprolol tartrate (LOPRESSOR) 25 MG tablet Take 1 tablet (25 mg total) by mouth 3 (three) times daily. 90 tablet 11    MULTIVITAMIN W-MINERALS/LUTEIN (CENTRUM SILVER ORAL) Take by mouth.      XARELTO 20 mg Tab TAKE ONE TABLET BY MOUTH EVERY DAY  WITH DINNER OR IN THE EVENING MEAL 30 tablet 11    atorvastatin (LIPITOR) 10 MG tablet Take 1 tablet (10 mg total) by mouth every evening. 30 tablet 6    dorzolamide (TRUSOPT) 2 % ophthalmic solution Place 1 drop into both eyes 2 (two) times daily. 10 mL 12     Lab Results   Component Value Date    WBC 6.93 02/27/2019    HGB 12.9 02/27/2019    HCT 39.8 02/27/2019     (H) 02/27/2019     02/27/2019     BMP  Lab Results   Component Value Date     02/27/2019    K 4.5 02/27/2019     02/27/2019    CO2 28 02/27/2019    BUN 20 02/27/2019    CREATININE 0.8 02/27/2019    CALCIUM 10.4 02/27/2019    ANIONGAP 7 (L) 02/27/2019    ESTGFRAFRICA >60.0 02/27/2019    EGFRNONAA >60.0 02/27/2019         Anesthesia Evaluation    I have reviewed the Patient Summary Reports.     I have reviewed the Medications.     Review of Systems  Anesthesia Hx:  No problems with previous Anesthesia   Denies Personal Hx of Anesthesia complications.   Cardiovascular:   Exercise tolerance: good Hypertension Dysrhythmias atrial fibrillation ECG has been reviewed.    Pulmonary:   Denies COPD.  Denies Asthma.  Denies Sleep Apnea.    Renal/:  Renal/ Normal     Hepatic/GI:   GERD, well controlled    Neurological:   Denies CVA. Headaches Denies Seizures.        Physical Exam  General:  Well nourished    Airway/Jaw/Neck:  Airway Findings: Mouth Opening: Normal Tongue: Normal  General Airway Assessment: Adult  Mallampati: III  Improves to II with phonation.  TM Distance: Normal, at least 6 cm  Jaw/Neck Findings:  Neck ROM: Normal ROM      Dental:  Dental Findings: In tact        Mental Status:  Mental Status Findings:  Cooperative, Alert and Oriented         Anesthesia Plan  Type of Anesthesia, risks & benefits discussed:  Anesthesia Type:  general  Patient's Preference:   Intra-op Monitoring Plan: arterial line and standard ASA monitors  Intra-op Monitoring Plan Comments:   Post Op Pain Control Plan: per primary service following  discharge from PACU and IV/PO Opioids PRN  Post Op Pain Control Plan Comments:   Induction:   IV  Beta Blocker:  Patient is on a Beta-Blocker and has received one dose within the past 24 hours (No further documentation required).       Informed Consent: Patient understands risks and agrees with Anesthesia plan.  Questions answered. Anesthesia consent signed with patient.  ASA Score: 3     Day of Surgery Review of History & Physical:    H&P update referred to the surgeon.         Ready For Surgery From Anesthesia Perspective.

## 2019-03-06 NOTE — PLAN OF CARE
Problem: Pain (Cardiac Rhythm Management Device)  Goal: Acceptable Pain Level  Outcome: Ongoing (interventions implemented as appropriate)  Plan of care and safety prec initiated. Will continue to monitor.

## 2019-03-07 VITALS
TEMPERATURE: 98 F | OXYGEN SATURATION: 95 % | HEART RATE: 73 BPM | RESPIRATION RATE: 16 BRPM | HEIGHT: 65 IN | WEIGHT: 193 LBS | BODY MASS INDEX: 32.15 KG/M2 | DIASTOLIC BLOOD PRESSURE: 62 MMHG | SYSTOLIC BLOOD PRESSURE: 138 MMHG

## 2019-03-07 PROCEDURE — 25000003 PHARM REV CODE 250: Performed by: INTERNAL MEDICINE

## 2019-03-07 RX ORDER — IBUPROFEN 400 MG/1
400 TABLET ORAL EVERY 6 HOURS PRN
Qty: 10 TABLET | Refills: 0 | Status: SHIPPED | OUTPATIENT
Start: 2019-03-07 | End: 2019-03-25

## 2019-03-07 RX ORDER — METOPROLOL TARTRATE 25 MG/1
25 TABLET, FILM COATED ORAL 2 TIMES DAILY
Qty: 60 TABLET | Refills: 11 | Status: SHIPPED | OUTPATIENT
Start: 2019-03-07 | End: 2020-03-23

## 2019-03-07 RX ADMIN — METOPROLOL TARTRATE 25 MG: 25 TABLET ORAL at 08:03

## 2019-03-07 RX ADMIN — PANTOPRAZOLE SODIUM 40 MG: 40 TABLET, DELAYED RELEASE ORAL at 08:03

## 2019-03-07 RX ADMIN — GABAPENTIN 100 MG: 100 CAPSULE ORAL at 08:03

## 2019-03-07 NOTE — PLAN OF CARE
Problem: Adult Inpatient Plan of Care  Goal: Plan of Care Review  Outcome: Ongoing (interventions implemented as appropriate)  Bilateral groin sites remain CDI, no bleeding or hematoma noted.  Pt ambulate in hallway, unsteady gait.  Instructed patient to call before getting up.  Pt states understanding.  Will cont to monitor.

## 2019-03-07 NOTE — PLAN OF CARE
Problem: Adult Inpatient Plan of Care  Goal: Plan of Care Review  Outcome: Ongoing (interventions implemented as appropriate)  Plan of care discussed with patient. Patient is free of fall/trauma/injury. Denies CP, SOB, or pain/discomfort. All questions addressed. Will continue to monitor. Patient to be discharged this AM.

## 2019-03-07 NOTE — PROGRESS NOTES
Pt DC'd per MD order. Discharge instructions given including activity, wound care, S&S of infections,  future appointments, and when to call MD. Medications reviewed including when to take next dose. Telemetry and PIV DC'd, catheter tip intact. Pt left unit via wheelchair with transport and family.

## 2019-03-07 NOTE — DISCHARGE SUMMARY
[facility]  Discharge Summary      Admit Date: 3/6/2019    Discharge Date and Time:  03/07/2019 7:17 AM    Attending Physician: Abel Morillo MD     Reason for Admission: PVI     Procedures Performed: Procedure(s) (LRB):  ABLATION, ATRIAL FLUTTER, TYPICAL (N/A)  ABLATION, ARRHYTHMOGENIC FOCUS, FOR ATRIAL FIBRILLATION (N/A)    Hospital Course     Patient is s/p RFA PVI + CTI for sympt Afib and Afl:   She had a LSPV trigger for her Afib that was ablated.   Tolerated procedure well. No acute complication noted.  No AT/Afib noted during overnight monitoring.   Resume xeralto    She was on metop t 25 tid, will reduce it to 25 mg BID.  She had mutiple sympt bradycardia as noted in chart with amiodarone and later timolol, she reports she has not been taking both for sometime. .   Access: b/l CFV, hemostasis b/l sutures - sutures removed at 4 hours  good, now site is soft and non tender.         Final Diagnoses:    Principal Problem: <principal problem not specified>   Secondary Diagnoses:   Active Hospital Problems    Diagnosis  POA    PAF (paroxysmal atrial fibrillation) [I48.0]  Yes      Resolved Hospital Problems   No resolved problems to display.       Discharged Condition: stable    Disposition: Home or Self Care    Follow Up/Patient Instructions:     Medications:  Reconciled Home Medications:      Medication List      START taking these medications    ibuprofen 400 MG tablet  Commonly known as:  ADVIL,MOTRIN  Take 1 tablet (400 mg total) by mouth every 6 (six) hours as needed (moderate pain).        CHANGE how you take these medications    metoprolol tartrate 25 MG tablet  Commonly known as:  LOPRESSOR  Take 1 tablet (25 mg total) by mouth 2 (two) times daily.  What changed:  when to take this        CONTINUE taking these medications    acetaminophen 500 MG tablet  Commonly known as:  TYLENOL  Take 500 mg by mouth every 6 (six) hours as needed for Pain.     atorvastatin 10 MG  tablet  Commonly known as:  LIPITOR  Take 1 tablet (10 mg total) by mouth every evening.     CENTRUM SILVER ORAL  Take by mouth.     CITRACAL + D ORAL  Take 1 tablet by mouth once daily at 6am.     famotidine 20 MG tablet  Commonly known as:  PEPCID  TAKE ONE TABLET BY MOUTH EVERY DAY     fish oil-omega-3 fatty acids 300-1,000 mg capsule  Take 1 capsule by mouth once daily.     gabapentin 100 MG capsule  Commonly known as:  NEURONTIN  TAKE ONE CAPSULE BY MOUTH THREE TIMES DAILY     glucosamine-chondroitin 500-400 mg tablet  Take 2 tablets by mouth once daily at 6am.     XARELTO 20 mg Tab  Generic drug:  rivaroxaban  TAKE ONE TABLET BY MOUTH EVERY DAY WITH DINNER OR IN THE EVENING MEAL     ZYRTEC ORAL  Take by mouth.        ASK your doctor about these medications    dorzolamide 2 % ophthalmic solution  Commonly known as:  TRUSOPT  Place 1 drop into both eyes 2 (two) times daily.     latanoprost 0.005 % ophthalmic solution  instill ONE DROP BOTH EYES AT BEDTIME          Discharge Procedure Orders   Diet Cardiac     No driving until:   Scheduling Instructions: For 48 hrs from procedure stand point , no lifting for 7 days     Notify your health care provider if you experience any of the following:  temperature >100.4     Notify your health care provider if you experience any of the following:  persistent nausea and vomiting or diarrhea     Notify your health care provider if you experience any of the following:  severe uncontrolled pain     Notify your health care provider if you experience any of the following:  redness, tenderness, or signs of infection (pain, swelling, redness, odor or green/yellow discharge around incision site)     Notify your health care provider if you experience any of the following:  difficulty breathing or increased cough     Notify your health care provider if you experience any of the following:  severe persistent headache     Notify your health care provider if you experience any of the  following:  worsening rash     Notify your health care provider if you experience any of the following:  persistent dizziness, light-headedness, or visual disturbances     Notify your health care provider if you experience any of the following:  increased confusion or weakness     No dressing needed     Follow-up Information     Abel Morillo MD In 6 weeks.    Specialties:  Electrophysiology, Cardiovascular Disease  Why:  post PVI - at [facility]  Contact information:  [facility] [id number] [phone]

## 2019-03-07 NOTE — ANESTHESIA POSTPROCEDURE EVALUATION
"Anesthesia Post Evaluation    Patient: Maria Del Carmen Spence    Procedure(s) Performed: Procedure(s) (LRB):  ABLATION, ATRIAL FLUTTER, TYPICAL (N/A)  ABLATION, ARRHYTHMOGENIC FOCUS, FOR ATRIAL FIBRILLATION (N/A)    Final Anesthesia Type: general  Patient location during evaluation: PACU  Patient participation: Yes- Able to Participate  Level of consciousness: awake and alert  Post-procedure vital signs: reviewed and stable  Pain management: adequate  Airway patency: patent  PONV status at discharge: No PONV  Anesthetic complications: no      Cardiovascular status: hemodynamically stable  Respiratory status: unassisted  Hydration status: euvolemic  Follow-up not needed.        Visit Vitals  /62 (BP Location: Left arm, Patient Position: Lying)   Pulse 74   Temp 36.7 °C (98 °F) (Oral)   Resp 16   Ht 5' 5" (1.651 m)   Wt 87.5 kg (193 lb)   SpO2 95%   Breastfeeding? No   BMI 32.12 kg/m²       Pain/Karie Score: Pain Rating Prior to Med Admin: 5 (3/6/2019 11:09 PM)  Pain Rating Post Med Admin: 2 (3/7/2019 12:09 AM)  Karie Score: 10 (3/6/2019  5:45 PM)        "

## 2019-03-07 NOTE — PROGRESS NOTES
Received report from Amaris BARRETO. Patient s/p PVI. AAOx3. VSS, resp even and unlabored. Bilateral sutures intact with No active bleeding. No hematoma noted. Post procedure protocol reviewed with patient and patient's family. Understanding verbalized. Family members at bedside. Nurse call bell within reach. Will continue to monitor per post procedure protocol.

## 2019-03-11 ENCOUNTER — PATIENT OUTREACH (OUTPATIENT)
Dept: ADMINISTRATIVE | Facility: HOSPITAL | Age: 79
End: 2019-03-11

## 2019-03-12 LAB
POC ACTIVATED CLOTTING TIME K: 362 SEC (ref 74–137)
POC ACTIVATED CLOTTING TIME K: 384 SEC (ref 74–137)
POC ACTIVATED CLOTTING TIME K: 400 SEC (ref 74–137)
POC ACTIVATED CLOTTING TIME K: 417 SEC (ref 74–137)
POC ACTIVATED CLOTTING TIME K: 428 SEC (ref 74–137)
SAMPLE: ABNORMAL

## 2019-03-25 ENCOUNTER — OFFICE VISIT (OUTPATIENT)
Dept: INTERNAL MEDICINE | Facility: CLINIC | Age: 79
End: 2019-03-25
Payer: MEDICARE

## 2019-03-25 ENCOUNTER — APPOINTMENT (OUTPATIENT)
Dept: RADIOLOGY | Facility: HOSPITAL | Age: 79
End: 2019-03-25
Attending: FAMILY MEDICINE
Payer: MEDICARE

## 2019-03-25 ENCOUNTER — TELEPHONE (OUTPATIENT)
Dept: ORTHOPEDICS | Facility: CLINIC | Age: 79
End: 2019-03-25

## 2019-03-25 VITALS
SYSTOLIC BLOOD PRESSURE: 136 MMHG | OXYGEN SATURATION: 97 % | BODY MASS INDEX: 32.15 KG/M2 | HEIGHT: 65 IN | TEMPERATURE: 98 F | DIASTOLIC BLOOD PRESSURE: 88 MMHG | WEIGHT: 193 LBS | HEART RATE: 65 BPM

## 2019-03-25 DIAGNOSIS — G89.29 CHRONIC BILATERAL LOW BACK PAIN WITH RIGHT-SIDED SCIATICA: ICD-10-CM

## 2019-03-25 DIAGNOSIS — M54.41 CHRONIC BILATERAL LOW BACK PAIN WITH RIGHT-SIDED SCIATICA: ICD-10-CM

## 2019-03-25 DIAGNOSIS — I10 ESSENTIAL HYPERTENSION: ICD-10-CM

## 2019-03-25 DIAGNOSIS — M25.511 RIGHT SHOULDER PAIN, UNSPECIFIED CHRONICITY: ICD-10-CM

## 2019-03-25 DIAGNOSIS — M17.11 ARTHRITIS OF KNEE, RIGHT: ICD-10-CM

## 2019-03-25 DIAGNOSIS — G56.21 CUBITAL TUNNEL SYNDROME ON RIGHT: ICD-10-CM

## 2019-03-25 DIAGNOSIS — I77.1 TORTUOUS AORTA: ICD-10-CM

## 2019-03-25 DIAGNOSIS — M17.11 ARTHRITIS OF KNEE, RIGHT: Primary | ICD-10-CM

## 2019-03-25 PROCEDURE — 73560 X-RAY EXAM OF KNEE 1 OR 2: CPT | Mod: TC,FY,PO,LT,59

## 2019-03-25 PROCEDURE — 99215 OFFICE O/P EST HI 40 MIN: CPT | Mod: PBBFAC,25,PO | Performed by: FAMILY MEDICINE

## 2019-03-25 PROCEDURE — 73562 XR KNEE ORTHO RIGHT: ICD-10-PCS | Mod: 26,RT,, | Performed by: RADIOLOGY

## 2019-03-25 PROCEDURE — 73560 X-RAY EXAM OF KNEE 1 OR 2: CPT | Mod: 26,59,RT, | Performed by: RADIOLOGY

## 2019-03-25 PROCEDURE — 73560 XR KNEE ORTHO RIGHT: ICD-10-PCS | Mod: 26,59,RT, | Performed by: RADIOLOGY

## 2019-03-25 PROCEDURE — 99214 PR OFFICE/OUTPT VISIT, EST, LEVL IV, 30-39 MIN: ICD-10-PCS | Mod: S$PBB,,, | Performed by: FAMILY MEDICINE

## 2019-03-25 PROCEDURE — 99999 PR PBB SHADOW E&M-EST. PATIENT-LVL V: ICD-10-PCS | Mod: PBBFAC,,, | Performed by: FAMILY MEDICINE

## 2019-03-25 PROCEDURE — 99999 PR PBB SHADOW E&M-EST. PATIENT-LVL V: CPT | Mod: PBBFAC,,, | Performed by: FAMILY MEDICINE

## 2019-03-25 PROCEDURE — 99214 OFFICE O/P EST MOD 30 MIN: CPT | Mod: S$PBB,,, | Performed by: FAMILY MEDICINE

## 2019-03-25 PROCEDURE — 73562 X-RAY EXAM OF KNEE 3: CPT | Mod: 26,RT,, | Performed by: RADIOLOGY

## 2019-03-25 PROCEDURE — 73562 X-RAY EXAM OF KNEE 3: CPT | Mod: TC,FY,PO,RT

## 2019-03-25 RX ORDER — GABAPENTIN 100 MG/1
CAPSULE ORAL
Qty: 270 CAPSULE | Refills: 5 | Status: SHIPPED | OUTPATIENT
Start: 2019-03-25 | End: 2019-07-12

## 2019-03-25 NOTE — PROGRESS NOTES
Subjective:       Patient ID: Maria Del Carmen Spence is a 78 y.o. female.    Chief Complaint: f/u back pain    Here for 6 month recheck LBP/radiation - still with some pain to R below waist with certain movements. Still with radiation to RLE. On gabapentin 100 TID. Can't say if helping. She is having severe R knee pain - swelling that comes and goes - was on meloxicam in past 7.5 but stopped due to reading about cardiovascular effects. She does take tylenol 2000 - 3000 daily.    Also c/o numbness to right hand.    Review of Systems   Musculoskeletal: Positive for arthralgias (knees, shoulder right), back pain, gait problem, joint swelling, myalgias, neck pain and neck stiffness.   Neurological: Positive for numbness.       Objective:      Physical Exam   Constitutional: She is oriented to person, place, and time. She appears well-developed.   HENT:   Head: Normocephalic and atraumatic.   Cardiovascular: Normal rate, regular rhythm and normal heart sounds.   Pulmonary/Chest: Effort normal and breath sounds normal.   Musculoskeletal:   R knee enlarged, TTP posterior knee, medial and lateral joint lines. L knee TTP to medial joint line, lateral NTTP.  Shoulder ROM - painful R > 90 degrees., painful and decreased int rotation. Pain with resisted extrernal rotation.TTP right AC joint and lateral shoulder.  Pressure to right cubital tunnel elicits pain and tingling in ulnar distribution. Negative for symptoms on left.   Neurological: She is alert and oriented to person, place, and time.   MS 5/5 knees, ankles F/E  MS 5/5 interossei B, thumb/digit grasp B   Skin: Skin is warm and dry.   Psychiatric: She has a normal mood and affect. Her behavior is normal.         Assessment/Plan:     1. Arthritis of knee, right  Ambulatory referral to Orthopedics    X-ray Knee Ortho Right   2. Essential hypertension     3. Chronic bilateral low back pain with right-sided sciatica  gabapentin (NEURONTIN) 100 MG capsule   4. Right shoulder pain,  unspecified chronicity  X-ray Shoulder 2 or More Views Right    Ambulatory referral to Physical Medicine Rehab   5. Cubital tunnel syndrome on right  Ambulatory referral to Physical Medicine Rehab   6. Tortuous aorta     reviewed 2014 CXR with mildly tortuous aorta. Pt on atorvastatin 10 currently.  Refer to PMR for further eval presumed cubital tunnel syndrome, shoulder pain.

## 2019-04-01 ENCOUNTER — OFFICE VISIT (OUTPATIENT)
Dept: ORTHOPEDICS | Facility: CLINIC | Age: 79
End: 2019-04-01
Payer: MEDICARE

## 2019-04-01 VITALS
WEIGHT: 192.88 LBS | SYSTOLIC BLOOD PRESSURE: 155 MMHG | BODY MASS INDEX: 32.14 KG/M2 | DIASTOLIC BLOOD PRESSURE: 83 MMHG | HEART RATE: 73 BPM | RESPIRATION RATE: 12 BRPM | HEIGHT: 65 IN

## 2019-04-01 DIAGNOSIS — E78.2 MIXED HYPERLIPIDEMIA: ICD-10-CM

## 2019-04-01 DIAGNOSIS — M17.11 PRIMARY OSTEOARTHRITIS OF RIGHT KNEE: Primary | ICD-10-CM

## 2019-04-01 DIAGNOSIS — I48.0 PAROXYSMAL ATRIAL FIBRILLATION: ICD-10-CM

## 2019-04-01 PROCEDURE — 99999 PR PBB SHADOW E&M-EST. PATIENT-LVL III: ICD-10-PCS | Mod: PBBFAC,,, | Performed by: FAMILY MEDICINE

## 2019-04-01 PROCEDURE — 20610 LARGE JOINT ASPIRATION/INJECTION: R KNEE: ICD-10-PCS | Mod: S$PBB,RT,, | Performed by: FAMILY MEDICINE

## 2019-04-01 PROCEDURE — 20610 DRAIN/INJ JOINT/BURSA W/O US: CPT | Mod: PBBFAC | Performed by: FAMILY MEDICINE

## 2019-04-01 PROCEDURE — 99999 PR PBB SHADOW E&M-EST. PATIENT-LVL III: CPT | Mod: PBBFAC,,, | Performed by: FAMILY MEDICINE

## 2019-04-01 PROCEDURE — 99214 OFFICE O/P EST MOD 30 MIN: CPT | Mod: 25,S$PBB,, | Performed by: FAMILY MEDICINE

## 2019-04-01 PROCEDURE — 99213 OFFICE O/P EST LOW 20 MIN: CPT | Mod: PBBFAC | Performed by: FAMILY MEDICINE

## 2019-04-01 PROCEDURE — 99214 PR OFFICE/OUTPT VISIT, EST, LEVL IV, 30-39 MIN: ICD-10-PCS | Mod: 25,S$PBB,, | Performed by: FAMILY MEDICINE

## 2019-04-01 RX ORDER — TRIAMCINOLONE ACETONIDE 40 MG/ML
80 INJECTION, SUSPENSION INTRA-ARTICULAR; INTRAMUSCULAR
Status: DISCONTINUED | OUTPATIENT
Start: 2019-04-01 | End: 2019-04-01 | Stop reason: HOSPADM

## 2019-04-01 RX ORDER — MELOXICAM 7.5 MG/1
7.5 TABLET ORAL DAILY
Qty: 30 TABLET | Refills: 2 | Status: SHIPPED | OUTPATIENT
Start: 2019-04-01 | End: 2019-04-29

## 2019-04-01 RX ORDER — MELOXICAM 7.5 MG/1
7.5 TABLET ORAL DAILY
COMMUNITY
End: 2019-04-01 | Stop reason: SDUPTHER

## 2019-04-01 RX ADMIN — TRIAMCINOLONE ACETONIDE 80 MG: 40 INJECTION, SUSPENSION INTRA-ARTICULAR; INTRAMUSCULAR at 03:04

## 2019-04-01 NOTE — PROCEDURES
Large Joint Aspiration/Injection: R knee  Date/Time: 4/1/2019 3:15 PM  Performed by: Bahman Collier MD  Authorized by: Bahman Collier MD     Consent Done?:  Yes (Verbal)  Indications:  Pain and diagnostic evaluation  Procedure site marked: Yes    Timeout: Prior to procedure the correct patient, procedure, and site was verified      Location:  Knee  Site:  R knee  Prep: Patient was prepped and draped in usual sterile fashion    Ultrasonic Guidance for needle placement: No  Needle size:  25 G  Approach:  Anteromedial  Medications:  80 mg triamcinolone acetonide 40 mg/mL  Patient tolerance:  Patient tolerated the procedure well with no immediate complications    Additional Comments: Patient experienced minimal blood loss (< 3 cc) and clean bandage was applied. Post procedure care was provided to the patient verbally and with their AVS. Patient was instructed to take it easy for the next 24-48 hours and was informed that their pain may increase once the anaesthesia wears off and before the steroid begins to work. Patient was instructed to ice area for 15 minutes and may take NSAIDs or Tylenol PRN if pain worsens. Patient was informed to contact clinic or go to the ED if they develop any fever, chills, redness, swelling, or other complications.

## 2019-04-01 NOTE — LETTER
April 1, 2019      Elda Powers MD  [address] [place] [address] [address] [place] [id number]           [facility]  [address] [place] [address]  [place] [address]  Phone: [phone]  Fax: [phone]          Patient: Maria Del Carmen Spence   MR Number: [id number]   YOB: 1940   Date of Visit: 4/1/2019       Dear Dr. Elda Powers:    Thank you for referring Maria Del Carmen Spence to me for evaluation. Attached you will find relevant portions of my assessment and plan of care.    If you have questions, please do not hesitate to call me. I look forward to following Maria Del Carmen Spence along with you.    Sincerely,    Bahman Collier MD    Enclosure  CC:  No Recipients    If you would like to receive this communication electronically, please contact [email or web address] or [phone] to request more information on [employer] access.    For providers and/or their staff who would like to refer a patient to [facility], please contact us through our one-stop-shop provider referral line, [facility] at [phone].    If you feel you have received this communication in error or would no longer like to receive these types of communications, please e-mail [email or web address]

## 2019-04-01 NOTE — PROGRESS NOTES
Subjective:     Patient ID: Maria Del Carmen Spence is a 78 y.o. female.    Chief Complaint: Pain of the Right Knee    Patient is a 78-year-old female presents clinic today complaining chronic right knee pain.  Patient states that she has had this pain for the past couple years.  States that initially she was prescribed Mobic which helped, but over time has become less and less effective.  Patient states she has had cortisone injections in her spine, but have never had an for her knee.  Patient states that the pain has significantly worsened over the past 3-4 weeks.  States she is also getting lower leg swelling with it.  Patient would like to try cortisone injection today if possible.  Has never had any viscosupplementation.      Past Medical History:   Diagnosis Date    A-fib     Arthritis     Chronic LBP     ESIs x 3 with Dr. Hicks, PT at [place], chiropractor    Eye pain     Frequent headaches     GERD (gastroesophageal reflux disease)     Glaucoma     Hyperlipemia     Hypertension      Past Surgical History:   Procedure Laterality Date    ABLATION, ARRHYTHMOGENIC FOCUS, FOR ATRIAL FIBRILLATION N/A 3/6/2019    Performed by [facility] EP LAB    ABLATION, ATRIAL FLUTTER, TYPICAL N/A 3/6/2019    Performed by [facility] at [facility] EP LAB    CARDIOVERSION N/A 7/9/2018    Performed by [facility]enthal [facility] at [facility] CATH LAB    CATARACT EXTRACTION W/  INTRAOCULAR LENS IMPLANT  OU    ECHOCARDIOGRAM,TRANSESOPHAGEAL N/A 3/4/2019    Performed by [facility] at [facility] CATH LAB    ECHOCARDIOGRAM,TRANSESOPHAGEAL N/A 7/9/2018    Performed by [facility] at [facility] CATH LAB    TUBAL LIGATION       Family History   Problem Relation Age of Onset    Glaucoma Mother     Cancer Mother     Cataracts Mother     Hypertension Mother     Hypertension Father     Diabetes Paternal Aunt     Strabismus Neg Hx     Retinal detachment Neg Hx     Macular degeneration Neg Hx     Blindness Neg Hx     Amblyopia  Neg Hx     Stroke Neg Hx     Thyroid disease Neg Hx      Social History     Socioeconomic History    Marital status:      Spouse name: Not on file    Number of children: Not on file    Years of education: Not on file    Highest education level: Not on file   Occupational History    Not on file   Social Needs    Financial resource strain: Not on file    Food insecurity:     Worry: Not on file     Inability: Not on file    Transportation needs:     Medical: Not on file     Non-medical: Not on file   Tobacco Use    Smoking status: Never Smoker    Smokeless tobacco: Never Used   Substance and Sexual Activity    Alcohol use: No    Drug use: No    Sexual activity: Yes     Partners: Male   Lifestyle    Physical activity:     Days per week: Not on file     Minutes per session: Not on file    Stress: Not on file   Relationships    Social connections:     Talks on phone: Not on file     Gets together: Not on file     Attends Yazdanism service: Not on file     Active member of club or organization: Not on file     Attends meetings of clubs or organizations: Not on file     Relationship status: Not on file    Intimate partner violence:     Fear of current or ex partner: Not on file     Emotionally abused: Not on file     Physically abused: Not on file     Forced sexual activity: Not on file   Other Topics Concern    Not on file   Social History Narrative    Not on file     Medication List with Changes/Refills   Current Medications    ACETAMINOPHEN (TYLENOL) 500 MG TABLET    Take 500 mg by mouth every 6 (six) hours as needed for Pain.    ATORVASTATIN (LIPITOR) 10 MG TABLET    Take 1 tablet (10 mg total) by mouth every evening.    CALCIUM PHOSPHATE TRIB/VIT D3 (CITRACAL + D ORAL)    Take 1 tablet by mouth once daily at 6am.     CETIRIZINE HCL (ZYRTEC ORAL)    Take by mouth.    DORZOLAMIDE (TRUSOPT) 2 % OPHTHALMIC SOLUTION    Place 1 drop into both eyes 2 (two) times daily.    FAMOTIDINE (PEPCID) 20 MG  "TABLET    TAKE ONE TABLET BY MOUTH EVERY DAY    FISH OIL-OMEGA-3 FATTY ACIDS 300-1,000 MG CAPSULE    Take 1 capsule by mouth once daily.    GABAPENTIN (NEURONTIN) 100 MG CAPSULE    100-300 up to TID    GLUCOSAMINE-CHONDROITIN 500-400 MG TABLET    Take 2 tablets by mouth once daily at 6am.     LATANOPROST 0.005 % OPHTHALMIC SOLUTION    instill ONE DROP BOTH EYES AT BEDTIME    METOPROLOL TARTRATE (LOPRESSOR) 25 MG TABLET    Take 1 tablet (25 mg total) by mouth 2 (two) times daily.    MULTIVITAMIN W-MINERALS/LUTEIN (CENTRUM SILVER ORAL)    Take by mouth.    XARELTO 20 MG TAB    TAKE ONE TABLET BY MOUTH EVERY DAY WITH DINNER OR IN THE EVENING MEAL   Changed and/or Refilled Medications    Modified Medication Previous Medication    MELOXICAM (MOBIC) 7.5 MG TABLET meloxicam (MOBIC) 7.5 MG tablet       Take 1 tablet (7.5 mg total) by mouth once daily.    Take 7.5 mg by mouth once daily.     Review of patient's allergies indicates:   Allergen Reactions    Eliquis [apixaban] Other (See Comments)     Headache, numbness in lip      Review of Systems   Constitutional: Negative for chills and fever.   HENT: Negative for hearing loss.    Cardiovascular: Negative for leg swelling.   Gastrointestinal: Negative for nausea and vomiting.   Musculoskeletal: Positive for joint pain. Negative for back pain, falls and myalgias.   Skin: Negative for rash.   Neurological: Negative for tingling, sensory change, focal weakness and weakness.        Objective:   Body mass index is 32.1 kg/m².  Vitals:    04/01/19 1445   BP: (!) 155/83   Pulse: 73   Resp: 12   Weight: 87.5 kg (192 lb 14.4 oz)   Height: 5' 5" (1.651 m)   PainSc:   6   PainLoc: Knee           General    Nursing note and vitals reviewed.  Constitutional: She is oriented to person, place, and time. She appears well-developed and well-nourished. No distress.   Eyes: Conjunctivae are normal. No scleral icterus.   Pulmonary/Chest: Effort normal.   Neurological: She is alert and " oriented to person, place, and time.   Psychiatric: She has a normal mood and affect. Her behavior is normal. Judgment and thought content normal.     General Musculoskeletal Exam   Gait: abnormal       Right Knee Exam     Inspection   Erythema: absent  Effusion: absent  Deformity: absent    Tenderness   The patient is tender to palpation of the lateral joint line and patella.    Crepitus   The patient has crepitus of the patella and lateral joint line.    Range of Motion   The patient has normal right knee ROM.    Other   Sensation: normal    Muscle Strength   Right Lower Extremity   Quadriceps:  5/5       EXAMINATION:  XR KNEE ORTHO RIGHT    CLINICAL HISTORY:  Unilateral primary osteoarthritis, right knee    TECHNIQUE:  AP standing views of both knees, AP flexion views of both knees, lateral view of the right knee and Merchant views of both knees    COMPARISON:  06/14/2017    FINDINGS:  There is moderate to severe joint space narrowing involving the medial compartment of the left knee.  There is also mild-to-moderate degenerative change involving the lateral compartment of the left knee.  There is severe joint space narrowing and osteophyte formation involving the lateral compartment of the right knee.  No significant joint space narrowing of the medial compartment of the right knee.  Moderate to severe degenerative change and osteophyte formation noted at the right patellofemoral compartment.  No joint effusion.  No acute fracture or dislocation.      Impression       1.  As above      Electronically signed by: Philippe Tapia DO  Date: 03/25/2019  Time: 12:41           Maria Del Carmen was seen today for pain.    Diagnoses and all orders for this visit:    Primary osteoarthritis of right knee  -     meloxicam (MOBIC) 7.5 MG tablet; Take 1 tablet (7.5 mg total) by mouth once daily.  -     Large Joint Aspiration/Injection: R knee    Mixed hyperlipidemia    Paroxysmal atrial fibrillation    -discussed the clinical course and  nature of osteoarthritis with patient.  At this time patient would like to conservative approach with cortisone injections, NSAIDs/Tylenol, and home physical therapy. Cautioned patient to take Mobic/NSAIDs very sparingly due to her Xarelto. If patient does not get significant relief, would recommend viscosupplementation.  Discussed with patient that the definitive treatment for knee osteoarthritis is total knee replacement.  -Bilateral KneeXray images were independently viewed and read by me showing severe lateral compartment joint space loss on the right knee with severe osteophyte formation.  Left knee has severe medial joint space loss with moderate osteophyte formation.  No acute fractures or dislocations.  -Formal read by radiologist is as described above  -Discussed findings with patient    -Chronic hyperlipidemia.  Managed by patient's PCP.  -Chronic AFib on chronic anticoagulation.  Managed by patient's PCP and Cardiology.    -Treatment options and alternatives were discussed with the patient. Patient expressed understanding. Patient was given the opportunity to ask questions and be an active participant in their medical care. Patient had no further questions or concerns at this time.   -Patient is an overall moderate risk for health complications from their medical conditions.

## 2019-04-08 ENCOUNTER — TELEPHONE (OUTPATIENT)
Dept: INTERNAL MEDICINE | Facility: CLINIC | Age: 79
End: 2019-04-08

## 2019-04-08 NOTE — TELEPHONE ENCOUNTER
----- Message from Lizette Flowers sent at 4/8/2019  9:10 AM CDT -----  Contact: pt   Type:  Test Results    Who Called: ADRI WHITE   Name of Test (Lab/Mammo/Etc):  X-Ray   Date of Test: 03/25/2019  Ordering Provider: Priya  Where the test was performed: [facility]  Would the patient rather a call back or a response via [facility]? Call   Best Call Back Number:  [phone] (home)   Additional Information:

## 2019-04-08 NOTE — TELEPHONE ENCOUNTER
Patient was contacted and informed of her test results. Patient verbally understood the information given.

## 2019-04-09 ENCOUNTER — OFFICE VISIT (OUTPATIENT)
Dept: OPHTHALMOLOGY | Facility: CLINIC | Age: 79
End: 2019-04-09
Payer: MEDICARE

## 2019-04-09 DIAGNOSIS — Z96.1 PSEUDOPHAKIA: ICD-10-CM

## 2019-04-09 DIAGNOSIS — H40.1131 PRIMARY OPEN ANGLE GLAUCOMA OF BOTH EYES, MILD STAGE: Primary | ICD-10-CM

## 2019-04-09 PROCEDURE — 92012 PR EYE EXAM, EST PATIENT,INTERMED: ICD-10-PCS | Mod: S$PBB,,, | Performed by: OPHTHALMOLOGY

## 2019-04-09 PROCEDURE — 99999 PR PBB SHADOW E&M-EST. PATIENT-LVL II: ICD-10-PCS | Mod: PBBFAC,,, | Performed by: OPHTHALMOLOGY

## 2019-04-09 PROCEDURE — 92012 INTRM OPH EXAM EST PATIENT: CPT | Mod: S$PBB,,, | Performed by: OPHTHALMOLOGY

## 2019-04-09 PROCEDURE — 99999 PR PBB SHADOW E&M-EST. PATIENT-LVL II: CPT | Mod: PBBFAC,,, | Performed by: OPHTHALMOLOGY

## 2019-04-09 PROCEDURE — 99212 OFFICE O/P EST SF 10 MIN: CPT | Mod: PBBFAC | Performed by: OPHTHALMOLOGY

## 2019-04-09 RX ORDER — TIMOLOL MALEATE 5 MG/ML
1 SOLUTION/ DROPS OPHTHALMIC EVERY MORNING
Qty: 10 ML | Refills: 12 | Status: SHIPPED | OUTPATIENT
Start: 2019-04-09 | End: 2020-05-20

## 2019-04-09 NOTE — PROGRESS NOTES
SUBJECTIVE:   Maria Del Carmen Spence is a 78 y.o. female   Corrected distance visual acuity was 20/25 -2 in the right eye and 20/20 in the left eye.   Chief Complaint   Patient presents with    Glaucoma     here for 4 month iop check states drops make lids grainy         HPI:  HPI     Glaucoma      Additional comments: here for 4 month iop check states drops make lids   grainy               Comments     1. Mild COAG OD>OS (init 24/20) Goal <17  Timolol BID OD(d/c 7/23 due to heart issues)  Rhopressa (irritated but exc IOP 11/11)  2. PCIOL OU (Sulcus OD) (partial dislocation Od)      Latanoprost QHS OU  Dorzolamide OU BID (Patient States This Eye Drop Makes Her Eye Burn & Red)            Last edited by Marissa Harris MA on 4/9/2019  9:47 AM. (History)        Assessment /Plan :  1. Primary open angle glaucoma of both eyes, mild stage Doing well, IOP within acceptable range relative to target IOP and no evidence of progression. Continue current treatment. Reviewed importance of continued compliance with treatment and follow up.  Patient states that the Dorzolamide is irritating her eyes, so change Dorzolamide to Timolol qam OU, patient will discuss with her Cardiologist. If her cardiologist does not let her use the Timolol then she will continue with the Dorzolamide  Continue Latanoprost OU qhs   2. Pseudophakia stable           Return to clinic in 3-4 months  or as needed.  With GOCT, Dilation, HVF 24-2 and SDP

## 2019-04-29 ENCOUNTER — CLINICAL SUPPORT (OUTPATIENT)
Dept: CARDIOLOGY | Facility: CLINIC | Age: 79
End: 2019-04-29
Payer: MEDICARE

## 2019-04-29 ENCOUNTER — OFFICE VISIT (OUTPATIENT)
Dept: CARDIOLOGY | Facility: CLINIC | Age: 79
End: 2019-04-29
Payer: MEDICARE

## 2019-04-29 VITALS
WEIGHT: 196.19 LBS | DIASTOLIC BLOOD PRESSURE: 70 MMHG | SYSTOLIC BLOOD PRESSURE: 128 MMHG | HEART RATE: 82 BPM | BODY MASS INDEX: 32.65 KG/M2

## 2019-04-29 DIAGNOSIS — I48.92 ATRIAL FLUTTER, UNSPECIFIED TYPE: ICD-10-CM

## 2019-04-29 DIAGNOSIS — I48.3 TYPICAL ATRIAL FLUTTER: ICD-10-CM

## 2019-04-29 DIAGNOSIS — I48.92 ATRIAL FLUTTER, UNSPECIFIED TYPE: Primary | ICD-10-CM

## 2019-04-29 DIAGNOSIS — I48.0 PAROXYSMAL ATRIAL FIBRILLATION: Primary | ICD-10-CM

## 2019-04-29 PROCEDURE — 99999 PR PBB SHADOW E&M-EST. PATIENT-LVL III: ICD-10-PCS | Mod: PBBFAC,,, | Performed by: INTERNAL MEDICINE

## 2019-04-29 PROCEDURE — 93010 EKG 12-LEAD: ICD-10-PCS | Mod: S$PBB,,, | Performed by: NUCLEAR MEDICINE

## 2019-04-29 PROCEDURE — 99214 PR OFFICE/OUTPT VISIT, EST, LEVL IV, 30-39 MIN: ICD-10-PCS | Mod: S$PBB,,, | Performed by: INTERNAL MEDICINE

## 2019-04-29 PROCEDURE — 99213 OFFICE O/P EST LOW 20 MIN: CPT | Mod: PBBFAC | Performed by: INTERNAL MEDICINE

## 2019-04-29 PROCEDURE — 93010 ELECTROCARDIOGRAM REPORT: CPT | Mod: S$PBB,,, | Performed by: NUCLEAR MEDICINE

## 2019-04-29 PROCEDURE — 99999 PR PBB SHADOW E&M-EST. PATIENT-LVL III: CPT | Mod: PBBFAC,,, | Performed by: INTERNAL MEDICINE

## 2019-04-29 PROCEDURE — 93005 ELECTROCARDIOGRAM TRACING: CPT | Mod: PBBFAC | Performed by: NUCLEAR MEDICINE

## 2019-04-29 PROCEDURE — 99214 OFFICE O/P EST MOD 30 MIN: CPT | Mod: S$PBB,,, | Performed by: INTERNAL MEDICINE

## 2019-04-29 NOTE — PROGRESS NOTES
Subjective:    Patient ID:  Maria Del Carmen Spence is a 78 y.o. female who presents for follow-up of Atrial Flutter      78 yoF here for AF and AFL management. She developed AF as well as AFL (most ECGs with AFL) since 6/18. With her episodes she has developed syncope and near syncope. Attempts at rate control have resulted in bradycardic symptoms. Amiodarone resulted in symptomatic bradycardia. She has had recurrent events August, October and December 2018- mostly AFL. She has been placed on xarelto for CVA prophylaxis.     Interval history: Cryoballoon PVI and AFL RFA 3/6/19. No recurrence. No arrhythmia symptoms. No procedural complications.     Echo 7/18:  CONCLUSIONS     1 - Concentric remodeling.     2 - No wall motion abnormalities.     3 - Normal left ventricular systolic function (EF 60-65%).     4 - Normal left ventricular diastolic function.     5 - Normal right ventricular systolic function .     6 - The estimated PA systolic pressure is greater than 29 mmHg.     7 - Mild mitral regurgitation.     8 - Mild tricuspid regurgitation.     Past Medical History:  No date: A-fib  No date: Arthritis  No date: Chronic LBP      Comment:  ESIs x 3 with Dr. Hicks, PT at [facility]  No date: Eye pain  No date: Frequent headaches  No date: GERD (gastroesophageal reflux disease)  No date: Glaucoma  No date: Hyperlipemia  No date: Hypertension    Past Surgical History:  3/6/2019: ABLATION, ARRHYTHMOGENIC FOCUS, FOR ATRIAL FIBRILLATION; N/A      Comment:  Performed by Abel Morillo MD at [facility] EP LAB  3/6/2019: ABLATION, ATRIAL FLUTTER, TYPICAL; N/A      Comment:  Performed by Abel Morillo MD at [facility] EP LAB  7/9/2018: CARDIOVERSION; N/A      Comment:  Performed by Will [facility]enthal MD at [facility] CATH LAB  OU: CATARACT EXTRACTION W/  INTRAOCULAR LENS IMPLANT  3/4/2019: ECHOCARDIOGRAM,TRANSESOPHAGEAL; N/A      Comment:  Performed by Will Rosenthal MD at [facility] CATH LAB  7/9/2018: ECHOCARDIOGRAM,TRANSESOPHAGEAL; N/A       Comment:  Performed by [facility] MD at [facility] CATH LAB  No date: TUBAL LIGATION    Social History    Socioeconomic History      Marital status:       Spouse name: Not on file      Number of children: Not on file      Years of education: Not on file      Highest education level: Not on file    Occupational History      Not on file    Social Needs      Financial resource strain: Not on file      Food insecurity:        Worry: Not on file        Inability: Not on file      Transportation needs:        Medical: Not on file        Non-medical: Not on file    Tobacco Use      Smoking status: Never Smoker      Smokeless tobacco: Never Used    Substance and Sexual Activity      Alcohol use: No      Drug use: No      Sexual activity: Yes        Partners: Male    Lifestyle      Physical activity:        Days per week: Not on file        Minutes per session: Not on file      Stress: Not on file    Relationships      Social connections:        Talks on phone: Not on file        Gets together: Not on file        Attends Judaism service: Not on file        Active member of club or organization: Not on file        Attends meetings of clubs or organizations: Not on file        Relationship status: Not on file    Other Topics      Concerns:        Not on file    Social History Narrative      Not on file      Review of patient's family history indicates:  Problem: Glaucoma      Relation: Mother          Age of Onset: (Not Specified)  Problem: Cancer      Relation: Mother          Age of Onset: (Not Specified)  Problem: Cataracts      Relation: Mother          Age of Onset: (Not Specified)  Problem: Hypertension      Relation: Mother          Age of Onset: (Not Specified)  Problem: Hypertension      Relation: Father          Age of Onset: (Not Specified)  Problem: Diabetes      Relation: Paternal Aunt          Age of Onset: (Not Specified)  Problem: Strabismus      Relation: Neg Hx          Age of Onset: (Not  Specified)  Problem: Retinal detachment      Relation: Neg Hx          Age of Onset: (Not Specified)  Problem: Macular degeneration      Relation: Neg Hx          Age of Onset: (Not Specified)  Problem: Blindness      Relation: Neg Hx          Age of Onset: (Not Specified)  Problem: Amblyopia      Relation: Neg Hx          Age of Onset: (Not Specified)  Problem: Stroke      Relation: Neg Hx          Age of Onset: (Not Specified)  Problem: Thyroid disease      Relation: Neg Hx          Age of Onset: (Not Specified)        Review of Systems   Constitution: Negative for malaise/fatigue.   HENT: Negative.    Eyes: Negative.    Cardiovascular: Positive for syncope. Negative for chest pain, dyspnea on exertion, irregular heartbeat, leg swelling, near-syncope and palpitations.   Respiratory: Negative.  Negative for shortness of breath.    Endocrine: Negative.    Hematologic/Lymphatic: Negative.    Skin: Negative.    Musculoskeletal: Negative.    Gastrointestinal: Negative.    Genitourinary: Negative.    Neurological: Negative for dizziness and light-headedness.   Psychiatric/Behavioral: Negative.    Allergic/Immunologic: Negative.         Objective:    Physical Exam   Constitutional: She is oriented to person, place, and time. She appears well-developed and well-nourished. No distress.   HENT:   Head: Normocephalic and atraumatic.   Eyes: Pupils are equal, round, and reactive to light. EOM are normal.   Neck: Normal range of motion. No JVD present. No thyromegaly present.   Cardiovascular: Normal rate, regular rhythm, S1 normal, S2 normal and normal heart sounds. PMI is not displaced. Exam reveals no gallop and no friction rub.   No murmur heard.  Pulmonary/Chest: Effort normal and breath sounds normal. No respiratory distress. She has no wheezes. She has no rales.   Abdominal: Soft. Bowel sounds are normal. She exhibits no distension. There is no tenderness. There is no rebound and no guarding.   Musculoskeletal: Normal  range of motion. She exhibits no edema or tenderness.   Neurological: She is alert and oriented to person, place, and time. No cranial nerve deficit.   Skin: Skin is warm and dry. No rash noted. No erythema.   Psychiatric: She has a normal mood and affect. Her behavior is normal. Judgment and thought content normal.   Vitals reviewed.    ECG: NSR nl WA, QRS, QTc        Assessment:       1. Paroxysmal atrial fibrillation    2. Typical atrial flutter         Plan:       78 yoF AF, AFL s/p combination PVI and CTI ablations. No complications, no recurrence. Continue current therapy. RTC 6m

## 2019-04-30 DIAGNOSIS — K21.9 GASTROESOPHAGEAL REFLUX DISEASE, ESOPHAGITIS PRESENCE NOT SPECIFIED: ICD-10-CM

## 2019-04-30 RX ORDER — FAMOTIDINE 20 MG/1
TABLET, FILM COATED ORAL
Qty: 90 TABLET | Refills: 0 | Status: SHIPPED | OUTPATIENT
Start: 2019-04-30 | End: 2019-07-30 | Stop reason: SDUPTHER

## 2019-05-31 RX ORDER — ATORVASTATIN CALCIUM 10 MG/1
TABLET, FILM COATED ORAL
Qty: 30 TABLET | Refills: 11 | Status: SHIPPED | OUTPATIENT
Start: 2019-05-31 | End: 2020-05-20

## 2019-06-12 ENCOUNTER — OFFICE VISIT (OUTPATIENT)
Dept: CARDIOLOGY | Facility: CLINIC | Age: 79
End: 2019-06-12
Payer: MEDICARE

## 2019-06-12 VITALS
HEIGHT: 65 IN | BODY MASS INDEX: 32.47 KG/M2 | SYSTOLIC BLOOD PRESSURE: 130 MMHG | WEIGHT: 194.88 LBS | DIASTOLIC BLOOD PRESSURE: 68 MMHG | HEART RATE: 88 BPM

## 2019-06-12 DIAGNOSIS — I48.3 TYPICAL ATRIAL FLUTTER: ICD-10-CM

## 2019-06-12 DIAGNOSIS — I48.0 PAF (PAROXYSMAL ATRIAL FIBRILLATION): ICD-10-CM

## 2019-06-12 DIAGNOSIS — I10 ESSENTIAL HYPERTENSION: Primary | ICD-10-CM

## 2019-06-12 PROCEDURE — 99214 OFFICE O/P EST MOD 30 MIN: CPT | Mod: S$PBB,,, | Performed by: INTERNAL MEDICINE

## 2019-06-12 PROCEDURE — 99999 PR PBB SHADOW E&M-EST. PATIENT-LVL III: CPT | Mod: PBBFAC,,, | Performed by: INTERNAL MEDICINE

## 2019-06-12 PROCEDURE — 99999 PR PBB SHADOW E&M-EST. PATIENT-LVL III: ICD-10-PCS | Mod: PBBFAC,,, | Performed by: INTERNAL MEDICINE

## 2019-06-12 PROCEDURE — 99214 PR OFFICE/OUTPT VISIT, EST, LEVL IV, 30-39 MIN: ICD-10-PCS | Mod: S$PBB,,, | Performed by: INTERNAL MEDICINE

## 2019-06-12 PROCEDURE — 99213 OFFICE O/P EST LOW 20 MIN: CPT | Mod: PBBFAC,PO | Performed by: INTERNAL MEDICINE

## 2019-06-12 NOTE — PROGRESS NOTES
Subjective:   Patient ID:  Maria Del Carmen Spence is a 78 y.o. female who presents for follow up of Follow-up      74 yo female, routine f/u  PMH PAF/AFL, s/p PVI cryoballon and CVI in  by Dr. Morillo, glaucoma, s/p knee surgery. Can not climb the stairs due to knee pain.   Had recurrent afib and DCCV in . Failed ADD and had ablation for AFL/PAF in   Echo in  normal EF  Repeat EKG in  sinus  Continue on Xeralto, BB and Lipitor  No palpitation, dizziness and faint  Shoulder, knee and hip joints pain. On tylenol 500 mg 3 times a day      Past Medical History:   Diagnosis Date    A-fib     Arthritis     Chronic LBP     ESIs x 3 with Dr. Hicks, PT at [facility], chiropractor    Eye pain     Frequent headaches     GERD (gastroesophageal reflux disease)     Glaucoma     Hyperlipemia     Hypertension        Past Surgical History:   Procedure Laterality Date    ABLATION, ARRHYTHMOGENIC FOCUS, FOR ATRIAL FIBRILLATION N/A 3/6/2019    Performed by [facility] MD at [facility] EP LAB    ABLATION, ATRIAL FLUTTER, TYPICAL N/A 3/6/2019    Performed by Abel [facility]illo MD at [facility] EP LAB    CARDIOVERSION N/A 7/9/2018    Performed by [facility] at [facility] CATH LAB    CATARACT EXTRACTION W/  INTRAOCULAR LENS IMPLANT  OU    ECHOCARDIOGRAM,TRANSESOPHAGEAL N/A 3/4/2019    Performed by [facility] MD at [facility] CATH LAB    ECHOCARDIOGRAM,TRANSESOPHAGEAL N/A 7/9/2018    Performed by Will [facility] MD at [facility] CATH LAB    TUBAL LIGATION         Social History     Tobacco Use    Smoking status: Never Smoker    Smokeless tobacco: Never Used   Substance Use Topics    Alcohol use: No    Drug use: No       Family History   Problem Relation Age of Onset    Glaucoma Mother     Cancer Mother     Cataracts Mother     Hypertension Mother     Hypertension Father     Diabetes Paternal Aunt     Strabismus Neg Hx     Retinal detachment Neg Hx     Macular degeneration Neg Hx     Blindness Neg Hx      Amblyopia Neg Hx     Stroke Neg Hx     Thyroid disease Neg Hx          Review of Systems   Constitution: Negative for decreased appetite, diaphoresis, fever, malaise/fatigue and night sweats.   HENT: Negative for nosebleeds.    Eyes: Negative for blurred vision and double vision.   Cardiovascular: Negative for chest pain, claudication, irregular heartbeat, leg swelling, near-syncope, orthopnea, palpitations, paroxysmal nocturnal dyspnea and syncope.   Respiratory: Negative for cough, shortness of breath, sleep disturbances due to breathing, snoring, sputum production and wheezing.    Endocrine: Negative for cold intolerance and polyuria.   Hematologic/Lymphatic: Does not bruise/bleed easily.   Skin: Negative for rash.   Musculoskeletal: Positive for arthritis. Negative for back pain, falls, joint pain, joint swelling and neck pain.   Gastrointestinal: Negative for abdominal pain, heartburn, nausea and vomiting.   Genitourinary: Negative for dysuria, frequency and hematuria.   Neurological: Negative for difficulty with concentration, focal weakness, headaches, light-headedness, numbness, seizures and weakness.   Psychiatric/Behavioral: Negative for depression, memory loss and substance abuse. The patient does not have insomnia.    Allergic/Immunologic: Negative for HIV exposure and hives.       Objective:   Physical Exam   Constitutional: She is oriented to person, place, and time. She appears well-nourished.   HENT:   Head: Normocephalic.   Eyes: Pupils are equal, round, and reactive to light.   Neck: Normal carotid pulses and no JVD present. Carotid bruit is not present. No thyromegaly present.   Cardiovascular: Normal rate, regular rhythm, normal heart sounds and normal pulses.  No extrasystoles are present. PMI is not displaced. Exam reveals no gallop and no S3.   No murmur heard.  Pulmonary/Chest: Breath sounds normal. No stridor. No respiratory distress.   Abdominal: Soft. Bowel sounds are normal. There is no  tenderness. There is no rebound.   Musculoskeletal: Normal range of motion.   Neurological: She is alert and oriented to person, place, and time.   Skin: Skin is intact. No rash noted.   Psychiatric: Her behavior is normal.       Lab Results   Component Value Date    CHOL 166 01/22/2019    CHOL 252 (H) 12/07/2018    CHOL 239 (H) 02/08/2017     Lab Results   Component Value Date    HDL 72 01/22/2019    HDL 71 12/07/2018    HDL 69 02/08/2017     Lab Results   Component Value Date    LDLCALC 78.2 01/22/2019    LDLCALC 158.2 12/07/2018    LDLCALC 152.0 02/08/2017     Lab Results   Component Value Date    TRIG 79 01/22/2019    TRIG 114 12/07/2018    TRIG 90 02/08/2017     Lab Results   Component Value Date    CHOLHDL 43.4 01/22/2019    CHOLHDL 28.2 12/07/2018    CHOLHDL 28.9 02/08/2017       Chemistry        Component Value Date/Time     02/27/2019 0828    K 4.5 02/27/2019 0828     02/27/2019 0828    CO2 28 02/27/2019 0828    BUN 20 02/27/2019 0828    CREATININE 0.8 02/27/2019 0828    GLU 65 (L) 02/27/2019 0828        Component Value Date/Time    CALCIUM 10.4 02/27/2019 0828    ALKPHOS 76 01/22/2019 0815    AST 26 01/22/2019 0815    ALT 14 01/22/2019 0815    BILITOT 0.5 01/22/2019 0815    ESTGFRAFRICA >60.0 02/27/2019 0828    EGFRNONAA >60.0 02/27/2019 0828          No results found for: LABA1C, HGBA1C  Lab Results   Component Value Date    TSH 2.716 06/14/2017     Lab Results   Component Value Date    INR 1.1 02/27/2019    INR 1.5 (H) 07/23/2018     Lab Results   Component Value Date    WBC 6.93 02/27/2019    HGB 12.9 02/27/2019    HCT 39.8 02/27/2019     (H) 02/27/2019     02/27/2019     BMP  Sodium   Date Value Ref Range Status   02/27/2019 140 136 - 145 mmol/L Final     Potassium   Date Value Ref Range Status   02/27/2019 4.5 3.5 - 5.1 mmol/L Final     Chloride   Date Value Ref Range Status   02/27/2019 105 95 - 110 mmol/L Final     CO2   Date Value Ref Range Status   02/27/2019 28 23 - 29  mmol/L Final     BUN, Bld   Date Value Ref Range Status   02/27/2019 20 8 - 23 mg/dL Final     Creatinine   Date Value Ref Range Status   02/27/2019 0.8 0.5 - 1.4 mg/dL Final     Calcium   Date Value Ref Range Status   02/27/2019 10.4 8.7 - 10.5 mg/dL Final     Anion Gap   Date Value Ref Range Status   02/27/2019 7 (L) 8 - 16 mmol/L Final     eGFR if    Date Value Ref Range Status   02/27/2019 >60.0 >60 mL/min/1.73 m^2 Final     eGFR if non    Date Value Ref Range Status   02/27/2019 >60.0 >60 mL/min/1.73 m^2 Final     Comment:     Calculation used to obtain the estimated glomerular filtration  rate (eGFR) is the CKD-EPI equation.        BNP  @LABRCNTIP(BNP,BNPTRIAGEBLO)@  @LABRCNTIP(troponini)@  CrCl cannot be calculated (Patient's most recent lab result is older than the maximum 7 days allowed.).  No results found in the last 24 hours.  No results found in the last 24 hours.  No results found in the last 24 hours.    Assessment:      1. Essential hypertension    2. Typical atrial flutter    3. PAF (paroxysmal atrial fibrillation)      Sinus rhythm  BP, LDL and A1c wnl  No CP  Plan:   Continue xeralto, BB and Lipitor  Continue current meds.  Recommend heart-healthy diet, weight control and regular exercise.  Heather. Risk modification.   RTC in 1 yr    I have reviewed all pertinent labs and cardiac studies. Plans and recommendations have been formulated under my direct supervision. All questions answered and patient voiced understanding. Patient to continue current medications.

## 2019-07-02 ENCOUNTER — OFFICE VISIT (OUTPATIENT)
Dept: ORTHOPEDICS | Facility: CLINIC | Age: 79
End: 2019-07-02
Payer: MEDICARE

## 2019-07-02 VITALS
HEIGHT: 65 IN | BODY MASS INDEX: 32.32 KG/M2 | DIASTOLIC BLOOD PRESSURE: 84 MMHG | WEIGHT: 194 LBS | HEART RATE: 66 BPM | SYSTOLIC BLOOD PRESSURE: 146 MMHG

## 2019-07-02 DIAGNOSIS — I48.0 PAROXYSMAL ATRIAL FIBRILLATION: ICD-10-CM

## 2019-07-02 DIAGNOSIS — M17.11 PRIMARY OSTEOARTHRITIS OF RIGHT KNEE: Primary | ICD-10-CM

## 2019-07-02 DIAGNOSIS — E78.2 MIXED HYPERLIPIDEMIA: ICD-10-CM

## 2019-07-02 PROCEDURE — 99214 PR OFFICE/OUTPT VISIT, EST, LEVL IV, 30-39 MIN: ICD-10-PCS | Mod: 25,S$PBB,, | Performed by: FAMILY MEDICINE

## 2019-07-02 PROCEDURE — 99999 PR PBB SHADOW E&M-EST. PATIENT-LVL III: CPT | Mod: PBBFAC,,, | Performed by: FAMILY MEDICINE

## 2019-07-02 PROCEDURE — 99213 OFFICE O/P EST LOW 20 MIN: CPT | Mod: PBBFAC | Performed by: FAMILY MEDICINE

## 2019-07-02 PROCEDURE — 20610 DRAIN/INJ JOINT/BURSA W/O US: CPT | Mod: PBBFAC | Performed by: FAMILY MEDICINE

## 2019-07-02 PROCEDURE — 99999 PR PBB SHADOW E&M-EST. PATIENT-LVL III: ICD-10-PCS | Mod: PBBFAC,,, | Performed by: FAMILY MEDICINE

## 2019-07-02 PROCEDURE — 99214 OFFICE O/P EST MOD 30 MIN: CPT | Mod: 25,S$PBB,, | Performed by: FAMILY MEDICINE

## 2019-07-02 PROCEDURE — 20610 LARGE JOINT ASPIRATION/INJECTION: R KNEE: ICD-10-PCS | Mod: S$PBB,RT,, | Performed by: FAMILY MEDICINE

## 2019-07-02 RX ORDER — TRIAMCINOLONE ACETONIDE 40 MG/ML
80 INJECTION, SUSPENSION INTRA-ARTICULAR; INTRAMUSCULAR
Status: DISCONTINUED | OUTPATIENT
Start: 2019-07-02 | End: 2019-07-02 | Stop reason: HOSPADM

## 2019-07-02 RX ADMIN — TRIAMCINOLONE ACETONIDE 80 MG: 40 INJECTION, SUSPENSION INTRA-ARTICULAR; INTRAMUSCULAR at 03:07

## 2019-07-02 NOTE — PROGRESS NOTES
Subjective:     Patient ID: Maria Del Carmen Spence is a 78 y.o. female.    Chief Complaint: Pain of the Right Knee    Patient is a 78-year-old female presents clinic today for follow-up chronic right knee pain.  Patient is that the previous cortisone injection she had 3 months ago lasted for approximately 3 weeks.  States that during those 3 weeks her knee felt great.  States that over the past 1-2 weeks her knee pain has significantly return.  States the knees being to swell again.  Patient states that she has never had any viscosupplementation in the past.  States she would be amenable to trying it.  Patient states that she has not had any new falls or injuries.  States that she would like to repeat cortisone injection today if possible.    Previous note:  Patient states that she has had this pain for the past couple years.  States that initially she was prescribed Mobic which helped, but over time has become less and less effective.  Patient states she has had cortisone injections in her spine, but have never had an for her knee.  Patient states that the pain has significantly worsened over the past 3-4 weeks.  States she is also getting lower leg swelling with it.  Patient would like to try cortisone injection today if possible.  Has never had any viscosupplementation.      Past Medical History:   Diagnosis Date    A-fib     Arthritis     Chronic LBP     ESIs x 3 with Dr. Hicks, PT at [place], chiropractor    Eye pain     Frequent headaches     GERD (gastroesophageal reflux disease)     Glaucoma     Hyperlipemia     Hypertension      Past Surgical History:   Procedure Laterality Date    ABLATION, ARRHYTHMOGENIC FOCUS, FOR ATRIAL FIBRILLATION N/A 3/6/2019    Performed by [facility]illo MD at [facility] EP LAB    ABLATION, ATRIAL FLUTTER, TYPICAL N/A 3/6/2019    Performed by Abel [facility]illo MD at [facility] EP LAB    CARDIOVERSION N/A 7/9/2018    [facility] CATH LAB    CATARACT EXTRACTION  W/  INTRAOCULAR LENS IMPLANT  OU    ECHOCARDIOGRAM,TRANSESOPHAGEAL N/A 3/4/2019    Performed by [facility] MD at [facility] CATH LAB    ECHOCARDIOGRAM,TRANSESOPHAGEAL N/A 7/9/2018    Performed by [facility] at [facility] CATH LAB    TUBAL LIGATION       Family History   Problem Relation Age of Onset    Glaucoma Mother     Cancer Mother     Cataracts Mother     Hypertension Mother     Hypertension Father     Diabetes Paternal Aunt     Strabismus Neg Hx     Retinal detachment Neg Hx     Macular degeneration Neg Hx     Blindness Neg Hx     Amblyopia Neg Hx     Stroke Neg Hx     Thyroid disease Neg Hx      Social History     Socioeconomic History    Marital status:      Spouse name: Not on file    Number of children: Not on file    Years of education: Not on file    Highest education level: Not on file   Occupational History    Not on file   Social Needs    Financial resource strain: Not on file    Food insecurity:     Worry: Not on file     Inability: Not on file    Transportation needs:     Medical: Not on file     Non-medical: Not on file   Tobacco Use    Smoking status: Never Smoker    Smokeless tobacco: Never Used   Substance and Sexual Activity    Alcohol use: No    Drug use: No    Sexual activity: Yes     Partners: Male   Lifestyle    Physical activity:     Days per week: Not on file     Minutes per session: Not on file    Stress: Not on file   Relationships    Social connections:     Talks on phone: Not on file     Gets together: Not on file     Attends Jainism service: Not on file     Active member of club or organization: Not on file     Attends meetings of clubs or organizations: Not on file     Relationship status: Not on file   Other Topics Concern    Not on file   Social History Narrative    Not on file     Medication List with Changes/Refills   Current Medications    ACETAMINOPHEN (TYLENOL) 500 MG TABLET    Take 500 mg by mouth every 6 (six) hours as needed for Pain.     "ATORVASTATIN (LIPITOR) 10 MG TABLET    TAKE ONE TABLET BY MOUTH EVERY EVENING    CALCIUM PHOSPHATE TRIB/VIT D3 (CITRACAL + D ORAL)    Take 1 tablet by mouth once daily at 6am.     CETIRIZINE HCL (ZYRTEC ORAL)    Take by mouth.    DORZOLAMIDE (TRUSOPT) 2 % OPHTHALMIC SOLUTION    Place 1 drop into both eyes 2 (two) times daily.    FAMOTIDINE (PEPCID) 20 MG TABLET    TAKE ONE TABLET BY MOUTH EVERY DAY    FISH OIL-OMEGA-3 FATTY ACIDS 300-1,000 MG CAPSULE    Take 1 capsule by mouth once daily.    GABAPENTIN (NEURONTIN) 100 MG CAPSULE    100-300 up to TID    GLUCOSAMINE-CHONDROITIN 500-400 MG TABLET    Take 2 tablets by mouth once daily at 6am.     LATANOPROST 0.005 % OPHTHALMIC SOLUTION    instill ONE DROP BOTH EYES AT BEDTIME    METOPROLOL TARTRATE (LOPRESSOR) 25 MG TABLET    Take 1 tablet (25 mg total) by mouth 2 (two) times daily.    MULTIVITAMIN W-MINERALS/LUTEIN (CENTRUM SILVER ORAL)    Take by mouth.    TIMOLOL MALEATE 0.5% (TIMOPTIC) 0.5 % DROP    Place 1 drop into both eyes every morning.    XARELTO 20 MG TAB    TAKE ONE TABLET BY MOUTH EVERY DAY WITH DINNER OR IN THE EVENING MEAL     Review of patient's allergies indicates:   Allergen Reactions    Eliquis [apixaban] Other (See Comments)     Headache, numbness in lip      Review of Systems   Constitutional: Negative for chills, fever and malaise/fatigue.   HENT: Negative for hearing loss.    Eyes: Negative for redness.   Cardiovascular: Negative for leg swelling.   Gastrointestinal: Negative for nausea and vomiting.   Musculoskeletal: Positive for joint pain. Negative for back pain, falls and myalgias.   Skin: Negative for rash.   Neurological: Negative for tingling, sensory change, focal weakness and weakness.        Objective:   Body mass index is 32.28 kg/m².  Vitals:    07/02/19 1524   BP: (!) 146/84   Pulse: 66   Weight: 88 kg (194 lb)   Height: 5' 5" (1.651 m)   PainSc:   8   PainLoc: Knee           General    Nursing note and vitals " reviewed.  Constitutional: She is oriented to person, place, and time. She appears well-developed and well-nourished. No distress.   Eyes: Conjunctivae are normal. No scleral icterus.   Pulmonary/Chest: Effort normal.   Neurological: She is alert and oriented to person, place, and time.   Psychiatric: She has a normal mood and affect. Her behavior is normal. Judgment and thought content normal.     General Musculoskeletal Exam   Gait: abnormal       Right Knee Exam     Inspection   Erythema: absent  Effusion: absent  Deformity: absent    Tenderness   The patient is tender to palpation of the lateral joint line and patella.    Crepitus   The patient has crepitus of the patella and lateral joint line.    Range of Motion   The patient has normal right knee ROM.    Other   Sensation: normal    Muscle Strength   Right Lower Extremity   Quadriceps:  5/5       EXAMINATION:  XR KNEE ORTHO RIGHT    CLINICAL HISTORY:  Unilateral primary osteoarthritis, right knee    TECHNIQUE:  AP standing views of both knees, AP flexion views of both knees, lateral view of the right knee and Merchant views of both knees    COMPARISON:  06/14/2017    FINDINGS:  There is moderate to severe joint space narrowing involving the medial compartment of the left knee.  There is also mild-to-moderate degenerative change involving the lateral compartment of the left knee.  There is severe joint space narrowing and osteophyte formation involving the lateral compartment of the right knee.  No significant joint space narrowing of the medial compartment of the right knee.  Moderate to severe degenerative change and osteophyte formation noted at the right patellofemoral compartment.  No joint effusion.  No acute fracture or dislocation.      Impression       1.  As above      Electronically signed by: Philippe Tapia DO  Date: 03/25/2019  Time: 12:41           Maria Del Carmen was seen today for pain.    Diagnoses and all orders for this visit:    Primary  osteoarthritis of right knee  -     sodium hyaluronate (EUFLEXXA) 10 mg/mL(mw 2.4 -3.6 million) Syrg 10 mg  -     Prior Authorization Order  -     Large Joint Aspiration/Injection: R knee    Mixed hyperlipidemia    Paroxysmal atrial fibrillation    -discussed the clinical course and nature of osteoarthritis with patient.  At this time patient would like to conservative approach with cortisone injections, NSAIDs/Tylenol, and home physical therapy. Cautioned patient to take NSAIDs very sparingly due to her Xarelto. If patient does not get significant relief, would recommend viscosupplementation.  Discussed with patient that the definitive treatment for knee osteoarthritis is total knee replacement.  -Bilateral KneeXray images were independently viewed and read by me showing severe lateral compartment joint space loss on the right knee with severe osteophyte formation.  Left knee has severe medial joint space loss with moderate osteophyte formation.  No acute fractures or dislocations.  -Formal read by radiologist is as described above  -Discussed findings with patient    -Chronic hyperlipidemia.  Managed by patient's PCP.  -Chronic AFib on chronic anticoagulation.  Managed by patient's PCP and Cardiology.      -Treatment options and alternatives were discussed with the patient. Patient expressed understanding. Patient was given the opportunity to ask questions and be an active participant in their medical care. Patient had no further questions or concerns at this time.   -Patient is an overall moderate risk for health complications from their medical conditions.

## 2019-07-02 NOTE — PROCEDURES
Large Joint Aspiration/Injection: R knee  Date/Time: 7/2/2019 3:49 PM  Performed by: Bahman Collier MD  Authorized by: Bahman Collier MD     Consent Done?:  Yes (Verbal)  Indications:  Pain and diagnostic evaluation  Procedure site marked: Yes    Timeout: Prior to procedure the correct patient, procedure, and site was verified      Location:  Knee  Site:  R knee  Prep: Patient was prepped and draped in usual sterile fashion    Ultrasonic Guidance for needle placement: No  Needle size:  25 G  Approach:  Anteromedial  Medications:  80 mg triamcinolone acetonide 40 mg/mL  Patient tolerance:  Patient tolerated the procedure well with no immediate complications    Additional Comments: Patient experienced minimal blood loss (< 3 cc) and clean bandage was applied. Post procedure care was provided to the patient verbally and with their AVS. Patient was instructed to take it easy for the next 24-48 hours and was informed that their pain may increase once the anaesthesia wears off and before the steroid begins to work. Patient was instructed to ice area for 15 minutes and may take Tylenol PRN if pain worsens. Patient was informed to contact clinic or go to the ED if they develop any fever, chills, redness, swelling, or other complications.

## 2019-07-12 ENCOUNTER — OFFICE VISIT (OUTPATIENT)
Dept: INTERNAL MEDICINE | Facility: CLINIC | Age: 79
End: 2019-07-12
Payer: MEDICARE

## 2019-07-12 VITALS
HEART RATE: 59 BPM | WEIGHT: 194.75 LBS | OXYGEN SATURATION: 98 % | TEMPERATURE: 96 F | HEIGHT: 65 IN | SYSTOLIC BLOOD PRESSURE: 126 MMHG | BODY MASS INDEX: 32.45 KG/M2 | DIASTOLIC BLOOD PRESSURE: 72 MMHG

## 2019-07-12 DIAGNOSIS — K21.9 GASTROESOPHAGEAL REFLUX DISEASE, ESOPHAGITIS PRESENCE NOT SPECIFIED: ICD-10-CM

## 2019-07-12 DIAGNOSIS — G89.29 CHRONIC BILATERAL LOW BACK PAIN WITH RIGHT-SIDED SCIATICA: Primary | ICD-10-CM

## 2019-07-12 DIAGNOSIS — R20.2 PARESTHESIA OF BOTH FEET: ICD-10-CM

## 2019-07-12 DIAGNOSIS — M54.41 CHRONIC BILATERAL LOW BACK PAIN WITH RIGHT-SIDED SCIATICA: Primary | ICD-10-CM

## 2019-07-12 PROCEDURE — 99213 OFFICE O/P EST LOW 20 MIN: CPT | Mod: S$PBB,,, | Performed by: FAMILY MEDICINE

## 2019-07-12 PROCEDURE — 99999 PR PBB SHADOW E&M-EST. PATIENT-LVL III: ICD-10-PCS | Mod: PBBFAC,,, | Performed by: FAMILY MEDICINE

## 2019-07-12 PROCEDURE — 99213 PR OFFICE/OUTPT VISIT, EST, LEVL III, 20-29 MIN: ICD-10-PCS | Mod: S$PBB,,, | Performed by: FAMILY MEDICINE

## 2019-07-12 PROCEDURE — 99999 PR PBB SHADOW E&M-EST. PATIENT-LVL III: CPT | Mod: PBBFAC,,, | Performed by: FAMILY MEDICINE

## 2019-07-12 PROCEDURE — 99213 OFFICE O/P EST LOW 20 MIN: CPT | Mod: PBBFAC,PO | Performed by: FAMILY MEDICINE

## 2019-07-12 RX ORDER — CHOLECALCIFEROL (VITAMIN D3) 125 MCG
CAPSULE ORAL 3 TIMES DAILY
Status: ON HOLD | COMMUNITY
End: 2020-11-03 | Stop reason: HOSPADM

## 2019-07-12 RX ORDER — NAPROXEN SODIUM 220 MG
220 TABLET ORAL DAILY
COMMUNITY
End: 2023-02-08

## 2019-07-12 RX ORDER — GABAPENTIN 300 MG/1
300 CAPSULE ORAL 3 TIMES DAILY
Qty: 270 CAPSULE | Refills: 3 | Status: SHIPPED | OUTPATIENT
Start: 2019-07-12 | End: 2020-07-16

## 2019-07-12 NOTE — PATIENT INSTRUCTIONS
"Talk to your pharmacy and/or your insurance about the "Shingrix" shingles vaccine.    "AG Pro" can be helpful for hair loss.  "

## 2019-07-12 NOTE — PROGRESS NOTES
Subjective:       Patient ID: Maria Del Carmen Spence is a 78 y.o. female.    Chief Complaint: f/u back pain    Here for follow-up on chronic low back pain with right-sided sciatica for which she uses gabapentin.  Also uses OTC aleve, new, once daily and tyenol  two daily in AM.  sts has toe tingling off and on R>L. Can't tell if this is less than in past.  States shoulder pain is better. Back pain comes and goes - taking full 300 mg seb TID - thinks episodes may be less frequent. Today her R buttock is hurting. States not every day. Recent last week second of 3 steroid IA doses to R knee.    Lab Results       Component                Value               Date                       WBC                      6.93                02/27/2019                 HGB                      12.9                02/27/2019                 HCT                      39.8                02/27/2019                 PLT                      294                 02/27/2019                 CHOL                     166                 01/22/2019                 TRIG                     79                  01/22/2019                 HDL                      72                  01/22/2019                 LDLCALC                  78.2                01/22/2019                 ALT                      14                  01/22/2019                 AST                      26                  01/22/2019                 NA                       140                 02/27/2019                 K                        4.5                 02/27/2019                 CL                       105                 02/27/2019                 CALCIUM                  10.4                02/27/2019                 CREATININE               0.8                 02/27/2019                 BUN                      20                  02/27/2019                 CO2                      28                  02/27/2019                 TSH                      2.716                06/14/2017                 INR                      1.1                 02/27/2019                 GLU                      65 (L)              02/27/2019                 ESTGFRAFRICA             >60.0               02/27/2019                 EGFRNONAA                >60.0               02/27/2019            Note macrocytosis which is new in this patient.  She is not anemic.  She had cardiac ablation in March for her atrial flutter.  Still on Xarelto and under the care of Dr. Morillo.    She is here for refill on gabapentin - she sts this med is helping her with numbness tingling in her feet which only happens off and on. As does the right sciatica. She still has numbness to her big toe - used to be to lateral foot.    sts for 4-6 weeks she notes hair loss - started taking biotin TID just recently.     Takes pepcid 20 daily - will continue to refill as needed. Does not try skipping. Advised OK to conitnue to take daily.    Review of Systems   Constitutional: Positive for fatigue (takes midday nap).   Cardiovascular: Negative for leg swelling.   Musculoskeletal: Positive for back pain and myalgias.   Neurological: Positive for numbness. Negative for headaches.   Psychiatric/Behavioral: Negative for sleep disturbance.       Objective:      Physical Exam   Constitutional: She is oriented to person, place, and time. She appears well-developed.   HENT:   Head: Normocephalic and atraumatic.   Cardiovascular: Normal rate, regular rhythm and normal heart sounds.   Pulmonary/Chest: Effort normal and breath sounds normal.   Neurological: She is alert and oriented to person, place, and time.   Skin: Skin is warm and dry.   Psychiatric: She has a normal mood and affect. Her behavior is normal.         Assessment/Plan:     1. Chronic bilateral low back pain with right-sided sciatica  gabapentin (NEURONTIN) 300 MG capsule   2. Paresthesia of both feet  gabapentin (NEURONTIN) 300 MG capsule   3. Gastroesophageal reflux  disease, esophagitis presence not specified     she will use up the current 100 mg tabs - taking three TID. No somnolence. Change to 300 Mg TID.  Recheck 6 months.

## 2019-07-22 ENCOUNTER — OFFICE VISIT (OUTPATIENT)
Dept: OPHTHALMOLOGY | Facility: CLINIC | Age: 79
End: 2019-07-22
Payer: MEDICARE

## 2019-07-22 DIAGNOSIS — H40.1131 PRIMARY OPEN ANGLE GLAUCOMA OF BOTH EYES, MILD STAGE: Primary | ICD-10-CM

## 2019-07-22 DIAGNOSIS — Z96.1 PSEUDOPHAKIA: ICD-10-CM

## 2019-07-22 PROCEDURE — 92250 FUNDUS PHOTOGRAPHY W/I&R: CPT | Mod: PBBFAC | Performed by: OPHTHALMOLOGY

## 2019-07-22 PROCEDURE — 92014 COMPRE OPH EXAM EST PT 1/>: CPT | Mod: S$PBB,,, | Performed by: OPHTHALMOLOGY

## 2019-07-22 PROCEDURE — 92083 HUMPHREY VISUAL FIELD - OU - BOTH EYES: ICD-10-PCS | Mod: 26,S$PBB,, | Performed by: OPHTHALMOLOGY

## 2019-07-22 PROCEDURE — 92133 CPTRZD OPH DX IMG PST SGM ON: CPT | Mod: PBBFAC | Performed by: OPHTHALMOLOGY

## 2019-07-22 PROCEDURE — 99999 PR PBB SHADOW E&M-EST. PATIENT-LVL II: ICD-10-PCS | Mod: PBBFAC,,, | Performed by: OPHTHALMOLOGY

## 2019-07-22 PROCEDURE — 92083 EXTENDED VISUAL FIELD XM: CPT | Mod: PBBFAC | Performed by: OPHTHALMOLOGY

## 2019-07-22 PROCEDURE — 99999 PR PBB SHADOW E&M-EST. PATIENT-LVL II: CPT | Mod: PBBFAC,,, | Performed by: OPHTHALMOLOGY

## 2019-07-22 PROCEDURE — 92014 PR EYE EXAM, EST PATIENT,COMPREHESV: ICD-10-PCS | Mod: S$PBB,,, | Performed by: OPHTHALMOLOGY

## 2019-07-22 PROCEDURE — 92133 CPTRZD OPH DX IMG PST SGM ON: CPT | Mod: 26,S$PBB,, | Performed by: OPHTHALMOLOGY

## 2019-07-22 PROCEDURE — 92133 COLOR FUNDUS PHOTOGRAPHY - OU - BOTH EYES: ICD-10-PCS | Mod: 26,S$PBB,, | Performed by: OPHTHALMOLOGY

## 2019-07-22 PROCEDURE — 99212 OFFICE O/P EST SF 10 MIN: CPT | Mod: PBBFAC | Performed by: OPHTHALMOLOGY

## 2019-07-22 NOTE — PROGRESS NOTES
SUBJECTIVE:   Maria Del Carmen Spence is a 78 y.o. female   Corrected distance visual acuity was 20/20 -1 in the right eye and 20/20 -1 in the left eye.   Chief Complaint   Patient presents with    Glaucoma        HPI:  HPI     Pt here for 4m HVF SDP GOCT chk.  No pain or discomfort.  VA stable.  100% compliant with gtts.     1. Mild COAG OD>OS (init 24/20) Goal <17  Timolol BID OD(d/c 7/18 due to heart issues)  Rhopressa (irritated but exc IOP 11/11)  2. PCIOL OU (Sulcus OD) (partial dislocation Od)      Latanoprost QHS OU  Timolol QAM OU  Dorzolamide OU BID (Not taking, patient States This Eye Drop Makes Her Eye   Burn & Red)    Last edited by Everett Hoffmann, Patient Care Assistant on 7/22/2019  2:13   PM. (History)        Assessment /Plan :  1. Primary open angle glaucoma of both eyes, mild stage Doing well, IOP within acceptable range relative to target IOP and no evidence of progression. Continue current treatment. Reviewed importance of continued compliance with treatment and follow up.     2. Pseudophakia  -- Condition stable, no therapeutic change required. Monitoring routinely.       Return to clinic in 4 months  or as needed.  With IOP Check

## 2019-07-30 DIAGNOSIS — K21.9 GASTROESOPHAGEAL REFLUX DISEASE, ESOPHAGITIS PRESENCE NOT SPECIFIED: ICD-10-CM

## 2019-07-30 RX ORDER — FAMOTIDINE 20 MG/1
TABLET, FILM COATED ORAL
Qty: 90 TABLET | Refills: 1 | Status: SHIPPED | OUTPATIENT
Start: 2019-07-30 | End: 2020-01-13 | Stop reason: SDUPTHER

## 2019-09-11 ENCOUNTER — PES CALL (OUTPATIENT)
Dept: ADMINISTRATIVE | Facility: CLINIC | Age: 79
End: 2019-09-11

## 2019-10-16 ENCOUNTER — OFFICE VISIT (OUTPATIENT)
Dept: CARDIOLOGY | Facility: CLINIC | Age: 79
End: 2019-10-16
Payer: MEDICARE

## 2019-10-16 VITALS
DIASTOLIC BLOOD PRESSURE: 77 MMHG | OXYGEN SATURATION: 98 % | BODY MASS INDEX: 32.87 KG/M2 | WEIGHT: 197.31 LBS | HEIGHT: 65 IN | SYSTOLIC BLOOD PRESSURE: 119 MMHG | HEART RATE: 80 BPM

## 2019-10-16 DIAGNOSIS — I48.3 TYPICAL ATRIAL FLUTTER: ICD-10-CM

## 2019-10-16 DIAGNOSIS — I48.0 PAF (PAROXYSMAL ATRIAL FIBRILLATION): Primary | ICD-10-CM

## 2019-10-16 DIAGNOSIS — E78.2 MIXED HYPERLIPIDEMIA: ICD-10-CM

## 2019-10-16 DIAGNOSIS — I10 ESSENTIAL HYPERTENSION: ICD-10-CM

## 2019-10-16 PROCEDURE — 99999 PR PBB SHADOW E&M-EST. PATIENT-LVL III: CPT | Mod: PBBFAC,,, | Performed by: INTERNAL MEDICINE

## 2019-10-16 PROCEDURE — 99999 PR PBB SHADOW E&M-EST. PATIENT-LVL III: ICD-10-PCS | Mod: PBBFAC,,, | Performed by: INTERNAL MEDICINE

## 2019-10-16 PROCEDURE — 99214 PR OFFICE/OUTPT VISIT, EST, LEVL IV, 30-39 MIN: ICD-10-PCS | Mod: S$PBB,,, | Performed by: INTERNAL MEDICINE

## 2019-10-16 PROCEDURE — 99213 OFFICE O/P EST LOW 20 MIN: CPT | Mod: PBBFAC,PO | Performed by: INTERNAL MEDICINE

## 2019-10-16 PROCEDURE — 99214 OFFICE O/P EST MOD 30 MIN: CPT | Mod: S$PBB,,, | Performed by: INTERNAL MEDICINE

## 2019-10-16 NOTE — PROGRESS NOTES
Subjective:   Patient ID:  Maria Del Carmen Spence is a 79 y.o. female who presents for follow up of Essential hypertension and Atrial Fibrillation      78 yo female, routine f/u  PMH PAF/AFL, s/p PVI cryoballon and CVI in  by Dr. Morillo, glaucoma, s/p knee surgery. Can not climb the stairs due to knee pain.   Echo in  normal EF  Repeat EKG in  sinus  Continue on Xeralto, BB and Lipitor  No chest pain, palpitation, dizziness and faint. Sometime could not lie on left side due to chest discomfort  Shoulder, knee and hip joints pain. On tylenol 500 mg 3 times a day. Most activity limited by lower back pain      Past Medical History:   Diagnosis Date    A-fib     Arthritis     Chronic LBP     ESIs x 3 with Dr. Hicks, PT at [facility], chiropractor    Eye pain     Frequent headaches     GERD (gastroesophageal reflux disease)     Glaucoma     Hyperlipemia     Hypertension        Past Surgical History:   Procedure Laterality Date    ABLATION OF ARRHYTHMOGENIC FOCUS FOR ATRIAL FIBRILLATION N/A 3/6/2019    Procedure: ABLATION, ARRHYTHMOGENIC FOCUS, FOR ATRIAL FIBRILLATION;  Surgeon: Abel Morillo MD;  Location: [facility] EP LAB;  Service: Cardiology;  Laterality: N/A;    CARDIOVERSION N/A 7/9/2018    Procedure: CARDIOVERSION;  Surgeon: Will Rosenthal MD;  Location: [facility] CATH LAB;  Service: Cardiology;  Laterality: N/A;    CATARACT EXTRACTION W/  INTRAOCULAR LENS IMPLANT  OU    TUBAL LIGATION         Social History     Tobacco Use    Smoking status: Never Smoker    Smokeless tobacco: Never Used   Substance Use Topics    Alcohol use: No    Drug use: No       Family History   Problem Relation Age of Onset    Glaucoma Mother     Cancer Mother     Cataracts Mother     Hypertension Mother     Hypertension Father     Diabetes Paternal Aunt     Strabismus Neg Hx     Retinal detachment Neg Hx     Macular degeneration Neg Hx     Blindness Neg Hx     Amblyopia Neg Hx     Stroke Neg Hx     Thyroid  disease Neg Hx          Review of Systems   Constitution: Negative for decreased appetite, diaphoresis, fever, malaise/fatigue and night sweats.   HENT: Negative for nosebleeds.    Eyes: Negative for blurred vision and double vision.   Cardiovascular: Negative for chest pain, claudication, irregular heartbeat, leg swelling, near-syncope, orthopnea, palpitations, paroxysmal nocturnal dyspnea and syncope.   Respiratory: Negative for cough, shortness of breath, sleep disturbances due to breathing, snoring, sputum production and wheezing.    Endocrine: Negative for cold intolerance and polyuria.   Hematologic/Lymphatic: Does not bruise/bleed easily.   Skin: Negative for rash.   Musculoskeletal: Positive for arthritis, back pain, joint pain and joint swelling. Negative for falls and neck pain.   Gastrointestinal: Negative for abdominal pain, heartburn, nausea and vomiting.   Genitourinary: Negative for dysuria, frequency and hematuria.   Neurological: Negative for difficulty with concentration, focal weakness, headaches, light-headedness, numbness, seizures and weakness.   Psychiatric/Behavioral: Negative for depression, memory loss and substance abuse. The patient does not have insomnia.    Allergic/Immunologic: Negative for HIV exposure and hives.       Objective:   Physical Exam   Constitutional: She is oriented to person, place, and time. She appears well-nourished.   HENT:   Head: Normocephalic.   Eyes: Pupils are equal, round, and reactive to light.   Neck: Normal carotid pulses and no JVD present. Carotid bruit is not present. No thyromegaly present.   Cardiovascular: Normal rate, regular rhythm, normal heart sounds and normal pulses.  No extrasystoles are present. PMI is not displaced. Exam reveals no gallop and no S3.   No murmur heard.  Pulmonary/Chest: Breath sounds normal. No stridor. No respiratory distress.   Abdominal: Soft. Bowel sounds are normal. There is no tenderness. There is no rebound.    Musculoskeletal: Normal range of motion.   Neurological: She is alert and oriented to person, place, and time.   Skin: Skin is intact. No rash noted.   Psychiatric: Her behavior is normal.       Lab Results   Component Value Date    CHOL 166 01/22/2019    CHOL 252 (H) 12/07/2018    CHOL 239 (H) 02/08/2017     Lab Results   Component Value Date    HDL 72 01/22/2019    HDL 71 12/07/2018    HDL 69 02/08/2017     Lab Results   Component Value Date    LDLCALC 78.2 01/22/2019    LDLCALC 158.2 12/07/2018    LDLCALC 152.0 02/08/2017     Lab Results   Component Value Date    TRIG 79 01/22/2019    TRIG 114 12/07/2018    TRIG 90 02/08/2017     Lab Results   Component Value Date    CHOLHDL 43.4 01/22/2019    CHOLHDL 28.2 12/07/2018    CHOLHDL 28.9 02/08/2017       Chemistry        Component Value Date/Time     02/27/2019 0828    K 4.5 02/27/2019 0828     02/27/2019 0828    CO2 28 02/27/2019 0828    BUN 20 02/27/2019 0828    CREATININE 0.8 02/27/2019 0828    GLU 65 (L) 02/27/2019 0828        Component Value Date/Time    CALCIUM 10.4 02/27/2019 0828    ALKPHOS 76 01/22/2019 0815    AST 26 01/22/2019 0815    ALT 14 01/22/2019 0815    BILITOT 0.5 01/22/2019 0815    ESTGFRAFRICA >60.0 02/27/2019 0828    EGFRNONAA >60.0 02/27/2019 0828          No results found for: LABA1C, HGBA1C  Lab Results   Component Value Date    TSH 2.716 06/14/2017     Lab Results   Component Value Date    INR 1.1 02/27/2019    INR 1.5 (H) 07/23/2018     Lab Results   Component Value Date    WBC 6.93 02/27/2019    HGB 12.9 02/27/2019    HCT 39.8 02/27/2019     (H) 02/27/2019     02/27/2019     BMP  Sodium   Date Value Ref Range Status   02/27/2019 140 136 - 145 mmol/L Final     Potassium   Date Value Ref Range Status   02/27/2019 4.5 3.5 - 5.1 mmol/L Final     Chloride   Date Value Ref Range Status   02/27/2019 105 95 - 110 mmol/L Final     CO2   Date Value Ref Range Status   02/27/2019 28 23 - 29 mmol/L Final     BUN, Bld   Date  Value Ref Range Status   02/27/2019 20 8 - 23 mg/dL Final     Creatinine   Date Value Ref Range Status   02/27/2019 0.8 0.5 - 1.4 mg/dL Final     Calcium   Date Value Ref Range Status   02/27/2019 10.4 8.7 - 10.5 mg/dL Final     Anion Gap   Date Value Ref Range Status   02/27/2019 7 (L) 8 - 16 mmol/L Final     eGFR if    Date Value Ref Range Status   02/27/2019 >60.0 >60 mL/min/1.73 m^2 Final     eGFR if non    Date Value Ref Range Status   02/27/2019 >60.0 >60 mL/min/1.73 m^2 Final     Comment:     Calculation used to obtain the estimated glomerular filtration  rate (eGFR) is the CKD-EPI equation.        BNP  @LABRCNTIP(BNP,BNPTRIAGEBLO)@  @LABRCNTIP(troponini)@  CrCl cannot be calculated (Patient's most recent lab result is older than the maximum 7 days allowed.).  No results found in the last 24 hours.  No results found in the last 24 hours.  No results found in the last 24 hours.    Assessment:      1. PAF (paroxysmal atrial fibrillation)    2. Typical atrial flutter    3. Mixed hyperlipidemia    4. Essential hypertension      PAF on sinus  BP LDL and BS wnl  No cp and chf    Plan:   Continue xeralto 20 mg, BB and Lipitor  DASH  Recommend heart-healthy diet, weight control and regular exercise.  Heather. Risk modification.   RTC in 1 yr    I have reviewed all pertinent labs and cardiac studies. Plans and recommendations have been formulated under my direct supervision. All questions answered and patient voiced understanding. Patient to continue current medications.

## 2019-11-19 ENCOUNTER — OFFICE VISIT (OUTPATIENT)
Dept: OPHTHALMOLOGY | Facility: CLINIC | Age: 79
End: 2019-11-19
Payer: MEDICARE

## 2019-11-19 DIAGNOSIS — H40.1131 PRIMARY OPEN ANGLE GLAUCOMA OF BOTH EYES, MILD STAGE: Primary | ICD-10-CM

## 2019-11-19 DIAGNOSIS — H04.129 DRY EYE: ICD-10-CM

## 2019-11-19 DIAGNOSIS — Z96.1 PSEUDOPHAKIA: ICD-10-CM

## 2019-11-19 PROCEDURE — 99999 PR PBB SHADOW E&M-EST. PATIENT-LVL II: ICD-10-PCS | Mod: PBBFAC,,, | Performed by: OPHTHALMOLOGY

## 2019-11-19 PROCEDURE — 99999 PR PBB SHADOW E&M-EST. PATIENT-LVL II: CPT | Mod: PBBFAC,,, | Performed by: OPHTHALMOLOGY

## 2019-11-19 PROCEDURE — 92012 INTRM OPH EXAM EST PATIENT: CPT | Mod: S$PBB,,, | Performed by: OPHTHALMOLOGY

## 2019-11-19 PROCEDURE — 92012 PR EYE EXAM, EST PATIENT,INTERMED: ICD-10-PCS | Mod: S$PBB,,, | Performed by: OPHTHALMOLOGY

## 2019-11-19 PROCEDURE — 99212 OFFICE O/P EST SF 10 MIN: CPT | Mod: PBBFAC | Performed by: OPHTHALMOLOGY

## 2019-11-19 NOTE — PROGRESS NOTES
SUBJECTIVE:   Maria Del Carmen Spence is a 79 y.o. female   Corrected distance visual acuity was 20/20 -1 in the right eye and 20/20 in the left eye.   Chief Complaint   Patient presents with    Glaucoma        HPI:  HPI     4 month iop check doing well no concerns only that ou itch at times     1. Mild COAG OD>OS (init 24/20) Goal <17  Rhopressa (irritated but exc IOP 11/11)  Dorzolamide OU BID (Not taking, patient States This Eye Drop Makes Her Eye   Burn & Red)  2. PCIOL OU (Sulcus OD) (partial dislocation Od)      Latanoprost QHS OU  Timolol QAM OU    Last edited by Marissa Harris MA on 11/19/2019  8:52 AM. (History)        Assessment /Plan :  1. Primary open angle glaucoma of both eyes, mild stage Doing well, IOP within acceptable range relative to target IOP and no evidence of progression. Continue current treatment. Reviewed importance of continued compliance with treatment and follow up.     2. Pseudophakia  -- Condition stable, no therapeutic change required. Monitoring routinely.     3. Dry eye Findings and symptoms consistent with dry eyes. Dry eye instruction sheet reviewed with patient recommending:AT's qid/titrate prn, Lubricating Oint qhs and prn and [place] 3 Fish Oils 9053-7176 mg po bid       Return to clinic in 4 months  or as needed.  With IOP Check

## 2019-12-19 DIAGNOSIS — I48.0 PAROXYSMAL ATRIAL FIBRILLATION: ICD-10-CM

## 2019-12-20 RX ORDER — RIVAROXABAN 20 MG/1
TABLET, FILM COATED ORAL
Qty: 30 TABLET | Refills: 11 | Status: SHIPPED | OUTPATIENT
Start: 2019-12-20 | End: 2020-11-17

## 2020-01-07 DIAGNOSIS — I48.91 ATRIAL FIBRILLATION, UNSPECIFIED TYPE: Primary | ICD-10-CM

## 2020-01-13 ENCOUNTER — OFFICE VISIT (OUTPATIENT)
Dept: INTERNAL MEDICINE | Facility: CLINIC | Age: 80
End: 2020-01-13
Payer: MEDICARE

## 2020-01-13 VITALS
SYSTOLIC BLOOD PRESSURE: 132 MMHG | OXYGEN SATURATION: 93 % | HEIGHT: 65 IN | DIASTOLIC BLOOD PRESSURE: 70 MMHG | WEIGHT: 195.69 LBS | TEMPERATURE: 99 F | BODY MASS INDEX: 32.6 KG/M2 | HEART RATE: 74 BPM

## 2020-01-13 DIAGNOSIS — E78.2 MIXED HYPERLIPIDEMIA: ICD-10-CM

## 2020-01-13 DIAGNOSIS — I77.1 TORTUOUS AORTA: ICD-10-CM

## 2020-01-13 DIAGNOSIS — K21.9 GASTROESOPHAGEAL REFLUX DISEASE, ESOPHAGITIS PRESENCE NOT SPECIFIED: ICD-10-CM

## 2020-01-13 DIAGNOSIS — I10 ESSENTIAL HYPERTENSION: Primary | ICD-10-CM

## 2020-01-13 DIAGNOSIS — M17.11 ARTHRITIS OF KNEE, RIGHT: ICD-10-CM

## 2020-01-13 LAB
BASOPHILS # BLD AUTO: 0.03 K/UL (ref 0–0.2)
BASOPHILS NFR BLD: 0.4 % (ref 0–1.9)
DIFFERENTIAL METHOD: ABNORMAL
EOSINOPHIL # BLD AUTO: 0.2 K/UL (ref 0–0.5)
EOSINOPHIL NFR BLD: 2 % (ref 0–8)
ERYTHROCYTE [DISTWIDTH] IN BLOOD BY AUTOMATED COUNT: 12.8 % (ref 11.5–14.5)
HCT VFR BLD AUTO: 36.9 % (ref 37–48.5)
HGB BLD-MCNC: 11 G/DL (ref 12–16)
IMM GRANULOCYTES # BLD AUTO: 0.02 K/UL (ref 0–0.04)
IMM GRANULOCYTES NFR BLD AUTO: 0.2 % (ref 0–0.5)
LYMPHOCYTES # BLD AUTO: 2.7 K/UL (ref 1–4.8)
LYMPHOCYTES NFR BLD: 33.1 % (ref 18–48)
MCH RBC QN AUTO: 30.1 PG (ref 27–31)
MCHC RBC AUTO-ENTMCNC: 29.8 G/DL (ref 32–36)
MCV RBC AUTO: 101 FL (ref 82–98)
MONOCYTES # BLD AUTO: 1 K/UL (ref 0.3–1)
MONOCYTES NFR BLD: 12.2 % (ref 4–15)
NEUTROPHILS # BLD AUTO: 4.2 K/UL (ref 1.8–7.7)
NEUTROPHILS NFR BLD: 52.1 % (ref 38–73)
NRBC BLD-RTO: 0 /100 WBC
PLATELET # BLD AUTO: 312 K/UL (ref 150–350)
PMV BLD AUTO: 11.1 FL (ref 9.2–12.9)
RBC # BLD AUTO: 3.66 M/UL (ref 4–5.4)
WBC # BLD AUTO: 8.01 K/UL (ref 3.9–12.7)

## 2020-01-13 PROCEDURE — 99213 OFFICE O/P EST LOW 20 MIN: CPT | Mod: PBBFAC,PO | Performed by: FAMILY MEDICINE

## 2020-01-13 PROCEDURE — 99999 PR PBB SHADOW E&M-EST. PATIENT-LVL III: CPT | Mod: PBBFAC,,, | Performed by: FAMILY MEDICINE

## 2020-01-13 PROCEDURE — 99213 OFFICE O/P EST LOW 20 MIN: CPT | Mod: S$PBB,,, | Performed by: FAMILY MEDICINE

## 2020-01-13 PROCEDURE — 99999 PR PBB SHADOW E&M-EST. PATIENT-LVL III: ICD-10-PCS | Mod: PBBFAC,,, | Performed by: FAMILY MEDICINE

## 2020-01-13 PROCEDURE — 99213 PR OFFICE/OUTPT VISIT, EST, LEVL III, 20-29 MIN: ICD-10-PCS | Mod: S$PBB,,, | Performed by: FAMILY MEDICINE

## 2020-01-13 PROCEDURE — 80061 LIPID PANEL: CPT

## 2020-01-13 PROCEDURE — 80053 COMPREHEN METABOLIC PANEL: CPT

## 2020-01-13 PROCEDURE — 85025 COMPLETE CBC W/AUTO DIFF WBC: CPT

## 2020-01-13 RX ORDER — FAMOTIDINE 20 MG/1
20 TABLET, FILM COATED ORAL 2 TIMES DAILY
Qty: 180 TABLET | Refills: 3 | Status: SHIPPED | OUTPATIENT
Start: 2020-01-13 | End: 2021-01-14

## 2020-01-13 NOTE — PROGRESS NOTES
Subjective:       Patient ID: Maria Del Carmen Spence is a 79 y.o. female.    Chief Complaint: follow-up  heart surgery    Here for periodic recheck - still in sinus rhythm following ablation in March last year.  States pepcid 20 daily has not been working well fo the past month - she is taking a pepcid complete about 3 x week for sympotms - it is effective when added.  Taking one alleve with 2 tylenol ES every AM per her cardiologists permission.    Review of Systems   Respiratory: Negative for cough and shortness of breath.    Cardiovascular: Negative for leg swelling.   Gastrointestinal:        Reflux, GERD   Musculoskeletal: Positive for arthralgias (L knee, L hip, L shoulder) and back pain.       Objective:      Physical Exam   Constitutional: She is oriented to person, place, and time. She appears well-developed.   HENT:   Head: Normocephalic and atraumatic.   Cardiovascular: Normal rate, regular rhythm and normal heart sounds.   Pulmonary/Chest: Effort normal and breath sounds normal.   Neurological: She is alert and oriented to person, place, and time.   Skin: Skin is warm and dry.   Psychiatric: She has a normal mood and affect. Her behavior is normal.         Assessment/Plan:     1. Essential hypertension  CBC auto differential    Comprehensive metabolic panel   2. Gastroesophageal reflux disease, esophagitis presence not specified  famotidine (PEPCID) 20 MG tablet   3. Arthritis of knee, right     4. Tortuous aorta     5. Mixed hyperlipidemia  Lipid panel   stop mints of any sort.  Try increased dose pepcid daily - will go to PPI if needed after being on increased dose a month.  Labs today and recheck in 6 months and as needed.

## 2020-01-14 LAB
ALBUMIN SERPL BCP-MCNC: 3.9 G/DL (ref 3.5–5.2)
ALP SERPL-CCNC: 72 U/L (ref 55–135)
ALT SERPL W/O P-5'-P-CCNC: 15 U/L (ref 10–44)
ANION GAP SERPL CALC-SCNC: 9 MMOL/L (ref 8–16)
AST SERPL-CCNC: 26 U/L (ref 10–40)
BILIRUB SERPL-MCNC: 0.3 MG/DL (ref 0.1–1)
BUN SERPL-MCNC: 21 MG/DL (ref 8–23)
CALCIUM SERPL-MCNC: 10.2 MG/DL (ref 8.7–10.5)
CHLORIDE SERPL-SCNC: 107 MMOL/L (ref 95–110)
CHOLEST SERPL-MCNC: 158 MG/DL (ref 120–199)
CHOLEST/HDLC SERPL: 2.4 {RATIO} (ref 2–5)
CO2 SERPL-SCNC: 25 MMOL/L (ref 23–29)
CREAT SERPL-MCNC: 0.8 MG/DL (ref 0.5–1.4)
EST. GFR  (AFRICAN AMERICAN): >60 ML/MIN/1.73 M^2
EST. GFR  (NON AFRICAN AMERICAN): >60 ML/MIN/1.73 M^2
GLUCOSE SERPL-MCNC: 76 MG/DL (ref 70–110)
HDLC SERPL-MCNC: 65 MG/DL (ref 40–75)
HDLC SERPL: 41.1 % (ref 20–50)
LDLC SERPL CALC-MCNC: 73.6 MG/DL (ref 63–159)
NONHDLC SERPL-MCNC: 93 MG/DL
POTASSIUM SERPL-SCNC: 5.8 MMOL/L (ref 3.5–5.1)
PROT SERPL-MCNC: 7.2 G/DL (ref 6–8.4)
SODIUM SERPL-SCNC: 141 MMOL/L (ref 136–145)
TRIGL SERPL-MCNC: 97 MG/DL (ref 30–150)

## 2020-01-15 ENCOUNTER — OFFICE VISIT (OUTPATIENT)
Dept: CARDIOLOGY | Facility: CLINIC | Age: 80
End: 2020-01-15
Payer: MEDICARE

## 2020-01-15 ENCOUNTER — CLINICAL SUPPORT (OUTPATIENT)
Dept: CARDIOLOGY | Facility: CLINIC | Age: 80
End: 2020-01-15
Payer: MEDICARE

## 2020-01-15 VITALS
BODY MASS INDEX: 32.51 KG/M2 | WEIGHT: 195.13 LBS | HEIGHT: 65 IN | DIASTOLIC BLOOD PRESSURE: 78 MMHG | SYSTOLIC BLOOD PRESSURE: 120 MMHG | HEART RATE: 69 BPM

## 2020-01-15 DIAGNOSIS — I10 ESSENTIAL HYPERTENSION: ICD-10-CM

## 2020-01-15 DIAGNOSIS — I48.91 ATRIAL FIBRILLATION, UNSPECIFIED TYPE: ICD-10-CM

## 2020-01-15 DIAGNOSIS — I48.0 PAROXYSMAL ATRIAL FIBRILLATION: Primary | ICD-10-CM

## 2020-01-15 DIAGNOSIS — R00.1 SYMPTOMATIC BRADYCARDIA: ICD-10-CM

## 2020-01-15 PROCEDURE — 1126F PR PAIN SEVERITY QUANTIFIED, NO PAIN PRESENT: ICD-10-PCS | Mod: ,,, | Performed by: INTERNAL MEDICINE

## 2020-01-15 PROCEDURE — 99999 PR PBB SHADOW E&M-EST. PATIENT-LVL III: CPT | Mod: PBBFAC,,, | Performed by: INTERNAL MEDICINE

## 2020-01-15 PROCEDURE — 99213 OFFICE O/P EST LOW 20 MIN: CPT | Mod: PBBFAC | Performed by: INTERNAL MEDICINE

## 2020-01-15 PROCEDURE — 1159F MED LIST DOCD IN RCRD: CPT | Mod: ,,, | Performed by: INTERNAL MEDICINE

## 2020-01-15 PROCEDURE — 99214 PR OFFICE/OUTPT VISIT, EST, LEVL IV, 30-39 MIN: ICD-10-PCS | Mod: S$PBB,,, | Performed by: INTERNAL MEDICINE

## 2020-01-15 PROCEDURE — 1126F AMNT PAIN NOTED NONE PRSNT: CPT | Mod: ,,, | Performed by: INTERNAL MEDICINE

## 2020-01-15 PROCEDURE — 93010 EKG 12-LEAD: ICD-10-PCS | Mod: S$PBB,,, | Performed by: INTERNAL MEDICINE

## 2020-01-15 PROCEDURE — 99214 OFFICE O/P EST MOD 30 MIN: CPT | Mod: S$PBB,,, | Performed by: INTERNAL MEDICINE

## 2020-01-15 PROCEDURE — 93010 ELECTROCARDIOGRAM REPORT: CPT | Mod: S$PBB,,, | Performed by: INTERNAL MEDICINE

## 2020-01-15 PROCEDURE — 93005 ELECTROCARDIOGRAM TRACING: CPT | Mod: PBBFAC | Performed by: INTERNAL MEDICINE

## 2020-01-15 PROCEDURE — 99999 PR PBB SHADOW E&M-EST. PATIENT-LVL III: ICD-10-PCS | Mod: PBBFAC,,, | Performed by: INTERNAL MEDICINE

## 2020-01-15 PROCEDURE — 1159F PR MEDICATION LIST DOCUMENTED IN MEDICAL RECORD: ICD-10-PCS | Mod: ,,, | Performed by: INTERNAL MEDICINE

## 2020-01-15 NOTE — PROGRESS NOTES
Subjective:    Patient ID:  Maria Del Carmen Spence is a 79 y.o. female who presents for follow-up of Paroxysmal atrial fibrillation      79 yoF here for AF and AFL management. She developed AF as well as AFL (most ECGs with AFL) since 6/18. With her episodes she has developed syncope and near syncope. Attempts at rate control have resulted in bradycardic symptoms. Amiodarone resulted in symptomatic bradycardia. She has had recurrent events August, October and December 2018- mostly AFL. She has been placed on xarelto for CVA prophylaxis.     4/19: Cryoballoon PVI and AFL RFA 3/6/19. No recurrence. No arrhythmia symptoms. No procedural complications.     Interval history: No recurrence since 3/19. Remains on xarelto    Echo 7/18:  CONCLUSIONS     1 - Concentric remodeling.     2 - No wall motion abnormalities.     3 - Normal left ventricular systolic function (EF 60-65%).     4 - Normal left ventricular diastolic function.     5 - Normal right ventricular systolic function .     6 - The estimated PA systolic pressure is greater than 29 mmHg.     7 - Mild mitral regurgitation.     8 - Mild tricuspid regurgitation.     Past Medical History:  No date: A-fib  No date: Arthritis  No date: Chronic LBP      Comment:  ESIs x 3 with Dr. Hicks, PT at [facility], chiropractor  No date: Eye pain  No date: Frequent headaches  No date: GERD (gastroesophageal reflux disease)  No date: Glaucoma  No date: Hyperlipemia  No date: Hypertension    Past Surgical History:  3/6/2019: ABLATION OF ARRHYTHMOGENIC FOCUS FOR ATRIAL FIBRILLATION; N/  A      Comment:  Procedure: ABLATION, ARRHYTHMOGENIC FOCUS, FOR ATRIAL                FIBRILLATION;  Surgeon: Abel Morillo MD;                 Location: [facility] EP LAB;  Service: Cardiology;  Laterality:               N/A;  7/9/2018: CARDIOVERSION; N/A      Comment:  Procedure: CARDIOVERSION;  Surgeon: Will Rosenthal MD;                 Location: [facility] CATH LAB;  Service: Cardiology;                 Laterality:  N/A;  OU: CATARACT EXTRACTION W/  INTRAOCULAR LENS IMPLANT  No date: TUBAL LIGATION    Social History    Socioeconomic History      Marital status:       Spouse name: Not on file      Number of children: Not on file      Years of education: Not on file      Highest education level: Not on file    Occupational History      Not on file    Social Needs      Financial resource strain: Not on file      Food insecurity:        Worry: Not on file        Inability: Not on file      Transportation needs:        Medical: Not on file        Non-medical: Not on file    Tobacco Use      Smoking status: Never Smoker      Smokeless tobacco: Never Used    Substance and Sexual Activity      Alcohol use: No      Drug use: No      Sexual activity: Yes        Partners: Male    Lifestyle      Physical activity:        Days per week: Not on file        Minutes per session: Not on file      Stress: Not on file    Relationships      Social connections:        Talks on phone: Not on file        Gets together: Not on file        Attends [employer]ars[employer] service: Not on file        Active member of club or organization: Not on file        Attends meetings of clubs or organizations: Not on file        Relationship status: Not on file    Other Topics      Concerns:        Not on file    Social History Narrative      Not on file      Review of patient's family history indicates:  Problem: Glaucoma      Relation: Mother          Age of Onset: (Not Specified)  Problem: Cancer      Relation: Mother          Age of Onset: (Not Specified)  Problem: Cataracts      Relation: Mother          Age of Onset: (Not Specified)  Problem: Hypertension      Relation: Mother          Age of Onset: (Not Specified)  Problem: Hypertension      Relation: Father          Age of Onset: (Not Specified)  Problem: Diabetes      Relation: Paternal Aunt          Age of Onset: (Not Specified)  Problem: Strabismus      Relation: Neg Hx          Age of Onset: (Not  Specified)  Problem: Retinal detachment      Relation: Neg Hx          Age of Onset: (Not Specified)  Problem: Macular degeneration      Relation: Neg Hx          Age of Onset: (Not Specified)  Problem: Blindness      Relation: Neg Hx          Age of Onset: (Not Specified)  Problem: Amblyopia      Relation: Neg Hx          Age of Onset: (Not Specified)  Problem: Stroke      Relation: Neg Hx          Age of Onset: (Not Specified)  Problem: Thyroid disease      Relation: Neg Hx          Age of Onset: (Not Specified)        Review of Systems   Constitution: Negative for malaise/fatigue.   HENT: Negative.    Eyes: Negative.    Cardiovascular: Positive for syncope. Negative for chest pain, dyspnea on exertion, irregular heartbeat, leg swelling, near-syncope and palpitations.   Respiratory: Negative.  Negative for shortness of breath.    Endocrine: Negative.    Hematologic/Lymphatic: Negative.    Skin: Negative.    Musculoskeletal: Negative.    Gastrointestinal: Negative.    Genitourinary: Negative.    Neurological: Negative for dizziness and light-headedness.   Psychiatric/Behavioral: Negative.    Allergic/Immunologic: Negative.         Objective:    Physical Exam   Constitutional: She is oriented to person, place, and time. She appears well-developed and well-nourished. No distress.   HENT:   Head: Normocephalic and atraumatic.   Eyes: Pupils are equal, round, and reactive to light. EOM are normal.   Neck: Normal range of motion. No JVD present. No thyromegaly present.   Cardiovascular: Normal rate, regular rhythm, S1 normal, S2 normal and normal heart sounds. PMI is not displaced. Exam reveals no gallop and no friction rub.   No murmur heard.  Pulmonary/Chest: Effort normal and breath sounds normal. No respiratory distress. She has no wheezes. She has no rales.   Abdominal: Soft. Bowel sounds are normal. She exhibits no distension. There is no tenderness. There is no rebound and no guarding.   Musculoskeletal: Normal  range of motion. She exhibits no edema or tenderness.   Neurological: She is alert and oriented to person, place, and time. No cranial nerve deficit.   Skin: Skin is warm and dry. No rash noted. No erythema.   Psychiatric: She has a normal mood and affect. Her behavior is normal. Judgment and thought content normal.   Vitals reviewed.    ECG: NSR nl MO, QRS, QTc        Assessment:       1. Paroxysmal atrial fibrillation    2. Symptomatic bradycardia    3. Essential hypertension         Plan:       79 yoF HTN, pAF here for follow up. No recurrence since her ablation. Continue current therapy. RTC 1y

## 2020-01-16 ENCOUNTER — TELEPHONE (OUTPATIENT)
Dept: INTERNAL MEDICINE | Facility: CLINIC | Age: 80
End: 2020-01-16

## 2020-01-16 NOTE — TELEPHONE ENCOUNTER
----- Message from Nadia Dias sent at 1/16/2020  8:07 AM CST -----  .Type:  Patient Returning Call    Who Called:SELF  Who Left Message for Patient:  Does the patient know what this is regarding?:YES  Would the patient rather a call back or a response via [facility]? CALL  Best Call Back Number:.[phone] (home)   Additional Information:

## 2020-03-13 ENCOUNTER — PATIENT OUTREACH (OUTPATIENT)
Dept: ADMINISTRATIVE | Facility: OTHER | Age: 80
End: 2020-03-13

## 2020-03-19 RX ORDER — LATANOPROST 50 UG/ML
SOLUTION/ DROPS OPHTHALMIC
Qty: 2.5 ML | Refills: 12 | Status: SHIPPED | OUTPATIENT
Start: 2020-03-19 | End: 2021-04-15

## 2020-03-23 RX ORDER — METOPROLOL TARTRATE 25 MG/1
25 TABLET, FILM COATED ORAL 2 TIMES DAILY
Qty: 60 TABLET | Refills: 11 | Status: SHIPPED | OUTPATIENT
Start: 2020-03-23 | End: 2021-04-13

## 2020-05-20 DIAGNOSIS — H40.1131 PRIMARY OPEN ANGLE GLAUCOMA OF BOTH EYES, MILD STAGE: ICD-10-CM

## 2020-05-20 RX ORDER — ATORVASTATIN CALCIUM 10 MG/1
TABLET, FILM COATED ORAL
Qty: 30 TABLET | Refills: 11 | Status: SHIPPED | OUTPATIENT
Start: 2020-05-20 | End: 2021-05-14

## 2020-05-20 RX ORDER — TIMOLOL MALEATE 5 MG/ML
SOLUTION/ DROPS OPHTHALMIC
Qty: 10 ML | Refills: 12 | Status: SHIPPED | OUTPATIENT
Start: 2020-05-20 | End: 2021-06-14

## 2020-06-12 DIAGNOSIS — M25.561 PAIN IN BOTH KNEES, UNSPECIFIED CHRONICITY: Primary | ICD-10-CM

## 2020-06-12 DIAGNOSIS — M25.562 PAIN IN BOTH KNEES, UNSPECIFIED CHRONICITY: Primary | ICD-10-CM

## 2020-06-15 ENCOUNTER — OFFICE VISIT (OUTPATIENT)
Dept: ORTHOPEDICS | Facility: CLINIC | Age: 80
End: 2020-06-15
Payer: MEDICARE

## 2020-06-15 ENCOUNTER — HOSPITAL ENCOUNTER (OUTPATIENT)
Dept: RADIOLOGY | Facility: HOSPITAL | Age: 80
Discharge: HOME | End: 2020-06-15
Attending: PHYSICIAN ASSISTANT
Payer: MEDICARE

## 2020-06-15 VITALS
HEIGHT: 65 IN | BODY MASS INDEX: 32.49 KG/M2 | WEIGHT: 195 LBS | SYSTOLIC BLOOD PRESSURE: 138 MMHG | HEART RATE: 70 BPM | DIASTOLIC BLOOD PRESSURE: 92 MMHG

## 2020-06-15 DIAGNOSIS — M17.11 PRIMARY OSTEOARTHRITIS OF RIGHT KNEE: Primary | ICD-10-CM

## 2020-06-15 DIAGNOSIS — M25.561 PAIN IN BOTH KNEES, UNSPECIFIED CHRONICITY: ICD-10-CM

## 2020-06-15 DIAGNOSIS — Z79.01 CHRONIC ANTICOAGULATION: ICD-10-CM

## 2020-06-15 DIAGNOSIS — G89.29 CHRONIC PAIN OF RIGHT KNEE: ICD-10-CM

## 2020-06-15 DIAGNOSIS — M25.562 PAIN IN BOTH KNEES, UNSPECIFIED CHRONICITY: ICD-10-CM

## 2020-06-15 DIAGNOSIS — M25.561 CHRONIC PAIN OF RIGHT KNEE: ICD-10-CM

## 2020-06-15 PROCEDURE — 99214 OFFICE O/P EST MOD 30 MIN: CPT | Mod: PBBFAC,25 | Performed by: PHYSICIAN ASSISTANT

## 2020-06-15 PROCEDURE — 99999 PR PBB SHADOW E&M-EST. PATIENT-LVL IV: CPT | Mod: PBBFAC,,, | Performed by: PHYSICIAN ASSISTANT

## 2020-06-15 PROCEDURE — 20610 DRAIN/INJ JOINT/BURSA W/O US: CPT | Mod: S$PBB,RT,, | Performed by: PHYSICIAN ASSISTANT

## 2020-06-15 PROCEDURE — 73564 XR KNEE ORTHO BILAT WITH FLEXION: ICD-10-PCS | Mod: 26,50,, | Performed by: RADIOLOGY

## 2020-06-15 PROCEDURE — 73564 X-RAY EXAM KNEE 4 OR MORE: CPT | Mod: 26,50,, | Performed by: RADIOLOGY

## 2020-06-15 PROCEDURE — 99214 OFFICE O/P EST MOD 30 MIN: CPT | Mod: 25,S$PBB,, | Performed by: PHYSICIAN ASSISTANT

## 2020-06-15 PROCEDURE — 99214 PR OFFICE/OUTPT VISIT, EST, LEVL IV, 30-39 MIN: ICD-10-PCS | Mod: 25,S$PBB,, | Performed by: PHYSICIAN ASSISTANT

## 2020-06-15 PROCEDURE — 20610 LARGE JOINT ASPIRATION/INJECTION: R KNEE: ICD-10-PCS | Mod: S$PBB,RT,, | Performed by: PHYSICIAN ASSISTANT

## 2020-06-15 PROCEDURE — 73564 X-RAY EXAM KNEE 4 OR MORE: CPT | Mod: TC,50

## 2020-06-15 PROCEDURE — 99999 PR PBB SHADOW E&M-EST. PATIENT-LVL IV: ICD-10-PCS | Mod: PBBFAC,,, | Performed by: PHYSICIAN ASSISTANT

## 2020-06-15 PROCEDURE — 20610 DRAIN/INJ JOINT/BURSA W/O US: CPT | Mod: PBBFAC | Performed by: PHYSICIAN ASSISTANT

## 2020-06-15 RX ORDER — METHYLPREDNISOLONE ACETATE 80 MG/ML
80 INJECTION, SUSPENSION INTRA-ARTICULAR; INTRALESIONAL; INTRAMUSCULAR; SOFT TISSUE
Status: DISCONTINUED | OUTPATIENT
Start: 2020-06-15 | End: 2020-06-15 | Stop reason: HOSPADM

## 2020-06-15 RX ADMIN — METHYLPREDNISOLONE ACETATE 80 MG: 80 INJECTION, SUSPENSION INTRALESIONAL; INTRAMUSCULAR; INTRASYNOVIAL; SOFT TISSUE at 10:06

## 2020-06-15 NOTE — PATIENT INSTRUCTIONS
Voltaren Gel 1% - apply 2 grams topically 3 times per day.  Pre-Knee Replacement: Ankle Pumps, Quad Sets, Leg Raises  Doing these exercises BEFORE your knee replacement can help speed your recovery. Ask your physical therapist (PT) whether you should exercise one or both legs. Unless youre told otherwise, start by doing each exercise five to 10 times (5 to 10 reps) twice a day (2 sets). As you get stronger, slowly increase the number of reps and sets.  Stop any exercise that causes sharp or increased knee pain, dizziness, shortness of breath, or chest pain.   Ankle pumps    Ankle pumps can help prevent circulation problems, such as blood clots. Do ankle pumps by pointing and flexing your feet.  Quadriceps sets    · Lie on your back in bed, legs straight.  · Tighten the muscle at the front of the thigh as you press the back of your knee down toward the bed. Hold for a few seconds. Then relax the leg.  Straight leg raises    · Lie in bed. Bend one leg. Keep your other leg straight on the bed.  · Lift your straight leg as high as you comfortably can, but not higher than 12 inches. Hold for a few seconds. Then slowly lower the leg.  Date Last Reviewed: 10/1/2015  © 6310-8810 [employer]. [address] [place] [address], [place] [address]. All rights reserved. This information is not intended as a substitute for professional medical care. Always follow your healthcare professional's instructions.

## 2020-06-15 NOTE — PROCEDURES
Large Joint Aspiration/Injection: R knee    Date/Time: 6/15/2020 10:20 AM  Performed by: Stacy Cross PA-C  Authorized by: Stacy Cross PA-C     Consent Done?:  Yes (Verbal)  Indications:  Pain  Site marked: the procedure site was marked    Timeout: prior to procedure the correct patient, procedure, and site was verified    Prep: patient was prepped and draped in usual sterile fashion      Local anesthesia used?: Yes    Local anesthetic:  Lidocaine 1% without epinephrine  Anesthetic total (ml):  2      Details:  Needle Size:  22 G  Ultrasonic Guidance for needle placement?: No    Approach:  Anterolateral  Location:  Knee  Site:  R knee  Medications:  80 mg methylPREDNISolone acetate 80 mg/mL  Patient tolerance:  Patient tolerated the procedure well with no immediate complications     After verbal consent was obtained for right knee injection, patient ID, site, and side were verified.  The  right  knee was sterilly prepped in the standard fashion.  A 22-gauge needle was introduced into right knee joint from an marquis-lateral site without complication. The right knee was then injected with 20 mg lidocaine plain and 80 mg depomedrol.  A sterile bandaid was applied.  The patient was informed to apply an ice pack approximately 10min once arriving home and not to do anything strenuous for 24hours.  Elevation of glucose in diabetic patients was discussed.  She was instructed to call if there were any problems. The patient was discharged in stable condition.

## 2020-06-15 NOTE — PROGRESS NOTES
Patient ID: Maria Del Carmen Spence is a 79 y.o. female.    Chief Complaint: Pain of the Right Knee      HPI: Maria Del Carmen Spence  is a 79 y.o. female who c/o Pain of the Right Knee   for duration of years.  She has seen another provider here in the department in the past.  She has had corticosteroid injections which seem to give her about 4-6 weeks of relief at a time.  Otherwise, she either deals with the pain or takes Tylenol occasionally.  Pain level 8/10.  Quality is aching and constant.  She complains of associated swelling, popping, and grinding.  Alleviating factors as above.  Aggravating factors include prolonged weight-bearing, getting up after prolonged inactivity.    Past Medical History:   Diagnosis Date    A-fib     Arthritis     Chronic LBP     ESIs x 3 with Dr. Hicks, PT at [facility], chiropractor    Eye pain     Frequent headaches     GERD (gastroesophageal reflux disease)     Glaucoma     Hyperlipemia     Hypertension      Past Surgical History:   Procedure Laterality Date    ABLATION OF ARRHYTHMOGENIC FOCUS FOR ATRIAL FIBRILLATION N/A 3/6/2019    Procedure: ABLATION, ARRHYTHMOGENIC FOCUS, FOR ATRIAL FIBRILLATION;  Surgeon: Abel Morillo MD;  Location: [facility] EP LAB;  Service: Cardiology;  Laterality: N/A;    CARDIOVERSION N/A 7/9/2018    Procedure: CARDIOVERSION;  Surgeon: Will Rosenthal MD;  Location: [facility] CATH LAB;  Service: Cardiology;  Laterality: N/A;    CATARACT EXTRACTION W/  INTRAOCULAR LENS IMPLANT  OU    TUBAL LIGATION       Family History   Problem Relation Age of Onset    Glaucoma Mother     Cancer Mother     Cataracts Mother     Hypertension Mother     Hypertension Father     Diabetes Paternal Aunt     Strabismus Neg Hx     Retinal detachment Neg Hx     Macular degeneration Neg Hx     Blindness Neg Hx     Amblyopia Neg Hx     Stroke Neg Hx     Thyroid disease Neg Hx      Social History     Socioeconomic History    Marital status:      Spouse name: Not on file     Number of children: Not on file    Years of education: Not on file    Highest education level: Not on file   Occupational History    Not on file   Social Needs    Financial resource strain: Not on file    Food insecurity     Worry: Not on file     Inability: Not on file    Transportation needs     Medical: Not on file     Non-medical: Not on file   Tobacco Use    Smoking status: Never Smoker    Smokeless tobacco: Never Used   Substance and Sexual Activity    Alcohol use: No    Drug use: No    Sexual activity: Yes     Partners: Male   Lifestyle    Physical activity     Days per week: Not on file     Minutes per session: Not on file    Stress: Not on file   Relationships    Social connections     Talks on phone: Not on file     Gets together: Not on file     Attends Protestant service: Not on file     Active member of club or organization: Not on file     Attends meetings of clubs or organizations: Not on file     Relationship status: Not on file   Other Topics Concern    Not on file   Social History Narrative    Not on file     Medication List with Changes/Refills   Current Medications    AA/PROT/LYSINE/METHIO/VIT C/B6 (A/G PRO ORAL)    Take 2 tablets by mouth 3 (three) times daily.    ACETAMINOPHEN (TYLENOL) 500 MG TABLET    Take 1,000 mg by mouth once daily.     ATORVASTATIN (LIPITOR) 10 MG TABLET    TAKE ONE TABLET BY MOUTH EVERY EVENING    BIOTIN 5,000 MCG TBDL    Take by mouth 3 (three) times daily.    CALCIUM PHOSPHATE TRIB/VIT D3 (CITRACAL + D ORAL)    Take 1 tablet by mouth once daily at 6am.     CETIRIZINE HCL (ZYRTEC ORAL)    Take by mouth.    FAMOTIDINE (PEPCID) 20 MG TABLET    Take 1 tablet (20 mg total) by mouth 2 (two) times daily.    GABAPENTIN (NEURONTIN) 300 MG CAPSULE    Take 1 capsule (300 mg total) by mouth 3 (three) times daily.    LATANOPROST 0.005 % OPHTHALMIC SOLUTION    instill ONE DROP BOTH EYES AT BEDTIME    METOPROLOL TARTRATE (LOPRESSOR) 25 MG TABLET    Take 1 tablet (25 mg  total) by mouth 2 (two) times daily.    MULTIVITAMIN W-MINERALS/LUTEIN (CENTRUM SILVER ORAL)    Take by mouth.    NAPROXEN SODIUM (ALEVE) 220 MG TABLET    Take 220 mg by mouth once daily.    TIMOLOL MALEATE 0.5% (TIMOPTIC) 0.5 % DROP    PLACE ONE DROP INTO BOTH EYES EVERY MORNING    XARELTO 20 MG TAB    TAKE ONE TABLET BY MOUTH EVERY DAY WITH DINNER OR IN THE EVENING MEAL     Review of patient's allergies indicates:   Allergen Reactions    Eliquis [apixaban] Other (See Comments)     Headache, numbness in lip        Objective:        General    Nursing note and vitals reviewed.  Constitutional: She is oriented to person, place, and time. She appears well-developed and well-nourished.   HENT:   Head: Normocephalic and atraumatic.   Eyes: EOM are normal.   Cardiovascular: Normal rate and regular rhythm.    Pulmonary/Chest: Effort normal.   Abdominal: Soft.   Neurological: She is alert and oriented to person, place, and time.   Psychiatric: She has a normal mood and affect. Her behavior is normal.           Right Knee Exam     Inspection   Erythema: absent  Swelling: absent  Effusion: absent  Deformity: absent (valgus)  Bruising: absent    Tenderness   The patient is tender to palpation of the lateral joint line and condyle.    Crepitus   The patient has crepitus of the patella.    Range of Motion   Extension:  5 abnormal   Flexion:  100 abnormal     Tests   Meniscus   Charly:  Medial - negative Lateral - negative  Ligament Examination Lachman: normal (-1 to 2mm) PCL-Posterior Drawer: normal (0 to 2mm)     MCL - Valgus: normal (0 to 2mm)  LCL - Varus: abnormal - grade II  Patella   Patellar apprehension: negative  Patellar Tracking: normal  Patellar Grind: positive    Other   Meniscal Cyst: absent  Popliteal (Baker's) Cyst: absent  Sensation: normal    Comments:  Comp soft, cap refill < 2 sec.    Left Knee Exam     Range of Motion   Extension: normal   Flexion: normal     Tests   Stability Lachman: normal (-1 to 2mm)  PCL-Posterior Drawer: normal (0 to 2mm)  MCL - Valgus: normal (0 to 2mm)  LCL - Varus: normal (0 to 2mm)    Other   Sensation: normal    Muscle Strength   Right Lower Extremity   Quadriceps:  4/5   Hamstrin/5   Left Lower Extremity   Quadriceps:  4/5   Hamstrin/5     Vascular Exam       Edema  Right Lower Leg: absent  Left Lower Leg: absent      Xray images and report were reviewed today.  I agree with the radiologist's interpretation.    X-ray Knee Ortho Bilateral with Flexion  Narrative: EXAMINATION:  XR KNEE ORTHO BILAT WITH FLEXION    CLINICAL HISTORY:  Pain in right knee    TECHNIQUE:  AP standing of both knees, PA flexion standing views of both knees, and Merchant views of both knees were performed.  Lateral views of both knees were also performed.    COMPARISON:  2019    FINDINGS:  Generalized osteopenia and juxta-articular prominence.  Extensive bilateral tricompartment degenerative changes without significant interval change from 2019 exam.  There is asymmetric increased involvement of the right lateral and left medial compartments.  Near bone on bone articulation bilaterally right greater than left.  Extensive degenerative change bilateral patellofemoral joints right greater than left.  Asymmetric increased marginal osteophyte formation on the right.  No definite effusions.  Impression: As above.    Electronically signed by: Roldan Guerra MD  Date:    06/15/2020  Time:    11:07        Assessment:       Encounter Diagnoses   Name Primary?    Primary osteoarthritis of right knee Yes    Chronic pain of right knee     Chronic anticoagulation           Plan:       Maria Del Carmen was seen today for pain.    Diagnoses and all orders for this visit:    Primary osteoarthritis of right knee  -     sodium hyaluronate (EUFLEXXA) 10 mg/mL(mw 2.4 -3.6 million) injection 20 mg  -     Large Joint Aspiration/Injection: R knee    Chronic pain of right knee  -     Large Joint Aspiration/Injection: R  knee    Chronic anticoagulation        Maria Del Carmen Spence comes in today with the above problems as above.  We had a long discussion today regarding degenerative arthritis in the knees. The patient understands that arthritis is chronic and will worsen over time.  The patient also understands that arthritis may cause episodic flare-ups in pain. Management or if arthritis is achieved through a multi-modal approach including weight loss in obese individuals, activity modification, NSAIDs (topical vs oral) where appropriate, periodic intra-articular steroid injections, viscosupplementation, physical therapy, knee bracing, ambulatory aids, as well as geniculate nerve blocks.      After discussion of risks and benefits of all of the above were discussed, the decision was made to move forward with corticosteroid injection today.  We have also submitted an authorization request for Euflexxa in the right knee.  She is not a candidate for oral NSAIDs due to long-term anticoagulation.  I have recommended Voltaren gel topically 3 times daily in addition to a home exercise program.  Patient verbalizes understanding.  I will see her back in the office in 3-4 weeks to begin the Euflexxa injection series.  She verbalizes understanding and agrees.    The patient understands, chooses and consents to this plan and accepts all   the risks which include but are not limited to the risks mentioned above.     Disclaimer: This note was prepared using a voice recognition system and is likely to have sound alike errors within the text.

## 2020-06-29 ENCOUNTER — OFFICE VISIT (OUTPATIENT)
Dept: ORTHOPEDICS | Facility: CLINIC | Age: 80
End: 2020-06-29
Payer: MEDICARE

## 2020-06-29 ENCOUNTER — PATIENT OUTREACH (OUTPATIENT)
Dept: ADMINISTRATIVE | Facility: OTHER | Age: 80
End: 2020-06-29

## 2020-06-29 VITALS
HEART RATE: 68 BPM | WEIGHT: 195 LBS | DIASTOLIC BLOOD PRESSURE: 70 MMHG | HEIGHT: 65 IN | BODY MASS INDEX: 32.49 KG/M2 | SYSTOLIC BLOOD PRESSURE: 170 MMHG

## 2020-06-29 DIAGNOSIS — M17.11 PRIMARY OSTEOARTHRITIS OF RIGHT KNEE: Primary | ICD-10-CM

## 2020-06-29 DIAGNOSIS — M25.561 CHRONIC PAIN OF RIGHT KNEE: ICD-10-CM

## 2020-06-29 DIAGNOSIS — G89.29 CHRONIC PAIN OF RIGHT KNEE: ICD-10-CM

## 2020-06-29 PROCEDURE — 99499 NO LOS: ICD-10-PCS | Mod: S$PBB,,, | Performed by: PHYSICIAN ASSISTANT

## 2020-06-29 PROCEDURE — 20610 LARGE JOINT ASPIRATION/INJECTION: R KNEE: ICD-10-PCS | Mod: S$PBB,RT,, | Performed by: PHYSICIAN ASSISTANT

## 2020-06-29 PROCEDURE — 20610 DRAIN/INJ JOINT/BURSA W/O US: CPT | Mod: PBBFAC | Performed by: PHYSICIAN ASSISTANT

## 2020-06-29 PROCEDURE — 20610 DRAIN/INJ JOINT/BURSA W/O US: CPT | Mod: S$PBB,RT,, | Performed by: PHYSICIAN ASSISTANT

## 2020-06-29 PROCEDURE — 99999 PR PBB SHADOW E&M-EST. PATIENT-LVL IV: CPT | Mod: PBBFAC,,, | Performed by: PHYSICIAN ASSISTANT

## 2020-06-29 PROCEDURE — 99499 UNLISTED E&M SERVICE: CPT | Mod: S$PBB,,, | Performed by: PHYSICIAN ASSISTANT

## 2020-06-29 PROCEDURE — 99214 OFFICE O/P EST MOD 30 MIN: CPT | Mod: PBBFAC,25 | Performed by: PHYSICIAN ASSISTANT

## 2020-06-29 PROCEDURE — 99999 PR PBB SHADOW E&M-EST. PATIENT-LVL IV: ICD-10-PCS | Mod: PBBFAC,,, | Performed by: PHYSICIAN ASSISTANT

## 2020-06-29 RX ADMIN — Medication 20 MG: at 08:06

## 2020-06-29 NOTE — PROGRESS NOTES
Chart reviewed.   Immunizations: Triggered Imm Registry     Orders placed: n/a  Upcoming appts to satisfy RODRIGO topics: n/a

## 2020-06-29 NOTE — PROCEDURES
Large Joint Aspiration/Injection: R knee    Date/Time: 6/29/2020 8:20 AM  Performed by: Stacy Cross PA-C  Authorized by: Stacy Cross PA-C     Consent Done?:  Yes (Verbal)  Indications:  Pain  Site marked: the procedure site was marked    Timeout: prior to procedure the correct patient, procedure, and site was verified    Prep: patient was prepped and draped in usual sterile fashion    Local anesthesia used?: No      Details:  Needle Size:  22 G  Ultrasonic Guidance for needle placement?: No    Approach:  Anterolateral  Location:  Knee  Site:  R knee  Medications:  20 mg sodium hyaluronate (EUFLEXXA) 10 mg/mL(mw 2.4 -3.6 million)  Patient tolerance:  Patient tolerated the procedure well with no immediate complications     Maria Del Carmen Spence comes in today for the first Euflexxa injection in the right knee.  We have again discussed risks and benefits of the injection.  She wishes to proceed and will notify the office with any questions or concerned.  I will see him/her back in the office next week as scheduled for the next injection.  She verbalizes understanding and agrees.    Primary osteoarthritis of right knee  (primary encounter diagnosis)  Chronic pain of right knee

## 2020-06-30 ENCOUNTER — OFFICE VISIT (OUTPATIENT)
Dept: OPHTHALMOLOGY | Facility: CLINIC | Age: 80
End: 2020-06-30
Payer: MEDICARE

## 2020-06-30 DIAGNOSIS — H40.1131 PRIMARY OPEN ANGLE GLAUCOMA OF BOTH EYES, MILD STAGE: Primary | ICD-10-CM

## 2020-06-30 DIAGNOSIS — H04.129 DRY EYE: ICD-10-CM

## 2020-06-30 DIAGNOSIS — Z96.1 PSEUDOPHAKIA: ICD-10-CM

## 2020-06-30 PROCEDURE — 92012 PR EYE EXAM, EST PATIENT,INTERMED: ICD-10-PCS | Mod: S$PBB,,, | Performed by: OPHTHALMOLOGY

## 2020-06-30 PROCEDURE — 99213 OFFICE O/P EST LOW 20 MIN: CPT | Mod: PBBFAC | Performed by: OPHTHALMOLOGY

## 2020-06-30 PROCEDURE — 99999 PR PBB SHADOW E&M-EST. PATIENT-LVL III: CPT | Mod: PBBFAC,,, | Performed by: OPHTHALMOLOGY

## 2020-06-30 PROCEDURE — 92012 INTRM OPH EXAM EST PATIENT: CPT | Mod: S$PBB,,, | Performed by: OPHTHALMOLOGY

## 2020-06-30 PROCEDURE — 99999 PR PBB SHADOW E&M-EST. PATIENT-LVL III: ICD-10-PCS | Mod: PBBFAC,,, | Performed by: OPHTHALMOLOGY

## 2020-06-30 NOTE — PROGRESS NOTES
SUBJECTIVE  Maria Del Carmen Spence is 79 y.o. female  Corrected distance visual acuity was 20/20 -2 in the right eye and 20/20 in the left eye.   Chief Complaint   Patient presents with    Glaucoma          HPI     Pt here for 4m IOP chk. No pain or discomfort. VA stable. 100% compliant   with gtts.     1. Mild COAG OD>OS (init 24/20) Goal <17  Rhopressa (irritated but exc IOP 11/11)  Dorzolamide OU BID (Not taking, patient States This Eye Drop Makes Her Eye   Burn & Red)  2. PCIOL OU (Sulcus OD) (partial dislocation Od)  3. Dry Eyes      Latanoprost QHS OU  Timolol QAM OU    Last edited by Everett Hoffmann, Patient Care Assistant on 6/30/2020  8:52   AM. (History)         Assessment /Plan :  1. Primary open angle glaucoma of both eyes, mild stage Doing well, IOP within acceptable range relative to target IOP and no evidence of progression. Continue current treatment. Reviewed importance of continued compliance with treatment and follow up.    Return to clinic in 4 months  or as needed.  With IOP Check, GOCT, Dilation and HVF 24-2     2. Pseudophakia    3. Dry eye

## 2020-07-09 ENCOUNTER — OFFICE VISIT (OUTPATIENT)
Dept: ORTHOPEDICS | Facility: CLINIC | Age: 80
End: 2020-07-09
Payer: MEDICARE

## 2020-07-09 VITALS
HEART RATE: 75 BPM | SYSTOLIC BLOOD PRESSURE: 148 MMHG | BODY MASS INDEX: 32.51 KG/M2 | DIASTOLIC BLOOD PRESSURE: 78 MMHG | WEIGHT: 195.13 LBS | HEIGHT: 65 IN

## 2020-07-09 DIAGNOSIS — M25.561 CHRONIC PAIN OF RIGHT KNEE: ICD-10-CM

## 2020-07-09 DIAGNOSIS — M17.11 PRIMARY OSTEOARTHRITIS OF RIGHT KNEE: Primary | ICD-10-CM

## 2020-07-09 DIAGNOSIS — G89.29 CHRONIC PAIN OF RIGHT KNEE: ICD-10-CM

## 2020-07-09 PROCEDURE — 20610 DRAIN/INJ JOINT/BURSA W/O US: CPT | Mod: PBBFAC | Performed by: PHYSICIAN ASSISTANT

## 2020-07-09 PROCEDURE — 99214 OFFICE O/P EST MOD 30 MIN: CPT | Mod: PBBFAC,25 | Performed by: PHYSICIAN ASSISTANT

## 2020-07-09 PROCEDURE — 99499 UNLISTED E&M SERVICE: CPT | Mod: S$PBB,,, | Performed by: PHYSICIAN ASSISTANT

## 2020-07-09 PROCEDURE — 20610 LARGE JOINT ASPIRATION/INJECTION: R KNEE: ICD-10-PCS | Mod: S$PBB,RT,, | Performed by: PHYSICIAN ASSISTANT

## 2020-07-09 PROCEDURE — 99499 NO LOS: ICD-10-PCS | Mod: S$PBB,,, | Performed by: PHYSICIAN ASSISTANT

## 2020-07-09 PROCEDURE — 99999 PR PBB SHADOW E&M-EST. PATIENT-LVL IV: ICD-10-PCS | Mod: PBBFAC,,, | Performed by: PHYSICIAN ASSISTANT

## 2020-07-09 PROCEDURE — 20610 DRAIN/INJ JOINT/BURSA W/O US: CPT | Mod: S$PBB,RT,, | Performed by: PHYSICIAN ASSISTANT

## 2020-07-09 PROCEDURE — 99999 PR PBB SHADOW E&M-EST. PATIENT-LVL IV: CPT | Mod: PBBFAC,,, | Performed by: PHYSICIAN ASSISTANT

## 2020-07-09 RX ADMIN — Medication 20 MG: at 08:07

## 2020-07-09 NOTE — PROCEDURES
Large Joint Aspiration/Injection: R knee    Date/Time: 7/9/2020 8:40 AM  Performed by: Stacy Cross PA-C  Authorized by: Stacy Cross PA-C     Consent Done?:  Yes (Verbal)  Indications:  Pain  Site marked: the procedure site was marked    Timeout: prior to procedure the correct patient, procedure, and site was verified    Prep: patient was prepped and draped in usual sterile fashion    Local anesthesia used?: No      Details:  Needle Size:  22 G  Approach:  Anterolateral  Location:  Knee  Site:  R knee  Medications:  20 mg sodium hyaluronate (EUFLEXXA) 10 mg/mL(mw 2.4 -3.6 million)  Patient tolerance:  Patient tolerated the procedure well with no immediate complications     Maria Del Carmen Spence comes in today for the second Euflexxa injection in the right knee.  We have again discussed risks and benefits of the injection.  Patient denies complications from prior injection.  She wishes to proceed and will notify the office with any questions or concerned.  I will see him/her back in the office next week as scheduled for the next injection.  She verbalizes understanding and agrees.    Primary osteoarthritis of right knee  (primary encounter diagnosis)  Chronic pain of right knee

## 2020-07-09 NOTE — PROGRESS NOTES
Patient comes today for her 2nd Euflexxa in the right knee.  She denies complications from prior injection and wishes to proceed with the 2nd 1 today.  Unfortunately, Voltaren gel made her swell in her face.  She is not a candidate to continue topical NSAIDs.  Additionally, she cannot take oral NSAIDs due to history of chronic anticoagulation.  She is working on a home exercise program.  I have again discussed with her the option of formal therapy.  She will think about it and let me know next week.

## 2020-07-13 ENCOUNTER — PATIENT OUTREACH (OUTPATIENT)
Dept: ADMINISTRATIVE | Facility: OTHER | Age: 80
End: 2020-07-13

## 2020-07-13 NOTE — PROGRESS NOTES
Requested updates within Care Everywhere.  Patient's chart was reviewed for overdue RODRIGO topics.  Immunizations reconciled.

## 2020-07-14 ENCOUNTER — OFFICE VISIT (OUTPATIENT)
Dept: ORTHOPEDICS | Facility: CLINIC | Age: 80
End: 2020-07-14
Payer: MEDICARE

## 2020-07-14 VITALS
WEIGHT: 195.13 LBS | BODY MASS INDEX: 32.51 KG/M2 | DIASTOLIC BLOOD PRESSURE: 82 MMHG | HEART RATE: 70 BPM | HEIGHT: 65 IN | SYSTOLIC BLOOD PRESSURE: 161 MMHG

## 2020-07-14 DIAGNOSIS — M17.11 PRIMARY OSTEOARTHRITIS OF RIGHT KNEE: Primary | ICD-10-CM

## 2020-07-14 DIAGNOSIS — M25.561 CHRONIC PAIN OF RIGHT KNEE: ICD-10-CM

## 2020-07-14 DIAGNOSIS — G89.29 CHRONIC PAIN OF RIGHT KNEE: ICD-10-CM

## 2020-07-14 PROCEDURE — 20610 LARGE JOINT ASPIRATION/INJECTION: R KNEE: ICD-10-PCS | Mod: S$PBB,RT,, | Performed by: PHYSICIAN ASSISTANT

## 2020-07-14 PROCEDURE — 99999 PR PBB SHADOW E&M-EST. PATIENT-LVL III: CPT | Mod: PBBFAC,,, | Performed by: PHYSICIAN ASSISTANT

## 2020-07-14 PROCEDURE — 20610 DRAIN/INJ JOINT/BURSA W/O US: CPT | Mod: S$PBB,RT,, | Performed by: PHYSICIAN ASSISTANT

## 2020-07-14 PROCEDURE — 99499 NO LOS: ICD-10-PCS | Mod: S$PBB,,, | Performed by: PHYSICIAN ASSISTANT

## 2020-07-14 PROCEDURE — 20610 DRAIN/INJ JOINT/BURSA W/O US: CPT | Mod: PBBFAC | Performed by: PHYSICIAN ASSISTANT

## 2020-07-14 PROCEDURE — 99999 PR PBB SHADOW E&M-EST. PATIENT-LVL III: ICD-10-PCS | Mod: PBBFAC,,, | Performed by: PHYSICIAN ASSISTANT

## 2020-07-14 PROCEDURE — 99499 UNLISTED E&M SERVICE: CPT | Mod: S$PBB,,, | Performed by: PHYSICIAN ASSISTANT

## 2020-07-14 PROCEDURE — 99213 OFFICE O/P EST LOW 20 MIN: CPT | Mod: PBBFAC | Performed by: PHYSICIAN ASSISTANT

## 2020-07-14 RX ADMIN — Medication 20 MG: at 02:07

## 2020-07-14 NOTE — PROCEDURES
Large Joint Aspiration/Injection: R knee    Date/Time: 7/14/2020 2:20 PM  Performed by: Stacy Cross PA-C  Authorized by: Stacy Cross PA-C     Consent Done?:  Yes (Verbal)  Indications:  Pain  Site marked: the procedure site was marked    Timeout: prior to procedure the correct patient, procedure, and site was verified    Prep: patient was prepped and draped in usual sterile fashion    Local anesthesia used?: No      Details:  Needle Size:  22 G  Ultrasonic Guidance for needle placement?: No    Approach:  Anterolateral  Location:  Knee  Site:  R knee  Medications:  20 mg sodium hyaluronate (EUFLEXXA) 10 mg/mL(mw 2.4 -3.6 million)  Patient tolerance:  Patient tolerated the procedure well with no immediate complications     Maria Del Carmen Spence comes in today for the third Euflexxa injection in the right knee.  She denies complications from prior injections.  We have again discussed risks and benefits of the injection.  She wishes to proceed and will notify the office with any questions or concerned.  Patient will return to the clinic for re-evaluation in 2 months or sooner if needed. She verbalizes understanding and agrees.    Primary osteoarthritis of right knee  (primary encounter diagnosis)  Chronic pain of right knee

## 2020-07-14 NOTE — PROGRESS NOTES
Mrs. Spence comes today for her 3rd Euflexxa injection in the right knee.  She has had symptomatic improvement since starting the injection series.  Pain has improved to 4/10.  No complications from prior to injections.  After discussing risks and benefits of the injection she wishes to proceed with the final shot.  I will see her back in the office in 2-3 months to reassess her progress.  She verbalizes understanding and agrees.

## 2020-08-18 ENCOUNTER — IMMUNIZATION (OUTPATIENT)
Dept: PHARMACY | Facility: CLINIC | Age: 80
End: 2020-08-18
Payer: MEDICARE

## 2020-08-18 ENCOUNTER — APPOINTMENT (OUTPATIENT)
Dept: RADIOLOGY | Facility: HOSPITAL | Age: 80
End: 2020-08-18
Attending: FAMILY MEDICINE
Payer: MEDICARE

## 2020-08-18 ENCOUNTER — OFFICE VISIT (OUTPATIENT)
Dept: INTERNAL MEDICINE | Facility: CLINIC | Age: 80
End: 2020-08-18
Payer: MEDICARE

## 2020-08-18 VITALS
WEIGHT: 193.31 LBS | HEART RATE: 72 BPM | SYSTOLIC BLOOD PRESSURE: 138 MMHG | DIASTOLIC BLOOD PRESSURE: 80 MMHG | HEIGHT: 65 IN | BODY MASS INDEX: 32.21 KG/M2 | TEMPERATURE: 98 F | OXYGEN SATURATION: 97 %

## 2020-08-18 DIAGNOSIS — E78.2 MIXED HYPERLIPIDEMIA: ICD-10-CM

## 2020-08-18 DIAGNOSIS — Z78.0 ASYMPTOMATIC MENOPAUSAL STATE: ICD-10-CM

## 2020-08-18 DIAGNOSIS — M17.11 ARTHRITIS OF KNEE, RIGHT: ICD-10-CM

## 2020-08-18 DIAGNOSIS — M85.851 OSTEOPENIA OF RIGHT HIP: ICD-10-CM

## 2020-08-18 DIAGNOSIS — I10 ESSENTIAL HYPERTENSION: ICD-10-CM

## 2020-08-18 DIAGNOSIS — Z78.0 ASYMPTOMATIC MENOPAUSAL STATE: Primary | ICD-10-CM

## 2020-08-18 DIAGNOSIS — L65.9 HAIR LOSS: ICD-10-CM

## 2020-08-18 PROBLEM — M85.89 OSTEOPENIA OF MULTIPLE SITES: Status: ACTIVE | Noted: 2020-08-18

## 2020-08-18 PROBLEM — M85.89 OSTEOPENIA OF MULTIPLE SITES: Status: RESOLVED | Noted: 2020-08-18 | Resolved: 2020-08-18

## 2020-08-18 PROCEDURE — 99215 PR OFFICE/OUTPT VISIT, EST, LEVL V, 40-54 MIN: ICD-10-PCS | Mod: S$PBB,,, | Performed by: FAMILY MEDICINE

## 2020-08-18 PROCEDURE — 77080 DEXA BONE DENSITY SPINE HIP: ICD-10-PCS | Mod: 26,,, | Performed by: RADIOLOGY

## 2020-08-18 PROCEDURE — 99215 OFFICE O/P EST HI 40 MIN: CPT | Mod: S$PBB,,, | Performed by: FAMILY MEDICINE

## 2020-08-18 PROCEDURE — 99999 PR PBB SHADOW E&M-EST. PATIENT-LVL V: ICD-10-PCS | Mod: PBBFAC,,, | Performed by: FAMILY MEDICINE

## 2020-08-18 PROCEDURE — 77080 DXA BONE DENSITY AXIAL: CPT | Mod: 26,,, | Performed by: RADIOLOGY

## 2020-08-18 PROCEDURE — 99999 PR PBB SHADOW E&M-EST. PATIENT-LVL V: CPT | Mod: PBBFAC,,, | Performed by: FAMILY MEDICINE

## 2020-08-18 PROCEDURE — 77080 DXA BONE DENSITY AXIAL: CPT | Mod: TC

## 2020-08-18 PROCEDURE — 99215 OFFICE O/P EST HI 40 MIN: CPT | Mod: PBBFAC,25 | Performed by: FAMILY MEDICINE

## 2020-08-18 NOTE — PROGRESS NOTES
"Subjective:       Patient ID: Maria Del Carmen Spence is a 79 y.o. female.    Chief Complaint: Follow-up (6 months)    Six month recheck in patient with GERD, borderline hypertension PAF HLD.  She was asked to stop mints at last visit.  She was placed famotidine 20 BID up from 20 qD, rather than a PPI. She now only uses her mint toothpaste hour or more before bedtime. Date of last visit: 1/13/2020    C/O R knee and hip pain. Has had shots in knees. Stopped glucosamine 6-12 months ago. Not sure if pain worse or not. Sciatica not so bad now. Knee improved post IA meds.    1/13/2020: "Here for periodic recheck - still in sinus rhythm following ablation in March last year.  States pepcid 20 daily has not been working well fo the past month - she is taking a pepcid complete about 3 x week for sympotms - it is effective when added.  Taking one alleve with 2 tylenol ES every AM per her cardiologists permission."    Review of Systems   Respiratory: Negative for cough and shortness of breath.    Cardiovascular: Negative for leg swelling.   Gastrointestinal:        Reflux, GERD   Musculoskeletal: Positive for arthralgias (L knee, L hip, L shoulder) and back pain.    TETANUS VACCINE due on 10/12/1957  DEXA SCAN due on 06/19/2020    Patient Active Problem List:     Primary open-angle glaucoma(365.11) - Both Eyes     Lens replaced by other means - Both Eyes     Fatigue     Dyspnea on exertion     HTN (hypertension) borderline     Primary open angle glaucoma of both eyes, indeterminate stage     Allergic conjunctivitis of both eyes     Indeterminate stage chronic open angle glaucoma     Pseudophakia     Paroxysmal atrial fibrillation     Primary open angle glaucoma of both eyes, mild stage     Arthritis of knee, right     Atrial flutter     Symptomatic bradycardia     Tortuous aorta     Mixed hyperlipidemia     A-fib     PAF (paroxysmal atrial fibrillation)     Primary osteoarthritis of right knee    Lab Results       Component          "       Value               Date                       WBC                      8.01                01/13/2020                 HGB                      11.0 (L)            01/13/2020                 HCT                      36.9 (L)            01/13/2020                 PLT                      312                 01/13/2020                 CHOL                     158                 01/13/2020                 TRIG                     97                  01/13/2020                 HDL                      65                  01/13/2020                 LDLCALC                  73.6                01/13/2020                 ALT                      15                  01/13/2020                 AST                      26                  01/13/2020                 NA                       141                 01/13/2020                 K                        5.8 (H)             01/13/2020                 CL                       107                 01/13/2020                 CALCIUM                  10.2                01/13/2020                 CREATININE               0.8                 01/13/2020                 BUN                      21                  01/13/2020                 CO2                      25                  01/13/2020                 TSH                      2.716               06/14/2017                 INR                      1.1                 02/27/2019                 GLU                      76                  01/13/2020                 ESTGFRAFRICA             >60.0               01/13/2020                 EGFRNONAA                >60.0               01/13/2020              BP Readings from Last 3 Encounters:  08/18/20 : (!) 144/74 repeat 138/80  07/14/20 : (!) 161/82  07/09/20 : (!) 148/78    Hypertension Medications        metoprolol tartrate (LOPRESSOR) 25 MG tablet Take 1 tablet (25 mg total) by mouth 2 (two) times daily.        Lab Results on atorvastatin 10       Component                 Value               Date                       CHOL                     158                 01/13/2020                 CHOL                     166                 01/22/2019                 CHOL                     252 (H)             12/07/2018            Lab Results       Component                Value               Date                       HDL                      65                  01/13/2020                 HDL                      72                  01/22/2019                 HDL                      71                  12/07/2018            Lab Results       Component                Value               Date                       LDLCALC                  73.6                01/13/2020                 LDLCALC                  78.2                01/22/2019                 LDLCALC                  158.2               12/07/2018            Lab Results       Component                Value               Date                       TRIG                     97                  01/13/2020                 TRIG                     79                  01/22/2019                 TRIG                     114                 12/07/2018                Review of Systems   Constitutional: Negative for fever.   Respiratory: Negative for cough, chest tightness and shortness of breath.    Cardiovascular: Negative for chest pain and leg swelling.   Musculoskeletal: Positive for arthralgias (knee hip), back pain (bowling in past - now not 2/2 back) and myalgias (right leg laterally - using Gabapentin for this sciatica).   Skin:        Hair loss - sts getting some new hair growth now    Psychiatric/Behavioral: Negative for sleep disturbance.       Objective:      Physical Exam  Constitutional:       Appearance: She is well-developed.   HENT:      Head: Normocephalic and atraumatic.   Cardiovascular:      Rate and Rhythm: Normal rate and regular rhythm.      Heart sounds: Normal heart sounds.   Pulmonary:      Effort:  Pulmonary effort is normal. No respiratory distress.      Breath sounds: Normal breath sounds.   Musculoskeletal:      Comments: No pain with int/ext rotation B hip joints   Skin:     General: Skin is warm and dry.   Neurological:      Mental Status: She is alert and oriented to person, place, and time.   Psychiatric:         Behavior: Behavior normal.           Assessment/Plan:     1. Asymptomatic menopausal state  DXA Bone Density Spine And Hip   2. Osteopenia of right hip     3. Essential hypertension     4. Mixed hyperlipidemia     5. Arthritis of knee, right     6. Hair loss     continue current meds for now.  Bone density testing soon.  She was at high risk for fracture 2017 testing.  If there is a change will have her return to discuss start of Fosamax.  Reviewed contraindications and she does not endorse swallowing problems or dental problems.   watch BPs. Her numbers are 110s-120s./xx on wrist cuff - eligible for DIG MED but no smart phone  Continue to F/u with cards.  Continue on pepcid BID, lifestyls control.  Recheck 6 months - labs at that time.  offered derm for hair loss - she defers for now - thinks maybe it is improving.  Cont to F/U with ortho re knee, hip.  More than 50% of visit was spent in counseling regarding options, recommendations, medication use, side effects, expectations, and precautions.  Total time spent face-to-face was 45 minutes.

## 2020-08-18 NOTE — PATIENT INSTRUCTIONS
Tetanus/whooping cough immunization is due. Please contact your pharmacy about this immunization. They will let you know how much your copay or other cost will be.        Preventing Osteoporosis: Meeting Your Calcium Needs    Your body needs calcium to build and repair bones. But it can't make calcium on its own. That's why it's important to eat calcium-rich foods. Some foods are naturally rich in calcium. Others have calcium added (fortified). It's best to get calcium from the foods you eat. But if you can't get enough, you may want to take calcium supplements. To meet your daily calcium needs, try the foods listed below.  Dairy Fish & beans Other sources      Source   Calcium (mg) per serving   Source   Calcium (mg) per serving   Source   Calcium (mg) per serving      Low-fat yogurt, plain   415 mg/8 oz.   Sardines, Atlantic, canned, with bones   351 mg/3 oz.   Oatmeal, instant, fortified   215 mg/1 cup   Nonfat milk   302 mg/1 cup   Murphy, sockeye, canned, with bones   239 mg/3 oz.   Tofu made with calcium sulfate   204 mg/3 oz.   Low-fat milk   297 mg/1 cup   Soybeans, fresh, boiled   131 mg/1/2 cup   Collards   179 mg/1/2 cup   Swiss cheese   272 mg/1 oz.   White beans, cooked   81 mg/1/2 cup   English muffin, whole wheat   175 mg/1 muffin   Cheddar cheese   205 mg/1 oz.   Navy beans, cooked   79 mg/1/2 cup   Kale   90 mg/1/2 cup   Ice cream strawberry   79 mg/1/2 cup           Orange, navel   56 mg/1 medium   Note: Calcium levels may vary depending on brand and size.  Daily calcium needs  14-18 years old: 1,300 mg  19-30 years old: 1,000 mg  31-50 years old: 1,000 mg  51-70 years old, women: 1,200 mg  51-70 years old, men: 1,000 mg  Pregnant or nursin-28 years old: 1,300 mg, 19-50 years old: 1,000 mg  Older than 70 (women and men): 1,200 mg   Date Last Reviewed: 10/17/2015  © 3306-1056 [employer]. [address] [place] [address], [place] [address]. All rights reserved. This information is not  intended as a substitute for professional medical care. Always follow your healthcare professional's instructions.          Living with Osteoporosis: Regular Exercise  If you have osteoporosis, exercise is vital for your health. It can prevent bone fractures and spine changes. It will slow bone loss. Exercise will strengthen your body. It can also be fun. A variety of exercises is best. See below for exercises that can help you. Before you start, though, talk to your healthcare provider to be sure these exercises are right for you.    Resistance exercises. These build muscle strength and maintain bone mass. They also make you less prone to injury. Exercises include lifting small weights, doing push-ups and sit-ups, using elastic exercise bands, and using weight machines.  Weight-bearing activities. These help your whole body. They also help you maintain bone mass. Activities include walking, dancing, and housework.  Nonweight-bearing exercises. These help prevent back strain and pain. They do this by building the trunk and leg muscles. Exercises that help with flexibility can prevent falls. Examples include swimming, water exercise, and stretching.  Staying safe  Here are tips to stay safe:   · Always check with your healthcare provider before starting any new exercise program.  · Use weights only as instructed.  · Stop any exercise that causes pain.   Date Last Reviewed: 10/17/2015  © 4142-9570 [employer]. [address] [place] [address], [place] [address]. All rights reserved. This information is not intended as a substitute for professional medical care. Always follow your healthcare professional's instructions.        Preventing Osteoporosis: Avoiding Bone Loss  Certain factors can speed up bone loss or decrease bone growth. For example, alcohol, cigarettes, and certain medicines reduce bone mass. Some foods make it hard for your body to absorb calcium.    Things to avoid  Here are things to avoid to help  prevent osteoporosis:  · Alcohol is toxic to bones. It is a major cause of bone loss. Heavy drinking can cause osteoporosis even if you have no other risk factors.  · Smoking reduces bone mass. Smoking may also interfere with estrogen levels and cause early menopause.  · Inactivity makes your bones lose strength and become thinner. Over time, thin bones may break. Women who aren't active are at a high risk for osteoporosis.  · Certain medicines, such as cortisone, increase bone loss. They also decrease bone growth. Ask your healthcare provider about any side effects of your medicines, and how to prevent them.  · Protein-rich or salty foods eaten in large amounts may deplete calcium.  · Caffeine increases calcium loss. People who drink a lot of coffee, tea, or james lose more calcium than those who don't.  Date Last Reviewed: 10/17/2015  © 7637-9610 [employer]. [address] [place] [address]. All rights reserved. This information is not intended as a substitute for professional medical care. Always follow your healthcare professional's instructions.        Preventing Osteoporosis: Staying Active  Certain factors can speed up bone loss or decrease bone growth. For example, a lack of activity makes bones lose their strength. Exercise plays a big part in maintaining bone mass, no matter what your age. The amount and type of activity you do also play a part in keeping your bones strong. Weight-bearing and resistance exercises, such as walking, aerobic dancing, and bicycling, are just a few of the activities that are good for your bones.     Resistance exercises, such as weight training, help maintain bones by strengthening the muscles around them. Swimming is also a good choice.     · Check with your healthcare provider before starting any new exercise program.  · Stop any exercise that causes pain.   Date Last Reviewed: 10/17/2015  © 0643-2570 [employer]. [address] [place] [address],  ANUR[place]HA Horton [address]. All rights reserved. This information is not intended as a substitute for professional medical care. Always follow your healthcare professional's instructions.

## 2020-08-26 ENCOUNTER — OFFICE VISIT (OUTPATIENT)
Dept: DERMATOLOGY | Facility: CLINIC | Age: 80
End: 2020-08-26
Payer: MEDICARE

## 2020-08-26 DIAGNOSIS — L65.9 HAIR LOSS: Primary | ICD-10-CM

## 2020-08-26 PROCEDURE — 99202 PR OFFICE/OUTPT VISIT, NEW, LEVL II, 15-29 MIN: ICD-10-PCS | Mod: S$PBB,,, | Performed by: STUDENT IN AN ORGANIZED HEALTH CARE EDUCATION/TRAINING PROGRAM

## 2020-08-26 PROCEDURE — 99202 OFFICE O/P NEW SF 15 MIN: CPT | Mod: S$PBB,,, | Performed by: STUDENT IN AN ORGANIZED HEALTH CARE EDUCATION/TRAINING PROGRAM

## 2020-08-26 PROCEDURE — 99999 PR PBB SHADOW E&M-EST. PATIENT-LVL III: ICD-10-PCS | Mod: PBBFAC,,, | Performed by: STUDENT IN AN ORGANIZED HEALTH CARE EDUCATION/TRAINING PROGRAM

## 2020-08-26 PROCEDURE — 99999 PR PBB SHADOW E&M-EST. PATIENT-LVL III: CPT | Mod: PBBFAC,,, | Performed by: STUDENT IN AN ORGANIZED HEALTH CARE EDUCATION/TRAINING PROGRAM

## 2020-08-26 PROCEDURE — 99213 OFFICE O/P EST LOW 20 MIN: CPT | Mod: PBBFAC | Performed by: STUDENT IN AN ORGANIZED HEALTH CARE EDUCATION/TRAINING PROGRAM

## 2020-08-26 RX ORDER — CLOBETASOL PROPIONATE 0.46 MG/ML
SOLUTION TOPICAL 2 TIMES DAILY
Qty: 50 ML | Refills: 1 | Status: ON HOLD | OUTPATIENT
Start: 2020-08-26 | End: 2020-11-03 | Stop reason: HOSPADM

## 2020-08-26 NOTE — PROGRESS NOTES
Subjective:       Patient ID:  Maria Del Carmen Spence is a 79 y.o. female who presents for   Chief Complaint   Patient presents with    Hair Loss     c/o hair loss, about 6 mth      History of Present Illness: The patient presents with chief complaint o hair loss .  Location: scalp  Duration: over 6 months  Signs/Symptoms: hair thinning and shedding. Denies any itching or flaking of the scalp. No other hair loss.   Prior treatments: none      Hair Loss        Review of Systems   Skin: Negative for itching, rash and dry skin.        Objective:    Physical Exam   Constitutional: She appears well-developed and well-nourished. No distress.   Neurological: She is alert and oriented to person, place, and time. She is not disoriented.   Psychiatric: She has a normal mood and affect.   Skin:   Areas Examined (abnormalities noted in diagram):   Scalp / Hair Palpated and Inspected  Head / Face Inspection Performed  Neck Inspection Performed              Diagram Legend     Erythematous scaling macule/papule c/w actinic keratosis       Vascular papule c/w angioma      Pigmented verrucoid papule/plaque c/w seborrheic keratosis      Yellow umbilicated papule c/w sebaceous hyperplasia      Irregularly shaped tan macule c/w lentigo     1-2 mm smooth white papules consistent with Milia      Movable subcutaneous cyst with punctum c/w epidermal inclusion cyst      Subcutaneous movable cyst c/w pilar cyst      Firm pink to brown papule c/w dermatofibroma      Pedunculated fleshy papule(s) c/w skin tag(s)      Evenly pigmented macule c/w junctional nevus     Mildly variegated pigmented, slightly irregular-bordered macule c/w mildly atypical nevus      Flesh colored to evenly pigmented papule c/w intradermal nevus       Pink pearly papule/plaque c/w basal cell carcinoma      Erythematous hyperkeratotic cursted plaque c/w SCC      Surgical scar with no sign of skin cancer recurrence      Open and closed comedones      Inflammatory papules  and pustules      Verrucoid papule consistent consistent with wart     Erythematous eczematous patches and plaques     Dystrophic onycholytic nail with subungual debris c/w onychomycosis     Umbilicated papule    Erythematous-base heme-crusted tan verrucoid plaque consistent with inflamed seborrheic keratosis     Erythematous Silvery Scaling Plaque c/w Psoriasis     See annotation      Assessment / Plan:        Hair loss  -     clobetasoL (TEMOVATE) 0.05 % external solution; Apply topically 2 (two) times daily.  Dispense: 50 mL; Refill: 1  -     Recommend topical minoxidil 5% foam daily.        Follow up in about 3 months (around 11/26/2020).

## 2020-09-09 ENCOUNTER — TELEPHONE (OUTPATIENT)
Dept: DERMATOLOGY | Facility: CLINIC | Age: 80
End: 2020-09-09

## 2020-09-09 NOTE — TELEPHONE ENCOUNTER
PA for clob[facility]asol solution [facility]  Pt ID [id number]

## 2020-09-14 ENCOUNTER — PATIENT OUTREACH (OUTPATIENT)
Dept: ADMINISTRATIVE | Facility: OTHER | Age: 80
End: 2020-09-14

## 2020-09-14 NOTE — PROGRESS NOTES
Health Maintenance Due   Topic Date Due    Influenza Vaccine (1) 08/01/2020     Updates were requested from care everywhere.  Chart was reviewed for overdue Proactive [facility] Encounters (RODRIGO) topics (CRS, Breast Cancer Screening, Eye exam)  Health Maintenance has been updated.  LINKS immunization registry triggered.  Immunizations were reconciled.

## 2020-09-15 ENCOUNTER — OFFICE VISIT (OUTPATIENT)
Dept: ORTHOPEDICS | Facility: CLINIC | Age: 80
End: 2020-09-15
Payer: MEDICARE

## 2020-09-15 ENCOUNTER — HOSPITAL ENCOUNTER (OUTPATIENT)
Dept: RADIOLOGY | Facility: HOSPITAL | Age: 80
Discharge: HOME | End: 2020-09-15
Attending: PHYSICIAN ASSISTANT
Payer: MEDICARE

## 2020-09-15 VITALS
DIASTOLIC BLOOD PRESSURE: 70 MMHG | WEIGHT: 193 LBS | BODY MASS INDEX: 32.15 KG/M2 | SYSTOLIC BLOOD PRESSURE: 134 MMHG | HEART RATE: 70 BPM | HEIGHT: 65 IN

## 2020-09-15 DIAGNOSIS — M25.561 CHRONIC PAIN OF RIGHT KNEE: ICD-10-CM

## 2020-09-15 DIAGNOSIS — G89.29 CHRONIC LEFT SI JOINT PAIN: ICD-10-CM

## 2020-09-15 DIAGNOSIS — M53.3 CHRONIC LEFT SI JOINT PAIN: ICD-10-CM

## 2020-09-15 DIAGNOSIS — M54.50 LOW BACK PAIN, UNSPECIFIED BACK PAIN LATERALITY, UNSPECIFIED CHRONICITY, UNSPECIFIED WHETHER SCIATICA PRESENT: Primary | ICD-10-CM

## 2020-09-15 DIAGNOSIS — M17.11 PRIMARY OSTEOARTHRITIS OF RIGHT KNEE: Primary | ICD-10-CM

## 2020-09-15 DIAGNOSIS — M54.50 LOW BACK PAIN, UNSPECIFIED BACK PAIN LATERALITY, UNSPECIFIED CHRONICITY, UNSPECIFIED WHETHER SCIATICA PRESENT: ICD-10-CM

## 2020-09-15 DIAGNOSIS — G89.29 CHRONIC PAIN OF RIGHT KNEE: ICD-10-CM

## 2020-09-15 PROCEDURE — 99999 PR PBB SHADOW E&M-EST. PATIENT-LVL V: ICD-10-PCS | Mod: PBBFAC,,, | Performed by: PHYSICIAN ASSISTANT

## 2020-09-15 PROCEDURE — 99215 OFFICE O/P EST HI 40 MIN: CPT | Mod: PBBFAC,25 | Performed by: PHYSICIAN ASSISTANT

## 2020-09-15 PROCEDURE — 99214 PR OFFICE/OUTPT VISIT, EST, LEVL IV, 30-39 MIN: ICD-10-PCS | Mod: S$PBB,,, | Performed by: PHYSICIAN ASSISTANT

## 2020-09-15 PROCEDURE — 72110 X-RAY EXAM L-2 SPINE 4/>VWS: CPT | Mod: 26,,, | Performed by: RADIOLOGY

## 2020-09-15 PROCEDURE — 99214 OFFICE O/P EST MOD 30 MIN: CPT | Mod: S$PBB,,, | Performed by: PHYSICIAN ASSISTANT

## 2020-09-15 PROCEDURE — 72110 X-RAY EXAM L-2 SPINE 4/>VWS: CPT | Mod: TC

## 2020-09-15 PROCEDURE — 99999 PR PBB SHADOW E&M-EST. PATIENT-LVL V: CPT | Mod: PBBFAC,,, | Performed by: PHYSICIAN ASSISTANT

## 2020-09-15 PROCEDURE — 72110 XR LUMBAR SPINE COMPLETE 5 VIEW: ICD-10-PCS | Mod: 26,,, | Performed by: RADIOLOGY

## 2020-09-15 RX ORDER — METHYLPREDNISOLONE 4 MG/1
TABLET ORAL
Qty: 1 PACKAGE | Refills: 0 | Status: SHIPPED | OUTPATIENT
Start: 2020-09-15 | End: 2020-10-21 | Stop reason: SDUPTHER

## 2020-09-15 RX ORDER — METHOCARBAMOL 500 MG/1
500 TABLET, FILM COATED ORAL 2 TIMES DAILY PRN
Qty: 20 TABLET | Refills: 0 | Status: SHIPPED | OUTPATIENT
Start: 2020-09-15 | End: 2020-12-15 | Stop reason: SDUPTHER

## 2020-09-15 NOTE — Clinical Note
Sending you this lad to gabi for right GNB and Left SI jt injection.  Pt does not want TKA.  She can f/u prn with me as also not interested in PT and injections haven't helped.  May benefit from Chronic medical pain management if block fails.  If she reconsiders surgery, I'd be happy to see her or you can set her up with Dr. Ortiz for richyal.  THanks!

## 2020-09-15 NOTE — PROGRESS NOTES
Patient ID: Maria Del Carmen Spence is a 79 y.o. female.    Chief Complaint: Pain of the Right Knee      HPI: Maria Del Carmen Spence  is a 79 y.o. female who c/o Pain of the Right Knee   for duration of years.  I have seen her in done corticosteroid injections in the knee which did not give her much relief.  She later underwent Euflexxa injection series this summer.  She says those did really nothing to substantially alleviate her pain.  Pain level still rated at 8/10.  The left knee is doing well.  Quality is aching and sharp.  She feels a crushing sensation in a pressure.  Alleviating factors include nothing.  She has done Voltaren gel which caused facial swelling.  She has had home exercise program which did not do anything for her.  She has used the knee brace which only slides down the leg.  She is not interested in surgical intervention.    Past Medical History:   Diagnosis Date    A-fib     Arthritis     Chronic LBP     ESIs x 3 with Dr. Hicks, PT at [facility], chiropractor    Eye pain     Frequent headaches     GERD (gastroesophageal reflux disease)     Glaucoma     Hyperlipemia     Hypertension      Past Surgical History:   Procedure Laterality Date    ABLATION OF ARRHYTHMOGENIC FOCUS FOR ATRIAL FIBRILLATION N/A 3/6/2019    Procedure: ABLATION, ARRHYTHMOGENIC FOCUS, FOR ATRIAL FIBRILLATION;  Surgeon: Abel Morillo MD;  Location[facility] EP LAB;  Service: Cardiology;  Laterality: N/A;    CARDIOVERSION N/A 7/9/2018    Procedure: CARDIOVERSION;  Surgeon: Will Rosenthal MD;  Location: [facility] CATH LAB;  Service: Cardiology;  Laterality: N/A;    CATARACT EXTRACTION W/  INTRAOCULAR LENS IMPLANT  OU    TUBAL LIGATION       Family History   Problem Relation Age of Onset    Glaucoma Mother     Cancer Mother     Cataracts Mother     Hypertension Mother     Hypertension Father     Diabetes Paternal Aunt     Strabismus Neg Hx     Retinal detachment Neg Hx     Macular degeneration Neg Hx     Blindness Neg Hx      Amblyopia Neg Hx     Stroke Neg Hx     Thyroid disease Neg Hx      Social History     Socioeconomic History    Marital status:      Spouse name: Not on file    Number of children: Not on file    Years of education: Not on file    Highest education level: Not on file   Occupational History    Not on file   Social Needs    Financial resource strain: Not on file    Food insecurity     Worry: Not on file     Inability: Not on file    Transportation needs     Medical: Not on file     Non-medical: Not on file   Tobacco Use    Smoking status: Never Smoker    Smokeless tobacco: Never Used   Substance and Sexual Activity    Alcohol use: No    Drug use: No    Sexual activity: Yes     Partners: Male   Lifestyle    Physical activity     Days per week: Not on file     Minutes per session: Not on file    Stress: Not on file   Relationships    Social connections     Talks on phone: Not on file     Gets together: Not on file     Attends Baptism service: Not on file     Active member of club or organization: Not on file     Attends meetings of clubs or organizations: Not on file     Relationship status: Not on file   Other Topics Concern    Are you pregnant or think you may be? Not Asked    Breast-feeding Not Asked   Social History Narrative    Not on file     Medication List with Changes/Refills   New Medications    METHOCARBAMOL (ROBAXIN) 500 MG TAB    Take 1 tablet (500 mg total) by mouth 2 (two) times daily as needed (muscle spasms).    METHYLPREDNISOLONE (MEDROL DOSEPACK) 4 MG TABLET    use as directed   Current Medications    AA/PROT/LYSINE/METHIO/VIT C/B6 (A/G PRO ORAL)    Take 2 tablets by mouth 3 (three) times daily.    ACETAMINOPHEN (TYLENOL) 500 MG TABLET    Take 1,000 mg by mouth once daily.     ATORVASTATIN (LIPITOR) 10 MG TABLET    TAKE ONE TABLET BY MOUTH EVERY EVENING    BIOTIN 5,000 MCG TBDL    Take by mouth 3 (three) times daily. Only takes if out of AG pro    CALCIUM PHOSPHATE  TRIB/VIT D3 (CITRACAL + D ORAL)    Take 1 tablet by mouth once daily at 6am.     CETIRIZINE HCL (ZYRTEC ORAL)    Take by mouth.    CLOBETASOL (TEMOVATE) 0.05 % EXTERNAL SOLUTION    Apply topically 2 (two) times daily.    FAMOTIDINE (PEPCID) 20 MG TABLET    Take 1 tablet (20 mg total) by mouth 2 (two) times daily.    GABAPENTIN (NEURONTIN) 300 MG CAPSULE    Take 1 capsule (300 mg total) by mouth 3 (three) times daily.    LATANOPROST 0.005 % OPHTHALMIC SOLUTION    instill ONE DROP BOTH EYES AT BEDTIME    METOPROLOL TARTRATE (LOPRESSOR) 25 MG TABLET    Take 1 tablet (25 mg total) by mouth 2 (two) times daily.    MULTIVITAMIN W-MINERALS/LUTEIN (CENTRUM SILVER ORAL)    Take by mouth.    NAPROXEN SODIUM (ALEVE) 220 MG TABLET    Take 220 mg by mouth once daily.    TIMOLOL MALEATE 0.5% (TIMOPTIC) 0.5 % DROP    PLACE ONE DROP INTO BOTH EYES EVERY MORNING    XARELTO 20 MG TAB    TAKE ONE TABLET BY MOUTH EVERY DAY WITH DINNER OR IN THE EVENING MEAL     Review of patient's allergies indicates:   Allergen Reactions    Voltaren [diclofenac sodium] Edema     Gel formulation caused facial rash and facial swelling    Eliquis [apixaban] Other (See Comments)     Headache, numbness in lip        Objective:        General    Nursing note and vitals reviewed.  Constitutional: She is oriented to person, place, and time. She appears well-developed and well-nourished.   HENT:   Head: Normocephalic and atraumatic.   Eyes: EOM are normal.   Cardiovascular: Normal rate and regular rhythm.    Pulmonary/Chest: Effort normal.   Abdominal: Soft.   Neurological: She is alert and oriented to person, place, and time.   Psychiatric: She has a normal mood and affect. Her behavior is normal.           Right Knee Exam     Inspection   Erythema: absent  Swelling: absent  Effusion: absent  Deformity: present (valgus)  Bruising: absent    Tenderness   The patient is tender to palpation of the lateral joint line and condyle.    Crepitus   The patient has  crepitus of the patella.    Range of Motion   Extension:  5 abnormal   Flexion:  100 abnormal     Tests   Meniscus   Charly:  Medial - negative Lateral - negative  Ligament Examination Lachman: normal (-1 to 2mm) PCL-Posterior Drawer: normal (0 to 2mm)     MCL - Valgus: normal (0 to 2mm)  LCL - Varus: abnormal - grade II  Patella   Patellar apprehension: negative  Patellar Tracking: normal  Patellar Grind: positive    Other   Meniscal Cyst: absent  Popliteal (Baker's) Cyst: absent  Sensation: normal    Comments:  Comp soft, cap refill < 2 sec.    Left Knee Exam     Inspection   Deformity: present (varus)    Crepitus   The patient has crepitus of the patella.    Range of Motion   Extension: normal   Flexion: normal     Tests   Stability Lachman: normal (-1 to 2mm) PCL-Posterior Drawer: normal (0 to 2mm)  MCL - Valgus: abnormal - grade I  LCL - Varus: normal (0 to 2mm)    Other   Sensation: normal    Muscle Strength   Right Lower Extremity   Quadriceps:  4/5   Hamstrin/5   Left Lower Extremity   Quadriceps:  4/5   Hamstrin/5     Vascular Exam       Edema  Right Lower Leg: absent  Left Lower Leg: absent      Xray images and report were reviewed today.  I agree with the radiologist's interpretation.    X-ray Knee Ortho Bilateral with Flexion  Order: [id number]  Status:  Final result   Visible to patient:  No (not released)   Next appt:  10/12/2020 at 10:00 AM in Ophthalmology ([facility])   Dx:  Pain in both knees, unspecified chron...  Details    Reading Physician Reading Date Result Priority   Roldan Guerra III, MD  [phone] 6/15/2020 Routine      Narrative & Impression     EXAMINATION:  XR KNEE ORTHO BILAT WITH FLEXION     CLINICAL HISTORY:  Pain in right knee     TECHNIQUE:  AP standing of both knees, PA flexion standing views of both knees, and Merchant views of both knees were performed.  Lateral views of both knees were also performed.     COMPARISON:    2019     FINDINGS:  Generalized osteopenia and juxta-articular prominence.  Extensive bilateral tricompartment degenerative changes without significant interval change from March 25, 2019 exam.  There is asymmetric increased involvement of the right lateral and left medial compartments.  Near bone on bone articulation bilaterally right greater than left.  Extensive degenerative change bilateral patellofemoral joints right greater than left.  Asymmetric increased marginal osteophyte formation on the right.  No definite effusions.     Impression:     As above.        Electronically signed by: Roldan Guerra MD  Date:                                            06/15/2020  Time:                                           11:07               Assessment:       Encounter Diagnoses   Name Primary?    Primary osteoarthritis of right knee Yes    Chronic pain of right knee     Chronic left SI joint pain           Plan:       Maria Del Carmen was seen today for pain.    Diagnoses and all orders for this visit:    Primary osteoarthritis of right knee  -     Ambulatory referral/consult to Pain Clinic; Future  -     methylPREDNISolone (MEDROL DOSEPACK) 4 mg tablet; use as directed  -     methocarbamoL (ROBAXIN) 500 MG Tab; Take 1 tablet (500 mg total) by mouth 2 (two) times daily as needed (muscle spasms).    Chronic pain of right knee  -     Ambulatory referral/consult to Pain Clinic; Future  -     methylPREDNISolone (MEDROL DOSEPACK) 4 mg tablet; use as directed  -     methocarbamoL (ROBAXIN) 500 MG Tab; Take 1 tablet (500 mg total) by mouth 2 (two) times daily as needed (muscle spasms).    Chronic left SI joint pain  -     Ambulatory referral/consult to Pain Clinic; Future  -     methylPREDNISolone (MEDROL DOSEPACK) 4 mg tablet; use as directed  -     methocarbamoL (ROBAXIN) 500 MG Tab; Take 1 tablet (500 mg total) by mouth 2 (two) times daily as needed (muscle spasms).        Maria Del Carmen Spence comes in today with the above problems as  above.  We had a long discussion today regarding degenerative arthritis in the knees. The patient understands that arthritis is chronic and will worsen over time.  The patient also understands that arthritis may cause episodic flare-ups in pain. Management or if arthritis is achieved through a multi-modal approach including weight loss in obese individuals, activity modification, NSAIDs (topical vs oral) where appropriate, periodic intra-articular steroid injections, viscosupplementation, physical therapy, knee bracing, ambulatory aids, as well as geniculate nerve blocks.      After discussion of risks and benefits of all of the above were discussed, the decision was made to move forward with referral to Dr. Christiansen with interventional pain management for consideration of a right geniculate nerve block.  I would like him to see her for so they can discussed risks and benefits of a block.  She has declined total knee arthroplasty, aquatic therapy, medial  brace.  She has not had significant improvement with home exercise program neoprene hinged knee brace.  She had a DC Voltaren gel due to facial swelling.  She can do oral NSAIDs due to chronic anticoagulation.  If she is not doing any better with geniculate nerve block in still does not want any of the above, she may benefit from external Pain Management referral for chronic medical pain management.    As far as the pain she feels in the left hip posteriorly, this is a new problem.  I think she has degenerative SI joint pain.  She likely has degenerative changes in her lumbar spine.  I would like her to get an x-ray of the back on her way out.  I would also like her to discuss the possibility of an SI joint injection with Dr. Christiansen.  I have put her on a Medrol Dosepak as well as a mild muscle relaxer to trying get her some relief until she can follow up with Dr. Christiansen.  She verbalizes understanding and agrees.    Follow up if symptoms worsen or fail to  improve.    Kellgren Varun Scale 4 right knee     The patient understands, chooses and consents to this plan and accepts all   the risks which include but are not limited to the risks mentioned above.     Disclaimer: This note was prepared using a voice recognition system and is likely to have sound alike errors within the text.

## 2020-09-16 ENCOUNTER — TELEPHONE (OUTPATIENT)
Dept: ORTHOPEDICS | Facility: CLINIC | Age: 80
End: 2020-09-16

## 2020-09-16 NOTE — TELEPHONE ENCOUNTER
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  Dr. Christiansen can discuss management in detail.  Did she want to wait until late November to see him?   Tori

## 2020-09-17 ENCOUNTER — TELEPHONE (OUTPATIENT)
Dept: ORTHOPEDICS | Facility: CLINIC | Age: 80
End: 2020-09-17

## 2020-09-17 NOTE — TELEPHONE ENCOUNTER
----- Message from Neli Thomas sent at 9/17/2020  8:11 AM CDT -----  Regarding: Missed call  Contact: Patient  .Type:  Patient Returning Call    Who Called:Patient  Who Left Message for Patient:  URVASHI  Does the patient know what this is regarding?:Missed call  Would the patient rather a call back or a response via [facility]? Call  Best Call Back Number: .[phone] (home)     Additional Information:

## 2020-09-17 NOTE — TELEPHONE ENCOUNTER
Spoke with patient and informed her of her xray results per Stacy Cross PA-C. Patient has complaints of puffiness and flush to her face since taking her Medrol Dose pack and Methocarbamol. Patient informed that this is a possible reaction to the steroid and that she can hold it to see if the symptoms resolve. Patient advised to call EMS if she develops swelling or shortness of breathe. Patient verbalized understanding.         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  Dr. Christiansen can discuss management in detail.  Did she want to wait until late November to see him?   Tori

## 2020-09-18 ENCOUNTER — TELEPHONE (OUTPATIENT)
Dept: INTERNAL MEDICINE | Facility: CLINIC | Age: 80
End: 2020-09-18

## 2020-09-18 NOTE — TELEPHONE ENCOUNTER
----- Message from Martina Stone sent at 9/18/2020  9:39 AM CDT -----  .Type:  Sooner Apoointment Request    Caller is requesting a sooner appointment.  Caller declined first available appointment listed below.  Caller will not accept being placed on the waitlist and is requesting a message be sent to doctor.  Name of Caller:Maria Del Carmen Spence  When is the first available appointment?11/06/20  Symptoms:follow-up/bone density  Would the patient rather a call back or a response via [facility]? Call back  Best Call Back Number:[phone]  Additional Information:

## 2020-09-25 ENCOUNTER — PES CALL (OUTPATIENT)
Dept: ADMINISTRATIVE | Facility: CLINIC | Age: 80
End: 2020-09-25

## 2020-10-12 ENCOUNTER — OFFICE VISIT (OUTPATIENT)
Dept: OPHTHALMOLOGY | Facility: CLINIC | Age: 80
End: 2020-10-12
Payer: MEDICARE

## 2020-10-12 ENCOUNTER — APPOINTMENT (OUTPATIENT)
Dept: OPHTHALMOLOGY | Facility: CLINIC | Age: 80
End: 2020-10-12
Payer: MEDICARE

## 2020-10-12 DIAGNOSIS — Z96.1 PSEUDOPHAKIA: ICD-10-CM

## 2020-10-12 DIAGNOSIS — H40.1131 PRIMARY OPEN ANGLE GLAUCOMA OF BOTH EYES, MILD STAGE: Primary | ICD-10-CM

## 2020-10-12 PROCEDURE — 92014 PR EYE EXAM, EST PATIENT,COMPREHESV: ICD-10-PCS | Mod: S$PBB,,, | Performed by: OPHTHALMOLOGY

## 2020-10-12 PROCEDURE — 99213 OFFICE O/P EST LOW 20 MIN: CPT | Mod: PBBFAC,25 | Performed by: OPHTHALMOLOGY

## 2020-10-12 PROCEDURE — 92014 COMPRE OPH EXAM EST PT 1/>: CPT | Mod: S$PBB,,, | Performed by: OPHTHALMOLOGY

## 2020-10-12 PROCEDURE — 99999 PR PBB SHADOW E&M-EST. PATIENT-LVL III: ICD-10-PCS | Mod: PBBFAC,,, | Performed by: OPHTHALMOLOGY

## 2020-10-12 PROCEDURE — 92133 CPTRZD OPH DX IMG PST SGM ON: CPT | Mod: PBBFAC | Performed by: OPHTHALMOLOGY

## 2020-10-12 PROCEDURE — 92083 EXTENDED VISUAL FIELD XM: CPT | Mod: PBBFAC | Performed by: OPHTHALMOLOGY

## 2020-10-12 PROCEDURE — 99999 PR PBB SHADOW E&M-EST. PATIENT-LVL III: CPT | Mod: PBBFAC,,, | Performed by: OPHTHALMOLOGY

## 2020-10-12 PROCEDURE — 92083 HUMPHREY VISUAL FIELD - OU - BOTH EYES: ICD-10-PCS | Mod: 26,S$PBB,, | Performed by: OPHTHALMOLOGY

## 2020-10-12 PROCEDURE — 92133 POSTERIOR SEGMENT OCT OPTIC NERVE(OCULAR COHERENCE TOMOGRAPHY) - OU - BOTH EYES: ICD-10-PCS | Mod: 26,S$PBB,, | Performed by: OPHTHALMOLOGY

## 2020-10-12 NOTE — PROGRESS NOTES
SUBJECTIVE  Maria Del Carmen Spence is 80 y.o. female  Corrected distance visual acuity was 20/20-2 in the right eye and 20/20 in the left eye.   Chief Complaint   Patient presents with    Glaucoma          HPI     Here for 4 month VF w/ GOCT states that her va  is blurred due to   allergies. Compliant with gtts and denies any pain or discomfort.       1. Mild COAG OD>OS (init 24/20) Goal <17  Rhopressa (irritated but exc IOP 11/11)  Dorzolamide OU BID (Not taking, patient States This Eye Drop Makes Her Eye   Burn & Red)  2. PCIOL OU (Sulcus OD) (partial dislocation Od)  3. Dry Eyes      Latanoprost QHS OU  Timolol QAM OU    Last edited by Briana Cabrera on 10/12/2020 10:23 AM. (History)         Assessment /Plan :  1. Primary open angle glaucoma of both eyes, mild stage Doing well, IOP within acceptable range relative to target IOP and no evidence of progression. Continue current treatment. Reviewed importance of continued compliance with treatment and follow up.     2. Pseudophakia  -- Condition stable, no therapeutic change required. Monitoring routinely.       Return to clinic in 4 months  or as needed.  With IOP Check

## 2020-10-20 ENCOUNTER — TELEPHONE (OUTPATIENT)
Dept: PAIN MEDICINE | Facility: CLINIC | Age: 80
End: 2020-10-20

## 2020-10-20 NOTE — TELEPHONE ENCOUNTER
Tried to call to confirm jaylon for 10/21. No answer. Left vm for pt to r/s or cancel   Eyal Montes MA  [facility]   [facility]

## 2020-10-21 ENCOUNTER — OFFICE VISIT (OUTPATIENT)
Dept: PAIN MEDICINE | Facility: CLINIC | Age: 80
End: 2020-10-21
Payer: MEDICARE

## 2020-10-21 VITALS
SYSTOLIC BLOOD PRESSURE: 165 MMHG | BODY MASS INDEX: 31.59 KG/M2 | HEIGHT: 65 IN | HEART RATE: 69 BPM | WEIGHT: 189.63 LBS | DIASTOLIC BLOOD PRESSURE: 90 MMHG

## 2020-10-21 DIAGNOSIS — M53.3 CHRONIC LEFT SI JOINT PAIN: ICD-10-CM

## 2020-10-21 DIAGNOSIS — G89.29 CHRONIC PAIN OF RIGHT KNEE: ICD-10-CM

## 2020-10-21 DIAGNOSIS — M17.11 PRIMARY OSTEOARTHRITIS OF RIGHT KNEE: ICD-10-CM

## 2020-10-21 DIAGNOSIS — M25.561 CHRONIC PAIN OF RIGHT KNEE: ICD-10-CM

## 2020-10-21 DIAGNOSIS — M70.61 GREATER TROCHANTERIC BURSITIS OF RIGHT HIP: ICD-10-CM

## 2020-10-21 DIAGNOSIS — M46.1 SACROILIITIS: Primary | ICD-10-CM

## 2020-10-21 DIAGNOSIS — G89.29 CHRONIC LEFT SI JOINT PAIN: ICD-10-CM

## 2020-10-21 PROCEDURE — 99999 PR PBB SHADOW E&M-EST. PATIENT-LVL V: ICD-10-PCS | Mod: PBBFAC,,, | Performed by: PHYSICAL MEDICINE & REHABILITATION

## 2020-10-21 PROCEDURE — 99204 PR OFFICE/OUTPT VISIT, NEW, LEVL IV, 45-59 MIN: ICD-10-PCS | Mod: S$PBB,,, | Performed by: PHYSICAL MEDICINE & REHABILITATION

## 2020-10-21 PROCEDURE — 99204 OFFICE O/P NEW MOD 45 MIN: CPT | Mod: S$PBB,,, | Performed by: PHYSICAL MEDICINE & REHABILITATION

## 2020-10-21 PROCEDURE — 99999 PR PBB SHADOW E&M-EST. PATIENT-LVL V: CPT | Mod: PBBFAC,,, | Performed by: PHYSICAL MEDICINE & REHABILITATION

## 2020-10-21 PROCEDURE — 99215 OFFICE O/P EST HI 40 MIN: CPT | Mod: PBBFAC | Performed by: PHYSICAL MEDICINE & REHABILITATION

## 2020-10-21 NOTE — LETTER
October 21, 2020      Stacy Cross PA-C  [address]  [place] [address]           [facility]  [address] [address]  Phone: [phone]  Fax: [phone]          Patient: Maria Del Carmen Spence   MR Number: [id number]   YOB: 1940   Date of Visit: 10/21/2020       Dear Stacy Cross:    Thank you for referring Maria Del Carmen Spence to me for evaluation. Attached you will find relevant portions of my assessment and plan of care.    If you have questions, please do not hesitate to call me. I look forward to following Maria Del Carmen Spence along with you.    Sincerely,    Ayush Christiansen MD    Enclosure  CC:  No Recipients    If you would like to receive this communication electronically, please contact [email or web address] or [phone] to request more information on [employer] Link access.    For providers and/or their staff who would like to refer a patient to [facility], please contact us through our one-stop-shop provider referral line, [facility], at [phone].    If you feel you have received this communication in error or would no longer like to receive these types of communications, please e-mail [email or web address]

## 2020-10-21 NOTE — H&P (VIEW-ONLY)
New Patient Chronic Pain Note (Initial Visit)    Referring Physician: Stacy Cross,*    PCP: Elda Powers MD    Chief Complaint:   Chief Complaint   Patient presents with    Back Pain        SUBJECTIVE:  Maria Del Carmen Spence is a 80 y.o. female who presents to the clinic for the evaluation of lower back and right knee pain.  She was referred by the Orthopedics Department for further evaluation and management of this pain and for potential right genicular nerve blocks/RFA and SI joint injection.  Of note, patient has past medical history of headaches, glaucoma, hypertension, hyperlipidemia, atrial fibrillation, atrial flutter, GERD, and multiple other medical comorbidities as listed in her chart.  The pain started several years ago without any known injury or trauma and symptoms have been worsening.The pain is located in the right lumbosacral area and radiates to the right hip and buttock.  She also locates pain to the bilateral knees, right worse than left.  The pain is described as Crushing and is rated as 7/10. The pain is rated with a score of  4/10 on the BEST day and a score of 10/10 on the WORST day.  Symptoms interfere with daily activity and sleeping. The pain is exacerbated by prolonged standing, walking.  The pain is mitigated by nothing in particular. The patient reports spending 4-6 hours per day reclining. The patient reports 5-7 hours of uninterrupted sleep per night.    Patient denies night fever/night sweats, urinary incontinence, bowel incontinence, significant weight loss, significant motor weakness and loss of sensations.    Pain Disability Index Review:  Last 3 PDI Scores 10/21/2020   Pain Disability Index (PDI) 36       Non-Pharmacologic Treatments:  Physical Therapy/Home Exercise: yes  Ice/Heat:yes  TENS: yes  Acupuncture: no  Massage: yes  Chiropractic: yes    Other: no      Pain Medications:  - Opioids: None  - Adjuvant Medications: Robaxin, Medrol Dosepak, gabapentin, Tylenol,  Voltaren gel  - Anti-Coagulants: Xarelto     report:  Reviewed and consistent with medication use as prescribed.  No controlled substances recently prescribed.    Pain Procedures:   -previous right knee steroid injections and viscosupplementation  -previous lumbar ROSE        Imaging:   X-ray lumbar spine 09/15/2020:  Dextroscoliosis noted.  Vertebral body heights maintained.  Grade 1 anterolisthesis of L4 on L5.  Multilevel advanced degenerative disc height loss present throughout the lumbar spine.  Diffuse facet arthropathy noted greater at the lower lumbar spine.  No definite pars defects.  No definite acute osseous or soft tissue abnormality.    X-ray bilateral knees 06/15/2020:  Generalized osteopenia and juxta-articular prominence.  Extensive bilateral tricompartment degenerative changes without significant interval change from March 25, 2019 exam.  There is asymmetric increased involvement of the right lateral and left medial compartments.  Near bone on bone articulation bilaterally right greater than left.  Extensive degenerative change bilateral patellofemoral joints right greater than left.  Asymmetric increased marginal osteophyte formation on the right.  No definite effusions.    Past Medical History:   Diagnosis Date    A-fib     Arthritis     Chronic LBP     ESIs x 3 with Dr. Hicks, PT at [facility], chiropractor    Eye pain     Frequent headaches     GERD (gastroesophageal reflux disease)     Glaucoma     Hyperlipemia     Hypertension      Past Surgical History:   Procedure Laterality Date    ABLATION OF ARRHYTHMOGENIC FOCUS FOR ATRIAL FIBRILLATION N/A 3/6/2019    Procedure: ABLATION, ARRHYTHMOGENIC FOCUS, FOR ATRIAL FIBRILLATION;  Surgeon: Abel Morillo MD;  Location: [facility] EP LAB;  Service: Cardiology;  Laterality: N/A;    CARDIOVERSION N/A 7/9/2018    Procedure: CARDIOVERSION;  Surgeon: Will Rosenthal MD;  Location: [facility] CATH LAB;  Service: Cardiology;  Laterality: N/A;    CATARACT  EXTRACTION W/  INTRAOCULAR LENS IMPLANT  OU    TUBAL LIGATION       Social History     Socioeconomic History    Marital status:      Spouse name: Not on file    Number of children: Not on file    Years of education: Not on file    Highest education level: Not on file   Occupational History    Not on file   Social Needs    Financial resource strain: Not on file    Food insecurity     Worry: Not on file     Inability: Not on file    Transportation needs     Medical: Not on file     Non-medical: Not on file   Tobacco Use    Smoking status: Never Smoker    Smokeless tobacco: Never Used   Substance and Sexual Activity    Alcohol use: No    Drug use: No    Sexual activity: Yes     Partners: Male   Lifestyle    Physical activity     Days per week: Not on file     Minutes per session: Not on file    Stress: Not on file   Relationships    Social connections     Talks on phone: Not on file     Gets together: Not on file     Attends Sikhism service: Not on file     Active member of club or organization: Not on file     Attends meetings of clubs or organizations: Not on file     Relationship status: Not on file   Other Topics Concern    Are you pregnant or think you may be? Not Asked    Breast-feeding Not Asked   Social History Narrative    Not on file     Family History   Problem Relation Age of Onset    Glaucoma Mother     Cancer Mother     Cataracts Mother     Hypertension Mother     Hypertension Father     Diabetes Paternal Aunt     Strabismus Neg Hx     Retinal detachment Neg Hx     Macular degeneration Neg Hx     Blindness Neg Hx     Amblyopia Neg Hx     Stroke Neg Hx     Thyroid disease Neg Hx        Review of patient's allergies indicates:   Allergen Reactions    Voltaren [diclofenac sodium] Edema     Gel formulation caused facial rash and facial swelling    Eliquis [apixaban] Other (See Comments)     Headache, numbness in lip     Medrol [methylprednisolone] Blisters        Current Outpatient Medications   Medication Sig    AA/prot/lysine/methio/vit C/B6 (A/G PRO ORAL) Take 2 tablets by mouth 3 (three) times daily.    acetaminophen (TYLENOL) 500 MG tablet Take 1,000 mg by mouth once daily.     atorvastatin (LIPITOR) 10 MG tablet TAKE ONE TABLET BY MOUTH EVERY EVENING    biotin 5,000 mcg TbDL Take by mouth 3 (three) times daily. Only takes if out of AG pro    CALCIUM PHOSPHATE TRIB/VIT D3 (CITRACAL + D ORAL) Take 1 tablet by mouth once daily at 6am.     CETIRIZINE HCL (ZYRTEC ORAL) Take by mouth.    clobetasoL (TEMOVATE) 0.05 % external solution Apply topically 2 (two) times daily.    famotidine (PEPCID) 20 MG tablet Take 1 tablet (20 mg total) by mouth 2 (two) times daily.    gabapentin (NEURONTIN) 300 MG capsule Take 1 capsule (300 mg total) by mouth 3 (three) times daily.    latanoprost 0.005 % ophthalmic solution instill ONE DROP BOTH EYES AT BEDTIME    methocarbamoL (ROBAXIN) 500 MG Tab Take 1 tablet (500 mg total) by mouth 2 (two) times daily as needed (muscle spasms).    metoprolol tartrate (LOPRESSOR) 25 MG tablet Take 1 tablet (25 mg total) by mouth 2 (two) times daily.    MULTIVITAMIN W-MINERALS/LUTEIN (CENTRUM SILVER ORAL) Take by mouth.    naproxen sodium (ALEVE) 220 MG tablet Take 220 mg by mouth once daily.    timolol maleate 0.5% (TIMOPTIC) 0.5 % Drop PLACE ONE DROP INTO BOTH EYES EVERY MORNING    XARELTO 20 mg Tab TAKE ONE TABLET BY MOUTH EVERY DAY WITH DINNER OR IN THE EVENING MEAL     No current facility-administered medications for this visit.        Review of Systems     GENERAL:  No weight loss, malaise or fevers.  HEENT:   No recent changes in vision or hearing  NECK:  Negative for lumps, no difficulty with swallowing.  RESPIRATORY:  Negative for cough, wheezing or shortness of breath, patient denies any recent URI.  CARDIOVASCULAR:  Negative for chest pain, leg swelling or palpitations.  GI:  Negative for abdominal discomfort, blood in stools  "or black stools or change in bowel habits.  MUSCULOSKELETAL:  See HPI.  SKIN:  Negative for lesions, rash, and itching.  PSYCH:  No mood disorder or recent psychosocial stressors.  Patients sleep is not disturbed secondary to pain.  HEMATOLOGY/LYMPHOLOGY:  Negative for prolonged bleeding, bruising easily or swollen nodes.  Patient is currently taking anti-coagulants - Xarelto  NEURO:   No history of headaches, syncope, paralysis, seizures or tremors.  All other reviewed and negative other than HPI.    OBJECTIVE:    BP (!) 165/90   Pulse 69   Ht 5' 5" (1.651 m)   Wt 86 kg (189 lb 9.5 oz)   BMI 31.55 kg/m²         Physical Exam    GENERAL: Well appearing, in no acute distress, alert and oriented x3.  PSYCH:  Mood and affect appropriate.  SKIN: Skin color, texture, turgor normal, no rashes or lesions.  HEAD/FACE:  Normocephalic, atraumatic. Cranial nerves grossly intact.  CV: RRR with palpation of the radial artery.  PULM: No evidence of respiratory difficulty, symmetric chest rise.  GI:  Soft and non-tender.  BACK: Straight leg raising in the sitting and supine positions is equivocal to radicular pain.  Mild-to-moderate pain to palpation over the facet joints of the lumbar spine and lumbar paraspinal.  Limited range of motion secondary to pain reproduction.  EXTREMITIES: Peripheral joint ROM is full and pain free without obvious instability or laxity in all four extremities with the exception of limited knee flexion of the right when compared to the left secondary to pain. No deformities, edema, or skin discoloration. Good capillary refill.  MUSCULOSKELETAL:  Tenderness palpation over the right greater trochanteric bursa.  There is moderate pain with palpation over the sacroiliac joints bilaterally, right worse than left.  FABERs test is positive on the right.  FADIRs test is equivocal.  Tenderness palpation over the right pes anserine bursa.  Bilateral upper and lower extremity strength is normal and symmetric.  " No atrophy or tone abnormalities are noted.  NEURO: Bilateral upper and lower extremity coordination and muscle stretch reflexes are physiologic and symmetric.  Plantar response are downgoing. No clonus.  No loss of sensation is noted.  GAIT:  Slow, antalgic.      LABS:  Lab Results   Component Value Date    WBC 8.01 01/13/2020    HGB 11.0 (L) 01/13/2020    HCT 36.9 (L) 01/13/2020     (H) 01/13/2020     01/13/2020       CMP  Sodium   Date Value Ref Range Status   01/13/2020 141 136 - 145 mmol/L Final     Potassium   Date Value Ref Range Status   01/13/2020 5.8 (H) 3.5 - 5.1 mmol/L Final     Comment:     *No Visible Hemolysis     Chloride   Date Value Ref Range Status   01/13/2020 107 95 - 110 mmol/L Final     CO2   Date Value Ref Range Status   01/13/2020 25 23 - 29 mmol/L Final     Glucose   Date Value Ref Range Status   01/13/2020 76 70 - 110 mg/dL Final     BUN, Bld   Date Value Ref Range Status   01/13/2020 21 8 - 23 mg/dL Final     Creatinine   Date Value Ref Range Status   01/13/2020 0.8 0.5 - 1.4 mg/dL Final     Calcium   Date Value Ref Range Status   01/13/2020 10.2 8.7 - 10.5 mg/dL Final     Total Protein   Date Value Ref Range Status   01/13/2020 7.2 6.0 - 8.4 g/dL Final     Albumin   Date Value Ref Range Status   01/13/2020 3.9 3.5 - 5.2 g/dL Final     Total Bilirubin   Date Value Ref Range Status   01/13/2020 0.3 0.1 - 1.0 mg/dL Final     Comment:     For infants and newborns, interpretation of results should be based  on gestational age, weight and in agreement with clinical  observations.  Premature Infant recommended reference ranges:  Up to 24 hours.............<8.0 mg/dL  Up to 48 hours............<12.0 mg/dL  3-5 days..................<15.0 mg/dL  6-29 days.................<15.0 mg/dL       Alkaline Phosphatase   Date Value Ref Range Status   01/13/2020 72 55 - 135 U/L Final     AST   Date Value Ref Range Status   01/13/2020 26 10 - 40 U/L Final     ALT   Date Value Ref Range Status    01/13/2020 15 10 - 44 U/L Final     Anion Gap   Date Value Ref Range Status   01/13/2020 9 8 - 16 mmol/L Final     eGFR if    Date Value Ref Range Status   01/13/2020 >60.0 >60 mL/min/1.73 m^2 Final     eGFR if non    Date Value Ref Range Status   01/13/2020 >60.0 >60 mL/min/1.73 m^2 Final     Comment:     Calculation used to obtain the estimated glomerular filtration  rate (eGFR) is the CKD-EPI equation.          No results found for: LABA1C, HGBA1C          ASSESSMENT: 80 y.o. year old female with low back, hip, and knee pain, consistent with     1. Sacroiliitis  IR SI Joint Injection w/Imaging    IR Aspiration Injection Large Joint W FL    Case Request-RAD/Other Procedure Area: right GT bursa injection, right Sacroiliac Joint Injection   2. Primary osteoarthritis of right knee  Ambulatory referral/consult to Pain Clinic   3. Chronic pain of right knee  Ambulatory referral/consult to Pain Clinic   4. Chronic left SI joint pain  Ambulatory referral/consult to Pain Clinic   5. Greater trochanteric bursitis of right hip  IR SI Joint Injection w/Imaging    IR Aspiration Injection Large Joint W FL    Case Request-RAD/Other Procedure Area: right GT bursa injection, right Sacroiliac Joint Injection         PLAN:   - Interventions: Scheduled for right-sided sacroiliac joint and greater trochanteric bursa injections under fluoroscopy for diagnostic and therapeutic purposes.. Explained the risks and benefits of the procedure in detail with the patient today in clinic along with alternative treatment options, and the patient elected to pursue the intervention at this time.      - Anticoagulation use: yes Xarelto    - If no benefit with above procedure, consider Lumbar ROSE (will need updated lumbar MRI) and/or right pes anserine bursa injection.     - Medications: I have stressed the importance of physical activity and a home exercise plan to help with pain and improve health. and Patient can  continue with medications for now since they are providing benefits, using them appropriately, and without side effects.     - Therapy:  Advised patient continue with activities and exercises as tolerated    - Psychological:  Discussed coping mechanisms of address chronic pain issues    - Labs:  Reviewed    - Imaging: Reviewed available imaging with patient and answered any questions they had regarding study.    - Consults/Referrals:  None at this time    - Records: Reviewed/Obtain old records from outside physicians and imaging    - Follow up visit: return to clinic in 4 weeks post procedure  -The patient's follow-up will be with our physician assistant, Liza García PA-C.    - Counseled patient regarding the importance of activity modification and physical therapy    - This condition does not require this patient to take time off of work, and the primary goal of our Pain Management services is to improve the patient's functional capacity.    - Patient Questions: Answered all of the patient's questions regarding diagnosis, therapy, and treatment        The above plan and management options were discussed at length with patient. Patient is in agreement with the above and verbalized understanding.    I discussed the goals of interventional chronic pain management with the patient on today's visit.  I explained the utility of injections for diagnostic and therapeutic purposes.  We discussed a multimodal approach to pain including treating the patient's given worst pain at any given time.  We will use a systematic approach to addressing pain.  We will also adopt a multimodal approach that includes injections, adjuvant medications, physical therapy, at times psychiatry.  There may be a limited role for opioid use intermittently in the treatment of pain, more particularly for acute pain although no one approach can be used as a sole treatment modality.    I emphasized the importance of regular exercise, core  strengthening and stretching, diet and weight loss as a cornerstone of long-term pain management.      Ayush Christiansen MD  Interventional Pain Management  [facility]    Disclaimer:  This note was prepared using voice recognition system and is likely to have sound alike errors that may have been overlooked even after proof reading.  Please call me with any questions

## 2020-10-21 NOTE — PROGRESS NOTES
New Patient Chronic Pain Note (Initial Visit)    Referring Physician: Stacy Cross,*    PCP: Elda Powers MD    Chief Complaint:   Chief Complaint   Patient presents with    Back Pain        SUBJECTIVE:  Maria Del Carmen Spence is a 80 y.o. female who presents to the clinic for the evaluation of lower back and right knee pain.  She was referred by the Orthopedics Department for further evaluation and management of this pain and for potential right genicular nerve blocks/RFA and SI joint injection.  Of note, patient has past medical history of headaches, glaucoma, hypertension, hyperlipidemia, atrial fibrillation, atrial flutter, GERD, and multiple other medical comorbidities as listed in her chart.  The pain started several years ago without any known injury or trauma and symptoms have been worsening.The pain is located in the right lumbosacral area and radiates to the right hip and buttock.  She also locates pain to the bilateral knees, right worse than left.  The pain is described as Crushing and is rated as 7/10. The pain is rated with a score of  4/10 on the BEST day and a score of 10/10 on the WORST day.  Symptoms interfere with daily activity and sleeping. The pain is exacerbated by prolonged standing, walking.  The pain is mitigated by nothing in particular. The patient reports spending 4-6 hours per day reclining. The patient reports 5-7 hours of uninterrupted sleep per night.    Patient denies night fever/night sweats, urinary incontinence, bowel incontinence, significant weight loss, significant motor weakness and loss of sensations.    Pain Disability Index Review:  Last 3 PDI Scores 10/21/2020   Pain Disability Index (PDI) 36       Non-Pharmacologic Treatments:  Physical Therapy/Home Exercise: yes  Ice/Heat:yes  TENS: yes  Acupuncture: no  Massage: yes  Chiropractic: yes    Other: no      Pain Medications:  - Opioids: None  - Adjuvant Medications: Robaxin, Medrol Dosepak, gabapentin, Tylenol,  Voltaren gel  - Anti-Coagulants: Xarelto     report:  Reviewed and consistent with medication use as prescribed.  No controlled substances recently prescribed.    Pain Procedures:   -previous right knee steroid injections and viscosupplementation  -previous lumbar ROSE        Imaging:   X-ray lumbar spine 09/15/2020:  Dextroscoliosis noted.  Vertebral body heights maintained.  Grade 1 anterolisthesis of L4 on L5.  Multilevel advanced degenerative disc height loss present throughout the lumbar spine.  Diffuse facet arthropathy noted greater at the lower lumbar spine.  No definite pars defects.  No definite acute osseous or soft tissue abnormality.    X-ray bilateral knees 06/15/2020:  Generalized osteopenia and juxta-articular prominence.  Extensive bilateral tricompartment degenerative changes without significant interval change from March 25, 2019 exam.  There is asymmetric increased involvement of the right lateral and left medial compartments.  Near bone on bone articulation bilaterally right greater than left.  Extensive degenerative change bilateral patellofemoral joints right greater than left.  Asymmetric increased marginal osteophyte formation on the right.  No definite effusions.    Past Medical History:   Diagnosis Date    A-fib     Arthritis     Chronic LBP     ESIs x 3 with Dr. Hicks, PT at [facility], chiropractor    Eye pain     Frequent headaches     GERD (gastroesophageal reflux disease)     Glaucoma     Hyperlipemia     Hypertension      Past Surgical History:   Procedure Laterality Date    ABLATION OF ARRHYTHMOGENIC FOCUS FOR ATRIAL FIBRILLATION N/A 3/6/2019    Procedure: ABLATION, ARRHYTHMOGENIC FOCUS, FOR ATRIAL FIBRILLATION;  Surgeon: Abel Morillo MD;  Location: [facility] EP LAB;  Service: Cardiology;  Laterality: N/A;    CARDIOVERSION N/A 7/9/2018    Procedure: CARDIOVERSION;  Surgeon: Will Rosenthal MD;  Location: [facility] CATH LAB;  Service: Cardiology;  Laterality: N/A;    CATARACT  EXTRACTION W/  INTRAOCULAR LENS IMPLANT  OU    TUBAL LIGATION       Social History     Socioeconomic History    Marital status:      Spouse name: Not on file    Number of children: Not on file    Years of education: Not on file    Highest education level: Not on file   Occupational History    Not on file   Social Needs    Financial resource strain: Not on file    Food insecurity     Worry: Not on file     Inability: Not on file    Transportation needs     Medical: Not on file     Non-medical: Not on file   Tobacco Use    Smoking status: Never Smoker    Smokeless tobacco: Never Used   Substance and Sexual Activity    Alcohol use: No    Drug use: No    Sexual activity: Yes     Partners: Male   Lifestyle    Physical activity     Days per week: Not on file     Minutes per session: Not on file    Stress: Not on file   Relationships    Social connections     Talks on phone: Not on file     Gets together: Not on file     Attends Bahai service: Not on file     Active member of club or organization: Not on file     Attends meetings of clubs or organizations: Not on file     Relationship status: Not on file   Other Topics Concern    Are you pregnant or think you may be? Not Asked    Breast-feeding Not Asked   Social History Narrative    Not on file     Family History   Problem Relation Age of Onset    Glaucoma Mother     Cancer Mother     Cataracts Mother     Hypertension Mother     Hypertension Father     Diabetes Paternal Aunt     Strabismus Neg Hx     Retinal detachment Neg Hx     Macular degeneration Neg Hx     Blindness Neg Hx     Amblyopia Neg Hx     Stroke Neg Hx     Thyroid disease Neg Hx        Review of patient's allergies indicates:   Allergen Reactions    Voltaren [diclofenac sodium] Edema     Gel formulation caused facial rash and facial swelling    Eliquis [apixaban] Other (See Comments)     Headache, numbness in lip     Medrol [methylprednisolone] Blisters        Current Outpatient Medications   Medication Sig    AA/prot/lysine/methio/vit C/B6 (A/G PRO ORAL) Take 2 tablets by mouth 3 (three) times daily.    acetaminophen (TYLENOL) 500 MG tablet Take 1,000 mg by mouth once daily.     atorvastatin (LIPITOR) 10 MG tablet TAKE ONE TABLET BY MOUTH EVERY EVENING    biotin 5,000 mcg TbDL Take by mouth 3 (three) times daily. Only takes if out of AG pro    CALCIUM PHOSPHATE TRIB/VIT D3 (CITRACAL + D ORAL) Take 1 tablet by mouth once daily at 6am.     CETIRIZINE HCL (ZYRTEC ORAL) Take by mouth.    clobetasoL (TEMOVATE) 0.05 % external solution Apply topically 2 (two) times daily.    famotidine (PEPCID) 20 MG tablet Take 1 tablet (20 mg total) by mouth 2 (two) times daily.    gabapentin (NEURONTIN) 300 MG capsule Take 1 capsule (300 mg total) by mouth 3 (three) times daily.    latanoprost 0.005 % ophthalmic solution instill ONE DROP BOTH EYES AT BEDTIME    methocarbamoL (ROBAXIN) 500 MG Tab Take 1 tablet (500 mg total) by mouth 2 (two) times daily as needed (muscle spasms).    metoprolol tartrate (LOPRESSOR) 25 MG tablet Take 1 tablet (25 mg total) by mouth 2 (two) times daily.    MULTIVITAMIN W-MINERALS/LUTEIN (CENTRUM SILVER ORAL) Take by mouth.    naproxen sodium (ALEVE) 220 MG tablet Take 220 mg by mouth once daily.    timolol maleate 0.5% (TIMOPTIC) 0.5 % Drop PLACE ONE DROP INTO BOTH EYES EVERY MORNING    XARELTO 20 mg Tab TAKE ONE TABLET BY MOUTH EVERY DAY WITH DINNER OR IN THE EVENING MEAL     No current facility-administered medications for this visit.        Review of Systems     GENERAL:  No weight loss, malaise or fevers.  HEENT:   No recent changes in vision or hearing  NECK:  Negative for lumps, no difficulty with swallowing.  RESPIRATORY:  Negative for cough, wheezing or shortness of breath, patient denies any recent URI.  CARDIOVASCULAR:  Negative for chest pain, leg swelling or palpitations.  GI:  Negative for abdominal discomfort, blood in stools  "or black stools or change in bowel habits.  MUSCULOSKELETAL:  See HPI.  SKIN:  Negative for lesions, rash, and itching.  PSYCH:  No mood disorder or recent psychosocial stressors.  Patients sleep is not disturbed secondary to pain.  HEMATOLOGY/LYMPHOLOGY:  Negative for prolonged bleeding, bruising easily or swollen nodes.  Patient is currently taking anti-coagulants - Xarelto  NEURO:   No history of headaches, syncope, paralysis, seizures or tremors.  All other reviewed and negative other than HPI.    OBJECTIVE:    BP (!) 165/90   Pulse 69   Ht 5' 5" (1.651 m)   Wt 86 kg (189 lb 9.5 oz)   BMI 31.55 kg/m²         Physical Exam    GENERAL: Well appearing, in no acute distress, alert and oriented x3.  PSYCH:  Mood and affect appropriate.  SKIN: Skin color, texture, turgor normal, no rashes or lesions.  HEAD/FACE:  Normocephalic, atraumatic. Cranial nerves grossly intact.  CV: RRR with palpation of the radial artery.  PULM: No evidence of respiratory difficulty, symmetric chest rise.  GI:  Soft and non-tender.  BACK: Straight leg raising in the sitting and supine positions is equivocal to radicular pain.  Mild-to-moderate pain to palpation over the facet joints of the lumbar spine and lumbar paraspinal.  Limited range of motion secondary to pain reproduction.  EXTREMITIES: Peripheral joint ROM is full and pain free without obvious instability or laxity in all four extremities with the exception of limited knee flexion of the right when compared to the left secondary to pain. No deformities, edema, or skin discoloration. Good capillary refill.  MUSCULOSKELETAL:  Tenderness palpation over the right greater trochanteric bursa.  There is moderate pain with palpation over the sacroiliac joints bilaterally, right worse than left.  FABERs test is positive on the right.  FADIRs test is equivocal.  Tenderness palpation over the right pes anserine bursa.  Bilateral upper and lower extremity strength is normal and symmetric.  " No atrophy or tone abnormalities are noted.  NEURO: Bilateral upper and lower extremity coordination and muscle stretch reflexes are physiologic and symmetric.  Plantar response are downgoing. No clonus.  No loss of sensation is noted.  GAIT:  Slow, antalgic.      LABS:  Lab Results   Component Value Date    WBC 8.01 01/13/2020    HGB 11.0 (L) 01/13/2020    HCT 36.9 (L) 01/13/2020     (H) 01/13/2020     01/13/2020       CMP  Sodium   Date Value Ref Range Status   01/13/2020 141 136 - 145 mmol/L Final     Potassium   Date Value Ref Range Status   01/13/2020 5.8 (H) 3.5 - 5.1 mmol/L Final     Comment:     *No Visible Hemolysis     Chloride   Date Value Ref Range Status   01/13/2020 107 95 - 110 mmol/L Final     CO2   Date Value Ref Range Status   01/13/2020 25 23 - 29 mmol/L Final     Glucose   Date Value Ref Range Status   01/13/2020 76 70 - 110 mg/dL Final     BUN, Bld   Date Value Ref Range Status   01/13/2020 21 8 - 23 mg/dL Final     Creatinine   Date Value Ref Range Status   01/13/2020 0.8 0.5 - 1.4 mg/dL Final     Calcium   Date Value Ref Range Status   01/13/2020 10.2 8.7 - 10.5 mg/dL Final     Total Protein   Date Value Ref Range Status   01/13/2020 7.2 6.0 - 8.4 g/dL Final     Albumin   Date Value Ref Range Status   01/13/2020 3.9 3.5 - 5.2 g/dL Final     Total Bilirubin   Date Value Ref Range Status   01/13/2020 0.3 0.1 - 1.0 mg/dL Final     Comment:     For infants and newborns, interpretation of results should be based  on gestational age, weight and in agreement with clinical  observations.  Premature Infant recommended reference ranges:  Up to 24 hours.............<8.0 mg/dL  Up to 48 hours............<12.0 mg/dL  3-5 days..................<15.0 mg/dL  6-29 days.................<15.0 mg/dL       Alkaline Phosphatase   Date Value Ref Range Status   01/13/2020 72 55 - 135 U/L Final     AST   Date Value Ref Range Status   01/13/2020 26 10 - 40 U/L Final     ALT   Date Value Ref Range Status    01/13/2020 15 10 - 44 U/L Final     Anion Gap   Date Value Ref Range Status   01/13/2020 9 8 - 16 mmol/L Final     eGFR if    Date Value Ref Range Status   01/13/2020 >60.0 >60 mL/min/1.73 m^2 Final     eGFR if non    Date Value Ref Range Status   01/13/2020 >60.0 >60 mL/min/1.73 m^2 Final     Comment:     Calculation used to obtain the estimated glomerular filtration  rate (eGFR) is the CKD-EPI equation.          No results found for: LABA1C, HGBA1C          ASSESSMENT: 80 y.o. year old female with low back, hip, and knee pain, consistent with     1. Sacroiliitis  IR SI Joint Injection w/Imaging    IR Aspiration Injection Large Joint W FL    Case Request-RAD/Other Procedure Area: right GT bursa injection, right Sacroiliac Joint Injection   2. Primary osteoarthritis of right knee  Ambulatory referral/consult to Pain Clinic   3. Chronic pain of right knee  Ambulatory referral/consult to Pain Clinic   4. Chronic left SI joint pain  Ambulatory referral/consult to Pain Clinic   5. Greater trochanteric bursitis of right hip  IR SI Joint Injection w/Imaging    IR Aspiration Injection Large Joint W FL    Case Request-RAD/Other Procedure Area: right GT bursa injection, right Sacroiliac Joint Injection         PLAN:   - Interventions: Scheduled for right-sided sacroiliac joint and greater trochanteric bursa injections under fluoroscopy for diagnostic and therapeutic purposes.. Explained the risks and benefits of the procedure in detail with the patient today in clinic along with alternative treatment options, and the patient elected to pursue the intervention at this time.      - Anticoagulation use: yes Xarelto    - If no benefit with above procedure, consider Lumbar ROSE (will need updated lumbar MRI) and/or right pes anserine bursa injection.     - Medications: I have stressed the importance of physical activity and a home exercise plan to help with pain and improve health. and Patient can  continue with medications for now since they are providing benefits, using them appropriately, and without side effects.     - Therapy:  Advised patient continue with activities and exercises as tolerated    - Psychological:  Discussed coping mechanisms of address chronic pain issues    - Labs:  Reviewed    - Imaging: Reviewed available imaging with patient and answered any questions they had regarding study.    - Consults/Referrals:  None at this time    - Records: Reviewed/Obtain old records from outside physicians and imaging    - Follow up visit: return to clinic in 4 weeks post procedure  -The patient's follow-up will be with our physician assistant, Liza García PA-C.    - Counseled patient regarding the importance of activity modification and physical therapy    - This condition does not require this patient to take time off of work, and the primary goal of our Pain Management services is to improve the patient's functional capacity.    - Patient Questions: Answered all of the patient's questions regarding diagnosis, therapy, and treatment        The above plan and management options were discussed at length with patient. Patient is in agreement with the above and verbalized understanding.    I discussed the goals of interventional chronic pain management with the patient on today's visit.  I explained the utility of injections for diagnostic and therapeutic purposes.  We discussed a multimodal approach to pain including treating the patient's given worst pain at any given time.  We will use a systematic approach to addressing pain.  We will also adopt a multimodal approach that includes injections, adjuvant medications, physical therapy, at times psychiatry.  There may be a limited role for opioid use intermittently in the treatment of pain, more particularly for acute pain although no one approach can be used as a sole treatment modality.    I emphasized the importance of regular exercise, core  strengthening and stretching, diet and weight loss as a cornerstone of long-term pain management.      Ayush Christiansen MD  Interventional Pain Management  [facility]    Disclaimer:  This note was prepared using voice recognition system and is likely to have sound alike errors that may have been overlooked even after proof reading.  Please call me with any questions

## 2020-10-27 NOTE — PRE-PROCEDURE INSTRUCTIONS
Attempted to PAT patient for procedure on 11.3 with Dr. menendez . No answer. M with return phone number. No return call at this time.

## 2020-10-28 ENCOUNTER — OFFICE VISIT (OUTPATIENT)
Dept: CARDIOLOGY | Facility: CLINIC | Age: 80
End: 2020-10-28
Payer: MEDICARE

## 2020-10-28 VITALS
SYSTOLIC BLOOD PRESSURE: 142 MMHG | WEIGHT: 189.81 LBS | DIASTOLIC BLOOD PRESSURE: 88 MMHG | HEART RATE: 75 BPM | OXYGEN SATURATION: 95 % | BODY MASS INDEX: 31.59 KG/M2

## 2020-10-28 DIAGNOSIS — I10 ESSENTIAL HYPERTENSION: ICD-10-CM

## 2020-10-28 DIAGNOSIS — E78.2 MIXED HYPERLIPIDEMIA: ICD-10-CM

## 2020-10-28 DIAGNOSIS — Z98.890 HISTORY OF CARDIAC RADIOFREQUENCY ABLATION (RFA): ICD-10-CM

## 2020-10-28 DIAGNOSIS — I48.0 PAROXYSMAL ATRIAL FIBRILLATION: Primary | ICD-10-CM

## 2020-10-28 PROCEDURE — 99999 PR PBB SHADOW E&M-EST. PATIENT-LVL IV: CPT | Mod: PBBFAC,,, | Performed by: INTERNAL MEDICINE

## 2020-10-28 PROCEDURE — 99214 OFFICE O/P EST MOD 30 MIN: CPT | Mod: PBBFAC,PO | Performed by: INTERNAL MEDICINE

## 2020-10-28 PROCEDURE — 99214 OFFICE O/P EST MOD 30 MIN: CPT | Mod: S$PBB,,, | Performed by: INTERNAL MEDICINE

## 2020-10-28 PROCEDURE — 99214 PR OFFICE/OUTPT VISIT, EST, LEVL IV, 30-39 MIN: ICD-10-PCS | Mod: S$PBB,,, | Performed by: INTERNAL MEDICINE

## 2020-10-28 PROCEDURE — 99999 PR PBB SHADOW E&M-EST. PATIENT-LVL IV: ICD-10-PCS | Mod: PBBFAC,,, | Performed by: INTERNAL MEDICINE

## 2020-10-28 NOTE — PROGRESS NOTES
Subjective:   Patient ID:  Maria Del Carmen Spence is a 80 y.o. female who presents for follow up of No chief complaint on file.      81 yo female, routine f/u rescheduled after COVID 19 pandemic  PMH PAF/AFL, s/p PVI cryoballon and CVI in  by Dr. Morillo, mild anemia, glaucoma, s/p knee surgery. Can not climb the stairs due to knee pain.   Echo in  normal EF  Repeat EKG in  sinus  Continue on Xeralto, BB and Lipitor  No chest pain, palpitation, dizziness and faint. Sometime could not lie on left side due to chest discomfort  No fall and active bleeding  Shoulder, knee and hip joints pain. On tylenol 500 mg 3 times a day. Most activity limited by lower back pain  Pt states that her BP controlled at home        Past Medical History:   Diagnosis Date    A-fib     Arthritis     Chronic LBP     ESIs x 3 with Dr. Hicks, PT at [facility], chiropractor    Eye pain     Frequent headaches     GERD (gastroesophageal reflux disease)     Glaucoma     Hyperlipemia     Hypertension        Past Surgical History:   Procedure Laterality Date    ABLATION OF ARRHYTHMOGENIC FOCUS FOR ATRIAL FIBRILLATION N/A 3/6/2019    Procedure: ABLATION, ARRHYTHMOGENIC FOCUS, FOR ATRIAL FIBRILLATION;  Surgeon: Abel Morillo MD;  Location[facility];  Service: Cardiology;  Laterality: N/A;    CARDIOVERSION N/A 7/9/2018    Procedure: CARDIOVERSION;  Surgeon: Will Rosenthal MD;  Location: [facility] CATH LAB;  Service: Cardiology;  Laterality: N/A;    CATARACT EXTRACTION W/  INTRAOCULAR LENS IMPLANT  OU    TUBAL LIGATION         Social History     Tobacco Use    Smoking status: Never Smoker    Smokeless tobacco: Never Used   Substance Use Topics    Alcohol use: No    Drug use: No       Family History   Problem Relation Age of Onset    Glaucoma Mother     Cancer Mother     Cataracts Mother     Hypertension Mother     Hypertension Father     Diabetes Paternal Aunt     Strabismus Neg Hx     Retinal detachment Neg Hx      Macular degeneration Neg Hx     Blindness Neg Hx     Amblyopia Neg Hx     Stroke Neg Hx     Thyroid disease Neg Hx          Review of Systems   Constitution: Negative for decreased appetite, diaphoresis, fever, malaise/fatigue and night sweats.   HENT: Negative for nosebleeds.    Eyes: Negative for blurred vision and double vision.   Cardiovascular: Negative for chest pain, claudication, irregular heartbeat, leg swelling, near-syncope, orthopnea, palpitations, paroxysmal nocturnal dyspnea and syncope.   Respiratory: Negative for cough, shortness of breath, sleep disturbances due to breathing, snoring, sputum production and wheezing.    Endocrine: Negative for cold intolerance and polyuria.   Hematologic/Lymphatic: Does not bruise/bleed easily.   Skin: Negative for rash.   Musculoskeletal: Positive for arthritis, back pain, joint pain and joint swelling. Negative for falls and neck pain.   Gastrointestinal: Negative for abdominal pain, heartburn, nausea and vomiting.   Genitourinary: Negative for dysuria, frequency and hematuria.   Neurological: Negative for difficulty with concentration, focal weakness, headaches, light-headedness, numbness, seizures and weakness.   Psychiatric/Behavioral: Negative for depression, memory loss and substance abuse. The patient does not have insomnia.    Allergic/Immunologic: Negative for HIV exposure and hives.       Objective:   Physical Exam   Constitutional: She is oriented to person, place, and time. She appears well-nourished.   HENT:   Head: Normocephalic.   Eyes: Pupils are equal, round, and reactive to light.   Neck: Normal carotid pulses and no JVD present. Carotid bruit is not present. No thyromegaly present.   Cardiovascular: Normal rate, regular rhythm, normal heart sounds and normal pulses.  No extrasystoles are present. PMI is not displaced. Exam reveals no gallop and no S3.   No murmur heard.  Pulmonary/Chest: Breath sounds normal. No stridor. No respiratory  distress.   Abdominal: Soft. Bowel sounds are normal. There is no abdominal tenderness. There is no rebound.   Musculoskeletal: Normal range of motion.   Neurological: She is alert and oriented to person, place, and time.   Skin: Skin is intact. No rash noted.   Psychiatric: Her behavior is normal.       Lab Results   Component Value Date    CHOL 158 01/13/2020    CHOL 166 01/22/2019    CHOL 252 (H) 12/07/2018     Lab Results   Component Value Date    HDL 65 01/13/2020    HDL 72 01/22/2019    HDL 71 12/07/2018     Lab Results   Component Value Date    LDLCALC 73.6 01/13/2020    LDLCALC 78.2 01/22/2019    LDLCALC 158.2 12/07/2018     Lab Results   Component Value Date    TRIG 97 01/13/2020    TRIG 79 01/22/2019    TRIG 114 12/07/2018     Lab Results   Component Value Date    CHOLHDL 41.1 01/13/2020    CHOLHDL 43.4 01/22/2019    CHOLHDL 28.2 12/07/2018       Chemistry        Component Value Date/Time     01/13/2020 0921    K 5.8 (H) 01/13/2020 0921     01/13/2020 0921    CO2 25 01/13/2020 0921    BUN 21 01/13/2020 0921    CREATININE 0.8 01/13/2020 0921    GLU 76 01/13/2020 0921        Component Value Date/Time    CALCIUM 10.2 01/13/2020 0921    ALKPHOS 72 01/13/2020 0921    AST 26 01/13/2020 0921    ALT 15 01/13/2020 0921    BILITOT 0.3 01/13/2020 0921    ESTGFRAFRICA >60.0 01/13/2020 0921    EGFRNONAA >60.0 01/13/2020 0921          No results found for: LABA1C, HGBA1C  Lab Results   Component Value Date    TSH 2.716 06/14/2017     Lab Results   Component Value Date    INR 1.1 02/27/2019    INR 1.5 (H) 07/23/2018     Lab Results   Component Value Date    WBC 8.01 01/13/2020    HGB 11.0 (L) 01/13/2020    HCT 36.9 (L) 01/13/2020     (H) 01/13/2020     01/13/2020     BMP  Sodium   Date Value Ref Range Status   01/13/2020 141 136 - 145 mmol/L Final     Potassium   Date Value Ref Range Status   01/13/2020 5.8 (H) 3.5 - 5.1 mmol/L Final     Comment:     *No Visible Hemolysis     Chloride   Date  Value Ref Range Status   01/13/2020 107 95 - 110 mmol/L Final     CO2   Date Value Ref Range Status   01/13/2020 25 23 - 29 mmol/L Final     BUN   Date Value Ref Range Status   01/13/2020 21 8 - 23 mg/dL Final     Creatinine   Date Value Ref Range Status   01/13/2020 0.8 0.5 - 1.4 mg/dL Final     Calcium   Date Value Ref Range Status   01/13/2020 10.2 8.7 - 10.5 mg/dL Final     Anion Gap   Date Value Ref Range Status   01/13/2020 9 8 - 16 mmol/L Final     eGFR if    Date Value Ref Range Status   01/13/2020 >60.0 >60 mL/min/1.73 m^2 Final     eGFR if non    Date Value Ref Range Status   01/13/2020 >60.0 >60 mL/min/1.73 m^2 Final     Comment:     Calculation used to obtain the estimated glomerular filtration  rate (eGFR) is the CKD-EPI equation.        BNP  @LABRCNTIP(BNP,BNPTRIAGEBLO)@  @LABRCNTIP(troponini)@  CrCl cannot be calculated (Patient's most recent lab result is older than the maximum 7 days allowed.).  No results found in the last 24 hours.  No results found in the last 24 hours.  No results found in the last 24 hours.    Assessment:      1. Paroxysmal atrial fibrillation    2. Mixed hyperlipidemia    3. Essential hypertension    4. History of cardiac radiofrequency ablation (RFA)        Plan:   Continue xeralto 20 mg Lipitor and Metoprolol    Counseled DASH  Check Lipid profile in 6 months  Recommend heart-healthy diet, weight control and regular exercise.  Heather. Risk modification.   I have reviewed all pertinent labs and cardiac studies independently. Plans and recommendations have been formulated under my direct supervision. All questions answered and patient voiced understanding.   If symptoms persist go to the ED  RTC in 6 months

## 2020-11-02 NOTE — PRE-PROCEDURE INSTRUCTIONS
Spoke with patient regarding procedure scheduled on 11/3    Arrival time 0700    Has patient been sick with fever or on antibiotics within the last 7 days? No    Has patient received a vaccination within the last 7 days? no    Has the patient stopped all medications as directed? Na.     Does patient have a pacemaker and or defibrillator? no    Does the patient have a ride to and from procedure and someone reliable to remain with patient?  Bruce    Is the patient diabetic? no    Does the patient have sleep apnea? Or use O2 at home? No and no     Is the patient receiving sedation? yes    Is the patient instructed to remain NPO beginning at midnight the night before their procedure? yes    Procedure location confirmed with patient? Yes    Covid- Denies signs/symptoms. Instructed to notify PAT/MD if any changes.

## 2020-11-03 ENCOUNTER — HOSPITAL ENCOUNTER (OUTPATIENT)
Facility: HOSPITAL | Age: 80
Discharge: HOME | End: 2020-11-03
Attending: PHYSICAL MEDICINE & REHABILITATION | Admitting: PHYSICAL MEDICINE & REHABILITATION
Payer: MEDICARE

## 2020-11-03 VITALS
DIASTOLIC BLOOD PRESSURE: 64 MMHG | TEMPERATURE: 98 F | SYSTOLIC BLOOD PRESSURE: 135 MMHG | RESPIRATION RATE: 16 BRPM | HEIGHT: 65 IN | HEART RATE: 66 BPM | BODY MASS INDEX: 31.59 KG/M2 | WEIGHT: 189.63 LBS | OXYGEN SATURATION: 100 %

## 2020-11-03 DIAGNOSIS — M70.61 GREATER TROCHANTERIC BURSITIS OF RIGHT HIP: ICD-10-CM

## 2020-11-03 DIAGNOSIS — M46.1 SACROILIITIS: ICD-10-CM

## 2020-11-03 PROCEDURE — 27096 PR INJECTION,SACROILIAC JOINT: ICD-10-PCS | Mod: RT,,, | Performed by: PHYSICAL MEDICINE & REHABILITATION

## 2020-11-03 PROCEDURE — 20610 DRAIN/INJ JOINT/BURSA W/O US: CPT | Mod: 59,RT,, | Performed by: PHYSICAL MEDICINE & REHABILITATION

## 2020-11-03 PROCEDURE — 25500020 PHARM REV CODE 255: Performed by: PHYSICAL MEDICINE & REHABILITATION

## 2020-11-03 PROCEDURE — 20610 PR DRAIN/INJECT LARGE JOINT/BURSA: ICD-10-PCS | Mod: 59,RT,, | Performed by: PHYSICAL MEDICINE & REHABILITATION

## 2020-11-03 PROCEDURE — 27096 INJECT SACROILIAC JOINT: CPT | Performed by: PHYSICAL MEDICINE & REHABILITATION

## 2020-11-03 PROCEDURE — 25000003 PHARM REV CODE 250: Performed by: PHYSICAL MEDICINE & REHABILITATION

## 2020-11-03 PROCEDURE — 27096 INJECT SACROILIAC JOINT: CPT | Mod: RT,,, | Performed by: PHYSICAL MEDICINE & REHABILITATION

## 2020-11-03 PROCEDURE — 63600175 PHARM REV CODE 636 W HCPCS: Performed by: PHYSICAL MEDICINE & REHABILITATION

## 2020-11-03 PROCEDURE — 20610 DRAIN/INJ JOINT/BURSA W/O US: CPT | Performed by: PHYSICAL MEDICINE & REHABILITATION

## 2020-11-03 RX ORDER — MIDAZOLAM HYDROCHLORIDE 1 MG/ML
INJECTION, SOLUTION INTRAMUSCULAR; INTRAVENOUS
Status: DISCONTINUED | OUTPATIENT
Start: 2020-11-03 | End: 2020-11-03 | Stop reason: HOSPADM

## 2020-11-03 RX ORDER — BUPIVACAINE HYDROCHLORIDE 2.5 MG/ML
INJECTION, SOLUTION EPIDURAL; INFILTRATION; INTRACAUDAL
Status: DISCONTINUED | OUTPATIENT
Start: 2020-11-03 | End: 2020-11-03 | Stop reason: HOSPADM

## 2020-11-03 RX ORDER — METHYLPREDNISOLONE ACETATE 40 MG/ML
INJECTION, SUSPENSION INTRA-ARTICULAR; INTRALESIONAL; INTRAMUSCULAR; SOFT TISSUE
Status: DISCONTINUED | OUTPATIENT
Start: 2020-11-03 | End: 2020-11-03 | Stop reason: HOSPADM

## 2020-11-03 RX ORDER — FENTANYL CITRATE 50 UG/ML
INJECTION, SOLUTION INTRAMUSCULAR; INTRAVENOUS
Status: DISCONTINUED | OUTPATIENT
Start: 2020-11-03 | End: 2020-11-03 | Stop reason: HOSPADM

## 2020-11-03 RX ORDER — ONDANSETRON 2 MG/ML
4 INJECTION INTRAMUSCULAR; INTRAVENOUS ONCE AS NEEDED
Status: DISCONTINUED | OUTPATIENT
Start: 2020-11-03 | End: 2020-11-03 | Stop reason: HOSPADM

## 2020-11-03 NOTE — PLAN OF CARE
Discharge instructions reviewed with patient, verbalized understanding. 2 injection sites to lower back and right hip; clean, dry and intact. Voiced no complaints. Vital signs stable. Discharging home with ride.

## 2020-11-03 NOTE — OP NOTE
Time-out taken to identify patient and procedure side prior to starting the procedure.     11/03/2020      PROCEDURE:  1) Right greater trochanteric bursa injection    2) Right sacroiliac joint injection                            REASON FOR PROCEDURE:   Sacroiliitis [M46.1]  Greater trochanteric bursitis[M70.61]    PHYSICIAN: Ayush Christiansen MD  ASSISTANTS: None    SEDATION: Conscious sedation provided by M.D    The patient was monitored with continuous pulse oximetry, EKG, and intermittent blood pressure monitors.  The patient was hemodynamically stable throughout the entire process was responsive to voice, and breathing spontaneously.  Supplemental O2 was provided at 2L/min via nasal cannula.  Patient was comfortable for the duration of the procedure. (See nurse documentation and case log for sedation time)    There was a total of 1mg IV Midazolam and 25mcg Fentanyl titrated for the procedure      MEDICATIONS INJECTED: 1mL 40mg/ml Depo-Medrol and 4mL Bupivacaine 0.25% into each site    LOCAL ANESTHETIC USED: Xylocaine 1% 6ml     ESTIMATED BLOOD LOSS: None.   COMPLICATIONS: None.     TECHNIQUE:   Greater trochanteric bursa injection:  The area overlying the greater trochanteric bursa was identified using fluoroscopy, and the area overlying the skin was prepped and draped in usual sterile fashion. Local Xylocaine was injected by raising a wheel and going down to the periosteum using a 27-gauge hypodermic needle. A 5 inch 22-gauge spinal needle was introduce into the Right greater trochanteric bursa Negative pressure applied to confirm no intravascular placement. Omnipaque was injected to confirm placement and to confirm that there was no vascular runoff. The medication was then injected slowly.  Displacement of the contrast after injection of the medication confirmed that the medication went into the area of the greater trochanteric bursa    Sacroiliac joint injection:   Laying in the prone position, the patient  was prepped and draped in the usual sterile fashion using ChloraPrep and fenestrated drape.  The area was determined under fluoroscopy.  Local Xylocaine was injected by raising a wheel and going down to the periosteum using a 27-gauge hypodermic needle.  The 3.5 inch 22-gauge spinal needle was introduce into the Right sacroiliac joint.  Negative pressure applied to confirm no intravascular placement.  Omnipaque was injected to confirm placement and to confirm that there was no vascular runoff.  The medication was then injected slowly.  The patient tolerated the procedure well.                       The patient was monitored for approximately 30 minutes after the procedure. Patient was given post procedure and discharge instructions to follow at home. We will see the patient back in two weeks or the patient may call to inform of status. The patient was discharged in a stable condition

## 2020-11-03 NOTE — DISCHARGE INSTRUCTIONS

## 2020-11-03 NOTE — DISCHARGE SUMMARY
Discharge Note  Short Stay      SUMMARY     Admit Date: 11/3/2020    Attending Physician: Ayush Christiansen MD        Discharge Physician: Ayush Christiansen MD        Discharge Date: 11/3/2020 8:33 AM    Procedure(s) (LRB):  right GT bursa injection (Right)  right Sacroiliac Joint Injection (Right)    Final Diagnosis: Sacroiliitis [M46.1]  Greater trochanteric bursitis of right hip [M70.61]    Disposition: Home or self care    Patient Instructions:   Current Discharge Medication List      CONTINUE these medications which have NOT CHANGED    Details   atorvastatin (LIPITOR) 10 MG tablet TAKE ONE TABLET BY MOUTH EVERY EVENING  Qty: 30 tablet, Refills: 11    Comments: This prescription was filled on 5/20/2020. Any refills authorized will be placed on file.      CALCIUM PHOSPHATE TRIB/VIT D3 (CITRACAL + D ORAL) Take 1 tablet by mouth once daily at 6am.       CETIRIZINE HCL (ZYRTEC ORAL) Take by mouth.      famotidine (PEPCID) 20 MG tablet Take 1 tablet (20 mg total) by mouth 2 (two) times daily.  Qty: 180 tablet, Refills: 3    Associated Diagnoses: Gastroesophageal reflux disease, esophagitis presence not specified      gabapentin (NEURONTIN) 300 MG capsule Take 1 capsule (300 mg total) by mouth 3 (three) times daily.  Qty: 270 capsule, Refills: 3    Associated Diagnoses: Chronic bilateral low back pain with right-sided sciatica; Paresthesia of both feet      metoprolol tartrate (LOPRESSOR) 25 MG tablet Take 1 tablet (25 mg total) by mouth 2 (two) times daily.  Qty: 60 tablet, Refills: 11      MULTIVITAMIN W-MINERALS/LUTEIN (CENTRUM SILVER ORAL) Take by mouth.      naproxen sodium (ALEVE) 220 MG tablet Take 220 mg by mouth once daily.      XARELTO 20 mg Tab TAKE ONE TABLET BY MOUTH EVERY DAY WITH DINNER OR IN THE EVENING MEAL  Qty: 30 tablet, Refills: 11    Associated Diagnoses: Paroxysmal atrial fibrillation      AA/prot/lysine/methio/vit C/B6 (A/G PRO ORAL) Take 2 tablets by mouth 3 (three) times daily.       acetaminophen (TYLENOL) 500 MG tablet Take 1,000 mg by mouth once daily.       latanoprost 0.005 % ophthalmic solution instill ONE DROP BOTH EYES AT BEDTIME  Qty: 2.5 mL, Refills: 12      methocarbamoL (ROBAXIN) 500 MG Tab Take 1 tablet (500 mg total) by mouth 2 (two) times daily as needed (muscle spasms).  Qty: 20 tablet, Refills: 0    Associated Diagnoses: Primary osteoarthritis of right knee; Chronic pain of right knee; Chronic left SI joint pain      timolol maleate 0.5% (TIMOPTIC) 0.5 % Drop PLACE ONE DROP INTO BOTH EYES EVERY MORNING  Qty: 10 mL, Refills: 12    Associated Diagnoses: Primary open angle glaucoma of both eyes, mild stage         STOP taking these medications       biotin 5,000 mcg TbDL Comments:   Reason for Stopping:         clobetasoL (TEMOVATE) 0.05 % external solution Comments:   Reason for Stopping:                   Discharge Diagnosis: Sacroiliitis [M46.1]  Greater trochanteric bursitis of right hip [M70.61]  Condition on Discharge: Stable with no complications to procedure   Diet on Discharge: Same as before.  Activity: as per instruction sheet.  Discharge to: Home with a responsible adult.  Follow up: 2-4 weeks       Please call the office if you experience any weakness or loss of sensation, fever > 101.5, pain uncontrolled with oral medications, persistent nausea/vomiting/or diarrhea, redness or drainage from the incisions, or any other worrisome concerns. If physician on call was not reached or could not communicate with our office for any reason please go to the nearest emergency department

## 2020-11-03 NOTE — INTERVAL H&P NOTE
The patient has been examined and the H&P has been reviewed:    I concur with the findings and no changes have occurred since H&P was written.    Anesthesia/Surgery risks, benefits and alternative options discussed and understood by patient/family.      Active Hospital Problems    Diagnosis  POA    Sacroiliitis [M46.1]  Yes      Resolved Hospital Problems   No resolved problems to display.

## 2020-11-06 ENCOUNTER — OFFICE VISIT (OUTPATIENT)
Dept: INTERNAL MEDICINE | Facility: CLINIC | Age: 80
End: 2020-11-06
Payer: MEDICARE

## 2020-11-06 VITALS
DIASTOLIC BLOOD PRESSURE: 80 MMHG | WEIGHT: 187.81 LBS | HEART RATE: 73 BPM | OXYGEN SATURATION: 97 % | SYSTOLIC BLOOD PRESSURE: 124 MMHG | HEIGHT: 65 IN | BODY MASS INDEX: 31.29 KG/M2 | TEMPERATURE: 97 F

## 2020-11-06 DIAGNOSIS — M85.851 OSTEOPENIA OF BOTH HIPS: Primary | ICD-10-CM

## 2020-11-06 DIAGNOSIS — M89.9 DISORDER OF BONE, UNSPECIFIED: ICD-10-CM

## 2020-11-06 DIAGNOSIS — M85.852 OSTEOPENIA OF BOTH HIPS: Primary | ICD-10-CM

## 2020-11-06 PROCEDURE — 99999 PR PBB SHADOW E&M-EST. PATIENT-LVL IV: CPT | Mod: PBBFAC,,, | Performed by: FAMILY MEDICINE

## 2020-11-06 PROCEDURE — 99213 PR OFFICE/OUTPT VISIT, EST, LEVL III, 20-29 MIN: ICD-10-PCS | Mod: S$PBB,,, | Performed by: FAMILY MEDICINE

## 2020-11-06 PROCEDURE — 99213 OFFICE O/P EST LOW 20 MIN: CPT | Mod: S$PBB,,, | Performed by: FAMILY MEDICINE

## 2020-11-06 PROCEDURE — 99214 OFFICE O/P EST MOD 30 MIN: CPT | Mod: PBBFAC | Performed by: FAMILY MEDICINE

## 2020-11-06 PROCEDURE — 99999 PR PBB SHADOW E&M-EST. PATIENT-LVL IV: ICD-10-PCS | Mod: PBBFAC,,, | Performed by: FAMILY MEDICINE

## 2020-11-06 NOTE — PROGRESS NOTES
"Subjective:       Patient ID: Maria Del Carmen Spence is a 80 y.o. female.    Chief Complaint: Follow-up    Periodic recheck - The patient's last visit with me was on 8/18/2020. Was to recheck 6 months. She did have her BMD 8/18/2020 and was asked to schedule to review results.  8/18/20:Bone density testing soon.  She was at high risk for fracture 2017 testing.  If there is a change will have her return to discuss start of Fosamax.  Reviewed contraindications and she does not endorse swallowing problems or dental problems.   watch BPs. Her numbers are 110s-120s./xx on wrist cuff - eligible for DIG MED but no smart phone  Continue to F/u with cards. Continue on pepcid BID, lifestyls control. Recheck 6 months - labs at that time. offered derm for hair loss - she defers for now - thinks maybe it is improving.  Cont to F/U with ortho re knee, hip."    BMDresult note: "Bone density testing shows normal bone density to the spine and osteopenia to the hip.  The hip density has decreased significantly, by 8.4%, since the last check 3 years ago.  Although you have normal or increased bone density her spine, it had actually has decreased significantly since the 2017 study.  You have a high risk of fracture.  Come review the results - schedule pls. Consider medication. Will review other steps needed"    Reviewed BMD, pt with no swallowing problems, no dental problems.    Review of Systems   Constitutional: Negative for appetite change and fever.   HENT: Negative for dental problem and trouble swallowing.    Cardiovascular: Negative for chest pain and leg swelling.   Musculoskeletal: Negative for back pain.       Objective:      Physical Exam  Constitutional:       Appearance: She is well-developed.   HENT:      Head: Normocephalic and atraumatic.   Cardiovascular:      Rate and Rhythm: Normal rate and regular rhythm.      Heart sounds: Normal heart sounds.   Pulmonary:      Effort: Pulmonary effort is normal.      Breath sounds: " Normal breath sounds.   Skin:     General: Skin is warm and dry.   Neurological:      Mental Status: She is alert and oriented to person, place, and time.   Psychiatric:         Behavior: Behavior normal.           Assessment/Plan:     1. Osteopenia of both hips  Vitamin D   2. Disorder of bone, unspecified   Vitamin D     Reviewed in depth BMD results, implications, options including bisphosphonate start, risks/benefits medication, contraindications.  She states at this time she is not interested in starting medication. Pt handouts given re calcium recs, exercise, items to avoid.  VIT D level 77 in 2017. Currently on ca supp with D.  Consider check Vit D again with next labs.

## 2020-11-06 NOTE — PATIENT INSTRUCTIONS
Preventing Osteoporosis: Meeting Your Calcium Needs    Your body needs calcium to build and repair bones. But it can't make calcium on its own. That's why it's important to eat calcium-rich foods. Some foods are naturally rich in calcium. Others have calcium added (fortified). It's best to get calcium from the foods you eat. But if you can't get enough, you may want to take calcium supplements. To meet your daily calcium needs, try the foods listed below.  Dairy Fish & beans Other sources      Source   Calcium (mg) per serving   Source   Calcium (mg) per serving   Source   Calcium (mg) per serving      Low-fat yogurt, plain   415 mg/8 oz.   Sardines, Atlantic, canned, with bones   351 mg/3 oz.   Oatmeal, instant, fortified   215 mg/1 cup   Nonfat milk   302 mg/1 cup   [place], sockeye, canned, with bones   239 mg/3 oz.   Tofu made with calcium sulfate   204 mg/3 oz.   Low-fat milk   297 mg/1 cup   Soybeans, fresh, boiled   131 mg/1/2 cup   Collards   179 mg/1/2 cup   Swiss cheese   272 mg/1 oz.   White beans, cooked   81 mg/1/2 cup   English muffin, whole wheat   175 mg/1 muffin   Cheddar cheese   205 mg/1 oz.   Navy beans, cooked   79 mg/1/2 cup   Kale   90 mg/1/2 cup   Ice cream strawberry   79 mg/1/2 cup           Orange, navel   56 mg/1 medium   Note: Calcium levels may vary depending on brand and size.  Daily calcium needs  14-18 years old: 1,300 mg  19-30 years old: 1,000 mg  31-50 years old: 1,000 mg  51-70 years old, women: 1,200 mg  51-70 years old, men: 1,000 mg  Pregnant or nursin-28 years old: 1,300 mg, 19-50 years old: 1,000 mg  Older than 70 (women and men): 1,200 mg   Date Last Reviewed: 10/17/2015  © 7410-0569 [employer]. [address] [place] [address], [place] [address]. All rights reserved. This information is not intended as a substitute for professional medical care. Always follow your healthcare professional's instructions.          Living with Osteoporosis: Regular Exercise  If you  have osteoporosis, exercise is vital for your health. It can prevent bone fractures and spine changes. It will slow bone loss. Exercise will strengthen your body. It can also be fun. A variety of exercises is best. See below for exercises that can help you. Before you start, though, talk to your healthcare provider to be sure these exercises are right for you.    Resistance exercises. These build muscle strength and maintain bone mass. They also make you less prone to injury. Exercises include lifting small weights, doing push-ups and sit-ups, using elastic exercise bands, and using weight machines.  Weight-bearing activities. These help your whole body. They also help you maintain bone mass. Activities include walking, dancing, and housework.  Nonweight-bearing exercises. These help prevent back strain and pain. They do this by building the trunk and leg muscles. Exercises that help with flexibility can prevent falls. Examples include swimming, water exercise, and stretching.  Staying safe  Here are tips to stay safe:   · Always check with your healthcare provider before starting any new exercise program.  · Use weights only as instructed.  · Stop any exercise that causes pain.   Date Last Reviewed: 10/17/2015  © 9076-0423 [employer]. [address] [place] [address]. All rights reserved. This information is not intended as a substitute for professional medical care. Always follow your healthcare professional's instructions.        Preventing Osteoporosis: Avoiding Bone Loss  Certain factors can speed up bone loss or decrease bone growth. For example, alcohol, cigarettes, and certain medicines reduce bone mass. Some foods make it hard for your body to absorb calcium.    Things to avoid  Here are things to avoid to help prevent osteoporosis:  · Alcohol is toxic to bones. It is a major cause of bone loss. Heavy drinking can cause osteoporosis even if you have no other risk factors.  · Smoking  reduces bone mass. Smoking may also interfere with estrogen levels and cause early menopause.  · Inactivity makes your bones lose strength and become thinner. Over time, thin bones may break. Women who aren't active are at a high risk for osteoporosis.  · Certain medicines, such as cortisone, increase bone loss. They also decrease bone growth. Ask your healthcare provider about any side effects of your medicines, and how to prevent them.  · Protein-rich or salty foods eaten in large amounts may deplete calcium.  · Caffeine increases calcium loss. People who drink a lot of coffee, tea, or james lose more calcium than those who don't.  Date Last Reviewed: 10/17/2015  © 6897-4438 [employer]. [address] [place] [address], [place] [address]. All rights reserved. This information is not intended as a substitute for professional medical care. Always follow your healthcare professional's instructions.

## 2020-11-16 ENCOUNTER — NURSE TRIAGE (OUTPATIENT)
Dept: ADMINISTRATIVE | Facility: CLINIC | Age: 80
End: 2020-11-16

## 2020-11-16 NOTE — TELEPHONE ENCOUNTER
first attempt: no answer, will call pt back in 15 min    Attempted to contact pt on behalf of [facility]. No answer 2nd attempt. No follow up needed.    Reason for Disposition   No answer.  First attempt to contact caller.  Follow-up call scheduled within 15 minutes.    Additional Information   Negative: Caller has already spoken with the PCP (or office), and has no further questions   Negative: Caller has already spoken with another triager and has no further questions   Negative: Caller has already spoken with another triager or PCP (or office), and has further questions and triager able to answer questions.   Negative: Busy signal.  First attempt to contact caller.  Follow-up call scheduled within 15 minutes.    Protocols used: NO CONTACT OR DUPLICATE CONTACT CALL-A-[place]

## 2020-11-17 ENCOUNTER — PES CALL (OUTPATIENT)
Dept: ADMINISTRATIVE | Facility: CLINIC | Age: 80
End: 2020-11-17

## 2020-12-15 ENCOUNTER — OFFICE VISIT (OUTPATIENT)
Dept: PAIN MEDICINE | Facility: CLINIC | Age: 80
End: 2020-12-15
Payer: MEDICARE

## 2020-12-15 VITALS
DIASTOLIC BLOOD PRESSURE: 70 MMHG | HEIGHT: 65 IN | BODY MASS INDEX: 31.7 KG/M2 | SYSTOLIC BLOOD PRESSURE: 159 MMHG | HEART RATE: 78 BPM | WEIGHT: 190.25 LBS

## 2020-12-15 DIAGNOSIS — M70.61 GREATER TROCHANTERIC BURSITIS OF RIGHT HIP: ICD-10-CM

## 2020-12-15 DIAGNOSIS — G89.29 CHRONIC LEFT SI JOINT PAIN: ICD-10-CM

## 2020-12-15 DIAGNOSIS — M17.11 PRIMARY OSTEOARTHRITIS OF RIGHT KNEE: Primary | ICD-10-CM

## 2020-12-15 DIAGNOSIS — G89.29 CHRONIC PAIN OF RIGHT KNEE: ICD-10-CM

## 2020-12-15 DIAGNOSIS — M25.561 CHRONIC PAIN OF RIGHT KNEE: ICD-10-CM

## 2020-12-15 DIAGNOSIS — M53.3 CHRONIC LEFT SI JOINT PAIN: ICD-10-CM

## 2020-12-15 PROCEDURE — 99213 OFFICE O/P EST LOW 20 MIN: CPT | Mod: S$PBB,,, | Performed by: PHYSICAL MEDICINE & REHABILITATION

## 2020-12-15 PROCEDURE — 99999 PR PBB SHADOW E&M-EST. PATIENT-LVL IV: CPT | Mod: PBBFAC,,, | Performed by: PHYSICAL MEDICINE & REHABILITATION

## 2020-12-15 PROCEDURE — 99214 OFFICE O/P EST MOD 30 MIN: CPT | Mod: PBBFAC | Performed by: PHYSICAL MEDICINE & REHABILITATION

## 2020-12-15 PROCEDURE — 99213 PR OFFICE/OUTPT VISIT, EST, LEVL III, 20-29 MIN: ICD-10-PCS | Mod: S$PBB,,, | Performed by: PHYSICAL MEDICINE & REHABILITATION

## 2020-12-15 PROCEDURE — 99999 PR PBB SHADOW E&M-EST. PATIENT-LVL IV: ICD-10-PCS | Mod: PBBFAC,,, | Performed by: PHYSICAL MEDICINE & REHABILITATION

## 2020-12-15 NOTE — PROGRESS NOTES
Established Patient Chronic Pain Note (Follow up visit)    Chief Complaint:   Chief Complaint   Patient presents with    Back Pain       SUBJECTIVE:  Maria Del Carmen Spence is a 80 y.o. female who presents to the clinic for a follow-up appointment for lower back pain.  She is status post right-sided SI joint and GTB injections on 11/03/2020.  She reports that these resulted in 90% relief.  Since the last visit, Maria Del Carmen Spence states the pain has been improving. Current pain intensity is 6/10 which he contributes mainly to her knee pain, right worse than left.    Patient denies night fever/night sweats, urinary incontinence, bowel incontinence, significant weight loss, significant motor weakness and loss of sensations.    Pain Disability Index Review:  Last 3 PDI Scores 12/15/2020 10/21/2020   Pain Disability Index (PDI) 30 36     Initial HPI 10/21/2020:  Maria Del Carmen Spence is a 80 y.o. female who presents to the clinic for the evaluation of lower back and right knee pain.  She was referred by the Orthopedics Department for further evaluation and management of this pain and for potential right genicular nerve blocks/RFA and SI joint injection.  Of note, patient has past medical history of headaches, glaucoma, hypertension, hyperlipidemia, atrial fibrillation, atrial flutter, GERD, and multiple other medical comorbidities as listed in her chart.  The pain started several years ago without any known injury or trauma and symptoms have been worsening.The pain is located in the right lumbosacral area and radiates to the right hip and buttock.  She also locates pain to the bilateral knees, right worse than left.  The pain is described as Crushing and is rated as 7/10. The pain is rated with a score of  4/10 on the BEST day and a score of 10/10 on the WORST day.  Symptoms interfere with daily activity and sleeping. The pain is exacerbated by prolonged standing, walking.  The pain is mitigated by nothing in particular. The  patient reports spending 4-6 hours per day reclining. The patient reports 5-7 hours of uninterrupted sleep per night.      Non-Pharmacologic Treatments:  Physical Therapy/Home Exercise: yes  Ice/Heat:yes  TENS: yes  Acupuncture: no  Massage: yes  Chiropractic: yes    Other: no        Pain Medications:  - Opioids: None  - Adjuvant Medications: Robaxin, Medrol Dosepak, gabapentin, Tylenol, Voltaren gel  - Anti-Coagulants: Xarelto      report:  Reviewed and consistent with medication use as prescribed.  No controlled substances recently prescribed.     Pain Procedures:   -previous right knee steroid injections and viscosupplementation  -previous lumbar ROSE  - 11/03/2020:  Right SI joint and greater trochanteric bursa injections, 90% relief           Imaging:   X-ray lumbar spine 09/15/2020:  Dextroscoliosis noted.  Vertebral body heights maintained.  Grade 1 anterolisthesis of L4 on L5.  Multilevel advanced degenerative disc height loss present throughout the lumbar spine.  Diffuse facet arthropathy noted greater at the lower lumbar spine.  No definite pars defects.  No definite acute osseous or soft tissue abnormality.     X-ray bilateral knees 06/15/2020:  Generalized osteopenia and juxta-articular prominence.  Extensive bilateral tricompartment degenerative changes without significant interval change from March 25, 2019 exam.  There is asymmetric increased involvement of the right lateral and left medial compartments.  Near bone on bone articulation bilaterally right greater than left.  Extensive degenerative change bilateral patellofemoral joints right greater than left.  Asymmetric increased marginal osteophyte formation on the right.  No definite effusions.      PMHx,PSHx, Social history, and Family history:  I have reviewed the patient's medical, surgical, social, and family history in detail and updated the computerized patient record.    Review of patient's allergies indicates:   Allergen Reactions     Voltaren [diclofenac sodium] Edema     Gel formulation caused facial rash and facial swelling    Eliquis [apixaban] Other (See Comments)     Headache, numbness in lip     Medrol [methylprednisolone] Blisters       Current Outpatient Medications   Medication Sig    AA/prot/lysine/methio/vit C/B6 (A/G PRO ORAL) Take 2 tablets by mouth 3 (three) times daily.    acetaminophen (TYLENOL) 500 MG tablet Take 1,000 mg by mouth once daily.     atorvastatin (LIPITOR) 10 MG tablet TAKE ONE TABLET BY MOUTH EVERY EVENING    CALCIUM PHOSPHATE TRIB/VIT D3 (CITRACAL + D ORAL) Take 1 tablet by mouth once daily at 6am.     CETIRIZINE HCL (ZYRTEC ORAL) Take by mouth.    famotidine (PEPCID) 20 MG tablet Take 1 tablet (20 mg total) by mouth 2 (two) times daily.    gabapentin (NEURONTIN) 300 MG capsule Take 1 capsule (300 mg total) by mouth 3 (three) times daily.    latanoprost 0.005 % ophthalmic solution instill ONE DROP BOTH EYES AT BEDTIME    metoprolol tartrate (LOPRESSOR) 25 MG tablet Take 1 tablet (25 mg total) by mouth 2 (two) times daily.    MULTIVITAMIN W-MINERALS/LUTEIN (CENTRUM SILVER ORAL) Take by mouth.    naproxen sodium (ALEVE) 220 MG tablet Take 220 mg by mouth once daily.    timolol maleate 0.5% (TIMOPTIC) 0.5 % Drop PLACE ONE DROP INTO BOTH EYES EVERY MORNING    XARELTO 20 mg Tab TAKE ONE TABLET BY MOUTH EVERY EVENING WITH A MEAL     No current facility-administered medications for this visit.          REVIEW OF SYSTEMS:    GENERAL:  No weight loss, malaise or fevers.  HEENT:   No recent changes in vision or hearing  NECK:  Negative for lumps, no difficulty with swallowing.  RESPIRATORY:  Negative for cough, wheezing or shortness of breath, patient denies any recent URI.  CARDIOVASCULAR:  Negative for chest pain, leg swelling or palpitations.  GI:  Negative for abdominal discomfort, blood in stools or black stools or change in bowel habits.  MUSCULOSKELETAL:  See HPI.  SKIN:  Negative for lesions, rash,  "and itching.  PSYCH:  No mood disorder or recent psychosocial stressors.  Patients sleep is not disturbed secondary to pain.  HEMATOLOGY/LYMPHOLOGY:  Negative for prolonged bleeding, bruising easily or swollen nodes.  Patient is currently taking anti-coagulants - Xarelto  NEURO:   No history of headaches, syncope, paralysis, seizures or tremors.  All other reviewed and negative other than HPI.    OBJECTIVE:    BP (!) 159/70   Pulse 78   Ht 5' 5" (1.651 m)   Wt 86.3 kg (190 lb 4.1 oz)   BMI 31.66 kg/m²     PHYSICAL EXAMINATION:    GENERAL: Well appearing, in no acute distress, alert and oriented x3.  PSYCH:  Mood and affect appropriate.  SKIN: Skin color, texture, turgor normal, no rashes or lesions.  HEAD/FACE:  Normocephalic, atraumatic. Cranial nerves grossly intact.  CV: RRR with palpation of the radial artery.  PULM: No evidence of respiratory difficulty, symmetric chest rise.  GI:  Soft and non-tender.  BACK: Straight leg raising in the sitting and supine positions is equivocal to radicular pain.  Mild-to-moderate pain to palpation over the facet joints of the lumbar spine and lumbar paraspinal.  Limited range of motion secondary to pain reproduction.  EXTREMITIES: Peripheral joint ROM is full and pain free without obvious instability or laxity in all four extremities with the exception of limited knee flexion of the right when compared to the left secondary to pain. No deformities, edema, or skin discoloration. Good capillary refill.  MUSCULOSKELETAL:   palpable Baker cyst on the right.  Tenderness palpation over the right greater trochanteric bursa.  There is moderate pain with palpation over the sacroiliac joints bilaterally, right worse than left.  FABERs test is positive on the right.  FADIRs test is equivocal.  Tenderness palpation over the right pes anserine bursa.  Bilateral upper and lower extremity strength is normal and symmetric.  No atrophy or tone abnormalities are noted.  NEURO: Bilateral " upper and lower extremity coordination and muscle stretch reflexes are physiologic and symmetric.  Plantar response are downgoing. No clonus.  No loss of sensation is noted.  GAIT:  Slow, antalgic.    LABS:  Lab Results   Component Value Date    WBC 8.01 01/13/2020    HGB 11.0 (L) 01/13/2020    HCT 36.9 (L) 01/13/2020     (H) 01/13/2020     01/13/2020       CMP  Sodium   Date Value Ref Range Status   01/13/2020 141 136 - 145 mmol/L Final     Potassium   Date Value Ref Range Status   01/13/2020 5.8 (H) 3.5 - 5.1 mmol/L Final     Comment:     *No Visible Hemolysis     Chloride   Date Value Ref Range Status   01/13/2020 107 95 - 110 mmol/L Final     CO2   Date Value Ref Range Status   01/13/2020 25 23 - 29 mmol/L Final     Glucose   Date Value Ref Range Status   01/13/2020 76 70 - 110 mg/dL Final     BUN   Date Value Ref Range Status   01/13/2020 21 8 - 23 mg/dL Final     Creatinine   Date Value Ref Range Status   01/13/2020 0.8 0.5 - 1.4 mg/dL Final     Calcium   Date Value Ref Range Status   01/13/2020 10.2 8.7 - 10.5 mg/dL Final     Total Protein   Date Value Ref Range Status   01/13/2020 7.2 6.0 - 8.4 g/dL Final     Albumin   Date Value Ref Range Status   01/13/2020 3.9 3.5 - 5.2 g/dL Final     Total Bilirubin   Date Value Ref Range Status   01/13/2020 0.3 0.1 - 1.0 mg/dL Final     Comment:     For infants and newborns, interpretation of results should be based  on gestational age, weight and in agreement with clinical  observations.  Premature Infant recommended reference ranges:  Up to 24 hours.............<8.0 mg/dL  Up to 48 hours............<12.0 mg/dL  3-5 days..................<15.0 mg/dL  6-29 days.................<15.0 mg/dL       Alkaline Phosphatase   Date Value Ref Range Status   01/13/2020 72 55 - 135 U/L Final     AST   Date Value Ref Range Status   01/13/2020 26 10 - 40 U/L Final     ALT   Date Value Ref Range Status   01/13/2020 15 10 - 44 U/L Final     Anion Gap   Date Value Ref Range  Status   01/13/2020 9 8 - 16 mmol/L Final     eGFR if    Date Value Ref Range Status   01/13/2020 >60.0 >60 mL/min/1.73 m^2 Final     eGFR if non    Date Value Ref Range Status   01/13/2020 >60.0 >60 mL/min/1.73 m^2 Final     Comment:     Calculation used to obtain the estimated glomerular filtration  rate (eGFR) is the CKD-EPI equation.          No results found for: LABA1C, HGBA1C          ASSESSMENT: 80 y.o. year old female with lower back, hip, and knee pain, consistent with     1. Primary osteoarthritis of right knee     2. Chronic pain of right knee     3. Chronic left SI joint pain     4. Greater trochanteric bursitis of right hip           PLAN:   - Interventions:  discussed genicular nerve blocks for the right knee due to chronic pain.  Patient would like to trial more conservative measures initially.  There is a palpable Baker's cyst as well, and patient is interested in seeing Orthopedics to discuss options for management of this.     - Anticoagulation use: yes Xarelto     - If no benefit with above procedure, consider Lumbar ROSE (will need updated lumbar MRI) and/or right pes anserine bursa injection.      - Medications: I have stressed the importance of physical activity and a home exercise plan to help with pain and improve health. and Patient can continue with medications for now since they are providing benefits, using them appropriately, and without side effects.      - Therapy:  Advised patient continue with activities and exercises as tolerated     - Psychological:  Discussed coping mechanisms of address chronic pain issues     - Labs:  Reviewed     - Imaging: Reviewed available imaging with patient and answered any questions they had regarding study.     - Consults/Referrals:  None at this time, contacting Orthopedics to discuss options for her right knee pain     - Records: Reviewed/Obtain old records from outside physicians and imaging     - Follow up visit:  return to clinic in  3 months or as needed     - Counseled patient regarding the importance of activity modification and physical therapy     - This condition does not require this patient to take time off of work, and the primary goal of our Pain Management services is to improve the patient's functional capacity.     - Patient Questions: Answered all of the patient's questions regarding diagnosis, therapy, and treatment    The above plan and management options were discussed at length with patient. Patient is in agreement with the above and verbalized understanding.      Ayush Christiansen MD  Interventional Pain Management  [facility]    Disclaimer:  This note was prepared using voice recognition system and is likely to have sound alike errors that may have been overlooked even after proof reading.  Please call me with any questions

## 2021-01-13 ENCOUNTER — IMMUNIZATION (OUTPATIENT)
Dept: INTERNAL MEDICINE | Facility: CLINIC | Age: 81
End: 2021-01-13
Payer: MEDICARE

## 2021-01-13 DIAGNOSIS — Z23 NEED FOR VACCINATION: ICD-10-CM

## 2021-01-13 PROCEDURE — 91300 COVID-19, MRNA, LNP-S, PF, 30 MCG/0.3 ML DOSE VACCINE: CPT | Mod: PBBFAC | Performed by: FAMILY MEDICINE

## 2021-02-03 ENCOUNTER — IMMUNIZATION (OUTPATIENT)
Dept: INTERNAL MEDICINE | Facility: CLINIC | Age: 81
End: 2021-02-03
Payer: MEDICARE

## 2021-02-03 DIAGNOSIS — Z23 NEED FOR VACCINATION: Primary | ICD-10-CM

## 2021-02-03 PROCEDURE — 91300 COVID-19, MRNA, LNP-S, PF, 30 MCG/0.3 ML DOSE VACCINE: CPT | Mod: PBBFAC

## 2021-02-03 PROCEDURE — 0002A COVID-19, MRNA, LNP-S, PF, 30 MCG/0.3 ML DOSE VACCINE: CPT | Mod: PBBFAC

## 2021-02-04 ENCOUNTER — OFFICE VISIT (OUTPATIENT)
Dept: INTERNAL MEDICINE | Facility: CLINIC | Age: 81
End: 2021-02-04
Payer: MEDICARE

## 2021-02-04 ENCOUNTER — LAB VISIT (OUTPATIENT)
Dept: LAB | Facility: HOSPITAL | Age: 81
End: 2021-02-04
Attending: FAMILY MEDICINE
Payer: MEDICARE

## 2021-02-04 VITALS
TEMPERATURE: 99 F | DIASTOLIC BLOOD PRESSURE: 78 MMHG | OXYGEN SATURATION: 98 % | HEART RATE: 66 BPM | SYSTOLIC BLOOD PRESSURE: 130 MMHG | RESPIRATION RATE: 18 BRPM | HEIGHT: 65 IN | WEIGHT: 188.94 LBS | BODY MASS INDEX: 31.48 KG/M2

## 2021-02-04 DIAGNOSIS — M85.851 OSTEOPENIA OF BOTH HIPS: ICD-10-CM

## 2021-02-04 DIAGNOSIS — M85.852 OSTEOPENIA OF BOTH HIPS: ICD-10-CM

## 2021-02-04 DIAGNOSIS — D53.9 MACROCYTIC ANEMIA: ICD-10-CM

## 2021-02-04 DIAGNOSIS — L65.9 HAIR LOSS: ICD-10-CM

## 2021-02-04 DIAGNOSIS — E78.2 MIXED HYPERLIPIDEMIA: ICD-10-CM

## 2021-02-04 DIAGNOSIS — I77.1 TORTUOUS AORTA: ICD-10-CM

## 2021-02-04 DIAGNOSIS — M89.9 DISORDER OF BONE, UNSPECIFIED: ICD-10-CM

## 2021-02-04 DIAGNOSIS — I10 ESSENTIAL HYPERTENSION: Primary | ICD-10-CM

## 2021-02-04 DIAGNOSIS — I48.0 PAROXYSMAL ATRIAL FIBRILLATION: ICD-10-CM

## 2021-02-04 DIAGNOSIS — I10 ESSENTIAL HYPERTENSION: ICD-10-CM

## 2021-02-04 LAB
BASOPHILS # BLD AUTO: 0.02 K/UL (ref 0–0.2)
BASOPHILS NFR BLD: 0.3 % (ref 0–1.9)
DIFFERENTIAL METHOD: ABNORMAL
EOSINOPHIL # BLD AUTO: 0.1 K/UL (ref 0–0.5)
EOSINOPHIL NFR BLD: 2.1 % (ref 0–8)
ERYTHROCYTE [DISTWIDTH] IN BLOOD BY AUTOMATED COUNT: 13.5 % (ref 11.5–14.5)
HCT VFR BLD AUTO: 38 % (ref 37–48.5)
HGB BLD-MCNC: 11.4 G/DL (ref 12–16)
IMM GRANULOCYTES # BLD AUTO: 0.01 K/UL (ref 0–0.04)
IMM GRANULOCYTES NFR BLD AUTO: 0.2 % (ref 0–0.5)
LYMPHOCYTES # BLD AUTO: 1.9 K/UL (ref 1–4.8)
LYMPHOCYTES NFR BLD: 30.5 % (ref 18–48)
MCH RBC QN AUTO: 30.7 PG (ref 27–31)
MCHC RBC AUTO-ENTMCNC: 30 G/DL (ref 32–36)
MCV RBC AUTO: 102 FL (ref 82–98)
MONOCYTES # BLD AUTO: 0.9 K/UL (ref 0.3–1)
MONOCYTES NFR BLD: 14 % (ref 4–15)
NEUTROPHILS # BLD AUTO: 3.3 K/UL (ref 1.8–7.7)
NEUTROPHILS NFR BLD: 52.9 % (ref 38–73)
NRBC BLD-RTO: 0 /100 WBC
PLATELET # BLD AUTO: 302 K/UL (ref 150–350)
PMV BLD AUTO: 11.2 FL (ref 9.2–12.9)
RBC # BLD AUTO: 3.71 M/UL (ref 4–5.4)
WBC # BLD AUTO: 6.29 K/UL (ref 3.9–12.7)

## 2021-02-04 PROCEDURE — 82607 VITAMIN B-12: CPT

## 2021-02-04 PROCEDURE — 99214 OFFICE O/P EST MOD 30 MIN: CPT | Mod: S$PBB,,, | Performed by: FAMILY MEDICINE

## 2021-02-04 PROCEDURE — 80053 COMPREHEN METABOLIC PANEL: CPT

## 2021-02-04 PROCEDURE — 80061 LIPID PANEL: CPT

## 2021-02-04 PROCEDURE — 82306 VITAMIN D 25 HYDROXY: CPT

## 2021-02-04 PROCEDURE — 99999 PR PBB SHADOW E&M-EST. PATIENT-LVL IV: ICD-10-PCS | Mod: PBBFAC,,, | Performed by: FAMILY MEDICINE

## 2021-02-04 PROCEDURE — 85025 COMPLETE CBC W/AUTO DIFF WBC: CPT

## 2021-02-04 PROCEDURE — 36415 COLL VENOUS BLD VENIPUNCTURE: CPT

## 2021-02-04 PROCEDURE — 99214 PR OFFICE/OUTPT VISIT, EST, LEVL IV, 30-39 MIN: ICD-10-PCS | Mod: S$PBB,,, | Performed by: FAMILY MEDICINE

## 2021-02-04 PROCEDURE — 99999 PR PBB SHADOW E&M-EST. PATIENT-LVL IV: CPT | Mod: PBBFAC,,, | Performed by: FAMILY MEDICINE

## 2021-02-04 PROCEDURE — 99214 OFFICE O/P EST MOD 30 MIN: CPT | Mod: PBBFAC | Performed by: FAMILY MEDICINE

## 2021-02-05 LAB
25(OH)D3+25(OH)D2 SERPL-MCNC: 36 NG/ML (ref 30–96)
ALBUMIN SERPL BCP-MCNC: 4.1 G/DL (ref 3.5–5.2)
ALP SERPL-CCNC: 73 U/L (ref 55–135)
ALT SERPL W/O P-5'-P-CCNC: 11 U/L (ref 10–44)
ANION GAP SERPL CALC-SCNC: 13 MMOL/L (ref 8–16)
AST SERPL-CCNC: 24 U/L (ref 10–40)
BILIRUB SERPL-MCNC: 0.3 MG/DL (ref 0.1–1)
BUN SERPL-MCNC: 19 MG/DL (ref 8–23)
CALCIUM SERPL-MCNC: 9.5 MG/DL (ref 8.7–10.5)
CHLORIDE SERPL-SCNC: 103 MMOL/L (ref 95–110)
CHOLEST SERPL-MCNC: 161 MG/DL (ref 120–199)
CHOLEST/HDLC SERPL: 2.4 {RATIO} (ref 2–5)
CO2 SERPL-SCNC: 25 MMOL/L (ref 23–29)
CREAT SERPL-MCNC: 0.8 MG/DL (ref 0.5–1.4)
EST. GFR  (AFRICAN AMERICAN): >60 ML/MIN/1.73 M^2
EST. GFR  (NON AFRICAN AMERICAN): >60 ML/MIN/1.73 M^2
GLUCOSE SERPL-MCNC: 75 MG/DL (ref 70–110)
HDLC SERPL-MCNC: 66 MG/DL (ref 40–75)
HDLC SERPL: 41 % (ref 20–50)
LDLC SERPL CALC-MCNC: 71.6 MG/DL (ref 63–159)
NONHDLC SERPL-MCNC: 95 MG/DL
POTASSIUM SERPL-SCNC: 4.4 MMOL/L (ref 3.5–5.1)
PROT SERPL-MCNC: 7.4 G/DL (ref 6–8.4)
SODIUM SERPL-SCNC: 141 MMOL/L (ref 136–145)
TRIGL SERPL-MCNC: 117 MG/DL (ref 30–150)
VIT B12 SERPL-MCNC: 597 PG/ML (ref 210–950)

## 2021-02-09 ENCOUNTER — TELEPHONE (OUTPATIENT)
Dept: INTERNAL MEDICINE | Facility: CLINIC | Age: 81
End: 2021-02-09

## 2021-02-11 ENCOUNTER — TELEPHONE (OUTPATIENT)
Dept: INTERNAL MEDICINE | Facility: CLINIC | Age: 81
End: 2021-02-11

## 2021-02-12 ENCOUNTER — TELEPHONE (OUTPATIENT)
Dept: CARDIOLOGY | Facility: CLINIC | Age: 81
End: 2021-02-12

## 2021-02-26 ENCOUNTER — TELEPHONE (OUTPATIENT)
Dept: CARDIOLOGY | Facility: CLINIC | Age: 81
End: 2021-02-26

## 2021-03-01 ENCOUNTER — PES CALL (OUTPATIENT)
Dept: ADMINISTRATIVE | Facility: CLINIC | Age: 81
End: 2021-03-01

## 2021-03-09 ENCOUNTER — OFFICE VISIT (OUTPATIENT)
Dept: OPHTHALMOLOGY | Facility: CLINIC | Age: 81
End: 2021-03-09
Payer: MEDICARE

## 2021-03-09 DIAGNOSIS — H40.1131 PRIMARY OPEN ANGLE GLAUCOMA OF BOTH EYES, MILD STAGE: Primary | ICD-10-CM

## 2021-03-09 DIAGNOSIS — Z96.1 PSEUDOPHAKIA: ICD-10-CM

## 2021-03-09 DIAGNOSIS — H04.129 DRY EYE: ICD-10-CM

## 2021-03-09 PROCEDURE — 99999 PR PBB SHADOW E&M-EST. PATIENT-LVL III: ICD-10-PCS | Mod: PBBFAC,,, | Performed by: OPHTHALMOLOGY

## 2021-03-09 PROCEDURE — 99213 OFFICE O/P EST LOW 20 MIN: CPT | Mod: PBBFAC | Performed by: OPHTHALMOLOGY

## 2021-03-09 PROCEDURE — 99214 PR OFFICE/OUTPT VISIT, EST, LEVL IV, 30-39 MIN: ICD-10-PCS | Mod: S$PBB,,, | Performed by: OPHTHALMOLOGY

## 2021-03-09 PROCEDURE — 99999 PR PBB SHADOW E&M-EST. PATIENT-LVL III: CPT | Mod: PBBFAC,,, | Performed by: OPHTHALMOLOGY

## 2021-03-09 PROCEDURE — 99214 OFFICE O/P EST MOD 30 MIN: CPT | Mod: S$PBB,,, | Performed by: OPHTHALMOLOGY

## 2021-03-16 ENCOUNTER — OFFICE VISIT (OUTPATIENT)
Dept: PAIN MEDICINE | Facility: CLINIC | Age: 81
End: 2021-03-16
Payer: MEDICARE

## 2021-03-16 VITALS
BODY MASS INDEX: 31.44 KG/M2 | DIASTOLIC BLOOD PRESSURE: 89 MMHG | HEIGHT: 65 IN | HEART RATE: 70 BPM | SYSTOLIC BLOOD PRESSURE: 147 MMHG

## 2021-03-16 DIAGNOSIS — M70.61 GREATER TROCHANTERIC BURSITIS OF BOTH HIPS: ICD-10-CM

## 2021-03-16 DIAGNOSIS — M76.71 PERONEAL TENDINITIS, RIGHT LEG: ICD-10-CM

## 2021-03-16 DIAGNOSIS — M70.62 GREATER TROCHANTERIC BURSITIS OF BOTH HIPS: ICD-10-CM

## 2021-03-16 DIAGNOSIS — M46.1 SACROILIITIS: Primary | ICD-10-CM

## 2021-03-16 PROCEDURE — 99214 OFFICE O/P EST MOD 30 MIN: CPT | Mod: S$PBB,,, | Performed by: PHYSICAL MEDICINE & REHABILITATION

## 2021-03-16 PROCEDURE — 99214 PR OFFICE/OUTPT VISIT, EST, LEVL IV, 30-39 MIN: ICD-10-PCS | Mod: S$PBB,,, | Performed by: PHYSICAL MEDICINE & REHABILITATION

## 2021-03-16 PROCEDURE — 99999 PR PBB SHADOW E&M-EST. PATIENT-LVL IV: CPT | Mod: PBBFAC,,, | Performed by: PHYSICAL MEDICINE & REHABILITATION

## 2021-03-16 PROCEDURE — 99999 PR PBB SHADOW E&M-EST. PATIENT-LVL IV: ICD-10-PCS | Mod: PBBFAC,,, | Performed by: PHYSICAL MEDICINE & REHABILITATION

## 2021-03-16 PROCEDURE — 99214 OFFICE O/P EST MOD 30 MIN: CPT | Mod: PBBFAC | Performed by: PHYSICAL MEDICINE & REHABILITATION

## 2021-03-23 ENCOUNTER — HOSPITAL ENCOUNTER (OUTPATIENT)
Facility: HOSPITAL | Age: 81
Discharge: HOME | End: 2021-03-23
Attending: PHYSICAL MEDICINE & REHABILITATION | Admitting: PHYSICAL MEDICINE & REHABILITATION
Payer: MEDICARE

## 2021-03-23 VITALS
OXYGEN SATURATION: 96 % | WEIGHT: 187.06 LBS | HEART RATE: 67 BPM | TEMPERATURE: 98 F | RESPIRATION RATE: 12 BRPM | BODY MASS INDEX: 31.17 KG/M2 | SYSTOLIC BLOOD PRESSURE: 164 MMHG | DIASTOLIC BLOOD PRESSURE: 72 MMHG | HEIGHT: 65 IN

## 2021-03-23 DIAGNOSIS — M70.61 GREATER TROCHANTERIC BURSITIS OF BOTH HIPS: ICD-10-CM

## 2021-03-23 DIAGNOSIS — M46.1 SACROILIITIS: Primary | ICD-10-CM

## 2021-03-23 DIAGNOSIS — M70.62 GREATER TROCHANTERIC BURSITIS OF BOTH HIPS: ICD-10-CM

## 2021-03-23 PROCEDURE — 27096 PR INJECTION,SACROILIAC JOINT: ICD-10-PCS | Mod: RT,,, | Performed by: PHYSICAL MEDICINE & REHABILITATION

## 2021-03-23 PROCEDURE — 20610 PR DRAIN/INJECT LARGE JOINT/BURSA: ICD-10-PCS | Mod: 50,59,, | Performed by: PHYSICAL MEDICINE & REHABILITATION

## 2021-03-23 PROCEDURE — 20610 DRAIN/INJ JOINT/BURSA W/O US: CPT | Mod: 50,59,, | Performed by: PHYSICAL MEDICINE & REHABILITATION

## 2021-03-23 PROCEDURE — 63600175 PHARM REV CODE 636 W HCPCS: Performed by: PHYSICAL MEDICINE & REHABILITATION

## 2021-03-23 PROCEDURE — 27096 INJECT SACROILIAC JOINT: CPT | Mod: RT,,, | Performed by: PHYSICAL MEDICINE & REHABILITATION

## 2021-03-23 PROCEDURE — 25000003 PHARM REV CODE 250: Performed by: PHYSICAL MEDICINE & REHABILITATION

## 2021-03-23 PROCEDURE — 20610 DRAIN/INJ JOINT/BURSA W/O US: CPT | Mod: 50 | Performed by: PHYSICAL MEDICINE & REHABILITATION

## 2021-03-23 PROCEDURE — 27096 INJECT SACROILIAC JOINT: CPT | Performed by: PHYSICAL MEDICINE & REHABILITATION

## 2021-03-23 PROCEDURE — 25500020 PHARM REV CODE 255: Performed by: PHYSICAL MEDICINE & REHABILITATION

## 2021-03-23 RX ORDER — BUPIVACAINE HYDROCHLORIDE 2.5 MG/ML
INJECTION, SOLUTION EPIDURAL; INFILTRATION; INTRACAUDAL
Status: DISCONTINUED | OUTPATIENT
Start: 2021-03-23 | End: 2021-03-23 | Stop reason: HOSPADM

## 2021-03-23 RX ORDER — MIDAZOLAM HYDROCHLORIDE 1 MG/ML
INJECTION, SOLUTION INTRAMUSCULAR; INTRAVENOUS
Status: DISCONTINUED | OUTPATIENT
Start: 2021-03-23 | End: 2021-03-23 | Stop reason: HOSPADM

## 2021-03-23 RX ORDER — FENTANYL CITRATE 50 UG/ML
INJECTION, SOLUTION INTRAMUSCULAR; INTRAVENOUS
Status: DISCONTINUED | OUTPATIENT
Start: 2021-03-23 | End: 2021-03-23 | Stop reason: HOSPADM

## 2021-03-23 RX ORDER — ONDANSETRON 2 MG/ML
4 INJECTION INTRAMUSCULAR; INTRAVENOUS ONCE AS NEEDED
Status: DISCONTINUED | OUTPATIENT
Start: 2021-03-23 | End: 2021-03-23 | Stop reason: HOSPADM

## 2021-03-23 RX ORDER — METHYLPREDNISOLONE ACETATE 40 MG/ML
INJECTION, SUSPENSION INTRA-ARTICULAR; INTRALESIONAL; INTRAMUSCULAR; SOFT TISSUE
Status: DISCONTINUED | OUTPATIENT
Start: 2021-03-23 | End: 2021-03-23 | Stop reason: HOSPADM

## 2021-04-19 ENCOUNTER — TELEPHONE (OUTPATIENT)
Dept: PAIN MEDICINE | Facility: CLINIC | Age: 81
End: 2021-04-19

## 2021-04-20 ENCOUNTER — OFFICE VISIT (OUTPATIENT)
Dept: PAIN MEDICINE | Facility: CLINIC | Age: 81
End: 2021-04-20
Payer: MEDICARE

## 2021-04-20 VITALS
HEIGHT: 65 IN | WEIGHT: 187 LBS | DIASTOLIC BLOOD PRESSURE: 65 MMHG | SYSTOLIC BLOOD PRESSURE: 149 MMHG | BODY MASS INDEX: 31.16 KG/M2 | RESPIRATION RATE: 18 BRPM | HEART RATE: 77 BPM

## 2021-04-20 DIAGNOSIS — M47.818 ARTHROPATHY OF RIGHT SACROILIAC JOINT: ICD-10-CM

## 2021-04-20 DIAGNOSIS — M25.561 CHRONIC PAIN OF RIGHT KNEE: ICD-10-CM

## 2021-04-20 DIAGNOSIS — M70.61 GREATER TROCHANTERIC BURSITIS OF BOTH HIPS: Primary | ICD-10-CM

## 2021-04-20 DIAGNOSIS — M70.62 GREATER TROCHANTERIC BURSITIS OF BOTH HIPS: Primary | ICD-10-CM

## 2021-04-20 DIAGNOSIS — G89.29 CHRONIC PAIN OF RIGHT KNEE: ICD-10-CM

## 2021-04-20 PROCEDURE — 99214 PR OFFICE/OUTPT VISIT, EST, LEVL IV, 30-39 MIN: ICD-10-PCS | Mod: S$PBB,,, | Performed by: PHYSICIAN ASSISTANT

## 2021-04-20 PROCEDURE — 99214 OFFICE O/P EST MOD 30 MIN: CPT | Mod: S$PBB,,, | Performed by: PHYSICIAN ASSISTANT

## 2021-04-20 PROCEDURE — 99214 OFFICE O/P EST MOD 30 MIN: CPT | Mod: PBBFAC | Performed by: PHYSICIAN ASSISTANT

## 2021-04-20 PROCEDURE — 99999 PR PBB SHADOW E&M-EST. PATIENT-LVL IV: CPT | Mod: PBBFAC,,, | Performed by: PHYSICIAN ASSISTANT

## 2021-04-20 PROCEDURE — 99999 PR PBB SHADOW E&M-EST. PATIENT-LVL IV: ICD-10-PCS | Mod: PBBFAC,,, | Performed by: PHYSICIAN ASSISTANT

## 2021-06-15 ENCOUNTER — OFFICE VISIT (OUTPATIENT)
Dept: PAIN MEDICINE | Facility: CLINIC | Age: 81
End: 2021-06-15
Payer: MEDICARE

## 2021-06-15 ENCOUNTER — HOSPITAL ENCOUNTER (OUTPATIENT)
Dept: RADIOLOGY | Facility: HOSPITAL | Age: 81
Discharge: HOME | End: 2021-06-15
Attending: PHYSICIAN ASSISTANT
Payer: MEDICARE

## 2021-06-15 VITALS
RESPIRATION RATE: 18 BRPM | HEART RATE: 74 BPM | DIASTOLIC BLOOD PRESSURE: 79 MMHG | BODY MASS INDEX: 31.82 KG/M2 | WEIGHT: 191 LBS | SYSTOLIC BLOOD PRESSURE: 142 MMHG | HEIGHT: 65 IN

## 2021-06-15 DIAGNOSIS — M54.16 RIGHT LUMBAR RADICULOPATHY: ICD-10-CM

## 2021-06-15 DIAGNOSIS — M47.818 ARTHROPATHY OF RIGHT SACROILIAC JOINT: ICD-10-CM

## 2021-06-15 DIAGNOSIS — R60.0 LOCALIZED EDEMA: ICD-10-CM

## 2021-06-15 DIAGNOSIS — M54.16 RIGHT LUMBAR RADICULOPATHY: Primary | ICD-10-CM

## 2021-06-15 PROCEDURE — 99214 PR OFFICE/OUTPT VISIT, EST, LEVL IV, 30-39 MIN: ICD-10-PCS | Mod: S$PBB,,, | Performed by: PHYSICIAN ASSISTANT

## 2021-06-15 PROCEDURE — 99215 OFFICE O/P EST HI 40 MIN: CPT | Mod: PBBFAC,25 | Performed by: PHYSICIAN ASSISTANT

## 2021-06-15 PROCEDURE — 99999 PR PBB SHADOW E&M-EST. PATIENT-LVL V: ICD-10-PCS | Mod: PBBFAC,,, | Performed by: PHYSICIAN ASSISTANT

## 2021-06-15 PROCEDURE — 99999 PR PBB SHADOW E&M-EST. PATIENT-LVL V: CPT | Mod: PBBFAC,,, | Performed by: PHYSICIAN ASSISTANT

## 2021-06-15 PROCEDURE — 99214 OFFICE O/P EST MOD 30 MIN: CPT | Mod: S$PBB,,, | Performed by: PHYSICIAN ASSISTANT

## 2021-06-15 PROCEDURE — 72110 XR LUMBAR SPINE 5 VIEW WITH FLEX AND EXT: ICD-10-PCS | Mod: 26,,, | Performed by: RADIOLOGY

## 2021-06-15 PROCEDURE — 72110 X-RAY EXAM L-2 SPINE 4/>VWS: CPT | Mod: TC

## 2021-06-15 PROCEDURE — 72110 X-RAY EXAM L-2 SPINE 4/>VWS: CPT | Mod: 26,,, | Performed by: RADIOLOGY

## 2021-06-30 ENCOUNTER — HOSPITAL ENCOUNTER (OUTPATIENT)
Dept: RADIOLOGY | Facility: HOSPITAL | Age: 81
Discharge: HOME | End: 2021-06-30
Attending: PHYSICIAN ASSISTANT
Payer: MEDICARE

## 2021-06-30 DIAGNOSIS — M54.16 RIGHT LUMBAR RADICULOPATHY: ICD-10-CM

## 2021-06-30 DIAGNOSIS — R60.0 LOCALIZED EDEMA: ICD-10-CM

## 2021-06-30 PROCEDURE — 72148 MRI LUMBAR SPINE W/O DYE: CPT | Mod: TC

## 2021-06-30 PROCEDURE — 93971 EXTREMITY STUDY: CPT | Mod: TC,RT

## 2021-07-07 ENCOUNTER — TELEPHONE (OUTPATIENT)
Dept: PAIN MEDICINE | Facility: CLINIC | Age: 81
End: 2021-07-07

## 2021-07-08 ENCOUNTER — OFFICE VISIT (OUTPATIENT)
Dept: PAIN MEDICINE | Facility: CLINIC | Age: 81
End: 2021-07-08
Payer: MEDICARE

## 2021-07-08 VITALS
BODY MASS INDEX: 31.72 KG/M2 | WEIGHT: 190.38 LBS | HEIGHT: 65 IN | HEART RATE: 79 BPM | SYSTOLIC BLOOD PRESSURE: 142 MMHG | DIASTOLIC BLOOD PRESSURE: 80 MMHG

## 2021-07-08 DIAGNOSIS — M54.16 RIGHT LUMBAR RADICULOPATHY: Primary | ICD-10-CM

## 2021-07-08 DIAGNOSIS — M51.36 DDD (DEGENERATIVE DISC DISEASE), LUMBAR: ICD-10-CM

## 2021-07-08 DIAGNOSIS — M48.061 DEGENERATIVE LUMBAR SPINAL STENOSIS: ICD-10-CM

## 2021-07-08 PROCEDURE — 99999 PR PBB SHADOW E&M-EST. PATIENT-LVL IV: ICD-10-PCS | Mod: PBBFAC,,, | Performed by: PHYSICIAN ASSISTANT

## 2021-07-08 PROCEDURE — 99214 OFFICE O/P EST MOD 30 MIN: CPT | Mod: S$PBB,,, | Performed by: PHYSICIAN ASSISTANT

## 2021-07-08 PROCEDURE — 99214 PR OFFICE/OUTPT VISIT, EST, LEVL IV, 30-39 MIN: ICD-10-PCS | Mod: S$PBB,,, | Performed by: PHYSICIAN ASSISTANT

## 2021-07-08 PROCEDURE — 99999 PR PBB SHADOW E&M-EST. PATIENT-LVL IV: CPT | Mod: PBBFAC,,, | Performed by: PHYSICIAN ASSISTANT

## 2021-07-08 PROCEDURE — 99214 OFFICE O/P EST MOD 30 MIN: CPT | Mod: PBBFAC | Performed by: PHYSICIAN ASSISTANT

## 2021-07-08 RX ORDER — GABAPENTIN 400 MG/1
400 CAPSULE ORAL 3 TIMES DAILY
Qty: 90 CAPSULE | Refills: 1 | Status: SHIPPED | OUTPATIENT
Start: 2021-07-08 | End: 2021-08-18

## 2021-07-20 ENCOUNTER — OFFICE VISIT (OUTPATIENT)
Dept: OPHTHALMOLOGY | Facility: CLINIC | Age: 81
End: 2021-07-20
Payer: MEDICARE

## 2021-07-20 DIAGNOSIS — H04.129 DRY EYE: ICD-10-CM

## 2021-07-20 DIAGNOSIS — Z96.1 PSEUDOPHAKIA: ICD-10-CM

## 2021-07-20 DIAGNOSIS — H40.1131 PRIMARY OPEN ANGLE GLAUCOMA OF BOTH EYES, MILD STAGE: Primary | ICD-10-CM

## 2021-07-20 PROCEDURE — 99213 PR OFFICE/OUTPT VISIT, EST, LEVL III, 20-29 MIN: ICD-10-PCS | Mod: S$PBB,,, | Performed by: OPHTHALMOLOGY

## 2021-07-20 PROCEDURE — 99212 OFFICE O/P EST SF 10 MIN: CPT | Mod: PBBFAC | Performed by: OPHTHALMOLOGY

## 2021-07-20 PROCEDURE — 99999 PR PBB SHADOW E&M-EST. PATIENT-LVL II: CPT | Mod: PBBFAC,,, | Performed by: OPHTHALMOLOGY

## 2021-07-20 PROCEDURE — 92133 CPTRZD OPH DX IMG PST SGM ON: CPT | Mod: PBBFAC | Performed by: OPHTHALMOLOGY

## 2021-07-20 PROCEDURE — 99999 PR PBB SHADOW E&M-EST. PATIENT-LVL II: ICD-10-PCS | Mod: PBBFAC,,, | Performed by: OPHTHALMOLOGY

## 2021-07-20 PROCEDURE — 92133 POSTERIOR SEGMENT OCT OPTIC NERVE(OCULAR COHERENCE TOMOGRAPHY) - OU - BOTH EYES: ICD-10-PCS | Mod: 26,S$PBB,, | Performed by: OPHTHALMOLOGY

## 2021-07-20 PROCEDURE — 99213 OFFICE O/P EST LOW 20 MIN: CPT | Mod: S$PBB,,, | Performed by: OPHTHALMOLOGY

## 2021-07-27 DIAGNOSIS — I10 ESSENTIAL HYPERTENSION: Primary | ICD-10-CM

## 2021-07-28 ENCOUNTER — CLINICAL SUPPORT (OUTPATIENT)
Dept: CARDIOLOGY | Facility: CLINIC | Age: 81
End: 2021-07-28
Payer: MEDICARE

## 2021-07-28 ENCOUNTER — OFFICE VISIT (OUTPATIENT)
Dept: CARDIOLOGY | Facility: CLINIC | Age: 81
End: 2021-07-28
Payer: MEDICARE

## 2021-07-28 VITALS
WEIGHT: 192 LBS | SYSTOLIC BLOOD PRESSURE: 142 MMHG | DIASTOLIC BLOOD PRESSURE: 84 MMHG | HEART RATE: 90 BPM | BODY MASS INDEX: 31.95 KG/M2 | OXYGEN SATURATION: 97 %

## 2021-07-28 DIAGNOSIS — E78.2 MIXED HYPERLIPIDEMIA: ICD-10-CM

## 2021-07-28 DIAGNOSIS — Z98.890 HISTORY OF CARDIAC RADIOFREQUENCY ABLATION (RFA): ICD-10-CM

## 2021-07-28 DIAGNOSIS — I10 ESSENTIAL HYPERTENSION: ICD-10-CM

## 2021-07-28 DIAGNOSIS — I77.1 TORTUOUS AORTA: ICD-10-CM

## 2021-07-28 DIAGNOSIS — I73.9 PVD (PERIPHERAL VASCULAR DISEASE): ICD-10-CM

## 2021-07-28 DIAGNOSIS — I48.0 PAROXYSMAL ATRIAL FIBRILLATION: Primary | ICD-10-CM

## 2021-07-28 PROCEDURE — 99214 OFFICE O/P EST MOD 30 MIN: CPT | Mod: S$PBB,,, | Performed by: INTERNAL MEDICINE

## 2021-07-28 PROCEDURE — 99213 OFFICE O/P EST LOW 20 MIN: CPT | Mod: PBBFAC,PO | Performed by: INTERNAL MEDICINE

## 2021-07-28 PROCEDURE — 93005 ELECTROCARDIOGRAM TRACING: CPT | Mod: PBBFAC,PO | Performed by: INTERNAL MEDICINE

## 2021-07-28 PROCEDURE — 99999 PR PBB SHADOW E&M-EST. PATIENT-LVL III: ICD-10-PCS | Mod: PBBFAC,,, | Performed by: INTERNAL MEDICINE

## 2021-07-28 PROCEDURE — 93010 EKG 12-LEAD: ICD-10-PCS | Mod: S$PBB,,, | Performed by: INTERNAL MEDICINE

## 2021-07-28 PROCEDURE — 99999 PR PBB SHADOW E&M-EST. PATIENT-LVL III: CPT | Mod: PBBFAC,,, | Performed by: INTERNAL MEDICINE

## 2021-07-28 PROCEDURE — 99214 PR OFFICE/OUTPT VISIT, EST, LEVL IV, 30-39 MIN: ICD-10-PCS | Mod: S$PBB,,, | Performed by: INTERNAL MEDICINE

## 2021-07-28 PROCEDURE — 93010 ELECTROCARDIOGRAM REPORT: CPT | Mod: S$PBB,,, | Performed by: INTERNAL MEDICINE

## 2021-07-28 RX ORDER — FUROSEMIDE 20 MG/1
20 TABLET ORAL
Qty: 8 TABLET | Refills: 0 | Status: SHIPPED | OUTPATIENT
Start: 2021-07-29 | End: 2021-08-12

## 2021-08-04 ENCOUNTER — OFFICE VISIT (OUTPATIENT)
Dept: INTERNAL MEDICINE | Facility: CLINIC | Age: 81
End: 2021-08-04
Payer: MEDICARE

## 2021-08-04 ENCOUNTER — LAB VISIT (OUTPATIENT)
Dept: LAB | Facility: HOSPITAL | Age: 81
End: 2021-08-04
Attending: FAMILY MEDICINE
Payer: MEDICARE

## 2021-08-04 VITALS
DIASTOLIC BLOOD PRESSURE: 70 MMHG | HEART RATE: 84 BPM | BODY MASS INDEX: 31.44 KG/M2 | WEIGHT: 188.69 LBS | TEMPERATURE: 95 F | OXYGEN SATURATION: 99 % | SYSTOLIC BLOOD PRESSURE: 130 MMHG | HEIGHT: 65 IN

## 2021-08-04 DIAGNOSIS — K21.9 GASTROESOPHAGEAL REFLUX DISEASE WITHOUT ESOPHAGITIS: ICD-10-CM

## 2021-08-04 DIAGNOSIS — D64.9 ANEMIA, UNSPECIFIED TYPE: ICD-10-CM

## 2021-08-04 DIAGNOSIS — Z76.89 ENCOUNTER TO ESTABLISH CARE WITH NEW DOCTOR: Primary | ICD-10-CM

## 2021-08-04 DIAGNOSIS — I48.0 PAROXYSMAL ATRIAL FIBRILLATION: ICD-10-CM

## 2021-08-04 DIAGNOSIS — I10 ESSENTIAL HYPERTENSION: ICD-10-CM

## 2021-08-04 DIAGNOSIS — M17.11 PRIMARY OSTEOARTHRITIS OF RIGHT KNEE: ICD-10-CM

## 2021-08-04 LAB
FERRITIN SERPL-MCNC: 14 NG/ML (ref 20–300)
IRON SERPL-MCNC: 55 UG/DL (ref 30–160)

## 2021-08-04 PROCEDURE — 83540 ASSAY OF IRON: CPT | Performed by: FAMILY MEDICINE

## 2021-08-04 PROCEDURE — 85025 COMPLETE CBC W/AUTO DIFF WBC: CPT | Performed by: FAMILY MEDICINE

## 2021-08-04 PROCEDURE — 99999 PR PBB SHADOW E&M-EST. PATIENT-LVL III: CPT | Mod: PBBFAC,,, | Performed by: FAMILY MEDICINE

## 2021-08-04 PROCEDURE — 99213 OFFICE O/P EST LOW 20 MIN: CPT | Mod: PBBFAC | Performed by: FAMILY MEDICINE

## 2021-08-04 PROCEDURE — 36415 COLL VENOUS BLD VENIPUNCTURE: CPT | Performed by: FAMILY MEDICINE

## 2021-08-04 PROCEDURE — 99214 PR OFFICE/OUTPT VISIT, EST, LEVL IV, 30-39 MIN: ICD-10-PCS | Mod: S$PBB,,, | Performed by: FAMILY MEDICINE

## 2021-08-04 PROCEDURE — 82728 ASSAY OF FERRITIN: CPT | Performed by: FAMILY MEDICINE

## 2021-08-04 PROCEDURE — 99999 PR PBB SHADOW E&M-EST. PATIENT-LVL III: ICD-10-PCS | Mod: PBBFAC,,, | Performed by: FAMILY MEDICINE

## 2021-08-04 PROCEDURE — 99214 OFFICE O/P EST MOD 30 MIN: CPT | Mod: S$PBB,,, | Performed by: FAMILY MEDICINE

## 2021-08-04 RX ORDER — PANTOPRAZOLE SODIUM 40 MG/1
40 TABLET, DELAYED RELEASE ORAL DAILY
Qty: 30 TABLET | Refills: 11 | Status: SHIPPED | OUTPATIENT
Start: 2021-08-04 | End: 2022-07-01

## 2021-08-05 LAB
BASOPHILS # BLD AUTO: 0.03 K/UL (ref 0–0.2)
BASOPHILS NFR BLD: 0.4 % (ref 0–1.9)
DIFFERENTIAL METHOD: ABNORMAL
EOSINOPHIL # BLD AUTO: 0.2 K/UL (ref 0–0.5)
EOSINOPHIL NFR BLD: 2.1 % (ref 0–8)
ERYTHROCYTE [DISTWIDTH] IN BLOOD BY AUTOMATED COUNT: 14 % (ref 11.5–14.5)
HCT VFR BLD AUTO: 38 % (ref 37–48.5)
HGB BLD-MCNC: 11.8 G/DL (ref 12–16)
IMM GRANULOCYTES # BLD AUTO: 0.01 K/UL (ref 0–0.04)
IMM GRANULOCYTES NFR BLD AUTO: 0.1 % (ref 0–0.5)
LYMPHOCYTES # BLD AUTO: 3.3 K/UL (ref 1–4.8)
LYMPHOCYTES NFR BLD: 43.3 % (ref 18–48)
MCH RBC QN AUTO: 30.8 PG (ref 27–31)
MCHC RBC AUTO-ENTMCNC: 31.1 G/DL (ref 32–36)
MCV RBC AUTO: 99 FL (ref 82–98)
MONOCYTES # BLD AUTO: 1 K/UL (ref 0.3–1)
MONOCYTES NFR BLD: 12.6 % (ref 4–15)
NEUTROPHILS # BLD AUTO: 3.2 K/UL (ref 1.8–7.7)
NEUTROPHILS NFR BLD: 41.5 % (ref 38–73)
NRBC BLD-RTO: 0 /100 WBC
PLATELET # BLD AUTO: 337 K/UL (ref 150–450)
PMV BLD AUTO: 11 FL (ref 9.2–12.9)
RBC # BLD AUTO: 3.83 M/UL (ref 4–5.4)
WBC # BLD AUTO: 7.63 K/UL (ref 3.9–12.7)

## 2021-08-18 ENCOUNTER — TELEPHONE (OUTPATIENT)
Dept: PAIN MEDICINE | Facility: CLINIC | Age: 81
End: 2021-08-18

## 2021-09-01 ENCOUNTER — TELEPHONE (OUTPATIENT)
Dept: PAIN MEDICINE | Facility: CLINIC | Age: 81
End: 2021-09-01

## 2021-11-20 ENCOUNTER — PATIENT OUTREACH (OUTPATIENT)
Dept: ADMINISTRATIVE | Facility: OTHER | Age: 81
End: 2021-11-20
Payer: MEDICARE

## 2021-11-23 ENCOUNTER — OFFICE VISIT (OUTPATIENT)
Dept: OPHTHALMOLOGY | Facility: CLINIC | Age: 81
End: 2021-11-23
Payer: MEDICARE

## 2021-11-23 ENCOUNTER — APPOINTMENT (OUTPATIENT)
Dept: OPHTHALMOLOGY | Facility: CLINIC | Age: 81
End: 2021-11-23
Payer: MEDICARE

## 2021-11-23 DIAGNOSIS — Z96.1 LENS REPLACED BY OTHER MEANS: ICD-10-CM

## 2021-11-23 DIAGNOSIS — H40.1131 PRIMARY OPEN ANGLE GLAUCOMA OF BOTH EYES, MILD STAGE: Primary | ICD-10-CM

## 2021-11-23 PROCEDURE — 92014 COMPRE OPH EXAM EST PT 1/>: CPT | Mod: S$PBB,,, | Performed by: OPHTHALMOLOGY

## 2021-11-23 PROCEDURE — 92083 EXTENDED VISUAL FIELD XM: CPT | Mod: PBBFAC | Performed by: OPHTHALMOLOGY

## 2021-11-23 PROCEDURE — 99212 OFFICE O/P EST SF 10 MIN: CPT | Mod: PBBFAC | Performed by: OPHTHALMOLOGY

## 2021-11-23 PROCEDURE — 92014 PR EYE EXAM, EST PATIENT,COMPREHESV: ICD-10-PCS | Mod: S$PBB,,, | Performed by: OPHTHALMOLOGY

## 2021-11-23 PROCEDURE — 92083 HUMPHREY VISUAL FIELD - OU - BOTH EYES: ICD-10-PCS | Mod: 26,S$PBB,, | Performed by: OPHTHALMOLOGY

## 2021-11-23 PROCEDURE — 99999 PR PBB SHADOW E&M-EST. PATIENT-LVL II: CPT | Mod: PBBFAC,,, | Performed by: OPHTHALMOLOGY

## 2021-11-23 PROCEDURE — 99999 PR PBB SHADOW E&M-EST. PATIENT-LVL II: ICD-10-PCS | Mod: PBBFAC,,, | Performed by: OPHTHALMOLOGY

## 2022-01-24 ENCOUNTER — TELEPHONE (OUTPATIENT)
Dept: INTERNAL MEDICINE | Facility: CLINIC | Age: 82
End: 2022-01-24
Payer: MEDICARE

## 2022-01-24 NOTE — TELEPHONE ENCOUNTER
----- Message from Scotty Olson sent at 1/24/2022 10:02 AM CST -----  Contact: [phone]  Patient is calling regarding problems and concerns she is having, would like to speak to nurse . Please call back at [phone]. Thanks/vishnu

## 2022-01-24 NOTE — TELEPHONE ENCOUNTER
Patient is having rectal bleeding is started on Friday 1/21/22. Patient does have an office visit on 2/7/22. She didn't know if she should come in before or just wait and see. Scheduled an office visit with PA.

## 2022-01-25 ENCOUNTER — LAB VISIT (OUTPATIENT)
Dept: LAB | Facility: HOSPITAL | Age: 82
End: 2022-01-25
Attending: PHYSICIAN ASSISTANT
Payer: MEDICARE

## 2022-01-25 ENCOUNTER — OFFICE VISIT (OUTPATIENT)
Dept: INTERNAL MEDICINE | Facility: CLINIC | Age: 82
End: 2022-01-25
Payer: MEDICARE

## 2022-01-25 VITALS
HEART RATE: 90 BPM | DIASTOLIC BLOOD PRESSURE: 82 MMHG | RESPIRATION RATE: 18 BRPM | SYSTOLIC BLOOD PRESSURE: 140 MMHG | BODY MASS INDEX: 31.74 KG/M2 | WEIGHT: 190.5 LBS | HEIGHT: 65 IN | OXYGEN SATURATION: 96 % | TEMPERATURE: 99 F

## 2022-01-25 DIAGNOSIS — K62.5 RECTAL BLEEDING: ICD-10-CM

## 2022-01-25 DIAGNOSIS — I48.0 PAROXYSMAL ATRIAL FIBRILLATION: ICD-10-CM

## 2022-01-25 DIAGNOSIS — D64.9 ANEMIA, UNSPECIFIED TYPE: ICD-10-CM

## 2022-01-25 DIAGNOSIS — K60.2 ANAL FISSURE: Primary | ICD-10-CM

## 2022-01-25 DIAGNOSIS — I10 ESSENTIAL HYPERTENSION: ICD-10-CM

## 2022-01-25 LAB
BASOPHILS # BLD AUTO: 0.02 K/UL (ref 0–0.2)
BASOPHILS NFR BLD: 0.3 % (ref 0–1.9)
DIFFERENTIAL METHOD: ABNORMAL
EOSINOPHIL # BLD AUTO: 0.1 K/UL (ref 0–0.5)
EOSINOPHIL NFR BLD: 1.5 % (ref 0–8)
ERYTHROCYTE [DISTWIDTH] IN BLOOD BY AUTOMATED COUNT: 12.9 % (ref 11.5–14.5)
HCT VFR BLD AUTO: 37.8 % (ref 37–48.5)
HGB BLD-MCNC: 12.3 G/DL (ref 12–16)
IMM GRANULOCYTES # BLD AUTO: 0.01 K/UL (ref 0–0.04)
IMM GRANULOCYTES NFR BLD AUTO: 0.1 % (ref 0–0.5)
LYMPHOCYTES # BLD AUTO: 3.4 K/UL (ref 1–4.8)
LYMPHOCYTES NFR BLD: 42.1 % (ref 18–48)
MCH RBC QN AUTO: 31.9 PG (ref 27–31)
MCHC RBC AUTO-ENTMCNC: 32.5 G/DL (ref 32–36)
MCV RBC AUTO: 98 FL (ref 82–98)
MONOCYTES # BLD AUTO: 0.9 K/UL (ref 0.3–1)
MONOCYTES NFR BLD: 11.3 % (ref 4–15)
NEUTROPHILS # BLD AUTO: 3.6 K/UL (ref 1.8–7.7)
NEUTROPHILS NFR BLD: 44.7 % (ref 38–73)
NRBC BLD-RTO: 0 /100 WBC
PLATELET # BLD AUTO: 310 K/UL (ref 150–450)
PMV BLD AUTO: 10.5 FL (ref 9.2–12.9)
RBC # BLD AUTO: 3.86 M/UL (ref 4–5.4)
WBC # BLD AUTO: 7.96 K/UL (ref 3.9–12.7)

## 2022-01-25 PROCEDURE — 99999 PR PBB SHADOW E&M-EST. PATIENT-LVL IV: CPT | Mod: PBBFAC,,, | Performed by: PHYSICIAN ASSISTANT

## 2022-01-25 PROCEDURE — 85025 COMPLETE CBC W/AUTO DIFF WBC: CPT | Performed by: PHYSICIAN ASSISTANT

## 2022-01-25 PROCEDURE — 99999 PR PBB SHADOW E&M-EST. PATIENT-LVL IV: ICD-10-PCS | Mod: PBBFAC,,, | Performed by: PHYSICIAN ASSISTANT

## 2022-01-25 PROCEDURE — 36415 COLL VENOUS BLD VENIPUNCTURE: CPT | Performed by: PHYSICIAN ASSISTANT

## 2022-01-25 PROCEDURE — 99214 PR OFFICE/OUTPT VISIT, EST, LEVL IV, 30-39 MIN: ICD-10-PCS | Mod: S$PBB,,, | Performed by: PHYSICIAN ASSISTANT

## 2022-01-25 PROCEDURE — 99214 OFFICE O/P EST MOD 30 MIN: CPT | Mod: PBBFAC | Performed by: PHYSICIAN ASSISTANT

## 2022-01-25 PROCEDURE — 99214 OFFICE O/P EST MOD 30 MIN: CPT | Mod: S$PBB,,, | Performed by: PHYSICIAN ASSISTANT

## 2022-01-25 NOTE — PROGRESS NOTES
"Subjective:      Patient ID: Maria Del Carmen Spence is a 81 y.o. female.    Chief Complaint: Rectal Bleeding    Patient is new to me, being seen today for rectal bleeding.      Reports had constipation Friday, with straining had painful BM and noticed blood when wiping, bright red   No blood in stool or toilet that she has see  Denies persistent constipation but additional episodes of blood with each bowel movement  H/o hemorrhoids   Denies blood on underwear/panty liner    Currently on blood thinners, reports was told to stop d/t bleeding    Unsure of when last colonoscopy was, last at [facility] [address]    Last visit August 2021    Review of Systems   Constitutional: Negative for chills, diaphoresis and fever.   HENT: Negative for congestion, rhinorrhea and sore throat.    Respiratory: Negative for cough, shortness of breath and wheezing.    Gastrointestinal: Positive for rectal pain (sharp). Negative for abdominal pain, constipation, diarrhea and vomiting. Nausea: with BMs, seems to be improving.   Musculoskeletal: Positive for back pain (chronic).   Skin: Negative for rash.   Neurological: Negative for dizziness, light-headedness and headaches.       Objective:   BP (!) 140/82 (BP Location: Right arm, Patient Position: Sitting, BP Method: Large (Manual))   Pulse 90   Temp 99.1 °F (37.3 °C) (Tympanic)   Resp 18   Ht 5' 5" (1.651 m)   Wt 86.4 kg (190 lb 7.6 oz)   SpO2 96%   BMI 31.70 kg/m²   Physical Exam  Constitutional:       General: She is not in acute distress.     Appearance: Normal appearance. She is well-developed. She is not ill-appearing.   HENT:      Head: Normocephalic and atraumatic.   Cardiovascular:      Rate and Rhythm: Normal rate and regular rhythm.      Heart sounds: Normal heart sounds. No murmur heard.      Pulmonary:      Effort: Pulmonary effort is normal. No respiratory distress.      Breath sounds: Normal breath sounds. No decreased breath sounds.   Abdominal:      General: Bowel " sounds are normal.      Palpations: Abdomen is soft.      Tenderness: There is abdominal tenderness (mild).   Genitourinary:      Musculoskeletal:      Right lower leg: No edema.      Left lower leg: No edema.   Skin:     General: Skin is warm and dry.      Findings: No rash.   Psychiatric:         Speech: Speech normal.         Behavior: Behavior normal.         Thought Content: Thought content normal.       Assessment:      1. Anal fissure    2. Rectal bleeding    3. Anemia, unspecified type    4. Essential hypertension    5. Paroxysmal atrial fibrillation       Plan:   Anal fissure    Rectal bleeding  -     CBC Auto Differential; Future; Expected date: 01/25/2022  -     Occult blood x 1, stool; Future; Expected date: 01/25/2022    Anemia, unspecified type  -     CBC Auto Differential; Future; Expected date: 01/25/2022    Essential hypertension   Controlled     Paroxysmal atrial fibrillation    Resume xarelto    Discussed sitz baths, ensure adequate fiber and hydration, may consider stool softener   Recommend prepH for pain relief  F/u GI if symptoms persist     Discussed worsening signs/symptoms and when to return to clinic or go to ED.   Patient expresses understanding and agrees with treatment plan.

## 2022-01-27 ENCOUNTER — TELEPHONE (OUTPATIENT)
Dept: ADMINISTRATIVE | Facility: HOSPITAL | Age: 82
End: 2022-01-27
Payer: MEDICARE

## 2022-02-07 ENCOUNTER — OFFICE VISIT (OUTPATIENT)
Dept: INTERNAL MEDICINE | Facility: CLINIC | Age: 82
End: 2022-02-07
Payer: MEDICARE

## 2022-02-07 ENCOUNTER — LAB VISIT (OUTPATIENT)
Dept: LAB | Facility: HOSPITAL | Age: 82
End: 2022-02-07
Attending: NURSE PRACTITIONER
Payer: MEDICARE

## 2022-02-07 ENCOUNTER — TELEPHONE (OUTPATIENT)
Dept: INTERNAL MEDICINE | Facility: CLINIC | Age: 82
End: 2022-02-07

## 2022-02-07 VITALS
TEMPERATURE: 99 F | WEIGHT: 192.25 LBS | HEART RATE: 84 BPM | RESPIRATION RATE: 18 BRPM | HEIGHT: 65 IN | BODY MASS INDEX: 32.03 KG/M2 | OXYGEN SATURATION: 97 % | SYSTOLIC BLOOD PRESSURE: 124 MMHG | DIASTOLIC BLOOD PRESSURE: 82 MMHG

## 2022-02-07 VITALS
SYSTOLIC BLOOD PRESSURE: 124 MMHG | WEIGHT: 192.25 LBS | BODY MASS INDEX: 32.03 KG/M2 | OXYGEN SATURATION: 97 % | HEART RATE: 84 BPM | HEIGHT: 65 IN | DIASTOLIC BLOOD PRESSURE: 82 MMHG

## 2022-02-07 DIAGNOSIS — H40.1131 PRIMARY OPEN ANGLE GLAUCOMA (POAG) OF BOTH EYES, MILD STAGE: ICD-10-CM

## 2022-02-07 DIAGNOSIS — Z00.00 ENCOUNTER FOR PREVENTIVE HEALTH EXAMINATION: Primary | ICD-10-CM

## 2022-02-07 DIAGNOSIS — E66.9 OBESITY (BMI 30.0-34.9): ICD-10-CM

## 2022-02-07 DIAGNOSIS — L65.9 HAIR LOSS: ICD-10-CM

## 2022-02-07 DIAGNOSIS — H91.90 DECREASED HEARING, UNSPECIFIED LATERALITY: ICD-10-CM

## 2022-02-07 DIAGNOSIS — M46.1 SACROILIITIS: ICD-10-CM

## 2022-02-07 DIAGNOSIS — I77.1 TORTUOUS AORTA: ICD-10-CM

## 2022-02-07 DIAGNOSIS — D64.9 ANEMIA, UNSPECIFIED TYPE: ICD-10-CM

## 2022-02-07 DIAGNOSIS — I48.0 PAF (PAROXYSMAL ATRIAL FIBRILLATION): ICD-10-CM

## 2022-02-07 DIAGNOSIS — I10 PRIMARY HYPERTENSION: ICD-10-CM

## 2022-02-07 DIAGNOSIS — E78.2 MIXED HYPERLIPIDEMIA: ICD-10-CM

## 2022-02-07 DIAGNOSIS — Z74.09 OTHER REDUCED MOBILITY: ICD-10-CM

## 2022-02-07 DIAGNOSIS — K60.2 ANAL FISSURE: ICD-10-CM

## 2022-02-07 DIAGNOSIS — M17.11 PRIMARY OSTEOARTHRITIS OF RIGHT KNEE: ICD-10-CM

## 2022-02-07 DIAGNOSIS — I10 PRIMARY HYPERTENSION: Primary | ICD-10-CM

## 2022-02-07 DIAGNOSIS — M85.80 OSTEOPENIA, UNSPECIFIED LOCATION: ICD-10-CM

## 2022-02-07 DIAGNOSIS — R26.9 ABNORMALITY OF GAIT AND MOBILITY: ICD-10-CM

## 2022-02-07 DIAGNOSIS — I73.9 PVD (PERIPHERAL VASCULAR DISEASE): ICD-10-CM

## 2022-02-07 PROBLEM — E66.811 OBESITY (BMI 30.0-34.9): Status: ACTIVE | Noted: 2022-02-07

## 2022-02-07 PROCEDURE — 36415 COLL VENOUS BLD VENIPUNCTURE: CPT | Performed by: FAMILY MEDICINE

## 2022-02-07 PROCEDURE — G0439 PR MEDICARE ANNUAL WELLNESS SUBSEQUENT VISIT: ICD-10-PCS | Mod: ,,, | Performed by: NURSE PRACTITIONER

## 2022-02-07 PROCEDURE — 99999 PR PBB SHADOW E&M-EST. PATIENT-LVL IV: ICD-10-PCS | Mod: PBBFAC,,, | Performed by: FAMILY MEDICINE

## 2022-02-07 PROCEDURE — 80061 LIPID PANEL: CPT | Performed by: FAMILY MEDICINE

## 2022-02-07 PROCEDURE — 99214 OFFICE O/P EST MOD 30 MIN: CPT | Mod: 25,27,PBBFAC | Performed by: FAMILY MEDICINE

## 2022-02-07 PROCEDURE — 99999 PR PBB SHADOW E&M-EST. PATIENT-LVL V: CPT | Mod: PBBFAC,,, | Performed by: NURSE PRACTITIONER

## 2022-02-07 PROCEDURE — 99999 PR PBB SHADOW E&M-EST. PATIENT-LVL IV: CPT | Mod: PBBFAC,,, | Performed by: FAMILY MEDICINE

## 2022-02-07 PROCEDURE — 84443 ASSAY THYROID STIM HORMONE: CPT | Performed by: NURSE PRACTITIONER

## 2022-02-07 PROCEDURE — 99215 OFFICE O/P EST HI 40 MIN: CPT | Mod: 25,PBBFAC | Performed by: NURSE PRACTITIONER

## 2022-02-07 PROCEDURE — G0439 PPPS, SUBSEQ VISIT: HCPCS | Mod: ,,, | Performed by: NURSE PRACTITIONER

## 2022-02-07 PROCEDURE — 99214 PR OFFICE/OUTPT VISIT, EST, LEVL IV, 30-39 MIN: ICD-10-PCS | Mod: S$PBB,,, | Performed by: FAMILY MEDICINE

## 2022-02-07 PROCEDURE — 99214 OFFICE O/P EST MOD 30 MIN: CPT | Mod: S$PBB,,, | Performed by: FAMILY MEDICINE

## 2022-02-07 PROCEDURE — 99999 PR PBB SHADOW E&M-EST. PATIENT-LVL V: ICD-10-PCS | Mod: PBBFAC,,, | Performed by: NURSE PRACTITIONER

## 2022-02-07 NOTE — PROGRESS NOTES
Subjective:       Patient ID: Maria Del Carmen Spence is a 81 y.o. female.    Chief Complaint: Follow-up    Follow-up hypertension paroxysmal atrial fibrillation anemia.  She is followed by cardiology.  She is followed by pain management with chronic back pain.  She denies headache chest pain palpitations shortness of breath or edema.  She has had chronic anemia in the past with a hemoglobin of 11. 8. She had a recent CBC with hemoglobin 12.3.  She denies nausea vomiting abdominal pain or melena.  She had a recent rectal bleed associated with fissure.  She is also on Xarelto.  Rectal pain has essentially resolved.  She takes 1 Aleve a day for degenerative joint disease she reports this was cleared by her cardiologist    Review of Systems   Constitutional: Negative for chills and fever.   HENT: Negative for congestion.    Respiratory: Negative for cough, chest tightness, shortness of breath and wheezing.    Cardiovascular: Negative for chest pain, palpitations and leg swelling.   Gastrointestinal: Negative for abdominal distention, abdominal pain, nausea and vomiting.   Genitourinary: Negative for hematuria.   Musculoskeletal: Positive for arthralgias and back pain.   Neurological: Negative for dizziness, weakness, light-headedness and headaches.       Objective:      Physical Exam  Constitutional:       General: She is not in acute distress.     Appearance: She is not ill-appearing or diaphoretic.   Cardiovascular:      Rate and Rhythm: Normal rate and regular rhythm.      Heart sounds: No murmur heard.  No gallop.    Pulmonary:      Effort: Pulmonary effort is normal. No respiratory distress.      Breath sounds: Normal breath sounds. No wheezing, rhonchi or rales.   Abdominal:      General: There is no distension.      Palpations: Abdomen is soft. There is no mass.      Tenderness: There is no abdominal tenderness.   Lymphadenopathy:      Cervical: No cervical adenopathy.   Skin:     Findings: No bruising.   Neurological:       Mental Status: She is alert.   Psychiatric:         Mood and Affect: Mood normal.         Behavior: Behavior normal.         Thought Content: Thought content normal.         Judgment: Judgment normal.         Lab Visit on 01/25/2022   Component Date Value Ref Range Status    WBC 01/25/2022 7.96  3.90 - 12.70 K/uL Final    RBC 01/25/2022 3.86* 4.00 - 5.40 M/uL Final    Hemoglobin 01/25/2022 12.3  12.0 - 16.0 g/dL Final    Hematocrit 01/25/2022 37.8  37.0 - 48.5 % Final    MCV 01/25/2022 98  82 - 98 fL Final    MCH 01/25/2022 31.9* 27.0 - 31.0 pg Final    MCHC 01/25/2022 32.5  32.0 - 36.0 g/dL Final    RDW 01/25/2022 12.9  11.5 - 14.5 % Final    Platelets 01/25/2022 310  150 - 450 K/uL Final    MPV 01/25/2022 10.5  9.2 - 12.9 fL Final    Immature Granulocytes 01/25/2022 0.1  0.0 - 0.5 % Final    Gran # (ANC) 01/25/2022 3.6  1.8 - 7.7 K/uL Final    Immature Grans (Abs) 01/25/2022 0.01  0.00 - 0.04 K/uL Final    Lymph # 01/25/2022 3.4  1.0 - 4.8 K/uL Final    Mono # 01/25/2022 0.9  0.3 - 1.0 K/uL Final    Eos # 01/25/2022 0.1  0.0 - 0.5 K/uL Final    Baso # 01/25/2022 0.02  0.00 - 0.20 K/uL Final    nRBC 01/25/2022 0  0 /100 WBC Final    Gran % 01/25/2022 44.7  38.0 - 73.0 % Final    Lymph % 01/25/2022 42.1  18.0 - 48.0 % Final    Mono % 01/25/2022 11.3  4.0 - 15.0 % Final    Eosinophil % 01/25/2022 1.5  0.0 - 8.0 % Final    Basophil % 01/25/2022 0.3  0.0 - 1.9 % Final    Differential Method 01/25/2022 Automated   Final     Assessment:       1. Primary hypertension    2. Primary osteoarthritis of right knee    3. Sacroiliitis    4. Tortuous aorta    5. Anal fissure    6. PAF (paroxysmal atrial fibrillation)        Plan:     Blood pressure controlled.  Lipid profile was ordered.  Medications reviewed.  History also includes torturous aorta.  And she sacroiliitis associated with rheumatoid arthritis.  Follow-up in 6 months.    Primary hypertension  -     Lipid Panel; Future; Expected date:  02/07/2022    Primary osteoarthritis of right knee    Sacroiliitis    Tortuous aorta    Anal fissure    PAF (paroxysmal atrial fibrillation)

## 2022-02-07 NOTE — PATIENT INSTRUCTIONS
Counseling and Referral of Other Preventative  (Italic type indicates deductible and co-insurance are waived)    Patient Name: Maria Del Carmen Spence  Today's Date: 2/7/2022    Health Maintenance       Date Due Completion Date    Lipid Panel 02/04/2022 2/4/2021    DEXA SCAN 08/18/2023 8/18/2020    TETANUS VACCINE 08/18/2030 8/18/2020        Orders Placed This Encounter   Procedures    Ambulatory referral/consult to Audiology     The following information is provided to all patients.  This information is to help you find resources for any of the problems found today that may be affecting your health:                Living healthy guide: [email or web address].[email or web address].[facility]      Understanding Diabetes: www.diabetes.org      Eating healthy: www.cdc.gov/healthyweight      CDC home safety checklist: www.cdc.gov/steadi/patient.html      Agency on Aging: www.goea.[place].[facility]      Alcoholics anonymous (AA): www.aa.org      Physical Activity: www.amy.nih.gov/iu3jopt      Tobacco use: www.quitwithusla.org

## 2022-02-07 NOTE — TELEPHONE ENCOUNTER
Attempted to reach Ms Joyce to schedule her Audiogram that was ordered by Margo Elder. I left a voicemail for her to call the office.

## 2022-02-07 NOTE — PROGRESS NOTES
"  Maria Del Carmen Spence presented for a  Medicare AWV and comprehensive Health Risk Assessment today. The following components were reviewed and updated:    · Medical history  · Family History  · Social history  · Allergies and Current Medications  · Health Risk Assessment  · Health Maintenance  · Care Team         ** See Completed Assessments for Annual Wellness Visit within the encounter summary.**         The following assessments were completed:  · Living Situation  · CAGE  · Depression Screening  · Timed Get Up and Go  · Whisper Test  · Cognitive Function Screening  · Nutrition Screening  · ADL Screening  · PAQ Screening        Vitals:    02/07/22 1122   BP: 124/82   BP Location: Left arm   Patient Position: Sitting   Pulse: 84   SpO2: 97%   Weight: 87.2 kg (192 lb 3.9 oz)   Height: 5' 5" (1.651 m)     Body mass index is 31.99 kg/m².  Physical Exam  Vitals and nursing note reviewed.   Constitutional:       Appearance: She is well-developed.   HENT:      Head: Normocephalic.   Cardiovascular:      Rate and Rhythm: Normal rate and regular rhythm.      Heart sounds: Normal heart sounds.   Pulmonary:      Effort: Pulmonary effort is normal. No respiratory distress.      Breath sounds: Normal breath sounds.   Abdominal:      Palpations: Abdomen is soft. There is no mass.      Tenderness: There is no abdominal tenderness.   Musculoskeletal:         General: Normal range of motion.   Skin:     General: Skin is warm and dry.   Neurological:      Mental Status: She is alert and oriented to person, place, and time.      Motor: No abnormal muscle tone.   Psychiatric:         Speech: Speech normal.         Behavior: Behavior normal.               Diagnoses and health risks identified today and associated recommendations/orders:    1. Encounter for preventive health examination      2. PAF (paroxysmal atrial fibrillation)  Stable. Continue current treatment plan as previously prescribed with your  Cardiologist.     3. Primary " hypertension  Stable and controlled. Continue current treatment plan as previously prescribed with your PCP and cardiologist.     4. PVD (peripheral vascular disease)  Continue current treatment plan as previously prescribed with your  Cardiologist.     5. Mixed hyperlipidemia  Continue current treatment plan as previously prescribed with your  pcp and cardiologist.     6. Sacroiliitis  Continue current treatment plan as previously prescribed with your  Pain management.     7. Anemia, unspecified type  Continue current treatment plan as previously prescribed with your  pcp     8. Osteopenia, unspecified location  dexa 8/20  Continue current treatment plan as previously prescribed with your  pcp     9. Primary open angle glaucoma (POAG) of both eyes, mild stage  Continue current treatment plan as previously prescribed with your  Ophthalmologist.     10. Obesity (BMI 30.0-34.9)  Encouraged healthy diet and exercise as tolerated to help bring BMI into normal range.    Continue current treatment plan as previously prescribed with your  pcp     11. Decreased hearing, unspecified laterality  Abnormal whisper test-left.   Advised to follow up with PCP for further evaluation and recommendations. Patient expressed understanding.     - Ambulatory referral/consult to Audiology; Future    12. Abnormality of gait and mobility  Abnormal timed get up and go test. Denies any falls in the last 12 months.    Fall precautions reviewed with patient. Advised to follow up with PCP for further recommendations. Patient expressed understanding.       13. Other reduced mobility  See #12    14. Hair loss  Chronic   Advised to follow up with PCP for further evaluation and recommendations. Patient expressed understanding.    - TSH; Future      Provided Maria Del Carmen with a 5-10 year written screening schedule and personal prevention plan. Recommendations were developed using the USPSTF age appropriate recommendations. Education, counseling, and  referrals were provided as needed. After Visit Summary printed and given to patient which includes a list of additional screenings\tests needed.    Follow up in about 1 year (around 2/7/2023) for awv.    Margo Elder NP  I offered to discuss advanced care planning, including how to pick a person who would make decisions for you if you were unable to make them for yourself, called a health care power of , and what kind of decisions you might make such as use of life sustaining treatments such as ventilators and tube feeding when faced with a life limiting illness recorded on a living will that they will need to know. (How you want to be cared for as you near the end of your natural life)     X  Patient has advanced directives written and agrees to provide copies to the institution.

## 2022-02-07 NOTE — TELEPHONE ENCOUNTER
----- Message from Briana Diaz sent at 2/7/2022  3:04 PM CST -----  Contact: Maria Del Carmen Joyce would like a call back in regards to a missed call from the office. Please call her at [phone]

## 2022-02-08 LAB
CHOLEST SERPL-MCNC: 165 MG/DL (ref 120–199)
CHOLEST/HDLC SERPL: 2.3 {RATIO} (ref 2–5)
HDLC SERPL-MCNC: 72 MG/DL (ref 40–75)
HDLC SERPL: 43.6 % (ref 20–50)
LDLC SERPL CALC-MCNC: 74.8 MG/DL (ref 63–159)
NONHDLC SERPL-MCNC: 93 MG/DL
TRIGL SERPL-MCNC: 91 MG/DL (ref 30–150)
TSH SERPL DL<=0.005 MIU/L-ACNC: 2.82 UIU/ML (ref 0.4–4)

## 2022-02-10 DIAGNOSIS — I10 PRIMARY HYPERTENSION: ICD-10-CM

## 2022-02-15 ENCOUNTER — TELEPHONE (OUTPATIENT)
Dept: ADMINISTRATIVE | Facility: OTHER | Age: 82
End: 2022-02-15
Payer: MEDICARE

## 2022-02-22 ENCOUNTER — CLINICAL SUPPORT (OUTPATIENT)
Dept: AUDIOLOGY | Facility: CLINIC | Age: 82
End: 2022-02-22
Payer: MEDICARE

## 2022-02-22 DIAGNOSIS — H90.3 SENSORINEURAL HEARING LOSS, BILATERAL: Primary | ICD-10-CM

## 2022-02-22 DIAGNOSIS — H91.90 DECREASED HEARING, UNSPECIFIED LATERALITY: ICD-10-CM

## 2022-02-22 PROCEDURE — 99211 OFF/OP EST MAY X REQ PHY/QHP: CPT | Mod: PBBFAC | Performed by: AUDIOLOGIST-HEARING AID FITTER

## 2022-02-22 PROCEDURE — 92557 COMPREHENSIVE HEARING TEST: CPT | Mod: PBBFAC | Performed by: AUDIOLOGIST-HEARING AID FITTER

## 2022-02-22 PROCEDURE — 99999 PR PBB SHADOW E&M-EST. PATIENT-LVL I: CPT | Mod: PBBFAC,,, | Performed by: AUDIOLOGIST-HEARING AID FITTER

## 2022-02-22 PROCEDURE — 99999 PR PBB SHADOW E&M-EST. PATIENT-LVL I: ICD-10-PCS | Mod: PBBFAC,,, | Performed by: AUDIOLOGIST-HEARING AID FITTER

## 2022-02-22 PROCEDURE — 92567 TYMPANOMETRY: CPT | Mod: PBBFAC | Performed by: AUDIOLOGIST-HEARING AID FITTER

## 2022-02-22 NOTE — PROGRESS NOTES
Referring provider: Margo Elder NP    Maria Del Carmen Spence was seen 02/22/2022 for an audiological evaluation.  Patient is referred due to failed hearing screen at PCP. She voices no concerns about her hearing, reporting equally sided hearing. No tinnitus. No dizziness. No otalgia, aural fullness, pressure or drainage from ears. No significant otologic or audiologic history.     Otoscopy:  Right ear: abnormal with suspect cerumen at tympanic membrane  Left ear: clear ear canal with normal appearing tympanic membrane    Audiogram:  Results reveal a mild-to-moderate sensorineural hearing loss 250-8000 Hz for the right ear, and a mild-to-moderate sensorineural hearing loss 250-8000 Hz for the left ear.   Speech Reception Thresholds were 30 dBHL for the right ear and 30 dBHL for the left ear.   Word recognition scores were excellent for the right ear and good for the left ear.   Tympanograms were Type As for the right ear and Type A for the left ear.    Patient was counseled on the above findings.    Recommendations:  1. ENT  2. Patient may consider hearing aids, when desired. Low hearing handicap is reported.

## 2022-03-15 ENCOUNTER — OFFICE VISIT (OUTPATIENT)
Dept: OTOLARYNGOLOGY | Facility: CLINIC | Age: 82
End: 2022-03-15
Payer: MEDICARE

## 2022-03-15 ENCOUNTER — OFFICE VISIT (OUTPATIENT)
Dept: OPHTHALMOLOGY | Facility: CLINIC | Age: 82
End: 2022-03-15
Payer: MEDICARE

## 2022-03-15 VITALS
DIASTOLIC BLOOD PRESSURE: 76 MMHG | TEMPERATURE: 98 F | SYSTOLIC BLOOD PRESSURE: 148 MMHG | WEIGHT: 190.5 LBS | BODY MASS INDEX: 31.7 KG/M2 | HEART RATE: 77 BPM

## 2022-03-15 DIAGNOSIS — H40.1131 PRIMARY OPEN ANGLE GLAUCOMA OF BOTH EYES, MILD STAGE: Primary | ICD-10-CM

## 2022-03-15 DIAGNOSIS — H61.21 CERUMEN DEBRIS ON TYMPANIC MEMBRANE OF RIGHT EAR: Primary | ICD-10-CM

## 2022-03-15 DIAGNOSIS — Z96.1 LENS REPLACED BY OTHER MEANS: ICD-10-CM

## 2022-03-15 DIAGNOSIS — H90.3 SENSORINEURAL HEARING LOSS (SNHL) OF BOTH EARS: ICD-10-CM

## 2022-03-15 PROCEDURE — 99999 PR PBB SHADOW E&M-EST. PATIENT-LVL II: ICD-10-PCS | Mod: PBBFAC,,, | Performed by: STUDENT IN AN ORGANIZED HEALTH CARE EDUCATION/TRAINING PROGRAM

## 2022-03-15 PROCEDURE — 69210 PR REMOVAL IMPACTED CERUMEN REQUIRING INSTRUMENTATION, UNILATERAL: ICD-10-PCS | Mod: S$PBB,,, | Performed by: STUDENT IN AN ORGANIZED HEALTH CARE EDUCATION/TRAINING PROGRAM

## 2022-03-15 PROCEDURE — 99212 OFFICE O/P EST SF 10 MIN: CPT | Mod: PBBFAC,27 | Performed by: STUDENT IN AN ORGANIZED HEALTH CARE EDUCATION/TRAINING PROGRAM

## 2022-03-15 PROCEDURE — 99213 OFFICE O/P EST LOW 20 MIN: CPT | Mod: PBBFAC,25 | Performed by: OPHTHALMOLOGY

## 2022-03-15 PROCEDURE — 69210 REMOVE IMPACTED EAR WAX UNI: CPT | Mod: S$PBB,,, | Performed by: STUDENT IN AN ORGANIZED HEALTH CARE EDUCATION/TRAINING PROGRAM

## 2022-03-15 PROCEDURE — 99999 PR PBB SHADOW E&M-EST. PATIENT-LVL III: CPT | Mod: PBBFAC,,, | Performed by: OPHTHALMOLOGY

## 2022-03-15 PROCEDURE — 69210 REMOVE IMPACTED EAR WAX UNI: CPT | Mod: PBBFAC | Performed by: STUDENT IN AN ORGANIZED HEALTH CARE EDUCATION/TRAINING PROGRAM

## 2022-03-15 PROCEDURE — 99999 PR PBB SHADOW E&M-EST. PATIENT-LVL II: CPT | Mod: PBBFAC,,, | Performed by: STUDENT IN AN ORGANIZED HEALTH CARE EDUCATION/TRAINING PROGRAM

## 2022-03-15 PROCEDURE — 99203 OFFICE O/P NEW LOW 30 MIN: CPT | Mod: 25,S$PBB,, | Performed by: STUDENT IN AN ORGANIZED HEALTH CARE EDUCATION/TRAINING PROGRAM

## 2022-03-15 PROCEDURE — 99203 PR OFFICE/OUTPT VISIT, NEW, LEVL III, 30-44 MIN: ICD-10-PCS | Mod: 25,S$PBB,, | Performed by: STUDENT IN AN ORGANIZED HEALTH CARE EDUCATION/TRAINING PROGRAM

## 2022-03-15 PROCEDURE — 99999 PR PBB SHADOW E&M-EST. PATIENT-LVL III: ICD-10-PCS | Mod: PBBFAC,,, | Performed by: OPHTHALMOLOGY

## 2022-03-15 PROCEDURE — 99214 PR OFFICE/OUTPT VISIT, EST, LEVL IV, 30-39 MIN: ICD-10-PCS | Mod: S$PBB,,, | Performed by: OPHTHALMOLOGY

## 2022-03-15 PROCEDURE — 99214 OFFICE O/P EST MOD 30 MIN: CPT | Mod: S$PBB,,, | Performed by: OPHTHALMOLOGY

## 2022-03-15 NOTE — PROGRESS NOTES
Chief complaint:   Chief Complaint   Patient presents with    Other     Possible wax        History of Present Illness:     Ms. Spence is a 81 y.o. female presenting for evaluation of abnormal otoscopy.     Noted by Jean Paul Cano on 2/22.   Denies hearing loss, otalgia, otorrhea, vertigo, tinnitus.    The patient denies significant hearing loss risk factors, ototoxic medication exposure, chronic vestibular suppressant use, head/ facial/ iesha trauma, and otologic surgery.      No changes in hearing symptoms since then.     Review of Systems     A complete 10 point ROS was completed and are positive as per above HPI.    Otherwise negative for fever, diplopia, chest pain, shortness of breath, vomiting, blood in urine, joint pain, skin rash, seizures and unusual bleeding.       History        Past Medical History:   Past Medical History:   Diagnosis Date    A-fib     Arthritis     Chronic LBP     ESIs x 3 with Dr. Hicks, PT at [facility], chiropractor    Eye pain     Frequent headaches     GERD (gastroesophageal reflux disease)     Glaucoma     Hyperlipemia     Hypertension     .          Past Surgical History:  Past Surgical History:   Procedure Laterality Date    ABLATION OF ARRHYTHMOGENIC FOCUS FOR ATRIAL FIBRILLATION N/A 3/6/2019    Procedure: ABLATION, ARRHYTHMOGENIC FOCUS, FOR ATRIAL FIBRILLATION;  Surgeon: Abel Morillo MD;  Location: [facility] EP LAB;  Service: Cardiology;  Laterality: N/A;    CARDIOVERSION N/A 7/9/2018    Procedure: CARDIOVERSION;  Surgeon: Will Rosenthal MD;  Location: [facility] CATH LAB;  Service: Cardiology;  Laterality: N/A;    CATARACT EXTRACTION W/  INTRAOCULAR LENS IMPLANT  OU    INJECTION OF ANESTHETIC AGENT INTO SACROILIAC JOINT Right 11/3/2020    Procedure: right Sacroiliac Joint Injection;  Surgeon: Ayush Christiansen MD;  Location: [facility] PAIN MGT;  Service: Pain Management;  Laterality: Right;    INJECTION OF ANESTHETIC AGENT INTO SACROILIAC JOINT Right 3/23/2021    Procedure: right  Sacroiliac Joint Injection;  Surgeon: Ayush Christiansen MD;  Location: [facility] PAIN MGT;  Service: Pain Management;  Laterality: Right;    INJECTION OF JOINT Right 11/3/2020    Procedure: right GT bursa injection;  Surgeon: Ayush Christiansen MD;  Location: [facility] PAIN MGT;  Service: Pain Management;  Laterality: Right;    INJECTION OF JOINT Bilateral 3/23/2021    Procedure: bilateral GT bursa injection;  Surgeon: Ayush Christiansen MD;  Location: [facility] PAIN MGT;  Service: Pain Management;  Laterality: Bilateral;    TUBAL LIGATION     .         Medications: Medication list was reviewed. She  has a current medication list which includes the following prescription(s): aa/prot/lysine/methio/vit c/b6, acetaminophen, atorvastatin, calcium citrate/vitamin d3, cetirizine hcl, furosemide, gabapentin, latanoprost, metoprolol tartrate, folic acid/multivit-min/lutein, naproxen sodium, pantoprazole, timolol maleate 0.5%, and xarelto.         Allergies:   Review of patient's allergies indicates:   Allergen Reactions    Voltaren [diclofenac sodium] Edema     Gel formulation caused facial rash and facial swelling    Eliquis [apixaban] Other (See Comments)     Headache, numbness in lip     Medrol [methylprednisolone] Blisters            Family history: family history includes Cancer in her mother; Cataracts in her mother; Diabetes in her paternal aunt; Glaucoma in her mother; Hypertension in her father and mother.         Social History          Alcohol use:  reports no history of alcohol use.            Tobacco:  reports that she has never smoked. She has never used smokeless tobacco.         Please see the patient's intake form for full details of past medical history, past surgical history, family history, social history and review of systems. ?This information was reviewed by me and noted.      Physical Examination     General: Well developed, well nourished, well hydrated. Verbal with a strong voice and not dysphonic.      Head/Face: Normocephalic, atraumatic. No scars or lesions. Facial musculature equal.     Eyes: No scleral icterus or conjunctival hemorrhage. EOMI. PERRLA.     Ears: after removal    · Right ear: No gross deformity. EAC is clear of debris and erythema. The TM is intact with a pneumatized middle ear. No signs of retraction, fluid or infection.      · Left ear: No gross deformity. EAC is clear of debris and erythema. The TM is intact with a pneumatized middle ear. No signs of retraction, fluid or infection.   ·   Nose: No gross deformity or lesions. No purulent discharge. No significant NSD.      Mouth/Oropharynx: Lips without any lesions. No mucosal lesions within the oropharynx. No tonsillar exudate or lesions. Pharyngeal walls symmetrical. Uvula midline. Tongue midline without lesions.     Neck: Trachea midline. No masses. No thyromegaly or nodules palpated.     Lymphatic: No lymphadenopathy in the neck.     Extremities: No cyanosis. Warm and well-perfused.     Skin: No scars or lesions on face or neck.      Neurologic: Moving all extremities without gross abnormality.CN II-XII grossly intact. House-Brackmann 1/6. No signs of nystagmus.      Psych: Alert and oriented to person, place, and time with an appropriate mood and affect.     Procedure: ear microscopy with removal of cerumen    Description: The patient was in agreement with the examination and debridement of the ears. Removal of the cerumen required use of an operating microscope and multiple micro-instruments.     With the patient in the supine position, we used the operating microscope to examine both ears with the appropriate sized ear speculum.  A variety of sterile, micro-instruments were utilized to remove the cerumen atraumatically.  I performed the procedure which required a significant amount of time and effort. The tympanic membrane was then well visualized.  The patient tolerated the procedure well and there were no  complications.    Findings:   Right ear had a thin, adherent layer of wax completely covering the TM, the EAC was normal, and the tympanic membrane was intact with no evidence of middle ear fluid.        Audiogram     Audiogram, 02/22/2022 was independently reviewed           Assessment     Cerumen debris on tympanic membrane of right ear, removed, normal TM anatomy  Mild to moderate sloping Sensorineural Hearing Loss     Plan:    Conservative cerumen measures discussed, avoid q-tips  No subjective hearing concerns, repeat audio in 1 year, sooner if hearing worsens      Juve Kelley MD  [facility]   [facility]  [id number] [place] [address].  JANICE Gallego [id number]  P: [phone]  F: [phone]

## 2022-03-15 NOTE — PROGRESS NOTES
SUBJECTIVE  Maria Del Carmen Spence is 81 y.o. female  Corrected distance visual acuity was 20/30 in the right eye and 20/20 in the left eye.   Chief Complaint   Patient presents with    Glaucoma     Pt here for 4m IOP chk. No pain or discomfort. VA stable. 100% compliant with gtts.           HPI     Glaucoma      Additional comments: Pt here for 4m IOP chk. No pain or discomfort. VA   stable. 100% compliant with gtts.               Comments     1. Mild COAG OD>OS (init 24/20) Goal <17   Rhopressa (irritated but exc IOP 11/11)   Dorzolamide OU BID (Not taking, patient States This Eye Drop Makes Her Eye   Burn & Red)   2. PCIOL OU (Sulcus OD) (partial dislocation Od)   3. Dry Eyes       Latanoprost QHS OU   Timolol QAM OU            Last edited by Everett Hoffmann MA on 3/15/2022  9:27 AM. (History)         Assessment /Plan :  1. Primary open angle glaucoma of both eyes, mild stage Doing well, IOP within acceptable range relative to target IOP and no evidence of progression. Continue current treatment. Reviewed importance of continued compliance with treatment and follow up.      Patient instructed to continue using the following glaucoma medication as follows:  Latanoprost one drop in each eye nightly and Timolol one drop both eyes daily    Return to clinic in 4 months  or as needed.  With IOP Check and GOCT       2. Lens replaced by other means - Both Eyes  -- Condition stable, no therapeutic change required. Monitoring routinely.

## 2022-07-12 ENCOUNTER — OFFICE VISIT (OUTPATIENT)
Dept: OPHTHALMOLOGY | Facility: CLINIC | Age: 82
End: 2022-07-12
Payer: MEDICARE

## 2022-07-12 DIAGNOSIS — H40.1131 PRIMARY OPEN ANGLE GLAUCOMA OF BOTH EYES, MILD STAGE: Primary | ICD-10-CM

## 2022-07-12 DIAGNOSIS — Z96.1 PSEUDOPHAKIA: ICD-10-CM

## 2022-07-12 PROCEDURE — 99213 OFFICE O/P EST LOW 20 MIN: CPT | Mod: PBBFAC | Performed by: OPHTHALMOLOGY

## 2022-07-12 PROCEDURE — 92133 POSTERIOR SEGMENT OCT OPTIC NERVE(OCULAR COHERENCE TOMOGRAPHY) - OU - BOTH EYES: ICD-10-PCS | Mod: 26,S$PBB,, | Performed by: OPHTHALMOLOGY

## 2022-07-12 PROCEDURE — 99214 OFFICE O/P EST MOD 30 MIN: CPT | Mod: S$PBB,,, | Performed by: OPHTHALMOLOGY

## 2022-07-12 PROCEDURE — 92133 CPTRZD OPH DX IMG PST SGM ON: CPT | Mod: PBBFAC | Performed by: OPHTHALMOLOGY

## 2022-07-12 PROCEDURE — 99214 PR OFFICE/OUTPT VISIT, EST, LEVL IV, 30-39 MIN: ICD-10-PCS | Mod: S$PBB,,, | Performed by: OPHTHALMOLOGY

## 2022-07-12 PROCEDURE — 99999 PR PBB SHADOW E&M-EST. PATIENT-LVL III: ICD-10-PCS | Mod: PBBFAC,,, | Performed by: OPHTHALMOLOGY

## 2022-07-12 PROCEDURE — 99999 PR PBB SHADOW E&M-EST. PATIENT-LVL III: CPT | Mod: PBBFAC,,, | Performed by: OPHTHALMOLOGY

## 2022-07-12 NOTE — PROGRESS NOTES
SUBJECTIVE  Maria Del Carmen Spence is 81 y.o. female  Corrected distance visual acuity was 20/25 in the right eye and 20/20 -1 in the left eye.   Chief Complaint   Patient presents with    Glaucoma     Pt reports for 4m IOP check and GOCT. Denies any pain or irritation. Va stable. 100% compliant with gtts.           HPI     Glaucoma      Additional comments: Pt reports for 4m IOP check and GOCT. Denies any   pain or irritation. Va stable. 100% compliant with gtts.               Comments     1. Mild COAG OD>OS (init 24/20) Goal <17   Rhopressa (irritated but exc IOP 11/11)   Dorzolamide OU BID (Not taking, patient States This Eye Drop Makes Her Eye   Burn & Red)   2. PCIOL OU (Sulcus OD) (partial dislocation Od)   3. Dry Eyes       Latanoprost QHS OU   Timolol QAM OU            Last edited by Abel Machuca on 7/12/2022 12:51 PM. (History)         Assessment /Plan :  1. Primary open angle glaucoma of both eyes, mild stage Doing well, intraocular pressure (IOP) within acceptable range relative to target IOP and no evidence of progression. Continue current treatment. Reviewed importance of continued compliance with treatment and follow up.      Patient instructed to continue using the following glaucoma medication as follows:  Latanoprost one drop in each eye nightly and Timolol one drop both eyes daily    Return to clinic in 4 months  or as needed.  With Dilation and HVF 24-2       2. Pseudophakia - monitor for now

## 2022-08-08 ENCOUNTER — OFFICE VISIT (OUTPATIENT)
Dept: INTERNAL MEDICINE | Facility: CLINIC | Age: 82
End: 2022-08-08
Payer: MEDICARE

## 2022-08-08 VITALS
DIASTOLIC BLOOD PRESSURE: 80 MMHG | RESPIRATION RATE: 18 BRPM | BODY MASS INDEX: 31.7 KG/M2 | SYSTOLIC BLOOD PRESSURE: 122 MMHG | OXYGEN SATURATION: 97 % | HEIGHT: 65 IN | WEIGHT: 190.25 LBS | HEART RATE: 95 BPM

## 2022-08-08 DIAGNOSIS — I48.0 PAF (PAROXYSMAL ATRIAL FIBRILLATION): ICD-10-CM

## 2022-08-08 DIAGNOSIS — I10 PRIMARY HYPERTENSION: Primary | ICD-10-CM

## 2022-08-08 DIAGNOSIS — M85.80 OSTEOPENIA, UNSPECIFIED LOCATION: ICD-10-CM

## 2022-08-08 DIAGNOSIS — E78.2 MIXED HYPERLIPIDEMIA: ICD-10-CM

## 2022-08-08 DIAGNOSIS — K21.9 GASTROESOPHAGEAL REFLUX DISEASE WITHOUT ESOPHAGITIS: ICD-10-CM

## 2022-08-08 PROCEDURE — 99214 OFFICE O/P EST MOD 30 MIN: CPT | Mod: S$PBB,,, | Performed by: FAMILY MEDICINE

## 2022-08-08 PROCEDURE — 99214 PR OFFICE/OUTPT VISIT, EST, LEVL IV, 30-39 MIN: ICD-10-PCS | Mod: S$PBB,,, | Performed by: FAMILY MEDICINE

## 2022-08-08 PROCEDURE — 99999 PR PBB SHADOW E&M-EST. PATIENT-LVL IV: CPT | Mod: PBBFAC,,, | Performed by: FAMILY MEDICINE

## 2022-08-08 PROCEDURE — 99999 PR PBB SHADOW E&M-EST. PATIENT-LVL IV: ICD-10-PCS | Mod: PBBFAC,,, | Performed by: FAMILY MEDICINE

## 2022-08-08 PROCEDURE — 99214 OFFICE O/P EST MOD 30 MIN: CPT | Mod: PBBFAC | Performed by: FAMILY MEDICINE

## 2022-08-08 NOTE — PROGRESS NOTES
Subjective:       Patient ID: Maria Del Carmen Spence is a 81 y.o. female.    Chief Complaint: Follow-up    Follow-up hypertension hyperlipidemia paroxysmal atrial fibrillation status post cardial ablation esophageal reflux pain knee a she denies chest pain palpitations shortness of breath or edema.  Reflux controlled medications she denies dysphagia.    Review of Systems   Constitutional: Negative for activity change, appetite change, fatigue and unexpected weight change.   HENT: Negative for congestion.    Respiratory: Negative for cough, chest tightness, shortness of breath and wheezing.    Cardiovascular: Negative for chest pain, palpitations and leg swelling.   Gastrointestinal: Negative for abdominal distention and abdominal pain.   Genitourinary: Negative for difficulty urinating.   Neurological: Negative for dizziness, weakness, light-headedness and headaches.       Objective:      Physical Exam  Constitutional:       General: She is not in acute distress.     Appearance: She is well-developed. She is not ill-appearing or diaphoretic.   Neck:      Thyroid: No thyromegaly.      Comments: Normal thyroid  Cardiovascular:      Rate and Rhythm: Normal rate and regular rhythm.      Heart sounds: Normal heart sounds. No murmur heard.    No gallop.   Pulmonary:      Effort: Pulmonary effort is normal. No respiratory distress.      Breath sounds: Normal breath sounds. No wheezing or rhonchi.   Abdominal:      General: Bowel sounds are normal. There is no distension.      Palpations: Abdomen is soft. There is no mass.      Tenderness: There is no abdominal tenderness.   Musculoskeletal:      Cervical back: Neck supple.   Lymphadenopathy:      Cervical: No cervical adenopathy.   Skin:     General: Skin is warm and dry.      Coloration: Skin is not pale.      Findings: No erythema.   Neurological:      Mental Status: She is alert and oriented to person, place, and time.   Psychiatric:         Behavior: Behavior normal.          Thought Content: Thought content normal.         Judgment: Judgment normal.         Lab Visit on 02/07/2022   Component Date Value Ref Range Status    Cholesterol 02/07/2022 165  120 - 199 mg/dL Final    Triglycerides 02/07/2022 91  30 - 150 mg/dL Final    HDL 02/07/2022 72  40 - 75 mg/dL Final    LDL Cholesterol 02/07/2022 74.8  63.0 - 159.0 mg/dL Final    HDL/Cholesterol Ratio 02/07/2022 43.6  20.0 - 50.0 % Final    Total Cholesterol/HDL Ratio 02/07/2022 2.3  2.0 - 5.0 Final    Non-HDL Cholesterol 02/07/2022 93  mg/dL Final    TSH 02/07/2022 2.823  0.400 - 4.000 uIU/mL Final     Assessment:       1. Primary hypertension    2. PAF (paroxysmal atrial fibrillation)    3. Mixed hyperlipidemia    4. Osteopenia, unspecified location    5. Gastroesophageal reflux disease without esophagitis        Plan:     Blood pressure controlled.  Lab is up-to-date.  Health maintenance reviewed.  Regular sinus rhythm today.  Follow-up in 6 months with lab.    Primary hypertension    PAF (paroxysmal atrial fibrillation)    Mixed hyperlipidemia    Osteopenia, unspecified location    Gastroesophageal reflux disease without esophagitis

## 2022-09-30 DIAGNOSIS — I10 ESSENTIAL HYPERTENSION: Primary | ICD-10-CM

## 2022-10-05 ENCOUNTER — CLINICAL SUPPORT (OUTPATIENT)
Dept: CARDIOLOGY | Facility: CLINIC | Age: 82
End: 2022-10-05
Payer: MEDICARE

## 2022-10-05 ENCOUNTER — OFFICE VISIT (OUTPATIENT)
Dept: CARDIOLOGY | Facility: CLINIC | Age: 82
End: 2022-10-05
Payer: MEDICARE

## 2022-10-05 VITALS
SYSTOLIC BLOOD PRESSURE: 150 MMHG | TEMPERATURE: 99 F | BODY MASS INDEX: 31.4 KG/M2 | OXYGEN SATURATION: 96 % | DIASTOLIC BLOOD PRESSURE: 82 MMHG | HEIGHT: 65 IN | HEART RATE: 81 BPM | WEIGHT: 188.5 LBS

## 2022-10-05 DIAGNOSIS — Z98.890 HISTORY OF CARDIAC RADIOFREQUENCY ABLATION (RFA): ICD-10-CM

## 2022-10-05 DIAGNOSIS — I10 ESSENTIAL HYPERTENSION: ICD-10-CM

## 2022-10-05 DIAGNOSIS — I73.9 PVD (PERIPHERAL VASCULAR DISEASE): ICD-10-CM

## 2022-10-05 DIAGNOSIS — I48.0 PAROXYSMAL ATRIAL FIBRILLATION: Primary | ICD-10-CM

## 2022-10-05 DIAGNOSIS — I10 PRIMARY HYPERTENSION: ICD-10-CM

## 2022-10-05 DIAGNOSIS — E78.2 MIXED HYPERLIPIDEMIA: ICD-10-CM

## 2022-10-05 PROCEDURE — 99214 PR OFFICE/OUTPT VISIT, EST, LEVL IV, 30-39 MIN: ICD-10-PCS | Mod: S$PBB,,, | Performed by: INTERNAL MEDICINE

## 2022-10-05 PROCEDURE — 99214 OFFICE O/P EST MOD 30 MIN: CPT | Mod: S$PBB,,, | Performed by: INTERNAL MEDICINE

## 2022-10-05 PROCEDURE — 99999 PR PBB SHADOW E&M-EST. PATIENT-LVL IV: ICD-10-PCS | Mod: PBBFAC,,, | Performed by: INTERNAL MEDICINE

## 2022-10-05 PROCEDURE — 99999 PR PBB SHADOW E&M-EST. PATIENT-LVL IV: CPT | Mod: PBBFAC,,, | Performed by: INTERNAL MEDICINE

## 2022-10-05 PROCEDURE — 93010 EKG 12-LEAD: ICD-10-PCS | Mod: S$PBB,,, | Performed by: STUDENT IN AN ORGANIZED HEALTH CARE EDUCATION/TRAINING PROGRAM

## 2022-10-05 PROCEDURE — 99214 OFFICE O/P EST MOD 30 MIN: CPT | Mod: PBBFAC,PO | Performed by: INTERNAL MEDICINE

## 2022-10-05 PROCEDURE — 93010 ELECTROCARDIOGRAM REPORT: CPT | Mod: S$PBB,,, | Performed by: STUDENT IN AN ORGANIZED HEALTH CARE EDUCATION/TRAINING PROGRAM

## 2022-10-05 PROCEDURE — 93005 ELECTROCARDIOGRAM TRACING: CPT | Mod: PBBFAC,PO | Performed by: STUDENT IN AN ORGANIZED HEALTH CARE EDUCATION/TRAINING PROGRAM

## 2022-10-05 NOTE — PROGRESS NOTES
Subjective:   Patient ID:  Maria Del Carmen Spence is a 81 y.o. female who presents for follow up of Atrial Fibrillation      82 yo female, routine 12 months f/u   PMH PAF/AFL, s/p PVI cryoballon and CVI in  by Dr. Morillo, HTN, mild anemia, glaucoma, s/p knee surgery. Can not climb the stairs due to knee pain.   Echo in  normal EF  Repeat EKG in  sinus  EKG NSR low ARS voltage  Continue on Xeralto, BB and Lipitor  No chest pain, palpitation, dizziness and faint. Occasionally could not lie on left side due to chest discomfort  No fall and active bleeding  Shoulder, knee and hip joints pain. On tylenol 500 mg 3 times a day. Most activity limited by lower back pain.  Occasional Leg swelling  Did nerve block for the pain control  Pt states that her BP controlled at home  Lives in a big house and does a lot of house keeping work.     Interval history  Today ekg NSR and BP high,  Pt states that her BP controlled at home. No active bleeding. Chronic joint pain. Four tylenol 500 mg daily for pain.  No falls. Does house work          Past Medical History:   Diagnosis Date    A-fib     Arthritis     Chronic LBP     ESIs x 3 with Dr. Hicks, PT at [facility], chiropractor    Eye pain     Frequent headaches     GERD (gastroesophageal reflux disease)     Glaucoma     Hyperlipemia     Hypertension        Past Surgical History:   Procedure Laterality Date    ABLATION OF ARRHYTHMOGENIC FOCUS FOR ATRIAL FIBRILLATION N/A 3/6/2019    Procedure: ABLATION, ARRHYTHMOGENIC FOCUS, FOR ATRIAL FIBRILLATION;  Surgeon: Abel Morillo MD;  Location: [facility] EP LAB;  Service: Cardiology;  Laterality: N/A;    CARDIOVERSION N/A 7/9/2018    Procedure: CARDIOVERSION;  Surgeon: Will Rosenthal MD;  Location: [facility] CATH LAB;  Service: Cardiology;  Laterality: N/A;    CATARACT EXTRACTION W/  INTRAOCULAR LENS IMPLANT  OU    INJECTION OF ANESTHETIC AGENT INTO SACROILIAC JOINT Right 11/3/2020    Procedure: right Sacroiliac Joint Injection;  Surgeon:  Ayush Christiansen MD;  Location: [facility] PAIN MGT;  Service: Pain Management;  Laterality: Right;    INJECTION OF ANESTHETIC AGENT INTO SACROILIAC JOINT Right 3/23/2021    Procedure: right Sacroiliac Joint Injection;  Surgeon: Ayush Christiansen MD;  Location: [facility] PAIN MGT;  Service: Pain Management;  Laterality: Right;    INJECTION OF JOINT Right 11/3/2020    Procedure: right GT bursa injection;  Surgeon: Ayush Christiansen MD;  Location: [facility] PAIN MGT;  Service: Pain Management;  Laterality: Right;    INJECTION OF JOINT Bilateral 3/23/2021    Procedure: bilateral GT bursa injection;  Surgeon: Ayush Christiansen MD;  Location: [facility] PAIN MGT;  Service: Pain Management;  Laterality: Bilateral;    TUBAL LIGATION         Social History     Tobacco Use    Smoking status: Never    Smokeless tobacco: Never   Substance Use Topics    Alcohol use: No    Drug use: No       Family History   Problem Relation Age of Onset    Glaucoma Mother     Cancer Mother     Cataracts Mother     Hypertension Mother     Hypertension Father     Diabetes Paternal Aunt     Strabismus Neg Hx     Retinal detachment Neg Hx     Macular degeneration Neg Hx     Blindness Neg Hx     Amblyopia Neg Hx     Stroke Neg Hx     Thyroid disease Neg Hx          Review of Systems   Constitutional: Negative for decreased appetite, diaphoresis, fever, malaise/fatigue and night sweats.   HENT:  Negative for nosebleeds.    Eyes:  Negative for blurred vision and double vision.   Cardiovascular:  Negative for chest pain, claudication, irregular heartbeat, leg swelling, near-syncope, orthopnea, palpitations, paroxysmal nocturnal dyspnea and syncope.   Respiratory:  Negative for cough, shortness of breath, sleep disturbances due to breathing, snoring, sputum production and wheezing.    Endocrine: Negative for cold intolerance and polyuria.   Hematologic/Lymphatic: Does not bruise/bleed easily.   Skin:  Negative for rash.   Musculoskeletal:  Positive for arthritis, back pain,  joint pain and joint swelling. Negative for falls and neck pain.   Gastrointestinal:  Negative for abdominal pain, heartburn, nausea and vomiting.   Genitourinary:  Negative for dysuria, frequency and hematuria.   Neurological:  Negative for difficulty with concentration, focal weakness, headaches, light-headedness, numbness, seizures and weakness.   Psychiatric/Behavioral:  Negative for depression, memory loss and substance abuse. The patient does not have insomnia.    Allergic/Immunologic: Negative for HIV exposure and hives.     Objective:   Physical Exam  HENT:      Head: Normocephalic.   Eyes:      Pupils: Pupils are equal, round, and reactive to light.   Neck:      Thyroid: No thyromegaly.      Vascular: Normal carotid pulses. No carotid bruit or JVD.   Cardiovascular:      Rate and Rhythm: Normal rate and regular rhythm. No extrasystoles are present.     Chest Wall: PMI is not displaced.      Pulses: Normal pulses.      Heart sounds: Normal heart sounds. No murmur heard.    No gallop. No S3 sounds.   Pulmonary:      Effort: No respiratory distress.      Breath sounds: Normal breath sounds. No stridor.   Abdominal:      General: Bowel sounds are normal.      Palpations: Abdomen is soft.      Tenderness: There is no abdominal tenderness. There is no rebound.   Musculoskeletal:         General: Swelling (trace edema) present. Normal range of motion.   Skin:     Findings: No rash.   Neurological:      Mental Status: She is alert and oriented to person, place, and time.   Psychiatric:         Behavior: Behavior normal.       Lab Results   Component Value Date    CHOL 165 02/07/2022    CHOL 161 02/04/2021    CHOL 158 01/13/2020     Lab Results   Component Value Date    HDL 72 02/07/2022    HDL 66 02/04/2021    HDL 65 01/13/2020     Lab Results   Component Value Date    LDLCALC 74.8 02/07/2022    LDLCALC 71.6 02/04/2021    LDLCALC 73.6 01/13/2020     Lab Results   Component Value Date    TRIG 91 02/07/2022    TRIG  117 02/04/2021    TRIG 97 01/13/2020     Lab Results   Component Value Date    CHOLHDL 43.6 02/07/2022    CHOLHDL 41.0 02/04/2021    CHOLHDL 41.1 01/13/2020       Chemistry        Component Value Date/Time     02/04/2021 1450    K 4.4 02/04/2021 1450     02/04/2021 1450    CO2 25 02/04/2021 1450    BUN 19 02/04/2021 1450    CREATININE 0.8 02/04/2021 1450    GLU 75 02/04/2021 1450        Component Value Date/Time    CALCIUM 9.5 02/04/2021 1450    ALKPHOS 73 02/04/2021 1450    AST 24 02/04/2021 1450    ALT 11 02/04/2021 1450    BILITOT 0.3 02/04/2021 1450    ESTGFRAFRICA >60 02/04/2021 1450    EGFRNONAA >60 02/04/2021 1450          No results found for: LABA1C, HGBA1C  Lab Results   Component Value Date    TSH 2.823 02/07/2022     Lab Results   Component Value Date    INR 1.1 02/27/2019    INR 1.5 (H) 07/23/2018     Lab Results   Component Value Date    WBC 7.96 01/25/2022    HGB 12.3 01/25/2022    HCT 37.8 01/25/2022    MCV 98 01/25/2022     01/25/2022     BMP  Sodium   Date Value Ref Range Status   02/04/2021 141 136 - 145 mmol/L Final     Potassium   Date Value Ref Range Status   02/04/2021 4.4 3.5 - 5.1 mmol/L Final     Chloride   Date Value Ref Range Status   02/04/2021 103 95 - 110 mmol/L Final     CO2   Date Value Ref Range Status   02/04/2021 25 23 - 29 mmol/L Final     BUN   Date Value Ref Range Status   02/04/2021 19 8 - 23 mg/dL Final     Creatinine   Date Value Ref Range Status   02/04/2021 0.8 0.5 - 1.4 mg/dL Final     Calcium   Date Value Ref Range Status   02/04/2021 9.5 8.7 - 10.5 mg/dL Final     Anion Gap   Date Value Ref Range Status   02/04/2021 13 8 - 16 mmol/L Final     eGFR if    Date Value Ref Range Status   02/04/2021 >60 >60 mL/min/1.73 m^2 Final     eGFR if non    Date Value Ref Range Status   02/04/2021 >60 >60 mL/min/1.73 m^2 Final     Comment:     Calculation used to obtain the estimated glomerular filtration  rate (eGFR) is the CKD-EPI  equation.        BNP  @LABRCNTIP(BNP,BNPTRIAGEBLO)@  @LABRCNTIP(troponini)@  CrCl cannot be calculated (Patient's most recent lab result is older than the maximum 7 days allowed.).  No results found in the last 24 hours.  No results found in the last 24 hours.  No results found in the last 24 hours.    Assessment:      1. Paroxysmal atrial fibrillation    2. Primary hypertension    3. Mixed hyperlipidemia    4. History of cardiac radiofrequency ablation (RFA)    5. PVD (peripheral vascular disease)        Plan:   Continue Xarelto 20 mg daily metoprolol lipitor and lasix as needed    Counseled DASH  Check Lipid profile in 6 months  Recommend heart-healthy diet, weight control and regular exercise.  Heather. Risk modification.   I have reviewed all pertinent labs and cardiac studies independently. Plans and recommendations have been formulated under my direct supervision. All questions answered and patient voiced understanding.   If symptoms persist go to the ED  RTC in 12 months

## 2022-11-29 ENCOUNTER — OFFICE VISIT (OUTPATIENT)
Dept: OPHTHALMOLOGY | Facility: CLINIC | Age: 82
End: 2022-11-29
Payer: MEDICARE

## 2022-11-29 DIAGNOSIS — H40.1131 PRIMARY OPEN ANGLE GLAUCOMA OF BOTH EYES, MILD STAGE: Primary | ICD-10-CM

## 2022-11-29 DIAGNOSIS — Z96.1 PSEUDOPHAKIA: ICD-10-CM

## 2022-11-29 PROCEDURE — 99999 PR PBB SHADOW E&M-EST. PATIENT-LVL III: ICD-10-PCS | Mod: PBBFAC,,, | Performed by: OPHTHALMOLOGY

## 2022-11-29 PROCEDURE — 99999 PR PBB SHADOW E&M-EST. PATIENT-LVL III: CPT | Mod: PBBFAC,,, | Performed by: OPHTHALMOLOGY

## 2022-11-29 PROCEDURE — 92083 HUMPHREY VISUAL FIELD - OU - BOTH EYES: ICD-10-PCS | Mod: 26,S$PBB,, | Performed by: OPHTHALMOLOGY

## 2022-11-29 PROCEDURE — 99213 OFFICE O/P EST LOW 20 MIN: CPT | Mod: PBBFAC | Performed by: OPHTHALMOLOGY

## 2022-11-29 PROCEDURE — 92014 COMPRE OPH EXAM EST PT 1/>: CPT | Mod: S$PBB,,, | Performed by: OPHTHALMOLOGY

## 2022-11-29 PROCEDURE — 92083 EXTENDED VISUAL FIELD XM: CPT | Mod: PBBFAC | Performed by: OPHTHALMOLOGY

## 2022-11-29 PROCEDURE — 92014 PR EYE EXAM, EST PATIENT,COMPREHESV: ICD-10-PCS | Mod: S$PBB,,, | Performed by: OPHTHALMOLOGY

## 2022-11-29 NOTE — PROGRESS NOTES
SUBJECTIVE  Maria Del Carmen Spence is 82 y.o. female  Corrected distance visual acuity was 20/25 -2 in the right eye and 20/20 -2 in the left eye.   Chief Complaint   Patient presents with    Glaucoma     4m HVF and dil. Denies any pain or irritation. Va stable. 100% compliant with gtts. Pt states eyes dry / itchy at times due to allergies.           HPI     Glaucoma     Additional comments: 4m HVF and dil. Denies any pain or irritation. Va   stable. 100% compliant with gtts. Pt states eyes dry / itchy at times due   to allergies.            Comments    1. Mild COAG OD>OS (init 24/20) Goal <17   Rhopressa (irritated but exc IOP 11/11)   Dorzolamide OU BID (Not taking, patient States This Eye Drop Makes Her Eye   Burn & Red)   2. PCIOL OU (Sulcus OD) (partial dislocation Od)   3. Dry Eyes       Latanoprost QHS OU   Timolol QAM OU            Last edited by Abel Machuca on 11/29/2022  9:38 AM.         Assessment /Plan :  1. Primary open angle glaucoma of both eyes, mild stage Doing well, intraocular pressure (IOP) within acceptable range relative to target IOP and no evidence of progression. Continue current treatment. Reviewed importance of continued compliance with treatment and follow up.      Patient instructed to continue using the following glaucoma medication as follows:  Latanoprost one drop in each eye nightly and Timolol one drop both eyes daily    Return to clinic in 4 months  or as needed.  With IOP Check and GOCT     2. Pseudophakia - monitor for now

## 2023-01-19 ENCOUNTER — PES CALL (OUTPATIENT)
Dept: ADMINISTRATIVE | Facility: CLINIC | Age: 83
End: 2023-01-19
Payer: MEDICARE

## 2023-02-08 ENCOUNTER — LAB VISIT (OUTPATIENT)
Dept: LAB | Facility: HOSPITAL | Age: 83
End: 2023-02-08
Attending: FAMILY MEDICINE
Payer: MEDICARE

## 2023-02-08 ENCOUNTER — OFFICE VISIT (OUTPATIENT)
Dept: INTERNAL MEDICINE | Facility: CLINIC | Age: 83
End: 2023-02-08
Payer: MEDICARE

## 2023-02-08 VITALS
DIASTOLIC BLOOD PRESSURE: 84 MMHG | HEIGHT: 65 IN | OXYGEN SATURATION: 97 % | WEIGHT: 188.94 LBS | BODY MASS INDEX: 31.48 KG/M2 | HEART RATE: 62 BPM | TEMPERATURE: 98 F | SYSTOLIC BLOOD PRESSURE: 138 MMHG

## 2023-02-08 DIAGNOSIS — E78.2 MIXED HYPERLIPIDEMIA: ICD-10-CM

## 2023-02-08 DIAGNOSIS — I10 PRIMARY HYPERTENSION: Primary | ICD-10-CM

## 2023-02-08 DIAGNOSIS — M46.1 SACROILIITIS: ICD-10-CM

## 2023-02-08 DIAGNOSIS — M17.11 ARTHRITIS OF KNEE, RIGHT: ICD-10-CM

## 2023-02-08 DIAGNOSIS — I48.0 PAROXYSMAL ATRIAL FIBRILLATION: ICD-10-CM

## 2023-02-08 DIAGNOSIS — I10 PRIMARY HYPERTENSION: ICD-10-CM

## 2023-02-08 DIAGNOSIS — I73.9 PVD (PERIPHERAL VASCULAR DISEASE): ICD-10-CM

## 2023-02-08 LAB
ALBUMIN SERPL BCP-MCNC: 4.2 G/DL (ref 3.5–5.2)
ALBUMIN SERPL BCP-MCNC: 4.2 G/DL (ref 3.5–5.2)
ALP SERPL-CCNC: 90 U/L (ref 55–135)
ALP SERPL-CCNC: 90 U/L (ref 55–135)
ALT SERPL W/O P-5'-P-CCNC: 14 U/L (ref 10–44)
ALT SERPL W/O P-5'-P-CCNC: 14 U/L (ref 10–44)
ANION GAP SERPL CALC-SCNC: 11 MMOL/L (ref 8–16)
ANION GAP SERPL CALC-SCNC: 11 MMOL/L (ref 8–16)
AST SERPL-CCNC: 26 U/L (ref 10–40)
AST SERPL-CCNC: 26 U/L (ref 10–40)
BASOPHILS # BLD AUTO: 0.03 K/UL (ref 0–0.2)
BASOPHILS NFR BLD: 0.4 % (ref 0–1.9)
BILIRUB SERPL-MCNC: 0.5 MG/DL (ref 0.1–1)
BILIRUB SERPL-MCNC: 0.5 MG/DL (ref 0.1–1)
BUN SERPL-MCNC: 15 MG/DL (ref 8–23)
BUN SERPL-MCNC: 15 MG/DL (ref 8–23)
CALCIUM SERPL-MCNC: 10.5 MG/DL (ref 8.7–10.5)
CALCIUM SERPL-MCNC: 10.5 MG/DL (ref 8.7–10.5)
CHLORIDE SERPL-SCNC: 104 MMOL/L (ref 95–110)
CHLORIDE SERPL-SCNC: 104 MMOL/L (ref 95–110)
CHOLEST SERPL-MCNC: 159 MG/DL (ref 120–199)
CHOLEST/HDLC SERPL: 2.4 {RATIO} (ref 2–5)
CO2 SERPL-SCNC: 23 MMOL/L (ref 23–29)
CO2 SERPL-SCNC: 23 MMOL/L (ref 23–29)
CREAT SERPL-MCNC: 0.9 MG/DL (ref 0.5–1.4)
CREAT SERPL-MCNC: 0.9 MG/DL (ref 0.5–1.4)
DIFFERENTIAL METHOD: ABNORMAL
EOSINOPHIL # BLD AUTO: 0.1 K/UL (ref 0–0.5)
EOSINOPHIL NFR BLD: 2.1 % (ref 0–8)
ERYTHROCYTE [DISTWIDTH] IN BLOOD BY AUTOMATED COUNT: 13.3 % (ref 11.5–14.5)
EST. GFR  (NO RACE VARIABLE): >60 ML/MIN/1.73 M^2
EST. GFR  (NO RACE VARIABLE): >60 ML/MIN/1.73 M^2
GLUCOSE SERPL-MCNC: 79 MG/DL (ref 70–110)
GLUCOSE SERPL-MCNC: 79 MG/DL (ref 70–110)
HCT VFR BLD AUTO: 39.9 % (ref 37–48.5)
HDLC SERPL-MCNC: 65 MG/DL (ref 40–75)
HDLC SERPL: 40.9 % (ref 20–50)
HGB BLD-MCNC: 12.4 G/DL (ref 12–16)
IMM GRANULOCYTES # BLD AUTO: 0.02 K/UL (ref 0–0.04)
IMM GRANULOCYTES NFR BLD AUTO: 0.3 % (ref 0–0.5)
LDLC SERPL CALC-MCNC: 77.6 MG/DL (ref 63–159)
LYMPHOCYTES # BLD AUTO: 2.6 K/UL (ref 1–4.8)
LYMPHOCYTES NFR BLD: 38.2 % (ref 18–48)
MCH RBC QN AUTO: 30.5 PG (ref 27–31)
MCHC RBC AUTO-ENTMCNC: 31.1 G/DL (ref 32–36)
MCV RBC AUTO: 98 FL (ref 82–98)
MONOCYTES # BLD AUTO: 0.9 K/UL (ref 0.3–1)
MONOCYTES NFR BLD: 12.8 % (ref 4–15)
NEUTROPHILS # BLD AUTO: 3.2 K/UL (ref 1.8–7.7)
NEUTROPHILS NFR BLD: 46.2 % (ref 38–73)
NONHDLC SERPL-MCNC: 94 MG/DL
NRBC BLD-RTO: 0 /100 WBC
PLATELET # BLD AUTO: 345 K/UL (ref 150–450)
PMV BLD AUTO: 11.4 FL (ref 9.2–12.9)
POTASSIUM SERPL-SCNC: 4.6 MMOL/L (ref 3.5–5.1)
POTASSIUM SERPL-SCNC: 4.6 MMOL/L (ref 3.5–5.1)
PROT SERPL-MCNC: 7.6 G/DL (ref 6–8.4)
PROT SERPL-MCNC: 7.6 G/DL (ref 6–8.4)
RBC # BLD AUTO: 4.07 M/UL (ref 4–5.4)
SODIUM SERPL-SCNC: 138 MMOL/L (ref 136–145)
SODIUM SERPL-SCNC: 138 MMOL/L (ref 136–145)
TRIGL SERPL-MCNC: 82 MG/DL (ref 30–150)
TSH SERPL DL<=0.005 MIU/L-ACNC: 3 UIU/ML (ref 0.4–4)
WBC # BLD AUTO: 6.81 K/UL (ref 3.9–12.7)

## 2023-02-08 PROCEDURE — 99999 PR PBB SHADOW E&M-EST. PATIENT-LVL III: ICD-10-PCS | Mod: PBBFAC,,, | Performed by: FAMILY MEDICINE

## 2023-02-08 PROCEDURE — 99214 OFFICE O/P EST MOD 30 MIN: CPT | Mod: S$PBB,,, | Performed by: FAMILY MEDICINE

## 2023-02-08 PROCEDURE — 80061 LIPID PANEL: CPT | Performed by: FAMILY MEDICINE

## 2023-02-08 PROCEDURE — 80053 COMPREHEN METABOLIC PANEL: CPT | Performed by: FAMILY MEDICINE

## 2023-02-08 PROCEDURE — 36415 COLL VENOUS BLD VENIPUNCTURE: CPT | Performed by: FAMILY MEDICINE

## 2023-02-08 PROCEDURE — 99213 OFFICE O/P EST LOW 20 MIN: CPT | Mod: PBBFAC | Performed by: FAMILY MEDICINE

## 2023-02-08 PROCEDURE — 85025 COMPLETE CBC W/AUTO DIFF WBC: CPT | Performed by: FAMILY MEDICINE

## 2023-02-08 PROCEDURE — 84443 ASSAY THYROID STIM HORMONE: CPT | Performed by: FAMILY MEDICINE

## 2023-02-08 PROCEDURE — 99214 PR OFFICE/OUTPT VISIT, EST, LEVL IV, 30-39 MIN: ICD-10-PCS | Mod: S$PBB,,, | Performed by: FAMILY MEDICINE

## 2023-02-08 PROCEDURE — 99999 PR PBB SHADOW E&M-EST. PATIENT-LVL III: CPT | Mod: PBBFAC,,, | Performed by: FAMILY MEDICINE

## 2023-02-08 NOTE — PROGRESS NOTES
Subjective:       Patient ID: Maria Del Carmen Spence is a 82 y.o. female.    Chief Complaint: Follow-up    Six-month follow-up.  History of hypertension hyperlipidemia paroxysmal atrial fibrillation sacroiliitis osteoarthritis peripheral vascular disease esophageal reflux.  She denies headache chest pain palpitations shortness of breath or edema.  Reflux is controlled with Protonix.  She is currently using Tylenol 4-6 a day for back and knee pain.  She is also using alleve .  Her medications include Eliquis.  Suggest discontinuing Alleve and continue Tylenol.    Follow-up  Associated symptoms include arthralgias. Pertinent negatives include no abdominal pain, chest pain, coughing, fatigue, headaches, nausea or weakness.   Review of Systems   Constitutional:  Negative for appetite change, fatigue and unexpected weight change.   Respiratory:  Negative for cough, chest tightness, shortness of breath and wheezing.    Cardiovascular:  Negative for chest pain, palpitations and leg swelling.   Gastrointestinal:  Negative for abdominal distention, abdominal pain, blood in stool, diarrhea and nausea.   Genitourinary:  Negative for difficulty urinating.   Musculoskeletal:  Positive for arthralgias and back pain.   Neurological:  Negative for dizziness, weakness, light-headedness and headaches.     Objective:      Physical Exam  Constitutional:       General: She is not in acute distress.     Appearance: She is not ill-appearing or diaphoretic.   Cardiovascular:      Rate and Rhythm: Normal rate and regular rhythm.      Heart sounds: No murmur heard.    No gallop.   Pulmonary:      Effort: Pulmonary effort is normal. No respiratory distress.      Breath sounds: No wheezing, rhonchi or rales.   Abdominal:      General: There is no distension.      Palpations: Abdomen is soft. There is no mass.      Tenderness: There is no abdominal tenderness.   Lymphadenopathy:      Cervical: No cervical adenopathy.   Skin:     General: Skin is warm  and dry.      Coloration: Skin is not pale.      Findings: No bruising or erythema.   Neurological:      Mental Status: She is alert and oriented to person, place, and time.       Lab Visit on 02/07/2022   Component Date Value Ref Range Status    Cholesterol 02/07/2022 165  120 - 199 mg/dL Final    Triglycerides 02/07/2022 91  30 - 150 mg/dL Final    HDL 02/07/2022 72  40 - 75 mg/dL Final    LDL Cholesterol 02/07/2022 74.8  63.0 - 159.0 mg/dL Final    HDL/Cholesterol Ratio 02/07/2022 43.6  20.0 - 50.0 % Final    Total Cholesterol/HDL Ratio 02/07/2022 2.3  2.0 - 5.0 Final    Non-HDL Cholesterol 02/07/2022 93  mg/dL Final    TSH 02/07/2022 2.823  0.400 - 4.000 uIU/mL Final     Assessment:       1. Primary hypertension    2. Sacroiliitis    3. Paroxysmal atrial fibrillation    4. PVD (peripheral vascular disease)    5. Mixed hyperlipidemia    6. Arthritis of knee, right          Plan:   Blood pressure controlled medications reviewed discontinue alleve  lab was ordered follow-up 6 months      Primary hypertension  -     CBC Auto Differential; Future; Expected date: 02/08/2023  -     Comprehensive Metabolic Panel; Future; Expected date: 02/08/2023  -     TSH; Future; Expected date: 02/08/2023    Sacroiliitis    Paroxysmal atrial fibrillation    PVD (peripheral vascular disease)    Mixed hyperlipidemia  -     Lipid Panel; Future; Expected date: 02/08/2023    Arthritis of knee, right

## 2023-02-13 ENCOUNTER — TELEPHONE (OUTPATIENT)
Dept: INTERNAL MEDICINE | Facility: CLINIC | Age: 83
End: 2023-02-13
Payer: MEDICARE

## 2023-02-13 NOTE — TELEPHONE ENCOUNTER
----- Message from Rajinder Lutz MD sent at 2/10/2023  4:00 PM CST -----  Lab wnl   ----- Message -----  From: [facility]  Sent: 2/8/2023  11:05 PM CST  To: Rajinder Lutz MD

## 2023-03-28 ENCOUNTER — OFFICE VISIT (OUTPATIENT)
Dept: OPHTHALMOLOGY | Facility: CLINIC | Age: 83
End: 2023-03-28
Payer: MEDICARE

## 2023-03-28 DIAGNOSIS — H40.1131 PRIMARY OPEN ANGLE GLAUCOMA OF BOTH EYES, MILD STAGE: Primary | ICD-10-CM

## 2023-03-28 DIAGNOSIS — Z96.1 PSEUDOPHAKIA: ICD-10-CM

## 2023-03-28 PROCEDURE — 99999 PR PBB SHADOW E&M-EST. PATIENT-LVL II: CPT | Mod: PBBFAC,,, | Performed by: OPHTHALMOLOGY

## 2023-03-28 PROCEDURE — 92133 POSTERIOR SEGMENT OCT OPTIC NERVE(OCULAR COHERENCE TOMOGRAPHY) - OU - BOTH EYES: ICD-10-PCS | Mod: 26,S$PBB,, | Performed by: OPHTHALMOLOGY

## 2023-03-28 PROCEDURE — 99214 OFFICE O/P EST MOD 30 MIN: CPT | Mod: S$PBB,,, | Performed by: OPHTHALMOLOGY

## 2023-03-28 PROCEDURE — 99212 OFFICE O/P EST SF 10 MIN: CPT | Mod: PBBFAC | Performed by: OPHTHALMOLOGY

## 2023-03-28 PROCEDURE — 99999 PR PBB SHADOW E&M-EST. PATIENT-LVL II: ICD-10-PCS | Mod: PBBFAC,,, | Performed by: OPHTHALMOLOGY

## 2023-03-28 PROCEDURE — 99214 PR OFFICE/OUTPT VISIT, EST, LEVL IV, 30-39 MIN: ICD-10-PCS | Mod: S$PBB,,, | Performed by: OPHTHALMOLOGY

## 2023-03-28 PROCEDURE — 92133 CPTRZD OPH DX IMG PST SGM ON: CPT | Mod: PBBFAC | Performed by: OPHTHALMOLOGY

## 2023-03-28 NOTE — PROGRESS NOTES
SUBJECTIVE  Maria Del Carmen Spence is 82 y.o. female  Corrected distance visual acuity was 20/20 -1 in the right eye and 20/20 in the left eye.   Chief Complaint   Patient presents with    Glaucoma     IOP check and GOCT.  States has eye allergies, zyrtec helps.          HPI     Glaucoma     Additional comments: IOP check and GOCT.  States has eye allergies,   zyrtec helps.           Comments    Here for IOP check and GOCT.  1. Mild COAG OD>OS (init 24/20) Goal <17   Rhopressa (irritated but exc IOP 11/11)   Dorzolamide OU BID (Not taking, patient States This Eye Drop Makes Her Eye   Burn & Red)   2. PCIOL OU (Sulcus OD) (partial dislocation Od)   3. Dry Eyes       Latanoprost QHS OU   Timolol QAM OU          Last edited by Una Hdez MA on 3/28/2023  9:45 AM.         Assessment /Plan :  1. Primary open angle glaucoma of both eyes, mild stage Doing well, intraocular pressure (IOP) within acceptable range relative to target IOP and no evidence of progression. Continue current treatment. Reviewed importance of continued compliance with treatment and follow up.      Patient instructed to continue using the following glaucoma medication as follows:  Latanoprost one drop in each eye nightly and Timolol one drop both eyes daily    Return to clinic in 6 months  or as needed.  With GOCT, Dilation, and HVF 24-2     2. Pseudophakia  -- Condition stable, no therapeutic change required. Monitoring routinely.

## 2023-05-04 ENCOUNTER — TELEPHONE (OUTPATIENT)
Dept: ADMINISTRATIVE | Facility: HOSPITAL | Age: 83
End: 2023-05-04
Payer: MEDICARE

## 2023-05-22 RX ORDER — ATORVASTATIN CALCIUM 10 MG/1
TABLET, FILM COATED ORAL
Qty: 30 TABLET | Refills: 11 | Status: SHIPPED | OUTPATIENT
Start: 2023-05-22 | End: 2024-02-26 | Stop reason: SDUPTHER

## 2023-05-25 ENCOUNTER — TELEPHONE (OUTPATIENT)
Dept: ADMINISTRATIVE | Facility: HOSPITAL | Age: 83
End: 2023-05-25
Payer: MEDICARE

## 2023-06-28 RX ORDER — FUROSEMIDE 20 MG/1
TABLET ORAL
Qty: 25 TABLET | Refills: 3 | Status: SHIPPED | OUTPATIENT
Start: 2023-06-28 | End: 2023-10-16

## 2023-07-05 RX ORDER — GABAPENTIN 400 MG/1
CAPSULE ORAL
Qty: 90 CAPSULE | Refills: 2 | OUTPATIENT
Start: 2023-07-05

## 2023-07-05 NOTE — TELEPHONE ENCOUNTER
Reached out to patient to schedule appointment from message  RX refill . Left voice message on patients voice box to call back at earliest convenience to schedule apt.     Jean Hearn  Medical Assistant

## 2023-07-11 NOTE — TELEPHONE ENCOUNTER
No care due was identified.  [facility] Embedded Care Due Messages. Reference number: [id number].   7/11/2023 4:43:36 PM CDT

## 2023-07-12 RX ORDER — PANTOPRAZOLE SODIUM 40 MG/1
40 TABLET, DELAYED RELEASE ORAL DAILY
Qty: 30 TABLET | Refills: 11 | Status: SHIPPED | OUTPATIENT
Start: 2023-07-12 | End: 2023-08-24

## 2023-07-18 DIAGNOSIS — M46.1 SACROILIITIS: Primary | ICD-10-CM

## 2023-07-18 RX ORDER — GABAPENTIN 400 MG/1
400 CAPSULE ORAL 3 TIMES DAILY
Qty: 270 CAPSULE | Refills: 3 | Status: SHIPPED | OUTPATIENT
Start: 2023-07-18 | End: 2024-07-17

## 2023-08-08 ENCOUNTER — PES CALL (OUTPATIENT)
Dept: ADMINISTRATIVE | Facility: CLINIC | Age: 83
End: 2023-08-08
Payer: MEDICARE

## 2023-08-08 ENCOUNTER — TELEPHONE (OUTPATIENT)
Dept: INTERNAL MEDICINE | Facility: CLINIC | Age: 83
End: 2023-08-08
Payer: MEDICARE

## 2023-08-18 ENCOUNTER — TELEPHONE (OUTPATIENT)
Dept: ADMINISTRATIVE | Facility: HOSPITAL | Age: 83
End: 2023-08-18
Payer: MEDICARE

## 2023-08-24 ENCOUNTER — OFFICE VISIT (OUTPATIENT)
Dept: INTERNAL MEDICINE | Facility: CLINIC | Age: 83
End: 2023-08-24
Payer: MEDICARE

## 2023-08-24 VITALS
DIASTOLIC BLOOD PRESSURE: 78 MMHG | OXYGEN SATURATION: 98 % | SYSTOLIC BLOOD PRESSURE: 132 MMHG | BODY MASS INDEX: 28.61 KG/M2 | TEMPERATURE: 98 F | HEIGHT: 65 IN | WEIGHT: 171.75 LBS | HEART RATE: 77 BPM

## 2023-08-24 DIAGNOSIS — I10 PRIMARY HYPERTENSION: ICD-10-CM

## 2023-08-24 DIAGNOSIS — M85.80 OSTEOPENIA, UNSPECIFIED LOCATION: ICD-10-CM

## 2023-08-24 DIAGNOSIS — I48.0 PAF (PAROXYSMAL ATRIAL FIBRILLATION): ICD-10-CM

## 2023-08-24 DIAGNOSIS — K21.9 GASTROESOPHAGEAL REFLUX DISEASE WITHOUT ESOPHAGITIS: Primary | ICD-10-CM

## 2023-08-24 DIAGNOSIS — M54.31 SCIATICA OF RIGHT SIDE: ICD-10-CM

## 2023-08-24 DIAGNOSIS — M19.90 OSTEOARTHRITIS, UNSPECIFIED OSTEOARTHRITIS TYPE, UNSPECIFIED SITE: ICD-10-CM

## 2023-08-24 PROCEDURE — 99214 OFFICE O/P EST MOD 30 MIN: CPT | Mod: S$PBB,,, | Performed by: FAMILY MEDICINE

## 2023-08-24 PROCEDURE — 99999 PR PBB SHADOW E&M-EST. PATIENT-LVL IV: CPT | Mod: PBBFAC,,, | Performed by: FAMILY MEDICINE

## 2023-08-24 PROCEDURE — 99214 OFFICE O/P EST MOD 30 MIN: CPT | Mod: PBBFAC | Performed by: FAMILY MEDICINE

## 2023-08-24 PROCEDURE — 99999 PR PBB SHADOW E&M-EST. PATIENT-LVL IV: ICD-10-PCS | Mod: PBBFAC,,, | Performed by: FAMILY MEDICINE

## 2023-08-24 PROCEDURE — 99214 PR OFFICE/OUTPT VISIT, EST, LEVL IV, 30-39 MIN: ICD-10-PCS | Mod: S$PBB,,, | Performed by: FAMILY MEDICINE

## 2023-08-24 RX ORDER — OMEPRAZOLE 40 MG/1
40 CAPSULE, DELAYED RELEASE ORAL DAILY
Qty: 30 CAPSULE | Refills: 11 | Status: SHIPPED | OUTPATIENT
Start: 2023-08-24 | End: 2024-08-23

## 2023-08-24 NOTE — PROGRESS NOTES
Subjective:       Patient ID: Maria Del Carmen Spence is a 82 y.o. female.    Chief Complaint: Follow-up    Six-month follow-up with diagnoses of hypertension degenerative joint disease right leg sciatica associated with disc acid reflux.  She denies chest pain palpitations shortness of breath or edema she has degenerative joint disease taking Tylenol.  Gabapentin is helping with right leg sciatica.  Protonix is not helping reflux is much is before she would like to try different medication fever years ago she had DEXA scan with osteopenia.  She is not interested in repeat DEXA scan.  She reports that she not be interested in taking any medication due acid reflux and not interested Prolia injection.  Her weight last visit was 188 now 171.  She reports she is eating last but thinks because of hot weather she feels well in general     Follow-up  Associated symptoms include arthralgias. Pertinent negatives include no abdominal pain, chest pain, congestion, coughing, fatigue, headaches or weakness.     Review of Systems   Constitutional:  Positive for appetite change. Negative for activity change and fatigue.   HENT:  Negative for congestion.    Respiratory:  Negative for cough, chest tightness, shortness of breath and wheezing.    Cardiovascular:  Negative for chest pain, palpitations and leg swelling.   Gastrointestinal:  Negative for abdominal distention and abdominal pain.        Acid reflux mostly with certain foods.  Denies dysphagia or melena   Musculoskeletal:  Positive for arthralgias and back pain.   Neurological:  Negative for dizziness, weakness, light-headedness and headaches.       Objective:      Physical Exam  Constitutional:       General: She is not in acute distress.     Appearance: She is not ill-appearing or diaphoretic.   Cardiovascular:      Rate and Rhythm: Normal rate and regular rhythm.      Heart sounds: No murmur heard.     No gallop.   Pulmonary:      Effort: Pulmonary effort is normal. No  respiratory distress.      Breath sounds: No wheezing, rhonchi or rales.   Abdominal:      General: There is no distension.      Palpations: Abdomen is soft. There is no mass.   Musculoskeletal:      Comments: Using a cane to ambulate   Lymphadenopathy:      Cervical: No cervical adenopathy.   Skin:     General: Skin is warm and dry.      Coloration: Skin is not pale.      Findings: No erythema.   Neurological:      Mental Status: She is alert.         Lab Visit on 02/08/2023   Component Date Value Ref Range Status    Sodium 02/08/2023 138  136 - 145 mmol/L Final    Potassium 02/08/2023 4.6  3.5 - 5.1 mmol/L Final    Chloride 02/08/2023 104  95 - 110 mmol/L Final    CO2 02/08/2023 23  23 - 29 mmol/L Final    Glucose 02/08/2023 79  70 - 110 mg/dL Final    BUN 02/08/2023 15  8 - 23 mg/dL Final    Creatinine 02/08/2023 0.9  0.5 - 1.4 mg/dL Final    Calcium 02/08/2023 10.5  8.7 - 10.5 mg/dL Final    Total Protein 02/08/2023 7.6  6.0 - 8.4 g/dL Final    Albumin 02/08/2023 4.2  3.5 - 5.2 g/dL Final    Total Bilirubin 02/08/2023 0.5  0.1 - 1.0 mg/dL Final    Alkaline Phosphatase 02/08/2023 90  55 - 135 U/L Final    AST 02/08/2023 26  10 - 40 U/L Final    ALT 02/08/2023 14  10 - 44 U/L Final    Anion Gap 02/08/2023 11  8 - 16 mmol/L Final    eGFR 02/08/2023 >60.0  >60 mL/min/1.73 m^2 Final    WBC 02/08/2023 6.81  3.90 - 12.70 K/uL Final    RBC 02/08/2023 4.07  4.00 - 5.40 M/uL Final    Hemoglobin 02/08/2023 12.4  12.0 - 16.0 g/dL Final    Hematocrit 02/08/2023 39.9  37.0 - 48.5 % Final    MCV 02/08/2023 98  82 - 98 fL Final    MCH 02/08/2023 30.5  27.0 - 31.0 pg Final    MCHC 02/08/2023 31.1 (L)  32.0 - 36.0 g/dL Final    RDW 02/08/2023 13.3  11.5 - 14.5 % Final    Platelets 02/08/2023 345  150 - 450 K/uL Final    MPV 02/08/2023 11.4  9.2 - 12.9 fL Final    Immature Granulocytes 02/08/2023 0.3  0.0 - 0.5 % Final    Gran # (ANC) 02/08/2023 3.2  1.8 - 7.7 K/uL Final    Immature Grans (Abs) 02/08/2023 0.02  0.00 - 0.04 K/uL  Final    Lymph # 02/08/2023 2.6  1.0 - 4.8 K/uL Final    Mono # 02/08/2023 0.9  0.3 - 1.0 K/uL Final    Eos # 02/08/2023 0.1  0.0 - 0.5 K/uL Final    Baso # 02/08/2023 0.03  0.00 - 0.20 K/uL Final    nRBC 02/08/2023 0  0 /100 WBC Final    Gran % 02/08/2023 46.2  38.0 - 73.0 % Final    Lymph % 02/08/2023 38.2  18.0 - 48.0 % Final    Mono % 02/08/2023 12.8  4.0 - 15.0 % Final    Eosinophil % 02/08/2023 2.1  0.0 - 8.0 % Final    Basophil % 02/08/2023 0.4  0.0 - 1.9 % Final    Differential Method 02/08/2023 Automated   Final    Sodium 02/08/2023 138  136 - 145 mmol/L Final    Potassium 02/08/2023 4.6  3.5 - 5.1 mmol/L Final    Chloride 02/08/2023 104  95 - 110 mmol/L Final    CO2 02/08/2023 23  23 - 29 mmol/L Final    Glucose 02/08/2023 79  70 - 110 mg/dL Final    BUN 02/08/2023 15  8 - 23 mg/dL Final    Creatinine 02/08/2023 0.9  0.5 - 1.4 mg/dL Final    Calcium 02/08/2023 10.5  8.7 - 10.5 mg/dL Final    Total Protein 02/08/2023 7.6  6.0 - 8.4 g/dL Final    Albumin 02/08/2023 4.2  3.5 - 5.2 g/dL Final    Total Bilirubin 02/08/2023 0.5  0.1 - 1.0 mg/dL Final    Alkaline Phosphatase 02/08/2023 90  55 - 135 U/L Final    AST 02/08/2023 26  10 - 40 U/L Final    ALT 02/08/2023 14  10 - 44 U/L Final    Anion Gap 02/08/2023 11  8 - 16 mmol/L Final    eGFR 02/08/2023 >60.0  >60 mL/min/1.73 m^2 Final    Cholesterol 02/08/2023 159  120 - 199 mg/dL Final    Triglycerides 02/08/2023 82  30 - 150 mg/dL Final    HDL 02/08/2023 65  40 - 75 mg/dL Final    LDL Cholesterol 02/08/2023 77.6  63.0 - 159.0 mg/dL Final    HDL/Cholesterol Ratio 02/08/2023 40.9  20.0 - 50.0 % Final    Total Cholesterol/HDL Ratio 02/08/2023 2.4  2.0 - 5.0 Final    Non-HDL Cholesterol 02/08/2023 94  mg/dL Final    TSH 02/08/2023 3.002  0.400 - 4.000 uIU/mL Final     Assessment:       1. Gastroesophageal reflux disease without esophagitis    2. Primary hypertension    3. PAF (paroxysmal atrial fibrillation)    4. Osteopenia, unspecified location    5. Sciatica of  right side    6. Osteoarthritis, unspecified osteoarthritis type, unspecified site        Plan:   Blood pressure controlled up-to-date lab reviewed medications reviewed will try Prilosec as needed and discontinue Protonix.  She declined DEXA scan.  Follow-up 6 months with      Gastroesophageal reflux disease without esophagitis  -     omeprazole (PRILOSEC) 40 MG capsule; Take 1 capsule (40 mg total) by mouth once daily.  Dispense: 30 capsule; Refill: 11    Primary hypertension    PAF (paroxysmal atrial fibrillation)    Osteopenia, unspecified location    Sciatica of right side    Osteoarthritis, unspecified osteoarthritis type, unspecified site

## 2023-09-22 ENCOUNTER — HOSPITAL ENCOUNTER (EMERGENCY)
Facility: HOSPITAL | Age: 83
Discharge: HOME | End: 2023-09-22
Attending: EMERGENCY MEDICINE
Payer: MEDICARE

## 2023-09-22 ENCOUNTER — TELEPHONE (OUTPATIENT)
Dept: INTERNAL MEDICINE | Facility: CLINIC | Age: 83
End: 2023-09-22
Payer: MEDICARE

## 2023-09-22 VITALS
RESPIRATION RATE: 20 BRPM | HEART RATE: 62 BPM | TEMPERATURE: 99 F | DIASTOLIC BLOOD PRESSURE: 58 MMHG | SYSTOLIC BLOOD PRESSURE: 129 MMHG | OXYGEN SATURATION: 96 %

## 2023-09-22 DIAGNOSIS — M54.41 LOW BACK PAIN WITH RIGHT-SIDED SCIATICA, UNSPECIFIED BACK PAIN LATERALITY, UNSPECIFIED CHRONICITY: ICD-10-CM

## 2023-09-22 DIAGNOSIS — M54.31 SCIATICA OF RIGHT SIDE: Primary | ICD-10-CM

## 2023-09-22 PROCEDURE — 96372 THER/PROPH/DIAG INJ SC/IM: CPT | Performed by: NURSE PRACTITIONER

## 2023-09-22 PROCEDURE — 99284 EMERGENCY DEPT VISIT MOD MDM: CPT

## 2023-09-22 PROCEDURE — 63600175 PHARM REV CODE 636 W HCPCS: Performed by: NURSE PRACTITIONER

## 2023-09-22 PROCEDURE — 25000003 PHARM REV CODE 250: Performed by: NURSE PRACTITIONER

## 2023-09-22 RX ORDER — HYDROCODONE BITARTRATE AND ACETAMINOPHEN 5; 325 MG/1; MG/1
1 TABLET ORAL EVERY 8 HOURS PRN
Qty: 12 TABLET | Refills: 0 | Status: SHIPPED | OUTPATIENT
Start: 2023-09-22 | End: 2024-02-26

## 2023-09-22 RX ORDER — MORPHINE SULFATE 4 MG/ML
2 INJECTION, SOLUTION INTRAMUSCULAR; INTRAVENOUS
Status: DISCONTINUED | OUTPATIENT
Start: 2023-09-22 | End: 2023-09-22

## 2023-09-22 RX ORDER — ONDANSETRON 4 MG/1
4 TABLET, ORALLY DISINTEGRATING ORAL
Status: DISCONTINUED | OUTPATIENT
Start: 2023-09-22 | End: 2023-09-22

## 2023-09-22 RX ORDER — MORPHINE SULFATE 4 MG/ML
2 INJECTION, SOLUTION INTRAMUSCULAR; INTRAVENOUS
Status: COMPLETED | OUTPATIENT
Start: 2023-09-22 | End: 2023-09-22

## 2023-09-22 RX ORDER — METHOCARBAMOL 500 MG/1
500 TABLET, FILM COATED ORAL
Status: DISCONTINUED | OUTPATIENT
Start: 2023-09-22 | End: 2023-09-22

## 2023-09-22 RX ORDER — ONDANSETRON 4 MG/1
4 TABLET, ORALLY DISINTEGRATING ORAL
Status: COMPLETED | OUTPATIENT
Start: 2023-09-22 | End: 2023-09-22

## 2023-09-22 RX ADMIN — MORPHINE SULFATE 2 MG: 4 INJECTION INTRAVENOUS at 10:09

## 2023-09-22 RX ADMIN — ONDANSETRON 4 MG: 4 TABLET, ORALLY DISINTEGRATING ORAL at 10:09

## 2023-09-22 NOTE — ED PROVIDER NOTES
Encounter Date: 9/22/2023       History     Chief Complaint   Patient presents with    Back Pain     Pt reports chronic back pain that has worsened 3 days ago. Pain radiates down R. Leg. Pt states she's had to sleep in a chair because laying flat worsens the pain. Pt denies loss of bowel or bladder control     82-year-old female with a past medical history of sciatica presents to the ER for lower back pain that radiates into her right lower extremity.  Patient reports she did take some of her 's Norco which provided some relief for the patient.  Patient denies any fever, chills, chest pain, shortness of breath, back pain, abdominal pain, nausea, vomiting, calf pain, and all other concerns at this time.        Review of patient's allergies indicates:   Allergen Reactions    Voltaren [diclofenac sodium] Edema     Gel formulation caused facial rash and facial swelling    Eliquis [apixaban] Other (See Comments)     Headache, numbness in lip     Medrol [methylprednisolone] Blisters     Past Medical History:   Diagnosis Date    A-fib     Arthritis     Chronic LBP     ESIs x 3 with Dr. Hicks, PT at [facility], chiropractor    Eye pain     Frequent headaches     GERD (gastroesophageal reflux disease)     Glaucoma     Hyperlipemia     Hypertension      Past Surgical History:   Procedure Laterality Date    ABLATION OF ARRHYTHMOGENIC FOCUS FOR ATRIAL FIBRILLATION N/A 3/6/2019    Procedure: ABLATION, ARRHYTHMOGENIC FOCUS, FOR ATRIAL FIBRILLATION;  Surgeon: Abel Morillo MD;  Location: [facility] EP LAB;  Service: Cardiology;  Laterality: N/A;    CARDIOVERSION N/A 7/9/2018    Procedure: CARDIOVERSION;  Surgeon: Will Rosenthal MD;  Location: [facility] CATH LAB;  Service: Cardiology;  Laterality: N/A;    CATARACT EXTRACTION W/  INTRAOCULAR LENS IMPLANT  OU    INJECTION OF ANESTHETIC AGENT INTO SACROILIAC JOINT Right 11/3/2020    Procedure: right Sacroiliac Joint Injection;  Surgeon: Ayush Christiansen MD;  Location: [facility];  Service:  Pain Management;  Laterality: Right;    INJECTION OF ANESTHETIC AGENT INTO SACROILIAC JOINT Right 3/23/2021    Procedure: right Sacroiliac Joint Injection;  Surgeon: Ayush Christiansen MD;  Location: [facility] PAIN MGT;  Service: Pain Management;  Laterality: Right;    INJECTION OF JOINT Right 11/3/2020    Procedure: right GT bursa injection;  Surgeon: Ayush Christiansen MD;  Location: [facility] PAIN MGT;  Service: Pain Management;  Laterality: Right;    INJECTION OF JOINT Bilateral 3/23/2021    Procedure: bilateral GT bursa injection;  Surgeon: Ayush Christiansen MD;  Location: [facility] PAIN MGT;  Service: Pain Management;  Laterality: Bilateral;    TUBAL LIGATION       Family History   Problem Relation Age of Onset    Glaucoma Mother     Cancer Mother     Cataracts Mother     Hypertension Mother     Hypertension Father     Diabetes Paternal Aunt     Strabismus Neg Hx     Retinal detachment Neg Hx     Macular degeneration Neg Hx     Blindness Neg Hx     Amblyopia Neg Hx     Stroke Neg Hx     Thyroid disease Neg Hx      Social History     Tobacco Use    Smoking status: Never    Smokeless tobacco: Never   Substance Use Topics    Alcohol use: No    Drug use: No     Review of Systems   Constitutional:  Negative for fever.   HENT:  Negative for sore throat.    Respiratory:  Negative for shortness of breath.    Cardiovascular:  Negative for chest pain.   Gastrointestinal:  Negative for abdominal pain, nausea and vomiting.   Genitourinary:  Negative for dysuria.   Musculoskeletal:  Positive for back pain.   Skin:  Negative for rash.   Neurological:  Negative for weakness.   Hematological:  Does not bruise/bleed easily.       Physical Exam     Initial Vitals [09/22/23 0940]   BP Pulse Resp Temp SpO2   (!) 149/75 76 18 98.7 °F (37.1 °C) 96 %      MAP       --         Physical Exam    Nursing note and vitals reviewed.  Constitutional: She appears well-developed and well-nourished. She is not diaphoretic. No distress.   HENT:   Head:  Normocephalic and atraumatic.   Eyes: Right eye exhibits no discharge. Left eye exhibits no discharge.   Neck: Neck supple.   Normal range of motion.  Cardiovascular:  Normal rate.           Pulmonary/Chest: No respiratory distress.   Abdominal: She exhibits no distension.   Musculoskeletal:         General: Normal range of motion.      Cervical back: Normal range of motion and neck supple.     Neurological: She is alert and oriented to person, place, and time. She has normal strength.   Skin: Skin is warm and dry.   Psychiatric: She has a normal mood and affect. Her behavior is normal. Thought content normal.         ED Course   Procedures  Labs Reviewed - No data to display       Imaging Results              X-Ray Lumbar Spine Ap And Lateral (Final result)  Result time 09/22/23 11:05:07      Final result by Ryan Grey MD (09/22/23 11:05:07)                   Impression:      No adverse interval changes.  Advanced degenerative changes      Electronically signed by: Ryan Grey MD  Date:    09/22/2023  Time:    11:05               Narrative:    EXAMINATION:  XR LUMBAR SPINE AP AND LATERAL    CLINICAL HISTORY:  XR LUMBAR SPINE AP AND LATERAL    COMPARISON:  06/30/2021    FINDINGS:  Three views of the lumbar spine were obtained.    Prominent dextroscoliosis.  Advanced discogenic degenerative changes of the lumbar spine with disc space narrowing marginal spurring noted.  Appearance is unchanged from prior.  Grade 1 anterolisthesis of L4 on L5 which is unchanged.  Minimal grade 1 retrolisthesis of L2 on L3, also stable.  There is multilevel facet arthropathy and spurring of the endplates.  Stable slight wedge deformity of T12.                                       Medications   morphine injection 2 mg (2 mg Intramuscular Given 9/22/23 1005)   ondansetron disintegrating tablet 4 mg (4 mg Oral Given 9/22/23 1010)     Medical Decision Making  Amount and/or Complexity of Data Reviewed  Radiology:  ordered.    Risk  Prescription drug management.                I discussed with patient that the evaluation in the emergency department does not suggest any emergent or life threatening medical condition requiring immediate intervention beyond what was provided in the ED, and I believe patient is safe for discharge.  Regardless, an unremarkable evaluation in the ED does not preclude the development or presence of a serious of life threatening condition. As such, patient was instructed to return immediately for any worsening or change in current symptoms. I also discussed the results of my evaluation and diagnosis with patient and she concurs with the evaluation and management plan.  Detailed written and verbal instructions provided to patient and she expressed a verbal understanding of the discharge instructions and overall management plan. Reiterated the importance of medication administration and safety and advised patient to follow up with primary care provider in 3-5 days or sooner if needed.  Also advised patient to return to the ER for any complications.                  Clinical Impression:   Final diagnoses:  [M54.31] Sciatica of right side (Primary)  [M54.41] Low back pain with right-sided sciatica, unspecified back pain laterality, unspecified chronicity        ED Disposition Condition    Discharge Stable          ED Prescriptions       Medication Sig Dispense Start Date End Date Auth. Provider    HYDROcodone-acetaminophen (NORCO) 5-325 mg per tablet Take 1 tablet by mouth every 8 (eight) hours as needed for Pain. 12 tablet 9/22/2023 -- Ayush Arnett, NP          Follow-up Information       Follow up With Specialties Details Why Contact Info Additional Information    [facility] - Neurology Neurology Schedule an appointment as soon as possible for a visit   [id number] [place] [id number] [phone] Please take [address] for the Medical Office Building. Check in on the  2nd floor.    [facility] - Emergency Dept. Emergency Medicine  If symptoms worsen [id number] [facility] [id number] [phone]              Ayush Arnett NP  09/22/23 1829

## 2023-09-26 ENCOUNTER — TELEPHONE (OUTPATIENT)
Dept: PAIN MEDICINE | Facility: CLINIC | Age: 83
End: 2023-09-26
Payer: MEDICARE

## 2023-10-10 DIAGNOSIS — I48.0 PAROXYSMAL ATRIAL FIBRILLATION: Primary | ICD-10-CM

## 2023-10-10 DIAGNOSIS — I48.3 TYPICAL ATRIAL FLUTTER: ICD-10-CM

## 2023-10-11 ENCOUNTER — TELEPHONE (OUTPATIENT)
Dept: CARDIOLOGY | Facility: HOSPITAL | Age: 83
End: 2023-10-11
Payer: MEDICARE

## 2023-10-11 ENCOUNTER — OFFICE VISIT (OUTPATIENT)
Dept: CARDIOLOGY | Facility: CLINIC | Age: 83
End: 2023-10-11
Payer: MEDICARE

## 2023-10-11 ENCOUNTER — CLINICAL SUPPORT (OUTPATIENT)
Dept: CARDIOLOGY | Facility: CLINIC | Age: 83
End: 2023-10-11
Payer: MEDICARE

## 2023-10-11 VITALS
HEIGHT: 65 IN | SYSTOLIC BLOOD PRESSURE: 126 MMHG | HEART RATE: 77 BPM | BODY MASS INDEX: 28.28 KG/M2 | OXYGEN SATURATION: 97 % | DIASTOLIC BLOOD PRESSURE: 74 MMHG | WEIGHT: 169.75 LBS

## 2023-10-11 DIAGNOSIS — I10 PRIMARY HYPERTENSION: ICD-10-CM

## 2023-10-11 DIAGNOSIS — I48.3 TYPICAL ATRIAL FLUTTER: ICD-10-CM

## 2023-10-11 DIAGNOSIS — E78.2 MIXED HYPERLIPIDEMIA: ICD-10-CM

## 2023-10-11 DIAGNOSIS — Z98.890 HISTORY OF CARDIAC RADIOFREQUENCY ABLATION (RFA): ICD-10-CM

## 2023-10-11 DIAGNOSIS — I48.0 PAROXYSMAL ATRIAL FIBRILLATION: Primary | ICD-10-CM

## 2023-10-11 DIAGNOSIS — R00.1 SYMPTOMATIC BRADYCARDIA: ICD-10-CM

## 2023-10-11 DIAGNOSIS — I48.0 PAROXYSMAL ATRIAL FIBRILLATION: ICD-10-CM

## 2023-10-11 PROCEDURE — 93010 EKG 12-LEAD: ICD-10-PCS | Mod: S$PBB,,, | Performed by: INTERNAL MEDICINE

## 2023-10-11 PROCEDURE — 99214 OFFICE O/P EST MOD 30 MIN: CPT | Mod: S$PBB,,, | Performed by: INTERNAL MEDICINE

## 2023-10-11 PROCEDURE — 99999 PR PBB SHADOW E&M-EST. PATIENT-LVL IV: ICD-10-PCS | Mod: PBBFAC,,, | Performed by: INTERNAL MEDICINE

## 2023-10-11 PROCEDURE — 93010 ELECTROCARDIOGRAM REPORT: CPT | Mod: S$PBB,,, | Performed by: INTERNAL MEDICINE

## 2023-10-11 PROCEDURE — 93005 ELECTROCARDIOGRAM TRACING: CPT | Mod: PBBFAC,PO | Performed by: INTERNAL MEDICINE

## 2023-10-11 PROCEDURE — 99999 PR PBB SHADOW E&M-EST. PATIENT-LVL IV: CPT | Mod: PBBFAC,,, | Performed by: INTERNAL MEDICINE

## 2023-10-11 PROCEDURE — 99214 OFFICE O/P EST MOD 30 MIN: CPT | Mod: PBBFAC,PO | Performed by: INTERNAL MEDICINE

## 2023-10-11 PROCEDURE — 99214 PR OFFICE/OUTPT VISIT, EST, LEVL IV, 30-39 MIN: ICD-10-PCS | Mod: S$PBB,,, | Performed by: INTERNAL MEDICINE

## 2023-10-11 NOTE — PROGRESS NOTES
Subjective:   Patient ID:  Maria Del Carmen Spence is a 82 y.o. female who presents for follow up of No chief complaint on file.      83 yo female, routine 12 months f/u   PMH PAF/AFL, s/p PVI cryoballon and CVI in  by Dr. Morillo, HTN, mild anemia, DJD, glaucoma, s/p knee surgery. Can not climb the stairs due to knee pain.   Echo in  normal EF  Repeat EKG in  sinus  EKG NSR low ARS voltage  Continue on Xeralto, BB and Lipitor  No chest pain, palpitation, dizziness and faint. Occasionally could not lie on left side due to chest discomfort  No fall and active bleeding  Shoulder, knee and hip joints pain. On tylenol 500 mg 3 times a day. Most activity limited by lower back pain.  Occasional Leg swelling  Did nerve block for the pain control  Pt states that her BP controlled at home  Lives in a big house and does a lot of house keeping work.     10-22 visit  Ekg NSR and BP high,  Pt states that her BP controlled at home. No active bleeding. Chronic joint pain. Four tylenol 500 mg daily for pain.  No falls. Does house work    Interval history  Chronic joint pain and Tylenol works. No palpitation dizziness chest pain faint dyspnea orthopnea   Ekg NSR              Past Medical History:   Diagnosis Date    A-fib     Arthritis     Chronic LBP     ESIs x 3 with Dr. Hicks, PT at [facility], chiropractor    Eye pain     Frequent headaches     GERD (gastroesophageal reflux disease)     Glaucoma     Hyperlipemia     Hypertension        Past Surgical History:   Procedure Laterality Date    ABLATION OF ARRHYTHMOGENIC FOCUS FOR ATRIAL FIBRILLATION N/A 3/6/2019    Procedure: ABLATION, ARRHYTHMOGENIC FOCUS, FOR ATRIAL FIBRILLATION;  Surgeon: Abel Morillo MD;  Location: [facility] EP LAB;  Service: Cardiology;  Laterality: N/A;    CARDIOVERSION N/A 7/9/2018    Procedure: CARDIOVERSION;  Surgeon: Will Rosenthal MD;  Location: [facility] CATH LAB;  Service: Cardiology;  Laterality: N/A;    CATARACT EXTRACTION W/  INTRAOCULAR LENS IMPLANT   OU    INJECTION OF ANESTHETIC AGENT INTO SACROILIAC JOINT Right 11/3/2020    Procedure: right Sacroiliac Joint Injection;  Surgeon: Ayush Christiansen MD;  Location: [facility] PAIN MGT;  Service: Pain Management;  Laterality: Right;    INJECTION OF ANESTHETIC AGENT INTO SACROILIAC JOINT Right 3/23/2021    Procedure: right Sacroiliac Joint Injection;  Surgeon: Ayush Christiansen MD;  Location: [facility] PAIN MGT;  Service: Pain Management;  Laterality: Right;    INJECTION OF JOINT Right 11/3/2020    Procedure: right GT bursa injection;  Surgeon: Ayush Christiansen MD;  Location: [facility] PAIN MGT;  Service: Pain Management;  Laterality: Right;    INJECTION OF JOINT Bilateral 3/23/2021    Procedure: bilateral GT bursa injection;  Surgeon: Ayush Christiansen MD;  Location: [facility] PAIN MGT;  Service: Pain Management;  Laterality: Bilateral;    TUBAL LIGATION         Social History     Tobacco Use    Smoking status: Never    Smokeless tobacco: Never   Substance Use Topics    Alcohol use: No    Drug use: No       Family History   Problem Relation Age of Onset    Glaucoma Mother     Cancer Mother     Cataracts Mother     Hypertension Mother     Hypertension Father     Diabetes Paternal Aunt     Strabismus Neg Hx     Retinal detachment Neg Hx     Macular degeneration Neg Hx     Blindness Neg Hx     Amblyopia Neg Hx     Stroke Neg Hx     Thyroid disease Neg Hx          ROS    Objective:   Physical Exam  HENT:      Head: Normocephalic.   Eyes:      Pupils: Pupils are equal, round, and reactive to light.   Neck:      Thyroid: No thyromegaly.      Vascular: Normal carotid pulses. No carotid bruit or JVD.   Cardiovascular:      Rate and Rhythm: Normal rate and regular rhythm. No extrasystoles are present.     Chest Wall: PMI is not displaced.      Pulses: Normal pulses.      Heart sounds: Normal heart sounds. No murmur heard.     No gallop. No S3 sounds.   Pulmonary:      Effort: No respiratory distress.      Breath sounds: Normal breath sounds. No  "stridor.   Abdominal:      General: Bowel sounds are normal.      Palpations: Abdomen is soft.      Tenderness: There is no abdominal tenderness. There is no rebound.   Musculoskeletal:         General: Swelling (trace edema) present. Normal range of motion.   Skin:     Findings: No rash.   Neurological:      Mental Status: She is alert and oriented to person, place, and time.   Psychiatric:         Behavior: Behavior normal.         Lab Results   Component Value Date    CHOL 159 02/08/2023    CHOL 165 02/07/2022    CHOL 161 02/04/2021     Lab Results   Component Value Date    HDL 65 02/08/2023    HDL 72 02/07/2022    HDL 66 02/04/2021     Lab Results   Component Value Date    LDLCALC 77.6 02/08/2023    LDLCALC 74.8 02/07/2022    LDLCALC 71.6 02/04/2021     Lab Results   Component Value Date    TRIG 82 02/08/2023    TRIG 91 02/07/2022    TRIG 117 02/04/2021     Lab Results   Component Value Date    CHOLHDL 40.9 02/08/2023    CHOLHDL 43.6 02/07/2022    CHOLHDL 41.0 02/04/2021       Chemistry        Component Value Date/Time     02/08/2023 1039     02/08/2023 1039    K 4.6 02/08/2023 1039    K 4.6 02/08/2023 1039     02/08/2023 1039     02/08/2023 1039    CO2 23 02/08/2023 1039    CO2 23 02/08/2023 1039    BUN 15 02/08/2023 1039    BUN 15 02/08/2023 1039    CREATININE 0.9 02/08/2023 1039    CREATININE 0.9 02/08/2023 1039    GLU 79 02/08/2023 1039    GLU 79 02/08/2023 1039        Component Value Date/Time    CALCIUM 10.5 02/08/2023 1039    CALCIUM 10.5 02/08/2023 1039    ALKPHOS 90 02/08/2023 1039    ALKPHOS 90 02/08/2023 1039    AST 26 02/08/2023 1039    AST 26 02/08/2023 1039    ALT 14 02/08/2023 1039    ALT 14 02/08/2023 1039    BILITOT 0.5 02/08/2023 1039    BILITOT 0.5 02/08/2023 1039    ESTGFRAFRICA >60 02/04/2021 1450    EGFRNONAA >60 02/04/2021 1450          No results found for: "LABA1C", "HGBA1C"  Lab Results   Component Value Date    TSH 3.002 02/08/2023     Lab Results   Component " Value Date    INR 1.1 02/27/2019    INR 1.5 (H) 07/23/2018     Lab Results   Component Value Date    WBC 6.81 02/08/2023    HGB 12.4 02/08/2023    HCT 39.9 02/08/2023    MCV 98 02/08/2023     02/08/2023     BMP  Sodium   Date Value Ref Range Status   02/08/2023 138 136 - 145 mmol/L Final   02/08/2023 138 136 - 145 mmol/L Final     Potassium   Date Value Ref Range Status   02/08/2023 4.6 3.5 - 5.1 mmol/L Final   02/08/2023 4.6 3.5 - 5.1 mmol/L Final     Chloride   Date Value Ref Range Status   02/08/2023 104 95 - 110 mmol/L Final   02/08/2023 104 95 - 110 mmol/L Final     CO2   Date Value Ref Range Status   02/08/2023 23 23 - 29 mmol/L Final   02/08/2023 23 23 - 29 mmol/L Final     BUN   Date Value Ref Range Status   02/08/2023 15 8 - 23 mg/dL Final   02/08/2023 15 8 - 23 mg/dL Final     Creatinine   Date Value Ref Range Status   02/08/2023 0.9 0.5 - 1.4 mg/dL Final   02/08/2023 0.9 0.5 - 1.4 mg/dL Final     Calcium   Date Value Ref Range Status   02/08/2023 10.5 8.7 - 10.5 mg/dL Final   02/08/2023 10.5 8.7 - 10.5 mg/dL Final     Anion Gap   Date Value Ref Range Status   02/08/2023 11 8 - 16 mmol/L Final   02/08/2023 11 8 - 16 mmol/L Final     eGFR if    Date Value Ref Range Status   02/04/2021 >60 >60 mL/min/1.73 m^2 Final     eGFR if non    Date Value Ref Range Status   02/04/2021 >60 >60 mL/min/1.73 m^2 Final     Comment:     Calculation used to obtain the estimated glomerular filtration  rate (eGFR) is the CKD-EPI equation.        BNP  @LABRCNTIP(BNP,BNPTRIAGEBLO)@  @LABRCNTIP(troponini)@  CrCl cannot be calculated (Patient's most recent lab result is older than the maximum 7 days allowed.).  No results found in the last 24 hours.  No results found in the last 24 hours.  No results found in the last 24 hours.    Assessment:      1. Paroxysmal atrial fibrillation    2. Symptomatic bradycardia    3. Mixed hyperlipidemia    4. History of cardiac radiofrequency ablation (RFA)     5. Primary hypertension      AFIB s/p RFA now SR  AFIB [id number] score of 4    Plan:   Echo and BARYD for afib burden.   May consider to decrease dose or xeralto or stop it    Continue Xarelto 20 mg daily metoprolol lipitor and lasix as needed  Counseled DASH  Check Lipid profile with PCP in 6 months  Recommend heart-healthy diet, weight control and regular exercise.  Heather. Risk modification.   I have reviewed all pertinent labs and cardiac studies independently. Plans and recommendations have been formulated under my direct supervision. All questions answered and patient voiced understanding.   If symptoms persist go to the ED  RTC in 12 months

## 2023-10-16 RX ORDER — FUROSEMIDE 20 MG/1
20 TABLET ORAL
Qty: 25 TABLET | Refills: 0 | Status: SHIPPED | OUTPATIENT
Start: 2023-10-16 | End: 2024-01-16

## 2023-10-18 ENCOUNTER — HOSPITAL ENCOUNTER (OUTPATIENT)
Dept: CARDIOLOGY | Facility: HOSPITAL | Age: 83
Discharge: HOME | End: 2023-10-18
Attending: INTERNAL MEDICINE
Payer: MEDICARE

## 2023-10-18 ENCOUNTER — TELEPHONE (OUTPATIENT)
Dept: CARDIOLOGY | Facility: CLINIC | Age: 83
End: 2023-10-18
Payer: MEDICARE

## 2023-10-18 VITALS
HEIGHT: 65 IN | DIASTOLIC BLOOD PRESSURE: 74 MMHG | WEIGHT: 169 LBS | BODY MASS INDEX: 28.16 KG/M2 | SYSTOLIC BLOOD PRESSURE: 126 MMHG

## 2023-10-18 DIAGNOSIS — I48.0 PAROXYSMAL ATRIAL FIBRILLATION: ICD-10-CM

## 2023-10-18 LAB
AORTIC ROOT ANNULUS: 3.09 CM
ASCENDING AORTA: 3.44 CM
AV INDEX (PROSTH): 0.81
AV MEAN GRADIENT: 4 MMHG
AV PEAK GRADIENT: 7 MMHG
AV REGURGITATION PRESSURE HALF TIME: 419.31 MS
AV VALVE AREA BY VELOCITY RATIO: 2.34 CM²
AV VALVE AREA: 2.31 CM²
AV VELOCITY RATIO: 0.82
BSA FOR ECHO PROCEDURE: 1.87 M2
CV ECHO LV RWT: 0.4 CM
DOP CALC AO PEAK VEL: 1.3 M/S
DOP CALC AO VTI: 32.6 CM
DOP CALC LVOT AREA: 2.9 CM2
DOP CALC LVOT DIAMETER: 1.91 CM
DOP CALC LVOT PEAK VEL: 1.06 M/S
DOP CALC LVOT STROKE VOLUME: 75.32 CM3
DOP CALC RVOT PEAK VEL: 0.7 M/S
DOP CALC RVOT VTI: 16 CM
DOP CALCLVOT PEAK VEL VTI: 26.3 CM
E WAVE DECELERATION TIME: 198.89 MSEC
E/A RATIO: 1.39
E/E' RATIO: 11.26 M/S
ECHO LV POSTERIOR WALL: 0.87 CM (ref 0.6–1.1)
FRACTIONAL SHORTENING: 43 % (ref 28–44)
INTERVENTRICULAR SEPTUM: 0.88 CM (ref 0.6–1.1)
IVC DIAMETER: 1.5 CM
IVRT: 82.78 MSEC
LA MAJOR: 4.77 CM
LA MINOR: 5.26 CM
LA WIDTH: 3.7 CM
LEFT ATRIUM SIZE: 3.39 CM
LEFT ATRIUM VOLUME INDEX MOD: 27.4 ML/M2
LEFT ATRIUM VOLUME INDEX: 29 ML/M2
LEFT ATRIUM VOLUME MOD: 50.36 CM3
LEFT ATRIUM VOLUME: 53.34 CM3
LEFT INTERNAL DIMENSION IN SYSTOLE: 2.47 CM (ref 2.1–4)
LEFT VENTRICLE DIASTOLIC VOLUME INDEX: 45.6 ML/M2
LEFT VENTRICLE DIASTOLIC VOLUME: 83.91 ML
LEFT VENTRICLE MASS INDEX: 65 G/M2
LEFT VENTRICLE SYSTOLIC VOLUME INDEX: 11.8 ML/M2
LEFT VENTRICLE SYSTOLIC VOLUME: 21.68 ML
LEFT VENTRICULAR INTERNAL DIMENSION IN DIASTOLE: 4.32 CM (ref 3.5–6)
LEFT VENTRICULAR MASS: 119.6 G
LV LATERAL E/E' RATIO: 9.73 M/S
LV SEPTAL E/E' RATIO: 13.38 M/S
LVOT MG: 2.45 MMHG
LVOT MV: 0.73 CM/S
MR PISA EROA: 0.16 CM2
MV PEAK A VEL: 0.77 M/S
MV PEAK E VEL: 1.07 M/S
MV STENOSIS PRESSURE HALF TIME: 57.68 MS
MV VALVE AREA P 1/2 METHOD: 3.81 CM2
PISA AR MAX VEL: 4.7 M/S
PISA MRMAX VEL: 5.54 M/S
PISA RADIUS: 0.6 CM
PISA TR MAX VEL: 3.9 M/S
PISA VN NYQUIST MS: 0.39 M/S
PISA VN NYQUIST: 0.39 M/S
PULM VEIN S/D RATIO: 0.69
PV MEAN GRADIENT: 1 MMHG
PV MV: 0.63 M/S
PV PEAK D VEL: 1 M/S
PV PEAK GRADIENT: 3 MMHG
PV PEAK S VEL: 0.69 M/S
PV PEAK VELOCITY: 0.88 M/S
RA MAJOR: 4.75 CM
RA PRESSURE ESTIMATED: 3 MMHG
RA WIDTH: 3.78 CM
RIGHT VENTRICULAR END-DIASTOLIC DIMENSION: 3.33 CM
RV TB RVSP: 7 MMHG
SINUS: 2.95 CM
STJ: 2.66 CM
TDI LATERAL: 0.11 M/S
TDI SEPTAL: 0.08 M/S
TDI: 0.1 M/S
TR MAX PG: 61 MMHG
TRICUSPID ANNULAR PLANE SYSTOLIC EXCURSION: 2.05 CM
TV REST PULMONARY ARTERY PRESSURE: 64 MMHG
Z-SCORE OF LEFT VENTRICULAR DIMENSION IN END DIASTOLE: -1.67
Z-SCORE OF LEFT VENTRICULAR DIMENSION IN END SYSTOLE: -1.93

## 2023-10-18 PROCEDURE — 93306 ECHO (CUPID ONLY): ICD-10-PCS | Mod: 26,,, | Performed by: INTERNAL MEDICINE

## 2023-10-18 PROCEDURE — 93248 EXT ECG>7D<15D REV&INTERPJ: CPT | Mod: ,,, | Performed by: INTERNAL MEDICINE

## 2023-10-18 PROCEDURE — 93306 TTE W/DOPPLER COMPLETE: CPT

## 2023-10-18 PROCEDURE — 93306 TTE W/DOPPLER COMPLETE: CPT | Mod: 26,,, | Performed by: INTERNAL MEDICINE

## 2023-10-18 PROCEDURE — 93248 CV CARDIAC MONITOR - 3-15 DAY ADULT (CUPID ONLY): ICD-10-PCS | Mod: ,,, | Performed by: INTERNAL MEDICINE

## 2023-10-18 NOTE — TELEPHONE ENCOUNTER
Patient contacted and was advised that     Echo showed nl EF. Mod valve leaking  Continue current Rx  F/U as scheduled   Patient stated understanding with no questions or concern

## 2023-11-21 LAB
OHS CV HOLTER SINUS AVERAGE HR: 67
OHS CV HOLTER SINUS MAX HR: 122
OHS CV HOLTER SINUS MIN HR: 51

## 2023-11-22 ENCOUNTER — TELEPHONE (OUTPATIENT)
Dept: CARDIOLOGY | Facility: CLINIC | Age: 83
End: 2023-11-22
Payer: MEDICARE

## 2023-11-22 NOTE — TELEPHONE ENCOUNTER
Attempted to contact patient; LVM for patient to call back for cardiac monitor results.      ----- Message from Will Rosenthal MD sent at 11/22/2023  9:00 AM CST -----  2 WEEKS MONITOR SHOWED NO AFIB. ONLY RARE ARRHYTHMIA  DECREASE XERALTO TO 15 MG DAILY

## 2023-11-22 NOTE — TELEPHONE ENCOUNTER
-----    WEEKS MONITOR SHOWED NO AFIB. ONLY RARE ARRHYTHMIA  DECREASE XERALTO TO 15 MG DAILY      The patient has been notified of this information and all questions answered.     Message from Cyndy Plasencia sent at 11/22/2023 11:39 AM CST -----  Contact: Maria Del Carmen  Type:  Patient Returning Call    Who Called: Maria Del Carmen  Who Left Message for Patient: GoddardMaida westbrook  Does the patient know what this is regarding?: results  Would the patient rather a call back or a response via [facility]? Call back   Best Call Back Number: Please call her at [phone]  Additional Information:

## 2023-11-28 ENCOUNTER — OFFICE VISIT (OUTPATIENT)
Dept: OPHTHALMOLOGY | Facility: CLINIC | Age: 83
End: 2023-11-28
Payer: MEDICARE

## 2023-11-28 DIAGNOSIS — Z96.1 PSEUDOPHAKIA: ICD-10-CM

## 2023-11-28 DIAGNOSIS — H40.1131 PRIMARY OPEN ANGLE GLAUCOMA OF BOTH EYES, MILD STAGE: Primary | ICD-10-CM

## 2023-11-28 PROCEDURE — 99214 OFFICE O/P EST MOD 30 MIN: CPT | Mod: S$PBB,,, | Performed by: OPHTHALMOLOGY

## 2023-11-28 PROCEDURE — 99999 PR PBB SHADOW E&M-EST. PATIENT-LVL III: CPT | Mod: PBBFAC,,, | Performed by: OPHTHALMOLOGY

## 2023-11-28 PROCEDURE — 99999 PR PBB SHADOW E&M-EST. PATIENT-LVL III: ICD-10-PCS | Mod: PBBFAC,,, | Performed by: OPHTHALMOLOGY

## 2023-11-28 PROCEDURE — 92083 HUMPHREY VISUAL FIELD - OU - BOTH EYES: ICD-10-PCS | Mod: 26,S$PBB,, | Performed by: OPHTHALMOLOGY

## 2023-11-28 PROCEDURE — 99214 PR OFFICE/OUTPT VISIT, EST, LEVL IV, 30-39 MIN: ICD-10-PCS | Mod: S$PBB,,, | Performed by: OPHTHALMOLOGY

## 2023-11-28 PROCEDURE — 92083 EXTENDED VISUAL FIELD XM: CPT | Mod: PBBFAC | Performed by: OPHTHALMOLOGY

## 2023-11-28 PROCEDURE — 99213 OFFICE O/P EST LOW 20 MIN: CPT | Mod: PBBFAC | Performed by: OPHTHALMOLOGY

## 2023-11-28 PROCEDURE — 92133 POSTERIOR SEGMENT OCT OPTIC NERVE(OCULAR COHERENCE TOMOGRAPHY) - OU - BOTH EYES: ICD-10-PCS | Mod: 26,S$PBB,, | Performed by: OPHTHALMOLOGY

## 2023-11-28 PROCEDURE — 92133 CPTRZD OPH DX IMG PST SGM ON: CPT | Mod: PBBFAC | Performed by: OPHTHALMOLOGY

## 2023-11-28 NOTE — PROGRESS NOTES
SUBJECTIVE  Maria Del Carmen Spence is 83 y.o. female  Corrected distance visual acuity was 20/25 in the right eye and 20/20 in the left eye.   Chief Complaint   Patient presents with    Glaucoma     6 month follow up with HVF 24-2, GOCT and dilation. VA in her left eye had a problem about 3 weeks ago and had an eyelashes in it. She washes out but it got better. No pain or irritation. Compliant with gtts.          HPI     Glaucoma     Additional comments: 6 month follow up with HVF 24-2, GOCT and dilation.   VA in her left eye had a problem about 3 weeks ago and had an eyelashes in   it. She washes out but it got better. No pain or irritation. Compliant   with gtts.           Comments    Q 6 months  Likes  am    1. Mild COAG OD>OS (init 24/20) Goal <17   Rhopressa (irritated but exc IOP 11/11)   Dorzolamide OU BID (Not taking, patient States This Eye Drop Makes Her Eye   Burn & Red)   2. PCIOL OU (Sulcus OD) (partial dislocation Od)   3. Dry Eyes       Latanoprost QHS OU   Timolol QAM OU             Last edited by Dick Walker on 11/28/2023  9:22 AM.         Assessment /Plan :  1. Primary open angle glaucoma of both eyes, mild stage Doing well, intraocular pressure (IOP) within acceptable range relative to target IOP and no evidence of progression. Continue current treatment. Reviewed importance of continued compliance with treatment and follow up.      Patient instructed to continue using the following glaucoma medication as follows:  Latanoprost one drop in each eye nightly and Timolol one drop both eyes daily    Return to clinic in 6 months  or as needed.  With IOP Check and GOCT     2. Pseudophakia  -- Condition stable, no therapeutic change required. Monitoring routinely.

## 2023-12-11 ENCOUNTER — TELEPHONE (OUTPATIENT)
Dept: OPHTHALMOLOGY | Facility: CLINIC | Age: 83
End: 2023-12-11
Payer: MEDICARE

## 2023-12-11 NOTE — TELEPHONE ENCOUNTER
From last note Dr. Denise did not mention any Visual field. Will do a IOP and Goct for next visit in November.  ----- Message from [employer] sent at 12/11/2023  1:04 PM CST -----  Contact: dolores Joyce is calling regarding an order be put in for a visual fields test for next year in November.  Please give her a call to schedule at [phone].

## 2024-01-03 DIAGNOSIS — H40.1131 PRIMARY OPEN ANGLE GLAUCOMA OF BOTH EYES, MILD STAGE: ICD-10-CM

## 2024-01-03 RX ORDER — TIMOLOL MALEATE 5 MG/ML
SOLUTION/ DROPS OPHTHALMIC
Qty: 10 ML | Refills: 12 | Status: SHIPPED | OUTPATIENT
Start: 2024-01-03

## 2024-01-16 RX ORDER — FUROSEMIDE 20 MG/1
20 TABLET ORAL
Qty: 25 TABLET | Refills: 2 | Status: SHIPPED | OUTPATIENT
Start: 2024-01-18

## 2024-01-31 DIAGNOSIS — Z78.0 MENOPAUSE: ICD-10-CM

## 2024-02-26 ENCOUNTER — OFFICE VISIT (OUTPATIENT)
Dept: INTERNAL MEDICINE | Facility: CLINIC | Age: 84
End: 2024-02-26
Payer: MEDICARE

## 2024-02-26 VITALS
HEART RATE: 80 BPM | WEIGHT: 172.63 LBS | DIASTOLIC BLOOD PRESSURE: 72 MMHG | HEIGHT: 65 IN | TEMPERATURE: 99 F | OXYGEN SATURATION: 98 % | BODY MASS INDEX: 28.76 KG/M2 | SYSTOLIC BLOOD PRESSURE: 124 MMHG

## 2024-02-26 DIAGNOSIS — M54.31 SCIATICA OF RIGHT SIDE: ICD-10-CM

## 2024-02-26 DIAGNOSIS — I48.0 PAF (PAROXYSMAL ATRIAL FIBRILLATION): ICD-10-CM

## 2024-02-26 DIAGNOSIS — E78.2 MIXED HYPERLIPIDEMIA: ICD-10-CM

## 2024-02-26 DIAGNOSIS — I73.9 PVD (PERIPHERAL VASCULAR DISEASE): ICD-10-CM

## 2024-02-26 DIAGNOSIS — M46.1 SACROILIITIS: ICD-10-CM

## 2024-02-26 DIAGNOSIS — I48.0 PAROXYSMAL ATRIAL FIBRILLATION: ICD-10-CM

## 2024-02-26 DIAGNOSIS — K21.9 GASTROESOPHAGEAL REFLUX DISEASE WITHOUT ESOPHAGITIS: ICD-10-CM

## 2024-02-26 DIAGNOSIS — I10 PRIMARY HYPERTENSION: Primary | ICD-10-CM

## 2024-02-26 DIAGNOSIS — M19.90 OSTEOARTHRITIS, UNSPECIFIED OSTEOARTHRITIS TYPE, UNSPECIFIED SITE: ICD-10-CM

## 2024-02-26 PROCEDURE — 99213 OFFICE O/P EST LOW 20 MIN: CPT | Mod: PBBFAC | Performed by: FAMILY MEDICINE

## 2024-02-26 PROCEDURE — 99214 OFFICE O/P EST MOD 30 MIN: CPT | Mod: S$PBB,,, | Performed by: FAMILY MEDICINE

## 2024-02-26 PROCEDURE — 99999 PR PBB SHADOW E&M-EST. PATIENT-LVL III: CPT | Mod: PBBFAC,,, | Performed by: FAMILY MEDICINE

## 2024-02-26 RX ORDER — ATORVASTATIN CALCIUM 10 MG/1
10 TABLET, FILM COATED ORAL NIGHTLY
Qty: 30 TABLET | Refills: 11 | Status: SHIPPED | OUTPATIENT
Start: 2024-02-26

## 2024-02-26 RX ORDER — METOPROLOL TARTRATE 25 MG/1
25 TABLET, FILM COATED ORAL 2 TIMES DAILY
Qty: 60 TABLET | Refills: 11 | Status: SHIPPED | OUTPATIENT
Start: 2024-02-26

## 2024-02-26 RX ORDER — DEXTROMETHORPHAN HYDROBROMIDE, GUAIFENESIN 5; 100 MG/5ML; MG/5ML
650 LIQUID ORAL
COMMUNITY

## 2024-02-26 NOTE — PROGRESS NOTES
Subjective:       Patient ID: Maria Del Carmen Spence is a 83 y.o. female.    Chief Complaint: Follow-up    Six-month follow-up with a history of hypertension hyperlipidemia paroxysmal atrial fibrillation esophageal reflux osteoarthritis sciatica.  She is also followed by Cardiology.  She has a history of  radiofrequency ablation 5 years ago.  She denies headache chest pain palpitations shortness of breath or edema.  She is using Tylenol for osteoarthritis with some relief.  She also takes gabapentin that helps with sciatica.    Follow-up  Associated symptoms include arthralgias. Pertinent negatives include no abdominal pain, chest pain, chills, coughing, fever, headaches or weakness.     Review of Systems   Constitutional:  Negative for activity change, appetite change, chills, fever and unexpected weight change.   Respiratory:  Negative for cough, chest tightness, shortness of breath and wheezing.    Cardiovascular:  Negative for chest pain, palpitations and leg swelling.   Gastrointestinal:  Negative for abdominal distention, abdominal pain, constipation and diarrhea.   Genitourinary:  Negative for dysuria, hematuria and urgency.   Musculoskeletal:  Positive for arthralgias and back pain.   Neurological:  Negative for dizziness, weakness, light-headedness and headaches.   Hematological:  Does not bruise/bleed easily.       Objective:      Physical Exam  Constitutional:       General: She is not in acute distress.     Appearance: She is not ill-appearing or diaphoretic.   Neck:      Comments: 2+ carotids normal thyroid  Cardiovascular:      Rate and Rhythm: Normal rate and regular rhythm.      Heart sounds: No murmur heard.     No gallop.   Pulmonary:      Effort: Pulmonary effort is normal. No respiratory distress.      Breath sounds: No wheezing, rhonchi or rales.   Abdominal:      General: There is no distension.      Palpations: Abdomen is soft. There is no mass.      Tenderness: There is no abdominal tenderness.    Musculoskeletal:      Comments: Bony swelling of both knees.  She has using a cane to ambulate   Lymphadenopathy:      Cervical: No cervical adenopathy.   Skin:     General: Skin is warm and dry.      Coloration: Skin is not pale.      Findings: No erythema.   Neurological:      Mental Status: She is alert.   Psychiatric:         Mood and Affect: Mood normal.         Behavior: Behavior normal.         Hospital Outpatient Visit on 10/18/2023   Component Date Value Ref Range Status    Sinus min HR 10/18/2023 51   Final    Sinus max hr 10/18/2023 122   Final    Sinus avg hr 10/18/2023 67   Final     Assessment:       1. Primary hypertension    2. Paroxysmal atrial fibrillation    3. Sacroiliitis    4. PVD (peripheral vascular disease)    5. Osteoarthritis, unspecified osteoarthritis type, unspecified site    6. PAF (paroxysmal atrial fibrillation)    7. Sciatica of right side    8. Gastroesophageal reflux disease without esophagitis    9. Mixed hyperlipidemia        Plan:     Blood pressure controlled medications reviewed lab was ordered health maintenance reviewed in RSV ordered.  Follow-up in 6 months she denies palpitations with paroxysmal atrial fibrillation.  Chronic back pain treated with Tylenol as well as osteoarthritis.  Peripheral vascular disease is stable.  She denies dysphagia.    Primary hypertension    Paroxysmal atrial fibrillation    Sacroiliitis    PVD (peripheral vascular disease)    Osteoarthritis, unspecified osteoarthritis type, unspecified site    PAF (paroxysmal atrial fibrillation)    Sciatica of right side    Gastroesophageal reflux disease without esophagitis    Mixed hyperlipidemia    Other orders  -     atorvastatin (LIPITOR) 10 MG tablet; Take 1 tablet (10 mg total) by mouth every evening.  Dispense: 30 tablet; Refill: 11  -     metoprolol tartrate (LOPRESSOR) 25 MG tablet; Take 1 tablet (25 mg total) by mouth 2 (two) times daily.  Dispense: 60 tablet; Refill: 11

## 2024-04-10 ENCOUNTER — TELEPHONE (OUTPATIENT)
Dept: INTERNAL MEDICINE | Facility: CLINIC | Age: 84
End: 2024-04-10
Payer: MEDICARE

## 2024-04-10 ENCOUNTER — NURSE TRIAGE (OUTPATIENT)
Dept: ADMINISTRATIVE | Facility: CLINIC | Age: 84
End: 2024-04-10
Payer: MEDICARE

## 2024-04-10 PROCEDURE — 96361 HYDRATE IV INFUSION ADD-ON: CPT

## 2024-04-10 PROCEDURE — 96365 THER/PROPH/DIAG IV INF INIT: CPT

## 2024-04-10 PROCEDURE — 96375 TX/PRO/DX INJ NEW DRUG ADDON: CPT

## 2024-04-10 PROCEDURE — 96376 TX/PRO/DX INJ SAME DRUG ADON: CPT

## 2024-04-10 PROCEDURE — 96367 TX/PROPH/DG ADDL SEQ IV INF: CPT

## 2024-04-10 PROCEDURE — 99285 EMERGENCY DEPT VISIT HI MDM: CPT

## 2024-04-10 RX ORDER — LATANOPROST 50 UG/ML
SOLUTION/ DROPS OPHTHALMIC
Qty: 2.5 ML | Refills: 12 | Status: SHIPPED | OUTPATIENT
Start: 2024-04-10

## 2024-04-10 NOTE — TELEPHONE ENCOUNTER
Patient c/o diarrhea, nausea, and a low grade temp. Symptoms started 2 days ago. Patient reports having nearly 7 watery stools per day. Denies dehydration. She is drinking Gatorade and has good urine output. She can tolerate toast. Advised per protocol to be seen in the office today. Patient refuses due to symptoms. Patient is requesting a prescription for zofran to be sent to her pharmacy at [address] [address]. States that the pharmacy closes at 6:30 pm. Will route a message to her provider's office.  Advised the patient to call back with any further questions or if symptoms worsen.        Best contact number is [phone]      Reason for Disposition   MODERATE diarrhea (e.g., 4-6 times / day more than normal) and present > 48 hours (2 days)   Unexplained nausea    Additional Information   Negative: Shock suspected (e.g., cold/pale/clammy skin, too weak to stand, low BP, rapid pulse)   Negative: Difficult to awaken or acting confused (e.g., disoriented, slurred speech)   Negative: Sounds like a life-threatening emergency to the triager   Negative: SEVERE abdominal pain (e.g., excruciating) and present > 1 hour   Negative: SEVERE abdominal pain and age > 60 years   Negative: Bloody, black, or tarry bowel movements  (Exception: Chronic-unchanged black-grey bowel movements and is taking iron pills or Pepto-Bismol.)   Negative: SEVERE diarrhea (e.g., 7 or more times / day more than normal) and age > 60 years   Negative: Constant abdominal pain lasting > 2 hours   Negative: Drinking very little and dehydration suspected (e.g., no urine > 12 hours, very dry mouth, very lightheaded)   Negative: Patient sounds very sick or weak to the triager   Negative: SEVERE diarrhea (e.g., 7 or more times / day more than normal) and present > 24 hours (1 day)   Negative: Shock suspected (e.g., cold/pale/clammy skin, too weak to stand, low BP, rapid pulse)   Negative: Sounds like a life-threatening  emergency to the triager   Negative: Unable to walk, or can only walk with assistance (e.g., requires support)   Negative: Difficulty breathing   Negative: Insulin-dependent diabetes (Type I) and glucose > 400 mg/dL (22 mmol/L)   Negative: Drinking very little and dehydration suspected (e.g., no urine > 12 hours, very dry mouth, very lightheaded)   Negative: Patient sounds very sick or weak to the triager   Negative: Fever > 104 F  (40 C)   Negative: Fever > 101 F  (38.3 C) and over 60 years of age   Negative: Fever > 100.0 F  (37.8 C) and bedridden (e.g., CVA, chronic illness, recovering from surgery)   Negative: Fever > 100.0 F  (37.8 C) and diabetes mellitus or weak immune system (e.g., HIV positive, cancer chemo, splenectomy, chronic steroids)   Negative: Taking any of the following medications: digoxin (Lanoxin), lithium, theophylline, phenytoin (Dilantin)   Negative: Yellowish color of the skin or white of the eye (i.e., jaundice)   Negative: Patient wants to be seen   Negative: Fever present > 3 days (72 hours)   Negative: Receiving cancer chemotherapy medication   Negative: Taking prescription medication that could cause nausea (e.g., narcotics/opiates, antibiotics, OCPs, many others)   Negative: Nausea lasts > 1 week   Negative: Substance use (drug use) or unhealthy alcohol use, known or suspected   Negative: Nausea is a chronic symptom (recurrent or ongoing AND present > 4 weeks)    Protocols used: Diarrhea-A-OH, Nausea-A-OH

## 2024-04-11 ENCOUNTER — HOSPITAL ENCOUNTER (INPATIENT)
Facility: HOSPITAL | Age: 84
LOS: 1 days | Discharge: HOME | DRG: 378 | End: 2024-04-12
Attending: EMERGENCY MEDICINE | Admitting: FAMILY MEDICINE
Payer: MEDICARE

## 2024-04-11 DIAGNOSIS — R11.2 NAUSEA AND VOMITING, UNSPECIFIED VOMITING TYPE: ICD-10-CM

## 2024-04-11 DIAGNOSIS — N39.0 URINARY TRACT INFECTION WITHOUT HEMATURIA, SITE UNSPECIFIED: ICD-10-CM

## 2024-04-11 DIAGNOSIS — R10.84 ABDOMINAL PAIN, ACUTE, GENERALIZED: ICD-10-CM

## 2024-04-11 DIAGNOSIS — K92.2 UPPER GI BLEED: Primary | ICD-10-CM

## 2024-04-11 LAB
ABO + RH BLD: NORMAL
ALBUMIN SERPL BCP-MCNC: 3.9 G/DL (ref 3.5–5.2)
ALP SERPL-CCNC: 85 U/L (ref 55–135)
ALT SERPL W/O P-5'-P-CCNC: 17 U/L (ref 10–44)
ANION GAP SERPL CALC-SCNC: 13 MMOL/L (ref 8–16)
AST SERPL-CCNC: 30 U/L (ref 10–40)
BACTERIA #/AREA URNS HPF: ABNORMAL /HPF
BASOPHILS # BLD AUTO: 0.02 K/UL (ref 0–0.2)
BASOPHILS # BLD AUTO: 0.03 K/UL (ref 0–0.2)
BASOPHILS NFR BLD: 0.3 % (ref 0–1.9)
BASOPHILS NFR BLD: 0.4 % (ref 0–1.9)
BILIRUB SERPL-MCNC: 0.5 MG/DL (ref 0.1–1)
BILIRUB UR QL STRIP: NEGATIVE
BLD GP AB SCN CELLS X3 SERPL QL: NORMAL
BUN SERPL-MCNC: 23 MG/DL (ref 8–23)
CALCIUM SERPL-MCNC: 9.7 MG/DL (ref 8.7–10.5)
CHLORIDE SERPL-SCNC: 102 MMOL/L (ref 95–110)
CLARITY UR: CLEAR
CO2 SERPL-SCNC: 20 MMOL/L (ref 23–29)
COLOR UR: YELLOW
CREAT SERPL-MCNC: 0.7 MG/DL (ref 0.5–1.4)
DIFFERENTIAL METHOD BLD: ABNORMAL
EOSINOPHIL # BLD AUTO: 0 K/UL (ref 0–0.5)
EOSINOPHIL # BLD AUTO: 0.1 K/UL (ref 0–0.5)
EOSINOPHIL NFR BLD: 0 % (ref 0–8)
EOSINOPHIL NFR BLD: 0.1 % (ref 0–8)
EOSINOPHIL NFR BLD: 0.1 % (ref 0–8)
EOSINOPHIL NFR BLD: 0.5 % (ref 0–8)
ERYTHROCYTE [DISTWIDTH] IN BLOOD BY AUTOMATED COUNT: 12.7 % (ref 11.5–14.5)
ERYTHROCYTE [DISTWIDTH] IN BLOOD BY AUTOMATED COUNT: 12.7 % (ref 11.5–14.5)
ERYTHROCYTE [DISTWIDTH] IN BLOOD BY AUTOMATED COUNT: 12.9 % (ref 11.5–14.5)
ERYTHROCYTE [DISTWIDTH] IN BLOOD BY AUTOMATED COUNT: 13 % (ref 11.5–14.5)
EST. GFR  (NO RACE VARIABLE): >60 ML/MIN/1.73 M^2
GLUCOSE SERPL-MCNC: 124 MG/DL (ref 70–110)
GLUCOSE UR QL STRIP: NEGATIVE
HCT VFR BLD AUTO: 31.2 % (ref 37–48.5)
HCT VFR BLD AUTO: 33 % (ref 37–48.5)
HCT VFR BLD AUTO: 34.3 % (ref 37–48.5)
HCT VFR BLD AUTO: 37.8 % (ref 37–48.5)
HGB BLD-MCNC: 10.4 G/DL (ref 12–16)
HGB BLD-MCNC: 10.8 G/DL (ref 12–16)
HGB BLD-MCNC: 11.4 G/DL (ref 12–16)
HGB BLD-MCNC: 12.6 G/DL (ref 12–16)
HGB UR QL STRIP: ABNORMAL
IMM GRANULOCYTES # BLD AUTO: 0.01 K/UL (ref 0–0.04)
IMM GRANULOCYTES # BLD AUTO: 0.03 K/UL (ref 0–0.04)
IMM GRANULOCYTES NFR BLD AUTO: 0.1 % (ref 0–0.5)
IMM GRANULOCYTES NFR BLD AUTO: 0.3 % (ref 0–0.5)
IMM GRANULOCYTES NFR BLD AUTO: 0.3 % (ref 0–0.5)
IMM GRANULOCYTES NFR BLD AUTO: 0.4 % (ref 0–0.5)
KETONES UR QL STRIP: NEGATIVE
LEUKOCYTE ESTERASE UR QL STRIP: ABNORMAL
LIPASE SERPL-CCNC: 11 U/L (ref 4–60)
LYMPHOCYTES # BLD AUTO: 1.4 K/UL (ref 1–4.8)
LYMPHOCYTES # BLD AUTO: 2 K/UL (ref 1–4.8)
LYMPHOCYTES # BLD AUTO: 2.9 K/UL (ref 1–4.8)
LYMPHOCYTES # BLD AUTO: 4 K/UL (ref 1–4.8)
LYMPHOCYTES NFR BLD: 18.5 % (ref 18–48)
LYMPHOCYTES NFR BLD: 25.9 % (ref 18–48)
LYMPHOCYTES NFR BLD: 29.3 % (ref 18–48)
LYMPHOCYTES NFR BLD: 42.8 % (ref 18–48)
MCH RBC QN AUTO: 31.7 PG (ref 27–31)
MCH RBC QN AUTO: 31.8 PG (ref 27–31)
MCHC RBC AUTO-ENTMCNC: 32.7 G/DL (ref 32–36)
MCHC RBC AUTO-ENTMCNC: 33.2 G/DL (ref 32–36)
MCHC RBC AUTO-ENTMCNC: 33.3 G/DL (ref 32–36)
MCHC RBC AUTO-ENTMCNC: 33.3 G/DL (ref 32–36)
MCV RBC AUTO: 95 FL (ref 82–98)
MCV RBC AUTO: 95 FL (ref 82–98)
MCV RBC AUTO: 96 FL (ref 82–98)
MCV RBC AUTO: 97 FL (ref 82–98)
MICROSCOPIC COMMENT: ABNORMAL
MONOCYTES # BLD AUTO: 0.9 K/UL (ref 0.3–1)
MONOCYTES # BLD AUTO: 1 K/UL (ref 0.3–1)
MONOCYTES # BLD AUTO: 1.4 K/UL (ref 0.3–1)
MONOCYTES # BLD AUTO: 1.4 K/UL (ref 0.3–1)
MONOCYTES NFR BLD: 12.1 % (ref 4–15)
MONOCYTES NFR BLD: 13.7 % (ref 4–15)
MONOCYTES NFR BLD: 14.1 % (ref 4–15)
MONOCYTES NFR BLD: 15 % (ref 4–15)
NEUTROPHILS # BLD AUTO: 3.9 K/UL (ref 1.8–7.7)
NEUTROPHILS # BLD AUTO: 4.5 K/UL (ref 1.8–7.7)
NEUTROPHILS # BLD AUTO: 5.1 K/UL (ref 1.8–7.7)
NEUTROPHILS # BLD AUTO: 5.6 K/UL (ref 1.8–7.7)
NEUTROPHILS NFR BLD: 41.1 % (ref 38–73)
NEUTROPHILS NFR BLD: 55.9 % (ref 38–73)
NEUTROPHILS NFR BLD: 59.5 % (ref 38–73)
NEUTROPHILS NFR BLD: 69 % (ref 38–73)
NITRITE UR QL STRIP: POSITIVE
NRBC BLD-RTO: 0 /100 WBC
OB PNL GAST: POSITIVE
OHS QRS DURATION: 78 MS
OHS QTC CALCULATION: 459 MS
PH UR STRIP: 6 [PH] (ref 5–8)
PLATELET # BLD AUTO: 267 K/UL (ref 150–450)
PLATELET # BLD AUTO: 273 K/UL (ref 150–450)
PLATELET # BLD AUTO: 281 K/UL (ref 150–450)
PLATELET # BLD AUTO: 306 K/UL (ref 150–450)
PMV BLD AUTO: 9.6 FL (ref 9.2–12.9)
PMV BLD AUTO: 9.8 FL (ref 9.2–12.9)
PMV BLD AUTO: 9.8 FL (ref 9.2–12.9)
PMV BLD AUTO: 9.9 FL (ref 9.2–12.9)
POTASSIUM SERPL-SCNC: 3.7 MMOL/L (ref 3.5–5.1)
PROT SERPL-MCNC: 7.8 G/DL (ref 6–8.4)
PROT UR QL STRIP: ABNORMAL
RBC # BLD AUTO: 3.28 M/UL (ref 4–5.4)
RBC # BLD AUTO: 3.41 M/UL (ref 4–5.4)
RBC # BLD AUTO: 3.6 M/UL (ref 4–5.4)
RBC # BLD AUTO: 3.96 M/UL (ref 4–5.4)
RBC #/AREA URNS HPF: 5 /HPF (ref 0–4)
SODIUM SERPL-SCNC: 135 MMOL/L (ref 136–145)
SP GR UR STRIP: 1.02 (ref 1–1.03)
SPECIMEN OUTDATE: NORMAL
SQUAMOUS #/AREA URNS HPF: 2 /HPF
TROPONIN I SERPL DL<=0.01 NG/ML-MCNC: <0.006 NG/ML (ref 0–0.03)
URN SPEC COLLECT METH UR: ABNORMAL
UROBILINOGEN UR STRIP-ACNC: NEGATIVE EU/DL
WBC # BLD AUTO: 7.35 K/UL (ref 3.9–12.7)
WBC # BLD AUTO: 7.54 K/UL (ref 3.9–12.7)
WBC # BLD AUTO: 9.38 K/UL (ref 3.9–12.7)
WBC # BLD AUTO: 9.97 K/UL (ref 3.9–12.7)
WBC #/AREA URNS HPF: 23 /HPF (ref 0–5)

## 2024-04-11 PROCEDURE — 85025 COMPLETE CBC W/AUTO DIFF WBC: CPT | Performed by: NURSE PRACTITIONER

## 2024-04-11 PROCEDURE — 85025 COMPLETE CBC W/AUTO DIFF WBC: CPT | Mod: 91 | Performed by: EMERGENCY MEDICINE

## 2024-04-11 PROCEDURE — 93010 ELECTROCARDIOGRAM REPORT: CPT | Mod: ,,, | Performed by: INTERNAL MEDICINE

## 2024-04-11 PROCEDURE — C9113 INJ PANTOPRAZOLE SODIUM, VIA: HCPCS | Performed by: EMERGENCY MEDICINE

## 2024-04-11 PROCEDURE — 83690 ASSAY OF LIPASE: CPT | Performed by: NURSE PRACTITIONER

## 2024-04-11 PROCEDURE — 25000003 PHARM REV CODE 250: Performed by: EMERGENCY MEDICINE

## 2024-04-11 PROCEDURE — 82271 OCCULT BLOOD OTHER SOURCES: CPT | Performed by: EMERGENCY MEDICINE

## 2024-04-11 PROCEDURE — S5010 5% DEXTROSE AND 0.45% SALINE: HCPCS | Performed by: FAMILY MEDICINE

## 2024-04-11 PROCEDURE — 84484 ASSAY OF TROPONIN QUANT: CPT | Performed by: NURSE PRACTITIONER

## 2024-04-11 PROCEDURE — 36415 COLL VENOUS BLD VENIPUNCTURE: CPT | Performed by: FAMILY MEDICINE

## 2024-04-11 PROCEDURE — 80053 COMPREHEN METABOLIC PANEL: CPT | Performed by: NURSE PRACTITIONER

## 2024-04-11 PROCEDURE — 85025 COMPLETE CBC W/AUTO DIFF WBC: CPT | Mod: 91 | Performed by: FAMILY MEDICINE

## 2024-04-11 PROCEDURE — 21400001 HC TELEMETRY ROOM

## 2024-04-11 PROCEDURE — C9113 INJ PANTOPRAZOLE SODIUM, VIA: HCPCS | Performed by: FAMILY MEDICINE

## 2024-04-11 PROCEDURE — 86901 BLOOD TYPING SEROLOGIC RH(D): CPT | Performed by: FAMILY MEDICINE

## 2024-04-11 PROCEDURE — 87086 URINE CULTURE/COLONY COUNT: CPT | Performed by: NURSE PRACTITIONER

## 2024-04-11 PROCEDURE — 63600175 PHARM REV CODE 636 W HCPCS: Performed by: FAMILY MEDICINE

## 2024-04-11 PROCEDURE — 25000003 PHARM REV CODE 250: Performed by: FAMILY MEDICINE

## 2024-04-11 PROCEDURE — 81000 URINALYSIS NONAUTO W/SCOPE: CPT | Performed by: NURSE PRACTITIONER

## 2024-04-11 PROCEDURE — 11000001 HC ACUTE MED/SURG PRIVATE ROOM

## 2024-04-11 PROCEDURE — 63600175 PHARM REV CODE 636 W HCPCS: Performed by: EMERGENCY MEDICINE

## 2024-04-11 PROCEDURE — 93005 ELECTROCARDIOGRAM TRACING: CPT

## 2024-04-11 RX ORDER — PANTOPRAZOLE SODIUM 40 MG/10ML
40 INJECTION, POWDER, LYOPHILIZED, FOR SOLUTION INTRAVENOUS 2 TIMES DAILY
Status: DISCONTINUED | OUTPATIENT
Start: 2024-04-11 | End: 2024-04-12

## 2024-04-11 RX ORDER — ONDANSETRON HYDROCHLORIDE 2 MG/ML
4 INJECTION, SOLUTION INTRAVENOUS
Status: COMPLETED | OUTPATIENT
Start: 2024-04-11 | End: 2024-04-11

## 2024-04-11 RX ORDER — TIMOLOL MALEATE 5 MG/ML
1 SOLUTION/ DROPS OPHTHALMIC DAILY
Status: DISCONTINUED | OUTPATIENT
Start: 2024-04-11 | End: 2024-04-12 | Stop reason: HOSPADM

## 2024-04-11 RX ORDER — LABETALOL HYDROCHLORIDE 5 MG/ML
10 INJECTION, SOLUTION INTRAVENOUS EVERY 4 HOURS PRN
Status: DISCONTINUED | OUTPATIENT
Start: 2024-04-11 | End: 2024-04-12 | Stop reason: HOSPADM

## 2024-04-11 RX ORDER — ALUMINUM HYDROXIDE, MAGNESIUM HYDROXIDE, AND SIMETHICONE 1200; 120; 1200 MG/30ML; MG/30ML; MG/30ML
30 SUSPENSION ORAL
Status: DISCONTINUED | OUTPATIENT
Start: 2024-04-11 | End: 2024-04-12

## 2024-04-11 RX ORDER — ATORVASTATIN CALCIUM 10 MG/1
10 TABLET, FILM COATED ORAL NIGHTLY
Status: DISCONTINUED | OUTPATIENT
Start: 2024-04-11 | End: 2024-04-12 | Stop reason: HOSPADM

## 2024-04-11 RX ORDER — ALUMINUM HYDROXIDE, MAGNESIUM HYDROXIDE, AND SIMETHICONE 1200; 120; 1200 MG/30ML; MG/30ML; MG/30ML
30 SUSPENSION ORAL
Status: DISCONTINUED | OUTPATIENT
Start: 2024-04-11 | End: 2024-04-11

## 2024-04-11 RX ORDER — LATANOPROST 50 UG/ML
1 SOLUTION/ DROPS OPHTHALMIC NIGHTLY
Status: DISCONTINUED | OUTPATIENT
Start: 2024-04-11 | End: 2024-04-12 | Stop reason: HOSPADM

## 2024-04-11 RX ORDER — ONDANSETRON HYDROCHLORIDE 2 MG/ML
4 INJECTION, SOLUTION INTRAVENOUS EVERY 6 HOURS PRN
Status: DISCONTINUED | OUTPATIENT
Start: 2024-04-11 | End: 2024-04-12 | Stop reason: HOSPADM

## 2024-04-11 RX ORDER — DEXTROSE MONOHYDRATE AND SODIUM CHLORIDE 5; .45 G/100ML; G/100ML
INJECTION, SOLUTION INTRAVENOUS CONTINUOUS
Status: DISCONTINUED | OUTPATIENT
Start: 2024-04-11 | End: 2024-04-12

## 2024-04-11 RX ORDER — PANTOPRAZOLE SODIUM 40 MG/10ML
80 INJECTION, POWDER, LYOPHILIZED, FOR SOLUTION INTRAVENOUS
Status: COMPLETED | OUTPATIENT
Start: 2024-04-11 | End: 2024-04-11

## 2024-04-11 RX ORDER — METOPROLOL TARTRATE 25 MG/1
25 TABLET, FILM COATED ORAL 2 TIMES DAILY
Status: DISCONTINUED | OUTPATIENT
Start: 2024-04-11 | End: 2024-04-12 | Stop reason: HOSPADM

## 2024-04-11 RX ORDER — ACETAMINOPHEN 325 MG/1
650 TABLET ORAL EVERY 6 HOURS PRN
Status: DISCONTINUED | OUTPATIENT
Start: 2024-04-11 | End: 2024-04-12 | Stop reason: HOSPADM

## 2024-04-11 RX ORDER — MORPHINE SULFATE 4 MG/ML
4 INJECTION, SOLUTION INTRAMUSCULAR; INTRAVENOUS
Status: COMPLETED | OUTPATIENT
Start: 2024-04-11 | End: 2024-04-11

## 2024-04-11 RX ORDER — METOCLOPRAMIDE HYDROCHLORIDE 5 MG/ML
5 INJECTION INTRAMUSCULAR; INTRAVENOUS
Status: COMPLETED | OUTPATIENT
Start: 2024-04-11 | End: 2024-04-11

## 2024-04-11 RX ORDER — HYDRALAZINE HYDROCHLORIDE 20 MG/ML
10 INJECTION INTRAMUSCULAR; INTRAVENOUS EVERY 6 HOURS PRN
Status: DISCONTINUED | OUTPATIENT
Start: 2024-04-11 | End: 2024-04-11

## 2024-04-11 RX ADMIN — METOPROLOL TARTRATE 25 MG: 25 TABLET, FILM COATED ORAL at 11:04

## 2024-04-11 RX ADMIN — MORPHINE SULFATE 4 MG: 4 INJECTION INTRAVENOUS at 03:04

## 2024-04-11 RX ADMIN — ALUMINA, MAGNESIA, AND SIMETHICONE ORAL SUSPENSION REGULAR STRENGTH 30 ML: 1200; 1200; 120 SUSPENSION ORAL at 05:04

## 2024-04-11 RX ADMIN — ONDANSETRON 4 MG: 2 INJECTION INTRAMUSCULAR; INTRAVENOUS at 01:04

## 2024-04-11 RX ADMIN — METOCLOPRAMIDE 5 MG: 5 INJECTION, SOLUTION INTRAMUSCULAR; INTRAVENOUS at 06:04

## 2024-04-11 RX ADMIN — CEFTRIAXONE SODIUM 1 G: 1 INJECTION, POWDER, FOR SOLUTION INTRAMUSCULAR; INTRAVENOUS at 07:04

## 2024-04-11 RX ADMIN — PANTOPRAZOLE SODIUM 80 MG: 40 INJECTION, POWDER, FOR SOLUTION INTRAVENOUS at 07:04

## 2024-04-11 RX ADMIN — ONDANSETRON 4 MG: 2 INJECTION INTRAMUSCULAR; INTRAVENOUS at 02:04

## 2024-04-11 RX ADMIN — TIMOLOL MALEATE 1 DROP: 5 SOLUTION/ DROPS OPHTHALMIC at 01:04

## 2024-04-11 RX ADMIN — SODIUM CHLORIDE 500 ML: 9 INJECTION, SOLUTION INTRAVENOUS at 01:04

## 2024-04-11 RX ADMIN — METOPROLOL TARTRATE 25 MG: 25 TABLET, FILM COATED ORAL at 09:04

## 2024-04-11 RX ADMIN — LATANOPROST 1 DROP: 50 SOLUTION OPHTHALMIC at 09:04

## 2024-04-11 RX ADMIN — ATORVASTATIN CALCIUM 10 MG: 10 TABLET, FILM COATED ORAL at 09:04

## 2024-04-11 RX ADMIN — ALUMINA, MAGNESIA, AND SIMETHICONE ORAL SUSPENSION REGULAR STRENGTH 30 ML: 1200; 1200; 120 SUSPENSION ORAL at 09:04

## 2024-04-11 RX ADMIN — PANTOPRAZOLE SODIUM 40 MG: 40 INJECTION, POWDER, FOR SOLUTION INTRAVENOUS at 10:04

## 2024-04-11 RX ADMIN — PROMETHAZINE HYDROCHLORIDE 6.25 MG: 25 INJECTION INTRAMUSCULAR; INTRAVENOUS at 03:04

## 2024-04-11 RX ADMIN — DEXTROSE AND SODIUM CHLORIDE: 5; 450 INJECTION, SOLUTION INTRAVENOUS at 11:04

## 2024-04-11 NOTE — ASSESSMENT & PLAN NOTE
Protonix 80 mg IV given  Will continue Protonix 40 mg b.i.d.   Continue NPO   Trend H&H t.i.d.   Discussed case with GI  Who stated as patient is on anticoagulation   EGD if only urgent   If H&H stabilizes   EGD outpatient   However if H&H continues to trend down  Will need inpatient EGD

## 2024-04-11 NOTE — MEDICAL/APP STUDENT
"[facility] - Emergency Dept.  [facility] Medicine  Progress Note    Patient Name: Maria Del Carmen Spence  MRN: [id number]  Patient Class: OP- Observation   Admission Date: 4/11/2024  Length of Stay: 0 days  Attending Physician: Yimi Walker MD  Primary Care Provider: Rajinder Lutz MD        Subjective:     Chief Complaint   Patient presents with    Diarrhea    Nausea    Vomiting     Pt c/o N/V and diarrhea that started Monday. Reports small amount of black emesis today.     Heartburn       Principal Problem:<principal problem not specified>        HPI:  Maria Del Carmen Spence is a 83 y.o. female patient with a PMHx of a-fib, HTN, GERD who presents to the Emergency Department for evaluation of N/V/D which onset gradually on Monday. Patient states her symptoms started with multiple episodes of diarrhea that resolved after taking imodium. She notes she also had 1 episode of vomiting where she vomited "2 tablespoons of black" vomit. Symptoms are constant and moderate in severity. No mitigating or exacerbating factors reported. Associated sxs include abdominal distension and left sided abdominal pain. Patient denies any fever, SOB, CP, hematochezia, melena, dysuria, weakness, and all other sxs at this time. She notes she is on Xerelto. No further complaints or concerns at this time.         Interval History:   Pt currently reports nausea but denies any diarrhea. Denies any pain upon urination. Abdomen is sore diffusely.     Review of Systems   Constitutional:  Negative for fatigue and fever.   Eyes:  Negative for visual disturbance.   Respiratory:  Negative for apnea, choking, chest tightness, shortness of breath and stridor.    Cardiovascular:  Negative for chest pain, palpitations and leg swelling.   Gastrointestinal:  Positive for abdominal pain and nausea. Negative for abdominal distention, blood in stool, constipation, diarrhea and vomiting.   Genitourinary:  Negative for difficulty urinating.   Skin:  Negative for color change. "   Neurological:  Negative for dizziness, weakness, light-headedness, numbness and headaches.   All other systems reviewed and are negative.    Objective:   Weight: 70.9 kg (156 lb 4.9 oz)  Body mass index is 26.01 kg/m².    Intake/Output Summary (Last 24 hours) at 4/11/2024 0859  Last data filed at 4/11/2024 0438  Gross per 24 hour   Intake 550 ml   Output --   Net 550 ml     Vitals:    04/11/24 0700   BP: (!) 145/67   Pulse: 103   Resp: 15   Temp:      Physical Exam  Vitals reviewed.   Constitutional:       General: She is not in acute distress.     Appearance: She is not ill-appearing.   HENT:      Head: Normocephalic and atraumatic.   Cardiovascular:      Pulses: Normal pulses.      Heart sounds: No murmur heard.     No friction rub.   Pulmonary:      Effort: No respiratory distress.      Breath sounds: No wheezing.   Chest:      Chest wall: No tenderness.   Abdominal:      General: There is no distension.      Palpations: There is no mass.      Tenderness: There is abdominal tenderness. There is no guarding or rebound.      Hernia: No hernia is present.   Musculoskeletal:         General: No swelling, tenderness or deformity.      Right lower leg: No edema.      Left lower leg: No edema.   Skin:     Coloration: Skin is not jaundiced or pale.   Neurological:      Mental Status: She is oriented to person, place, and time.         Significant Labs: All pertinent labs within the past 24 hours have been reviewed.  Recent Lab Results         04/11/24  0634   04/11/24  0326   04/11/24  0152   04/11/24  0150        Albumin     3.9         ALP     85         ALT     17         Anion Gap     13         Appearance, UA       Clear       AST     30         Bacteria, UA       Many       Baso # 0.02     0.03         Basophil % 0.3     0.4         Bilirubin (UA)       Negative       BILIRUBIN TOTAL     0.5  Comment: For infants and newborns, interpretation of results should be based  on gestational age, weight and in agreement  with clinical  observations.    Premature Infant recommended reference ranges:  Up to 24 hours.............<8.0 mg/dL  Up to 48 hours............<12.0 mg/dL  3-5 days..................<15.0 mg/dL  6-29 days.................<15.0 mg/dL           BUN     23         Calcium     9.7         Chloride     102         CO2     20         Color, UA       Yellow       Creatinine     0.7         Differential Method Automated     Automated         eGFR     >60         Eos # 0.0     0.0         Eos % 0.0     0.1         Glucose     124         Glucose, UA       Negative       Gran # (ANC) 5.1     4.5         Gran % 69.0     59.5         Hematocrit 34.3     37.8         Hemoglobin 11.4     12.6         Immature Grans (Abs) 0.01  Comment: Mild elevation in immature granulocytes is non specific and   can be seen in a variety of conditions including stress response,   acute inflammation, trauma and pregnancy. Correlation with other   laboratory and clinical findings is essential.       0.03  Comment: Mild elevation in immature granulocytes is non specific and   can be seen in a variety of conditions including stress response,   acute inflammation, trauma and pregnancy. Correlation with other   laboratory and clinical findings is essential.           Immature Granulocytes 0.1     0.4         Ketones, UA       Negative       Leukocyte Esterase, UA       2+       Lipase     11         Lymph # 1.4     2.0         Lymph % 18.5     25.9         MCH 31.7     31.8         MCHC 33.2     33.3         MCV 95     96         Microscopic Comment       SEE COMMENT  Comment: Other formed elements not mentioned in the report are not   present in the microscopic examination.          Mono # 0.9     1.0         Mono % 12.1     13.7         MPV 9.8     9.6         NITRITE UA       Positive       nRBC 0     0         Occult Blood   Positive           Blood, UA       2+       pH, UA       6.0       Platelet Count 267     306         Potassium     3.7          PROTEIN TOTAL     7.8         Protein, UA       Trace  Comment: Recommend a 24 hour urine protein or a urine   protein/creatinine ratio if globulin induced proteinuria is  clinically suspected.         RBC 3.60     3.96         RBC, UA       5       RDW 12.7     12.7         Sodium     135         Specific [place], UA       1.025       Specimen UA       Urine, Clean Catch       Squam Epithel, UA       2       Troponin I     <0.006  Comment: The reference interval for Troponin I represents the 99th percentile   cutoff   for our facility and is consistent with 3rd generation assay   performance.           UROBILINOGEN UA       Negative       WBC, UA       23       WBC 7.35     7.54                 Significant Imaging: I have reviewed all pertinent imaging results/findings within the past 24 hours.  CT Abdomen Pelvis  Without Contrast   Final Result      Large hiatal hernia.      Evaluation of solid organ and vascular pathology is limited due to lack of IV contrast.      All CT scans at this facility use dose modulation, iterative reconstruction, and/or weight based dosing when appropriate to reduce radiation dose to as low as reasonable achievable.         Electronically signed by: Ryan Grey MD   Date:    04/11/2024   Time:    07:20              Assessment/Plan:      Hematemesis  -Two episodes of black vomit- one Wednesday, and another Thursday  -Consult gastroenterology  -Hold blood thinners  -NPO  -Trend Hgb and Hct    Primary HTN  Chronic, controlled. Latest blood pressure and vitals reviewed-     Temp:  [98.2 °F (36.8 °C)]   Pulse:  []   Resp:  [15-18]   BP: (131-148)/(66-84)   SpO2:  [96 %-98 %] .   Home meds for hypertension were reviewed and noted below.   Hypertension Medications               furosemide (LASIX) 20 MG tablet TAKE ONE TABLET BY MOUTH TWICE A WEEK    metoprolol tartrate (LOPRESSOR) 25 MG tablet Take 1 tablet (25 mg total) by mouth 2 (two) times daily.            While in the  hospital, will manage blood pressure as follows; Continue home antihypertensive regimen    Will utilize p.r.n. blood pressure medication only if patient's blood pressure greater than 180/110 and she develops symptoms such as worsening chest pain or shortness of breath.    HLD  -Continue atorvastatin    Afib  -Continue metoprolol    UTI  Recent Labs   Lab 04/11/24  0150   COLORU Yellow   SPECGRAV 1.025   PHUR 6.0   PROTEINUA Trace*   BACTERIA Many*     -Start ceftriaxone    No notes have been filed under this hospital service.  Service: Hospital Medicine    VTE Risk Mitigation (From admission, onward)      None            Discharge Planning   STEF:      Code Status: Prior   Is the patient medically ready for discharge?:     Reason for patient still in hospital (select all that apply): Patient trending condition                     Herbert Lundy  Department of Hospital Medicine   [facility] - Emergency Dept.

## 2024-04-11 NOTE — ED PROVIDER NOTES
"SCRIBE #1 NOTE: I, Nader Wylie, am scribing for, and in the presence of, Bryant Canales MD. I have scribed the HPI, ROS, and PEx.    SCRIBE #2 NOTE: I, Fausto Adame Jr. with Estefania Krause, am scribing for, and in the presence of,  Janes Lee MD. I have scribed the remaining portions of the note not scribed by Scribe #1.      History     Chief Complaint   Patient presents with    Diarrhea    Nausea    Vomiting     Pt c/o N/V and diarrhea that started Monday. Reports small amount of black emesis today.     Heartburn     Review of patient's allergies indicates:   Allergen Reactions    Voltaren [diclofenac sodium] Edema     Gel formulation caused facial rash and facial swelling    Eliquis [apixaban] Other (See Comments)     Headache, numbness in lip     Medrol [methylprednisolone] Blisters         History of Present Illness     HPI    4/11/2024, 1:27 AM  History obtained from the patient      History of Present Illness: Maria Del Carmen Spence is a 83 y.o. female patient with a PMHx of a-fib, HTN, GERD who presents to the Emergency Department for evaluation of N/V/D which onset gradually on Monday. Patient states her symptoms started with multiple episodes of diarrhea that resolved after taking imodium. She notes she also had 1 episode of vomiting where she vomited "2 tablespoons of black" vomit. Symptoms are constant and moderate in severity. No mitigating or exacerbating factors reported. Associated sxs include abdominal distension and left sided abdominal pain. Patient denies any fever, SOB, CP, hematochezia, melena, dysuria, weakness, and all other sxs at this time. She notes she is on Xerelto. No further complaints or concerns at this time.       Arrival mode: Personal vehicle    PCP: Rajinder Lutz MD        Past Medical History:  Past Medical History:   Diagnosis Date    A-fib     Arthritis     Chronic LBP     ESIs x 3 with Dr. Hicks, PT at [facility], chiropractor    Eye pain     Frequent headaches     GERD " (gastroesophageal reflux disease)     Glaucoma     Hyperlipemia     Hypertension        Past Surgical History:  Past Surgical History:   Procedure Laterality Date    ABLATION OF ARRHYTHMOGENIC FOCUS FOR ATRIAL FIBRILLATION N/A 3/6/2019    Procedure: ABLATION, ARRHYTHMOGENIC FOCUS, FOR ATRIAL FIBRILLATION;  Surgeon: Abel Morillo MD;  Location: [facility] EP LAB;  Service: Cardiology;  Laterality: N/A;    CARDIOVERSION N/A 7/9/2018    Procedure: CARDIOVERSION;  Surgeon: Will Rosenthal MD;  Location: [facility] CATH LAB;  Service: Cardiology;  Laterality: N/A;    CATARACT EXTRACTION W/  INTRAOCULAR LENS IMPLANT  OU    INJECTION OF ANESTHETIC AGENT INTO SACROILIAC JOINT Right 11/3/2020    Procedure: right Sacroiliac Joint Injection;  Surgeon: Ayush Christiansen MD;  Location: [facility] PAIN MGT;  Service: Pain Management;  Laterality: Right;    INJECTION OF ANESTHETIC AGENT INTO SACROILIAC JOINT Right 3/23/2021    Procedure: right Sacroiliac Joint Injection;  Surgeon: Ayush Christiansen MD;  Location: [facility] PAIN MGT;  Service: Pain Management;  Laterality: Right;    INJECTION OF JOINT Right 11/3/2020    Procedure: right GT bursa injection;  Surgeon: Ayush Christiansen MD;  Location: [facility] PAIN MGT;  Service: Pain Management;  Laterality: Right;    INJECTION OF JOINT Bilateral 3/23/2021    Procedure: bilateral GT bursa injection;  Surgeon: Ayush Christiansen MD;  Location: [facility] PAIN MGT;  Service: Pain Management;  Laterality: Bilateral;    TUBAL LIGATION           Family History:  Family History   Problem Relation Age of Onset    Glaucoma Mother     Cancer Mother     Cataracts Mother     Hypertension Mother     Hypertension Father     Diabetes Paternal Aunt     Strabismus Neg Hx     Retinal detachment Neg Hx     Macular degeneration Neg Hx     Blindness Neg Hx     Amblyopia Neg Hx     Stroke Neg Hx     Thyroid disease Neg Hx        Social History:  Social History     Tobacco Use    Smoking status: Never    Smokeless tobacco: Never    Substance and Sexual Activity    Alcohol use: No    Drug use: No    Sexual activity: Yes     Partners: Male        Review of Systems     Review of Systems   Constitutional:  Negative for fever.   HENT:  Negative for sore throat.    Respiratory:  Negative for shortness of breath.    Cardiovascular:  Negative for chest pain.   Gastrointestinal:  Positive for abdominal distention, abdominal pain (left sided), diarrhea, nausea and vomiting (x1). Negative for blood in stool and constipation.   Genitourinary:  Negative for dysuria.   Musculoskeletal:  Negative for back pain.   Skin:  Negative for rash.   Neurological:  Negative for weakness.   Hematological:  Does not bruise/bleed easily.   All other systems reviewed and are negative.     Physical Exam     Initial Vitals [04/10/24 2241]   BP Pulse Resp Temp SpO2   (!) 148/84 96 18 98.2 °F (36.8 °C) 98 %      MAP       --          Physical Exam   Nursing Notes and Vital Signs Reviewed.  Constitutional: Patient is in no acute distress. Well-developed and well-nourished.  Head: Atraumatic. Normocephalic.  Eyes: PERRL. EOM intact. Conjunctivae are not pale. No scleral icterus.  ENT: Mucous membranes are moist. Oropharynx is clear and symmetric.    Neck: Supple. Full ROM. No lymphadenopathy.  Cardiovascular: Regular rate. Regular rhythm. No murmurs, rubs, or gallops. Distal pulses are 2+ and symmetric.  Pulmonary/Chest: No respiratory distress. Clear to auscultation bilaterally. No wheezing or rales.  Abdominal: Soft and non-distended.  There is left sided abdominal tenderness.  No rebound, guarding, or rigidity. Good bowel sounds.  Genitourinary: No CVA tenderness  Musculoskeletal: Moves all extremities. No obvious deformities. No edema. No calf tenderness.  Skin: Warm and dry.  Neurological:  Alert, awake, and appropriate.  Normal speech.  No acute focal neurological deficits are appreciated.  Psychiatric: Normal affect. Good eye contact. Appropriate in content.     ED  Course   Critical Care    Date/Time: 4/11/2024 7:13 AM    Performed by: Janes Lee MD  Authorized by: Janes Lee MD  Total critical care time (exclusive of procedural time) : 0 minutes  Critical care time was exclusive of separately billable procedures and treating other patients and teaching time.  Critical care was necessary to treat or prevent imminent or life-threatening deterioration of the following conditions: Upper GI bleed.  Critical care was time spent personally by me on the following activities: blood draw for specimens, development of treatment plan with patient or surrogate, discussions with consultants, interpretation of cardiac output measurements, examination of patient, evaluation of patient's response to treatment, obtaining history from patient or surrogate, ordering and performing treatments and interventions, ordering and review of radiographic studies, re-evaluation of patient's condition, review of old charts, pulse oximetry and ordering and review of laboratory studies.        ED Vital Signs:  Vitals:    04/10/24 2241 04/11/24 0156 04/11/24 0347 04/11/24 0700   BP: (!) 148/84   (!) 145/67   Pulse: 96   103   Resp: 18  18 15   Temp: 98.2 °F (36.8 °C)      TempSrc: Oral      SpO2: 98%   97%   Weight:  70.9 kg (156 lb 4.9 oz)         Abnormal Lab Results:  Labs Reviewed   CBC W/ AUTO DIFFERENTIAL - Abnormal; Notable for the following components:       Result Value    RBC 3.96 (*)     MCH 31.8 (*)     All other components within normal limits   COMPREHENSIVE METABOLIC PANEL - Abnormal; Notable for the following components:    Sodium 135 (*)     CO2 20 (*)     Glucose 124 (*)     All other components within normal limits   URINALYSIS, REFLEX TO URINE CULTURE - Abnormal; Notable for the following components:    Protein, UA Trace (*)     Occult Blood UA 2+ (*)     Nitrite, UA Positive (*)     Leukocytes, UA 2+ (*)     All other components within normal limits    Narrative:      Specimen Source->Urine   URINALYSIS MICROSCOPIC - Abnormal; Notable for the following components:    RBC, UA 5 (*)     WBC, UA 23 (*)     Bacteria Many (*)     All other components within normal limits    Narrative:     Specimen Source->Urine   OCCULT BLOOD, OTHER SOURCES - Abnormal; Notable for the following components:    Occult Blood Positive (*)     All other components within normal limits   CBC W/ AUTO DIFFERENTIAL - Abnormal; Notable for the following components:    RBC 3.60 (*)     Hemoglobin 11.4 (*)     Hematocrit 34.3 (*)     MCH 31.7 (*)     All other components within normal limits   CULTURE, URINE   LIPASE   TROPONIN I   TROPONIN I        All Lab Results:  Results for orders placed or performed during the hospital encounter of 04/11/24   CBC auto differential   Result Value Ref Range    WBC 7.54 3.90 - 12.70 K/uL    RBC 3.96 (L) 4.00 - 5.40 M/uL    Hemoglobin 12.6 12.0 - 16.0 g/dL    Hematocrit 37.8 37.0 - 48.5 %    MCV 96 82 - 98 fL    MCH 31.8 (H) 27.0 - 31.0 pg    MCHC 33.3 32.0 - 36.0 g/dL    RDW 12.7 11.5 - 14.5 %    Platelets 306 150 - 450 K/uL    MPV 9.6 9.2 - 12.9 fL    Immature Granulocytes 0.4 0.0 - 0.5 %    Gran # (ANC) 4.5 1.8 - 7.7 K/uL    Immature Grans (Abs) 0.03 0.00 - 0.04 K/uL    Lymph # 2.0 1.0 - 4.8 K/uL    Mono # 1.0 0.3 - 1.0 K/uL    Eos # 0.0 0.0 - 0.5 K/uL    Baso # 0.03 0.00 - 0.20 K/uL    nRBC 0 0 /100 WBC    Gran % 59.5 38.0 - 73.0 %    Lymph % 25.9 18.0 - 48.0 %    Mono % 13.7 4.0 - 15.0 %    Eosinophil % 0.1 0.0 - 8.0 %    Basophil % 0.4 0.0 - 1.9 %    Differential Method Automated    Comprehensive metabolic panel   Result Value Ref Range    Sodium 135 (L) 136 - 145 mmol/L    Potassium 3.7 3.5 - 5.1 mmol/L    Chloride 102 95 - 110 mmol/L    CO2 20 (L) 23 - 29 mmol/L    Glucose 124 (H) 70 - 110 mg/dL    BUN 23 8 - 23 mg/dL    Creatinine 0.7 0.5 - 1.4 mg/dL    Calcium 9.7 8.7 - 10.5 mg/dL    Total Protein 7.8 6.0 - 8.4 g/dL    Albumin 3.9 3.5 - 5.2 g/dL    Total Bilirubin 0.5  0.1 - 1.0 mg/dL    Alkaline Phosphatase 85 55 - 135 U/L    AST 30 10 - 40 U/L    ALT 17 10 - 44 U/L    eGFR >60 >60 mL/min/1.73 m^2    Anion Gap 13 8 - 16 mmol/L   Lipase   Result Value Ref Range    Lipase 11 4 - 60 U/L   Urinalysis, Reflex to Urine Culture Urine, Clean Catch    Specimen: Urine, Clean Catch   Result Value Ref Range    Specimen UA Urine, Clean Catch     Color, UA Yellow Yellow, Straw, Morena    Appearance, UA Clear Clear    pH, UA 6.0 5.0 - 8.0    Specific Gravity, UA 1.025 1.005 - 1.030    Protein, UA Trace (A) Negative    Glucose, UA Negative Negative    Ketones, UA Negative Negative    Bilirubin (UA) Negative Negative    Occult Blood UA 2+ (A) Negative    Nitrite, UA Positive (A) Negative    Urobilinogen, UA Negative <2.0 EU/dL    Leukocytes, UA 2+ (A) Negative   Troponin I   Result Value Ref Range    Troponin I <0.006 0.000 - 0.026 ng/mL   Urinalysis Microscopic   Result Value Ref Range    RBC, UA 5 (H) 0 - 4 /hpf    WBC, UA 23 (H) 0 - 5 /hpf    Bacteria Many (A) None-Occ /hpf    Squam Epithel, UA 2 /hpf    Microscopic Comment SEE COMMENT    Occult blood, other sources   Result Value Ref Range    Occult Blood Positive (A) Negative   CBC auto differential   Result Value Ref Range    WBC 7.35 3.90 - 12.70 K/uL    RBC 3.60 (L) 4.00 - 5.40 M/uL    Hemoglobin 11.4 (L) 12.0 - 16.0 g/dL    Hematocrit 34.3 (L) 37.0 - 48.5 %    MCV 95 82 - 98 fL    MCH 31.7 (H) 27.0 - 31.0 pg    MCHC 33.2 32.0 - 36.0 g/dL    RDW 12.7 11.5 - 14.5 %    Platelets 267 150 - 450 K/uL    MPV 9.8 9.2 - 12.9 fL    Immature Granulocytes 0.1 0.0 - 0.5 %    Gran # (ANC) 5.1 1.8 - 7.7 K/uL    Immature Grans (Abs) 0.01 0.00 - 0.04 K/uL    Lymph # 1.4 1.0 - 4.8 K/uL    Mono # 0.9 0.3 - 1.0 K/uL    Eos # 0.0 0.0 - 0.5 K/uL    Baso # 0.02 0.00 - 0.20 K/uL    nRBC 0 0 /100 WBC    Gran % 69.0 38.0 - 73.0 %    Lymph % 18.5 18.0 - 48.0 %    Mono % 12.1 4.0 - 15.0 %    Eosinophil % 0.0 0.0 - 8.0 %    Basophil % 0.3 0.0 - 1.9 %    Differential  Method Automated          Imaging Results:  Imaging Results              CT Abdomen Pelvis  Without Contrast (Final result)  Result time 04/11/24 07:20:55      Final result by Ryan Grey MD (04/11/24 07:20:55)                   Impression:      Large hiatal hernia.    Evaluation of solid organ and vascular pathology is limited due to lack of IV contrast.    All CT scans at this facility use dose modulation, iterative reconstruction, and/or weight based dosing when appropriate to reduce radiation dose to as low as reasonable achievable.      Electronically signed by: Ryan Grey MD  Date:    04/11/2024  Time:    07:20               Narrative:    EXAMINATION:  CT ABDOMEN PELVIS WITHOUT CONTRAST    CLINICAL HISTORY:  Abdominal pain, acute, nonlocalized;    TECHNIQUE:  Low dose axial images, sagittal and coronal reformations were obtained from the lung bases to the pubic symphysis.  Oral contrast was not administered.    COMPARISON:  None    FINDINGS:  Heart: Normal size. No effusion.    Lung Bases: Clear.  Mild atelectasis right lower lobe.    Liver: Normal size and attenuation. No focal lesions.    Gallbladder: No calcified gallstones.    Bile Ducts: No dilatation.    Pancreas: No obvious mass. No peripancreatic fat stranding.    Spleen: Normal.    Adrenals: Normal.    Kidneys/Ureters: No mass, hydroureteronephrosis, or nephroureterolithiasis.    Bladder: Mild nonspecific wall thickening.    Reproductive organs: Normal.    GI Tract/Mesentery: Large hiatal hernia.  No evidence of bowel obstruction or inflammation.  Scattered diverticulosis without evidence of acute diverticulitis.    Peritoneal Space: No ascites or free air.    Retroperitoneum: No significant adenopathy.    Abdominal wall: Normal.    Vasculature: No aneurysm.    Bones: No acute fracture.  Diffuse osteopenia advanced degenerative changes of the lumbar spine multilevel disc space narrowing and spondylosis as well as facet arthropathy just are  producing varying degrees of neural foraminal narrowing.  Moderate hip joint degenerative changes.  No suspicious lytic or sclerotic lesions.                                     3:34 AM: Per Gus Medeiros MD from [facility], pt's CT abdomen and pelvis without IV contrast results: No acute findings in the abdomen and pelvis.    The EKG was ordered, reviewed, and independently interpreted by the ED provider.  Interpretation time: 01:52  Rate: 100 BPM  Rhythm: Sinus rhythm with premature supraventricular complexes.  Interpretation: Low voltage QRS. Nonspecific ST and T wave abnormality. No STEMI.    The Emergency Provider reviewed the vital signs and test results, which are outlined above.     ED Discussion     6:00 AM: Dr. Canales transfers care of patient to Dr. Lee pending imaging results.    7:00 AM: Dr. Lee visited the pt who reports she takes xarelto.    7:10 AM: Discussed case with Dr. Walker ([facility]). Dr. Walker agrees with current care and management of pt and accepts admission.   Admitting Service: Hospital Medicine  Admitting Physician: Dr. Walker  Admit to: Obs Tele     7:10 AM: Re-evaluated pt. I have discussed test results, shared treatment plan, and the need for admission with patient and family at bedside. Pt and family express understanding at this time and agree with all information. All questions answered. Pt and family have no further questions or concerns at this time. Pt is ready for admit.       Medical Decision Making  Diarrhea that started earlier this week, and then resolved.  Following coffee ground emesis  On xarelto  DDx: vomiting, GI bleed    Amount and/or Complexity of Data Reviewed  Labs: ordered. Decision-making details documented in ED Course.  Radiology: ordered. Decision-making details documented in ED Course.     Details: Exam: CT Abdomen and Pelvis Without Intravenous Contrast. MHx: Abdominal pain, acute, nonlocalized. Technique: Axial computed tomography images of the  abdomen and pelvis without intravenous contrast. Comparison: none Findings: Lung bases: Unremarkable. No mass. No consolidation. Mediastinum: Very large hiatus hernia. Abdomen: Liver: unremarkable. Gallbladder and bile ducts: unremarkable. No calcified stones. No ductal dilation. Pancreas:unremarkable No ductal dilation. Spleen: unremarkable. No splenomegaly. Adrenals: unremarkable. No mass. Kidneys and ureters: unremarkable. No obstructing stones. No hydronephrosis. Stomach and bowel: Diverticulosis. No obstruction. No mucosal thickening. Pelvis: Appendix: No findings to suggest acute appendicitis. Bladder: unremarkable. No stones. Reproductive: unremarkable as visualized. Abdomen and Pelvis: Intraperitoneal space: unremarkable. No free air. No significant fluid collection. Bones/joints: No acute fracture. No dislocation. Soft tissues: Unremarkable. Vasculature: unremarkable. No AAA. Lymph nodes: unremarkable. No enlarged lymph nodes. Impression: No acute findings in the abdomen or pelvis.  ECG/medicine tests: ordered and independent interpretation performed. Decision-making details documented in ED Course.    Risk  OTC drugs.  Prescription drug management.                 ED Medication(s):  Medications   aluminum-magnesium hydroxide-simethicone 200-200-20 mg/5 mL suspension 30 mL (30 mLs Oral Given 4/11/24 0530)   cefTRIAXone (Rocephin) 1 g in dextrose 5 % in water (D5W) 100 mL IVPB (MB+) (1 g Intravenous New Bag 4/11/24 0718)   ondansetron injection 4 mg (4 mg Intravenous Given 4/11/24 0151)   sodium chloride 0.9% bolus 500 mL 500 mL (0 mLs Intravenous Stopped 4/11/24 0347)   ondansetron injection 4 mg (4 mg Intravenous Given 4/11/24 0224)   morphine injection 4 mg (4 mg Intravenous Given 4/11/24 0347)   promethazine (PHENERGAN) 6.25 mg in dextrose 5 % (D5W) 50 mL IVPB (0 mg Intravenous Stopped 4/11/24 0438)   metoclopramide injection 5 mg (5 mg Intravenous Given 4/11/24 0642)   pantoprazole injection 80 mg (80  mg Intravenous Given 4/11/24 0742)       New Prescriptions    No medications on file               Scribe Attestation:   Scribe #1: I performed the above scribed service and the documentation accurately describes the services I performed. I attest to the accuracy of the note.     Attending:   Physician Attestation Statement for Scribe #1: I, Bryant Canales MD, personally performed the services described in this documentation, as scribed by Nader Wylie, in my presence, and it is both accurate and complete.       Scribe Attestation:   Scribe #2: I performed the above scribed service and the documentation accurately describes the services I performed. I attest to the accuracy of the note.    Attending Attestation:           Physician Attestation for Scribe:    Physician Attestation Statement for Scribe #2: I, Janes Lee MD, reviewed documentation, as scribed by Fausto Adame Jr. with Estefania Krause in my presence, and it is both accurate and complete. I also acknowledge and confirm the content of the note done by Scribe #1.           Clinical Impression       ICD-10-CM ICD-9-CM   1. Upper GI bleed  K92.2 578.9   2. Abdominal pain, acute, generalized  R10.84 789.07     338.19   3. Nausea and vomiting, unspecified vomiting type  R11.2 787.01       Disposition:   Disposition: Placed in Observation  Condition: Janes Rizzo MD  04/11/24 0809

## 2024-04-11 NOTE — SUBJECTIVE & OBJECTIVE
Past Medical History:   Diagnosis Date    A-fib     Arthritis     Chronic LBP     ESIs x 3 with Dr. Hicks, PT at [facility], chiropractor    Eye pain     Frequent headaches     GERD (gastroesophageal reflux disease)     Glaucoma     Hyperlipemia     Hypertension        Past Surgical History:   Procedure Laterality Date    ABLATION OF ARRHYTHMOGENIC FOCUS FOR ATRIAL FIBRILLATION N/A 3/6/2019    Procedure: ABLATION, ARRHYTHMOGENIC FOCUS, FOR ATRIAL FIBRILLATION;  Surgeon: Abel Morillo MD;  Location: [facility] EP LAB;  Service: Cardiology;  Laterality: N/A;    CARDIOVERSION N/A 7/9/2018    Procedure: CARDIOVERSION;  Surgeon: Will Rosenthal MD;  Location: [facility] CATH LAB;  Service: Cardiology;  Laterality: N/A;    CATARACT EXTRACTION W/  INTRAOCULAR LENS IMPLANT  OU    INJECTION OF ANESTHETIC AGENT INTO SACROILIAC JOINT Right 11/3/2020    Procedure: right Sacroiliac Joint Injection;  Surgeon: Ayush Christiansen MD;  Location: [facility] PAIN MGT;  Service: Pain Management;  Laterality: Right;    INJECTION OF ANESTHETIC AGENT INTO SACROILIAC JOINT Right 3/23/2021    Procedure: right Sacroiliac Joint Injection;  Surgeon: Ayush Christiansen MD;  Location: [facility] PAIN MGT;  Service: Pain Management;  Laterality: Right;    INJECTION OF JOINT Right 11/3/2020    Procedure: right GT bursa injection;  Surgeon: Ayush Christiansen MD;  Location: [facility] PAIN MGT;  Service: Pain Management;  Laterality: Right;    INJECTION OF JOINT Bilateral 3/23/2021    Procedure: bilateral GT bursa injection;  Surgeon: Ayush Christiansen MD;  Location: [facility] PAIN MGT;  Service: Pain Management;  Laterality: Bilateral;    TUBAL LIGATION         Review of patient's allergies indicates:   Allergen Reactions    Voltaren [diclofenac sodium] Edema     Gel formulation caused facial rash and facial swelling    Eliquis [apixaban] Other (See Comments)     Headache, numbness in lip     Medrol [methylprednisolone] Blisters       No current facility-administered medications on file  prior to encounter.     Current Outpatient Medications on File Prior to Encounter   Medication Sig    AA/prot/lysine/methio/vit C/B6 (A/G PRO ORAL) Take 2 tablets by mouth 2 (two) times daily.     acetaminophen (TYLENOL) 650 MG TbSR Take 650 mg by mouth 2 (two) times daily.    atorvastatin (LIPITOR) 10 MG tablet Take 1 tablet (10 mg total) by mouth every evening.    CALCIUM PHOSPHATE TRIB/VIT D3 (CITRACAL + D ORAL) Take 1 tablet by mouth once daily at 6am.     CETIRIZINE HCL (ZYRTEC ORAL) Take by mouth once daily.     furosemide (LASIX) 20 MG tablet TAKE ONE TABLET BY MOUTH TWICE A WEEK    gabapentin (NEURONTIN) 400 MG capsule Take 1 capsule (400 mg total) by mouth 3 (three) times daily.    latanoprost 0.005 % ophthalmic solution INSTILL ONE DROP IN EACH EYE AT BEDTIME    metoprolol tartrate (LOPRESSOR) 25 MG tablet Take 1 tablet (25 mg total) by mouth 2 (two) times daily.    MULTIVITAMIN W-MINERALS/LUTEIN (CENTRUM SILVER ORAL) Take by mouth.    omeprazole (PRILOSEC) 40 MG capsule Take 1 capsule (40 mg total) by mouth once daily.    rivaroxaban (XARELTO) 15 mg Tab Take 1 tablet (15 mg total) by mouth daily with dinner or evening meal.    RSVPreF3 antigen-AS01E, PF, (AREXVY, PF,) 120 mcg/0.5 mL SusR vaccine Inject into the muscle.    timolol maleate 0.5% (TIMOPTIC) 0.5 % Drop PLACE ONE DROP INTO BOTH EYES EVERY MORNING     Family History       Problem Relation (Age of Onset)    Cancer Mother    Cataracts Mother    Diabetes Paternal Aunt    Glaucoma Mother    Hypertension Mother, Father          Tobacco Use    Smoking status: Never    Smokeless tobacco: Never   Substance and Sexual Activity    Alcohol use: No    Drug use: No    Sexual activity: Yes     Partners: Male     Review of Systems   Constitutional:  Negative for fatigue and fever.   HENT:  Negative for sinus pressure.    Eyes:  Negative for visual disturbance.   Respiratory:  Negative for shortness of breath.    Cardiovascular:  Negative for chest pain.    Gastrointestinal:  Positive for nausea and vomiting.   Genitourinary:  Negative for difficulty urinating.   Musculoskeletal:  Negative for back pain.   Skin:  Negative for rash.   Neurological:  Negative for headaches.   Psychiatric/Behavioral:  Negative for confusion.      Objective:     Vital Signs (Most Recent):  Temp: 98.2 °F (36.8 °C) (04/10/24 2241)  Pulse: (!) 119 (04/11/24 1000)  Resp: 15 (04/11/24 0900)  BP: (!) 140/67 (04/11/24 1000)  SpO2: 96 % (04/11/24 0900) Vital Signs (24h Range):  Temp:  [98.2 °F (36.8 °C)] 98.2 °F (36.8 °C)  Pulse:  [] 119  Resp:  [15-18] 15  SpO2:  [96 %-98 %] 96 %  BP: (128-148)/(65-84) 140/67     Weight: 70.9 kg (156 lb 4.9 oz)  Body mass index is 26.01 kg/m².     Physical Exam  Constitutional:       General: She is not in acute distress.     Appearance: She is well-developed. She is not diaphoretic.   HENT:      Head: Normocephalic and atraumatic.   Eyes:      Pupils: Pupils are equal, round, and reactive to light.   Cardiovascular:      Rate and Rhythm: Normal rate and regular rhythm.      Heart sounds: Normal heart sounds. No murmur heard.     No friction rub. No gallop.   Pulmonary:      Effort: Pulmonary effort is normal. No respiratory distress.      Breath sounds: Normal breath sounds. No stridor. No wheezing or rales.   Abdominal:      General: Bowel sounds are normal. There is no distension.      Palpations: Abdomen is soft. There is no mass.      Tenderness: There is no abdominal tenderness. There is no guarding.   Musculoskeletal:      Right lower leg: No edema.      Left lower leg: No edema.   Skin:     General: Skin is warm.      Findings: No erythema.   Neurological:      Mental Status: She is alert and oriented to person, place, and time.              CRANIAL NERVES     CN III, IV, VI   Pupils are equal, round, and reactive to light.       Significant Labs:   Results for orders placed or performed during the hospital encounter of 04/11/24   CBC auto  differential   Result Value Ref Range    WBC 7.54 3.90 - 12.70 K/uL    RBC 3.96 (L) 4.00 - 5.40 M/uL    Hemoglobin 12.6 12.0 - 16.0 g/dL    Hematocrit 37.8 37.0 - 48.5 %    MCV 96 82 - 98 fL    MCH 31.8 (H) 27.0 - 31.0 pg    MCHC 33.3 32.0 - 36.0 g/dL    RDW 12.7 11.5 - 14.5 %    Platelets 306 150 - 450 K/uL    MPV 9.6 9.2 - 12.9 fL    Immature Granulocytes 0.4 0.0 - 0.5 %    Gran # (ANC) 4.5 1.8 - 7.7 K/uL    Immature Grans (Abs) 0.03 0.00 - 0.04 K/uL    Lymph # 2.0 1.0 - 4.8 K/uL    Mono # 1.0 0.3 - 1.0 K/uL    Eos # 0.0 0.0 - 0.5 K/uL    Baso # 0.03 0.00 - 0.20 K/uL    nRBC 0 0 /100 WBC    Gran % 59.5 38.0 - 73.0 %    Lymph % 25.9 18.0 - 48.0 %    Mono % 13.7 4.0 - 15.0 %    Eosinophil % 0.1 0.0 - 8.0 %    Basophil % 0.4 0.0 - 1.9 %    Differential Method Automated    Comprehensive metabolic panel   Result Value Ref Range    Sodium 135 (L) 136 - 145 mmol/L    Potassium 3.7 3.5 - 5.1 mmol/L    Chloride 102 95 - 110 mmol/L    CO2 20 (L) 23 - 29 mmol/L    Glucose 124 (H) 70 - 110 mg/dL    BUN 23 8 - 23 mg/dL    Creatinine 0.7 0.5 - 1.4 mg/dL    Calcium 9.7 8.7 - 10.5 mg/dL    Total Protein 7.8 6.0 - 8.4 g/dL    Albumin 3.9 3.5 - 5.2 g/dL    Total Bilirubin 0.5 0.1 - 1.0 mg/dL    Alkaline Phosphatase 85 55 - 135 U/L    AST 30 10 - 40 U/L    ALT 17 10 - 44 U/L    eGFR >60 >60 mL/min/1.73 m^2    Anion Gap 13 8 - 16 mmol/L   Lipase   Result Value Ref Range    Lipase 11 4 - 60 U/L   Urinalysis, Reflex to Urine Culture Urine, Clean Catch    Specimen: Urine, Clean Catch   Result Value Ref Range    Specimen UA Urine, Clean Catch     Color, UA Yellow Yellow, Straw, Morena    Appearance, UA Clear Clear    pH, UA 6.0 5.0 - 8.0    Specific Gravity, UA 1.025 1.005 - 1.030    Protein, UA Trace (A) Negative    Glucose, UA Negative Negative    Ketones, UA Negative Negative    Bilirubin (UA) Negative Negative    Occult Blood UA 2+ (A) Negative    Nitrite, UA Positive (A) Negative    Urobilinogen, UA Negative <2.0 EU/dL    Leukocytes,  UA 2+ (A) Negative   Troponin I   Result Value Ref Range    Troponin I <0.006 0.000 - 0.026 ng/mL   Urinalysis Microscopic   Result Value Ref Range    RBC, UA 5 (H) 0 - 4 /hpf    WBC, UA 23 (H) 0 - 5 /hpf    Bacteria Many (A) None-Occ /hpf    Squam Epithel, UA 2 /hpf    Microscopic Comment SEE COMMENT    Occult blood, other sources   Result Value Ref Range    Occult Blood Positive (A) Negative   CBC auto differential   Result Value Ref Range    WBC 7.35 3.90 - 12.70 K/uL    RBC 3.60 (L) 4.00 - 5.40 M/uL    Hemoglobin 11.4 (L) 12.0 - 16.0 g/dL    Hematocrit 34.3 (L) 37.0 - 48.5 %    MCV 95 82 - 98 fL    MCH 31.7 (H) 27.0 - 31.0 pg    MCHC 33.2 32.0 - 36.0 g/dL    RDW 12.7 11.5 - 14.5 %    Platelets 267 150 - 450 K/uL    MPV 9.8 9.2 - 12.9 fL    Immature Granulocytes 0.1 0.0 - 0.5 %    Gran # (ANC) 5.1 1.8 - 7.7 K/uL    Immature Grans (Abs) 0.01 0.00 - 0.04 K/uL    Lymph # 1.4 1.0 - 4.8 K/uL    Mono # 0.9 0.3 - 1.0 K/uL    Eos # 0.0 0.0 - 0.5 K/uL    Baso # 0.02 0.00 - 0.20 K/uL    nRBC 0 0 /100 WBC    Gran % 69.0 38.0 - 73.0 %    Lymph % 18.5 18.0 - 48.0 %    Mono % 12.1 4.0 - 15.0 %    Eosinophil % 0.0 0.0 - 8.0 %    Basophil % 0.3 0.0 - 1.9 %    Differential Method Automated    EKG 12-lead   Result Value Ref Range    QRS Duration 78 ms    OHS QTC Calculation 459 ms        Significant Imaging: CT Abdomen Pelvis  Without Contrast  Narrative: EXAMINATION:  CT ABDOMEN PELVIS WITHOUT CONTRAST    CLINICAL HISTORY:  Abdominal pain, acute, nonlocalized;    TECHNIQUE:  Low dose axial images, sagittal and coronal reformations were obtained from the lung bases to the pubic symphysis.  Oral contrast was not administered.    COMPARISON:  None    FINDINGS:  Heart: Normal size. No effusion.    Lung Bases: Clear.  Mild atelectasis right lower lobe.    Liver: Normal size and attenuation. No focal lesions.    Gallbladder: No calcified gallstones.    Bile Ducts: No dilatation.    Pancreas: No obvious mass. No peripancreatic fat  stranding.    Spleen: Normal.    Adrenals: Normal.    Kidneys/Ureters: No mass, hydroureteronephrosis, or nephroureterolithiasis.    Bladder: Mild nonspecific wall thickening.    Reproductive organs: Normal.    GI Tract/Mesentery: Large hiatal hernia.  No evidence of bowel obstruction or inflammation.  Scattered diverticulosis without evidence of acute diverticulitis.    Peritoneal Space: No ascites or free air.    Retroperitoneum: No significant adenopathy.    Abdominal wall: Normal.    Vasculature: No aneurysm.    Bones: No acute fracture.  Diffuse osteopenia advanced degenerative changes of the lumbar spine multilevel disc space narrowing and spondylosis as well as facet arthropathy just are producing varying degrees of neural foraminal narrowing.  Moderate hip joint degenerative changes.  No suspicious lytic or sclerotic lesions.  Impression: Large hiatal hernia.    Evaluation of solid organ and vascular pathology is limited due to lack of IV contrast.    All CT scans at this facility use dose modulation, iterative reconstruction, and/or weight based dosing when appropriate to reduce radiation dose to as low as reasonable achievable.    Electronically signed by: Ryan Grey MD  Date:    04/11/2024  Time:    07:20

## 2024-04-11 NOTE — PHARMACY MED REC
"Admission Medication History     The home medication history was taken by Trip Sharma.    You may go to "Admission" then "Reconcile Home Medications" tabs to review and/or act upon these items.     The home medication list has been updated by the Pharmacy department.   Please read ALL comments highlighted in yellow.   Please address this information as you see fit.    Feel free to contact us if you have any questions or require assistance.      Medications listed below were obtained from: Patient/family and Analytic software- [employer]  (Not in a hospital admission)      Trip Sharma  [id number]    Current Outpatient Medications on File Prior to Encounter   Medication Sig Dispense Refill Last Dose    atorvastatin (LIPITOR) 10 MG tablet Take 1 tablet (10 mg total) by mouth every evening. 30 tablet 11 4/10/2024    cetirizine (ZYRTEC) 10 MG tablet Take 10 mg by mouth once daily.   4/10/2024    furosemide (LASIX) 20 MG tablet TAKE ONE TABLET BY MOUTH TWICE A WEEK 25 tablet 2 4/10/2024    gabapentin (NEURONTIN) 400 MG capsule Take 1 capsule (400 mg total) by mouth 3 (three) times daily. 270 capsule 3 4/10/2024    latanoprost 0.005 % ophthalmic solution INSTILL ONE DROP IN EACH EYE AT BEDTIME 2.5 mL 12 4/10/2024    metoprolol tartrate (LOPRESSOR) 25 MG tablet Take 1 tablet (25 mg total) by mouth 2 (two) times daily. 60 tablet 11 4/10/2024    MULTIVITAMIN W-MINERALS/LUTEIN (CENTRUM SILVER ORAL) Take 1 tablet by mouth once daily.   4/10/2024    omeprazole (PRILOSEC) 40 MG capsule Take 1 capsule (40 mg total) by mouth once daily. 30 capsule 11 4/10/2024    rivaroxaban (XARELTO) 15 mg Tab Take 1 tablet (15 mg total) by mouth daily with dinner or evening meal. 30 tablet 5 4/10/2024    timolol maleate 0.5% (TIMOPTIC) 0.5 % Drop PLACE ONE DROP INTO BOTH EYES EVERY MORNING 10 mL 12 4/10/2024    AA/prot/lysine/methio/vit C/B6 (A/G PRO ORAL) Take 2 tablets by mouth 2 (two) times daily.        acetaminophen (TYLENOL) 650 " MG TbSR Take 650 mg by mouth 2 (two) times daily.       CALCIUM PHOSPHATE TRIB/VIT D3 (CITRACAL + D ORAL) Take 1 tablet by mouth once daily at 6am.        RSVPreF3 antigen-AS01E, PF, (AREXVY, PF,) 120 mcg/0.5 mL SusR vaccine Inject into the muscle. 0.5 mL 0                            .

## 2024-04-11 NOTE — HPI
Patient is an 83-year-old  female with a PMH of AFib on Xarelto, HTN, GERD who presents to the ED with complaints of nausea vomiting x 3 days.  Patient reported having diarrhea which has resolved after taking Imodium.  Patient is describes the vomit as black in color.  Denies any melena or hematochezia.  Currently sees Dr. Rsoenthal for cardiology    In the ED, H&H 12.6/37.8 and trended down to 11.4/34.3.  UA showed 23 WBC and many bacteria.  Patient given 80 mg Protonix IV.

## 2024-04-11 NOTE — H&P
[facility] Emergency Dept.  [facility] Medicine  History & Physical    Patient Name: Maria Del Carmen Spence  MRN: [id number]  Patient Class: OP- Observation  Admission Date: 4/11/2024  Attending Physician: Yimi Walker MD  Primary Care Provider: Rajinder Lutz MD         Patient information was obtained from patient and ER records.     Subjective:     Principal Problem:Upper GI bleed    Chief Complaint:   Chief Complaint   Patient presents with    Diarrhea    Nausea    Vomiting     Pt c/o N/V and diarrhea that started Monday. Reports small amount of black emesis today.     Heartburn        HPI: Patient is an 83-year-old  female with a PMH of AFib on Xarelto, HTN, GERD who presents to the ED with complaints of nausea vomiting x 3 days.  Patient reported having diarrhea which has resolved after taking Imodium.  Patient is describes the vomit as black in color.  Denies any melena or hematochezia.  Currently sees Dr. Rosenthal for cardiology    In the ED, H&H 12.6/37.8 and trended down to 11.4/34.3.  UA showed 23 WBC and many bacteria.  Patient given 80 mg Protonix IV.    Past Medical History:   Diagnosis Date    A-fib     Arthritis     Chronic LBP     ESIs x 3 with Dr. Hicks, PT at [facility], chiropractor    Eye pain     Frequent headaches     GERD (gastroesophageal reflux disease)     Glaucoma     Hyperlipemia     Hypertension        Past Surgical History:   Procedure Laterality Date    ABLATION OF ARRHYTHMOGENIC FOCUS FOR ATRIAL FIBRILLATION N/A 3/6/2019    Procedure: ABLATION, ARRHYTHMOGENIC FOCUS, FOR ATRIAL FIBRILLATION;  Surgeon: Abel Morillo MD;  Location: [facility] EP LAB;  Service: Cardiology;  Laterality: N/A;    CARDIOVERSION N/A 7/9/2018    Procedure: CARDIOVERSION;  Surgeon: Will Rosenthal MD;  Location: [facility] CATH LAB;  Service: Cardiology;  Laterality: N/A;    CATARACT EXTRACTION W/  INTRAOCULAR LENS IMPLANT  OU    INJECTION OF ANESTHETIC AGENT INTO SACROILIAC JOINT Right 11/3/2020    Procedure: right Sacroiliac Joint  Injection;  Surgeon: Ayush Christiansen MD;  Location: [facility] PAIN MGT;  Service: Pain Management;  Laterality: Right;    INJECTION OF ANESTHETIC AGENT INTO SACROILIAC JOINT Right 3/23/2021    Procedure: right Sacroiliac Joint Injection;  Surgeon: Ayush Christiansen MD;  Location: [facility] PAIN MGT;  Service: Pain Management;  Laterality: Right;    INJECTION OF JOINT Right 11/3/2020    Procedure: right GT bursa injection;  Surgeon: Ayush Christiansen MD;  Location: [facility] PAIN MGT;  Service: Pain Management;  Laterality: Right;    INJECTION OF JOINT Bilateral 3/23/2021    Procedure: bilateral GT bursa injection;  Surgeon: Ayush Christiansen MD;  Location: [facility] PAIN MGT;  Service: Pain Management;  Laterality: Bilateral;    TUBAL LIGATION         Review of patient's allergies indicates:   Allergen Reactions    Voltaren [diclofenac sodium] Edema     Gel formulation caused facial rash and facial swelling    Eliquis [apixaban] Other (See Comments)     Headache, numbness in lip     Medrol [methylprednisolone] Blisters       No current facility-administered medications on file prior to encounter.     Current Outpatient Medications on File Prior to Encounter   Medication Sig    AA/prot/lysine/methio/vit C/B6 (A/G PRO ORAL) Take 2 tablets by mouth 2 (two) times daily.     acetaminophen (TYLENOL) 650 MG TbSR Take 650 mg by mouth 2 (two) times daily.    atorvastatin (LIPITOR) 10 MG tablet Take 1 tablet (10 mg total) by mouth every evening.    CALCIUM PHOSPHATE TRIB/VIT D3 (CITRACAL + D ORAL) Take 1 tablet by mouth once daily at 6am.     CETIRIZINE HCL (ZYRTEC ORAL) Take by mouth once daily.     furosemide (LASIX) 20 MG tablet TAKE ONE TABLET BY MOUTH TWICE A WEEK    gabapentin (NEURONTIN) 400 MG capsule Take 1 capsule (400 mg total) by mouth 3 (three) times daily.    latanoprost 0.005 % ophthalmic solution INSTILL ONE DROP IN EACH EYE AT BEDTIME    metoprolol tartrate (LOPRESSOR) 25 MG tablet Take 1 tablet (25 mg total) by mouth 2 (two)  times daily.    MULTIVITAMIN W-MINERALS/LUTEIN (CENTRUM SILVER ORAL) Take by mouth.    omeprazole (PRILOSEC) 40 MG capsule Take 1 capsule (40 mg total) by mouth once daily.    rivaroxaban (XARELTO) 15 mg Tab Take 1 tablet (15 mg total) by mouth daily with dinner or evening meal.    RSVPreF3 antigen-AS01E, PF, (AREXVY, PF,) 120 mcg/0.5 mL SusR vaccine Inject into the muscle.    timolol maleate 0.5% (TIMOPTIC) 0.5 % Drop PLACE ONE DROP INTO BOTH EYES EVERY MORNING     Family History       Problem Relation (Age of Onset)    Cancer Mother    Cataracts Mother    Diabetes Paternal Aunt    Glaucoma Mother    Hypertension Mother, Father          Tobacco Use    Smoking status: Never    Smokeless tobacco: Never   Substance and Sexual Activity    Alcohol use: No    Drug use: No    Sexual activity: Yes     Partners: Male     Review of Systems   Constitutional:  Negative for fatigue and fever.   HENT:  Negative for sinus pressure.    Eyes:  Negative for visual disturbance.   Respiratory:  Negative for shortness of breath.    Cardiovascular:  Negative for chest pain.   Gastrointestinal:  Positive for nausea and vomiting.   Genitourinary:  Negative for difficulty urinating.   Musculoskeletal:  Negative for back pain.   Skin:  Negative for rash.   Neurological:  Negative for headaches.   Psychiatric/Behavioral:  Negative for confusion.      Objective:     Vital Signs (Most Recent):  Temp: 98.2 °F (36.8 °C) (04/10/24 2241)  Pulse: (!) 119 (04/11/24 1000)  Resp: 15 (04/11/24 0900)  BP: (!) 140/67 (04/11/24 1000)  SpO2: 96 % (04/11/24 0900) Vital Signs (24h Range):  Temp:  [98.2 °F (36.8 °C)] 98.2 °F (36.8 °C)  Pulse:  [] 119  Resp:  [15-18] 15  SpO2:  [96 %-98 %] 96 %  BP: (128-148)/(65-84) 140/67     Weight: 70.9 kg (156 lb 4.9 oz)  Body mass index is 26.01 kg/m².     Physical Exam  Constitutional:       General: She is not in acute distress.     Appearance: She is well-developed. She is not diaphoretic.   HENT:      Head:  Normocephalic and atraumatic.   Eyes:      Pupils: Pupils are equal, round, and reactive to light.   Cardiovascular:      Rate and Rhythm: Normal rate and regular rhythm.      Heart sounds: Normal heart sounds. No murmur heard.     No friction rub. No gallop.   Pulmonary:      Effort: Pulmonary effort is normal. No respiratory distress.      Breath sounds: Normal breath sounds. No stridor. No wheezing or rales.   Abdominal:      General: Bowel sounds are normal. There is no distension.      Palpations: Abdomen is soft. There is no mass.      Tenderness: There is no abdominal tenderness. There is no guarding.   Musculoskeletal:      Right lower leg: No edema.      Left lower leg: No edema.   Skin:     General: Skin is warm.      Findings: No erythema.   Neurological:      Mental Status: She is alert and oriented to person, place, and time.              CRANIAL NERVES     CN III, IV, VI   Pupils are equal, round, and reactive to light.       Significant Labs:   Results for orders placed or performed during the hospital encounter of 04/11/24   CBC auto differential   Result Value Ref Range    WBC 7.54 3.90 - 12.70 K/uL    RBC 3.96 (L) 4.00 - 5.40 M/uL    Hemoglobin 12.6 12.0 - 16.0 g/dL    Hematocrit 37.8 37.0 - 48.5 %    MCV 96 82 - 98 fL    MCH 31.8 (H) 27.0 - 31.0 pg    MCHC 33.3 32.0 - 36.0 g/dL    RDW 12.7 11.5 - 14.5 %    Platelets 306 150 - 450 K/uL    MPV 9.6 9.2 - 12.9 fL    Immature Granulocytes 0.4 0.0 - 0.5 %    Gran # (ANC) 4.5 1.8 - 7.7 K/uL    Immature Grans (Abs) 0.03 0.00 - 0.04 K/uL    Lymph # 2.0 1.0 - 4.8 K/uL    Mono # 1.0 0.3 - 1.0 K/uL    Eos # 0.0 0.0 - 0.5 K/uL    Baso # 0.03 0.00 - 0.20 K/uL    nRBC 0 0 /100 WBC    Gran % 59.5 38.0 - 73.0 %    Lymph % 25.9 18.0 - 48.0 %    Mono % 13.7 4.0 - 15.0 %    Eosinophil % 0.1 0.0 - 8.0 %    Basophil % 0.4 0.0 - 1.9 %    Differential Method Automated    Comprehensive metabolic panel   Result Value Ref Range    Sodium 135 (L) 136 - 145 mmol/L     Potassium 3.7 3.5 - 5.1 mmol/L    Chloride 102 95 - 110 mmol/L    CO2 20 (L) 23 - 29 mmol/L    Glucose 124 (H) 70 - 110 mg/dL    BUN 23 8 - 23 mg/dL    Creatinine 0.7 0.5 - 1.4 mg/dL    Calcium 9.7 8.7 - 10.5 mg/dL    Total Protein 7.8 6.0 - 8.4 g/dL    Albumin 3.9 3.5 - 5.2 g/dL    Total Bilirubin 0.5 0.1 - 1.0 mg/dL    Alkaline Phosphatase 85 55 - 135 U/L    AST 30 10 - 40 U/L    ALT 17 10 - 44 U/L    eGFR >60 >60 mL/min/1.73 m^2    Anion Gap 13 8 - 16 mmol/L   Lipase   Result Value Ref Range    Lipase 11 4 - 60 U/L   Urinalysis, Reflex to Urine Culture Urine, Clean Catch    Specimen: Urine, Clean Catch   Result Value Ref Range    Specimen UA Urine, Clean Catch     Color, UA Yellow Yellow, Straw, Morena    Appearance, UA Clear Clear    pH, UA 6.0 5.0 - 8.0    Specific Gravity, UA 1.025 1.005 - 1.030    Protein, UA Trace (A) Negative    Glucose, UA Negative Negative    Ketones, UA Negative Negative    Bilirubin (UA) Negative Negative    Occult Blood UA 2+ (A) Negative    Nitrite, UA Positive (A) Negative    Urobilinogen, UA Negative <2.0 EU/dL    Leukocytes, UA 2+ (A) Negative   Troponin I   Result Value Ref Range    Troponin I <0.006 0.000 - 0.026 ng/mL   Urinalysis Microscopic   Result Value Ref Range    RBC, UA 5 (H) 0 - 4 /hpf    WBC, UA 23 (H) 0 - 5 /hpf    Bacteria Many (A) None-Occ /hpf    Squam Epithel, UA 2 /hpf    Microscopic Comment SEE COMMENT    Occult blood, other sources   Result Value Ref Range    Occult Blood Positive (A) Negative   CBC auto differential   Result Value Ref Range    WBC 7.35 3.90 - 12.70 K/uL    RBC 3.60 (L) 4.00 - 5.40 M/uL    Hemoglobin 11.4 (L) 12.0 - 16.0 g/dL    Hematocrit 34.3 (L) 37.0 - 48.5 %    MCV 95 82 - 98 fL    MCH 31.7 (H) 27.0 - 31.0 pg    MCHC 33.2 32.0 - 36.0 g/dL    RDW 12.7 11.5 - 14.5 %    Platelets 267 150 - 450 K/uL    MPV 9.8 9.2 - 12.9 fL    Immature Granulocytes 0.1 0.0 - 0.5 %    Gran # (ANC) 5.1 1.8 - 7.7 K/uL    Immature Grans (Abs) 0.01 0.00 - 0.04 K/uL     Lymph # 1.4 1.0 - 4.8 K/uL    Mono # 0.9 0.3 - 1.0 K/uL    Eos # 0.0 0.0 - 0.5 K/uL    Baso # 0.02 0.00 - 0.20 K/uL    nRBC 0 0 /100 WBC    Gran % 69.0 38.0 - 73.0 %    Lymph % 18.5 18.0 - 48.0 %    Mono % 12.1 4.0 - 15.0 %    Eosinophil % 0.0 0.0 - 8.0 %    Basophil % 0.3 0.0 - 1.9 %    Differential Method Automated    EKG 12-lead   Result Value Ref Range    QRS Duration 78 ms    OHS QTC Calculation 459 ms        Significant Imaging: CT Abdomen Pelvis  Without Contrast  Narrative: EXAMINATION:  CT ABDOMEN PELVIS WITHOUT CONTRAST    CLINICAL HISTORY:  Abdominal pain, acute, nonlocalized;    TECHNIQUE:  Low dose axial images, sagittal and coronal reformations were obtained from the lung bases to the pubic symphysis.  Oral contrast was not administered.    COMPARISON:  None    FINDINGS:  Heart: Normal size. No effusion.    Lung Bases: Clear.  Mild atelectasis right lower lobe.    Liver: Normal size and attenuation. No focal lesions.    Gallbladder: No calcified gallstones.    Bile Ducts: No dilatation.    Pancreas: No obvious mass. No peripancreatic fat stranding.    Spleen: Normal.    Adrenals: Normal.    Kidneys/Ureters: No mass, hydroureteronephrosis, or nephroureterolithiasis.    Bladder: Mild nonspecific wall thickening.    Reproductive organs: Normal.    GI Tract/Mesentery: Large hiatal hernia.  No evidence of bowel obstruction or inflammation.  Scattered diverticulosis without evidence of acute diverticulitis.    Peritoneal Space: No ascites or free air.    Retroperitoneum: No significant adenopathy.    Abdominal wall: Normal.    Vasculature: No aneurysm.    Bones: No acute fracture.  Diffuse osteopenia advanced degenerative changes of the lumbar spine multilevel disc space narrowing and spondylosis as well as facet arthropathy just are producing varying degrees of neural foraminal narrowing.  Moderate hip joint degenerative changes.  No suspicious lytic or sclerotic lesions.  Impression: Large hiatal  hernia.    Evaluation of solid organ and vascular pathology is limited due to lack of IV contrast.    All CT scans at this facility use dose modulation, iterative reconstruction, and/or weight based dosing when appropriate to reduce radiation dose to as low as reasonable achievable.    Electronically signed by: Ryan Grey MD  Date:    04/11/2024  Time:    07:20      Assessment/Plan:     * Upper GI bleed  Protonix 80 mg IV given  Will continue Protonix 40 mg b.i.d.   Continue NPO   Trend H&H t.i.d.   Discussed case with GI  Who stated as patient is on anticoagulation   EGD if only urgent   If H&H stabilizes   EGD outpatient   However if H&H continues to trend down  Will need inpatient EGD      UTI (urinary tract infection)  Continue Rocephin  Urine culture pending      Gastroesophageal reflux disease without esophagitis  PPI      A-fib  Cont metoprolol  Hold xarelto     Primary hypertension  Continue metoprolol   Labetalol p.r.n.      VTE Risk Mitigation (From admission, onward)           Ordered     IP VTE HIGH RISK PATIENT  Once         04/11/24 0917     Place sequential compression device  Until discontinued         04/11/24 0917                       On 04/11/2024, patient should be placed in hospital observation services under my care.             Yimi Walker MD  Department of Hospital Medicine  [facility] - Emergency Dept.

## 2024-04-11 NOTE — FIRST PROVIDER EVALUATION
Medical screening examination initiated.  I have conducted a focused provider triage encounter, findings are as follows:    Brief history of present illness:  Patient presents complaining of nausea and vomiting with dark colored emesis.  Also reports diarrhea.  Onset of symptoms was Monday.    Vitals:    04/10/24 2241   BP: (!) 148/84   BP Location: Right arm   Pulse: 96   Resp: 18   Temp: 98.2 °F (36.8 °C)   TempSrc: Oral   SpO2: 98%       Pertinent physical exam:  No acute distress at this time.    Brief workup plan:  Labs    Preliminary workup initiated; this workup will be continued and followed by the physician or advanced practice provider that is assigned to the patient when roomed.

## 2024-04-12 ENCOUNTER — TELEPHONE (OUTPATIENT)
Dept: PREADMISSION TESTING | Facility: HOSPITAL | Age: 84
End: 2024-04-12
Payer: MEDICARE

## 2024-04-12 ENCOUNTER — NURSE TRIAGE (OUTPATIENT)
Dept: ADMINISTRATIVE | Facility: CLINIC | Age: 84
End: 2024-04-12
Payer: MEDICARE

## 2024-04-12 VITALS
HEART RATE: 79 BPM | DIASTOLIC BLOOD PRESSURE: 65 MMHG | BODY MASS INDEX: 29.61 KG/M2 | TEMPERATURE: 98 F | WEIGHT: 177.69 LBS | RESPIRATION RATE: 19 BRPM | SYSTOLIC BLOOD PRESSURE: 144 MMHG | OXYGEN SATURATION: 97 % | HEIGHT: 65 IN

## 2024-04-12 DIAGNOSIS — K92.0 COFFEE GROUND EMESIS: Primary | ICD-10-CM

## 2024-04-12 LAB
ANION GAP SERPL CALC-SCNC: 6 MMOL/L (ref 8–16)
BACTERIA UR CULT: NORMAL
BACTERIA UR CULT: NORMAL
BASOPHILS # BLD AUTO: 0.03 K/UL (ref 0–0.2)
BASOPHILS NFR BLD: 0.4 % (ref 0–1.9)
BUN SERPL-MCNC: 19 MG/DL (ref 8–23)
CALCIUM SERPL-MCNC: 9.3 MG/DL (ref 8.7–10.5)
CHLORIDE SERPL-SCNC: 110 MMOL/L (ref 95–110)
CO2 SERPL-SCNC: 26 MMOL/L (ref 23–29)
CREAT SERPL-MCNC: 0.7 MG/DL (ref 0.5–1.4)
DIFFERENTIAL METHOD BLD: ABNORMAL
EOSINOPHIL # BLD AUTO: 0.1 K/UL (ref 0–0.5)
EOSINOPHIL NFR BLD: 1.1 % (ref 0–8)
ERYTHROCYTE [DISTWIDTH] IN BLOOD BY AUTOMATED COUNT: 13.2 % (ref 11.5–14.5)
EST. GFR  (NO RACE VARIABLE): >60 ML/MIN/1.73 M^2
GLUCOSE SERPL-MCNC: 92 MG/DL (ref 70–110)
HCT VFR BLD AUTO: 32.6 % (ref 37–48.5)
HGB BLD-MCNC: 10.8 G/DL (ref 12–16)
IMM GRANULOCYTES # BLD AUTO: 0.02 K/UL (ref 0–0.04)
IMM GRANULOCYTES NFR BLD AUTO: 0.2 % (ref 0–0.5)
LYMPHOCYTES # BLD AUTO: 3 K/UL (ref 1–4.8)
LYMPHOCYTES NFR BLD: 36.4 % (ref 18–48)
MCH RBC QN AUTO: 32 PG (ref 27–31)
MCHC RBC AUTO-ENTMCNC: 33.1 G/DL (ref 32–36)
MCV RBC AUTO: 97 FL (ref 82–98)
MONOCYTES # BLD AUTO: 1 K/UL (ref 0.3–1)
MONOCYTES NFR BLD: 11.7 % (ref 4–15)
NEUTROPHILS # BLD AUTO: 4.1 K/UL (ref 1.8–7.7)
NEUTROPHILS NFR BLD: 50.2 % (ref 38–73)
NRBC BLD-RTO: 0 /100 WBC
PLATELET # BLD AUTO: 275 K/UL (ref 150–450)
PMV BLD AUTO: 10.1 FL (ref 9.2–12.9)
POTASSIUM SERPL-SCNC: 4.1 MMOL/L (ref 3.5–5.1)
RBC # BLD AUTO: 3.37 M/UL (ref 4–5.4)
SODIUM SERPL-SCNC: 142 MMOL/L (ref 136–145)
WBC # BLD AUTO: 8.26 K/UL (ref 3.9–12.7)

## 2024-04-12 PROCEDURE — 63600175 PHARM REV CODE 636 W HCPCS: Performed by: FAMILY MEDICINE

## 2024-04-12 PROCEDURE — 97530 THERAPEUTIC ACTIVITIES: CPT

## 2024-04-12 PROCEDURE — 25000003 PHARM REV CODE 250: Performed by: EMERGENCY MEDICINE

## 2024-04-12 PROCEDURE — 25000003 PHARM REV CODE 250: Performed by: FAMILY MEDICINE

## 2024-04-12 PROCEDURE — 97166 OT EVAL MOD COMPLEX 45 MIN: CPT

## 2024-04-12 PROCEDURE — 99285 EMERGENCY DEPT VISIT HI MDM: CPT

## 2024-04-12 PROCEDURE — 97116 GAIT TRAINING THERAPY: CPT

## 2024-04-12 PROCEDURE — 36415 COLL VENOUS BLD VENIPUNCTURE: CPT | Performed by: FAMILY MEDICINE

## 2024-04-12 PROCEDURE — 85025 COMPLETE CBC W/AUTO DIFF WBC: CPT | Performed by: FAMILY MEDICINE

## 2024-04-12 PROCEDURE — 97162 PT EVAL MOD COMPLEX 30 MIN: CPT

## 2024-04-12 PROCEDURE — C9113 INJ PANTOPRAZOLE SODIUM, VIA: HCPCS | Performed by: FAMILY MEDICINE

## 2024-04-12 PROCEDURE — 80048 BASIC METABOLIC PNL TOTAL CA: CPT | Performed by: FAMILY MEDICINE

## 2024-04-12 RX ORDER — PANTOPRAZOLE SODIUM 40 MG/1
40 TABLET, DELAYED RELEASE ORAL 2 TIMES DAILY
Qty: 60 TABLET | Refills: 0 | Status: SHIPPED | OUTPATIENT
Start: 2024-04-12 | End: 2024-05-07 | Stop reason: SDUPTHER

## 2024-04-12 RX ORDER — CEFDINIR 300 MG/1
300 CAPSULE ORAL 2 TIMES DAILY
Qty: 10 CAPSULE | Refills: 0 | Status: SHIPPED | OUTPATIENT
Start: 2024-04-12 | End: 2024-04-17

## 2024-04-12 RX ORDER — SUCRALFATE 1 G/10ML
1 SUSPENSION ORAL
Status: DISCONTINUED | OUTPATIENT
Start: 2024-04-12 | End: 2024-04-12

## 2024-04-12 RX ORDER — PANTOPRAZOLE SODIUM 40 MG/1
40 TABLET, DELAYED RELEASE ORAL 2 TIMES DAILY
Status: DISCONTINUED | OUTPATIENT
Start: 2024-04-12 | End: 2024-04-12 | Stop reason: HOSPADM

## 2024-04-12 RX ADMIN — ALUMINA, MAGNESIA, AND SIMETHICONE ORAL SUSPENSION REGULAR STRENGTH 30 ML: 1200; 1200; 120 SUSPENSION ORAL at 06:04

## 2024-04-12 RX ADMIN — PANTOPRAZOLE SODIUM 40 MG: 40 INJECTION, POWDER, FOR SOLUTION INTRAVENOUS at 11:04

## 2024-04-12 RX ADMIN — TIMOLOL MALEATE 1 DROP: 5 SOLUTION/ DROPS OPHTHALMIC at 01:04

## 2024-04-12 RX ADMIN — CEFTRIAXONE SODIUM 1 G: 1 INJECTION, POWDER, FOR SOLUTION INTRAMUSCULAR; INTRAVENOUS at 11:04

## 2024-04-12 NOTE — PLAN OF CARE
OT gabi completed. Recommends low intensity therapy.  CGA for bed mobility, t/fs, and ambulation 80ft with RW.

## 2024-04-12 NOTE — PLAN OF CARE
PT EVAL complete. Required CGA for bed mobility, ambulated 40ft x2 CGA using RW. Recommending low intensity therapy upon d/c.

## 2024-04-12 NOTE — PLAN OF CARE
[facility] - Med Surg  Discharge Final Note    Primary Care Provider: Rajinder Lutz MD    Expected Discharge Date: 4/12/2024    Final Discharge Note (most recent)       Final Note - 04/12/24 1518          Final Note    Assessment Type Final Discharge Note     Anticipated Discharge Disposition Home or Self Care        Post-Acute Status    Discharge Delays None known at this time                     Important Message from Medicare             Contact Info       Joaquin Prasad PA-C   Specialty: Gastroenterology    [address]  [place] [address]   Phone: [phone]       Next Steps: Follow up    Instructions: office will call to schedule your EGD    Rajinder Lutz MD   Specialty: Family Medicine   Relationship: PCP - General    [id number] [place] [id number]   Phone: [phone]       Next Steps: Schedule an appointment as soon as possible for a visit in 3 day(s)          Discharge home, no dme orders noted.  Declined home health. NP aware and hh orders cancelled by NP.

## 2024-04-12 NOTE — PLAN OF CARE
[facility] - Med Surg  Initial Discharge Assessment       Primary Care Provider: Rajinder Lutz MD    Admission Diagnosis: Upper GI bleed [K92.2]  Abdominal pain, acute, generalized [R10.84]  Nausea and vomiting, unspecified vomiting type [R11.2]    Admission Date: 4/11/2024  Expected Discharge Date:     Transition of Care Barriers: None    Payor: MEDICARE / Plan: MEDICARE PART A & B / Product Type: Government /     Extended Emergency Contact Information  Primary Emergency Contact: Shiela Spence  Address: [id number] [place] [address] [id number] [place] [address]  Home Phone: [phone]  Relation: Spouse    Discharge Plan A: Home with family  Discharge Plan B: Home Health      [facility] [address] [facility] [facility] [address]  Phone: [phone] Fax: [phone]      Initial Assessment (most recent)       Adult Discharge Assessment - 04/12/24 1240          Discharge Assessment    Assessment Type Discharge Planning Assessment     Confirmed/corrected address, phone number and insurance Yes     Confirmed Demographics Correct on Facesheet     Source of Information patient     People in Home spouse     Do you expect to return to your current living situation? Yes     Do you have help at home or someone to help you manage your care at home? Yes     Prior to hospitilization cognitive status: Alert/Oriented     Current cognitive status: Alert/Oriented     Walking or Climbing Stairs Difficulty no     Dressing/Bathing Difficulty no     Equipment Currently Used at Home none     Readmission within 30 days? No     Patient currently being followed by outpatient case management? No     Do you currently have service(s) that help you manage your care at home? No     Do you take prescription medications? Yes     Do you have prescription coverage? Yes     Do you have any problems affording any of your prescribed medications? No     Is the patient taking  medications as prescribed? yes     How do you get to doctors appointments? family or friend will provide     Are you on dialysis? No     Do you take coumadin? No     Discharge Plan A Home with family     Discharge Plan B Home Health     DME Needed Upon Discharge  none     Discharge Plan discussed with: Patient     Transition of Care Barriers None                   Independent with ADLs.  Has walker, cane, bsc and tub bench at home that she does not use.

## 2024-04-12 NOTE — PT/OT/SLP EVAL
Occupational Therapy   Evaluation    Name: Maria Del Carmen Spence  MRN: [id number]  Admitting Diagnosis: Upper GI bleed  Recent Surgery: * No surgery found *      Recommendations:     Discharge Recommendations: Low Intensity Therapy  Discharge Equipment Recommendations:  none  Barriers to discharge:  None    Assessment:     Maria Del Carmen Spence is a 83 y.o. female with a medical diagnosis of Upper GI bleed.  She presents with the following performance deficits affecting function: weakness, impaired endurance, impaired sensation, impaired self care skills, impaired functional mobility, gait instability, impaired balance, decreased safety awareness, pain.      Rehab Prognosis: Good; patient would benefit from acute skilled OT services to address these deficits and reach maximum level of function.       Plan:     Patient to be seen 2 x/week to address the above listed problems via self-care/home management, therapeutic exercises, therapeutic activities  Plan of Care Expires: 04/26/24  Plan of Care Reviewed with: patient, spouse    Subjective     Chief Complaint: back and knee pain  Patient/Family Comments/goals: get better, return home    Occupational Profile:  Living Environment: lives with  in a 1 story house with threshold to enter. Tub/shower combo.   Previous level of function: Pt Mod (I) with ADLs and functional mobility using either RW or QC, household and community distances.  Roles and Routines: drives and performs housework  Equipment Used at Home: walker, rolling, cane, quad, bedside commode, bath bench  Assistance upon Discharge: spouse    Pain/Comfort:  Pain Rating 1: 5/10  Location - Side 1: Bilateral  Location - Orientation 1: generalized  Location 1: back (chronic)  Pain Addressed 1: Reposition, Distraction  Pain Rating Post-Intervention 1: 5/10  Pain Rating 2: 5/10  Location - Side 2: Bilateral  Location - Orientation 2: generalized  Location 2: knee (R>L, chronic)  Pain Addressed 2: Reposition,  Distraction  Pain Rating Post-Intervention 2: 5/10    Objective:     Communicated with: Nurse and epic chart review prior to session.  Patient found HOB elevated with telemetry, peripheral IV upon OT entry to room.    General Precautions: Standard, fall  Orthopedic Precautions: N/A  Braces: N/A  Respiratory Status: Room air    Occupational Performance:    Bed Mobility:    Patient completed Rolling/Turning to Right with contact guard assistance  Patient completed Scooting/Bridging with contact guard assistance  Patient completed Supine to Sit with contact guard assistance    Functional Mobility/Transfers:  Patient completed Sit <> Stand Transfer with contact guard assistance  with  rolling walker   Patient completed Bed <> Chair Transfer using Stand Pivot technique with contact guard assistance with rolling walker  Functional Mobility: Patient completed x40ft x2 functional mobility with CGA and RW to increase dynamic standing balance and activity tolerance needed for ADL completion.     Activities of Daily Living:  Feeding:  setup A. Comb hair  Lower Body Dressing: contact guard assistance doff/rena socks in chair    Cognitive/Visual Perceptual:  Cognitive/Psychosocial Skills:     -       Oriented to: Person, Place, Time, and Situation   -       Follows Commands/attention:Follows multistep  commands  -       Communication: clear/fluent  -       Memory: No Deficits noted  -       Safety awareness/insight to disability: impaired     Physical Exam:  Sensation:    -       Impaired  pt reports tingling in RLE d/t herniated disc  Dominant hand:    -       right  Upper Extremity Range of Motion:     -       Right Upper Extremity: WFL  -       Left Upper Extremity: WFL  Upper Extremity Strength:    -       Right Upper Extremity: 4/5 grossly  -       Left Upper Extremity: 4/5 grossly   Strength:    -       Right Upper Extremity: WFL  -       Left Upper Extremity: WFL    AMPAC 6 Click ADL:  AMPAC Total Score:  21    Treatment & Education:  Patient educated on role of OT in acute setting and benefits of participation. Educated on techniques to use to increase independence and decrease fall risk with functional transfers. Educated on importance of OOB activity and calling for A to transfer back to bed. Encouraged completion of B UE AROM therex throughout the day to tolerance to increase functional strength and activity tolerance. Educated patient on importance of increased tolerance to upright position and direct impact on CV endurance and strength. Patient encouraged to sit up in chair for a minimum of 2 consecutive hours per day.  Patient stated understanding and in agreement with POC.     Patient left up in chair with all lines intact, call button in reach, and spouse present    GOALS:   Multidisciplinary Problems       Occupational Therapy Goals          Problem: Occupational Therapy    Goal Priority Disciplines Outcome Interventions   Occupational Therapy Goal     OT, PT/OT     Description: Goals to be met by: 4/26/24     Patient will increase functional independence with ADLs by performing:    Grooming while standing at sink with Modified [place].  Toileting from toilet with Modified [place] for hygiene and clothing management.   Toilet transfer to toilet with Modified [place].  Upper extremity exercise program x15 reps per handout, with independence.                         History:     Past Medical History:   Diagnosis Date    A-fib     Arthritis     Chronic LBP     ESIs x 3 with Dr. Hicks, PT at [facility], chiropractor    Eye pain     Frequent headaches     GERD (gastroesophageal reflux disease)     Glaucoma     Hyperlipemia     Hypertension          Past Surgical History:   Procedure Laterality Date    ABLATION OF ARRHYTHMOGENIC FOCUS FOR ATRIAL FIBRILLATION N/A 3/6/2019    Procedure: ABLATION, ARRHYTHMOGENIC FOCUS, FOR ATRIAL FIBRILLATION;  Surgeon: Abel Morillo MD;  Location: [facility] LAB;   Service: Cardiology;  Laterality: N/A;    CARDIOVERSION N/A 7/9/2018    Procedure: CARDIOVERSION;  Surgeon: Will Rosenthal MD;  Location: [facility] CATH LAB;  Service: Cardiology;  Laterality: N/A;    CATARACT EXTRACTION W/  INTRAOCULAR LENS IMPLANT  OU    INJECTION OF ANESTHETIC AGENT INTO SACROILIAC JOINT Right 11/3/2020    Procedure: right Sacroiliac Joint Injection;  Surgeon: Ayush Christiansen MD;  Location: [facility] PAIN MGT;  Service: Pain Management;  Laterality: Right;    INJECTION OF ANESTHETIC AGENT INTO SACROILIAC JOINT Right 3/23/2021    Procedure: right Sacroiliac Joint Injection;  Surgeon: Ayush Christiansen MD;  Location: [facility] PAIN MGT;  Service: Pain Management;  Laterality: Right;    INJECTION OF JOINT Right 11/3/2020    Procedure: right GT bursa injection;  Surgeon: Ayush Christiansen MD;  Location: [facility] PAIN MGT;  Service: Pain Management;  Laterality: Right;    INJECTION OF JOINT Bilateral 3/23/2021    Procedure: bilateral GT bursa injection;  Surgeon: Ayush Christiansen MD;  Location: [facility] PAIN MGT;  Service: Pain Management;  Laterality: Bilateral;    TUBAL LIGATION         Time Tracking:     OT Date of Treatment: 04/12/24  OT Start Time: 1415  OT Stop Time: 1440  OT Total Time (min): 25 min    Billable Minutes:Evaluation 15  Therapeutic Activity 10    4/12/2024  Irasema Reyes OT

## 2024-04-12 NOTE — PLAN OF CARE
Pt remained free of injury  Meds given  IVFs  Pt tolerated diet well, except for slight nausea  Labs reviewed  Pt and family understand POC

## 2024-04-12 NOTE — DISCHARGE SUMMARY
[facility]  Discharge Summary      Patient Name: Maria Del Carmen Spence  MRN: [id number]  JEOVANY: [id number]  Patient Class: IP- Inpatient  Admission Date: 4/11/2024  Hospital Length of Stay: 1 days  Discharge Date and Time:  04/12/2024 3:24 PM  Attending Physician: Nate Patel MD   Discharging Provider: Amaris Sevilla NP  Primary Care Provider: Rajinder Lutz MD    Primary Care Team: [facility] MEDICINE C    HPI:   Patient is an 83-year-old  female with a PMH of AFib on Xarelto, HTN, GERD who presents to the ED with complaints of nausea vomiting x 3 days.  Patient reported having diarrhea which has resolved after taking Imodium.  Patient is describes the vomit as black in color.  Denies any melena or hematochezia.  Currently sees Dr. Rosenthal for cardiology    In the ED, H&H 12.6/37.8 and trended down to 11.4/34.3.  UA showed 23 WBC and many bacteria.  Patient given 80 mg Protonix IV.    * No surgery found *      Hospital Course:   Ms. Spence is a 83 year old female with a history of PMH of AFib on Xarelto, HTN, GERD who was admitted to the ED with compliants of black emesis x 2 at home.  She was started on IV PPI, Gi saw in consultation. Hgb trended and reamined stable at 10.8.  Home xarelto held.  No further epsiodes of N/V.  Discussed with GI who recommeded outpatient scope, office will contact patient to schedule.  Diet advanced and patient able to tolerate without N/V.  She work with PT/OT who recommended home helath but pateint declines this at present.  Patient seen and exmained on day of discharged and deemed suitable to d/c to home.  She will coninue PPI BID, continue couse of abx for UTI and hold xarelto until follow-up ith GI.      Goals of Care Treatment Preferences:  Code Status: Full Code      Consults:   Consults (From admission, onward)          Status Ordering Provider     Inpatient consult to Social Work  Once        Provider:  (Not yet assigned)    Completed  NINI CARDONA            No new Assessment & Plan notes have been filed under this hospital service since the last note was generated.  Service: Hospital Medicine    Final Active Diagnoses:    Diagnosis Date Noted POA    PRINCIPAL PROBLEM:  Upper GI bleed [K92.2] 04/11/2024 Yes    UTI (urinary tract infection) [N39.0] 04/11/2024 Yes    Gastroesophageal reflux disease without esophagitis [K21.9] 08/04/2021 Yes    A-fib [I48.91] 03/04/2019 Yes    Primary hypertension [I10] 09/08/2014 Yes      Problems Resolved During this Admission:       Discharged Condition: good    Disposition: Home or Self Care    Follow Up:   Follow-up Information       Joaquin Prasad PA-C Follow up.    Specialty: Gastroenterology  Why: office will call to schedule your EGD  Contact information:  [address]  [place] [address] [phone]               Rajinder Lutz MD. Schedule an appointment as soon as possible for a visit in 3 day(s).    Specialty: Family Medicine  Contact information:  [address] [facility] [address] [phone]                           Patient Instructions:   No discharge procedures on file.    Significant Diagnostic Studies: Labs: CMP   Recent Labs   Lab 04/11/24  0152 04/12/24  0552   * 142   K 3.7 4.1    110   CO2 20* 26   * 92   BUN 23 19   CREATININE 0.7 0.7   CALCIUM 9.7 9.3   PROT 7.8  --    ALBUMIN 3.9  --    BILITOT 0.5  --    ALKPHOS 85  --    AST 30  --    ALT 17  --    ANIONGAP 13 6*    and CBC   Recent Labs   Lab 04/11/24  1317 04/11/24  1759 04/12/24  0758   WBC 9.97 9.38 8.26   HGB 10.4* 10.8* 10.8*   HCT 31.2* 33.0* 32.6*    281 275       Pending Diagnostic Studies:       None           Medications:  Reconciled Home Medications:      Medication List        START taking these medications      cefdinir 300 MG capsule  Commonly known as: OMNICEF  Take 1 capsule (300 mg total) by mouth 2 (two) times daily. for 5 days     pantoprazole 40 MG  tablet  Commonly known as: PROTONIX  Take 1 tablet (40 mg total) by mouth 2 (two) times daily.            CONTINUE taking these medications      A/G PRO ORAL  Take 2 tablets by mouth 2 (two) times daily.     acetaminophen 650 MG Tbsr  Commonly known as: TYLENOL  Take 650 mg by mouth 2 (two) times daily.     AREXVY (PF) 120 mcg/0.5 mL Susr vaccine  Generic drug: RSVPreF3 antigen-AS01E (PF)  Inject into the muscle.     atorvastatin 10 MG tablet  Commonly known as: LIPITOR  Take 1 tablet (10 mg total) by mouth every evening.     CENTRUM SILVER ORAL  Take 1 tablet by mouth once daily.     cetirizine 10 MG tablet  Commonly known as: ZYRTEC  Take 10 mg by mouth once daily.     CITRACAL + D ORAL  Take 1 tablet by mouth once daily at 6am.     furosemide 20 MG tablet  Commonly known as: LASIX  TAKE ONE TABLET BY MOUTH TWICE A WEEK     gabapentin 400 MG capsule  Commonly known as: NEURONTIN  Take 1 capsule (400 mg total) by mouth 3 (three) times daily.     latanoprost 0.005 % ophthalmic solution  INSTILL ONE DROP IN EACH EYE AT BEDTIME     metoprolol tartrate 25 MG tablet  Commonly known as: LOPRESSOR  Take 1 tablet (25 mg total) by mouth 2 (two) times daily.     rivaroxaban 15 mg Tab  Commonly known as: XARELTO  Take 1 tablet (15 mg total) by mouth daily with dinner or evening meal.     timolol maleate 0.5% 0.5 % Drop  Commonly known as: TIMOPTIC  PLACE ONE DROP INTO BOTH EYES EVERY MORNING            STOP taking these medications      omeprazole 40 MG capsule  Commonly known as: PRILOSEC              Indwelling Lines/Drains at time of discharge:   Lines/Drains/Airways       None                   Time spent on the discharge of patient: >30 minutes         Amaris Sevilla NP  Department of Hospital Medicine  [place] - Med Surg

## 2024-04-12 NOTE — PLAN OF CARE
Problem: Adult Inpatient Plan of Care  Goal: Plan of Care Review  Outcome: Ongoing, Progressing     Problem: Adult Inpatient Plan of Care  Goal: Patient-Specific Goal (Individualized)  Outcome: Ongoing, Progressing  Flowsheets (Taken 4/12/2024 0328)  Anxieties, Fears or Concerns: room warm  Individualized Care Needs: light off     Problem: Fall Injury Risk  Goal: Absence of Fall and Fall-Related Injury  Outcome: Ongoing, Progressing       Discussed POC with pt, verbalized understanding.  Patient remains free from injury.  Safety precautions maintained.   Call light and personal belongings within reach, bed in lowest position with bed wheels locked.   No s/s of acute distress.  Purposeful rounding continued this shift.  Pain controlled per MD order.  IVF infusing.   Cardiac monitoring in place, tele box number [id number]. Reading normal sinus  Diet orders continued, pt diet: NPO except sips with medications  Vital signs continued per orders this shift.   Chart and orders review completed. Pt education about care completed.

## 2024-04-12 NOTE — HOSPITAL COURSE
Ms. Spence is a 83 year old female with a history of PMH of AFib on Xarelto, HTN, GERD who was admitted to the ED with compliants of black emesis x 2 at home.  She was started on IV PPI, Gi saw in consultation. Hgb trended and reamined stable at 10.8.  Home xarelto held.  No further epsiodes of N/V.  Discussed with GI who recommeded outpatient scope, office will contact patient to schedule.  Diet advanced and patient able to tolerate without N/V.  She work with PT/OT who recommended home helath but pateint declines this at present.  Patient seen and exmained on day of discharged and deemed suitable to d/c to home.  She will coninue PPI BID, continue couse of abx for UTI and hold xarelto until follow-up ith GI.

## 2024-04-12 NOTE — PT/OT/SLP EVAL
Physical Therapy Evaluation and Treatment    Patient Name: Maria Del Carmen Spence   MRN: [id number]  Recent Surgery: * No surgery found *      Recommendations:     Discharge Recommendations: Low Intensity Therapy   Discharge Equipment Recommendations: none   Barriers to discharge: None    Assessment:     Maria Del Carmen Spence is a 83 y.o. female admitted with a medical diagnosis of Upper GI bleed. She presents with the following impairments/functional limitations: weakness, impaired endurance, impaired functional mobility, gait instability, impaired balance, pain, decreased safety awareness, decreased lower extremity function.     Rehab Prognosis: Good; patient would benefit from acute PT services to address these deficits and reach maximum level of function.    Plan:     During this hospitalization, patient to be seen 3 x/week to address the above listed problems via gait training, therapeutic activities, therapeutic exercises    Plan of Care Expires: 04/26/24    Subjective     Chief Complaint: weakness  Patient Comments/Goals: none stated  Pain/Comfort:  Pain Rating 1: 5/10  Location - Side 1: Bilateral  Location - Orientation 1: generalized  Location 1: back (chronic)  Pain Addressed 1: Reposition, Distraction  Pain Rating Post-Intervention 1: 5/10  Pain Rating 2: 5/10  Location - Side 2: Bilateral  Location - Orientation 2: generalized  Location 2: knee (chronic)  Pain Addressed 2: Reposition, Distraction, Cessation of Activity  Pain Rating Post-Intervention 2: 5/10    Social History:  Living Environment: Patient lives with their spouse in a single story home with number of outside stair(s): threshold  Prior Level of Function: Prior to admission, patient was independent, driving and retired, and ambulated household and community distances using rolling walker and quad cane  Equipment Used at Home: walker, rolling, cane, quad, bath bench, bedside commode  DME owned (not currently used): none  Assistance Upon Discharge:  "family    Objective:     Communicated with nurse and epic chart review prior to session. Patient found HOB elevated with peripheral IV, telemetry upon PT entry to room.    General Precautions: Standard, fall   Orthopedic Precautions: N/A   Braces: N/A    Respiratory Status: Room air    Exams:  Cognition: Patient is oriented to Person, Place, Time, Situation   MMT ROM    R LE L UE R LE L LE   Hip 4-/5 4/5 WFL WFL   Knee 4/5 4/5 WFL WFL   Ankle 4/5 4/5 WFL WFL   Sensation:    -       Impaired  R LE  Skin Integrity/Edema:     -       Skin integrity: Visible skin intact     Functional Mobility:  Gait belt applied - Yes  Bed Mobility  Rolling Right: contact guard assistance  Scooting: contact guard assistance  Supine to Sit: contact guard assistance  Transfers  Sit to Stand: contact guard assistance with rolling walker  Bed to Chair: contact guard assistance with rolling walker using Step Transfer  Gait  Patient ambulated 40ft x2 with rolling walker and contact guard assistance. Patient demonstrates occasional unsteady gait. No c/o dizziness or SOB, no gross LOB. All lines remained intact throughout ambulation trail.  Balance  Sitting: contact guard assistance  Standing: contact guard assistance    Therapeutic Activities and Exercises:   Pt educated on role of PT in acute care and POC. Educated on importance of OOB activities, activity pacing, and HEP (marching/hip flex, hip abd, heel slides/LAQ, quad sets, ankle pumps) in order to maintain/regain strength. Encouraged to sit up in chair for all meals. Educated on proper use of RW for safety and to reduce risk of falling. Educated on "call don't fall" policy and increased risk of falling due to weakness, instructed to utilize call bell for assistance with all transfers. Pt agreeable to all requests.    AM-PAC 6 CLICK MOBILITY  Total Score:16    Patient left up in chair with all lines intact, call button in reach, and spouse present.    GOALS:   Multidisciplinary Problems  "      Physical Therapy Goals          Problem: Physical Therapy    Goal Priority Disciplines Outcome Goal Variances Interventions   Physical Therapy Goal     PT, PT/OT      Description: Goals to be met by 4/26/24.  1. Pt will complete bed mobility MOD I.  2. Pt will complete sit to stand MOD I.  3. Pt will ambulate 200ft MOD I using RW.  4. Pt will increase AMPAC score by 2 points to progress functional mobility.                       History:     Past Medical History:   Diagnosis Date    A-fib     Arthritis     Chronic LBP     ESIs x 3 with Dr. Hicks, PT at [facility], chiropractor    Eye pain     Frequent headaches     GERD (gastroesophageal reflux disease)     Glaucoma     Hyperlipemia     Hypertension        Past Surgical History:   Procedure Laterality Date    ABLATION OF ARRHYTHMOGENIC FOCUS FOR ATRIAL FIBRILLATION N/A 3/6/2019    Procedure: ABLATION, ARRHYTHMOGENIC FOCUS, FOR ATRIAL FIBRILLATION;  Surgeon: Abel Morillo MD;  Location: [facility] EP LAB;  Service: Cardiology;  Laterality: N/A;    CARDIOVERSION N/A 7/9/2018    Procedure: CARDIOVERSION;  Surgeon: Will Rosenthal MD;  Location: [facility] CATH LAB;  Service: Cardiology;  Laterality: N/A;    CATARACT EXTRACTION W/  INTRAOCULAR LENS IMPLANT  OU    INJECTION OF ANESTHETIC AGENT INTO SACROILIAC JOINT Right 11/3/2020    Procedure: right Sacroiliac Joint Injection;  Surgeon: Ayush Christiansen MD;  Location: [facility] PAIN MGT;  Service: Pain Management;  Laterality: Right;    INJECTION OF ANESTHETIC AGENT INTO SACROILIAC JOINT Right 3/23/2021    Procedure: right Sacroiliac Joint Injection;  Surgeon: Ayuhs Christiansen MD;  Location: [facility] PAIN MGT;  Service: Pain Management;  Laterality: Right;    INJECTION OF JOINT Right 11/3/2020    Procedure: right GT bursa injection;  Surgeon: Ayush Christiansen MD;  Location: [facility] PAIN MGT;  Service: Pain Management;  Laterality: Right;    INJECTION OF JOINT Bilateral 3/23/2021    Procedure: bilateral GT bursa injection;  Surgeon: Ayush  KATIE Christiansen MD;  Location: [facility] PAIN MGT;  Service: Pain Management;  Laterality: Bilateral;    TUBAL LIGATION         Time Tracking:     PT Received On: 04/12/24  PT Start Time: 1410  PT Stop Time: 1435  PT Total Time (min): 25 min     Billable Minutes: Evaluation 15min and Gait Training 10min    4/12/2024

## 2024-04-12 NOTE — PLAN OF CARE
CHF Evaluation/Screening Form    Patient Level Data  Encounter Level Data  Knowledge Assessment/Post-Discharge Questions  Following Diet: Foods to Limit/Avoid Salt: Yes  Daily Weights Done?: Yes  Knows name of medication/water pill?: Yes  Understands Activity/Exercise:  Yes  Knows when to report symptoms?: Yes  Plan in place to call for worsening symptoms?: Yes  Does the patient have a means of transporation?: Yes Per NP, patient declines Home Health.

## 2024-04-13 ENCOUNTER — HOSPITAL ENCOUNTER (OUTPATIENT)
Facility: HOSPITAL | Age: 84
Discharge: HOME | End: 2024-04-13
Attending: EMERGENCY MEDICINE | Admitting: INTERNAL MEDICINE
Payer: MEDICARE

## 2024-04-13 ENCOUNTER — ANESTHESIA (OUTPATIENT)
Dept: ENDOSCOPY | Facility: HOSPITAL | Age: 84
End: 2024-04-13
Payer: MEDICARE

## 2024-04-13 ENCOUNTER — ANESTHESIA EVENT (OUTPATIENT)
Dept: ENDOSCOPY | Facility: HOSPITAL | Age: 84
End: 2024-04-13
Payer: MEDICARE

## 2024-04-13 VITALS
BODY MASS INDEX: 29.53 KG/M2 | HEART RATE: 87 BPM | SYSTOLIC BLOOD PRESSURE: 156 MMHG | DIASTOLIC BLOOD PRESSURE: 62 MMHG | TEMPERATURE: 98 F | HEIGHT: 65 IN | WEIGHT: 177.25 LBS | RESPIRATION RATE: 18 BRPM | OXYGEN SATURATION: 98 %

## 2024-04-13 DIAGNOSIS — K44.9 HIATAL HERNIA: ICD-10-CM

## 2024-04-13 DIAGNOSIS — K21.9 GASTROESOPHAGEAL REFLUX DISEASE WITHOUT ESOPHAGITIS: ICD-10-CM

## 2024-04-13 DIAGNOSIS — D50.8 OTHER IRON DEFICIENCY ANEMIA: ICD-10-CM

## 2024-04-13 DIAGNOSIS — K92.0 COFFEE GROUND EMESIS: Primary | ICD-10-CM

## 2024-04-13 DIAGNOSIS — R07.9 CHEST PAIN: ICD-10-CM

## 2024-04-13 DIAGNOSIS — K92.1 MELENA: ICD-10-CM

## 2024-04-13 PROBLEM — N30.01 ACUTE CYSTITIS WITH HEMATURIA: Status: ACTIVE | Noted: 2024-04-11

## 2024-04-13 LAB
ALBUMIN SERPL BCP-MCNC: 3.7 G/DL (ref 3.5–5.2)
ALP SERPL-CCNC: 77 U/L (ref 55–135)
ALT SERPL W/O P-5'-P-CCNC: 18 U/L (ref 10–44)
ANION GAP SERPL CALC-SCNC: 10 MMOL/L (ref 8–16)
APTT PPP: 25.4 SEC (ref 21–32)
AST SERPL-CCNC: 30 U/L (ref 10–40)
BASOPHILS # BLD AUTO: 0.02 K/UL (ref 0–0.2)
BASOPHILS NFR BLD: 0.2 % (ref 0–1.9)
BILIRUB SERPL-MCNC: 0.6 MG/DL (ref 0.1–1)
BUN SERPL-MCNC: 16 MG/DL (ref 8–23)
CALCIUM SERPL-MCNC: 10 MG/DL (ref 8.7–10.5)
CHLORIDE SERPL-SCNC: 108 MMOL/L (ref 95–110)
CO2 SERPL-SCNC: 24 MMOL/L (ref 23–29)
CREAT SERPL-MCNC: 0.8 MG/DL (ref 0.5–1.4)
DIFFERENTIAL METHOD BLD: ABNORMAL
EOSINOPHIL # BLD AUTO: 0.1 K/UL (ref 0–0.5)
EOSINOPHIL NFR BLD: 1.1 % (ref 0–8)
ERYTHROCYTE [DISTWIDTH] IN BLOOD BY AUTOMATED COUNT: 12.9 % (ref 11.5–14.5)
EST. GFR  (NO RACE VARIABLE): >60 ML/MIN/1.73 M^2
GLUCOSE SERPL-MCNC: 84 MG/DL (ref 70–110)
HCT VFR BLD AUTO: 31.6 % (ref 37–48.5)
HCV AB SERPL QL IA: NEGATIVE
HEP C VIRUS HOLD SPECIMEN: NORMAL
HGB BLD-MCNC: 10.6 G/DL (ref 12–16)
HIV 1+2 AB+HIV1 P24 AG SERPL QL IA: NEGATIVE
IMM GRANULOCYTES # BLD AUTO: 0.01 K/UL (ref 0–0.04)
IMM GRANULOCYTES NFR BLD AUTO: 0.1 % (ref 0–0.5)
INR PPP: 1.1 (ref 0.8–1.2)
LYMPHOCYTES # BLD AUTO: 3.8 K/UL (ref 1–4.8)
LYMPHOCYTES NFR BLD: 45.6 % (ref 18–48)
MCH RBC QN AUTO: 32.1 PG (ref 27–31)
MCHC RBC AUTO-ENTMCNC: 33.5 G/DL (ref 32–36)
MCV RBC AUTO: 96 FL (ref 82–98)
MONOCYTES # BLD AUTO: 1.1 K/UL (ref 0.3–1)
MONOCYTES NFR BLD: 13.1 % (ref 4–15)
NEUTROPHILS # BLD AUTO: 3.3 K/UL (ref 1.8–7.7)
NEUTROPHILS NFR BLD: 39.9 % (ref 38–73)
NRBC BLD-RTO: 0 /100 WBC
OB PNL STL: POSITIVE
PLATELET # BLD AUTO: 273 K/UL (ref 150–450)
PMV BLD AUTO: 9.8 FL (ref 9.2–12.9)
POTASSIUM SERPL-SCNC: 3.7 MMOL/L (ref 3.5–5.1)
PROT SERPL-MCNC: 7 G/DL (ref 6–8.4)
PROTHROMBIN TIME: 11.6 SEC (ref 9–12.5)
RBC # BLD AUTO: 3.3 M/UL (ref 4–5.4)
SODIUM SERPL-SCNC: 142 MMOL/L (ref 136–145)
WBC # BLD AUTO: 8.37 K/UL (ref 3.9–12.7)

## 2024-04-13 PROCEDURE — 25000003 PHARM REV CODE 250: Performed by: NURSE ANESTHETIST, CERTIFIED REGISTERED

## 2024-04-13 PROCEDURE — 85730 THROMBOPLASTIN TIME PARTIAL: CPT | Performed by: EMERGENCY MEDICINE

## 2024-04-13 PROCEDURE — 88305 TISSUE EXAM BY PATHOLOGIST: CPT | Mod: 26,,, | Performed by: PATHOLOGY

## 2024-04-13 PROCEDURE — 27201012 HC FORCEPS, HOT/COLD, DISP: Performed by: INTERNAL MEDICINE

## 2024-04-13 PROCEDURE — 99205 OFFICE O/P NEW HI 60 MIN: CPT | Mod: 25,,, | Performed by: INTERNAL MEDICINE

## 2024-04-13 PROCEDURE — 43239 EGD BIOPSY SINGLE/MULTIPLE: CPT | Mod: ,,, | Performed by: INTERNAL MEDICINE

## 2024-04-13 PROCEDURE — 63600175 PHARM REV CODE 636 W HCPCS: Performed by: NURSE ANESTHETIST, CERTIFIED REGISTERED

## 2024-04-13 PROCEDURE — 86803 HEPATITIS C AB TEST: CPT | Performed by: NURSE PRACTITIONER

## 2024-04-13 PROCEDURE — 96374 THER/PROPH/DIAG INJ IV PUSH: CPT | Mod: 59

## 2024-04-13 PROCEDURE — 80053 COMPREHEN METABOLIC PANEL: CPT | Performed by: NURSE PRACTITIONER

## 2024-04-13 PROCEDURE — 88342 IMHCHEM/IMCYTCHM 1ST ANTB: CPT | Performed by: PATHOLOGY

## 2024-04-13 PROCEDURE — 37000009 HC ANESTHESIA EA ADD 15 MINS: Performed by: INTERNAL MEDICINE

## 2024-04-13 PROCEDURE — 85025 COMPLETE CBC W/AUTO DIFF WBC: CPT | Performed by: NURSE PRACTITIONER

## 2024-04-13 PROCEDURE — 87389 HIV-1 AG W/HIV-1&-2 AB AG IA: CPT | Performed by: NURSE PRACTITIONER

## 2024-04-13 PROCEDURE — G0378 HOSPITAL OBSERVATION PER HR: HCPCS

## 2024-04-13 PROCEDURE — 88342 IMHCHEM/IMCYTCHM 1ST ANTB: CPT | Mod: 26,,, | Performed by: PATHOLOGY

## 2024-04-13 PROCEDURE — 85610 PROTHROMBIN TIME: CPT | Performed by: EMERGENCY MEDICINE

## 2024-04-13 PROCEDURE — 63600175 PHARM REV CODE 636 W HCPCS: Performed by: EMERGENCY MEDICINE

## 2024-04-13 PROCEDURE — 43239 EGD BIOPSY SINGLE/MULTIPLE: CPT | Performed by: INTERNAL MEDICINE

## 2024-04-13 PROCEDURE — 25000003 PHARM REV CODE 250: Performed by: INTERNAL MEDICINE

## 2024-04-13 PROCEDURE — 37000008 HC ANESTHESIA 1ST 15 MINUTES: Performed by: INTERNAL MEDICINE

## 2024-04-13 PROCEDURE — 82272 OCCULT BLD FECES 1-3 TESTS: CPT | Performed by: EMERGENCY MEDICINE

## 2024-04-13 PROCEDURE — C9113 INJ PANTOPRAZOLE SODIUM, VIA: HCPCS | Performed by: EMERGENCY MEDICINE

## 2024-04-13 PROCEDURE — 88305 TISSUE EXAM BY PATHOLOGIST: CPT | Performed by: PATHOLOGY

## 2024-04-13 RX ORDER — NALOXONE HCL 0.4 MG/ML
0.02 VIAL (ML) INJECTION
Status: DISCONTINUED | OUTPATIENT
Start: 2024-04-13 | End: 2024-04-13 | Stop reason: HOSPADM

## 2024-04-13 RX ORDER — IBUPROFEN 200 MG
16 TABLET ORAL
Status: DISCONTINUED | OUTPATIENT
Start: 2024-04-13 | End: 2024-04-13 | Stop reason: HOSPADM

## 2024-04-13 RX ORDER — DEXTROMETHORPHAN/PSEUDOEPHED 2.5-7.5/.8
DROPS ORAL
Status: DISCONTINUED | OUTPATIENT
Start: 2024-04-13 | End: 2024-04-13 | Stop reason: HOSPADM

## 2024-04-13 RX ORDER — PROMETHAZINE HYDROCHLORIDE 25 MG/1
25 TABLET ORAL EVERY 6 HOURS PRN
Status: DISCONTINUED | OUTPATIENT
Start: 2024-04-13 | End: 2024-04-13 | Stop reason: HOSPADM

## 2024-04-13 RX ORDER — LIDOCAINE HYDROCHLORIDE 10 MG/ML
INJECTION, SOLUTION EPIDURAL; INFILTRATION; INTRACAUDAL; PERINEURAL
Status: DISCONTINUED | OUTPATIENT
Start: 2024-04-13 | End: 2024-04-13

## 2024-04-13 RX ORDER — MORPHINE SULFATE 4 MG/ML
2 INJECTION, SOLUTION INTRAMUSCULAR; INTRAVENOUS EVERY 5 MIN PRN
Status: CANCELLED | OUTPATIENT
Start: 2024-04-13

## 2024-04-13 RX ORDER — PROPOFOL 10 MG/ML
VIAL (ML) INTRAVENOUS
Status: DISCONTINUED | OUTPATIENT
Start: 2024-04-13 | End: 2024-04-13

## 2024-04-13 RX ORDER — OXYCODONE AND ACETAMINOPHEN 5; 325 MG/1; MG/1
1 TABLET ORAL
Status: CANCELLED | OUTPATIENT
Start: 2024-04-13

## 2024-04-13 RX ORDER — SODIUM CHLORIDE 9 MG/ML
INJECTION, SOLUTION INTRAVENOUS CONTINUOUS
Status: DISCONTINUED | OUTPATIENT
Start: 2024-04-13 | End: 2024-04-13 | Stop reason: HOSPADM

## 2024-04-13 RX ORDER — SODIUM CHLORIDE 0.9 % (FLUSH) 0.9 %
3 SYRINGE (ML) INJECTION EVERY 8 HOURS
Status: DISCONTINUED | OUTPATIENT
Start: 2024-04-13 | End: 2024-04-13 | Stop reason: HOSPADM

## 2024-04-13 RX ORDER — GLUCAGON 1 MG
1 KIT INJECTION
Status: DISCONTINUED | OUTPATIENT
Start: 2024-04-13 | End: 2024-04-13 | Stop reason: HOSPADM

## 2024-04-13 RX ORDER — ONDANSETRON HYDROCHLORIDE 2 MG/ML
4 INJECTION, SOLUTION INTRAVENOUS EVERY 6 HOURS PRN
Status: DISCONTINUED | OUTPATIENT
Start: 2024-04-13 | End: 2024-04-13 | Stop reason: HOSPADM

## 2024-04-13 RX ORDER — PANTOPRAZOLE SODIUM 40 MG/10ML
80 INJECTION, POWDER, LYOPHILIZED, FOR SOLUTION INTRAVENOUS
Status: COMPLETED | OUTPATIENT
Start: 2024-04-13 | End: 2024-04-13

## 2024-04-13 RX ORDER — PANTOPRAZOLE SODIUM 40 MG/10ML
40 INJECTION, POWDER, LYOPHILIZED, FOR SOLUTION INTRAVENOUS 2 TIMES DAILY
Status: DISCONTINUED | OUTPATIENT
Start: 2024-04-13 | End: 2024-04-13 | Stop reason: HOSPADM

## 2024-04-13 RX ORDER — IBUPROFEN 200 MG
24 TABLET ORAL
Status: DISCONTINUED | OUTPATIENT
Start: 2024-04-13 | End: 2024-04-13 | Stop reason: HOSPADM

## 2024-04-13 RX ORDER — MEPERIDINE HYDROCHLORIDE 25 MG/ML
12.5 INJECTION INTRAMUSCULAR; INTRAVENOUS; SUBCUTANEOUS ONCE AS NEEDED
Status: CANCELLED | OUTPATIENT
Start: 2024-04-13 | End: 2024-04-14

## 2024-04-13 RX ORDER — SODIUM CHLORIDE 0.9 % (FLUSH) 0.9 %
10 SYRINGE (ML) INJECTION
Status: CANCELLED | OUTPATIENT
Start: 2024-04-13

## 2024-04-13 RX ORDER — SODIUM CHLORIDE, SODIUM LACTATE, POTASSIUM CHLORIDE, CALCIUM CHLORIDE 600; 310; 30; 20 MG/100ML; MG/100ML; MG/100ML; MG/100ML
INJECTION, SOLUTION INTRAVENOUS CONTINUOUS
Status: DISCONTINUED | OUTPATIENT
Start: 2024-04-13 | End: 2024-04-13 | Stop reason: HOSPADM

## 2024-04-13 RX ADMIN — PROPOFOL 30 MG: 10 INJECTION, EMULSION INTRAVENOUS at 07:04

## 2024-04-13 RX ADMIN — SODIUM CHLORIDE, POTASSIUM CHLORIDE, SODIUM LACTATE AND CALCIUM CHLORIDE: 600; 310; 30; 20 INJECTION, SOLUTION INTRAVENOUS at 07:04

## 2024-04-13 RX ADMIN — PANTOPRAZOLE SODIUM 80 MG: 40 INJECTION, POWDER, FOR SOLUTION INTRAVENOUS at 06:04

## 2024-04-13 RX ADMIN — LIDOCAINE HYDROCHLORIDE 50 MG: 10 SOLUTION INTRAVENOUS at 07:04

## 2024-04-13 RX ADMIN — PROPOFOL 50 MG: 10 INJECTION, EMULSION INTRAVENOUS at 07:04

## 2024-04-13 RX ADMIN — PROPOFOL 40 MG: 10 INJECTION, EMULSION INTRAVENOUS at 07:04

## 2024-04-13 NOTE — HPI
GI Bleeding: Patient complains of coffee ground emesis and melena. Symptoms have been present for approximately 5 days. The symptoms are stable. This has been associated with heartburn, midespigastric pain, and regurgitation of undigested food.  She denies dysphagia and unexpected weight loss.  She has not used nonsteroidal anti-inflammatory drugs on a regular bases; she is not anticoagulated.  The patient admits to abdominal pain, located in the epigastrium. The pain is described as burning and sharp, and is 7/10 in intensity.  There is not a past history of gastrointestinal bleeding. Patient was taking Xarelto for Atrial fibrillation. Last dose was Monday.

## 2024-04-13 NOTE — ASSESSMENT & PLAN NOTE
Recommend:  - NPO for now.  - IV PPI.   - EGD today to determine source of bleeding.   Risks, benefits and alternative options were discussed with the patient. Risks including but not limited to infection, bleeding, heart or respiratory problems and perforation that may require surgery were all explained to the patient. The patient had a chance for questions if there were doubts or concerns about the test. The referring provider will be notified that our consultation is complete and available through the patient's records.    Thanks for letting us participate in the care of this nice patient,    Oswald Esteves

## 2024-04-13 NOTE — ANESTHESIA POSTPROCEDURE EVALUATION
Anesthesia Post Evaluation    Patient: Maria Del Carmen Spence    Procedure(s) Performed: Procedure(s) (LRB):  EGD (ESOPHAGOGASTRODUODENOSCOPY) (N/A)    Final Anesthesia Type: MAC      Patient location during evaluation: PACU  Patient participation: Yes- Able to Participate  Level of consciousness: sedated  Post-procedure vital signs: reviewed and stable  Pain management: adequate  Airway patency: patent    PONV status at discharge: No PONV  Anesthetic complications: no      Cardiovascular status: blood pressure returned to baseline  Respiratory status: spontaneous ventilation  Hydration status: euvolemic  Follow-up not needed.              Vitals Value Taken Time   /60 04/13/24 0758   Temp  04/13/24 0800   Pulse 80 04/13/24 0758   Resp 18 04/13/24 0758   SpO2 97 % 04/13/24 0758         No case tracking events are documented in the log.      Pain/Karie Score: Karie Score: 8 (4/13/2024  7:58 AM)

## 2024-04-13 NOTE — ED PROVIDER NOTES
SCRIBE #1 NOTE: ITaya, am scribing for, and in the presence of, Sherrie Dutton DO. I have scribed the entire note.       History     Chief Complaint   Patient presents with    Rectal Bleeding     Pt c/o bloody diarrhea. Nausea and vomiting dark blood, Last blood thinner monday night     Review of patient's allergies indicates:   Allergen Reactions    Voltaren [diclofenac sodium] Edema     Gel formulation caused facial rash and facial swelling    Eliquis [apixaban] Other (See Comments)     Headache, numbness in lip     Medrol [methylprednisolone] Blisters         History of Present Illness     HPI    4/13/2024, 4:18 AM  History obtained from the patient      History of Present Illness: Maria Del Carmen Spence is a 83 y.o. female patient with a PMHx of HTN, GERD, A-fib, and HLD who presents to the Emergency Department for evaluation of dark stool which onset around 5 PM last night after being discharged from admission. Pt states she was admitted here on 4/11 for an upper GI bleed and was discharged yesterday evening (4/12). Pt takes Xarelto, but has not taken it since Monday evening. Pt states that she has not eaten any solid food today. Symptoms are constant and moderate in severity. No mitigating or exacerbating factors reported. Associated sxs include weakness. Patient denies any vomiting, abdominal pain, lightheadedness, dizziness, diarrhea, and all other sxs at this time. No prior Tx reported. No further complaints or concerns at this time.  She is planning to obtain an outpatient EGD.      Arrival mode: Personal vehicle     PCP: Rajinder Lutz MD        Past Medical History:  Past Medical History:   Diagnosis Date    A-fib     Arthritis     Chronic LBP     ESIs x 3 with Dr. Hicks, PT at [facility], chiropractor    Eye pain     Frequent headaches     GERD (gastroesophageal reflux disease)     Glaucoma     Hyperlipemia     Hypertension        Past Surgical History:  Past Surgical History:   Procedure  Laterality Date    ABLATION OF ARRHYTHMOGENIC FOCUS FOR ATRIAL FIBRILLATION N/A 3/6/2019    Procedure: ABLATION, ARRHYTHMOGENIC FOCUS, FOR ATRIAL FIBRILLATION;  Surgeon: Abel Morillo MD;  Location: [facility] EP LAB;  Service: Cardiology;  Laterality: N/A;    CARDIOVERSION N/A 7/9/2018    Procedure: CARDIOVERSION;  Surgeon: Will Rosenthal MD;  Location: [facility] CATH LAB;  Service: Cardiology;  Laterality: N/A;    CATARACT EXTRACTION W/  INTRAOCULAR LENS IMPLANT  OU    INJECTION OF ANESTHETIC AGENT INTO SACROILIAC JOINT Right 11/3/2020    Procedure: right Sacroiliac Joint Injection;  Surgeon: Ayush Christiansen MD;  Location: [facility] PAIN MGT;  Service: Pain Management;  Laterality: Right;    INJECTION OF ANESTHETIC AGENT INTO SACROILIAC JOINT Right 3/23/2021    Procedure: right Sacroiliac Joint Injection;  Surgeon: Ayush Christiansen MD;  Location: [facility] PAIN MGT;  Service: Pain Management;  Laterality: Right;    INJECTION OF JOINT Right 11/3/2020    Procedure: right GT bursa injection;  Surgeon: Ayush Christiansen MD;  Location: [facility] PAIN MGT;  Service: Pain Management;  Laterality: Right;    INJECTION OF JOINT Bilateral 3/23/2021    Procedure: bilateral GT bursa injection;  Surgeon: Ayush Christiansen MD;  Location: [facility] PAIN MGT;  Service: Pain Management;  Laterality: Bilateral;    TUBAL LIGATION           Family History:  Family History   Problem Relation Name Age of Onset    Glaucoma Mother      Cancer Mother      Cataracts Mother      Hypertension Mother      Hypertension Father      Diabetes Paternal Aunt      Strabismus Neg Hx      Retinal detachment Neg Hx      Macular degeneration Neg Hx      Blindness Neg Hx      Amblyopia Neg Hx      Stroke Neg Hx      Thyroid disease Neg Hx         Social History:  Social History     Tobacco Use    Smoking status: Never    Smokeless tobacco: Never   Substance and Sexual Activity    Alcohol use: No    Drug use: No    Sexual activity: Yes     Partners: Male        Review of Systems  "    Review of Systems   Constitutional:  Positive for fatigue.   Respiratory:  Negative for shortness of breath.    Cardiovascular:  Negative for chest pain.   Gastrointestinal:  Positive for blood in stool. Negative for abdominal pain, nausea and vomiting.         + melena   Neurological:  Positive for weakness. Negative for dizziness and light-headedness.      Physical Exam     Initial Vitals [04/12/24 2039]   BP Pulse Resp Temp SpO2   (!) 173/79 83 16 98.2 °F (36.8 °C) 96 %      MAP       --          Physical Exam  Nursing Notes and Vital Signs Reviewed.  Constitutional: Patient is in no acute distress. Well-developed and well-nourished.  Head: Atraumatic. Normocephalic.  Eyes: PERRL. EOM intact. Conjunctivae are pale. No scleral icterus.  ENT: Mucous membranes are moist.   Neck: Supple. Full ROM. No lymphadenopathy.  Cardiovascular: Regular rate. Regular rhythm. No murmurs, rubs, or gallops. Pulmonary/Chest: No respiratory distress. Clear to auscultation bilaterally. No wheezing or rales.  Abdominal: Soft and non-distended.  There is no tenderness.  No rebound, guarding, or rigidity. Good bowel sounds.  Musculoskeletal: Moves all extremities. No obvious deformities. No edema. No calf tenderness.  Skin: Warm and dry.  Pallor.  Neurological:  Alert, awake, and appropriate.  Normal speech.  No acute focal neurological deficits are appreciated.  Psychiatric: Normal affect. Good eye contact. Appropriate in content.  Rectal: Dark stool in rectal vault.     ED Course   Procedures  ED Vital Signs:  Vitals:    04/12/24 2039 04/13/24 0502 04/13/24 0514 04/13/24 0602   BP: (!) 173/79 (!) 141/66  (!) 155/66   Pulse: 83 88  94   Resp: 16      Temp: 98.2 °F (36.8 °C)      TempSrc: Oral      SpO2: 96% 96%  97%   Weight:   80.4 kg (177 lb 4 oz)    Height: 5' 5" (1.651 m)       04/13/24 0715 04/13/24 0758 04/13/24 0808 04/13/24 0818   BP: 114/64 132/60 (!) 147/64 (!) 156/62   Pulse: 96 80 74 87   Resp: 18 18 18 18   Temp:     "   TempSrc:       SpO2: 98% 97% 97% 98%   Weight:       Height:           Abnormal Lab Results:  Labs Reviewed   CBC W/ AUTO DIFFERENTIAL - Abnormal; Notable for the following components:       Result Value    RBC 3.30 (*)     Hemoglobin 10.6 (*)     Hematocrit 31.6 (*)     MCH 32.1 (*)     Mono # 1.1 (*)     All other components within normal limits    Narrative:     Release to patient->Immediate   OCCULT BLOOD X 1, STOOL - Abnormal; Notable for the following components:    Occult Blood Positive (*)     All other components within normal limits   HIV 1 / 2 ANTIBODY    Narrative:     Release to patient->Immediate   HEPATITIS C ANTIBODY    Narrative:     Release to patient->Immediate   HEP C VIRUS HOLD SPECIMEN    Narrative:     Release to patient->Immediate   COMPREHENSIVE METABOLIC PANEL    Narrative:     Release to patient->Immediate   APTT   PROTIME-INR        All Lab Results:  Results for orders placed or performed during the hospital encounter of 04/13/24   HIV 1/2 Ag/Ab (4th Gen)   Result Value Ref Range    HIV 1/2 Ag/Ab Negative Negative   Hepatitis C Antibody   Result Value Ref Range    Hepatitis C Ab Negative Negative   HCV Virus Hold Specimen   Result Value Ref Range    HEP C Virus Hold Specimen Hold for HCV sendout    CBC auto differential   Result Value Ref Range    WBC 8.37 3.90 - 12.70 K/uL    RBC 3.30 (L) 4.00 - 5.40 M/uL    Hemoglobin 10.6 (L) 12.0 - 16.0 g/dL    Hematocrit 31.6 (L) 37.0 - 48.5 %    MCV 96 82 - 98 fL    MCH 32.1 (H) 27.0 - 31.0 pg    MCHC 33.5 32.0 - 36.0 g/dL    RDW 12.9 11.5 - 14.5 %    Platelets 273 150 - 450 K/uL    MPV 9.8 9.2 - 12.9 fL    Immature Granulocytes 0.1 0.0 - 0.5 %    Gran # (ANC) 3.3 1.8 - 7.7 K/uL    Immature Grans (Abs) 0.01 0.00 - 0.04 K/uL    Lymph # 3.8 1.0 - 4.8 K/uL    Mono # 1.1 (H) 0.3 - 1.0 K/uL    Eos # 0.1 0.0 - 0.5 K/uL    Baso # 0.02 0.00 - 0.20 K/uL    nRBC 0 0 /100 WBC    Gran % 39.9 38.0 - 73.0 %    Lymph % 45.6 18.0 - 48.0 %    Mono % 13.1 4.0 -  "15.0 %    Eosinophil % 1.1 0.0 - 8.0 %    Basophil % 0.2 0.0 - 1.9 %    Differential Method Automated    Comprehensive metabolic panel   Result Value Ref Range    Sodium 142 136 - 145 mmol/L    Potassium 3.7 3.5 - 5.1 mmol/L    Chloride 108 95 - 110 mmol/L    CO2 24 23 - 29 mmol/L    Glucose 84 70 - 110 mg/dL    BUN 16 8 - 23 mg/dL    Creatinine 0.8 0.5 - 1.4 mg/dL    Calcium 10.0 8.7 - 10.5 mg/dL    Total Protein 7.0 6.0 - 8.4 g/dL    Albumin 3.7 3.5 - 5.2 g/dL    Total Bilirubin 0.6 0.1 - 1.0 mg/dL    Alkaline Phosphatase 77 55 - 135 U/L    AST 30 10 - 40 U/L    ALT 18 10 - 44 U/L    eGFR >60 >60 mL/min/1.73 m^2    Anion Gap 10 8 - 16 mmol/L   Occult blood x 1, stool    Specimen: Stool   Result Value Ref Range    Occult Blood Positive (A) Negative   APTT   Result Value Ref Range    aPTT 25.4 21.0 - 32.0 sec   Protime-INR   Result Value Ref Range    Prothrombin Time 11.6 9.0 - 12.5 sec    INR 1.1 0.8 - 1.2        Imaging Results:  Imaging Results    None               The Emergency Provider reviewed the vital signs and test results, which are outlined above.     ED Discussion       5:46 AM: Discussed pt's case with Dr. Esteves (Gastroenterology) who stated "I'm not certain she's having melena then and with a normal H/H, I'm a little concerned. Does she meet inpatient criteria? If not, I'll do an egd at 7 am and depending on my findings we can decide if she needs to be admitted to OBs or inpatient. I will call the sup and have the team ready for 7 am."    5:48 AM: Discussed case with Diana Hines NP (Hospital Medicine). Diana Hines NP agrees with current care and management of pt and accepts admission.   Admitting Service: Hospital Medicine  Admitting Physician: Dr. Ordonez  Admit to: obs med/tele      ED Course as of 04/13/24 2014   Sat Apr 13, 2024   4227 Old records reviewed.  Patient was admitted on 04/11 and discharge on 04/12.  She was diagnosed with GI bleed.  Hemoglobin trended downward remained stable.  " Xarelto was held and she was started on a PPI b.i.d..  GI was consulted and recommended outpatient scope. [NF]   0533 83-year-old female who was recently admitted for an upper GI bleed presents today with melena.  H&H is at baseline.  Hemoccult was positive.  Rectal exam does exhibit dark stool.  During admission GI was consulted and recommended an outpatient scope however patient states she was instructed to return to the ER with any melena.  She was previously on Xarelto, last dose Monday.  Bleeding times including INR are normal.  CBC shows normal renal function, LFTs, and electrolytes.  Place in observation for EGD, NPO, and PPI.  GI consulted.    Differential diagnosis includes but are not limited to peptic ulcer disease, esophagitis, gastritis, duodenitis, esophageal varices, Minerva-Ortiz syndrome.  [NF]      ED Course User Index  [NF] Sherrie Dutton, DO     Medical Decision Making  Amount and/or Complexity of Data Reviewed  External Data Reviewed: labs, radiology, ECG and notes.  Labs: ordered. Decision-making details documented in ED Course.    Risk  Prescription drug management.                ED Medication(s):  Medications   pantoprazole injection 80 mg (80 mg Intravenous Given 4/13/24 0647)       Discharge Medication List as of 4/13/2024  8:41 AM           Follow-up Information       [facility]. Call in 2 day(s).    Specialty: Gastroenterology  Why: Please call to follow up in clinic.  Contact information:  [address] [place] [id number] [phone]  Additional information:  Please take [address] to the Medical Office Building. Check in on the 4th floor.             Rajinder Lutz MD Follow up.    Specialty: Family Medicine  Why: As needed  Contact information:  [address] [place] [id number] [phone]                                 Scribe Attestation:   Scribe #1: I performed the above scribed service and the documentation  accurately describes the services I performed. I attest to the accuracy of the note.     Attending:   Physician Attestation Statement for Scribe #1: I, Sherrie Dutton DO, personally performed the services described in this documentation, as scribed by Taya Potts, in my presence, and it is both accurate and complete.       Scribe Attestation:   Scribe #1: I performed the above scribed service and the documentation accurately describes the services I performed. I attest to the accuracy of the note.         Clinical Impression       ICD-10-CM ICD-9-CM   1. Coffee ground emesis  K92.0 578.0   2. Melena  K92.1 578.1   3. Chest pain  R07.9 786.50   4. Hiatal hernia  K44.9 553.3   5. Other iron deficiency anemia  D50.8 280.8   6. Gastroesophageal reflux disease without esophagitis  K21.9 530.81       Disposition:   Disposition: Placed in Observation  Condition: Stable        Sherrie Dutton DO  04/13/24 2014

## 2024-04-13 NOTE — TRANSFER OF CARE
"Anesthesia Transfer of Care Note    Patient: Maria Del Carmen Spence    Procedure(s) Performed: Procedure(s) (LRB):  EGD (ESOPHAGOGASTRODUODENOSCOPY) (N/A)    Patient location: GI    Anesthesia Type: MAC    Post pain: adequate analgesia    Post assessment: no apparent anesthetic complications and tolerated procedure well    Post vital signs: stable    Level of consciousness: awake, alert and oriented    Nausea/Vomiting: no nausea/vomiting    Complications: none    Transfer of care protocol was followed      Last vitals: Visit Vitals  /64   Pulse 96   Temp 36.8 °C (98.2 °F) (Oral)   Resp 18   Ht 5' 5" (1.651 m)   Wt 80.4 kg (177 lb 4 oz)   SpO2 98%   Breastfeeding No   BMI 29.50 kg/m²     "

## 2024-04-13 NOTE — SUBJECTIVE & OBJECTIVE
Past Medical History:   Diagnosis Date    A-fib     Arthritis     Chronic LBP     ESIs x 3 with Dr. Hicks, PT at [facility], chiropractor    Eye pain     Frequent headaches     GERD (gastroesophageal reflux disease)     Glaucoma     Hyperlipemia     Hypertension        Past Surgical History:   Procedure Laterality Date    ABLATION OF ARRHYTHMOGENIC FOCUS FOR ATRIAL FIBRILLATION N/A 3/6/2019    Procedure: ABLATION, ARRHYTHMOGENIC FOCUS, FOR ATRIAL FIBRILLATION;  Surgeon: Abel Morillo MD;  Location: [facility] EP LAB;  Service: Cardiology;  Laterality: N/A;    CARDIOVERSION N/A 7/9/2018    Procedure: CARDIOVERSION;  Surgeon: Will Rosenthal MD;  Location: [facility] CATH LAB;  Service: Cardiology;  Laterality: N/A;    CATARACT EXTRACTION W/  INTRAOCULAR LENS IMPLANT  OU    INJECTION OF ANESTHETIC AGENT INTO SACROILIAC JOINT Right 11/3/2020    Procedure: right Sacroiliac Joint Injection;  Surgeon: Ayush Christiansen MD;  Location: [facility] PAIN MGT;  Service: Pain Management;  Laterality: Right;    INJECTION OF ANESTHETIC AGENT INTO SACROILIAC JOINT Right 3/23/2021    Procedure: right Sacroiliac Joint Injection;  Surgeon: Ayush Christiansen MD;  Location: [facility] PAIN MGT;  Service: Pain Management;  Laterality: Right;    INJECTION OF JOINT Right 11/3/2020    Procedure: right GT bursa injection;  Surgeon: Ayush Christiansen MD;  Location: [facility] PAIN MGT;  Service: Pain Management;  Laterality: Right;    INJECTION OF JOINT Bilateral 3/23/2021    Procedure: bilateral GT bursa injection;  Surgeon: Ayush Christiansen MD;  Location: [facility] PAIN MGT;  Service: Pain Management;  Laterality: Bilateral;    TUBAL LIGATION         Review of patient's allergies indicates:   Allergen Reactions    Voltaren [diclofenac sodium] Edema     Gel formulation caused facial rash and facial swelling    Eliquis [apixaban] Other (See Comments)     Headache, numbness in lip     Medrol [methylprednisolone] Blisters     Family History       Problem Relation (Age of Onset)     Cancer Mother    Cataracts Mother    Diabetes Paternal Aunt    Glaucoma Mother    Hypertension Mother, Father          Tobacco Use    Smoking status: Never    Smokeless tobacco: Never   Substance and Sexual Activity    Alcohol use: No    Drug use: No    Sexual activity: Yes     Partners: Male     Review of Systems   Constitutional:  Positive for activity change.   Gastrointestinal:  Positive for abdominal pain, blood in stool, nausea and vomiting.     Objective:     Vital Signs (Most Recent):  Temp: 98.2 °F (36.8 °C) (04/12/24 2039)  Pulse: 94 (04/13/24 0602)  Resp: 16 (04/12/24 2039)  BP: (!) 155/66 (04/13/24 0602)  SpO2: 97 % (04/13/24 0602) Vital Signs (24h Range):  Temp:  [98.2 °F (36.8 °C)-98.6 °F (37 °C)] 98.2 °F (36.8 °C)  Pulse:  [72-94] 94  Resp:  [16-19] 16  SpO2:  [96 %-97 %] 97 %  BP: (125-173)/(58-79) 155/66     Weight: 80.4 kg (177 lb 4 oz) (04/13/24 0514)  Body mass index is 29.5 kg/m².    No intake or output data in the 24 hours ending 04/13/24 0712    Lines/Drains/Airways       Peripheral Intravenous Line  Duration                  Peripheral IV - Single Lumen 04/13/24 0439 20 G Anterior;Distal;Right Upper Arm <1 day                     Physical Exam  Constitutional:       Appearance: She is well-developed.   HENT:      Head: Normocephalic and atraumatic.   Eyes:      Pupils: Pupils are equal, round, and reactive to light.   Neck:      Thyroid: No thyromegaly.   Cardiovascular:      Rate and Rhythm: Normal rate and regular rhythm.      Heart sounds: Normal heart sounds. No murmur heard.  Pulmonary:      Effort: Pulmonary effort is normal. No respiratory distress.      Breath sounds: Normal breath sounds. No wheezing or rales.   Abdominal:      General: Bowel sounds are normal. There is no distension.      Palpations: Abdomen is soft. There is no mass.      Tenderness: There is no abdominal tenderness.   Musculoskeletal:         General: No tenderness. Normal range of motion.      Cervical back:  Normal range of motion and neck supple.   Lymphadenopathy:      Cervical: No cervical adenopathy.   Skin:     General: Skin is warm and dry.      Findings: No erythema.   Neurological:      Mental Status: She is alert and oriented to person, place, and time.      Cranial Nerves: No cranial nerve deficit.      Deep Tendon Reflexes: Reflexes are normal and symmetric.   Psychiatric:         Behavior: Behavior normal.          Significant Labs:  Recent Lab Results         04/13/24  0439   04/13/24  0435   04/12/24  0758        Albumin   3.7         ALP   77         ALT   18         Anion Gap   10         PTT   25.4  Comment: Refer to local heparin nomogram for intensity/dose specific   therapeutic   range.           AST   30         Baso #   0.02   0.03       Basophil %   0.2   0.4       BILIRUBIN TOTAL   0.6  Comment: For infants and newborns, interpretation of results should be based  on gestational age, weight and in agreement with clinical  observations.    Premature Infant recommended reference ranges:  Up to 24 hours.............<8.0 mg/dL  Up to 48 hours............<12.0 mg/dL  3-5 days..................<15.0 mg/dL  6-29 days.................<15.0 mg/dL           BUN   16         Calcium   10.0         Chloride   108         CO2   24         Creatinine   0.8         Differential Method   Automated   Automated       eGFR   >60         Eos #   0.1   0.1       Eos %   1.1   1.1       Glucose   84         Gran # (ANC)   3.3   4.1       Gran %   39.9   50.2       Hematocrit   31.6   32.6       Hemoglobin   10.6   10.8       Hepatitis C Ab   Negative         HEP C Virus Hold Specimen   Hold for HCV sendout         HIV 1/2 Ag/Ab   Negative         Immature Grans (Abs)   0.01  Comment: Mild elevation in immature granulocytes is non specific and   can be seen in a variety of conditions including stress response,   acute inflammation, trauma and pregnancy. Correlation with other   laboratory and clinical findings is  essential.     0.02  Comment: Mild elevation in immature granulocytes is non specific and   can be seen in a variety of conditions including stress response,   acute inflammation, trauma and pregnancy. Correlation with other   laboratory and clinical findings is essential.         Immature Granulocytes   0.1   0.2       INR   1.1  Comment: Coumadin Therapy:  2.0 - 3.0 for INR for all indicators except mechanical heart valves  and antiphospholipid syndromes which should use 2.5 - 3.5.           Lymph #   3.8   3.0       Lymph %   45.6   36.4       MCH   32.1   32.0       MCHC   33.5   33.1       MCV   96   97       Mono #   1.1   1.0       Mono %   13.1   11.7       MPV   9.8   10.1       nRBC   0   0       Occult Blood Positive           Platelet Count   273   275       Potassium   3.7         PROTEIN TOTAL   7.0         PT   11.6         RBC   3.30   3.37       RDW   12.9   13.2       Sodium   142         WBC   8.37   8.26               Significant Imaging: n/a

## 2024-04-13 NOTE — ANESTHESIA PREPROCEDURE EVALUATION
04/13/2024  Maria Del Carmen Spence is a 83 y.o., female.    Past Medical History:   Diagnosis Date    A-fib     Arthritis     Chronic LBP     ESIs x 3 with Dr. Hicks, PT at [facility], chiropractor    Eye pain     Frequent headaches     GERD (gastroesophageal reflux disease)     Glaucoma     Hyperlipemia     Hypertension       Past Surgical History:   Procedure Laterality Date    ABLATION OF ARRHYTHMOGENIC FOCUS FOR ATRIAL FIBRILLATION N/A 3/6/2019    Procedure: ABLATION, ARRHYTHMOGENIC FOCUS, FOR ATRIAL FIBRILLATION;  Surgeon: Abel Morillo MD;  Location: [facility] EP LAB;  Service: Cardiology;  Laterality: N/A;    CARDIOVERSION N/A 7/9/2018    Procedure: CARDIOVERSION;  Surgeon: Will Rosenthal MD;  Location: [facility] CATH LAB;  Service: Cardiology;  Laterality: N/A;    CATARACT EXTRACTION W/  INTRAOCULAR LENS IMPLANT  OU    INJECTION OF ANESTHETIC AGENT INTO SACROILIAC JOINT Right 11/3/2020    Procedure: right Sacroiliac Joint Injection;  Surgeon: Ayush Christiansen MD;  Location: [facility] PAIN MGT;  Service: Pain Management;  Laterality: Right;    INJECTION OF ANESTHETIC AGENT INTO SACROILIAC JOINT Right 3/23/2021    Procedure: right Sacroiliac Joint Injection;  Surgeon: Ayush Christiansen MD;  Location: [facility] PAIN MGT;  Service: Pain Management;  Laterality: Right;    INJECTION OF JOINT Right 11/3/2020    Procedure: right GT bursa injection;  Surgeon: Ayush Christiansen MD;  Location: [facility] PAIN MGT;  Service: Pain Management;  Laterality: Right;    INJECTION OF JOINT Bilateral 3/23/2021    Procedure: bilateral GT bursa injection;  Surgeon: Ayush Christiansen MD;  Location: [facility] PAIN MGT;  Service: Pain Management;  Laterality: Bilateral;    TUBAL LIGATION        Pre-op Assessment    I have reviewed the Patient Summary Reports.     I have reviewed the Nursing Notes. I have reviewed the NPO Status.      Review of  Systems  Cardiovascular:     Hypertension    Dysrhythmias atrial fibrillation                Shortness of Breath                    Hypertension     Atrial Fibrillation, Atrial Flutter     Pulmonary:      Shortness of breath                  Hepatic/GI:     GERD      Gerd          Musculoskeletal:  Arthritis   Chronic LBP     Arthritis          Neurological:    Neuromuscular Disease,  Headaches      Dx of Headaches   Arthritis                         Neuromuscular Disease       Physical Exam  General: Well nourished and Cooperative    Airway:  Mallampati: II   Mouth Opening: Normal  TM Distance: Normal  Neck ROM: Normal ROM        Anesthesia Plan  Type of Anesthesia, risks & benefits discussed:    Anesthesia Type: MAC  Intra-op Monitoring Plan: Standard ASA Monitors  Post Op Pain Control Plan: multimodal analgesia  Induction:  IV  Informed Consent: Informed consent signed with the Patient and all parties understand the risks and agree with anesthesia plan.  All questions answered.   ASA Score: 3    Ready For Surgery From Anesthesia Perspective.     .

## 2024-04-13 NOTE — ASSESSMENT & PLAN NOTE
Chronic, controlled. Latest blood pressure and vitals reviewed-     Temp:  [98.2 °F (36.8 °C)-98.6 °F (37 °C)]   Pulse:  [72-88]   Resp:  [16-19]   BP: (125-173)/(58-79)   SpO2:  [96 %-97 %] .   Home meds for hypertension were reviewed and noted below.   Hypertension Medications               furosemide (LASIX) 20 MG tablet TAKE ONE TABLET BY MOUTH TWICE A WEEK    metoprolol tartrate (LOPRESSOR) 25 MG tablet Take 1 tablet (25 mg total) by mouth 2 (two) times daily.            While in the hospital, will manage blood pressure as follows; Continue home antihypertensive regimen after procedure and tolerating p.o.    Will utilize p.r.n. blood pressure medication only if patient's blood pressure greater than 180/110 and she develops symptoms such as worsening chest pain or shortness of breath.

## 2024-04-13 NOTE — ASSESSMENT & PLAN NOTE
Patient's anemia is currently controlled. Has not received any PRBCs to date. Etiology likely d/t chronic blood loss  Current CBC reviewed-   Lab Results   Component Value Date    HGB 10.6 (L) 04/13/2024    HCT 31.6 (L) 04/13/2024     Monitor serial CBC and transfuse if patient becomes hemodynamically unstable, symptomatic or H/H drops below 7/21.

## 2024-04-13 NOTE — BRIEF OP NOTE
Endoscopy Discharge Summary      Admit Date: 4/13/2024    Discharge Date and Time:  4/13/2024 8:07 AM    Attending Physician: Quan Jacobson MD     Discharge Physician: Quan Jacobson MD     Principal Admitting Diagnoses: Coffee ground emesis         Discharge Diagnosis: The primary encounter diagnosis was Coffee ground emesis. Diagnoses of Melena, Chest pain, Hiatal hernia, Other iron deficiency anemia, and Gastroesophageal reflux disease without esophagitis were also pertinent to this visit.     Discharged Condition: Good    Indication for Admission: Coffee ground emesis     Hospital Course: Patient was admitted for an inpatient procedure and tolerated the procedure well with no complications.    Significant Diagnostic Studies: EGD    Pathology (if any):  Specimen (24h ago, onward)       Start     Ordered    04/13/24 0753  Specimen to Pathology, Surgery Gastrointestinal tract  Once        Comments: Pre-op Diagnosis: Coffee ground emesis [K92.0]Procedure(s):EGD (ESOPHAGOGASTRODUODENOSCOPY) Number of specimens: 1Name of specimens: 1 gastric bx, r/o h pylori     References:    Click here for ordering Quick Tip   Question Answer Comment   Procedure Type: Gastrointestinal tract    Which provider would you like to cc? ROMEO PUGA    Release to patient Immediate        04/13/24 0753                    Disposition: Home.    Bleeding: None    No Implants         Follow Up/Patient Instructions:   Current Discharge Medication List        CONTINUE these medications which have NOT CHANGED    Details   atorvastatin (LIPITOR) 10 MG tablet Take 1 tablet (10 mg total) by mouth every evening.  Qty: 30 tablet, Refills: 11      cefdinir (OMNICEF) 300 MG capsule Take 1 capsule (300 mg total) by mouth 2 (two) times daily. for 5 days  Qty: 10 capsule, Refills: 0      furosemide (LASIX) 20 MG tablet TAKE ONE TABLET BY MOUTH TWICE A WEEK  Qty: 25 tablet, Refills: 2      gabapentin (NEURONTIN) 400 MG capsule Take 1 capsule (400 mg  total) by mouth 3 (three) times daily.  Qty: 270 capsule, Refills: 3    Associated Diagnoses: Sacroiliitis      metoprolol tartrate (LOPRESSOR) 25 MG tablet Take 1 tablet (25 mg total) by mouth 2 (two) times daily.  Qty: 60 tablet, Refills: 11    Comments: .      pantoprazole (PROTONIX) 40 MG tablet Take 1 tablet (40 mg total) by mouth 2 (two) times daily.  Qty: 60 tablet, Refills: 0      rivaroxaban (XARELTO) 15 mg Tab Take 1 tablet (15 mg total) by mouth daily with dinner or evening meal.  Qty: 30 tablet, Refills: 5    Comments: HOLD xarelto until follow-up with GI      AA/prot/lysine/methio/vit C/B6 (A/G PRO ORAL) Take 2 tablets by mouth 2 (two) times daily.       acetaminophen (TYLENOL) 650 MG TbSR Take 650 mg by mouth 2 (two) times daily.      CALCIUM PHOSPHATE TRIB/VIT D3 (CITRACAL + D ORAL) Take 1 tablet by mouth once daily at 6am.       cetirizine (ZYRTEC) 10 MG tablet Take 10 mg by mouth once daily.      latanoprost 0.005 % ophthalmic solution INSTILL ONE DROP IN EACH EYE AT BEDTIME  Qty: 2.5 mL, Refills: 12      MULTIVITAMIN W-MINERALS/LUTEIN (CENTRUM SILVER ORAL) Take 1 tablet by mouth once daily.      RSVPreF3 antigen-AS01E, PF, (AREXVY, PF,) 120 mcg/0.5 mL SusR vaccine Inject into the muscle.  Qty: 0.5 mL, Refills: 0      timolol maleate 0.5% (TIMOPTIC) 0.5 % Drop PLACE ONE DROP INTO BOTH EYES EVERY MORNING  Qty: 10 mL, Refills: 12    Associated Diagnoses: Primary open angle glaucoma of both eyes, mild stage             Discharge Procedure Orders   Diet general     No dressing needed     Call MD for:  temperature >100.4     Call MD for:  persistent nausea and vomiting     Call MD for:  severe uncontrolled pain     Call MD for:  difficulty breathing, headache or visual disturbances     Call MD for:  redness, tenderness, or signs of infection (pain, swelling, redness, odor or green/yellow discharge around incision site)     Call MD for:  hives     Call MD for:  persistent dizziness or light-headedness         Follow-up Information       [facility]. Call in 2 day(s).    Specialty: Gastroenterology  Why: Please call to follow up in clinic.  Contact information:  [address] [place] [id number] [phone]  Additional information:  Please take [address] to the Medical Office Building. Check in on the 4th floor.             Rajinder Lutz MD Follow up.    Specialty: Family Medicine  Why: As needed  Contact information:  [address] [facility] [address] [phone]

## 2024-04-13 NOTE — FIRST PROVIDER EVALUATION
Medical screening examination initiated.  I have conducted a focused provider triage encounter, findings are as follows:    Brief history of present illness:  Patient presents to ER for 1 episode of black tarry stool.  Reports recent hospitalization due to upper GI bleed.    There were no vitals filed for this visit.    Pertinent physical exam:  No acute distress.  Brief workup plan:  Workup.    Preliminary workup initiated; this workup will be continued and followed by the physician or advanced practice provider that is assigned to the patient when roomed.

## 2024-04-13 NOTE — DISCHARGE INSTRUCTIONS
Dear Maria Del Carmen Spence      If you develop fevers, severe abdominal pain, significant bleeding, nausea or vomiting, please contact us or come to our Emergency Department at [facility].  Our  contact number is [phone] available for emergencies or any question that you may have.      You may return to normal activities tomorrow.  Written discharge instructions were provided to you today and have them handy since you may not remember our conversation today.       If you take Aspirin, please resume taking it at your prior dose today. If we obtained biopsies or removed polyps during your procedure, we will contact you within 5 to 6 days with the results.      Thanks for trusting us with your healthcare needs.      Sincerely,     Oswald Esteves M.D.        To rate your experience with Dr. Esteves, please click on the link below     [email or web address]

## 2024-04-13 NOTE — DISCHARGE SUMMARY
[facility] ([facility])  [facility] Medicine  Discharge Summary      Patient Name: Maria Del Carmen Spence  MRN: [id number]  [facility]: [id number]  Patient Class: OP- Observation  Admission Date: 4/13/2024  Hospital Length of Stay: 0 days  Discharge Date and Time:  04/13/2024 9:04 AM  Attending Physician: Quan Jacobson MD   Discharging Provider: Amaris Sevilla NP  Primary Care Provider: Rajinder Lutz MD    Primary Care Team: Networked reference to record PCT     HPI:     Patient is 83-year-old female with past medical history significant for AFib on Xarelto, hypertension, GERD, hyperlipidemia, previous radiofrequency ablation procedure, degenerative joint disease,  chronic back pain, headaches, glaucoma, and anemia who was just discharged on 04/12/2024 after brief admission due to nausea, vomiting with reported black colored emesis, was started on PPI and Xarelto was held with plans for outpatient EGD per Gastroenterology.  She re-presented to ED on the evening of 04/12/2024 after having 1 episode of black tarry stool.  Hemoglobin has remained relatively stable, was 10.8 and dropped to 10.2.  PT / INR and PTT are all normal.  Chemistries unremarkable.  She was noted to have a urinary tract infection and she reports that she took her prescribed antibiotic last night as well as oral Protonix.  Vital signs have been stable on room air.  EKG reviewed, sinus rhythm with premature supraventricular complexes, heart rate  stable.  CT abdomen without contrast was done on 04/11/2024, which showed large hiatal hernia, no evidence of bowel obstruction or inflammation, scattered diverticulosis without evidence of acute diverticulitis, no ascites or free air.  Gastroenterology was consulted and due to recurrence of bleeding is planning on EGD at 7:00 a.m. this morning.  Hospital Medicine was consulted for admission.    Procedure(s) (LRB):  EGD (ESOPHAGOGASTRODUODENOSCOPY) (N/A)      Hospital Course:   Ms. Spence is a 83 year old  female with a history of PMH of AFib on Xarelto, HTN, GERD who was admitted [facility] 4/11-4/12 with black emesis x 2 at home, her hgb remained stable, started on ppi BID and discharged to home for outpatient EDG per GI recs.  Patient represented to ED on evening of 4/12 with after having 1 episode of black tarry stool. Hgb was stable.  GI consulted and patient underwent EGD 4/13 with Dr. Esteves which showed Large hiatal hernia, Acquired deformity in the gastric body and in the gastric antrum.  Case discussed with Dr. Esteves,ok to d/c to home, will continue PPI BID, may restart home xarelto.  Recommended outpatient colonoscopy.  Patient seen and examined on day of discharge and deemed suitable to d/c to home.  She will follow-up with PCP and GI next week.         Goals of Care Treatment Preferences:  Code Status: Full Code      Consults:   Consults (From admission, onward)          Status Ordering Provider     Inpatient consult to Gastroenterology  Once        Provider:  (Not yet assigned)    PATY Miller new Assessment & Plan notes have been filed under this hospital service since the last note was generated.  Service: Hospital Medicine    Final Active Diagnoses:    Diagnosis Date Noted POA    PRINCIPAL PROBLEM:  Coffee ground emesis [K92.0] 04/11/2024 Yes    Melena [K92.1] 04/13/2024 Yes    Hiatal hernia [K44.9] 04/13/2024 Yes    Gastroesophageal reflux disease without esophagitis [K21.9] 08/04/2021 Yes    Anemia [D64.9] 08/04/2021 Yes    PAF (paroxysmal atrial fibrillation) [I48.0] 03/06/2019 Yes      Problems Resolved During this Admission:       Discharged Condition: good    Disposition: Home or Self Care    Follow Up:   Follow-up Information       [facility]. Call in 2 day(s).    Specialty: Gastroenterology  Why: Please call to follow up in clinic.  Contact information:  [address] [facility] [address] [phone]  Additional  information:  Please take [address] to the Medical Office Building. Check in on the 4th floor.             Rajinder Lutz MD Follow up.    Specialty: Family Medicine  Why: As needed  Contact information:  [address] [address]  [facility] [address] [phone]                           Patient Instructions:      Diet general     No dressing needed     Call MD for:  temperature >100.4     Call MD for:  persistent nausea and vomiting     Call MD for:  severe uncontrolled pain     Call MD for:  difficulty breathing, headache or visual disturbances     Call MD for:  redness, tenderness, or signs of infection (pain, swelling, redness, odor or green/yellow discharge around incision site)     Call MD for:  hives     Call MD for:  persistent dizziness or light-headedness       Significant Diagnostic Studies: Labs: CMP   Recent Labs   Lab 04/12/24  0552 04/13/24  0435    142   K 4.1 3.7    108   CO2 26 24   GLU 92 84   BUN 19 16   CREATININE 0.7 0.8   CALCIUM 9.3 10.0   PROT  --  7.0   ALBUMIN  --  3.7   BILITOT  --  0.6   ALKPHOS  --  77   AST  --  30   ALT  --  18   ANIONGAP 6* 10    and CBC   Recent Labs   Lab 04/11/24  1759 04/12/24  0758 04/13/24  0435   WBC 9.38 8.26 8.37   HGB 10.8* 10.8* 10.6*   HCT 33.0* 32.6* 31.6*    275 273       Pending Diagnostic Studies:       Procedure Component Value Units Date/Time    Specimen to Pathology, Surgery Gastrointestinal tract [[id number]] Collected: 04/13/24 0753    Order Status: Sent Lab Status: In process Updated: 04/13/24 0754    Specimen: Tissue            Medications:  Reconciled Home Medications:      Medication List        CONTINUE taking these medications      A/G PRO ORAL  Take 2 tablets by mouth 2 (two) times daily.     acetaminophen 650 MG Tbsr  Commonly known as: TYLENOL  Take 650 mg by mouth 2 (two) times daily.     AREXVY (PF) 120 mcg/0.5 mL Susr vaccine  Generic drug: RSVPreF3 antigen-AS01E (PF)  Inject into the muscle.      atorvastatin 10 MG tablet  Commonly known as: LIPITOR  Take 1 tablet (10 mg total) by mouth every evening.     cefdinir 300 MG capsule  Commonly known as: OMNICEF  Take 1 capsule (300 mg total) by mouth 2 (two) times daily. for 5 days     CENTRUM SILVER ORAL  Take 1 tablet by mouth once daily.     cetirizine 10 MG tablet  Commonly known as: ZYRTEC  Take 10 mg by mouth once daily.     CITRACAL + D ORAL  Take 1 tablet by mouth once daily at 6am.     furosemide 20 MG tablet  Commonly known as: LASIX  TAKE ONE TABLET BY MOUTH TWICE A WEEK     gabapentin 400 MG capsule  Commonly known as: NEURONTIN  Take 1 capsule (400 mg total) by mouth 3 (three) times daily.     latanoprost 0.005 % ophthalmic solution  INSTILL ONE DROP IN EACH EYE AT BEDTIME     metoprolol tartrate 25 MG tablet  Commonly known as: LOPRESSOR  Take 1 tablet (25 mg total) by mouth 2 (two) times daily.     pantoprazole 40 MG tablet  Commonly known as: PROTONIX  Take 1 tablet (40 mg total) by mouth 2 (two) times daily.     rivaroxaban 15 mg Tab  Commonly known as: XARELTO  Take 1 tablet (15 mg total) by mouth daily with dinner or evening meal.     timolol maleate 0.5% 0.5 % Drop  Commonly known as: TIMOPTIC  PLACE ONE DROP INTO BOTH EYES EVERY MORNING              Indwelling Lines/Drains at time of discharge:   Lines/Drains/Airways       None                   Time spent on the discharge of patient: <30 minutes         Amaris Sevilla NP  Department of Hospital Medicine  [facility] ([facility])

## 2024-04-13 NOTE — HOSPITAL COURSE
Ms. Spence is a 83 year old female with a history of PMH of AFib on Xarelto, HTN, GERD who was admitted [facility] 4/11-4/12 with black emesis x 2 at home, her hgb remained stable, started on ppi BID and discharged to home for outpatient EDG per GI recs.  Patient represented to ED on evening of 4/12 with after having 1 episode of black tarry stool. Hgb was stable.  GI consulted and patient underwent EGD 4/13 with Dr. Esteves which showed Large hiatal hernia, Acquired deformity in the gastric body and in the gastric antrum.  Case discussed with Dr. Esteves,ok to d/c to home, will continue PPI BID, may restart home xarelto.  Recommended outpatient colonoscopy.  Patient seen and examined on day of discharge and deemed suitable to d/c to home.  She will follow-up with PCP and GI next week.

## 2024-04-13 NOTE — ASSESSMENT & PLAN NOTE
Will proceed with EGD today.   Vitals:    04/13/24 0502 04/13/24 0514 04/13/24 0602 04/13/24 0715   BP: (!) 141/66  (!) 155/66 114/64   BP Location:       Patient Position:       Pulse: 88  94 96   Resp:    18   Temp:       TempSrc:       SpO2: 96%  97% 98%   Weight:  80.4 kg (177 lb 4 oz)     Height:

## 2024-04-13 NOTE — TELEPHONE ENCOUNTER
"Maria Del Carmen currently c/o bloody, black/tarry stool that is new symptom occurring after dc from hospital today. Advised pt per triage protocol to go to nearest ED now for physician gabi. V/u.       Reason for Disposition   Black or tarry bowel movements  (Exception: Chronic-unchanged black-grey BMs AND is taking iron pills or Pepto-Bismol.)    Additional Information   Negative: Shock suspected (e.g., cold/pale/clammy skin, too weak to stand, low BP, rapid pulse)   Negative: Difficult to awaken or acting confused (e.g., disoriented, slurred speech)   Negative: Passed out (i.e., lost consciousness, collapsed and was not responding)   Negative: [1] Vomiting AND [2] contains red blood or black ("coffee ground") material  (Exception: Few red streaks in vomit that only happened once.)   Negative: Sounds like a life-threatening emergency to the triager   Negative: SEVERE rectal bleeding (large blood clots; constant or on and off bleeding)   Negative: SEVERE dizziness (e.g., unable to stand, requires support to walk, feels like passing out now)   Negative: [1] MODERATE rectal bleeding (small blood clots, passing blood without stool, or toilet water turns red) AND [2] more than once a day   Negative: Pale skin (pallor) of new-onset or worsening    Protocols used: Rectal Bleeding-A-AH    "

## 2024-04-13 NOTE — HPI
Patient is 83-year-old female with past medical history significant for AFib on Xarelto, hypertension, GERD, hyperlipidemia, previous radiofrequency ablation procedure, degenerative joint disease,  chronic back pain, headaches, glaucoma, and anemia who was just discharged on 04/12/2024 after brief admission due to nausea, vomiting with reported black colored emesis, was started on PPI and Xarelto was held with plans for outpatient EGD per Gastroenterology.  She re-presented to ED on the evening of 04/12/2024 after having 1 episode of black tarry stool.  Hemoglobin has remained relatively stable, was 10.8 and dropped to 10.2.  PT / INR and PTT are all normal.  Chemistries unremarkable.  She was noted to have a urinary tract infection and she reports that she took her prescribed antibiotic last night as well as oral Protonix.  Vital signs have been stable on room air.  EKG reviewed, sinus rhythm with premature supraventricular complexes, heart rate  stable.  CT abdomen without contrast was done on 04/11/2024, which showed large hiatal hernia, no evidence of bowel obstruction or inflammation, scattered diverticulosis without evidence of acute diverticulitis, no ascites or free air.  Gastroenterology was consulted and due to recurrence of bleeding is planning on EGD at 7:00 a.m. this morning.  Hospital Medicine was consulted for admission.

## 2024-04-13 NOTE — ASSESSMENT & PLAN NOTE
One episode of melena yesterday evening, prompting patient to return to ED, denies abdominal pain, no further N/V since d/c  Gastroenterology consulted, planning on EGD around 7:00 a.m.  Patient has been NPO since around 9:00 p.m. last night  Protonix 80 mg IV was given, we will continue 40 mg IV b.i.d.

## 2024-04-13 NOTE — ASSESSMENT & PLAN NOTE
Will proceed with an EGD today.   Recent Results (from the past 336 hour(s))   CBC auto differential    Collection Time: 04/13/24  4:35 AM   Result Value Ref Range    WBC 8.37 3.90 - 12.70 K/uL    Hemoglobin 10.6 (L) 12.0 - 16.0 g/dL    Hematocrit 31.6 (L) 37.0 - 48.5 %    Platelets 273 150 - 450 K/uL   CBC auto differential    Collection Time: 04/12/24  7:58 AM   Result Value Ref Range    WBC 8.26 3.90 - 12.70 K/uL    Hemoglobin 10.8 (L) 12.0 - 16.0 g/dL    Hematocrit 32.6 (L) 37.0 - 48.5 %    Platelets 275 150 - 450 K/uL   CBC auto differential    Collection Time: 04/11/24  5:59 PM   Result Value Ref Range    WBC 9.38 3.90 - 12.70 K/uL    Hemoglobin 10.8 (L) 12.0 - 16.0 g/dL    Hematocrit 33.0 (L) 37.0 - 48.5 %    Platelets 281 150 - 450 K/uL

## 2024-04-13 NOTE — CONSULTS
[facility] - Emergency Dept.  Gastroenterology  Consult Note    Patient Name: Maria Del Carmen Spence  MRN: [id number]  Admission Date: 4/13/2024  Hospital Length of Stay: 0 days  Code Status: Full Code   Attending Provider: Quan Jacobson MD   Consulting Provider: Oswald Esteves MD  Primary Care Physician: Rajinder Lutz MD  Principal Problem:Coffee ground emesis    Inpatient consult to Gastroenterology  Consult performed by: Oswald Esteves MD  Consult ordered by: Sherrie Dutton DO  Reason for consult: coffee gorund emesis, melena and anemia.        Subjective:     HPI:  GI Bleeding: Patient complains of coffee ground emesis and melena. Symptoms have been present for approximately 5 days. The symptoms are stable. This has been associated with heartburn, midespigastric pain, and regurgitation of undigested food.  She denies dysphagia and unexpected weight loss.  She has not used nonsteroidal anti-inflammatory drugs on a regular bases; she is not anticoagulated.  The patient admits to abdominal pain, located in the epigastrium. The pain is described as burning and sharp, and is 7/10 in intensity.  There is not a past history of gastrointestinal bleeding. Patient was taking Xarelto for Atrial fibrillation. Last dose was Monday.       Past Medical History:   Diagnosis Date    A-fib     Arthritis     Chronic LBP     ESIs x 3 with Dr. Hicks, PT at [facility], chiropractor    Eye pain     Frequent headaches     GERD (gastroesophageal reflux disease)     Glaucoma     Hyperlipemia     Hypertension        Past Surgical History:   Procedure Laterality Date    ABLATION OF ARRHYTHMOGENIC FOCUS FOR ATRIAL FIBRILLATION N/A 3/6/2019    Procedure: ABLATION, ARRHYTHMOGENIC FOCUS, FOR ATRIAL FIBRILLATION;  Surgeon: Abel Morillo MD;  Location: [facility] EP LAB;  Service: Cardiology;  Laterality: N/A;    CARDIOVERSION N/A 7/9/2018    Procedure: CARDIOVERSION;  Surgeon: Will Rosenthal MD;  Location: [facility] [place] CATH LAB;  Service: Cardiology;  Laterality: N/A;     CATARACT EXTRACTION W/  INTRAOCULAR LENS IMPLANT  OU    INJECTION OF ANESTHETIC AGENT INTO SACROILIAC JOINT Right 11/3/2020    Procedure: right Sacroiliac Joint Injection;  Surgeon: Ayush Christiansen MD;  Location: [facility] PAIN MGT;  Service: Pain Management;  Laterality: Right;    INJECTION OF ANESTHETIC AGENT INTO SACROILIAC JOINT Right 3/23/2021    Procedure: right Sacroiliac Joint Injection;  Surgeon: Ayush Christiansen MD;  [facility];  Service: Pain Management;  Laterality: Right;    INJECTION OF JOINT Right 11/3/2020    Procedure: right GT bursa injection;  Surgeon: Ayush Christiansen MD;  Location: [facility] PAIN MGT;  Service: Pain Management;  Laterality: Right;    INJECTION OF JOINT Bilateral 3/23/2021    Procedure: bilateral GT bursa injection;  Surgeon: Ayush Christiansen MD;  Location: [facility] PAIN MGT;  Service: Pain Management;  Laterality: Bilateral;    TUBAL LIGATION         Review of patient's allergies indicates:   Allergen Reactions    Voltaren [diclofenac sodium] Edema     Gel formulation caused facial rash and facial swelling    Eliquis [apixaban] Other (See Comments)     Headache, numbness in lip     Medrol [methylprednisolone] Blisters     Family History       Problem Relation (Age of Onset)    Cancer Mother    Cataracts Mother    Diabetes Paternal Aunt    Glaucoma Mother    Hypertension Mother, Father          Tobacco Use    Smoking status: Never    Smokeless tobacco: Never   Substance and Sexual Activity    Alcohol use: No    Drug use: No    Sexual activity: Yes     Partners: Male     Review of Systems   Constitutional:  Positive for activity change.   Gastrointestinal:  Positive for abdominal pain, blood in stool, nausea and vomiting.     Objective:     Vital Signs (Most Recent):  Temp: 98.2 °F (36.8 °C) (04/12/24 2039)  Pulse: 94 (04/13/24 0602)  Resp: 16 (04/12/24 2039)  BP: (!) 155/66 (04/13/24 0602)  SpO2: 97 % (04/13/24 0602) Vital Signs (24h Range):  Temp:  [98.2 °F (36.8 °C)-98.6  °F (37 °C)] 98.2 °F (36.8 °C)  Pulse:  [72-94] 94  Resp:  [16-19] 16  SpO2:  [96 %-97 %] 97 %  BP: (125-173)/(58-79) 155/66     Weight: 80.4 kg (177 lb 4 oz) (04/13/24 0514)  Body mass index is 29.5 kg/m².    No intake or output data in the 24 hours ending 04/13/24 0712    Lines/Drains/Airways       Peripheral Intravenous Line  Duration                  Peripheral IV - Single Lumen 04/13/24 0439 20 G Anterior;Distal;Right Upper Arm <1 day                     Physical Exam  Constitutional:       Appearance: She is well-developed.   HENT:      Head: Normocephalic and atraumatic.   Eyes:      Pupils: Pupils are equal, round, and reactive to light.   Neck:      Thyroid: No thyromegaly.   Cardiovascular:      Rate and Rhythm: Normal rate and regular rhythm.      Heart sounds: Normal heart sounds. No murmur heard.  Pulmonary:      Effort: Pulmonary effort is normal. No respiratory distress.      Breath sounds: Normal breath sounds. No wheezing or rales.   Abdominal:      General: Bowel sounds are normal. There is no distension.      Palpations: Abdomen is soft. There is no mass.      Tenderness: There is no abdominal tenderness.   Musculoskeletal:         General: No tenderness. Normal range of motion.      Cervical back: Normal range of motion and neck supple.   Lymphadenopathy:      Cervical: No cervical adenopathy.   Skin:     General: Skin is warm and dry.      Findings: No erythema.   Neurological:      Mental Status: She is alert and oriented to person, place, and time.      Cranial Nerves: No cranial nerve deficit.      Deep Tendon Reflexes: Reflexes are normal and symmetric.   Psychiatric:         Behavior: Behavior normal.          Significant Labs:  Recent Lab Results         04/13/24  0439   04/13/24  0435   04/12/24  0758        Albumin   3.7         ALP   77         ALT   18         Anion Gap   10         PTT   25.4  Comment: Refer to local heparin nomogram for intensity/dose specific   therapeutic    range.           AST   30         Baso #   0.02   0.03       Basophil %   0.2   0.4       BILIRUBIN TOTAL   0.6  Comment: For infants and newborns, interpretation of results should be based  on gestational age, weight and in agreement with clinical  observations.    Premature Infant recommended reference ranges:  Up to 24 hours.............<8.0 mg/dL  Up to 48 hours............<12.0 mg/dL  3-5 days..................<15.0 mg/dL  6-29 days.................<15.0 mg/dL           BUN   16         Calcium   10.0         Chloride   108         CO2   24         Creatinine   0.8         Differential Method   Automated   Automated       eGFR   >60         Eos #   0.1   0.1       Eos %   1.1   1.1       Glucose   84         Gran # (ANC)   3.3   4.1       Gran %   39.9   50.2       Hematocrit   31.6   32.6       Hemoglobin   10.6   10.8       Hepatitis C Ab   Negative         HEP C Virus Hold Specimen   Hold for HCV sendout         HIV 1/2 Ag/Ab   Negative         Immature Grans (Abs)   0.01  Comment: Mild elevation in immature granulocytes is non specific and   can be seen in a variety of conditions including stress response,   acute inflammation, trauma and pregnancy. Correlation with other   laboratory and clinical findings is essential.     0.02  Comment: Mild elevation in immature granulocytes is non specific and   can be seen in a variety of conditions including stress response,   acute inflammation, trauma and pregnancy. Correlation with other   laboratory and clinical findings is essential.         Immature Granulocytes   0.1   0.2       INR   1.1  Comment: Coumadin Therapy:  2.0 - 3.0 for INR for all indicators except mechanical heart valves  and antiphospholipid syndromes which should use 2.5 - 3.5.           Lymph #   3.8   3.0       Lymph %   45.6   36.4       MCH   32.1   32.0       MCHC   33.5   33.1       MCV   96   97       Mono #   1.1   1.0       Mono %   13.1   11.7       MPV   9.8   10.1       nRBC   0    0       Occult Blood Positive           Platelet Count   273   275       Potassium   3.7         PROTEIN TOTAL   7.0         PT   11.6         RBC   3.30   3.37       RDW   12.9   13.2       Sodium   142         WBC   8.37   8.26               Significant Imaging: n/a    Assessment/Plan:     Oncology  Anemia  Will proceed with an EGD today.   Recent Results (from the past 336 hour(s))   CBC auto differential    Collection Time: 04/13/24  4:35 AM   Result Value Ref Range    WBC 8.37 3.90 - 12.70 K/uL    Hemoglobin 10.6 (L) 12.0 - 16.0 g/dL    Hematocrit 31.6 (L) 37.0 - 48.5 %    Platelets 273 150 - 450 K/uL   CBC auto differential    Collection Time: 04/12/24  7:58 AM   Result Value Ref Range    WBC 8.26 3.90 - 12.70 K/uL    Hemoglobin 10.8 (L) 12.0 - 16.0 g/dL    Hematocrit 32.6 (L) 37.0 - 48.5 %    Platelets 275 150 - 450 K/uL   CBC auto differential    Collection Time: 04/11/24  5:59 PM   Result Value Ref Range    WBC 9.38 3.90 - 12.70 K/uL    Hemoglobin 10.8 (L) 12.0 - 16.0 g/dL    Hematocrit 33.0 (L) 37.0 - 48.5 %    Platelets 281 150 - 450 K/uL         GI  * Coffee ground emesis  Recommend:  - NPO for now.  - IV PPI.   - EGD today to determine source of bleeding.   Risks, benefits and alternative options were discussed with the patient. Risks including but not limited to infection, bleeding, heart or respiratory problems and perforation that may require surgery were all explained to the patient. The patient had a chance for questions if there were doubts or concerns about the test. The referring provider will be notified that our consultation is complete and available through the patient's records.    Thanks for letting us participate in the care of this nice patient,    Oswald Stock  Will proceed with EGD today.   Vitals:    04/13/24 0502 04/13/24 0514 04/13/24 0602 04/13/24 0715   BP: (!) 141/66  (!) 155/66 114/64   BP Location:       Patient Position:       Pulse: 88  94 96   Resp:    18   Temp:        TempSrc:       SpO2: 96%  97% 98%   Weight:  80.4 kg (177 lb 4 oz)     Height:                 Thank you for your consult. I will follow-up with patient. Please contact us if you have any additional questions.    Oswald Esteves MD  Gastroenterology  [facility] - Emergency Dept.

## 2024-04-13 NOTE — PROVATION PATIENT INSTRUCTIONS
Discharge Summary/Instructions after an Endoscopic Procedure  Patient Name: Maria Del Carmen Spence  Patient MRN: [id number]  Patient YOB: 1940  Saturday, April 13, 2024 Oswald Esteves MD  Dear patient,  As a result of recent federal legislation (The Federal Cures Act), you may   receive lab or pathology results from your procedure in your [facility]   account before your physician is able to contact you. Your physician or   their representative will relay the results to you with their   recommendations at their soonest availability.  Thank you,  RESTRICTIONS:  During your procedure today, you received medications for sedation.  These   medications may affect your judgment, balance and coordination.  Therefore,   for 24 hours, you have the following restrictions:   - DO NOT drive a car, operate machinery, make legal/financial decisions,   sign important papers or drink alcohol.    ACTIVITY:  Today: no heavy lifting, straining or running due to procedural   sedation/anesthesia.  The following day: return to full activity including work.  DIET:  Eat and drink normally unless instructed otherwise.     TREATMENT FOR COMMON SIDE EFFECTS:  - Mild abdominal pain, nausea, belching, bloating or excessive gas:  rest,   eat lightly and use a heating pad.  - Sore Throat: treat with throat lozenges and/or gargle with warm salt   water.  - Because air was used during the procedure, expelling large amounts of air   from your rectum or belching is normal.  - If a bowel prep was taken, you may not have a bowel movement for 1-3 days.    This is normal.  SYMPTOMS TO WATCH FOR AND REPORT TO YOUR PHYSICIAN:  1. Abdominal pain or bloating, other than gas cramps.  2. Chest pain.  3. Back pain.  4. Signs of infection such as: chills or fever occurring within 24 hours   after the procedure.  5. Rectal bleeding, which would show as bright red, maroon, or black stools.   (A tablespoon of blood from the rectum is not serious, especially if    hemorrhoids are present.)  6. Vomiting.  7. Weakness or dizziness.  GO DIRECTLY TO THE NEAREST EMERGENCY ROOM IF YOU HAVE ANY OF THE FOLLOWING:      Difficulty breathing              Chills and/or fever over 101 F   Persistent vomiting and/or vomiting blood   Severe abdominal pain   Severe chest pain   Black, tarry stools   Bleeding- more than one tablespoon   Any other symptom or condition that you feel may need urgent attention  Your doctor recommends these additional instructions:  If any biopsies were taken, your doctors clinic will contact you in 1 to 2   weeks with any results.  - The patient will be observed post-procedure, until all discharge criteria   are met.   - Discharge patient to home (ambulatory).   - Patient has a contact number available for emergencies.  The signs and   symptoms of potential delayed complications were discussed with the   patient.  Return to normal activities tomorrow.  Written discharge   instructions were provided to the patient.   - Resume previous diet.   - Continue present medications.   - Await pathology results.   - Resume Xarelto (rivaroxaban) at prior dose tomorrow.  Refer to managing   physician for further adjustment of therapy.   - Follow an antireflux regimen.   - Use a proton pump inhibitor PO daily.   - Return to GI clinic at appointment to be scheduled.  For questions, problems or results please call your physician Oswald Esteves MD at Work:  [phone]  If you have any questions about the above instructions, call the GI   department at [phone] or call the endoscopy unit at [phone]   from 7am until 3 pm.  [facility] [place], EMERGENCY ROOM PHONE NUMBER:   [phone]  IF A COMPLICATION OR EMERGENCY SITUATION ARISES AND YOU ARE UNABLE TO REACH   YOUR PHYSICIAN - GO DIRECTLY TO THE EMERGENCY ROOM.  I have read or have had read to me these discharge instructions for my   procedure and have received a written copy.  I understand  these   instructions and will follow-up with my physician if I have any questions.     __________________________________       _____________________________________  Nurse Signature                                          Patient/Designated   Responsible Party Signature  Oswald Esteves MD  4/13/2024 8:03:10 AM  This report has been verified and signed electronically.  Dear patient,  As a result of recent federal legislation (The Federal Cures Act), you may   receive lab or pathology results from your procedure in your [facility]   account before your physician is able to contact you. Your physician or   their representative will relay the results to you with their   recommendations at their soonest availability.  Thank you,  PROVATION

## 2024-04-13 NOTE — SUBJECTIVE & OBJECTIVE
Past Medical History:   Diagnosis Date    A-fib     Arthritis     Chronic LBP     ESIs x 3 with Dr. Hicks, PT at [facility], chiropractor    Eye pain     Frequent headaches     GERD (gastroesophageal reflux disease)     Glaucoma     Hyperlipemia     Hypertension        Past Surgical History:   Procedure Laterality Date    ABLATION OF ARRHYTHMOGENIC FOCUS FOR ATRIAL FIBRILLATION N/A 3/6/2019    Procedure: ABLATION, ARRHYTHMOGENIC FOCUS, FOR ATRIAL FIBRILLATION;  Surgeon: Abel Morillo MD;  Location: [facility] EP LAB;  Service: Cardiology;  Laterality: N/A;    CARDIOVERSION N/A 7/9/2018    Procedure: CARDIOVERSION;  Surgeon: Will Rosenthal MD;  Location: [facility] CATH LAB;  Service: Cardiology;  Laterality: N/A;    CATARACT EXTRACTION W/  INTRAOCULAR LENS IMPLANT  OU    INJECTION OF ANESTHETIC AGENT INTO SACROILIAC JOINT Right 11/3/2020    Procedure: right Sacroiliac Joint Injection;  Surgeon: Ayush Christiansen MD;  Location: [facility] PAIN MGT;  Service: Pain Management;  Laterality: Right;    INJECTION OF ANESTHETIC AGENT INTO SACROILIAC JOINT Right 3/23/2021    Procedure: right Sacroiliac Joint Injection;  Surgeon: Ayush Christiansen MD;  Location: [facility] PAIN MGT;  Service: Pain Management;  Laterality: Right;    INJECTION OF JOINT Right 11/3/2020    Procedure: right GT bursa injection;  Surgeon: Ayush Christiansen MD;  Location: [facility] PAIN MGT;  Service: Pain Management;  Laterality: Right;    INJECTION OF JOINT Bilateral 3/23/2021    Procedure: bilateral GT bursa injection;  Surgeon: Ayush Christiansen MD;  Location: [facility] PAIN MGT;  Service: Pain Management;  Laterality: Bilateral;    TUBAL LIGATION         Review of patient's allergies indicates:   Allergen Reactions    Voltaren [diclofenac sodium] Edema     Gel formulation caused facial rash and facial swelling    Eliquis [apixaban] Other (See Comments)     Headache, numbness in lip     Medrol [methylprednisolone] Blisters       Current Facility-Administered Medications   Medication  Dose Route Frequency Provider Last Rate Last Admin    dextrose 10% bolus 125 mL 125 mL  12.5 g Intravenous PRN Diana Hines NP        dextrose 10% bolus 250 mL 250 mL  25 g Intravenous PRN Diana Hines NP        glucagon (human recombinant) injection 1 mg  1 mg Intramuscular PRN Diana Hines NP        glucose chewable tablet 16 g  16 g Oral PRN Diana Hines NP        glucose chewable tablet 24 g  24 g Oral PRN Diana Hines NP        naloxone 0.4 mg/mL injection 0.02 mg  0.02 mg Intravenous PRN Diana Hines NP        ondansetron injection 4 mg  4 mg Intravenous Q6H PRN Diana Hines NP        pantoprazole injection 80 mg  80 mg Intravenous ED 1 Time Sherrie Dutton DO        promethazine tablet 25 mg  25 mg Oral Q6H PRN Diana Hines NP        sodium chloride 0.9% flush 3 mL  3 mL Intravenous Q8H Diana Hines NP         Current Outpatient Medications   Medication Sig Dispense Refill    atorvastatin (LIPITOR) 10 MG tablet Take 1 tablet (10 mg total) by mouth every evening. 30 tablet 11    cefdinir (OMNICEF) 300 MG capsule Take 1 capsule (300 mg total) by mouth 2 (two) times daily. for 5 days 10 capsule 0    furosemide (LASIX) 20 MG tablet TAKE ONE TABLET BY MOUTH TWICE A WEEK 25 tablet 2    gabapentin (NEURONTIN) 400 MG capsule Take 1 capsule (400 mg total) by mouth 3 (three) times daily. 270 capsule 3    metoprolol tartrate (LOPRESSOR) 25 MG tablet Take 1 tablet (25 mg total) by mouth 2 (two) times daily. 60 tablet 11    pantoprazole (PROTONIX) 40 MG tablet Take 1 tablet (40 mg total) by mouth 2 (two) times daily. 60 tablet 0    rivaroxaban (XARELTO) 15 mg Tab Take 1 tablet (15 mg total) by mouth daily with dinner or evening meal. 30 tablet 5    AA/prot/lysine/methio/vit C/B6 (A/G PRO ORAL) Take 2 tablets by mouth 2 (two) times daily.       acetaminophen (TYLENOL) 650 MG TbSR Take 650 mg by mouth 2 (two) times daily.      CALCIUM PHOSPHATE TRIB/VIT D3 (CITRACAL + D ORAL) Take 1  tablet by mouth once daily at 6am.       cetirizine (ZYRTEC) 10 MG tablet Take 10 mg by mouth once daily.      latanoprost 0.005 % ophthalmic solution INSTILL ONE DROP IN EACH EYE AT BEDTIME 2.5 mL 12    MULTIVITAMIN W-MINERALS/LUTEIN (CENTRUM SILVER ORAL) Take 1 tablet by mouth once daily.      RSVPreF3 antigen-AS01E, PF, (AREXVY, PF,) 120 mcg/0.5 mL SusR vaccine Inject into the muscle. 0.5 mL 0    timolol maleate 0.5% (TIMOPTIC) 0.5 % Drop PLACE ONE DROP INTO BOTH EYES EVERY MORNING 10 mL 12     Family History       Problem Relation (Age of Onset)    Cancer Mother    Cataracts Mother    Diabetes Paternal Aunt    Glaucoma Mother    Hypertension Mother, Father          Tobacco Use    Smoking status: Never    Smokeless tobacco: Never   Substance and Sexual Activity    Alcohol use: No    Drug use: No    Sexual activity: Yes     Partners: Male     Review of Systems   Constitutional: Negative.  Negative for chills, diaphoresis, fatigue and fever.   HENT: Negative.     Eyes: Negative.    Respiratory: Negative.  Negative for cough, shortness of breath and wheezing.    Cardiovascular:  Positive for leg swelling. Negative for chest pain and palpitations.   Gastrointestinal:  Negative for abdominal distention, abdominal pain, constipation, nausea and vomiting.        Melena x one last night  Black emesis on Thursday  No further nausea or vomiting since discharge yesterday   Endocrine: Negative.    Genitourinary: Negative.    Musculoskeletal: Negative.    Skin: Negative.    Allergic/Immunologic: Negative.    Neurological: Negative.  Negative for dizziness, syncope, weakness, light-headedness and headaches.   Hematological:  Bruises/bleeds easily.   Psychiatric/Behavioral: Negative.       Objective:     Vital Signs (Most Recent):  Temp: 98.2 °F (36.8 °C) (04/12/24 2039)  Pulse: 88 (04/13/24 0502)  Resp: 16 (04/12/24 2039)  BP: (!) 141/66 (04/13/24 0502)  SpO2: 96 % (04/13/24 0502) Vital Signs (24h Range):  Temp:  [98.2 °F  (36.8 °C)-98.6 °F (37 °C)] 98.2 °F (36.8 °C)  Pulse:  [72-88] 88  Resp:  [16-19] 16  SpO2:  [96 %-97 %] 96 %  BP: (125-173)/(58-79) 141/66     Weight: 80.4 kg (177 lb 4 oz)  Body mass index is 29.5 kg/m².     Physical Exam  Vitals reviewed.   Constitutional:       General: She is not in acute distress.     Appearance: She is not ill-appearing, toxic-appearing or diaphoretic.      Comments: Elderly white female, no distress   HENT:      Head: Normocephalic.      Nose: Nose normal.      Mouth/Throat:      Mouth: Mucous membranes are dry.   Eyes:      Extraocular Movements: Extraocular movements intact.      Pupils: Pupils are equal, round, and reactive to light.   Cardiovascular:      Rate and Rhythm: Normal rate and regular rhythm.      Pulses: Normal pulses.      Heart sounds: Normal heart sounds.   Pulmonary:      Effort: Pulmonary effort is normal. No respiratory distress.      Breath sounds: Normal breath sounds. No wheezing or rales.   Chest:      Chest wall: No tenderness.   Abdominal:      General: Bowel sounds are normal. There is no distension.      Palpations: Abdomen is soft.      Tenderness: There is no abdominal tenderness. There is no guarding.   Genitourinary:     Rectum: Guaiac result positive.   Musculoskeletal:         General: Normal range of motion.      Cervical back: Neck supple.      Right lower leg: Edema present.      Left lower leg: Edema present.      Comments: Mild 1+ edema to lower extremities bilaterally   Skin:     General: Skin is warm and dry.      Capillary Refill: Capillary refill takes less than 2 seconds.   Neurological:      General: No focal deficit present.      Mental Status: She is alert and oriented to person, place, and time.      Motor: No weakness.   Psychiatric:         Mood and Affect: Mood normal.         Behavior: Behavior normal.         Thought Content: Thought content normal.              CRANIAL NERVES     CN III, IV, VI   Pupils are equal, round, and reactive to  light.       Significant Labs: All pertinent labs within the past 24 hours have been reviewed.  Recent Lab Results         04/13/24  0439   04/13/24  0435   04/12/24  0758        Albumin   3.7         ALP   77         ALT   18         Anion Gap   10         PTT   25.4  Comment: Refer to local heparin nomogram for intensity/dose specific   therapeutic   range.           AST   30         Baso #   0.02   0.03       Basophil %   0.2   0.4       BILIRUBIN TOTAL   0.6  Comment: For infants and newborns, interpretation of results should be based  on gestational age, weight and in agreement with clinical  observations.    Premature Infant recommended reference ranges:  Up to 24 hours.............<8.0 mg/dL  Up to 48 hours............<12.0 mg/dL  3-5 days..................<15.0 mg/dL  6-29 days.................<15.0 mg/dL           BUN   16         Calcium   10.0         Chloride   108         CO2   24         Creatinine   0.8         Differential Method   Automated   Automated       eGFR   >60         Eos #   0.1   0.1       Eos %   1.1   1.1       Glucose   84         Gran # (ANC)   3.3   4.1       Gran %   39.9   50.2       Hematocrit   31.6   32.6       Hemoglobin   10.6   10.8       Hepatitis C Ab   Negative         HEP C Virus Hold Specimen   Hold for HCV sendout         HIV 1/2 Ag/Ab   Negative         Immature Grans (Abs)   0.01  Comment: Mild elevation in immature granulocytes is non specific and   can be seen in a variety of conditions including stress response,   acute inflammation, trauma and pregnancy. Correlation with other   laboratory and clinical findings is essential.     0.02  Comment: Mild elevation in immature granulocytes is non specific and   can be seen in a variety of conditions including stress response,   acute inflammation, trauma and pregnancy. Correlation with other   laboratory and clinical findings is essential.         Immature Granulocytes   0.1   0.2       INR   1.1  Comment: Coumadin  Therapy:  2.0 - 3.0 for INR for all indicators except mechanical heart valves  and antiphospholipid syndromes which should use 2.5 - 3.5.           Lymph #   3.8   3.0       Lymph %   45.6   36.4       MCH   32.1   32.0       MCHC   33.5   33.1       MCV   96   97       Mono #   1.1   1.0       Mono %   13.1   11.7       MPV   9.8   10.1       nRBC   0   0       Occult Blood Positive           Platelet Count   273   275       Potassium   3.7         PROTEIN TOTAL   7.0         PT   11.6         RBC   3.30   3.37       RDW   12.9   13.2       Sodium   142         WBC   8.37   8.26               Significant Imaging: I have reviewed all pertinent imaging results/findings within the past 24 hours.    CT ABDOMEN PELVIS WITHOUT CONTRAST     CLINICAL HISTORY:  Abdominal pain, acute, nonlocalized;     TECHNIQUE:  Low dose axial images, sagittal and coronal reformations were obtained from the lung bases to the pubic symphysis.  Oral contrast was not administered.     COMPARISON:  None     FINDINGS:  Heart: Normal size. No effusion.     Lung Bases: Clear.  Mild atelectasis right lower lobe.     Liver: Normal size and attenuation. No focal lesions.     Gallbladder: No calcified gallstones.     Bile Ducts: No dilatation.     Pancreas: No obvious mass. No peripancreatic fat stranding.     Spleen: Normal.     Adrenals: Normal.     Kidneys/Ureters: No mass, hydroureteronephrosis, or nephroureterolithiasis.     Bladder: Mild nonspecific wall thickening.     Reproductive organs: Normal.     GI Tract/Mesentery: Large hiatal hernia.  No evidence of bowel obstruction or inflammation.  Scattered diverticulosis without evidence of acute diverticulitis.     Peritoneal Space: No ascites or free air.     Retroperitoneum: No significant adenopathy.     Abdominal wall: Normal.     Vasculature: No aneurysm.     Bones: No acute fracture.  Diffuse osteopenia advanced degenerative changes of the lumbar spine multilevel disc space narrowing and  spondylosis as well as facet arthropathy just are producing varying degrees of neural foraminal narrowing.  Moderate hip joint degenerative changes.  No suspicious lytic or sclerotic lesions.     Impression:     Large hiatal hernia.     Evaluation of solid organ and vascular pathology is limited due to lack of IV contrast.     All CT scans at this facility use dose modulation, iterative reconstruction, and/or weight based dosing when appropriate to reduce radiation dose to as low as reasonable achievable.        Electronically signed by:Ryan Grey MD  Date:                                            04/11/2024  Time:                                           07:20           Exam Ended: 04/11/24 02:55 CDT Last Resulted: 04/11/24 07:20 CDT

## 2024-04-13 NOTE — H&P
[facility] - Emergency Dept.  [facility] Medicine  History & Physical    Patient Name: Maria Del Carmen Spence  MRN: [id number]  Patient Class: OP- Observation  Admission Date: 4/13/2024  Attending Physician: Elda Ordonez MD   Primary Care Provider: Rajinder Lutz MD         Patient information was obtained from patient, past medical records, and ER records.     Subjective:     Principal Problem:Melena    Chief Complaint:   Chief Complaint   Patient presents with    Rectal Bleeding     Pt c/o bloody diarrhea. Nausea and vomiting dark blood, Last blood thinner monday night        HPI:   Patient is 83-year-old female with past medical history significant for AFib on Xarelto, hypertension, GERD, hyperlipidemia, previous radiofrequency ablation procedure, degenerative joint disease,  chronic back pain, headaches, glaucoma, and anemia who was just discharged on 04/12/2024 after brief admission due to nausea, vomiting with reported black colored emesis, was started on PPI and Xarelto was held with plans for outpatient EGD per Gastroenterology.  She re-presented to ED on the evening of 04/12/2024 after having 1 episode of black tarry stool.  Hemoglobin has remained relatively stable, was 10.8 and dropped to 10.2.  PT / INR and PTT are all normal.  Chemistries unremarkable.  She was noted to have a urinary tract infection and she reports that she took her prescribed antibiotic last night as well as oral Protonix.  Vital signs have been stable on room air.  EKG reviewed, sinus rhythm with premature supraventricular complexes, heart rate  stable.  CT abdomen without contrast was done on 04/11/2024, which showed large hiatal hernia, no evidence of bowel obstruction or inflammation, scattered diverticulosis without evidence of acute diverticulitis, no ascites or free air.  Gastroenterology was consulted and due to recurrence of bleeding is planning on EGD at 7:00 a.m. this morning.  Hospital Medicine was consulted for  admission.    Past Medical History:   Diagnosis Date    A-fib     Arthritis     Chronic LBP     ESIs x 3 with Dr. Hicks, PT at [place], chiropractor    Eye pain     Frequent headaches     GERD (gastroesophageal reflux disease)     Glaucoma     Hyperlipemia     Hypertension        Past Surgical History:   Procedure Laterality Date    ABLATION OF ARRHYTHMOGENIC FOCUS FOR ATRIAL FIBRILLATION N/A 3/6/2019    Procedure: ABLATION, ARRHYTHMOGENIC FOCUS, FOR ATRIAL FIBRILLATION;  Surgeon: Abel Morillo MD;  Location: [facility] EP LAB;  Service: Cardiology;  Laterality: N/A;    CARDIOVERSION N/A 7/9/2018    Procedure: CARDIOVERSION;  Surgeon: Will Rosenthal MD;  Location: [facility] CATH LAB;  Service: Cardiology;  Laterality: N/A;    CATARACT EXTRACTION W/  INTRAOCULAR LENS IMPLANT  OU    INJECTION OF ANESTHETIC AGENT INTO SACROILIAC JOINT Right 11/3/2020    Procedure: right Sacroiliac Joint Injection;  Surgeon: Ayush Christiansen MD;  Location: [facility] PAIN MGT;  Service: Pain Management;  Laterality: Right;    INJECTION OF ANESTHETIC AGENT INTO SACROILIAC JOINT Right 3/23/2021    Procedure: right Sacroiliac Joint Injection;  Surgeon: Ayush Christiansen MD;  Location: [facility] PAIN MGT;  Service: Pain Management;  Laterality: Right;    INJECTION OF JOINT Right 11/3/2020    Procedure: right GT bursa injection;  Surgeon: Ayush Christiansen MD;  Location: [facility] PAIN MGT;  Service: Pain Management;  Laterality: Right;    INJECTION OF JOINT Bilateral 3/23/2021    Procedure: bilateral GT bursa injection;  Surgeon: Ayush Christiansen MD;  Location: [facility] PAIN MGT;  Service: Pain Management;  Laterality: Bilateral;    TUBAL LIGATION         Review of patient's allergies indicates:   Allergen Reactions    Voltaren [diclofenac sodium] Edema     Gel formulation caused facial rash and facial swelling    Eliquis [apixaban] Other (See Comments)     Headache, numbness in lip     Medrol [methylprednisolone] Blisters       Current Facility-Administered Medications    Medication Dose Route Frequency Provider Last Rate Last Admin    dextrose 10% bolus 125 mL 125 mL  12.5 g Intravenous PRN Diana Hines NP        dextrose 10% bolus 250 mL 250 mL  25 g Intravenous PRN Diana Hines NP        glucagon (human recombinant) injection 1 mg  1 mg Intramuscular PRN Diana Hines NP        glucose chewable tablet 16 g  16 g Oral PRN Diana Hines NP        glucose chewable tablet 24 g  24 g Oral PRN Diana Hines NP        naloxone 0.4 mg/mL injection 0.02 mg  0.02 mg Intravenous PRN Diana Hines NP        ondansetron injection 4 mg  4 mg Intravenous Q6H PRN Diana Hines NP        pantoprazole injection 80 mg  80 mg Intravenous ED 1 Time Sherrie Dutton DO        promethazine tablet 25 mg  25 mg Oral Q6H PRN Diana Hines NP        sodium chloride 0.9% flush 3 mL  3 mL Intravenous Q8H Diana Hines NP         Current Outpatient Medications   Medication Sig Dispense Refill    atorvastatin (LIPITOR) 10 MG tablet Take 1 tablet (10 mg total) by mouth every evening. 30 tablet 11    cefdinir (OMNICEF) 300 MG capsule Take 1 capsule (300 mg total) by mouth 2 (two) times daily. for 5 days 10 capsule 0    furosemide (LASIX) 20 MG tablet TAKE ONE TABLET BY MOUTH TWICE A WEEK 25 tablet 2    gabapentin (NEURONTIN) 400 MG capsule Take 1 capsule (400 mg total) by mouth 3 (three) times daily. 270 capsule 3    metoprolol tartrate (LOPRESSOR) 25 MG tablet Take 1 tablet (25 mg total) by mouth 2 (two) times daily. 60 tablet 11    pantoprazole (PROTONIX) 40 MG tablet Take 1 tablet (40 mg total) by mouth 2 (two) times daily. 60 tablet 0    rivaroxaban (XARELTO) 15 mg Tab Take 1 tablet (15 mg total) by mouth daily with dinner or evening meal. 30 tablet 5    AA/prot/lysine/methio/vit C/B6 (A/G PRO ORAL) Take 2 tablets by mouth 2 (two) times daily.       acetaminophen (TYLENOL) 650 MG TbSR Take 650 mg by mouth 2 (two) times daily.      CALCIUM PHOSPHATE TRIB/VIT D3 (CITRACAL + D  ORAL) Take 1 tablet by mouth once daily at 6am.       cetirizine (ZYRTEC) 10 MG tablet Take 10 mg by mouth once daily.      latanoprost 0.005 % ophthalmic solution INSTILL ONE DROP IN EACH EYE AT BEDTIME 2.5 mL 12    MULTIVITAMIN W-MINERALS/LUTEIN (CENTRUM SILVER ORAL) Take 1 tablet by mouth once daily.      RSVPreF3 antigen-AS01E, PF, (AREXVY, PF,) 120 mcg/0.5 mL SusR vaccine Inject into the muscle. 0.5 mL 0    timolol maleate 0.5% (TIMOPTIC) 0.5 % Drop PLACE ONE DROP INTO BOTH EYES EVERY MORNING 10 mL 12     Family History       Problem Relation (Age of Onset)    Cancer Mother    Cataracts Mother    Diabetes Paternal Aunt    Glaucoma Mother    Hypertension Mother, Father          Tobacco Use    Smoking status: Never    Smokeless tobacco: Never   Substance and Sexual Activity    Alcohol use: No    Drug use: No    Sexual activity: Yes     Partners: Male     Review of Systems   Constitutional: Negative.  Negative for chills, diaphoresis, fatigue and fever.   HENT: Negative.     Eyes: Negative.    Respiratory: Negative.  Negative for cough, shortness of breath and wheezing.    Cardiovascular:  Positive for leg swelling. Negative for chest pain and palpitations.   Gastrointestinal:  Negative for abdominal distention, abdominal pain, constipation, nausea and vomiting.        Melena x one last night  Black emesis on Thursday  No further nausea or vomiting since discharge yesterday   Endocrine: Negative.    Genitourinary: Negative.    Musculoskeletal: Negative.    Skin: Negative.    Allergic/Immunologic: Negative.    Neurological: Negative.  Negative for dizziness, syncope, weakness, light-headedness and headaches.   Hematological:  Bruises/bleeds easily.   Psychiatric/Behavioral: Negative.       Objective:     Vital Signs (Most Recent):  Temp: 98.2 °F (36.8 °C) (04/12/24 2039)  Pulse: 88 (04/13/24 0502)  Resp: 16 (04/12/24 2039)  BP: (!) 141/66 (04/13/24 0502)  SpO2: 96 % (04/13/24 0502) Vital Signs (24h Range):  Temp:   [98.2 °F (36.8 °C)-98.6 °F (37 °C)] 98.2 °F (36.8 °C)  Pulse:  [72-88] 88  Resp:  [16-19] 16  SpO2:  [96 %-97 %] 96 %  BP: (125-173)/(58-79) 141/66     Weight: 80.4 kg (177 lb 4 oz)  Body mass index is 29.5 kg/m².     Physical Exam  Vitals reviewed.   Constitutional:       General: She is not in acute distress.     Appearance: She is not ill-appearing, toxic-appearing or diaphoretic.      Comments: Elderly white female, no distress   HENT:      Head: Normocephalic.      Nose: Nose normal.      Mouth/Throat:      Mouth: Mucous membranes are dry.   Eyes:      Extraocular Movements: Extraocular movements intact.      Pupils: Pupils are equal, round, and reactive to light.   Cardiovascular:      Rate and Rhythm: Normal rate and regular rhythm.      Pulses: Normal pulses.      Heart sounds: Normal heart sounds.   Pulmonary:      Effort: Pulmonary effort is normal. No respiratory distress.      Breath sounds: Normal breath sounds. No wheezing or rales.   Chest:      Chest wall: No tenderness.   Abdominal:      General: Bowel sounds are normal. There is no distension.      Palpations: Abdomen is soft.      Tenderness: There is no abdominal tenderness. There is no guarding.   Genitourinary:     Rectum: Guaiac result positive.   Musculoskeletal:         General: Normal range of motion.      Cervical back: Neck supple.      Right lower leg: Edema present.      Left lower leg: Edema present.      Comments: Mild 1+ edema to lower extremities bilaterally   Skin:     General: Skin is warm and dry.      Capillary Refill: Capillary refill takes less than 2 seconds.   Neurological:      General: No focal deficit present.      Mental Status: She is alert and oriented to person, place, and time.      Motor: No weakness.   Psychiatric:         Mood and Affect: Mood normal.         Behavior: Behavior normal.         Thought Content: Thought content normal.              CRANIAL NERVES     CN III, IV, VI   Pupils are equal, round, and  reactive to light.       Significant Labs: All pertinent labs within the past 24 hours have been reviewed.  Recent Lab Results         04/13/24  0439   04/13/24  0435   04/12/24  0758        Albumin   3.7         ALP   77         ALT   18         Anion Gap   10         PTT   25.4  Comment: Refer to local heparin nomogram for intensity/dose specific   therapeutic   range.           AST   30         Baso #   0.02   0.03       Basophil %   0.2   0.4       BILIRUBIN TOTAL   0.6  Comment: For infants and newborns, interpretation of results should be based  on gestational age, weight and in agreement with clinical  observations.    Premature Infant recommended reference ranges:  Up to 24 hours.............<8.0 mg/dL  Up to 48 hours............<12.0 mg/dL  3-5 days..................<15.0 mg/dL  6-29 days.................<15.0 mg/dL           BUN   16         Calcium   10.0         Chloride   108         CO2   24         Creatinine   0.8         Differential Method   Automated   Automated       eGFR   >60         Eos #   0.1   0.1       Eos %   1.1   1.1       Glucose   84         Gran # (ANC)   3.3   4.1       Gran %   39.9   50.2       Hematocrit   31.6   32.6       Hemoglobin   10.6   10.8       Hepatitis C Ab   Negative         HEP C Virus Hold Specimen   Hold for HCV sendout         HIV 1/2 Ag/Ab   Negative         Immature Grans (Abs)   0.01  Comment: Mild elevation in immature granulocytes is non specific and   can be seen in a variety of conditions including stress response,   acute inflammation, trauma and pregnancy. Correlation with other   laboratory and clinical findings is essential.     0.02  Comment: Mild elevation in immature granulocytes is non specific and   can be seen in a variety of conditions including stress response,   acute inflammation, trauma and pregnancy. Correlation with other   laboratory and clinical findings is essential.         Immature Granulocytes   0.1   0.2       INR   1.1  Comment:  Coumadin Therapy:  2.0 - 3.0 for INR for all indicators except mechanical heart valves  and antiphospholipid syndromes which should use 2.5 - 3.5.           Lymph #   3.8   3.0       Lymph %   45.6   36.4       MCH   32.1   32.0       MCHC   33.5   33.1       MCV   96   97       Mono #   1.1   1.0       Mono %   13.1   11.7       MPV   9.8   10.1       nRBC   0   0       Occult Blood Positive           Platelet Count   273   275       Potassium   3.7         PROTEIN TOTAL   7.0         PT   11.6         RBC   3.30   3.37       RDW   12.9   13.2       Sodium   142         WBC   8.37   8.26               Significant Imaging: I have reviewed all pertinent imaging results/findings within the past 24 hours.    CT ABDOMEN PELVIS WITHOUT CONTRAST     CLINICAL HISTORY:  Abdominal pain, acute, nonlocalized;     TECHNIQUE:  Low dose axial images, sagittal and coronal reformations were obtained from the lung bases to the pubic symphysis.  Oral contrast was not administered.     COMPARISON:  None     FINDINGS:  Heart: Normal size. No effusion.     Lung Bases: Clear.  Mild atelectasis right lower lobe.     Liver: Normal size and attenuation. No focal lesions.     Gallbladder: No calcified gallstones.     Bile Ducts: No dilatation.     Pancreas: No obvious mass. No peripancreatic fat stranding.     Spleen: Normal.     Adrenals: Normal.     Kidneys/Ureters: No mass, hydroureteronephrosis, or nephroureterolithiasis.     Bladder: Mild nonspecific wall thickening.     Reproductive organs: Normal.     GI Tract/Mesentery: Large hiatal hernia.  No evidence of bowel obstruction or inflammation.  Scattered diverticulosis without evidence of acute diverticulitis.     Peritoneal Space: No ascites or free air.     Retroperitoneum: No significant adenopathy.     Abdominal wall: Normal.     Vasculature: No aneurysm.     Bones: No acute fracture.  Diffuse osteopenia advanced degenerative changes of the lumbar spine multilevel disc space  narrowing and spondylosis as well as facet arthropathy just are producing varying degrees of neural foraminal narrowing.  Moderate hip joint degenerative changes.  No suspicious lytic or sclerotic lesions.     Impression:     Large hiatal hernia.     Evaluation of solid organ and vascular pathology is limited due to lack of IV contrast.     All CT scans at this facility use dose modulation, iterative reconstruction, and/or weight based dosing when appropriate to reduce radiation dose to as low as reasonable achievable.        Electronically signed by:Ryan Grey MD  Date:                                            04/11/2024  Time:                                           07:20           Exam Ended: 04/11/24 02:55 CDT Last Resulted: 04/11/24 07:20 CDT           Assessment/Plan:     * Melena  One episode of melena yesterday evening, prompting patient to return to ED, denies abdominal pain, no further N/V since d/c  Gastroenterology consulted, planning on EGD around 7:00 a.m.  Patient has been NPO since around 9:00 p.m. last night  Protonix 80 mg IV was given, we will continue 40 mg IV b.i.d.      Anemia  Patient's anemia is currently controlled. Has not received any PRBCs to date. Etiology likely d/t chronic blood loss  Current CBC reviewed-   Lab Results   Component Value Date    HGB 10.6 (L) 04/13/2024    HCT 31.6 (L) 04/13/2024     Monitor serial CBC and transfuse if patient becomes hemodynamically unstable, symptomatic or H/H drops below 7/21.    Acute cystitis with hematuria  UTI discovered during previous admission, was given Rocephin and then discharged with Rx for Omnicef  We will continue Omnicef once no longer NPO      Gastroesophageal reflux disease without esophagitis  IV ppi ordered  Patient reports previous gastric ulcer years ago      PAF (paroxysmal atrial fibrillation)  We will continue metoprolol after procedure  Xarelto is on hold    Mixed hyperlipidemia  We will resume statin when no longer  NPO      Primary hypertension  Chronic, controlled. Latest blood pressure and vitals reviewed-     Temp:  [98.2 °F (36.8 °C)-98.6 °F (37 °C)]   Pulse:  [72-88]   Resp:  [16-19]   BP: (125-173)/(58-79)   SpO2:  [96 %-97 %] .   Home meds for hypertension were reviewed and noted below.   Hypertension Medications               furosemide (LASIX) 20 MG tablet TAKE ONE TABLET BY MOUTH TWICE A WEEK    metoprolol tartrate (LOPRESSOR) 25 MG tablet Take 1 tablet (25 mg total) by mouth 2 (two) times daily.            While in the hospital, will manage blood pressure as follows; Continue home antihypertensive regimen after procedure and tolerating p.o.    Will utilize p.r.n. blood pressure medication only if patient's blood pressure greater than 180/110 and she develops symptoms such as worsening chest pain or shortness of breath.      VTE Risk Mitigation (From admission, onward)           Ordered     IP VTE HIGH RISK PATIENT  Once         04/13/24 0558     Place sequential compression device  Until discontinued         04/13/24 0558     Reason for No Pharmacological VTE Prophylaxis  Once        Question:  Reasons:  Answer:  Active Bleeding    04/13/24 0558                         On 04/13/2024, patient should be placed in hospital observation services under my care in collaboration with Dr. Elda Ordonez.           Diana Hines NP  Department of Hospital Medicine  [facility] - Emergency Dept.

## 2024-04-13 NOTE — ASSESSMENT & PLAN NOTE
UTI discovered during previous admission, was given Rocephin and then discharged with Rx for Omnicef  We will continue Omnicef once no longer NPO

## 2024-04-15 ENCOUNTER — TELEPHONE (OUTPATIENT)
Dept: GASTROENTEROLOGY | Facility: CLINIC | Age: 84
End: 2024-04-15
Payer: MEDICARE

## 2024-04-15 NOTE — TELEPHONE ENCOUNTER
----- Message from Oswald Esteves MD sent at 4/13/2024  8:37 AM CDT -----  Patient needs to be seen in clinic this coming week.     Dx: melena and anemia with negative EGD.     Needs a follow up CBC and discuss having a colonoscopy. It can be a virtual visit and anyone can see her.     Thanks

## 2024-04-15 NOTE — TELEPHONE ENCOUNTER
Called and spoke to pts , he reports she is in the shower, he asked for me to call her back tomorrow.

## 2024-04-16 ENCOUNTER — PATIENT OUTREACH (OUTPATIENT)
Dept: ADMINISTRATIVE | Facility: CLINIC | Age: 84
End: 2024-04-16
Payer: MEDICARE

## 2024-04-16 DIAGNOSIS — K92.1 MELENA: Primary | ICD-10-CM

## 2024-04-16 NOTE — PROGRESS NOTES
Spoke to pt and scheduled lab appt on 04/25/24 at [facility] lab per pt request.

## 2024-04-16 NOTE — PROGRESS NOTES
C3 nurse spoke with Maria Del Carmen Spence for a TCC post hospital discharge follow up call. The patient does not have a scheduled HOSFU appointment with Rajinder Lutz MD within 5-7 days post hospital discharge date 04/13/24. C3 nurse was unable to schedule HOSFU appointment in [place] [facility].    Message sent to PCP staff requesting they contact patient and schedule follow up appointment.

## 2024-04-16 NOTE — TELEPHONE ENCOUNTER
Spoke to the pt about hospital follow up. Offered an appt for next week with Ms Dwyer but pt refused and said she can not drive to [facility].   Scheduled next available with Dr Esteves on 04/29/24. Pt agreed to plan and verbalized understanding.

## 2024-04-17 LAB
FINAL PATHOLOGIC DIAGNOSIS: NORMAL
GROSS: NORMAL
Lab: NORMAL

## 2024-04-24 ENCOUNTER — TELEPHONE (OUTPATIENT)
Dept: GASTROENTEROLOGY | Facility: CLINIC | Age: 84
End: 2024-04-24
Payer: MEDICARE

## 2024-04-24 NOTE — TELEPHONE ENCOUNTER
----- Message from Cyndy Plasencia sent at 4/24/2024 10:26 AM CDT -----  Contact: Maria Del Carmen  Type:  Patient Returning Call    Who Called: Maria Del Carmen  Who Left Message for Patient: ARIA ANDRADE  Does the patient know what this is regarding?: Test results  Would the patient rather a call back or a response via [facility]? Call back   Best Call Back Number:[phone]  Additional Information:

## 2024-04-25 ENCOUNTER — LAB VISIT (OUTPATIENT)
Dept: LAB | Facility: HOSPITAL | Age: 84
End: 2024-04-25
Attending: INTERNAL MEDICINE
Payer: MEDICARE

## 2024-04-25 DIAGNOSIS — K92.1 MELENA: ICD-10-CM

## 2024-04-25 LAB
BASOPHILS # BLD AUTO: 0.02 K/UL (ref 0–0.2)
BASOPHILS NFR BLD: 0.3 % (ref 0–1.9)
DIFFERENTIAL METHOD BLD: ABNORMAL
EOSINOPHIL # BLD AUTO: 0.1 K/UL (ref 0–0.5)
EOSINOPHIL NFR BLD: 1.7 % (ref 0–8)
ERYTHROCYTE [DISTWIDTH] IN BLOOD BY AUTOMATED COUNT: 13.3 % (ref 11.5–14.5)
HCT VFR BLD AUTO: 35.4 % (ref 37–48.5)
HGB BLD-MCNC: 11 G/DL (ref 12–16)
IMM GRANULOCYTES # BLD AUTO: 0.01 K/UL (ref 0–0.04)
IMM GRANULOCYTES NFR BLD AUTO: 0.1 % (ref 0–0.5)
LYMPHOCYTES # BLD AUTO: 3.1 K/UL (ref 1–4.8)
LYMPHOCYTES NFR BLD: 44 % (ref 18–48)
MCH RBC QN AUTO: 31.5 PG (ref 27–31)
MCHC RBC AUTO-ENTMCNC: 31.1 G/DL (ref 32–36)
MCV RBC AUTO: 101 FL (ref 82–98)
MONOCYTES # BLD AUTO: 0.9 K/UL (ref 0.3–1)
MONOCYTES NFR BLD: 13.3 % (ref 4–15)
NEUTROPHILS # BLD AUTO: 2.9 K/UL (ref 1.8–7.7)
NEUTROPHILS NFR BLD: 40.6 % (ref 38–73)
NRBC BLD-RTO: 0 /100 WBC
PLATELET # BLD AUTO: 385 K/UL (ref 150–450)
PMV BLD AUTO: 10.6 FL (ref 9.2–12.9)
RBC # BLD AUTO: 3.49 M/UL (ref 4–5.4)
WBC # BLD AUTO: 7.09 K/UL (ref 3.9–12.7)

## 2024-04-25 PROCEDURE — 36415 COLL VENOUS BLD VENIPUNCTURE: CPT | Mod: PO | Performed by: INTERNAL MEDICINE

## 2024-04-25 PROCEDURE — 85025 COMPLETE CBC W/AUTO DIFF WBC: CPT | Performed by: INTERNAL MEDICINE

## 2024-05-06 ENCOUNTER — LAB VISIT (OUTPATIENT)
Dept: LAB | Facility: HOSPITAL | Age: 84
End: 2024-05-06
Attending: INTERNAL MEDICINE
Payer: MEDICARE

## 2024-05-06 ENCOUNTER — OFFICE VISIT (OUTPATIENT)
Dept: GASTROENTEROLOGY | Facility: CLINIC | Age: 84
End: 2024-05-06
Payer: MEDICARE

## 2024-05-06 VITALS
BODY MASS INDEX: 28.67 KG/M2 | HEART RATE: 62 BPM | HEIGHT: 65 IN | DIASTOLIC BLOOD PRESSURE: 76 MMHG | WEIGHT: 172.06 LBS | SYSTOLIC BLOOD PRESSURE: 137 MMHG

## 2024-05-06 DIAGNOSIS — K21.9 GASTROESOPHAGEAL REFLUX DISEASE WITHOUT ESOPHAGITIS: ICD-10-CM

## 2024-05-06 DIAGNOSIS — K44.9 HIATAL HERNIA: ICD-10-CM

## 2024-05-06 DIAGNOSIS — D50.0 IRON DEFICIENCY ANEMIA DUE TO CHRONIC BLOOD LOSS: Primary | ICD-10-CM

## 2024-05-06 DIAGNOSIS — D50.0 IRON DEFICIENCY ANEMIA DUE TO CHRONIC BLOOD LOSS: ICD-10-CM

## 2024-05-06 LAB
BASOPHILS # BLD AUTO: 0.04 K/UL (ref 0–0.2)
BASOPHILS NFR BLD: 0.6 % (ref 0–1.9)
DIFFERENTIAL METHOD BLD: ABNORMAL
EOSINOPHIL # BLD AUTO: 0.1 K/UL (ref 0–0.5)
EOSINOPHIL NFR BLD: 1.4 % (ref 0–8)
ERYTHROCYTE [DISTWIDTH] IN BLOOD BY AUTOMATED COUNT: 13.5 % (ref 11.5–14.5)
HCT VFR BLD AUTO: 36.8 % (ref 37–48.5)
HGB BLD-MCNC: 11.4 G/DL (ref 12–16)
IMM GRANULOCYTES # BLD AUTO: 0.01 K/UL (ref 0–0.04)
IMM GRANULOCYTES NFR BLD AUTO: 0.1 % (ref 0–0.5)
LYMPHOCYTES # BLD AUTO: 3.5 K/UL (ref 1–4.8)
LYMPHOCYTES NFR BLD: 49.5 % (ref 18–48)
MCH RBC QN AUTO: 31.8 PG (ref 27–31)
MCHC RBC AUTO-ENTMCNC: 31 G/DL (ref 32–36)
MCV RBC AUTO: 103 FL (ref 82–98)
MONOCYTES # BLD AUTO: 0.8 K/UL (ref 0.3–1)
MONOCYTES NFR BLD: 11.6 % (ref 4–15)
NEUTROPHILS # BLD AUTO: 2.6 K/UL (ref 1.8–7.7)
NEUTROPHILS NFR BLD: 36.8 % (ref 38–73)
NRBC BLD-RTO: 0 /100 WBC
PLATELET # BLD AUTO: 303 K/UL (ref 150–450)
PMV BLD AUTO: 11.2 FL (ref 9.2–12.9)
RBC # BLD AUTO: 3.59 M/UL (ref 4–5.4)
WBC # BLD AUTO: 7.01 K/UL (ref 3.9–12.7)

## 2024-05-06 PROCEDURE — 99215 OFFICE O/P EST HI 40 MIN: CPT | Mod: PBBFAC | Performed by: INTERNAL MEDICINE

## 2024-05-06 PROCEDURE — 99999 PR PBB SHADOW E&M-EST. PATIENT-LVL V: CPT | Mod: PBBFAC,,, | Performed by: INTERNAL MEDICINE

## 2024-05-06 PROCEDURE — 36415 COLL VENOUS BLD VENIPUNCTURE: CPT | Performed by: INTERNAL MEDICINE

## 2024-05-06 PROCEDURE — 85025 COMPLETE CBC W/AUTO DIFF WBC: CPT | Performed by: INTERNAL MEDICINE

## 2024-05-06 PROCEDURE — 99214 OFFICE O/P EST MOD 30 MIN: CPT | Mod: S$PBB,,, | Performed by: INTERNAL MEDICINE

## 2024-05-06 NOTE — PROGRESS NOTES
[facility]  Gastroenterology    Patient evaluated at the request of Sayda Ferreira MD  [address] [place] [address]    PCP: Rajinder Lutz MD    5/6/24    [facility]       Hospital Follow Up     Additional comments: Review labs and EGD          Last edited by Alka Pringle LPN on 5/6/2024 10:37 AM.          Subjective:     Patient presented to [facility] with Hematemesis/coffee ground emesis and post hemorrhagic anemia. EGD revealed a large hiatal hernia, J-shape stomach and no evidence or source of bleeding. She does not remember when she had a colonoscopy. She is doing really well. No nausea, no vomiting, no blood in stools and no melena. No abdominal pain. GERD under control with a PPI.      Past Medical History:   Diagnosis Date    A-fib     Arthritis     Chronic LBP     ESIs x 3 with Dr. Hicks, PT at [facility], chiropractor    Eye pain     Frequent headaches     GERD (gastroesophageal reflux disease)     Glaucoma     Hyperlipemia     Hypertension        Past Surgical History:   Procedure Laterality Date    ABLATION OF ARRHYTHMOGENIC FOCUS FOR ATRIAL FIBRILLATION N/A 3/6/2019    Procedure: ABLATION, ARRHYTHMOGENIC FOCUS, FOR ATRIAL FIBRILLATION;  Surgeon: Abel Morillo MD;  Location: [facility] EP LAB;  Service: Cardiology;  Laterality: N/A;    CARDIOVERSION N/A 7/9/2018    Procedure: CARDIOVERSION;  Surgeon: Will Rosenthal MD;  Location: [facility] CATH LAB;  Service: Cardiology;  Laterality: N/A;    CATARACT EXTRACTION W/  INTRAOCULAR LENS IMPLANT  OU    ESOPHAGOGASTRODUODENOSCOPY N/A 4/13/2024    Procedure: EGD (ESOPHAGOGASTRODUODENOSCOPY);  Surgeon: Oswald Esteves MD;  Location: [facility] ENDO;  Service: Endoscopy;  Laterality: N/A;    INJECTION OF ANESTHETIC AGENT INTO SACROILIAC JOINT Right 11/3/2020    Procedure: right Sacroiliac Joint Injection;  Surgeon: Ayush Christiansen MD;  Location: [facility] PAIN T;  Service: Pain Management;  Laterality: Right;    INJECTION OF ANESTHETIC AGENT  well developed, well nourished , in no acute distress , ambulating without difficulty , normal communication ability INTO SACROILIAC JOINT Right 3/23/2021    Procedure: right Sacroiliac Joint Injection;  Surgeon: Ayush Christiansen MD;  [facility]: [facility] PA[facility];  Service: Pain Management;  Laterality: Right;    INJECTION OF JOINT Right 11/3/2020    Procedure: right GT bursa injection;  Surgeon: Ayush Christiansen MD;  Location: [facility] PAIN MGT;  Service: Pain Management;  Laterality: Right;    INJECTION OF JOINT Bilateral 3/23/2021    Procedure: bilateral GT bursa injection;  Surgeon: Ayush Christiansen MD;  Location: [facility];  Service: Pain Management;  Laterality: Bilateral;    TUBAL LIGATION         Current Outpatient Medications on File Prior to Visit   Medication Sig Dispense Refill    AA/prot/lysine/methio/vit C/B6 (A/G PRO ORAL) Take 2 tablets by mouth 2 (two) times daily.       acetaminophen (TYLENOL) 650 MG TbSR Take 650 mg by mouth as needed. Does not go over 3000mg daily      atorvastatin (LIPITOR) 10 MG tablet Take 1 tablet (10 mg total) by mouth every evening. 30 tablet 11    CALCIUM PHOSPHATE TRIB/VIT D3 (CITRACAL + D ORAL) Take 1 tablet by mouth once daily at 6am.       cetirizine (ZYRTEC) 10 MG tablet Take 10 mg by mouth as needed for Allergies.      furosemide (LASIX) 20 MG tablet TAKE ONE TABLET BY MOUTH TWICE A WEEK 25 tablet 2    gabapentin (NEURONTIN) 400 MG capsule Take 1 capsule (400 mg total) by mouth 3 (three) times daily. (Patient taking differently: Take 400 mg by mouth 3 (three) times daily. Takes two-three times a day) 270 capsule 3    latanoprost 0.005 % ophthalmic solution INSTILL ONE DROP IN EACH EYE AT BEDTIME 2.5 mL 12    metoprolol tartrate (LOPRESSOR) 25 MG tablet Take 1 tablet (25 mg total) by mouth 2 (two) times daily. 60 tablet 11    MULTIVITAMIN W-MINERALS/LUTEIN (CENTRUM SILVER ORAL) Take 1 tablet by mouth once daily.      pantoprazole (PROTONIX) 40 MG tablet Take 1 tablet (40 mg total) by mouth 2 (two) times daily. 60 tablet 0    rivaroxaban (XARELTO) 15 mg Tab Take 1 tablet (15 mg total)  by mouth daily with dinner or evening meal. 30 tablet 5    timolol maleate 0.5% (TIMOPTIC) 0.5 % Drop PLACE ONE DROP INTO BOTH EYES EVERY MORNING 10 mL 12    RSVPreF3 antigen-AS01E, PF, (AREXVY, PF,) 120 mcg/0.5 mL SusR vaccine Inject into the muscle. (Patient not taking: Reported on 4/16/2024) 0.5 mL 0     No current facility-administered medications on file prior to visit.       Review of patient's allergies indicates:   Allergen Reactions    Voltaren [diclofenac sodium] Edema     Gel formulation caused facial rash and facial swelling    Eliquis [apixaban] Other (See Comments)     Headache, numbness in lip     Medrol [methylprednisolone] Blisters       Social History     Socioeconomic History    Marital status:     Number of children: 1   Occupational History    Occupation: retired   Tobacco Use    Smoking status: Never    Smokeless tobacco: Never   Substance and Sexual Activity    Alcohol use: No    Drug use: No    Sexual activity: Yes     Partners: Male     Social Determinants of Health     Financial Resource Strain: Low Risk  (2/7/2022)    Overall Financial Resource Strain (CARDIA)     Difficulty of Paying Living Expenses: Not hard at all   Food Insecurity: No Food Insecurity (2/7/2022)    Hunger Vital Sign     Worried About Running Out of Food in the Last Year: Never true     Ran Out of Food in the Last Year: Never true   Transportation Needs: No Transportation Needs (2/7/2022)    PRAPARE - Transportation     Lack of Transportation (Medical): No     Lack of Transportation (Non-Medical): No   Physical Activity: Inactive (2/7/2022)    Exercise Vital Sign     Days of Exercise per Week: 0 days     Minutes of Exercise per Session: 0 min   Stress: No Stress Concern Present (2/7/2022)    [employer] - Occupational Stress Questionnaire     Feeling of Stress : Not at all   Housing Stability: Low Risk  (2/7/2022)    Housing Stability Vital Sign     Unable to Pay for Housing in the  Last Year: No     Number of Places Lived in the Last Year: 1     Unstable Housing in the Last Year: No       Family History   Problem Relation Name Age of Onset    Glaucoma Mother      Cancer Mother      Cataracts Mother      Hypertension Mother      Hypertension Father      Diabetes Paternal Aunt      Strabismus Neg Hx      Retinal detachment Neg Hx      Macular degeneration Neg Hx      Blindness Neg Hx      Amblyopia Neg Hx      Stroke Neg Hx      Thyroid disease Neg Hx         Objective:   Vitals:   Vitals:    05/06/24 1037   BP: 137/76   Pulse: 62   Body mass index is 28.63 kg/m².    Physical Exam  Constitutional:       Appearance: She is well-developed.   HENT:      Head: Normocephalic and atraumatic.   Eyes:      Pupils: Pupils are equal, round, and reactive to light.   Neck:      Thyroid: No thyromegaly.   Cardiovascular:      Rate and Rhythm: Normal rate and regular rhythm.      Heart sounds: Normal heart sounds. No murmur heard.  Pulmonary:      Effort: Pulmonary effort is normal. No respiratory distress.      Breath sounds: Normal breath sounds. No wheezing or rales.   Abdominal:      General: Bowel sounds are normal. There is no distension.      Palpations: Abdomen is soft. There is no mass.      Tenderness: There is no abdominal tenderness.   Musculoskeletal:         General: No tenderness. Normal range of motion.      Cervical back: Normal range of motion and neck supple.   Lymphadenopathy:      Cervical: No cervical adenopathy.   Skin:     General: Skin is warm and dry.      Findings: No erythema.   Neurological:      Mental Status: She is alert and oriented to person, place, and time.      Cranial Nerves: No cranial nerve deficit.      Deep Tendon Reflexes: Reflexes are normal and symmetric.   Psychiatric:         Behavior: Behavior normal.       CT Abdomen Pelvis  Without Contrast    Result Date: 4/11/2024  EXAMINATION: CT ABDOMEN PELVIS WITHOUT CONTRAST CLINICAL HISTORY: Abdominal pain, acute,  nonlocalized; TECHNIQUE: Low dose axial images, sagittal and coronal reformations were obtained from the lung bases to the pubic symphysis.  Oral contrast was not administered. COMPARISON: None FINDINGS: Heart: Normal size. No effusion. Lung Bases: Clear.  Mild atelectasis right lower lobe. Liver: Normal size and attenuation. No focal lesions. Gallbladder: No calcified gallstones. Bile Ducts: No dilatation. Pancreas: No obvious mass. No peripancreatic fat stranding. Spleen: Normal. Adrenals: Normal. Kidneys/Ureters: No mass, hydroureteronephrosis, or nephroureterolithiasis. Bladder: Mild nonspecific wall thickening. Reproductive organs: Normal. GI Tract/Mesentery: Large hiatal hernia.  No evidence of bowel obstruction or inflammation.  Scattered diverticulosis without evidence of acute diverticulitis. Peritoneal Space: No ascites or free air. Retroperitoneum: No significant adenopathy. Abdominal wall: Normal. Vasculature: No aneurysm. Bones: No acute fracture.  Diffuse osteopenia advanced degenerative changes of the lumbar spine multilevel disc space narrowing and spondylosis as well as facet arthropathy just are producing varying degrees of neural foraminal narrowing.  Moderate hip joint degenerative changes.  No suspicious lytic or sclerotic lesions.     Large hiatal hernia. Evaluation of solid organ and vascular pathology is limited due to lack of IV contrast. All CT scans at this facility use dose modulation, iterative reconstruction, and/or weight based dosing when appropriate to reduce radiation dose to as low as reasonable achievable. Electronically signed by: Ryan Grey MD Date:    04/11/2024 Time:    07:20      IMPRESSION     Problem List Items Addressed This Visit       Gastroesophageal reflux disease without esophagitis    Anemia - Primary    Relevant Orders    Fecal Immunochemical Test (iFOBT)    CBC Auto Differential    Hiatal hernia       PLANS:    Iron deficiency anemia due to chronic blood loss  -      Fecal Immunochemical Test (iFOBT); Future; Expected date: 05/06/2024  -     CBC Auto Differential; Future; Expected date: 05/06/2024    Gastroesophageal reflux disease without esophagitis    Hiatal hernia    If the Fit Kit is + -> Colonoscopy. Risks, benefits and alternative options were discussed with the patient. Risks including but not limited to infection, bleeding, heart or respiratory problems and perforation that may require surgery were all explained to the patient. The patient had a chance for questions if there were doubts or concerns about the test. The referring provider will be notified that our consultation is complete and available through the patient's records.    Thanks for letting us participate in the care of this nice patient,      Oswald Esteves

## 2024-05-06 NOTE — PATIENT INSTRUCTIONS
05/06/2024    Dear Maria Del Carmen Spence,    We are writing to express our heartfelt gratitude for choosing us as your healthcare provider and entrusting us with your well-being. Your health and satisfaction are our top priorities, and we are truly honored to have the opportunity to serve you.    At [facility], we strive to provide the best possible care and support for our patients. My assessment and recommendations are as follows:    Iron deficiency anemia due to chronic blood loss  -     Fecal Immunochemical Test (iFOBT); Future; Expected date: 05/06/2024  -     CBC Auto Differential; Future; Expected date: 05/06/2024    Gastroesophageal reflux disease without esophagitis    Hiatal hernia      If the stools are positive for blood, we will schedule a colonoscopy.     We genuinely value your feedback and believe that it plays a crucial role in our pursuit of excellence. We kindly request you to take a few minutes of your time to share your experience with us by posting a Google review. Your honest thoughts and opinions can make a significant difference for others seeking healthcare services and will help us better understand how we can further enhance our patient care.    Your kind words and positive reviews will not only motivate our dedicated team but will also inspire confidence in potential patients who are looking for exceptional healthcare services.    Once again, we extend our sincere appreciation for giving us the opportunity to serve you. If there is anything else we can do to improve your experience or assist you further, please do not hesitate to reach out to us.    Thank you for being part of our [facility] family, and we look forward to continuing to provide you with the best care possible.    Thanks for trusting us with your healthcare needs and using [facility]. If you want to ask us a question, you can do so by replying to this message or by calling [phone].    Sincerely,    Oswald BOWIE  "RAMEZ Esteves    PS: At [facility] we offer virtual visits. Please use your [facility] jaylon to schedule a virtual visit.     To rate your experience with Dr. Esteves please click on the link below:    [email or web address]    To post a review, simply follow these quick steps:  1. Visit [employer] or search for my name on Google.  2. Click on the "Write a review" button on the left-hand side of the page.  3. Rate your experience by choosing the number of stars that reflect your satisfaction.  4. Share your thoughts and insights about your visit - we'd love to hear your feedback!    "

## 2024-05-06 NOTE — PROGRESS NOTES
FitKit was given to patient on 5/6/2024 10:50am. Reviewed instructions. Patient verbalized understanding.

## 2024-05-07 ENCOUNTER — OFFICE VISIT (OUTPATIENT)
Dept: INTERNAL MEDICINE | Facility: CLINIC | Age: 84
End: 2024-05-07
Payer: MEDICARE

## 2024-05-07 VITALS
SYSTOLIC BLOOD PRESSURE: 130 MMHG | HEART RATE: 70 BPM | BODY MASS INDEX: 28.83 KG/M2 | HEIGHT: 65 IN | DIASTOLIC BLOOD PRESSURE: 78 MMHG | WEIGHT: 173.06 LBS | TEMPERATURE: 98 F | OXYGEN SATURATION: 97 %

## 2024-05-07 DIAGNOSIS — K92.0 HEMATEMESIS WITH NAUSEA: ICD-10-CM

## 2024-05-07 DIAGNOSIS — D64.9 ANEMIA, UNSPECIFIED TYPE: ICD-10-CM

## 2024-05-07 DIAGNOSIS — Z09 HOSPITAL DISCHARGE FOLLOW-UP: Primary | ICD-10-CM

## 2024-05-07 DIAGNOSIS — I48.0 PAROXYSMAL ATRIAL FIBRILLATION: ICD-10-CM

## 2024-05-07 DIAGNOSIS — K21.9 GASTROESOPHAGEAL REFLUX DISEASE WITHOUT ESOPHAGITIS: ICD-10-CM

## 2024-05-07 PROCEDURE — 82274 ASSAY TEST FOR BLOOD FECAL: CPT | Performed by: INTERNAL MEDICINE

## 2024-05-07 PROCEDURE — 99999 PR PBB SHADOW E&M-EST. PATIENT-LVL IV: CPT | Mod: PBBFAC,,, | Performed by: FAMILY MEDICINE

## 2024-05-07 PROCEDURE — 99214 OFFICE O/P EST MOD 30 MIN: CPT | Mod: S$PBB,,, | Performed by: FAMILY MEDICINE

## 2024-05-07 PROCEDURE — 99214 OFFICE O/P EST MOD 30 MIN: CPT | Mod: PBBFAC | Performed by: FAMILY MEDICINE

## 2024-05-07 RX ORDER — PANTOPRAZOLE SODIUM 40 MG/1
40 TABLET, DELAYED RELEASE ORAL 2 TIMES DAILY
Qty: 180 TABLET | Refills: 1 | Status: SHIPPED | OUTPATIENT
Start: 2024-05-07

## 2024-05-07 NOTE — PROGRESS NOTES
Subjective:       Patient ID: Maria Del Carmen Spence is a 83 y.o. female.    Chief Complaint: Hospital Follow Up    Hospital follow-up for hematemesis epigastric pain.  This was preceded by several days of diarrhea.  She was admitted to hospital on 04/13/2024 and discharged on 04/16/2024.  Serial lab was done reviewed.  Hemoglobin was 11.0 during hospital stay.  Repeat hemoglobin yesterday 11.4.  She denies any further nausea vomiting hematemesis epigastric pain.  She denies any melena.  She has paroxysmal atrial fibrillation on Xarelto.  This has been resumed.  Palpitations shortness breath edema.  She has a esophageal reflux with large hiatal hernia.  She is on Protonix currently at 40 mg twice a day.  She denies dysphagia.      Review of Systems   Constitutional:  Negative for appetite change, chills, fever and unexpected weight change.   Respiratory:  Negative for cough, chest tightness, shortness of breath and wheezing.    Cardiovascular:  Negative for chest pain, palpitations and leg swelling.   Gastrointestinal:  Negative for abdominal distention, abdominal pain, blood in stool, constipation, diarrhea, nausea and vomiting.   Neurological:  Negative for dizziness, weakness, light-headedness and headaches.       Objective:      Physical Exam  Constitutional:       General: She is not in acute distress.     Appearance: She is not ill-appearing or diaphoretic.   Cardiovascular:      Rate and Rhythm: Normal rate and regular rhythm.      Heart sounds: No murmur heard.     No gallop.   Pulmonary:      Effort: Pulmonary effort is normal. No respiratory distress.      Breath sounds: No wheezing, rhonchi or rales.   Abdominal:      General: There is no distension.      Palpations: Abdomen is soft. There is no mass.      Tenderness: There is no abdominal tenderness.   Lymphadenopathy:      Cervical: No cervical adenopathy.   Skin:     General: Skin is warm and dry.      Coloration: Skin is not pale.      Findings: No  erythema.   Neurological:      Mental Status: She is alert.   Psychiatric:         Behavior: Behavior normal.         Lab Visit on 05/06/2024   Component Date Value Ref Range Status    WBC 05/06/2024 7.01  3.90 - 12.70 K/uL Final    RBC 05/06/2024 3.59 (L)  4.00 - 5.40 M/uL Final    Hemoglobin 05/06/2024 11.4 (L)  12.0 - 16.0 g/dL Final    Hematocrit 05/06/2024 36.8 (L)  37.0 - 48.5 % Final    MCV 05/06/2024 103 (H)  82 - 98 fL Final    MCH 05/06/2024 31.8 (H)  27.0 - 31.0 pg Final    MCHC 05/06/2024 31.0 (L)  32.0 - 36.0 g/dL Final    RDW 05/06/2024 13.5  11.5 - 14.5 % Final    Platelets 05/06/2024 303  150 - 450 K/uL Final    MPV 05/06/2024 11.2  9.2 - 12.9 fL Final    Immature Granulocytes 05/06/2024 0.1  0.0 - 0.5 % Final    Gran # (ANC) 05/06/2024 2.6  1.8 - 7.7 K/uL Final    Immature Grans (Abs) 05/06/2024 0.01  0.00 - 0.04 K/uL Final    Lymph # 05/06/2024 3.5  1.0 - 4.8 K/uL Final    Mono # 05/06/2024 0.8  0.3 - 1.0 K/uL Final    Eos # 05/06/2024 0.1  0.0 - 0.5 K/uL Final    Baso # 05/06/2024 0.04  0.00 - 0.20 K/uL Final    nRBC 05/06/2024 0  0 /100 WBC Final    Gran % 05/06/2024 36.8 (L)  38.0 - 73.0 % Final    Lymph % 05/06/2024 49.5 (H)  18.0 - 48.0 % Final    Mono % 05/06/2024 11.6  4.0 - 15.0 % Final    Eosinophil % 05/06/2024 1.4  0.0 - 8.0 % Final    Basophil % 05/06/2024 0.6  0.0 - 1.9 % Final    Differential Method 05/06/2024 Automated   Final     Assessment:       1. Hospital discharge follow-up    2. Hematemesis with nausea    3. Paroxysmal atrial fibrillation    4. Gastroesophageal reflux disease without esophagitis    5. Anemia, unspecified type        Plan:   Medications reviewed hospital record was reviewed continue Protonix 40 mg twice a day which is refilled.  She has already resume Xarelto.  Follow-up one-month.  Discharge Information     Discharge Date:  4/16/24          Primary Discharge Diagnosis:  Hematemesis          How patient is feeling since discharge from the hospital?  Better       "    Medication & Order Review     Discharge Medication Review:    Medication reconciliation performed Yes   If no, state reason why not performed:        Did patient have any difficulty/problems filling prescriptions?  No           If yes, state reason and steps taken to assist in resolving issue:      Does patient have any questions regarding medications?  No           If yes, state question and steps taken to answer question:      Was Home Health and/or any equipment ordered for patient upon discharge?            Home Health No   If yes, has home health contacted patient and/or initiated services? N/A   Name of Home Health Agency    Durable Medical Equipment (DME)  No (Example: walker, wheelchair, nebulizer)   If yes, has the DME provider contacted patient and delivered equipment?     DME Company      Red Flag Review     Was patient educated on "red flags" or things to watch for?  Yes       If yes:  "Red flags" patient was told to watch for:     hematemesis abdominal pain melena                                               Other:              Is patient experiencing any red flags today (or any of these symptoms) (List examples of red flags specifically)?  No       Notes:                      If no:    Educated the patient on 3 "red flags" to watch for:                                                    Other:                  Is patient experiencing any red flags today (or any of these symptoms) (List examples of red flags specifically)?  No      Notes:        Patient Education & Follow Up               Phone number patient will call if having any questions or problems:  Patient was able to state the phone number for [facility] accurately.    Appointment scheduled?        Follow-up/transition of care appointment, including the date/time and location of your appointment:          Notes:            Provided patient with date, time and location of follow-up appointment if they do not have it:        Rescheduled " transition of care appointment if the patient has a conflict:    Appointment re-scheduled?          Patient informed of this appointment?        Notes:            3 questions patient would like to ask physician during appointment:                                  Hospital discharge follow-up    Hematemesis with nausea    Paroxysmal atrial fibrillation    Gastroesophageal reflux disease without esophagitis    Anemia, unspecified type    Other orders  -     pantoprazole (PROTONIX) 40 MG tablet; Take 1 tablet (40 mg total) by mouth 2 (two) times daily.  Dispense: 180 tablet; Refill: 1

## 2024-05-15 DIAGNOSIS — D50.0 IRON DEFICIENCY ANEMIA DUE TO CHRONIC BLOOD LOSS: Primary | ICD-10-CM

## 2024-05-15 DIAGNOSIS — R19.5 OCCULT BLOOD POSITIVE STOOL: ICD-10-CM

## 2024-05-16 RX ORDER — RIVAROXABAN 15 MG/1
15 TABLET, FILM COATED ORAL
Qty: 30 TABLET | Refills: 5 | Status: SHIPPED | OUTPATIENT
Start: 2024-05-16

## 2024-05-17 ENCOUNTER — TELEPHONE (OUTPATIENT)
Dept: PREADMISSION TESTING | Facility: HOSPITAL | Age: 84
End: 2024-05-17
Payer: MEDICARE

## 2024-05-17 ENCOUNTER — HOSPITAL ENCOUNTER (OUTPATIENT)
Dept: PREADMISSION TESTING | Facility: HOSPITAL | Age: 84
Discharge: HOME | End: 2024-05-17
Attending: INTERNAL MEDICINE
Payer: MEDICARE

## 2024-05-17 DIAGNOSIS — R19.5 OCCULT BLOOD POSITIVE STOOL: ICD-10-CM

## 2024-05-17 DIAGNOSIS — D50.0 IRON DEFICIENCY ANEMIA DUE TO CHRONIC BLOOD LOSS: Primary | ICD-10-CM

## 2024-05-17 RX ORDER — POLYETHYLENE GLYCOL 3350, SODIUM SULFATE ANHYDROUS, SODIUM BICARBONATE, SODIUM CHLORIDE, POTASSIUM CHLORIDE 236; 22.74; 6.74; 5.86; 2.97 G/4L; G/4L; G/4L; G/4L; G/4L
4 POWDER, FOR SOLUTION ORAL ONCE
Qty: 4000 ML | Refills: 0 | Status: SHIPPED | OUTPATIENT
Start: 2024-05-17 | End: 2024-05-17

## 2024-05-17 NOTE — TELEPHONE ENCOUNTER
Maria Del Carmen S Bebe  [address] [address] [address]    PEG (polyethylene glycol) Instructions for Colonoscopy Common Brands: Golytely, Colyte, Nulytely, Gavilyte, Trilyte    Date of procedure:06/13/2024   Your arrival time will be given to you prior to the day of your procedure.    Your procedure will be performed at [facility] ([facility]). The hospital is located at [address] ([facility]).        As soon as possible:     your prep from pharmacy and over the counter DULCOLAX LAXATIVE TABLETS, GAS-X, AND MAGNESIUM CITRATE.    Three (3) days before colonoscopy: Avoid high fiber foods such as whole wheat or whole grain breads or pasta, raw vegetables or fruit with peels, corn, beans, peas, lentils, nuts, seeds, and popcorn.    On the day before your procedure     What You CAN do:     You may have CLEAR LIQUIDS ONLY (Drink at least 16 glasses (8oz glass)-   see below for list.   Liquids That Are OK to Drink:    Water    Sports drinks (Gatorade, Power-Aid)    Coffee or tea (no cream or nondairy creamer)    Clear juices without pulp (apple, white grape)    Gelatin desserts (no fruit or toppings)    Clear soda (sprite, coke, ginger ale)    Chicken broth (until 12 midnight the night before procedure)      What You CANNOT do:      Do not EAT solid food, drink milk or anything colored red or purple.    Do not drink alcohol.    Do not take oral medications within 1 hour of starting each dose of prep.    No tobacco products, gum chewing, or candy morning of procedure     PLEASE DISREGARD THE INSERT INSTRUCTIONS FROM THE PHARMACY.  How to take prep:Mix bowel prep by adding water to the detention fill line (2L) and put in refrigerator. Mix the prep with Crystal Light (no red or purple flavoring), apple juice, or Gatorade (no red or purple) to improve the flavor.    DOSE 1-Day Before Colonoscopy  12:00 pm (NOON) Take four (4) Dulcolax (Bisacodyl) Laxative tablets and 1  simethicone (GAS-X) 125 mg capsule with at least 8 ounces or more of clear liquids.  3:00 pm Drink one bottle of Magnesium Citrate Oral Saline Laxative Solution 10 oz.  6:00 pm Drink half the liquid (1L) in the container within 1 hour and 30 minutes.  Put the remaining half (1L) back in refrigerator.    After midnight, nothing to drink or eat except for the bowel prep.     DOSE 2-Day of the Colonoscopy     [place] the time you were instructed: 2:00 AM       or     5:00AM    For this dose, Drink the remaining half of the liquid prep within 1 hour and 30 minutes.    After finishing prep, do not drink or eat anything until after the colonoscopy.   You may have a small sip of water with your morning medications. If your stool is brown, orange, or cloudy, your colon is not clean, please call endoscopy staff at [phone].    Endoscopy Scheduling Department at [phone].  Endoscopy Department at [phone].   On-Call Nurse line at [phone].  Financial Services at [phone].    Frequently Asked Questions- Colonoscopy  What are some tips for preparing for a colonoscopy?  Stay near a toilet after taking the prep, you will have diarrhea and may have cramping with your bowel movements.  For skin irritation or flaring of hemorrhoids from frequent bowel movements and wiping, ease with over-the-counter products like baby wipes, Vaseline, or Tucks pads.  If experiencing nausea from the prep, take a 30-minute break, rinse your mouth, and continue drinking the prep. Dramamine (Meclizine) is available over the counter, take ½ of a tablet (12.5 mg) every 6 hours as needed for nausea. Do not take more than 50 mg in 24 hours.   If a long drive or need a change to the prep direction times, please call the endoscopy department to talk with our staff at [phone].  Females between the ages of 10-55 may be asked for a urine sample (pregnancy test) after you check in for your procedure. Please let staff know  before going to the restroom.  Coumadin (WARFARIN), Plavix (CLOPIDOGREL), and Effient (PRASUGREL) MUST be stopped 5 days prior to exam unless discussed with the doctor performing the test. Eliquis (APIXABAN), Savaysa (EDOXABAN), Arixtra (FONDAPARINUX), and Xarelto (RIVAROXABAN) MUST be stopped 2 days prior to exam unless discussed with the doctor performing the test.  Glucagon-like peptide-1 receptor agonists (GLP-1 Chelsea) medications (semaglutide -OZEMPIC, RYBELSUS, WEGOVY; Dulaglutide- TRULICITY; Liraglutide- SAXENDA; tirzepatide- MOUNJARO) for weight loss or diabetes, MUST be stopped 7 days prior to exam unless discussed with the doctor performing the test.  Weight loss medications such as Phentermine (Adipex/Lomaira) and Diethylpropion (Amfepraone/Tepanil/Tenuate) should be stopped 7 days prior to exam.  You must have a licensed  to bring you home. If using public transportation, you must have an adult come with you.  Do not take any diabetic medications (including insulin) the morning of the exam. If your blood sugar goes below 70, you may drink 2 ounces of clear juice. Wait 15 minutes, then recheck your blood sugar. If it isn't going up, you may drink another 2 ounces of clear juice and contact the On-Call Nurse line at [phone].   Continue to take blood pressure, heart, thyroid, lung, seizure, and psychological medications the morning of procedure.    Do I have to drink all the bowel prep and water?         Yes, in addition to drinking plenty of clear liquids. Drinking plenty of clear liquids helps the prep to work and keep you hydrated. Any clear liquid that isn't red or purple. Drink at least 12 (8 oz) glasses of clear liquids or three 32 oz Gatorades by 5PM the day before your procedure. Drink   at least 1 (8 oz) glass of liquid every hour while you're awake. After the first dose of prep, drink an additional four (8 oz) glasses of clear liquids before midnight.  Clear liquids include: Coffee (no  "cream/milk)  Water  Tea  Clear carbonated drinks (ginger ale, Sprite, or sparkling water)  Gelatin/Jello  Apple, White grape, White Cranberry Juice  Beef/chicken broth/bouillon  Gatorade/Powerade  Lemonade/limeade  Snowballs/slushes  Popsicles  No "Energy" beverages or alcohol. No juices with pulp.  Do not drink red or purple liquids because the dye will stain the walls of your colon and may appear to be a bleed/abnormality.        The first dose of the prep starts to clean out the stool from your colon. The second dose of      the prep helps to fully clean out the colon before the procedure.    What are some ways to improve the taste of the prep?  Put the prep solution in the refrigerator to chill before beginning the prep, tastes best cold.  Drinking the prep with a straw.    How will I know that the bowel prep is working? Why is it important to have a good prep or a clean colon before the procedure?  bowel movements are clear or clear yellow.   Colon should be clean as possible so the doctor can see the walls of the colon and find tiny polyps or colon cancer.  If there is stool in the colon, the doctor cannot move the endoscopy scope through the entire colon and the procedure will be canceled.  If a history of poor bowel prep, constipation, opiate medication use, diabetes, or kidney disease, please let us know; you may require an extended bowel prep to clean your colon.  If brown (including dark orange and cloudy) bowel movements on the morning of your procedure, please call the endoscopy department at [phone] (M-F from 6AM-3PM).      What should I bring to my appointment?  -List of your current medications                                              -Clothing that is easy to put back on after the procedure        -Method of payment        -Your ID         - Insurance card     Leave jewelry and other valuables at home.   Please plan to be at the hospital for 3 - 4 hours.    Why doesn't my appointment time " show up in [facility] or [facility]?  [facility] and [facility] are not set up to show surgical times. You will be called the day before the procedure and told your arrival time.    Where is the Endoscopy department located?  [facility] ([facility]) hospital is located at [address], off [address] ([place]). Once on [address], [facility] is the second building (Entrance #2) on the left. Check in for your procedure on the 1st floor at Registration.

## 2024-06-10 ENCOUNTER — TELEPHONE (OUTPATIENT)
Dept: PREADMISSION TESTING | Facility: HOSPITAL | Age: 84
End: 2024-06-10
Payer: MEDICARE

## 2024-06-10 NOTE — TELEPHONE ENCOUNTER
----- Message from Will Rosenthal MD sent at 6/10/2024  3:50 PM CDT -----  Regarding: RE: Xarelto clearance for 6/13 Colonoscopy     Elevated periop risk of CV events for non-high risk procedure.  Ok to proceed the scheduled procedure without further cardiac study.  OK to hold Xarelto 2 days before the procedure and resume postop once hemodynamically stable  ----- Message -----  From: Toshia Engle RN  Sent: 6/10/2024   3:19 PM CDT  To: Will Rosenthal MD  Subject: Xarelto clearance for 6/13 Colonoscopy           Dr. Rosenthal,  We are having some issues in getting our cardiology econsults signed. Could you please review this patient's Xarelto clearance request for her Colonoscopy 6/13. Her last dose of her Xarelto would need to be today.  Please advise.  Thank you.       This patient is being scheduled for one of the following procedures: Colonoscopy     Cardiac PMHx: Dyspnea on exertion   Primary hypertension   Paroxysmal atrial fibrillation   Atrial flutter   Symptomatic bradycardia   Mixed hyperlipidemia   A-fib   PAF (paroxysmal atrial fibrillation)   History of cardiac radiofrequency ablation (RFA)   PVD (peripheral vascular disease)   Pulmonary HTN          Last Echo: === Results for orders placed during the hospital encounter of 10/18/23 ===     Echo     - Interpretation Summary -   ·  Left Ventricle: The left ventricle is normal in size. Normal wall thickness. Normal wall motion. There is normal systolic function with a visually estimated ejection fraction of 55 - 60%. There is normal diastolic function.   ·  Right Ventricle: Normal right ventricular cavity size. Wall thickness is normal. Right ventricle wall motion  is normal. Systolic function is normal.   ·  Aortic Valve: There is mild aortic valve sclerosis. There is moderate aortic regurgitation.   ·  Mitral Valve:  There is moderate regurgitation.   ·  Tricuspid Valve: There is moderate regurgitation.   ·  Pulmonary Artery: There is moderate pulmonary  hypertension. The estimated pulmonary artery systolic pressure is 64 mmHg.   ·  IVC/SVC: Normal venous pressure at 3 mmHg.     Our records indicate that they are currently taking: Xarelto (RIVAROXABAN) #     Please indicate if the patient is cleared for surgery from a cardiac standpoint and if on anticoagulation, when the patient can safely stop anticoagulation maintenance before the procedure.     Please take into consideration that therapeutic maneuvers may be performed during the procedure that requires delay in restarting anticoagulation therapy.

## 2024-06-12 PROBLEM — R19.5 POSITIVE FECAL IMMUNOCHEMICAL TEST: Status: ACTIVE | Noted: 2024-06-12

## 2024-06-13 ENCOUNTER — ANESTHESIA EVENT (OUTPATIENT)
Dept: ENDOSCOPY | Facility: HOSPITAL | Age: 84
End: 2024-06-13
Payer: MEDICARE

## 2024-06-13 ENCOUNTER — ANESTHESIA (OUTPATIENT)
Dept: ENDOSCOPY | Facility: HOSPITAL | Age: 84
End: 2024-06-13
Payer: MEDICARE

## 2024-06-13 ENCOUNTER — HOSPITAL ENCOUNTER (OUTPATIENT)
Facility: HOSPITAL | Age: 84
Discharge: HOME | End: 2024-06-13
Attending: INTERNAL MEDICINE | Admitting: INTERNAL MEDICINE
Payer: MEDICARE

## 2024-06-13 DIAGNOSIS — K57.30 DIVERTICULOSIS OF COLON: Primary | ICD-10-CM

## 2024-06-13 DIAGNOSIS — K64.8 INTERNAL HEMORRHOIDS: ICD-10-CM

## 2024-06-13 DIAGNOSIS — R19.5 POSITIVE FECAL IMMUNOCHEMICAL TEST: ICD-10-CM

## 2024-06-13 PROCEDURE — 27200997: Performed by: INTERNAL MEDICINE

## 2024-06-13 PROCEDURE — 45380 COLONOSCOPY AND BIOPSY: CPT | Mod: PT,59,, | Performed by: INTERNAL MEDICINE

## 2024-06-13 PROCEDURE — 27201089 HC SNARE, DISP (ANY): Performed by: INTERNAL MEDICINE

## 2024-06-13 PROCEDURE — 45385 COLONOSCOPY W/LESION REMOVAL: CPT | Mod: PT | Performed by: INTERNAL MEDICINE

## 2024-06-13 PROCEDURE — 63600175 PHARM REV CODE 636 W HCPCS: Performed by: NURSE ANESTHETIST, CERTIFIED REGISTERED

## 2024-06-13 PROCEDURE — 27201028 HC NEEDLE, SCLERO: Performed by: INTERNAL MEDICINE

## 2024-06-13 PROCEDURE — 25000003 PHARM REV CODE 250: Performed by: NURSE ANESTHETIST, CERTIFIED REGISTERED

## 2024-06-13 PROCEDURE — 88305 TISSUE EXAM BY PATHOLOGIST: CPT | Performed by: PATHOLOGY

## 2024-06-13 PROCEDURE — 37000009 HC ANESTHESIA EA ADD 15 MINS: Performed by: INTERNAL MEDICINE

## 2024-06-13 PROCEDURE — 45390 COLONOSCOPY W/RESECTION: CPT | Mod: PT,59,, | Performed by: INTERNAL MEDICINE

## 2024-06-13 PROCEDURE — 45390 COLONOSCOPY W/RESECTION: CPT | Mod: PT,59 | Performed by: INTERNAL MEDICINE

## 2024-06-13 PROCEDURE — 45380 COLONOSCOPY AND BIOPSY: CPT | Mod: PT,59 | Performed by: INTERNAL MEDICINE

## 2024-06-13 PROCEDURE — 37000008 HC ANESTHESIA 1ST 15 MINUTES: Performed by: INTERNAL MEDICINE

## 2024-06-13 PROCEDURE — 88305 TISSUE EXAM BY PATHOLOGIST: CPT | Mod: 26,,, | Performed by: PATHOLOGY

## 2024-06-13 PROCEDURE — 27201012 HC FORCEPS, HOT/COLD, DISP: Performed by: INTERNAL MEDICINE

## 2024-06-13 PROCEDURE — 63600175 PHARM REV CODE 636 W HCPCS: Performed by: INTERNAL MEDICINE

## 2024-06-13 PROCEDURE — 45385 COLONOSCOPY W/LESION REMOVAL: CPT | Mod: PT,,, | Performed by: INTERNAL MEDICINE

## 2024-06-13 RX ORDER — EPINEPHRINE 1 MG/ML
INJECTION INTRAMUSCULAR; INTRAVENOUS; SUBCUTANEOUS
Status: COMPLETED | OUTPATIENT
Start: 2024-06-13 | End: 2024-06-13

## 2024-06-13 RX ORDER — LIDOCAINE HYDROCHLORIDE 10 MG/ML
INJECTION, SOLUTION EPIDURAL; INFILTRATION; INTRACAUDAL; PERINEURAL
Status: DISCONTINUED | OUTPATIENT
Start: 2024-06-13 | End: 2024-06-13

## 2024-06-13 RX ORDER — PROPOFOL 10 MG/ML
VIAL (ML) INTRAVENOUS
Status: DISCONTINUED | OUTPATIENT
Start: 2024-06-13 | End: 2024-06-13

## 2024-06-13 RX ADMIN — PROPOFOL 20 MG: 10 INJECTION, EMULSION INTRAVENOUS at 09:06

## 2024-06-13 RX ADMIN — PROPOFOL 30 MG: 10 INJECTION, EMULSION INTRAVENOUS at 09:06

## 2024-06-13 RX ADMIN — PROPOFOL 40 MG: 10 INJECTION, EMULSION INTRAVENOUS at 09:06

## 2024-06-13 RX ADMIN — PROPOFOL 20 MG: 10 INJECTION, EMULSION INTRAVENOUS at 08:06

## 2024-06-13 RX ADMIN — LIDOCAINE HYDROCHLORIDE 50 MG: 10 SOLUTION INTRAVENOUS at 08:06

## 2024-06-13 RX ADMIN — GLYCOPYRROLATE 0.4 MG: 0.2 INJECTION, SOLUTION INTRAMUSCULAR; INTRAVITREAL at 09:06

## 2024-06-13 RX ADMIN — PROPOFOL 70 MG: 10 INJECTION, EMULSION INTRAVENOUS at 08:06

## 2024-06-13 RX ADMIN — SODIUM CHLORIDE, SODIUM LACTATE, POTASSIUM CHLORIDE, AND CALCIUM CHLORIDE: .6; .31; .03; .02 INJECTION, SOLUTION INTRAVENOUS at 08:06

## 2024-06-13 NOTE — PROVATION PATIENT INSTRUCTIONS
Discharge Summary/Instructions after an Endoscopic Procedure  Patient Name: Maria Del Carmen Spence  Patient MRN: [id number]  Patient YOB: 1940  Thursday, June 13, 2024 Sayda Ferreira MD  Dear patient,  As a result of recent federal legislation (The Federal Cures Act), you may   receive lab or pathology results from your procedure in your [facility]   account before your physician is able to contact you. Your physician or   their representative will relay the results to you with their   recommendations at their soonest availability.  Thank you,  RESTRICTIONS:  During your procedure today, you received medications for sedation.  These   medications may affect your judgment, balance and coordination.  Therefore,   for 24 hours, you have the following restrictions:   - DO NOT drive a car, operate machinery, make legal/financial decisions,   sign important papers or drink alcohol.    ACTIVITY:  Today: no heavy lifting, straining or running due to procedural   sedation/anesthesia.  The following day: return to full activity including work.  DIET:  Eat and drink normally unless instructed otherwise.     TREATMENT FOR COMMON SIDE EFFECTS:  - Mild abdominal pain, nausea, belching, bloating or excessive gas:  rest,   eat lightly and use a heating pad.  - Sore Throat: treat with throat lozenges and/or gargle with warm salt   water.  - Because air was used during the procedure, expelling large amounts of air   from your rectum or belching is normal.  - If a bowel prep was taken, you may not have a bowel movement for 1-3 days.    This is normal.  SYMPTOMS TO WATCH FOR AND REPORT TO YOUR PHYSICIAN:  1. Abdominal pain or bloating, other than gas cramps.  2. Chest pain.  3. Back pain.  4. Signs of infection such as: chills or fever occurring within 24 hours   after the procedure.  5. Rectal bleeding, which would show as bright red, maroon, or black stools.   (A tablespoon of blood from the rectum is not serious, especially if    hemorrhoids are present.)  6. Vomiting.  7. Weakness or dizziness.  GO DIRECTLY TO THE NEAREST EMERGENCY ROOM IF YOU HAVE ANY OF THE FOLLOWING:      Difficulty breathing              Chills and/or fever over 101 F   Persistent vomiting and/or vomiting blood   Severe abdominal pain   Severe chest pain   Black, tarry stools   Bleeding- more than one tablespoon   Any other symptom or condition that you feel may need urgent attention  Your doctor recommends these additional instructions:  If any biopsies were taken, your doctors clinic will contact you in 1 to 2   weeks with any results.  - Discharge patient to home (with escort).   - Resume previous diet.   - Continue present medications.   - Resume Xarelto (rivaroxaban) at prior dose in 5 days.  Refer to referring   physician for further adjustment of therapy. Discussed with patient the   risk of stroke and coming off anticoagulation. Communicated with Dr. Rosenthal,   patient cardiologist and Dr. Lutz, the patient's primary care provider   regarding these recommendations.   - Await pathology results.   - No repeat colonoscopy due to age.   - Return to primary care physician.   - Try to avoid heavy lifting. No more than 20 lbs for 3 weeks.  For questions, problems or results please call your physician Sayda Ferreira MD at Work:  [phone]  If you have any questions about the above instructions, call the GI   department at [phone] or call the endoscopy unit at [phone]   from 7am until 3 pm.  [facility], EMERGENCY ROOM PHONE NUMBER:   [phone]  IF A COMPLICATION OR EMERGENCY SITUATION ARISES AND YOU ARE UNABLE TO REACH   YOUR PHYSICIAN - GO DIRECTLY TO THE EMERGENCY ROOM.  I have read or have had read to me these discharge instructions for my   procedure and have received a written copy.  I understand these   instructions and will follow-up with my physician if I have any questions.     __________________________________        _____________________________________  Nurse Signature                                          Patient/Designated   Responsible Party Signature  MD Sayda Simpson MD  6/13/2024 9:53:43 AM  This report has been verified and signed electronically.  Dear patient,  As a result of recent federal legislation (The Federal Cures Act), you may   receive lab or pathology results from your procedure in your [facility]   account before your physician is able to contact you. Your physician or   their representative will relay the results to you with their   recommendations at their soonest availability.  Thank you,  PROVATION

## 2024-06-13 NOTE — ANESTHESIA PREPROCEDURE EVALUATION
06/13/2024  Maria Del Carmen Spence is a 83 y.o., female.      Pre-op Assessment    I have reviewed the Patient Summary Reports.     I have reviewed the Nursing Notes. I have reviewed the NPO Status.   I have reviewed the Medications.     Review of Systems  Anesthesia Hx:  No problems with previous Anesthesia                Social:  Non-Smoker, No Alcohol Use       Hematology/Oncology:  Hematology Normal   Oncology Normal                                   EENT/Dental:  EENT/Dental Normal           Cardiovascular:     Hypertension, well controlled    Dysrhythmias atrial fibrillation     PVD hyperlipidemia                             Pulmonary:      Shortness of breath                  Hepatic/GI:  Bowel Prep.  Hiatal Hernia, GERD, poorly controlled             Musculoskeletal:  Arthritis               Neurological:    Neuromuscular Disease,  Headaches                                 Endocrine:  Endocrine Normal            Dermatological:  Skin Normal    Psych:  Psychiatric Normal                    Physical Exam  General: Well nourished    Airway:  Mallampati: II   Mouth Opening: Normal  TM Distance: Normal  Tongue: Normal  Neck ROM: Normal ROM    Dental:  Intact    Chest/Lungs:  Clear to auscultation    Heart:  Rate: Normal        Anesthesia Plan  Type of Anesthesia, risks & benefits discussed:    Anesthesia Type: MAC  Intra-op Monitoring Plan: Standard ASA Monitors  Induction:  IV  Informed Consent: Informed consent signed with the Patient and all parties understand the risks and agree with anesthesia plan.  All questions answered. Patient consented to blood products? Yes  ASA Score: 3    Ready For Surgery From Anesthesia Perspective.     .

## 2024-06-13 NOTE — TRANSFER OF CARE
"Anesthesia Transfer of Care Note    Patient: Maria Del Carmen Spence    Procedure(s) Performed: Procedure(s) (LRB):  COLONOSCOPY 6/10 xarelto 2 day hold note (N/A)    Patient location: PACU    Anesthesia Type: MAC    Transport from OR: Transported from OR on room air with adequate spontaneous ventilation    Post pain: adequate analgesia    Post assessment: no apparent anesthetic complications    Post vital signs: stable    Level of consciousness: awake    Nausea/Vomiting: no nausea/vomiting    Complications: none    Transfer of care protocol was followed      Last vitals: Visit Vitals  BP (!) 151/71 (BP Location: Left arm, Patient Position: Lying)   Pulse 88   Temp 36.8 °C (98.2 °F) (Temporal)   Resp (!) 22   Ht 5' 5" (1.651 m)   Wt 79.4 kg (175 lb)   SpO2 95%   Breastfeeding No   BMI 29.12 kg/m²     "

## 2024-06-13 NOTE — ANESTHESIA POSTPROCEDURE EVALUATION
Anesthesia Post Evaluation    Patient: Maria Del Carmen Spence    Procedure(s) Performed: Procedure(s) (LRB):  COLONOSCOPY 6/10 xarelto 2 day hold note (N/A)    Final Anesthesia Type: MAC      Patient location during evaluation: PACU  Patient participation: Yes- Able to Participate  Level of consciousness: awake and alert  Post-procedure vital signs: reviewed and stable  Pain management: adequate  Airway patency: patent    PONV status at discharge: No PONV  Anesthetic complications: no      Cardiovascular status: blood pressure returned to baseline  Respiratory status: unassisted  Hydration status: euvolemic  Follow-up not needed.              Vitals Value Taken Time   /66 06/13/24 0940   Temp 98 06/13/24 0940   Pulse 66 06/13/24 0940   Resp 12 06/13/24 0940   SpO2 98 06/13/24 0940         No case tracking events are documented in the log.      Pain/Karie Score: No data recorded

## 2024-06-14 VITALS
WEIGHT: 175 LBS | RESPIRATION RATE: 18 BRPM | BODY MASS INDEX: 29.16 KG/M2 | HEART RATE: 80 BPM | DIASTOLIC BLOOD PRESSURE: 70 MMHG | SYSTOLIC BLOOD PRESSURE: 150 MMHG | TEMPERATURE: 98 F | HEIGHT: 65 IN | OXYGEN SATURATION: 97 %

## 2024-06-14 LAB
FINAL PATHOLOGIC DIAGNOSIS: NORMAL
GROSS: NORMAL
Lab: NORMAL

## 2024-06-18 ENCOUNTER — LAB VISIT (OUTPATIENT)
Dept: LAB | Facility: HOSPITAL | Age: 84
End: 2024-06-18
Attending: FAMILY MEDICINE
Payer: MEDICARE

## 2024-06-18 ENCOUNTER — OFFICE VISIT (OUTPATIENT)
Dept: INTERNAL MEDICINE | Facility: CLINIC | Age: 84
End: 2024-06-18
Payer: MEDICARE

## 2024-06-18 VITALS
TEMPERATURE: 98 F | BODY MASS INDEX: 28.98 KG/M2 | SYSTOLIC BLOOD PRESSURE: 144 MMHG | HEIGHT: 65 IN | WEIGHT: 173.94 LBS | HEART RATE: 93 BPM | DIASTOLIC BLOOD PRESSURE: 82 MMHG | OXYGEN SATURATION: 100 %

## 2024-06-18 DIAGNOSIS — I48.0 PAROXYSMAL ATRIAL FIBRILLATION: ICD-10-CM

## 2024-06-18 DIAGNOSIS — K21.9 GASTROESOPHAGEAL REFLUX DISEASE WITHOUT ESOPHAGITIS: ICD-10-CM

## 2024-06-18 DIAGNOSIS — K92.0 HEMATEMESIS WITH NAUSEA: ICD-10-CM

## 2024-06-18 DIAGNOSIS — D50.0 IRON DEFICIENCY ANEMIA DUE TO CHRONIC BLOOD LOSS: ICD-10-CM

## 2024-06-18 DIAGNOSIS — D50.0 IRON DEFICIENCY ANEMIA DUE TO CHRONIC BLOOD LOSS: Primary | ICD-10-CM

## 2024-06-18 LAB
BASOPHILS # BLD AUTO: 0.03 K/UL (ref 0–0.2)
BASOPHILS NFR BLD: 0.5 % (ref 0–1.9)
DIFFERENTIAL METHOD BLD: ABNORMAL
EOSINOPHIL # BLD AUTO: 0.1 K/UL (ref 0–0.5)
EOSINOPHIL NFR BLD: 2.1 % (ref 0–8)
ERYTHROCYTE [DISTWIDTH] IN BLOOD BY AUTOMATED COUNT: 12.5 % (ref 11.5–14.5)
FERRITIN SERPL-MCNC: 36 NG/ML (ref 20–300)
HCT VFR BLD AUTO: 36.5 % (ref 37–48.5)
HGB BLD-MCNC: 11.3 G/DL (ref 12–16)
IMM GRANULOCYTES # BLD AUTO: 0.02 K/UL (ref 0–0.04)
IMM GRANULOCYTES NFR BLD AUTO: 0.3 % (ref 0–0.5)
IRON SERPL-MCNC: 33 UG/DL (ref 30–160)
LYMPHOCYTES # BLD AUTO: 2.5 K/UL (ref 1–4.8)
LYMPHOCYTES NFR BLD: 36.8 % (ref 18–48)
MCH RBC QN AUTO: 31.4 PG (ref 27–31)
MCHC RBC AUTO-ENTMCNC: 31 G/DL (ref 32–36)
MCV RBC AUTO: 101 FL (ref 82–98)
MONOCYTES # BLD AUTO: 0.9 K/UL (ref 0.3–1)
MONOCYTES NFR BLD: 12.8 % (ref 4–15)
NEUTROPHILS # BLD AUTO: 3.2 K/UL (ref 1.8–7.7)
NEUTROPHILS NFR BLD: 47.5 % (ref 38–73)
NRBC BLD-RTO: 0 /100 WBC
PLATELET # BLD AUTO: 340 K/UL (ref 150–450)
PMV BLD AUTO: 11.1 FL (ref 9.2–12.9)
RBC # BLD AUTO: 3.6 M/UL (ref 4–5.4)
WBC # BLD AUTO: 6.65 K/UL (ref 3.9–12.7)

## 2024-06-18 PROCEDURE — 82728 ASSAY OF FERRITIN: CPT | Performed by: FAMILY MEDICINE

## 2024-06-18 PROCEDURE — 99999 PR PBB SHADOW E&M-EST. PATIENT-LVL IV: CPT | Mod: PBBFAC,,, | Performed by: FAMILY MEDICINE

## 2024-06-18 PROCEDURE — 36415 COLL VENOUS BLD VENIPUNCTURE: CPT | Performed by: FAMILY MEDICINE

## 2024-06-18 PROCEDURE — 99214 OFFICE O/P EST MOD 30 MIN: CPT | Mod: PBBFAC | Performed by: FAMILY MEDICINE

## 2024-06-18 PROCEDURE — 99214 OFFICE O/P EST MOD 30 MIN: CPT | Mod: S$PBB,,, | Performed by: FAMILY MEDICINE

## 2024-06-18 PROCEDURE — 83540 ASSAY OF IRON: CPT | Performed by: FAMILY MEDICINE

## 2024-06-18 PROCEDURE — 85025 COMPLETE CBC W/AUTO DIFF WBC: CPT | Performed by: FAMILY MEDICINE

## 2024-06-18 NOTE — PROGRESS NOTES
Subjective:       Patient ID: Maria Del Carmen Spence is a 83 y.o. female.    Chief Complaint: Follow-up    Follow-up GI bleed.  She reports she had coffee-ground emesis and subsequent black stools initially.  She also had epigastric pain.  These symptoms have all resolved.  She is on Protonix 40 mg twice a day.  EGD was done.  She had some gastritis.  Colonoscopy was done.  Several polyps were removed.  She reports good appetite.  She denies any further signs of GI bleeding.  She denies any further abdominal pain.  Previously she was on Protonix 40 mg once a day for acid reflux.  She has been on this for quite some time.  Potassium was increased to twice a day due to the GI bleed.  She will be resuming Xarelto today.    Follow-up  Pertinent negatives include no abdominal pain, chest pain, coughing, headaches, nausea, vomiting or weakness.     Review of Systems   Constitutional:  Negative for activity change, appetite change and unexpected weight change.   Respiratory:  Negative for cough, chest tightness, shortness of breath and wheezing.    Cardiovascular:  Negative for chest pain, palpitations and leg swelling.   Gastrointestinal:  Negative for abdominal distention, abdominal pain, blood in stool, diarrhea, nausea and vomiting.   Neurological:  Negative for dizziness, weakness, light-headedness and headaches.       Objective:      Physical Exam  Constitutional:       General: She is not in acute distress.     Appearance: She is not ill-appearing or diaphoretic.   Cardiovascular:      Rate and Rhythm: Normal rate and regular rhythm.      Heart sounds: No murmur heard.     No gallop.   Pulmonary:      Effort: Pulmonary effort is normal. No respiratory distress.      Breath sounds: No wheezing, rhonchi or rales.   Abdominal:      General: Bowel sounds are normal. There is no distension.      Palpations: Abdomen is soft. There is no mass.      Tenderness: There is no abdominal tenderness.   Lymphadenopathy:      Cervical:  No cervical adenopathy.   Skin:     General: Skin is warm and dry.   Neurological:      Mental Status: She is alert.         Admission on 06/13/2024, Discharged on 06/13/2024   Component Date Value Ref Range Status    Final Pathologic Diagnosis 06/13/2024    Final                    Value:1. Colon, cecal polyp (polypectomy):  Tubular adenoma     2. Colon, proximal ascending polyp (polypectomy):  Tubular adenoma    3. Colon, transverse polyp (polypectomy):  Tubular adenoma        Gross 06/13/2024    Final                    Value:1: Surgery ID:  [id number]   Pathology ID:  [id number]  1. Received in formalin labeled &quot;cecal polypectomy&quot; is a 1.5 x 1.5 x 0.3 cm aggregate of tan-pink tissue fragments.  The specimen is submitted entirely in cassette 1A.    QBU--1-A        Grossed by: Oscar Schroeder MS PA([facility])   2: Surgery ID:  [id number]   Pathology ID:  [id number]  2. Received in formalin labeled &quot;proximal ascending colon polypectomy&quot; is a 0.4 x 0.4 x 0.2 cm portion of tan tissue.  The specimen is submitted entirely in cassette 2A.    JJC--2-A        Grossed by: Oscar Schroeder MS PA([facility])   3: Surgery ID:  [id number]   Pathology ID:  [id number]  3. Received in formalin labeled &quot;transverse colon polypectomy&quot; is a single tan tissue fragment measuring 0.3 x 0.2 x 0.2 cm.  The specimen is submitted entirely in cassette 3A.    UPQ--3-A        Grossed by: Oscar Schroeder MS, PA([facility])      Disclaimer 06/13/2024 Unless the case is a 'gross only' or additional testing only, the final diagnosis for each specimen is based on a microscopic examination of appropriate tissue sections.   Final     Assessment:       1. Iron deficiency anemia due to chronic blood loss    2. Hematemesis with nausea    3. Paroxysmal atrial fibrillation    4. Gastroesophageal reflux disease without esophagitis        Plan:     Overall she is doing well.  Will continue Protonix for total of 3 months taking twice a day  and then resume once a day.  CBC iron ferritin levels were done.  Follow-up in 3 months    Iron deficiency anemia due to chronic blood loss  -     CBC Auto Differential; Future; Expected date: 06/18/2024  -     Ferritin; Future; Expected date: 06/18/2024  -     Iron; Future; Expected date: 06/18/2024    Hematemesis with nausea    Paroxysmal atrial fibrillation    Gastroesophageal reflux disease without esophagitis

## 2024-06-19 ENCOUNTER — TELEPHONE (OUTPATIENT)
Dept: INTERNAL MEDICINE | Facility: CLINIC | Age: 84
End: 2024-06-19
Payer: MEDICARE

## 2024-06-19 NOTE — TELEPHONE ENCOUNTER
----- Message from John Ragsdale sent at 6/19/2024  4:03 PM CDT -----  Contact: Maria Del Carmen  Type:  Patient Returning Call    Who Called:Maria Del Carmen  Who Left Message for Patient: Dali  Does the patient know what this is regarding?: not sure  Would the patient rather a call back or a response via [facility]?  Call back   Best Call Back Number: [phone]  Additional Information:     Thanks   Am

## 2024-06-20 ENCOUNTER — TELEPHONE (OUTPATIENT)
Dept: INTERNAL MEDICINE | Facility: CLINIC | Age: 84
End: 2024-06-20
Payer: MEDICARE

## 2024-06-20 NOTE — PROGRESS NOTES
AN staff: please call patient with results since she does not have patient portal. Inform the following:  The polyps removed were precancerous also known as adenomas. They were completely removed. Due to your age, a repeat colonoscopy is not recommended. Please remember to avoid heavy lifting for the next 3 weeks and avoid non-steroidal anti-inflammatory drugs, NSAIDs for the next month. No additional testing is needed.       Thanks for trusting us with your healthcare needs and using [facility].     Sincerely,    Sayda Ferreira M.D.

## 2024-07-15 PROBLEM — K92.0 COFFEE GROUND EMESIS: Status: RESOLVED | Noted: 2024-04-11 | Resolved: 2024-07-15

## 2024-07-22 DIAGNOSIS — M46.1 SACROILIITIS: ICD-10-CM

## 2024-07-22 RX ORDER — GABAPENTIN 400 MG/1
400 CAPSULE ORAL 3 TIMES DAILY
Qty: 270 CAPSULE | Refills: 3 | Status: SHIPPED | OUTPATIENT
Start: 2024-07-22

## 2024-07-22 NOTE — TELEPHONE ENCOUNTER
Care Due:                  Date            Visit Type   Department     Provider  --------------------------------------------------------------------------------                                EP -                              PRIMARY      [facility] INTERNAL  Last Visit: 06-      CARE ([facility])   ROSA Lutz                              EP -                              PRIMARY      [facility] INTERNAL  Next Visit: 09-      CARE ([facility])   ROSA Lutz                                                            Last  Test          Frequency    Reason                     Performed    Due Date  --------------------------------------------------------------------------------    Lipid Panel.  12 months..  atorvastatin.............  02- 02-    [facility] Embedded Care Due Messages. Reference number: [id number].   7/22/2024 10:06:07 AM CDT

## 2024-08-12 PROBLEM — K92.0 HEMATEMESIS WITH NAUSEA: Status: RESOLVED | Noted: 2024-05-07 | Resolved: 2024-08-12

## 2024-08-12 PROBLEM — Z09 HOSPITAL DISCHARGE FOLLOW-UP: Status: RESOLVED | Noted: 2024-05-07 | Resolved: 2024-08-12

## 2024-09-17 ENCOUNTER — OFFICE VISIT (OUTPATIENT)
Dept: INTERNAL MEDICINE | Facility: CLINIC | Age: 84
End: 2024-09-17
Payer: MEDICARE

## 2024-09-17 ENCOUNTER — LAB VISIT (OUTPATIENT)
Dept: LAB | Facility: HOSPITAL | Age: 84
End: 2024-09-17
Attending: FAMILY MEDICINE
Payer: MEDICARE

## 2024-09-17 VITALS
OXYGEN SATURATION: 98 % | HEART RATE: 76 BPM | DIASTOLIC BLOOD PRESSURE: 86 MMHG | BODY MASS INDEX: 28.28 KG/M2 | HEIGHT: 65 IN | WEIGHT: 169.75 LBS | TEMPERATURE: 98 F | SYSTOLIC BLOOD PRESSURE: 138 MMHG

## 2024-09-17 DIAGNOSIS — I48.0 PAROXYSMAL ATRIAL FIBRILLATION: ICD-10-CM

## 2024-09-17 DIAGNOSIS — Z98.890 HISTORY OF CARDIAC RADIOFREQUENCY ABLATION (RFA): ICD-10-CM

## 2024-09-17 DIAGNOSIS — E78.49 OTHER HYPERLIPIDEMIA: ICD-10-CM

## 2024-09-17 DIAGNOSIS — M15.8 OTHER OSTEOARTHRITIS INVOLVING MULTIPLE JOINTS: ICD-10-CM

## 2024-09-17 DIAGNOSIS — I10 PRIMARY HYPERTENSION: ICD-10-CM

## 2024-09-17 DIAGNOSIS — D50.0 IRON DEFICIENCY ANEMIA DUE TO CHRONIC BLOOD LOSS: Primary | ICD-10-CM

## 2024-09-17 DIAGNOSIS — D50.0 IRON DEFICIENCY ANEMIA DUE TO CHRONIC BLOOD LOSS: ICD-10-CM

## 2024-09-17 DIAGNOSIS — K92.0 HEMATEMESIS WITH NAUSEA: ICD-10-CM

## 2024-09-17 LAB
BASOPHILS # BLD AUTO: 0.03 K/UL (ref 0–0.2)
BASOPHILS NFR BLD: 0.5 % (ref 0–1.9)
CHOLEST SERPL-MCNC: 167 MG/DL (ref 120–199)
CHOLEST/HDLC SERPL: 2.8 {RATIO} (ref 2–5)
DIFFERENTIAL METHOD BLD: ABNORMAL
EOSINOPHIL # BLD AUTO: 0.1 K/UL (ref 0–0.5)
EOSINOPHIL NFR BLD: 1.7 % (ref 0–8)
ERYTHROCYTE [DISTWIDTH] IN BLOOD BY AUTOMATED COUNT: 13.9 % (ref 11.5–14.5)
FERRITIN SERPL-MCNC: 29 NG/ML (ref 20–300)
HCT VFR BLD AUTO: 40.4 % (ref 37–48.5)
HDLC SERPL-MCNC: 59 MG/DL (ref 40–75)
HDLC SERPL: 35.3 % (ref 20–50)
HGB BLD-MCNC: 12.9 G/DL (ref 12–16)
IMM GRANULOCYTES # BLD AUTO: 0.01 K/UL (ref 0–0.04)
IMM GRANULOCYTES NFR BLD AUTO: 0.2 % (ref 0–0.5)
IRON SERPL-MCNC: 70 UG/DL (ref 30–160)
LDLC SERPL CALC-MCNC: 80.6 MG/DL (ref 63–159)
LYMPHOCYTES # BLD AUTO: 2.8 K/UL (ref 1–4.8)
LYMPHOCYTES NFR BLD: 42.7 % (ref 18–48)
MCH RBC QN AUTO: 31.3 PG (ref 27–31)
MCHC RBC AUTO-ENTMCNC: 31.9 G/DL (ref 32–36)
MCV RBC AUTO: 98 FL (ref 82–98)
MONOCYTES # BLD AUTO: 0.6 K/UL (ref 0.3–1)
MONOCYTES NFR BLD: 9.4 % (ref 4–15)
NEUTROPHILS # BLD AUTO: 3 K/UL (ref 1.8–7.7)
NEUTROPHILS NFR BLD: 45.5 % (ref 38–73)
NONHDLC SERPL-MCNC: 108 MG/DL
NRBC BLD-RTO: 0 /100 WBC
PLATELET # BLD AUTO: 326 K/UL (ref 150–450)
PMV BLD AUTO: 10.7 FL (ref 9.2–12.9)
RBC # BLD AUTO: 4.12 M/UL (ref 4–5.4)
TRIGL SERPL-MCNC: 137 MG/DL (ref 30–150)
WBC # BLD AUTO: 6.48 K/UL (ref 3.9–12.7)

## 2024-09-17 PROCEDURE — 83540 ASSAY OF IRON: CPT | Performed by: FAMILY MEDICINE

## 2024-09-17 PROCEDURE — 36415 COLL VENOUS BLD VENIPUNCTURE: CPT | Performed by: FAMILY MEDICINE

## 2024-09-17 PROCEDURE — 99214 OFFICE O/P EST MOD 30 MIN: CPT | Mod: S$PBB,,, | Performed by: FAMILY MEDICINE

## 2024-09-17 PROCEDURE — 85025 COMPLETE CBC W/AUTO DIFF WBC: CPT | Performed by: FAMILY MEDICINE

## 2024-09-17 PROCEDURE — 99999 PR PBB SHADOW E&M-EST. PATIENT-LVL III: CPT | Mod: PBBFAC,,, | Performed by: FAMILY MEDICINE

## 2024-09-17 PROCEDURE — 82728 ASSAY OF FERRITIN: CPT | Performed by: FAMILY MEDICINE

## 2024-09-17 PROCEDURE — 99213 OFFICE O/P EST LOW 20 MIN: CPT | Mod: PBBFAC | Performed by: FAMILY MEDICINE

## 2024-09-17 PROCEDURE — 80061 LIPID PANEL: CPT | Performed by: FAMILY MEDICINE

## 2024-09-17 RX ORDER — OMEPRAZOLE 40 MG/1
40 CAPSULE, DELAYED RELEASE ORAL DAILY
COMMUNITY
End: 2024-09-17 | Stop reason: SDUPTHER

## 2024-09-17 RX ORDER — OMEPRAZOLE 40 MG/1
40 CAPSULE, DELAYED RELEASE ORAL DAILY
Qty: 90 CAPSULE | Refills: 3 | Status: SHIPPED | OUTPATIENT
Start: 2024-09-17

## 2024-09-17 NOTE — PROGRESS NOTES
Patient ID: Maria Del Carmen Spence is a 83 y.o. female.    Chief Complaint: Follow-up    History of Present Illness    Ms. Spence presents today for follow up.    She has a history of stomach problems. She was previously prescribed Protonix which she initially took twice daily. Upon recommendation, she attempted to reduce the dosage to daily but found it ineffective and subsequently returned to her previous regimen of daily omeprazole. She denies experiencing black stools or increased bleeding. She requires a refill of her medication, as she took her last dose this morning. She agrees to a three-month supply.    She reports experiencing achy joints, which she describes as her primary concern. She states that if not for the joint pain, she would feel great. For pain relief, she takes Tylenol, noting that it is the only medication she can use due to her abdomen issues.    She has a history of anemia and requires a lab recheck to monitor her condition. She reports no new symptoms or concerns related to her anemia at this time. She mentions receiving a letter with lab results, which she was unable to understand. She recalls that previous labs showed stable anemia and normal iron levels.    She is considering getting a flu vaccine this year, as she usually does annually. She is also discussing the possibility of receiving a COVID-19 booster shot and is seeking recommendations regarding its necessity.  Recommend flu VAX COVID booster at her pharmacy    She reports a history of cardiac ablation procedure performed several years ago, but is uncertain about the exact timing. She expresses uncertainty about the expected duration of the ablation's effectiveness. She denies any current cardiovascular issues, including heartburn, indigestion, or trouble swallowing. No chest pain or discomfort reported.      ROS:  General: -fever, -chills, -fatigue, -weight gain, -weight loss  Eyes: -vision changes, -redness, -discharge  ENT: -ear  pain, -nasal congestion, -sore throat  Cardiovascular: -chest pain, -palpitations, -lower extremity edema  Respiratory: -cough, -shortness of breath  Gastrointestinal: -abdominal pain, -nausea, -vomiting, -diarrhea, -constipation, -blood in stool, -melena, -heartburn  Genitourinary: -dysuria, -hematuria, -frequency  Musculoskeletal: +joint pain, -muscle pain  Skin: -rash, -lesion  Neurological: -headache, -dizziness, -numbness, -tingling         Physical Exam    General: In no acute distress. Not ill-appearing or diaphoretic.  Neck: Normal thyroid. No cervical lymphadenopathy. 2+ carotid pulses.  Cardiovascular: Normal rate and regular  rhythm. No murmur heard. No gallop. Normal heart sounds.  Pulmonary: Pulmonary effort is normal. No respiratory distress. No wheezing. No rhonchi. No rales.  Abdominal: Soft. Nontender. Nondistended. No mass.  Musculoskeletal: No swelling. No tenderness. No deformity.  Neurological: Alert.  Psychiatric: Mood normal. Behavior normal.         Assessment & Plan    GASTROESOPHAGEAL REFLUX DISEASE (GERD):  - Assessed current medication regimen for GERD.  - Explained that omeprazole is the same medication as Prilosec, just a different name.  - Continued omeprazole 1 time daily and provided 3-month refill.    JOINT PAIN:  - Evaluated joint pain, noting Tylenol as the only safe option due to stomach issues.  - Continued Tylenol for joint pain as needed.    ANEMIA:  - Reviewed previous lab results indicating stable anemia and normal iron levels.  - Ordered lab work to recheck anemia.    CARDIAC STATUS:  - Assessed cardiac status post-ablation, noting normal heart rhythm.    COVID-19 VACCINATION:  - Discussed current COVID-19 situation and the need for an updated booster shot.  - Clarified that mask-wearing is not currently required.  - Flu shot and COVID-19 booster vaccination recommended.    LABS:  - Considered need for cholesterol check, but deferred due to non-fasting state.    FOLLOW  UP:  - Follow up in 6 months.       1. Iron deficiency anemia due to chronic blood loss  CBC Auto Differential    Ferritin    Iron      2. Other hyperlipidemia        3. Primary hypertension  Lipid Panel      4. Hematemesis with nausea        5. Paroxysmal atrial fibrillation        6. History of cardiac radiofrequency ablation (RFA)        7. Other osteoarthritis involving multiple joints                   Follow up in about 6 months (around 3/17/2025).    This note was generated with the assistance of ambient listening technology. Verbal consent was obtained by the patient and accompanying visitor(s) for the recording of patient appointment to facilitate this note. I attest to having reviewed and edited the generated note for accuracy, though some syntax or spelling errors may persist. Please contact the author of this note for any clarification.

## 2024-10-09 RX ORDER — FUROSEMIDE 20 MG/1
20 TABLET ORAL
Qty: 25 TABLET | Refills: 5 | Status: SHIPPED | OUTPATIENT
Start: 2024-10-10

## 2024-10-15 DIAGNOSIS — I48.0 PAROXYSMAL ATRIAL FIBRILLATION: Primary | ICD-10-CM

## 2024-10-15 DIAGNOSIS — I10 PRIMARY HYPERTENSION: ICD-10-CM

## 2024-10-16 ENCOUNTER — CLINICAL SUPPORT (OUTPATIENT)
Dept: CARDIOLOGY | Facility: CLINIC | Age: 84
End: 2024-10-16
Payer: MEDICARE

## 2024-10-16 ENCOUNTER — OFFICE VISIT (OUTPATIENT)
Dept: CARDIOLOGY | Facility: CLINIC | Age: 84
End: 2024-10-16
Payer: MEDICARE

## 2024-10-16 VITALS
DIASTOLIC BLOOD PRESSURE: 70 MMHG | BODY MASS INDEX: 28.89 KG/M2 | HEART RATE: 97 BPM | HEIGHT: 65 IN | WEIGHT: 173.38 LBS | OXYGEN SATURATION: 96 % | SYSTOLIC BLOOD PRESSURE: 130 MMHG

## 2024-10-16 DIAGNOSIS — I27.20 PULMONARY HTN: ICD-10-CM

## 2024-10-16 DIAGNOSIS — E78.2 MIXED HYPERLIPIDEMIA: ICD-10-CM

## 2024-10-16 DIAGNOSIS — I48.0 PAROXYSMAL ATRIAL FIBRILLATION: ICD-10-CM

## 2024-10-16 DIAGNOSIS — R06.09 DYSPNEA ON EXERTION: ICD-10-CM

## 2024-10-16 DIAGNOSIS — I73.9 PVD (PERIPHERAL VASCULAR DISEASE): ICD-10-CM

## 2024-10-16 DIAGNOSIS — I35.1 NONRHEUMATIC AORTIC VALVE INSUFFICIENCY: ICD-10-CM

## 2024-10-16 DIAGNOSIS — I77.89 OTHER SPECIFIED DISORDERS OF ARTERIES AND ARTERIOLES: ICD-10-CM

## 2024-10-16 DIAGNOSIS — I48.0 PAROXYSMAL ATRIAL FIBRILLATION: Primary | ICD-10-CM

## 2024-10-16 DIAGNOSIS — Z98.890 HISTORY OF CARDIAC RADIOFREQUENCY ABLATION (RFA): ICD-10-CM

## 2024-10-16 DIAGNOSIS — I10 PRIMARY HYPERTENSION: ICD-10-CM

## 2024-10-16 DIAGNOSIS — E78.49 OTHER HYPERLIPIDEMIA: ICD-10-CM

## 2024-10-16 PROCEDURE — 93005 ELECTROCARDIOGRAM TRACING: CPT | Mod: PBBFAC,PO | Performed by: STUDENT IN AN ORGANIZED HEALTH CARE EDUCATION/TRAINING PROGRAM

## 2024-10-16 PROCEDURE — 99999 PR PBB SHADOW E&M-EST. PATIENT-LVL IV: CPT | Mod: PBBFAC,,, | Performed by: INTERNAL MEDICINE

## 2024-10-16 PROCEDURE — 99214 OFFICE O/P EST MOD 30 MIN: CPT | Mod: PBBFAC,PO | Performed by: INTERNAL MEDICINE

## 2024-10-16 PROCEDURE — 93010 ELECTROCARDIOGRAM REPORT: CPT | Mod: S$PBB,,, | Performed by: STUDENT IN AN ORGANIZED HEALTH CARE EDUCATION/TRAINING PROGRAM

## 2024-10-16 NOTE — PROGRESS NOTES
Subjective:   Patient ID:  Maria Del Carmen Spence is a 84 y.o. female who presents for follow up of No chief complaint on file.      85 yo female, routine 12 months f/u   PMH PAF/AFL, s/p PVI cryoballon and CVI in  by Dr. Morillo, HTN, mild anemia, DJD, glaucoma, s/p knee surgery. Can not climb the stairs due to knee pain.   Echo in  normal EF  Repeat EKG in  sinus  EKG NSR low ARS voltage  Continue on Xeralto, BB and Lipitor  No chest pain, palpitation, dizziness and faint. Occasionally could not lie on left side due to chest discomfort  No fall and active bleeding  Shoulder, knee and hip joints pain. On tylenol 500 mg 3 times a day. Most activity limited by lower back pain.  Occasional Leg swelling  Did nerve block for the pain control  Pt states that her BP controlled at home  Lives in a big house and does a lot of house keeping work.     10-22 visit  Ekg NSR and BP high,  Pt states that her BP controlled at home. No active bleeding. Chronic joint pain. Four tylenol 500 mg daily for pain.  No falls. Does house work    10.23 visit  Chronic joint pain and Tylenol works. No palpitation dizziness chest pain faint dyspnea orthopnea   Ekg NSR    Interval history  EKG reviewed by myself today reveals NSR nonspecific STT change  04/24 question GI bleeding and endoscopy and colonoscopy unremarkable except large hiatal hernia  Anemia HGB at 10 and no PRBC. Now HGB 12  No orthopnea PND and chest pain  no palpitation and dizziness  LDL 81 BP C  10/23 The Echo showed normal function and mold valvular leaking. Mod pulm HTN                     Past Medical History:   Diagnosis Date    A-fib     Arthritis     Chronic LBP     ESIs x 3 with Dr. Hicks, PT at [facility], chiropractor    Eye pain     Frequent headaches     GERD (gastroesophageal reflux disease)     Glaucoma     Hyperlipemia     Hypertension        Past Surgical History:   Procedure Laterality Date    ABLATION OF ARRHYTHMOGENIC FOCUS FOR ATRIAL FIBRILLATION  N/A 3/6/2019    Procedure: ABLATION, ARRHYTHMOGENIC FOCUS, FOR ATRIAL FIBRILLATION;  Surgeon: Abel Morillo MD;  Location: [facility] EP LAB;  Service: Cardiology;  Laterality: N/A;    CARDIOVERSION N/A 7/9/2018    Procedure: CARDIOVERSION;  Surgeon: Will Rosenthal MD;  Location: [facility] CATH LAB;  Service: Cardiology;  Laterality: N/A;    CATARACT EXTRACTION W/  INTRAOCULAR LENS IMPLANT  OU    COLONOSCOPY N/A 6/13/2024    Procedure: COLONOSCOPY 6/10 xarelto 2 day hold note;  Surgeon: Sayda Ferreira MD;  Location: [facility] ENDO;  Service: Endoscopy;  Laterality: N/A;    ESOPHAGOGASTRODUODENOSCOPY N/A 4/13/2024    Procedure: EGD (ESOPHAGOGASTRODUODENOSCOPY);  Surgeon: Oswald Esteves MD;  Location: [facility] ENDO;  Service: Endoscopy;  Laterality: N/A;    INJECTION OF ANESTHETIC AGENT INTO SACROILIAC JOINT Right 11/3/2020    Procedure: right Sacroiliac Joint Injection;  Surgeon: Ayush Christiansen MD;  Location: [facility] PAIN MGT;  Service: Pain Management;  Laterality: Right;    INJECTION OF ANESTHETIC AGENT INTO SACROILIAC JOINT Right 3/23/2021    Procedure: right Sacroiliac Joint Injection;  Surgeon: Ayush Christiansen MD;  Location: [facility] PAIN MGT;  Service: Pain Management;  Laterality: Right;    INJECTION OF JOINT Right 11/3/2020    Procedure: right GT bursa injection;  Surgeon: Ayush Christiansen MD;  Location: [facility] PAIN MGT;  Service: Pain Management;  Laterality: Right;    INJECTION OF JOINT Bilateral 3/23/2021    Procedure: bilateral GT bursa injection;  Surgeon: Ayush Christiansen MD;  Location: [facility] PAIN MGT;  Service: Pain Management;  Laterality: Bilateral;    TUBAL LIGATION         Social History     Tobacco Use    Smoking status: Never    Smokeless tobacco: Never   Substance Use Topics    Alcohol use: No    Drug use: No       Family History   Problem Relation Name Age of Onset    Glaucoma Mother      Cancer Mother      Cataracts Mother      Hypertension Mother      Hypertension Father      Diabetes Paternal Aunt       Strabismus Neg Hx      Retinal detachment Neg Hx      Macular degeneration Neg Hx      Blindness Neg Hx      Amblyopia Neg Hx      Stroke Neg Hx      Thyroid disease Neg Hx           ROS    Objective:   Physical Exam  HENT:      Head: Normocephalic.   Eyes:      Pupils: Pupils are equal, round, and reactive to light.   Neck:      Thyroid: No thyromegaly.      Vascular: Normal carotid pulses. No carotid bruit or JVD.   Cardiovascular:      Rate and Rhythm: Normal rate and regular rhythm. No extrasystoles are present.     Chest Wall: PMI is not displaced.      Pulses: Normal pulses.      Heart sounds: Normal heart sounds. No murmur heard.     No gallop. No S3 sounds.   Pulmonary:      Effort: No respiratory distress.      Breath sounds: Normal breath sounds. No stridor.   Abdominal:      General: Bowel sounds are normal.      Palpations: Abdomen is soft.      Tenderness: There is no abdominal tenderness. There is no rebound.   Musculoskeletal:         General: Swelling (trace edema) present. Normal range of motion.   Skin:     Findings: No rash.   Neurological:      Mental Status: She is alert and oriented to person, place, and time.   Psychiatric:         Behavior: Behavior normal.         Lab Results   Component Value Date    CHOL 167 09/17/2024    CHOL 159 02/08/2023    CHOL 165 02/07/2022     Lab Results   Component Value Date    HDL 59 09/17/2024    HDL 65 02/08/2023    HDL 72 02/07/2022     Lab Results   Component Value Date    LDLCALC 80.6 09/17/2024    LDLCALC 77.6 02/08/2023    LDLCALC 74.8 02/07/2022     Lab Results   Component Value Date    TRIG 137 09/17/2024    TRIG 82 02/08/2023    TRIG 91 02/07/2022     Lab Results   Component Value Date    CHOLHDL 35.3 09/17/2024    CHOLHDL 40.9 02/08/2023    CHOLHDL 43.6 02/07/2022       Chemistry        Component Value Date/Time     04/13/2024 0435    K 3.7 04/13/2024 0435     04/13/2024 0435    CO2 24 04/13/2024 0435    BUN 16 04/13/2024 0435     "CREATININE 0.8 04/13/2024 0435    GLU 84 04/13/2024 0435        Component Value Date/Time    CALCIUM 10.0 04/13/2024 0435    ALKPHOS 77 04/13/2024 0435    AST 30 04/13/2024 0435    ALT 18 04/13/2024 0435    BILITOT 0.6 04/13/2024 0435    ESTGFRAFRICA >60 02/04/2021 1450    EGFRNONAA >60 02/04/2021 1450          No results found for: "LABA1C", "HGBA1C"  Lab Results   Component Value Date    TSH 3.002 02/08/2023     Lab Results   Component Value Date    INR 1.1 04/13/2024    INR 1.1 02/27/2019    INR 1.5 (H) 07/23/2018     Lab Results   Component Value Date    WBC 6.48 09/17/2024    HGB 12.9 09/17/2024    HCT 40.4 09/17/2024    MCV 98 09/17/2024     09/17/2024     BMP  Sodium   Date Value Ref Range Status   04/13/2024 142 136 - 145 mmol/L Final     Potassium   Date Value Ref Range Status   04/13/2024 3.7 3.5 - 5.1 mmol/L Final     Chloride   Date Value Ref Range Status   04/13/2024 108 95 - 110 mmol/L Final     CO2   Date Value Ref Range Status   04/13/2024 24 23 - 29 mmol/L Final     BUN   Date Value Ref Range Status   04/13/2024 16 8 - 23 mg/dL Final     Creatinine   Date Value Ref Range Status   04/13/2024 0.8 0.5 - 1.4 mg/dL Final     Calcium   Date Value Ref Range Status   04/13/2024 10.0 8.7 - 10.5 mg/dL Final     Anion Gap   Date Value Ref Range Status   04/13/2024 10 8 - 16 mmol/L Final     eGFR if    Date Value Ref Range Status   02/04/2021 >60 >60 mL/min/1.73 m^2 Final     eGFR if non    Date Value Ref Range Status   02/04/2021 >60 >60 mL/min/1.73 m^2 Final     Comment:     Calculation used to obtain the estimated glomerular filtration  rate (eGFR) is the CKD-EPI equation.        BNP  @LABRCNTIP(BNP,BNPTRIAGEBLO)@  @LABRCNTIP(troponini)@  CrCl cannot be calculated (Patient's most recent lab result is older than the maximum 7 days allowed.).  No results found in the last 24 hours.  No results found in the last 24 hours.  No results found in the last 24 " hours.    Assessment:      1. Paroxysmal atrial fibrillation    2. Dyspnea on exertion    3. History of cardiac radiofrequency ablation (RFA)    4. Mixed hyperlipidemia    5. Other hyperlipidemia    6. Primary hypertension    7. PVD (peripheral vascular disease)    8. Nonrheumatic aortic valve insufficiency    9. Pulmonary HTN    10. Other specified disorders of arteries and arterioles        Plan:   Carotid US screen   Continue lipitor lasix metoprolol and xarelto for HTN HKD AFIB and PVD  RTC in 6m

## 2024-10-17 LAB
OHS QRS DURATION: 76 MS
OHS QTC CALCULATION: 453 MS

## 2024-10-25 ENCOUNTER — HOSPITAL ENCOUNTER (OUTPATIENT)
Dept: CARDIOLOGY | Facility: HOSPITAL | Age: 84
Discharge: HOME | End: 2024-10-25
Attending: INTERNAL MEDICINE
Payer: MEDICARE

## 2024-10-25 VITALS
SYSTOLIC BLOOD PRESSURE: 130 MMHG | BODY MASS INDEX: 28.82 KG/M2 | DIASTOLIC BLOOD PRESSURE: 70 MMHG | HEIGHT: 65 IN | WEIGHT: 173 LBS

## 2024-10-25 DIAGNOSIS — E78.49 OTHER HYPERLIPIDEMIA: ICD-10-CM

## 2024-10-25 DIAGNOSIS — I77.89 OTHER SPECIFIED DISORDERS OF ARTERIES AND ARTERIOLES: ICD-10-CM

## 2024-10-25 LAB
LEFT ARM DIASTOLIC BLOOD PRESSURE: 68 MMHG
LEFT ARM SYSTOLIC BLOOD PRESSURE: 125 MMHG
LEFT CBA DIAS: 12 CM/S
LEFT CBA SYS: 52 CM/S
LEFT CCA DIST DIAS: 8 CM/S
LEFT CCA DIST SYS: 60 CM/S
LEFT CCA MID DIAS: 10 CM/S
LEFT CCA MID SYS: 61 CM/S
LEFT CCA PROX DIAS: 15 CM/S
LEFT CCA PROX SYS: 148 CM/S
LEFT ECA DIAS: 4 CM/S
LEFT ECA SYS: 78 CM/S
LEFT ICA DIST DIAS: 21 CM/S
LEFT ICA DIST SYS: 82 CM/S
LEFT ICA MID DIAS: 21 CM/S
LEFT ICA MID SYS: 85 CM/S
LEFT ICA PROX DIAS: 11 CM/S
LEFT ICA PROX SYS: 53 CM/S
LEFT VERTEBRAL DIAS: 8 CM/S
LEFT VERTEBRAL SYS: 48 CM/S
OHS CV CAROTID RIGHT ICA EDV HIGHEST: 22
OHS CV CAROTID ULTRASOUND LEFT ICA/CCA RATIO: 1.42
OHS CV CAROTID ULTRASOUND RIGHT ICA/CCA RATIO: 1.72
OHS CV PV CAROTID LEFT HIGHEST CCA: 148
OHS CV PV CAROTID LEFT HIGHEST ICA: 85
OHS CV PV CAROTID RIGHT HIGHEST CCA: 70
OHS CV PV CAROTID RIGHT HIGHEST ICA: 119
OHS CV US CAROTID LEFT HIGHEST EDV: 21
RIGHT ARM DIASTOLIC BLOOD PRESSURE: 70 MMHG
RIGHT ARM SYSTOLIC BLOOD PRESSURE: 130 MMHG
RIGHT CBA DIAS: 12 CM/S
RIGHT CBA SYS: 65 CM/S
RIGHT CCA DIST DIAS: 12 CM/S
RIGHT CCA DIST SYS: 69 CM/S
RIGHT CCA MID DIAS: 13 CM/S
RIGHT CCA MID SYS: 70 CM/S
RIGHT CCA PROX DIAS: 7 CM/S
RIGHT CCA PROX SYS: 63 CM/S
RIGHT ECA DIAS: 6 CM/S
RIGHT ECA SYS: 77 CM/S
RIGHT ICA DIST DIAS: 22 CM/S
RIGHT ICA DIST SYS: 119 CM/S
RIGHT ICA MID DIAS: 11 CM/S
RIGHT ICA MID SYS: 63 CM/S
RIGHT ICA PROX DIAS: 14 CM/S
RIGHT ICA PROX SYS: 72 CM/S
RIGHT VERTEBRAL DIAS: 8 CM/S
RIGHT VERTEBRAL SYS: 55 CM/S

## 2024-10-25 PROCEDURE — 93880 EXTRACRANIAL BILAT STUDY: CPT | Mod: 26,,, | Performed by: INTERNAL MEDICINE

## 2024-10-25 PROCEDURE — 93880 EXTRACRANIAL BILAT STUDY: CPT

## 2024-10-28 ENCOUNTER — TELEPHONE (OUTPATIENT)
Dept: CARDIOLOGY | Facility: CLINIC | Age: 84
End: 2024-10-28
Payer: MEDICARE

## 2024-11-11 RX ORDER — RIVAROXABAN 15 MG/1
15 TABLET, FILM COATED ORAL
Qty: 30 TABLET | Refills: 5 | Status: SHIPPED | OUTPATIENT
Start: 2024-11-11

## 2024-11-14 ENCOUNTER — HOSPITAL ENCOUNTER (EMERGENCY)
Facility: HOSPITAL | Age: 84
Discharge: HOME | End: 2024-11-14
Attending: EMERGENCY MEDICINE
Payer: MEDICARE

## 2024-11-14 VITALS
TEMPERATURE: 98 F | HEIGHT: 65 IN | DIASTOLIC BLOOD PRESSURE: 92 MMHG | RESPIRATION RATE: 18 BRPM | OXYGEN SATURATION: 97 % | SYSTOLIC BLOOD PRESSURE: 136 MMHG | BODY MASS INDEX: 28.79 KG/M2 | HEART RATE: 91 BPM

## 2024-11-14 DIAGNOSIS — R11.2 NAUSEA AND VOMITING, UNSPECIFIED VOMITING TYPE: Primary | ICD-10-CM

## 2024-11-14 LAB
ALBUMIN SERPL BCP-MCNC: 4.4 G/DL (ref 3.5–5.2)
ALP SERPL-CCNC: 88 U/L (ref 40–150)
ALT SERPL W/O P-5'-P-CCNC: 18 U/L (ref 10–44)
ANION GAP SERPL CALC-SCNC: 15 MMOL/L (ref 8–16)
AST SERPL-CCNC: 38 U/L (ref 10–40)
BASOPHILS # BLD AUTO: 0.03 K/UL (ref 0–0.2)
BASOPHILS NFR BLD: 0.2 % (ref 0–1.9)
BILIRUB SERPL-MCNC: 0.8 MG/DL (ref 0.1–1)
BILIRUB UR QL STRIP: NEGATIVE
BUN SERPL-MCNC: 15 MG/DL (ref 8–23)
CALCIUM SERPL-MCNC: 10.4 MG/DL (ref 8.7–10.5)
CHLORIDE SERPL-SCNC: 106 MMOL/L (ref 95–110)
CLARITY UR: CLEAR
CO2 SERPL-SCNC: 21 MMOL/L (ref 23–29)
COLOR UR: YELLOW
CREAT SERPL-MCNC: 0.8 MG/DL (ref 0.5–1.4)
DIFFERENTIAL METHOD BLD: ABNORMAL
EOSINOPHIL # BLD AUTO: 0 K/UL (ref 0–0.5)
EOSINOPHIL NFR BLD: 0.1 % (ref 0–8)
ERYTHROCYTE [DISTWIDTH] IN BLOOD BY AUTOMATED COUNT: 12.9 % (ref 11.5–14.5)
EST. GFR  (NO RACE VARIABLE): >60 ML/MIN/1.73 M^2
GLUCOSE SERPL-MCNC: 116 MG/DL (ref 70–110)
GLUCOSE UR QL STRIP: NEGATIVE
HCT VFR BLD AUTO: 40.7 % (ref 37–48.5)
HGB BLD-MCNC: 13.8 G/DL (ref 12–16)
HGB UR QL STRIP: NEGATIVE
IMM GRANULOCYTES # BLD AUTO: 0.07 K/UL (ref 0–0.04)
IMM GRANULOCYTES NFR BLD AUTO: 0.4 % (ref 0–0.5)
INFLUENZA A, MOLECULAR: NEGATIVE
INFLUENZA B, MOLECULAR: NEGATIVE
KETONES UR QL STRIP: ABNORMAL
LEUKOCYTE ESTERASE UR QL STRIP: NEGATIVE
LIPASE SERPL-CCNC: 30 U/L (ref 4–60)
LYMPHOCYTES # BLD AUTO: 1.4 K/UL (ref 1–4.8)
LYMPHOCYTES NFR BLD: 8.6 % (ref 18–48)
MCH RBC QN AUTO: 31.9 PG (ref 27–31)
MCHC RBC AUTO-ENTMCNC: 33.9 G/DL (ref 32–36)
MCV RBC AUTO: 94 FL (ref 82–98)
MONOCYTES # BLD AUTO: 0.8 K/UL (ref 0.3–1)
MONOCYTES NFR BLD: 4.8 % (ref 4–15)
NEUTROPHILS # BLD AUTO: 13.9 K/UL (ref 1.8–7.7)
NEUTROPHILS NFR BLD: 85.9 % (ref 38–73)
NITRITE UR QL STRIP: NEGATIVE
NRBC BLD-RTO: 0 /100 WBC
PH UR STRIP: 6 [PH] (ref 5–8)
PLATELET # BLD AUTO: 277 K/UL (ref 150–450)
PMV BLD AUTO: 10 FL (ref 9.2–12.9)
POTASSIUM SERPL-SCNC: 5.1 MMOL/L (ref 3.5–5.1)
PROT SERPL-MCNC: 8.7 G/DL (ref 6–8.4)
PROT UR QL STRIP: ABNORMAL
RBC # BLD AUTO: 4.32 M/UL (ref 4–5.4)
SARS-COV-2 RDRP RESP QL NAA+PROBE: NEGATIVE
SODIUM SERPL-SCNC: 142 MMOL/L (ref 136–145)
SP GR UR STRIP: 1.02 (ref 1–1.03)
SPECIMEN SOURCE: NORMAL
URN SPEC COLLECT METH UR: ABNORMAL
UROBILINOGEN UR STRIP-ACNC: NEGATIVE EU/DL
WBC # BLD AUTO: 16.24 K/UL (ref 3.9–12.7)

## 2024-11-14 PROCEDURE — 85025 COMPLETE CBC W/AUTO DIFF WBC: CPT | Performed by: EMERGENCY MEDICINE

## 2024-11-14 PROCEDURE — 63600175 PHARM REV CODE 636 W HCPCS: Performed by: EMERGENCY MEDICINE

## 2024-11-14 PROCEDURE — 80053 COMPREHEN METABOLIC PANEL: CPT | Performed by: EMERGENCY MEDICINE

## 2024-11-14 PROCEDURE — 87502 INFLUENZA DNA AMP PROBE: CPT | Performed by: EMERGENCY MEDICINE

## 2024-11-14 PROCEDURE — 83690 ASSAY OF LIPASE: CPT | Performed by: EMERGENCY MEDICINE

## 2024-11-14 PROCEDURE — 87635 SARS-COV-2 COVID-19 AMP PRB: CPT | Performed by: EMERGENCY MEDICINE

## 2024-11-14 PROCEDURE — 99284 EMERGENCY DEPT VISIT MOD MDM: CPT | Mod: 25

## 2024-11-14 PROCEDURE — 25000003 PHARM REV CODE 250: Performed by: EMERGENCY MEDICINE

## 2024-11-14 PROCEDURE — 96374 THER/PROPH/DIAG INJ IV PUSH: CPT

## 2024-11-14 PROCEDURE — 96375 TX/PRO/DX INJ NEW DRUG ADDON: CPT

## 2024-11-14 PROCEDURE — 81003 URINALYSIS AUTO W/O SCOPE: CPT | Performed by: EMERGENCY MEDICINE

## 2024-11-14 RX ORDER — ACETAMINOPHEN 500 MG
1000 TABLET ORAL
Status: DISCONTINUED | OUTPATIENT
Start: 2024-11-14 | End: 2024-11-14 | Stop reason: HOSPADM

## 2024-11-14 RX ORDER — PANTOPRAZOLE SODIUM 40 MG/10ML
40 INJECTION, POWDER, LYOPHILIZED, FOR SOLUTION INTRAVENOUS
Status: COMPLETED | OUTPATIENT
Start: 2024-11-14 | End: 2024-11-14

## 2024-11-14 RX ORDER — DOCUSATE SODIUM 100 MG
600 CAPSULE ORAL
Status: DISCONTINUED | OUTPATIENT
Start: 2024-11-14 | End: 2024-11-14 | Stop reason: HOSPADM

## 2024-11-14 RX ORDER — ONDANSETRON HYDROCHLORIDE 2 MG/ML
4 INJECTION, SOLUTION INTRAVENOUS
Status: COMPLETED | OUTPATIENT
Start: 2024-11-14 | End: 2024-11-14

## 2024-11-14 RX ORDER — ONDANSETRON 4 MG/1
4 TABLET, ORALLY DISINTEGRATING ORAL EVERY 6 HOURS PRN
Qty: 30 TABLET | Refills: 0 | Status: SHIPPED | OUTPATIENT
Start: 2024-11-14

## 2024-11-14 RX ORDER — ONDANSETRON 4 MG/1
4 TABLET, ORALLY DISINTEGRATING ORAL EVERY 6 HOURS PRN
Qty: 30 TABLET | Refills: 0 | Status: SHIPPED | OUTPATIENT
Start: 2024-11-14 | End: 2024-11-14

## 2024-11-14 RX ADMIN — PANTOPRAZOLE SODIUM 40 MG: 40 INJECTION, POWDER, FOR SOLUTION INTRAVENOUS at 07:11

## 2024-11-14 RX ADMIN — Medication 600 ML: at 07:11

## 2024-11-14 RX ADMIN — ONDANSETRON 4 MG: 2 INJECTION INTRAMUSCULAR; INTRAVENOUS at 06:11

## 2024-11-15 ENCOUNTER — PATIENT OUTREACH (OUTPATIENT)
Dept: EMERGENCY MEDICINE | Facility: HOSPITAL | Age: 84
End: 2024-11-15
Payer: MEDICARE

## 2024-11-15 NOTE — ED PROVIDER NOTES
SCRIBE #1 NOTE: I, Arthur Camargo, am scribing for, and in the presence of, Hilario Diana Jr., MD. I have scribed the entire note.       History     Chief Complaint   Patient presents with    Vomiting     Patient presents to ED with c/o nausea and vomiting that started today. Unable to keep anything down.      Review of patient's allergies indicates:   Allergen Reactions    Voltaren [diclofenac sodium] Edema     Gel formulation caused facial rash and facial swelling    Eliquis [apixaban] Other (See Comments)     Headache, numbness in lip     Medrol [methylprednisolone] Blisters         History of Present Illness     HPI    11/14/2024, 6:34 PM  History obtained from the  and patient      History of Present Illness: Maria Del Carmen Spence is a 84 y.o. female patient with a PMHx of glaucoma, arthritis, HTN, GERD, a-fib, and HLD who presents to the Emergency Department for evaluation of multiple episodes nausea and vomiting which onset gradually today. Pt states she has been vomiting any medications or PO she takes. Pt states the vomit was bitter tasting and yellow in color. Pt states she had ramen noodles last night. Pt's  denies any recent sick contacts.  Symptoms are constant and moderate in severity. No mitigating or exacerbating factors reported. Associated sxs include abdominal pain and diarrhea. Patient denies any fever, sore throat, dysuria, HA, and all other sxs at this time. No prior Tx reported. No further complaints or concerns at this time.       Arrival mode: Personal Transportation    PCP: Rajinder Lutz MD        Past Medical History:  Past Medical History:   Diagnosis Date    A-fib     Arthritis     Chronic LBP     ESIs x 3 with Dr. Hicks, PT at [facility], chiropractor    Eye pain     Frequent headaches     GERD (gastroesophageal reflux disease)     Glaucoma     Hyperlipemia     Hypertension        Past Surgical History:  Past Surgical History:   Procedure Laterality Date    ABLATION OF  ARRHYTHMOGENIC FOCUS FOR ATRIAL FIBRILLATION N/A 3/6/2019    Procedure: ABLATION, ARRHYTHMOGENIC FOCUS, FOR ATRIAL FIBRILLATION;  Surgeon: Abel Morillo MD;  Location: [facility] EP LAB;  Service: Cardiology;  Laterality: N/A;    CARDIOVERSION N/A 7/9/2018    Procedure: CARDIOVERSION;  Surgeon: Will Rosenthal MD;  Location: [facility] CATH LAB;  Service: Cardiology;  Laterality: N/A;    CATARACT EXTRACTION W/  INTRAOCULAR LENS IMPLANT  OU    COLONOSCOPY N/A 6/13/2024    Procedure: COLONOSCOPY 6/10 xarelto 2 day hold note;  Surgeon: Sayda Ferreira MD;  Location: [facility] ENDO;  Service: Endoscopy;  Laterality: N/A;    ESOPHAGOGASTRODUODENOSCOPY N/A 4/13/2024    Procedure: EGD (ESOPHAGOGASTRODUODENOSCOPY);  Surgeon: Oswald Esteves MD;  Location: [facility] ENDO;  Service: Endoscopy;  Laterality: N/A;    INJECTION OF ANESTHETIC AGENT INTO SACROILIAC JOINT Right 11/3/2020    Procedure: right Sacroiliac Joint Injection;  Surgeon: Ayush Christiansen MD;  Location: [facility] PAIN MGT;  Service: Pain Management;  Laterality: Right;    INJECTION OF ANESTHETIC AGENT INTO SACROILIAC JOINT Right 3/23/2021    Procedure: right Sacroiliac Joint Injection;  Surgeon: Ayush Christiansen MD;  Location: [facility] PAIN MGT;  Service: Pain Management;  Laterality: Right;    INJECTION OF JOINT Right 11/3/2020    Procedure: right GT bursa injection;  Surgeon: Ayush Christiansen MD;  Location: [facility] PAIN MGT;  Service: Pain Management;  Laterality: Right;    INJECTION OF JOINT Bilateral 3/23/2021    Procedure: bilateral GT bursa injection;  Surgeon: Ayush Christiansen MD;  Location: [facility] PAIN MGT;  Service: Pain Management;  Laterality: Bilateral;    TUBAL LIGATION           Family History:  Family History   Problem Relation Name Age of Onset    Glaucoma Mother      Cancer Mother      Cataracts Mother      Hypertension Mother      Hypertension Father      Diabetes Paternal Aunt      Strabismus Neg Hx      Retinal detachment Neg Hx      Macular degeneration Neg Hx       Blindness Neg Hx      Amblyopia Neg Hx      Stroke Neg Hx      Thyroid disease Neg Hx         Social History:  Social History     Tobacco Use    Smoking status: Never    Smokeless tobacco: Never   Substance and Sexual Activity    Alcohol use: No    Drug use: No    Sexual activity: Yes     Partners: Male        Review of Systems     Review of Systems   Constitutional:  Negative for chills and fever.   HENT:  Negative for sore throat.    Respiratory:  Negative for shortness of breath.    Cardiovascular:  Negative for chest pain.   Gastrointestinal:  Positive for abdominal pain, diarrhea, nausea and vomiting.   Genitourinary:  Negative for dysuria.   Musculoskeletal:  Negative for back pain.   Skin:  Negative for rash.   Neurological:  Negative for weakness and headaches.   Hematological:  Does not bruise/bleed easily.   All other systems reviewed and are negative.     Physical Exam     Initial Vitals [11/14/24 1752]   BP Pulse Resp Temp SpO2   (!) 150/66 95 20 97.6 °F (36.4 °C) 97 %      MAP       --          Physical Exam  Nursing Notes and Vital Signs Reviewed.  Constitutional: Patient is in no acute distress. Well-developed and well-nourished.  Head: Atraumatic. Normocephalic.  Eyes: PERRL. EOM intact. Conjunctivae are not pale. No scleral icterus.  ENT: Mucous membranes are moist. Oropharynx is clear and symmetric.    Neck: Supple. Full ROM. No lymphadenopathy.  Cardiovascular: Regular rate. Regular rhythm. No murmurs, rubs, or gallops. Distal pulses are 2+ and symmetric.  Pulmonary/Chest: No respiratory distress. Clear to auscultation bilaterally. No wheezing or rales.  Abdominal: Soft and non-distended.  There is epigastric tenderness.  No rebound, guarding, or rigidity. Good bowel sounds.  Genitourinary: No CVA tenderness  Musculoskeletal: Moves all extremities. No obvious deformities. No edema. No calf tenderness.  Skin: Warm and dry.  Neurological:  Alert, awake, and appropriate.  Normal speech.  No acute  "focal neurological deficits are appreciated.  Psychiatric: Normal affect. Good eye contact. Appropriate in content.     ED Course   Procedures  ED Vital Signs:  Vitals:    11/14/24 1752 11/14/24 1810   BP: (!) 150/66 (!) 136/92   Pulse: 95 91   Resp: 20 18   Temp: 97.6 °F (36.4 °C)    TempSrc: Oral    SpO2: 97% 97%   Height: 5' 5" (1.651 m)        Abnormal Lab Results:  Labs Reviewed   CBC W/ AUTO DIFFERENTIAL - Abnormal       Result Value    WBC 16.24 (*)     RBC 4.32      Hemoglobin 13.8      Hematocrit 40.7      MCV 94      MCH 31.9 (*)     MCHC 33.9      RDW 12.9      Platelets 277      MPV 10.0      Immature Granulocytes 0.4      Gran # (ANC) 13.9 (*)     Immature Grans (Abs) 0.07 (*)     Lymph # 1.4      Mono # 0.8      Eos # 0.0      Baso # 0.03      nRBC 0      Gran % 85.9 (*)     Lymph % 8.6 (*)     Mono % 4.8      Eosinophil % 0.1      Basophil % 0.2      Differential Method Automated     COMPREHENSIVE METABOLIC PANEL - Abnormal    Sodium 142      Potassium 5.1      Chloride 106      CO2 21 (*)     Glucose 116 (*)     BUN 15      Creatinine 0.8      Calcium 10.4      Total Protein 8.7 (*)     Albumin 4.4      Total Bilirubin 0.8      Alkaline Phosphatase 88      AST 38      ALT 18      eGFR >60      Anion Gap 15     URINALYSIS, REFLEX TO URINE CULTURE - Abnormal    Specimen UA Urine, Clean Catch      Color, UA Yellow      Appearance, UA Clear      pH, UA 6.0      Specific Gravity, UA 1.025      Protein, UA Trace (*)     Glucose, UA Negative      Ketones, UA 2+ (*)     Bilirubin (UA) Negative      Occult Blood UA Negative      Nitrite, UA Negative      Urobilinogen, UA Negative      Leukocytes, UA Negative      Narrative:     Specimen Source->Urine   INFLUENZA A & B BY MOLECULAR    Influenza A, Molecular Negative      Influenza B, Molecular Negative      Flu A & B Source Nasal swab     LIPASE    Lipase 30     SARS-COV-2 RNA AMPLIFICATION, QUAL    SARS-CoV-2 RNA, Amplification, Qual Negative          All Lab " Results:  Results for orders placed or performed during the hospital encounter of 11/14/24   Influenza A & B by Molecular    Collection Time: 11/14/24  6:18 PM    Specimen: Nasopharyngeal Swab   Result Value Ref Range    Influenza A, Molecular Negative Negative    Influenza B, Molecular Negative Negative    Flu A & B Source Nasal swab    COVID-19 Rapid Screening    Collection Time: 11/14/24  6:18 PM   Result Value Ref Range    SARS-CoV-2 RNA, Amplification, Qual Negative Negative   CBC W/ AUTO DIFFERENTIAL    Collection Time: 11/14/24  6:35 PM   Result Value Ref Range    WBC 16.24 (H) 3.90 - 12.70 K/uL    RBC 4.32 4.00 - 5.40 M/uL    Hemoglobin 13.8 12.0 - 16.0 g/dL    Hematocrit 40.7 37.0 - 48.5 %    MCV 94 82 - 98 fL    MCH 31.9 (H) 27.0 - 31.0 pg    MCHC 33.9 32.0 - 36.0 g/dL    RDW 12.9 11.5 - 14.5 %    Platelets 277 150 - 450 K/uL    MPV 10.0 9.2 - 12.9 fL    Immature Granulocytes 0.4 0.0 - 0.5 %    Gran # (ANC) 13.9 (H) 1.8 - 7.7 K/uL    Immature Grans (Abs) 0.07 (H) 0.00 - 0.04 K/uL    Lymph # 1.4 1.0 - 4.8 K/uL    Mono # 0.8 0.3 - 1.0 K/uL    Eos # 0.0 0.0 - 0.5 K/uL    Baso # 0.03 0.00 - 0.20 K/uL    nRBC 0 0 /100 WBC    Gran % 85.9 (H) 38.0 - 73.0 %    Lymph % 8.6 (L) 18.0 - 48.0 %    Mono % 4.8 4.0 - 15.0 %    Eosinophil % 0.1 0.0 - 8.0 %    Basophil % 0.2 0.0 - 1.9 %    Differential Method Automated    Comp. Metabolic Panel    Collection Time: 11/14/24  6:35 PM   Result Value Ref Range    Sodium 142 136 - 145 mmol/L    Potassium 5.1 3.5 - 5.1 mmol/L    Chloride 106 95 - 110 mmol/L    CO2 21 (L) 23 - 29 mmol/L    Glucose 116 (H) 70 - 110 mg/dL    BUN 15 8 - 23 mg/dL    Creatinine 0.8 0.5 - 1.4 mg/dL    Calcium 10.4 8.7 - 10.5 mg/dL    Total Protein 8.7 (H) 6.0 - 8.4 g/dL    Albumin 4.4 3.5 - 5.2 g/dL    Total Bilirubin 0.8 0.1 - 1.0 mg/dL    Alkaline Phosphatase 88 40 - 150 U/L    AST 38 10 - 40 U/L    ALT 18 10 - 44 U/L    eGFR >60 >60 mL/min/1.73 m^2    Anion Gap 15 8 - 16 mmol/L   Lipase    Collection  Time: 11/14/24  6:35 PM   Result Value Ref Range    Lipase 30 4 - 60 U/L   Urinalysis, Reflex to Urine Culture Urine, Clean Catch    Collection Time: 11/14/24  7:28 PM    Specimen: Urine   Result Value Ref Range    Specimen UA Urine, Clean Catch     Color, UA Yellow Yellow, Straw, Morena    Appearance, UA Clear Clear    pH, UA 6.0 5.0 - 8.0    Specific Gravity, UA 1.025 1.005 - 1.030    Protein, UA Trace (A) Negative    Glucose, UA Negative Negative    Ketones, UA 2+ (A) Negative    Bilirubin (UA) Negative Negative    Occult Blood UA Negative Negative    Nitrite, UA Negative Negative    Urobilinogen, UA Negative <2.0 EU/dL    Leukocytes, UA Negative Negative         Imaging Results:  Imaging Results              X-Ray Abdomen Flat And Erect (Final result)  Result time 11/14/24 18:43:48      Final result by Yoana Mccarthy MD (11/14/24 18:43:48)                   Impression:      No acute finding      Electronically signed by: Yoana Mccarthy  Date:    11/14/2024  Time:    18:43               Narrative:    EXAMINATION:  XR ABDOMEN FLAT AND ERECT    CLINICAL HISTORY:  Abdominal Pain;    TECHNIQUE:  Flat and erect AP views of the abdomen were performed.    COMPARISON:  None    FINDINGS:  No air distended bowel, significant air-fluid levels or pneumoperitoneum.  Large hiatal hernia redemonstrated similar to prior CT                                                The Emergency Provider reviewed the vital signs and test results, which are outlined above.     ED Discussion     8:00 PM: Reassessed pt at this time. Pt is able to tolerate PO and able to ambulate without assistance.     Discussed with pt all pertinent ED information and results. Discussed pt dx and plan of tx. Gave pt all f/u and return to the ED instructions. All questions and concerns were addressed at this time. Pt expresses understanding of information and instructions, and is comfortable with plan to discharge. Pt is stable for discharge.    I  discussed with patient and/or family/caretaker that evaluation in the ED does not suggest any emergent or life threatening medical conditions requiring immediate intervention beyond what was provided in the ED, and I believe patient is safe for discharge.  Regardless, an unremarkable evaluation in the ED does not preclude the development or presence of a serious of life threatening condition. As such, patient was instructed to return immediately for any worsening or change in current symptoms.    For NAUSEA/VOMITING, I advised patient on importance of staying well hydrated; eating frequent, small amounts of clear liquids; avoid solid foods until there has been no vomiting for six hours, and then work slowly back to a normal diet; and to use an OTC bismuth stomach remedy for upset stomach, nausea, indigestion, and diarrhea. We discussed signs and symptoms of dehydration and possible causes of N/V with patient (viral infections, medications, migraine headaches, food poisoning, allergies, and peptic ulcer disease).  Reiterated the importance of following up with primary care provider if no improvement is noted within 72 hours.        Medical Decision Making  Amount and/or Complexity of Data Reviewed  Independent Historian: spouse     Details:  at bedside  Labs: ordered. Decision-making details documented in ED Course.  Radiology: ordered. Decision-making details documented in ED Course.    Risk  OTC drugs.  Prescription drug management.  Parenteral controlled substances.  Risk Details: OTC drugs, prescription drugs and controlled substances considered.  Due to patient's symptoms improving and pain controlled pain medications ordered appropriately.  Differential diagnosis;  Gastroenteritis, Bowel obstruction, Colitis, Diverticulitis, Cholecystitis, Appendicitis, Perforated bowel, Herniation, Infectious etiology, UTI, Pyelonephritis,  Biliary obstruction, kidney stone                   ED  Medication(s):  Medications   ondansetron injection 4 mg (4 mg Intravenous Given 11/14/24 1834)   pantoprazole injection 40 mg (40 mg Intravenous Given 11/14/24 1944)       Discharge Medication List as of 11/14/2024  8:34 PM        START taking these medications    Details   ondansetron (ZOFRAN-ODT) 4 MG TbDL Take 1 tablet (4 mg total) by mouth every 6 (six) hours as needed., Starting Thu 11/14/2024, Print              Follow-up Information       Schedule an appointment as soon as possible for a visit  with Rajinder Lutz MD.    Specialty: Family Medicine  Contact information:  [address] [facility] [address]  [phone]               [facility]    Specialty: Emergency Medicine  Why: As needed, If symptoms worsen  Contact information:  [id number] [facility] [id number] [phone]                               Scribe Attestation:   Scribe #1: I performed the above scribed service and the documentation accurately describes the services I performed. I attest to the accuracy of the note.     Attending:   Physician Attestation Statement for Scribe #1: I, Hilario Diana Jr., MD, personally performed the services described in this documentation, as scribed by Arthur Camargo, in my presence, and it is both accurate and complete.           Clinical Impression       ICD-10-CM ICD-9-CM   1. Nausea and vomiting, unspecified vomiting type  R11.2 787.01       Disposition:   Disposition: Discharged  Condition: Stable       Hilario Diana Jr., MD  11/18/24 0221

## 2024-11-18 NOTE — PROGRESS NOTES
Pt visited the ED on 11/14/24. I made 2 attempts to reach patient to assist with scheduling a post ED 7-day follow up with PCP. Unable to reach.  Closing Encounter.    Melva Jimenez

## 2024-11-20 ENCOUNTER — TELEPHONE (OUTPATIENT)
Dept: INTERNAL MEDICINE | Facility: CLINIC | Age: 84
End: 2024-11-20
Payer: MEDICARE

## 2024-11-20 NOTE — TELEPHONE ENCOUNTER
----- Message from [place] sent at 11/20/2024  9:27 AM CST -----  Contact: dolores  Type:  Sooner Apoointment Request    Caller is requesting a sooner appointment.  Caller declined first available appointment listed below.  Caller will not accept being placed on the waitlist and is requesting a message be sent to doctor.  Name of Caller:dolores  When is the first available appointment?marie  Symptoms:acid reflex/er follow up  Would the patient rather a call back or a response via [facility]? call  Best Call Back Number:[phone]   Additional Information:  needs apt on 11/26 if possible with

## 2024-11-20 NOTE — TELEPHONE ENCOUNTER
Patient denies appointment with an MARYLOU at this time. She will called the office back if she's not feeling well to schedule an visit. As of right now she's feeling ok.

## 2024-11-26 ENCOUNTER — OFFICE VISIT (OUTPATIENT)
Dept: OPHTHALMOLOGY | Facility: CLINIC | Age: 84
End: 2024-11-26
Payer: MEDICARE

## 2024-11-26 DIAGNOSIS — Z96.1 PSEUDOPHAKIA: ICD-10-CM

## 2024-11-26 DIAGNOSIS — H40.1131 PRIMARY OPEN ANGLE GLAUCOMA OF BOTH EYES, MILD STAGE: Primary | ICD-10-CM

## 2024-11-26 PROCEDURE — 99999 PR PBB SHADOW E&M-EST. PATIENT-LVL III: CPT | Mod: PBBFAC,,, | Performed by: OPHTHALMOLOGY

## 2024-11-26 PROCEDURE — 99213 OFFICE O/P EST LOW 20 MIN: CPT | Mod: PBBFAC | Performed by: OPHTHALMOLOGY

## 2024-11-26 PROCEDURE — 92133 CPTRZD OPH DX IMG PST SGM ON: CPT | Mod: PBBFAC | Performed by: OPHTHALMOLOGY

## 2024-11-26 PROCEDURE — 99214 OFFICE O/P EST MOD 30 MIN: CPT | Mod: S$PBB,,, | Performed by: OPHTHALMOLOGY

## 2024-11-26 NOTE — PROGRESS NOTES
SUBJECTIVE  Maria Del Carmen Spence is 84 y.o. female  Corrected distance visual acuity was 20/25 -2 in the right eye and 20/20 in the left eye.   Chief Complaint   Patient presents with    Glaucoma     Pt here for lost to f/u IOP GOCT chk. No pain or discomfort. VA stable. 100% compliant with gtts.           HPI     Glaucoma     Additional comments: Pt here for lost to f/u IOP GOCT chk. No pain or   discomfort. VA stable. 100% compliant with gtts.            Comments    1. Mild COAG OD>OS (init 24/20) Goal <17   Rhopressa (irritated but exc IOP 11/11)   Dorzolamide OU BID (Not taking, patient States This Eye Drop Makes Her Eye   Burn & Red)   2. PCIOL OU (Sulcus OD) (partial dislocation Od)   3. Dry Eyes       Latanoprost QHS OU   Timolol QAM OU             Last edited by Everett Hoffmann MA on 11/26/2024  9:13 AM.         Assessment /Plan :  1. Primary open angle glaucoma of both eyes, mild stage Doing well, intraocular pressure (IOP) within acceptable range relative to target IOP and no evidence of progression. Continue current treatment. Reviewed importance of continued compliance with treatment and follow up.      Patient instructed to continue using the following glaucoma medication as follows:  Latanoprost one drop in each eye nightly and Timolol one drop both eyes daily    Return to clinic in 6 months  or as needed.  With IOP Check, Dilation, and HVF 24-2  With TF       2. Pseudophakia

## 2025-01-14 DIAGNOSIS — Z00.00 ENCOUNTER FOR MEDICARE ANNUAL WELLNESS EXAM: ICD-10-CM

## 2025-02-05 DIAGNOSIS — Z78.0 MENOPAUSE: ICD-10-CM

## 2025-02-13 DIAGNOSIS — H40.1131 PRIMARY OPEN ANGLE GLAUCOMA OF BOTH EYES, MILD STAGE: ICD-10-CM

## 2025-02-13 RX ORDER — TIMOLOL MALEATE 5 MG/ML
SOLUTION/ DROPS OPHTHALMIC
Qty: 10 ML | Refills: 12 | Status: SHIPPED | OUTPATIENT
Start: 2025-02-13

## 2025-03-10 NOTE — TELEPHONE ENCOUNTER
No care due was identified.  [facility] Embedded Care Due Messages. Reference number: [id number].   3/10/2025 4:00:09 PM CDT

## 2025-03-11 RX ORDER — METOPROLOL TARTRATE 25 MG/1
25 TABLET, FILM COATED ORAL 2 TIMES DAILY
Qty: 180 TABLET | Refills: 1 | Status: SHIPPED | OUTPATIENT
Start: 2025-03-11

## 2025-03-11 RX ORDER — ATORVASTATIN CALCIUM 10 MG/1
10 TABLET, FILM COATED ORAL NIGHTLY
Qty: 90 TABLET | Refills: 1 | Status: SHIPPED | OUTPATIENT
Start: 2025-03-11

## 2025-03-11 NOTE — TELEPHONE ENCOUNTER
Refill Routing Note   Medication(s) are not appropriate for processing by [facility] for the following reason(s):        Required vitals abnormal    [facility] action(s):  Defer  Approve      Medication Therapy Plan: ED visit reviewed      Appointments  past 12m or future 3m with PCP    Date Provider   Last Visit   9/17/2024 Rajinder Lutz MD   Next Visit   3/18/2025 Rajinder Lutz MD   ED visits in past 90 days: 0        Note composed:11:58 AM 03/11/2025

## 2025-03-18 ENCOUNTER — OFFICE VISIT (OUTPATIENT)
Dept: INTERNAL MEDICINE | Facility: CLINIC | Age: 85
End: 2025-03-18
Payer: MEDICARE

## 2025-03-18 VITALS
OXYGEN SATURATION: 96 % | TEMPERATURE: 98 F | HEIGHT: 65 IN | BODY MASS INDEX: 28.25 KG/M2 | DIASTOLIC BLOOD PRESSURE: 80 MMHG | WEIGHT: 169.56 LBS | HEART RATE: 94 BPM | SYSTOLIC BLOOD PRESSURE: 138 MMHG

## 2025-03-18 DIAGNOSIS — M46.1 SACROILIITIS: ICD-10-CM

## 2025-03-18 DIAGNOSIS — I10 PRIMARY HYPERTENSION: ICD-10-CM

## 2025-03-18 DIAGNOSIS — I48.0 PAROXYSMAL ATRIAL FIBRILLATION: Primary | ICD-10-CM

## 2025-03-18 DIAGNOSIS — K21.9 GASTROESOPHAGEAL REFLUX DISEASE WITHOUT ESOPHAGITIS: ICD-10-CM

## 2025-03-18 PROCEDURE — 99999 PR PBB SHADOW E&M-EST. PATIENT-LVL III: CPT | Mod: PBBFAC,,, | Performed by: FAMILY MEDICINE

## 2025-03-18 PROCEDURE — 99213 OFFICE O/P EST LOW 20 MIN: CPT | Mod: PBBFAC | Performed by: FAMILY MEDICINE

## 2025-03-18 PROCEDURE — 99214 OFFICE O/P EST MOD 30 MIN: CPT | Mod: S$PBB,,, | Performed by: FAMILY MEDICINE

## 2025-03-18 RX ORDER — GABAPENTIN 400 MG/1
400 CAPSULE ORAL 3 TIMES DAILY
Qty: 270 CAPSULE | Refills: 3 | Status: SHIPPED | OUTPATIENT
Start: 2025-03-18

## 2025-03-18 NOTE — PROGRESS NOTES
Patient ID: Maria Del Carmen Spence is a 84 y.o. female.    Chief Complaint: Follow-up    History of Present Illness    Ms. Spence presents today for 6-month follow up.    She experiences joint pain, particularly in the knees, which limits her activities. She can only engage in activities, including housework, for limited periods before requiring rest. She has a history of herniated disc with right-sided involvement. Previous bone density scan demonstrated thinning of the hip area. MRI of the lower back was performed four years ago.    She reports a recent emergency room visit due to severe vomiting episode that began at 3:00 AM and persisted throughout the day with frequent dry heaves. The vomitus was yellow-clear in appearance, possibly bilious. She sought emergency care at 4:30 PM.    She takes medications for acid reflux, cholesterol, and cabapentin      ROS:  General: -fever, -chills, -fatigue, -weight gain, -weight loss  Eyes: -vision changes, -redness, -discharge  ENT: -ear pain, -nasal congestion, -sore throat  Cardiovascular: -chest pain, -palpitations, -lower extremity edema  Respiratory: -cough, -shortness of breath  Gastrointestinal: -abdominal pain, -nausea, +vomiting, -diarrhea, -constipation, -blood in stool  Genitourinary: -dysuria, -hematuria, -frequency  Musculoskeletal: +joint pain, -muscle pain  Skin: -rash, -lesion  Neurological: -headache, -dizziness, -numbness, -tingling         Physical Exam    General: In no acute distress. Not ill-appearing or diaphoretic.  Neck: Normal thyroid. No cervical lymphadenopathy. 2+ carotid pulses.  Cardiovascular: Normal rate and regular  rhythm. No murmur heard. No gallop.  Pulmonary: Pulmonary effort is normal. No respiratory distress. No wheezing. No rhonchi. No rales.  Abdominal: Soft. Nontender. Nondistended. No mass.  Musculoskeletal:  Ambulating with a cane  Neurological: Alert.  Psychiatric: Mood normal. Behavior normal.         Assessment & Plan    RIGHT KNEE  PAIN:  - Reviewed the patient's joint pain, particularly in the right knee, noting limitations in daily activities.  - Ms. Spence reports ongoing knee pain causing restrictions in daily functioning.  - Acknowledged the chronic nature of the knee condition.  - Discussed the impact of knee pain on the patient's quality of life and daily activities.    LEFT KNEE PAIN:  - Reviewed the patient's joint pain, particularly in the left knee, noting limitations in daily activities.  - Ms. Spence reports ongoing knee pain causing restrictions in daily functioning.  - Acknowledged the chronic nature of the knee condition.  - Discussed the impact of knee pain on the patient's quality of life and daily activities.    LUMBAR DISC DISORDER WITH RADICULOPATHY:  - Reviewed MRI of the lower back from 4 years ago.  - Ms. Spence mentions a history of herniated disc affecting the right side.  - Evaluated the previous imaging results to assess the progression of the condition.    BONE DENSITY DISORDER:  - Evaluated bone density scan results from previous years, noting normal spine but some thinning in the hip area.  - Considered ordering a new bone density scan as the previous one was performed 4-5 years ago.  - Discussed the importance of regular bone density monitoring with the patient.    HYPERLIPIDEMIA:  - Assessed current medication regimen, including cholesterol medication.  - Confirmed that the patient continues to take cholesterol medication (Relto).  - Evaluated the effectiveness of the current treatment plan for hyperlipidemia.    GASTROESOPHAGEAL REFLUX DISEASE:  - Assessed current medication regimen, including acid reflux treatment.  - Acknowledged the patient's ongoing acid reflux condition.  - Planned to refill medication for gastroesophageal reflux disease management.    BILIOUS VOMITING:  - Ms. Spence reports a recent episode of vomiting, including yellow-colored emesis.  - Reviewed the patient's recent emergency room  visit for severe vomiting.  - Discussed the circumstances and potential triggers of the vomiting episode.  - Evaluated the effectiveness of the treatment received at the emergency room.    FOLLOW-UP AND MANAGEMENT:  - Ms. Spence to pace activities to manage joint pain and limitations.        Up-to-date lab reviewed.  Follow-up in 6 months with repeat lab.  Medications reviewed.      Follow up in about 6 months (around 9/18/2025).    This note was generated with the assistance of ambient listening technology. Verbal consent was obtained by the patient and accompanying visitor(s) for the recording of patient appointment to facilitate this note. I attest to having reviewed and edited the generated note for accuracy, though some syntax or spelling errors may persist. Please contact the author of this note for any clarification.

## 2025-04-07 ENCOUNTER — HOSPITAL ENCOUNTER (INPATIENT)
Facility: HOSPITAL | Age: 85
LOS: 4 days | Discharge: HOME HEALTH | DRG: 329 | End: 2025-04-12
Attending: EMERGENCY MEDICINE | Admitting: STUDENT IN AN ORGANIZED HEALTH CARE EDUCATION/TRAINING PROGRAM
Payer: MEDICARE

## 2025-04-07 DIAGNOSIS — K56.52 INTESTINAL ADHESIONS WITH COMPLETE OBSTRUCTION: ICD-10-CM

## 2025-04-07 DIAGNOSIS — Z79.01 ANTICOAGULATED: ICD-10-CM

## 2025-04-07 DIAGNOSIS — R07.9 CHEST PAIN: ICD-10-CM

## 2025-04-07 DIAGNOSIS — R10.13 EPIGASTRIC PAIN: ICD-10-CM

## 2025-04-07 DIAGNOSIS — K56.609 INTESTINAL OBSTRUCTION, UNSPECIFIED CAUSE, UNSPECIFIED WHETHER PARTIAL OR COMPLETE: Primary | ICD-10-CM

## 2025-04-07 PROCEDURE — 80053 COMPREHEN METABOLIC PANEL: CPT | Performed by: EMERGENCY MEDICINE

## 2025-04-07 PROCEDURE — 93010 ELECTROCARDIOGRAM REPORT: CPT | Mod: ,,, | Performed by: INTERNAL MEDICINE

## 2025-04-07 PROCEDURE — 36415 COLL VENOUS BLD VENIPUNCTURE: CPT | Performed by: EMERGENCY MEDICINE

## 2025-04-07 PROCEDURE — 96375 TX/PRO/DX INJ NEW DRUG ADDON: CPT

## 2025-04-07 PROCEDURE — 82550 ASSAY OF CK (CPK): CPT | Performed by: EMERGENCY MEDICINE

## 2025-04-07 PROCEDURE — 84484 ASSAY OF TROPONIN QUANT: CPT | Performed by: EMERGENCY MEDICINE

## 2025-04-07 PROCEDURE — 83690 ASSAY OF LIPASE: CPT | Performed by: EMERGENCY MEDICINE

## 2025-04-07 PROCEDURE — 85025 COMPLETE CBC W/AUTO DIFF WBC: CPT | Performed by: EMERGENCY MEDICINE

## 2025-04-07 PROCEDURE — 99285 EMERGENCY DEPT VISIT HI MDM: CPT | Mod: 25

## 2025-04-07 PROCEDURE — 83880 ASSAY OF NATRIURETIC PEPTIDE: CPT | Performed by: EMERGENCY MEDICINE

## 2025-04-07 PROCEDURE — 86803 HEPATITIS C AB TEST: CPT | Performed by: EMERGENCY MEDICINE

## 2025-04-07 PROCEDURE — 87389 HIV-1 AG W/HIV-1&-2 AB AG IA: CPT | Performed by: EMERGENCY MEDICINE

## 2025-04-07 PROCEDURE — 93005 ELECTROCARDIOGRAM TRACING: CPT

## 2025-04-07 RX ORDER — ONDANSETRON HYDROCHLORIDE 2 MG/ML
4 INJECTION, SOLUTION INTRAVENOUS
Status: COMPLETED | OUTPATIENT
Start: 2025-04-08 | End: 2025-04-07

## 2025-04-07 RX ORDER — ALUMINUM HYDROXIDE, MAGNESIUM HYDROXIDE, AND SIMETHICONE 1200; 120; 1200 MG/30ML; MG/30ML; MG/30ML
30 SUSPENSION ORAL ONCE
Status: COMPLETED | OUTPATIENT
Start: 2025-04-08 | End: 2025-04-08

## 2025-04-07 RX ORDER — LIDOCAINE HYDROCHLORIDE 20 MG/ML
15 SOLUTION OROPHARYNGEAL ONCE
Status: COMPLETED | OUTPATIENT
Start: 2025-04-08 | End: 2025-04-08

## 2025-04-07 RX ADMIN — ONDANSETRON 4 MG: 2 INJECTION INTRAMUSCULAR; INTRAVENOUS at 11:04

## 2025-04-08 ENCOUNTER — ANESTHESIA EVENT (OUTPATIENT)
Dept: SURGERY | Facility: HOSPITAL | Age: 85
End: 2025-04-08
Payer: MEDICARE

## 2025-04-08 ENCOUNTER — ANESTHESIA (OUTPATIENT)
Dept: SURGERY | Facility: HOSPITAL | Age: 85
End: 2025-04-08
Payer: MEDICARE

## 2025-04-08 PROBLEM — K56.609 BOWEL OBSTRUCTION: Status: ACTIVE | Noted: 2025-04-08

## 2025-04-08 PROBLEM — K56.52 INTESTINAL ADHESIONS WITH COMPLETE OBSTRUCTION: Status: ACTIVE | Noted: 2025-04-08

## 2025-04-08 LAB
ABSOLUTE EOSINOPHIL (OHS): 0.02 K/UL
ABSOLUTE MONOCYTE (OHS): 0.64 K/UL (ref 0.3–1)
ABSOLUTE NEUTROPHIL COUNT (OHS): 10.5 K/UL (ref 1.8–7.7)
ALBUMIN SERPL BCP-MCNC: 4.2 G/DL (ref 3.5–5.2)
ALP SERPL-CCNC: 91 UNIT/L (ref 40–150)
ALT SERPL W/O P-5'-P-CCNC: 12 UNIT/L (ref 10–44)
ANION GAP (OHS): 10 MMOL/L (ref 8–16)
AST SERPL-CCNC: 23 UNIT/L (ref 11–45)
BASOPHILS # BLD AUTO: 0.01 K/UL
BASOPHILS NFR BLD AUTO: 0.1 %
BILIRUB SERPL-MCNC: 0.4 MG/DL (ref 0.1–1)
BILIRUB UR QL STRIP.AUTO: NEGATIVE
BNP SERPL-MCNC: 77 PG/ML (ref 0–99)
BUN SERPL-MCNC: 14 MG/DL (ref 8–23)
CALCIUM SERPL-MCNC: 10.4 MG/DL (ref 8.7–10.5)
CHLORIDE SERPL-SCNC: 104 MMOL/L (ref 95–110)
CK SERPL-CCNC: 80 U/L (ref 20–180)
CLARITY UR: CLEAR
CO2 SERPL-SCNC: 25 MMOL/L (ref 23–29)
COLOR UR AUTO: YELLOW
CREAT SERPL-MCNC: 0.8 MG/DL (ref 0.5–1.4)
ERYTHROCYTE [DISTWIDTH] IN BLOOD BY AUTOMATED COUNT: 12 % (ref 11.5–14.5)
GFR SERPLBLD CREATININE-BSD FMLA CKD-EPI: >60 ML/MIN/1.73/M2
GLUCOSE SERPL-MCNC: 127 MG/DL (ref 70–110)
GLUCOSE UR QL STRIP: NEGATIVE
HCT VFR BLD AUTO: 39.2 % (ref 37–48.5)
HCV AB SERPL QL IA: NEGATIVE
HGB BLD-MCNC: 13.1 GM/DL (ref 12–16)
HGB UR QL STRIP: NEGATIVE
HIV 1+2 AB+HIV1 P24 AG SERPL QL IA: NEGATIVE
HOLD SPECIMEN: NORMAL
IMM GRANULOCYTES # BLD AUTO: 0.05 K/UL (ref 0–0.04)
IMM GRANULOCYTES NFR BLD AUTO: 0.4 % (ref 0–0.5)
KETONES UR QL STRIP: NEGATIVE
LACTATE SERPL-SCNC: 1 MMOL/L (ref 0.5–2.2)
LEUKOCYTE ESTERASE UR QL STRIP: NEGATIVE
LIPASE SERPL-CCNC: 26 U/L (ref 4–60)
LYMPHOCYTES # BLD AUTO: 1.57 K/UL (ref 1–4.8)
MCH RBC QN AUTO: 32.8 PG (ref 27–31)
MCHC RBC AUTO-ENTMCNC: 33.4 G/DL (ref 32–36)
MCV RBC AUTO: 98 FL (ref 82–98)
NITRITE UR QL STRIP: NEGATIVE
NUCLEATED RBC (/100WBC) (OHS): 0 /100 WBC
PH UR STRIP: 7 [PH]
PLATELET # BLD AUTO: 331 K/UL (ref 150–450)
PMV BLD AUTO: 9.8 FL (ref 9.2–12.9)
POTASSIUM SERPL-SCNC: 4.1 MMOL/L (ref 3.5–5.1)
PROT SERPL-MCNC: 8.3 GM/DL (ref 6–8.4)
PROT UR QL STRIP: ABNORMAL
RBC # BLD AUTO: 4 M/UL (ref 4–5.4)
RELATIVE EOSINOPHIL (OHS): 0.2 %
RELATIVE LYMPHOCYTE (OHS): 12.3 % (ref 18–48)
RELATIVE MONOCYTE (OHS): 5 % (ref 4–15)
RELATIVE NEUTROPHIL (OHS): 82 % (ref 38–73)
SODIUM SERPL-SCNC: 139 MMOL/L (ref 136–145)
SP GR UR STRIP: >=1.03
TROPONIN I SERPL DL<=0.01 NG/ML-MCNC: <0.006 NG/ML
UROBILINOGEN UR STRIP-ACNC: NEGATIVE EU/DL
WBC # BLD AUTO: 12.79 K/UL (ref 3.9–12.7)

## 2025-04-08 PROCEDURE — 63600175 PHARM REV CODE 636 W HCPCS: Performed by: SURGERY

## 2025-04-08 PROCEDURE — 11000001 HC ACUTE MED/SURG PRIVATE ROOM

## 2025-04-08 PROCEDURE — 63600175 PHARM REV CODE 636 W HCPCS: Performed by: STUDENT IN AN ORGANIZED HEALTH CARE EDUCATION/TRAINING PROGRAM

## 2025-04-08 PROCEDURE — 37000008 HC ANESTHESIA 1ST 15 MINUTES: Performed by: SURGERY

## 2025-04-08 PROCEDURE — 25000003 PHARM REV CODE 250: Performed by: FAMILY MEDICINE

## 2025-04-08 PROCEDURE — 81003 URINALYSIS AUTO W/O SCOPE: CPT | Performed by: EMERGENCY MEDICINE

## 2025-04-08 PROCEDURE — 88307 TISSUE EXAM BY PATHOLOGIST: CPT | Mod: TC | Performed by: SURGERY

## 2025-04-08 PROCEDURE — 88307 TISSUE EXAM BY PATHOLOGIST: CPT | Mod: 26,,, | Performed by: PATHOLOGY

## 2025-04-08 PROCEDURE — 25000003 PHARM REV CODE 250: Performed by: EMERGENCY MEDICINE

## 2025-04-08 PROCEDURE — 0DB84ZZ EXCISION OF SMALL INTESTINE, PERCUTANEOUS ENDOSCOPIC APPROACH: ICD-10-PCS | Performed by: SURGERY

## 2025-04-08 PROCEDURE — 99900035 HC TECH TIME PER 15 MIN (STAT)

## 2025-04-08 PROCEDURE — 25000003 PHARM REV CODE 250: Performed by: STUDENT IN AN ORGANIZED HEALTH CARE EDUCATION/TRAINING PROGRAM

## 2025-04-08 PROCEDURE — 63600175 PHARM REV CODE 636 W HCPCS: Performed by: EMERGENCY MEDICINE

## 2025-04-08 PROCEDURE — 44202 LAP ENTERECTOMY: CPT | Mod: ,,, | Performed by: SURGERY

## 2025-04-08 PROCEDURE — 36000711: Performed by: SURGERY

## 2025-04-08 PROCEDURE — 83605 ASSAY OF LACTIC ACID: CPT | Performed by: EMERGENCY MEDICINE

## 2025-04-08 PROCEDURE — 63600175 PHARM REV CODE 636 W HCPCS

## 2025-04-08 PROCEDURE — 63600175 PHARM REV CODE 636 W HCPCS: Mod: JZ,TB | Performed by: SURGERY

## 2025-04-08 PROCEDURE — 96365 THER/PROPH/DIAG IV INF INIT: CPT

## 2025-04-08 PROCEDURE — 27201423 OPTIME MED/SURG SUP & DEVICES STERILE SUPPLY: Performed by: SURGERY

## 2025-04-08 PROCEDURE — 94761 N-INVAS EAR/PLS OXIMETRY MLT: CPT

## 2025-04-08 PROCEDURE — 96361 HYDRATE IV INFUSION ADD-ON: CPT

## 2025-04-08 PROCEDURE — 25500020 PHARM REV CODE 255: Performed by: EMERGENCY MEDICINE

## 2025-04-08 PROCEDURE — 63600175 PHARM REV CODE 636 W HCPCS: Performed by: FAMILY MEDICINE

## 2025-04-08 PROCEDURE — 36000710: Performed by: SURGERY

## 2025-04-08 PROCEDURE — 96375 TX/PRO/DX INJ NEW DRUG ADDON: CPT

## 2025-04-08 PROCEDURE — 71000039 HC RECOVERY, EACH ADD'L HOUR: Performed by: SURGERY

## 2025-04-08 PROCEDURE — 25000003 PHARM REV CODE 250: Performed by: SURGERY

## 2025-04-08 PROCEDURE — 63600175 PHARM REV CODE 636 W HCPCS: Performed by: ANESTHESIOLOGY

## 2025-04-08 PROCEDURE — 8E0W4CZ ROBOTIC ASSISTED PROCEDURE OF TRUNK REGION, PERCUTANEOUS ENDOSCOPIC APPROACH: ICD-10-PCS | Performed by: SURGERY

## 2025-04-08 PROCEDURE — 37000009 HC ANESTHESIA EA ADD 15 MINS: Performed by: SURGERY

## 2025-04-08 PROCEDURE — 99223 1ST HOSP IP/OBS HIGH 75: CPT | Mod: 57,,, | Performed by: SURGERY

## 2025-04-08 PROCEDURE — 0D9670Z DRAINAGE OF STOMACH WITH DRAINAGE DEVICE, VIA NATURAL OR ARTIFICIAL OPENING: ICD-10-PCS | Performed by: SURGERY

## 2025-04-08 PROCEDURE — 71000033 HC RECOVERY, INTIAL HOUR: Performed by: SURGERY

## 2025-04-08 PROCEDURE — 27000221 HC OXYGEN, UP TO 24 HOURS

## 2025-04-08 PROCEDURE — 21400001 HC TELEMETRY ROOM

## 2025-04-08 PROCEDURE — 63600531 PHARM REV CODE 636 NO ALT 250 W HCPCS: Mod: JZ,TB | Performed by: EMERGENCY MEDICINE

## 2025-04-08 RX ORDER — TIMOLOL MALEATE 5 MG/ML
1 SOLUTION/ DROPS OPHTHALMIC DAILY
Status: DISCONTINUED | OUTPATIENT
Start: 2025-04-08 | End: 2025-04-12 | Stop reason: HOSPADM

## 2025-04-08 RX ORDER — SODIUM CHLORIDE 0.9 % (FLUSH) 0.9 %
10 SYRINGE (ML) INJECTION EVERY 12 HOURS PRN
Status: DISCONTINUED | OUTPATIENT
Start: 2025-04-08 | End: 2025-04-12 | Stop reason: HOSPADM

## 2025-04-08 RX ORDER — ONDANSETRON HYDROCHLORIDE 2 MG/ML
4 INJECTION, SOLUTION INTRAVENOUS DAILY PRN
Status: DISCONTINUED | OUTPATIENT
Start: 2025-04-08 | End: 2025-04-08 | Stop reason: HOSPADM

## 2025-04-08 RX ORDER — GLUCAGON 1 MG
1 KIT INJECTION
Status: DISCONTINUED | OUTPATIENT
Start: 2025-04-08 | End: 2025-04-12 | Stop reason: HOSPADM

## 2025-04-08 RX ORDER — MUPIROCIN 20 MG/G
OINTMENT TOPICAL 2 TIMES DAILY
Status: DISCONTINUED | OUTPATIENT
Start: 2025-04-08 | End: 2025-04-08 | Stop reason: SDUPTHER

## 2025-04-08 RX ORDER — HYDROMORPHONE HYDROCHLORIDE 2 MG/ML
0.2 INJECTION, SOLUTION INTRAMUSCULAR; INTRAVENOUS; SUBCUTANEOUS EVERY 5 MIN PRN
Status: DISCONTINUED | OUTPATIENT
Start: 2025-04-08 | End: 2025-04-08 | Stop reason: HOSPADM

## 2025-04-08 RX ORDER — NALOXONE HCL 0.4 MG/ML
0.02 VIAL (ML) INJECTION
Status: DISCONTINUED | OUTPATIENT
Start: 2025-04-08 | End: 2025-04-12 | Stop reason: HOSPADM

## 2025-04-08 RX ORDER — METOPROLOL TARTRATE 25 MG/1
25 TABLET, FILM COATED ORAL 2 TIMES DAILY
Status: DISCONTINUED | OUTPATIENT
Start: 2025-04-08 | End: 2025-04-08

## 2025-04-08 RX ORDER — CEFAZOLIN SODIUM 1 G/3ML
INJECTION, POWDER, FOR SOLUTION INTRAMUSCULAR; INTRAVENOUS
Status: DISCONTINUED | OUTPATIENT
Start: 2025-04-08 | End: 2025-04-08

## 2025-04-08 RX ORDER — ONDANSETRON HYDROCHLORIDE 2 MG/ML
4 INJECTION, SOLUTION INTRAVENOUS
Status: COMPLETED | OUTPATIENT
Start: 2025-04-08 | End: 2025-04-08

## 2025-04-08 RX ORDER — MIDAZOLAM HYDROCHLORIDE 1 MG/ML
INJECTION INTRAMUSCULAR; INTRAVENOUS
Status: DISCONTINUED | OUTPATIENT
Start: 2025-04-08 | End: 2025-04-08

## 2025-04-08 RX ORDER — IBUPROFEN 200 MG
24 TABLET ORAL
Status: DISCONTINUED | OUTPATIENT
Start: 2025-04-08 | End: 2025-04-12 | Stop reason: HOSPADM

## 2025-04-08 RX ORDER — METOPROLOL TARTRATE 1 MG/ML
5 INJECTION, SOLUTION INTRAVENOUS EVERY 8 HOURS
Status: DISCONTINUED | OUTPATIENT
Start: 2025-04-08 | End: 2025-04-11

## 2025-04-08 RX ORDER — KETAMINE HCL IN 0.9 % NACL 50 MG/5 ML
SYRINGE (ML) INTRAVENOUS
Status: DISCONTINUED | OUTPATIENT
Start: 2025-04-08 | End: 2025-04-08

## 2025-04-08 RX ORDER — MORPHINE SULFATE 4 MG/ML
2 INJECTION, SOLUTION INTRAMUSCULAR; INTRAVENOUS EVERY 4 HOURS PRN
Status: DISCONTINUED | OUTPATIENT
Start: 2025-04-08 | End: 2025-04-10

## 2025-04-08 RX ORDER — IBUPROFEN 200 MG
16 TABLET ORAL
Status: DISCONTINUED | OUTPATIENT
Start: 2025-04-08 | End: 2025-04-12 | Stop reason: HOSPADM

## 2025-04-08 RX ORDER — ONDANSETRON HYDROCHLORIDE 2 MG/ML
INJECTION, SOLUTION INTRAVENOUS
Status: DISCONTINUED | OUTPATIENT
Start: 2025-04-08 | End: 2025-04-08

## 2025-04-08 RX ORDER — SODIUM CHLORIDE 9 MG/ML
1000 INJECTION, SOLUTION INTRAVENOUS CONTINUOUS
Status: ACTIVE | OUTPATIENT
Start: 2025-04-08 | End: 2025-04-08

## 2025-04-08 RX ORDER — ONDANSETRON HYDROCHLORIDE 2 MG/ML
4 INJECTION, SOLUTION INTRAVENOUS EVERY 6 HOURS PRN
Status: DISCONTINUED | OUTPATIENT
Start: 2025-04-08 | End: 2025-04-12 | Stop reason: HOSPADM

## 2025-04-08 RX ORDER — PROCHLORPERAZINE EDISYLATE 5 MG/ML
2.5 INJECTION INTRAMUSCULAR; INTRAVENOUS EVERY 8 HOURS PRN
Status: DISCONTINUED | OUTPATIENT
Start: 2025-04-08 | End: 2025-04-12 | Stop reason: HOSPADM

## 2025-04-08 RX ORDER — ACETAMINOPHEN 325 MG/1
650 TABLET ORAL EVERY 6 HOURS PRN
Status: DISCONTINUED | OUTPATIENT
Start: 2025-04-08 | End: 2025-04-08

## 2025-04-08 RX ORDER — METRONIDAZOLE 500 MG/100ML
INJECTION, SOLUTION INTRAVENOUS
Status: DISCONTINUED | OUTPATIENT
Start: 2025-04-08 | End: 2025-04-08

## 2025-04-08 RX ORDER — ROCURONIUM BROMIDE 10 MG/ML
INJECTION, SOLUTION INTRAVENOUS
Status: DISCONTINUED | OUTPATIENT
Start: 2025-04-08 | End: 2025-04-08

## 2025-04-08 RX ORDER — PANTOPRAZOLE SODIUM 40 MG/10ML
40 INJECTION, POWDER, LYOPHILIZED, FOR SOLUTION INTRAVENOUS DAILY
Status: DISCONTINUED | OUTPATIENT
Start: 2025-04-08 | End: 2025-04-12 | Stop reason: HOSPADM

## 2025-04-08 RX ORDER — FENTANYL CITRATE 50 UG/ML
INJECTION, SOLUTION INTRAMUSCULAR; INTRAVENOUS
Status: DISCONTINUED | OUTPATIENT
Start: 2025-04-08 | End: 2025-04-08

## 2025-04-08 RX ORDER — ACETAMINOPHEN 10 MG/ML
1000 INJECTION, SOLUTION INTRAVENOUS EVERY 8 HOURS
Status: COMPLETED | OUTPATIENT
Start: 2025-04-08 | End: 2025-04-09

## 2025-04-08 RX ORDER — MORPHINE SULFATE 4 MG/ML
2 INJECTION, SOLUTION INTRAMUSCULAR; INTRAVENOUS
Status: COMPLETED | OUTPATIENT
Start: 2025-04-08 | End: 2025-04-08

## 2025-04-08 RX ORDER — SUCCINYLCHOLINE CHLORIDE 20 MG/ML
INJECTION INTRAMUSCULAR; INTRAVENOUS
Status: DISCONTINUED | OUTPATIENT
Start: 2025-04-08 | End: 2025-04-08

## 2025-04-08 RX ORDER — CHLORHEXIDINE GLUCONATE ORAL RINSE 1.2 MG/ML
10 SOLUTION DENTAL 2 TIMES DAILY
Status: DISCONTINUED | OUTPATIENT
Start: 2025-04-08 | End: 2025-04-12 | Stop reason: HOSPADM

## 2025-04-08 RX ORDER — BUPIVACAINE 13.3 MG/ML
INJECTION, SUSPENSION, LIPOSOMAL INFILTRATION
Status: DISCONTINUED | OUTPATIENT
Start: 2025-04-08 | End: 2025-04-08 | Stop reason: HOSPADM

## 2025-04-08 RX ORDER — LATANOPROST 50 UG/ML
1 SOLUTION/ DROPS OPHTHALMIC NIGHTLY
Status: DISCONTINUED | OUTPATIENT
Start: 2025-04-08 | End: 2025-04-12 | Stop reason: HOSPADM

## 2025-04-08 RX ORDER — OXYCODONE AND ACETAMINOPHEN 5; 325 MG/1; MG/1
1 TABLET ORAL
Status: DISCONTINUED | OUTPATIENT
Start: 2025-04-08 | End: 2025-04-08 | Stop reason: HOSPADM

## 2025-04-08 RX ORDER — BUPIVACAINE HYDROCHLORIDE 2.5 MG/ML
INJECTION, SOLUTION EPIDURAL; INFILTRATION; INTRACAUDAL; PERINEURAL
Status: DISCONTINUED | OUTPATIENT
Start: 2025-04-08 | End: 2025-04-08 | Stop reason: HOSPADM

## 2025-04-08 RX ORDER — LIDOCAINE HYDROCHLORIDE 20 MG/ML
INJECTION INTRAVENOUS
Status: DISCONTINUED | OUTPATIENT
Start: 2025-04-08 | End: 2025-04-08

## 2025-04-08 RX ORDER — HYDRALAZINE HYDROCHLORIDE 20 MG/ML
5 INJECTION INTRAMUSCULAR; INTRAVENOUS EVERY 6 HOURS PRN
Status: DISCONTINUED | OUTPATIENT
Start: 2025-04-08 | End: 2025-04-12 | Stop reason: HOSPADM

## 2025-04-08 RX ORDER — TALC
6 POWDER (GRAM) TOPICAL NIGHTLY PRN
Status: DISCONTINUED | OUTPATIENT
Start: 2025-04-08 | End: 2025-04-12 | Stop reason: HOSPADM

## 2025-04-08 RX ORDER — PROPOFOL 10 MG/ML
VIAL (ML) INTRAVENOUS
Status: DISCONTINUED | OUTPATIENT
Start: 2025-04-08 | End: 2025-04-08

## 2025-04-08 RX ADMIN — LIDOCAINE HYDROCHLORIDE 100 MG: 20 INJECTION INTRAVENOUS at 07:04

## 2025-04-08 RX ADMIN — ACETAMINOPHEN 1000 MG: 10 INJECTION, SOLUTION INTRAVENOUS at 01:04

## 2025-04-08 RX ADMIN — ACETAMINOPHEN 1000 MG: 10 INJECTION, SOLUTION INTRAVENOUS at 09:04

## 2025-04-08 RX ADMIN — FENTANYL CITRATE 50 MCG: 50 INJECTION, SOLUTION INTRAMUSCULAR; INTRAVENOUS at 09:04

## 2025-04-08 RX ADMIN — ONDANSETRON 4 MG: 2 INJECTION INTRAMUSCULAR; INTRAVENOUS at 06:04

## 2025-04-08 RX ADMIN — ONDANSETRON 4 MG: 2 INJECTION INTRAMUSCULAR; INTRAVENOUS at 10:04

## 2025-04-08 RX ADMIN — ROCURONIUM BROMIDE 45 MG: 10 SOLUTION INTRAVENOUS at 07:04

## 2025-04-08 RX ADMIN — ROCURONIUM BROMIDE 20 MG: 10 SOLUTION INTRAVENOUS at 09:04

## 2025-04-08 RX ADMIN — Medication 20 MG: at 07:04

## 2025-04-08 RX ADMIN — HYDROMORPHONE HYDROCHLORIDE 0.2 MG: 2 INJECTION, SOLUTION INTRAMUSCULAR; INTRAVENOUS; SUBCUTANEOUS at 11:04

## 2025-04-08 RX ADMIN — MORPHINE SULFATE 2 MG: 4 INJECTION INTRAVENOUS at 06:04

## 2025-04-08 RX ADMIN — SODIUM CHLORIDE, SODIUM LACTATE, POTASSIUM CHLORIDE, AND CALCIUM CHLORIDE: .6; .31; .03; .02 INJECTION, SOLUTION INTRAVENOUS at 07:04

## 2025-04-08 RX ADMIN — LATANOPROST 1 DROP: 50 SOLUTION OPHTHALMIC at 09:04

## 2025-04-08 RX ADMIN — CHLORHEXIDINE GLUCONATE 0.12% ORAL RINSE 10 ML: 1.2 LIQUID ORAL at 09:04

## 2025-04-08 RX ADMIN — LIDOCAINE HYDROCHLORIDE 15 ML: 20 SOLUTION ORAL at 12:04

## 2025-04-08 RX ADMIN — ONDANSETRON 4 MG: 2 INJECTION INTRAMUSCULAR; INTRAVENOUS at 07:04

## 2025-04-08 RX ADMIN — SUGAMMADEX 200 MG: 100 INJECTION, SOLUTION INTRAVENOUS at 10:04

## 2025-04-08 RX ADMIN — METOROPROLOL TARTRATE 5 MG: 5 INJECTION, SOLUTION INTRAVENOUS at 09:04

## 2025-04-08 RX ADMIN — PIPERACILLIN SODIUM AND TAZOBACTAM SODIUM 4.5 G: 4; .5 INJECTION, POWDER, FOR SOLUTION INTRAVENOUS at 07:04

## 2025-04-08 RX ADMIN — FENTANYL CITRATE 50 MCG: 50 INJECTION, SOLUTION INTRAMUSCULAR; INTRAVENOUS at 08:04

## 2025-04-08 RX ADMIN — PROMETHAZINE HYDROCHLORIDE 12.5 MG: 25 INJECTION INTRAMUSCULAR; INTRAVENOUS at 01:04

## 2025-04-08 RX ADMIN — MORPHINE SULFATE 2 MG: 4 INJECTION INTRAVENOUS at 03:04

## 2025-04-08 RX ADMIN — ALUMINUM HYDROXIDE, MAGNESIUM HYDROXIDE, AND DIMETHICONE 30 ML: 200; 20; 200 SUSPENSION ORAL at 12:04

## 2025-04-08 RX ADMIN — MIDAZOLAM HYDROCHLORIDE 2 MG: 1 INJECTION, SOLUTION INTRAMUSCULAR; INTRAVENOUS at 07:04

## 2025-04-08 RX ADMIN — FENTANYL CITRATE 50 MCG: 50 INJECTION, SOLUTION INTRAMUSCULAR; INTRAVENOUS at 07:04

## 2025-04-08 RX ADMIN — Medication 2098 UNITS: at 05:04

## 2025-04-08 RX ADMIN — SODIUM CHLORIDE 1000 ML: 9 INJECTION, SOLUTION INTRAVENOUS at 05:04

## 2025-04-08 RX ADMIN — PROPOFOL 100 MG: 10 INJECTION, EMULSION INTRAVENOUS at 07:04

## 2025-04-08 RX ADMIN — SUCCINYLCHOLINE CHLORIDE 100 MG: 20 INJECTION, SOLUTION INTRAMUSCULAR; INTRAVENOUS; PARENTERAL at 07:04

## 2025-04-08 RX ADMIN — SODIUM CHLORIDE 500 ML: 9 INJECTION, SOLUTION INTRAVENOUS at 03:04

## 2025-04-08 RX ADMIN — ONDANSETRON 4 MG: 2 INJECTION INTRAMUSCULAR; INTRAVENOUS at 04:04

## 2025-04-08 RX ADMIN — IOHEXOL 65 ML: 350 INJECTION, SOLUTION INTRAVENOUS at 02:04

## 2025-04-08 RX ADMIN — ROCURONIUM BROMIDE 5 MG: 10 SOLUTION INTRAVENOUS at 07:04

## 2025-04-08 RX ADMIN — METRONIDAZOLE 500 MG: 5 INJECTION, SOLUTION INTRAVENOUS at 08:04

## 2025-04-08 RX ADMIN — PIPERACILLIN SODIUM AND TAZOBACTAM SODIUM 4.5 G: 4; .5 INJECTION, POWDER, FOR SOLUTION INTRAVENOUS at 01:04

## 2025-04-08 RX ADMIN — METOROPROLOL TARTRATE 5 MG: 5 INJECTION, SOLUTION INTRAVENOUS at 01:04

## 2025-04-08 RX ADMIN — CEFAZOLIN 2 G: 330 INJECTION, POWDER, FOR SOLUTION INTRAMUSCULAR; INTRAVENOUS at 08:04

## 2025-04-08 RX ADMIN — MINERAL OIL AND WHITE PETROLATUM: 150; 830 OINTMENT OPHTHALMIC at 09:04

## 2025-04-08 NOTE — H&P
[facility] Medicine  History & Physical    Patient Name: Maria Del Carmen Spence  MRN: [id number]  Patient Class: IP- Inpatient  Admission Date: 4/7/2025  Attending Physician: Dino Broussard DO   Primary Care Provider: Rajinder Lutz MD         Patient information was obtained from patient, past medical records, and ER records.     Subjective:     Principal Problem:Bowel obstruction    Chief Complaint:   Chief Complaint   Patient presents with    Abdominal Pain    Nausea     Pt c/o epigastric pain with nausea onset of 6p. Pt reports that she took two gas-x pills pta         HPI: Ms. Maria Del Carmen Spence is a 84 y.o. female who  has a medical history of A-fib, Arthritis, Chronic back pain,, Frequent headaches, GERD (gastroesophageal reflux disease), Glaucoma, Hyperlipemia, and Hypertension.    She  presented to the ED for evaluation of worsening abdominal pain with associated nausea since yesterday afternoon which did not alleviate with OTC treatments.  Denies any recent concerns for infection, fever, chills, urinary symptoms.  She reports a prior history of gynecologic surgeries.    ED course notable for hemodynamically stable vitals, labs with WBC 12.79.  CT abdomen pelvis w/ IV contrast(read pending) were ordered.  Evaluation noted concerns for closed loop bowel obstruction and patient also had episodes of vomiting.  General surgery was consulted with recommendations for Xarelto reversal and anticipated surgery.  Patient was admitted to Hospital Medicine for further evaluation.    Past Medical History:   Diagnosis Date    A-fib     Arthritis     Chronic LBP     ESIs x 3 with Dr. Hicks, PT at [facility], chiropractor    Eye pain     Frequent headaches     GERD (gastroesophageal reflux disease)     Glaucoma     Hyperlipemia     Hypertension        Past Surgical History:   Procedure Laterality Date    ABLATION OF ARRHYTHMOGENIC FOCUS FOR ATRIAL FIBRILLATION N/A 3/6/2019    Procedure: ABLATION,  ARRHYTHMOGENIC FOCUS, FOR ATRIAL FIBRILLATION;  Surgeon: Abel Morillo MD;  Location: [facility] EP LAB;  Service: Cardiology;  Laterality: N/A;    CARDIOVERSION N/A 7/9/2018    Procedure: CARDIOVERSION;  Surgeon: Will Rosenthal MD;  Location: [facility] CATH LAB;  Service: Cardiology;  Laterality: N/A;    CATARACT EXTRACTION W/  INTRAOCULAR LENS IMPLANT  OU    COLONOSCOPY N/A 6/13/2024    Procedure: COLONOSCOPY 6/10 xarelto 2 day hold note;  Surgeon: Sayda Ferreira MD;  Location: [facility] ENDO;  Service: Endoscopy;  Laterality: N/A;    ESOPHAGOGASTRODUODENOSCOPY N/A 4/13/2024    Procedure: EGD (ESOPHAGOGASTRODUODENOSCOPY);  Surgeon: Oswald Esteves MD;  Location: [facility];  Service: Endoscopy;  Laterality: N/A;    INJECTION OF ANESTHETIC AGENT INTO SACROILIAC JOINT Right 11/3/2020    Procedure: right Sacroiliac Joint Injection;  Surgeon: Ayush Christiansen MD;  Location: [facility] PAIN MGT;  Service: Pain Management;  Laterality: Right;    INJECTION OF ANESTHETIC AGENT INTO SACROILIAC JOINT Right 3/23/2021    Procedure: right Sacroiliac Joint Injection;  Surgeon: Ayush Christiansen MD;  Location: [facility] PAIN MGT;  Service: Pain Management;  Laterality: Right;    INJECTION OF JOINT Right 11/3/2020    Procedure: right GT bursa injection;  Surgeon: Ayush Christiansen MD;  Location: [facility] PAIN MGT;  Service: Pain Management;  Laterality: Right;    INJECTION OF JOINT Bilateral 3/23/2021    Procedure: bilateral GT bursa injection;  Surgeon: Ayush Christiansen MD;  Location: [facility] PAIN MGT;  Service: Pain Management;  Laterality: Bilateral;    TUBAL LIGATION         Review of patient's allergies indicates:   Allergen Reactions    Voltaren [diclofenac sodium] Edema     Gel formulation caused facial rash and facial swelling    Eliquis [apixaban] Other (See Comments)     Headache, numbness in lip     Medrol [methylprednisolone] Blisters       No current facility-administered medications on file prior to encounter.     Current Outpatient  Medications on File Prior to Encounter   Medication Sig    AA/prot/lysine/methio/vit C/B6 (A/G PRO ORAL) Take 2 tablets by mouth 2 (two) times daily.     acetaminophen (TYLENOL) 650 MG TbSR Take 650 mg by mouth as needed. Does not go over 3000mg daily    atorvastatin (LIPITOR) 10 MG tablet Take 1 tablet (10 mg total) by mouth every evening.    CALCIUM PHOSPHATE TRIB/VIT D3 (CITRACAL + D ORAL) Take 1 tablet by mouth once daily at 6am.     cetirizine (ZYRTEC) 10 MG tablet Take 10 mg by mouth as needed for Allergies.    furosemide (LASIX) 20 MG tablet TAKE ONE TABLET BY MOUTH TWICE A WEEK    gabapentin (NEURONTIN) 400 MG capsule Take 1 capsule (400 mg total) by mouth 3 (three) times daily.    latanoprost 0.005 % ophthalmic solution INSTILL ONE DROP IN EACH EYE AT BEDTIME    metoprolol tartrate (LOPRESSOR) 25 MG tablet Take 1 tablet (25 mg total) by mouth 2 (two) times daily.    MULTIVITAMIN W-MINERALS/LUTEIN (CENTRUM SILVER ORAL) Take 1 tablet by mouth once daily.    omeprazole (PRILOSEC) 40 MG capsule Take 1 capsule (40 mg total) by mouth once daily.    ondansetron (ZOFRAN-ODT) 4 MG TbDL Take 1 tablet (4 mg total) by mouth every 6 (six) hours as needed.    RSVPreF3 antigen-AS01E, PF, (AREXVY, PF,) 120 mcg/0.5 mL SusR vaccine Inject into the muscle.    timolol maleate 0.5% (TIMOPTIC) 0.5 % Drop PLACE ONE DROP INTO BOTH EYES EVERY MORNING    XARELTO 15 mg Tab Take 1 tablet (15 mg total) by mouth daily with dinner or evening meal.     Family History       Problem Relation (Age of Onset)    Cancer Mother    Cataracts Mother    Diabetes Paternal Aunt    Glaucoma Mother    Hypertension Mother, Father          Tobacco Use    Smoking status: Never    Smokeless tobacco: Never   Substance and Sexual Activity    Alcohol use: No    Drug use: No    Sexual activity: Yes     Partners: Male     Review of Systems   Constitutional:  Positive for fatigue. Negative for chills and fever.   HENT:  Negative for congestion and rhinorrhea.     Respiratory:  Negative for cough and shortness of breath.    Cardiovascular:  Negative for chest pain and leg swelling.   Gastrointestinal:  Positive for abdominal distention, abdominal pain, nausea and vomiting. Negative for constipation and diarrhea.   Genitourinary:  Negative for difficulty urinating and dysuria.   Musculoskeletal:  Positive for back pain (Chronic).     Objective:     Vital Signs (Most Recent):  Temp: 97.9 °F (36.6 °C) (04/08/25 0535)  Pulse: 97 (04/08/25 0551)  Resp: 18 (04/08/25 0611)  BP: 119/68 (04/08/25 0535)  SpO2: 96 % (04/08/25 0535) Vital Signs (24h Range):  Temp:  [97.9 °F (36.6 °C)-98 °F (36.7 °C)] 97.9 °F (36.6 °C)  Pulse:  [] 97  Resp:  [15-20] 18  SpO2:  [92 %-97 %] 96 %  BP: (119-188)/(61-95) 119/68     Weight: 78.2 kg (172 lb 6.4 oz)  Body mass index is 28.69 kg/m².     Physical Exam  Vitals and nursing note reviewed.   Constitutional:       General: She is not in acute distress.     Appearance: She is ill-appearing. She is not toxic-appearing or diaphoretic.   HENT:      Head: Normocephalic and atraumatic.      Mouth/Throat:      Mouth: Mucous membranes are moist.   Eyes:      General: No scleral icterus.        Right eye: No discharge.         Left eye: No discharge.   Cardiovascular:      Rate and Rhythm: Normal rate and regular rhythm.      Heart sounds: Normal heart sounds.   Pulmonary:      Effort: Pulmonary effort is normal. No respiratory distress.      Breath sounds: Normal breath sounds. No wheezing.   Abdominal:      General: Bowel sounds are decreased. There is distension.      Tenderness: There is abdominal tenderness. There is guarding.   Musculoskeletal:      Cervical back: No rigidity.      Right lower leg: No edema.      Left lower leg: No edema.   Skin:     General: Skin is warm and dry.      Coloration: Skin is not jaundiced.   Neurological:      Mental Status: She is alert and oriented to person, place, and time. Mental status is at baseline.    Psychiatric:         Mood and Affect: Mood normal.         Behavior: Behavior normal.                Significant Labs: All pertinent labs within the past 24 hours have been reviewed.  Recent Results (from the past 24 hours)   Comprehensive Metabolic Panel    Collection Time: 04/07/25 11:59 PM   Result Value Ref Range    Sodium 139 136 - 145 mmol/L    Potassium 4.1 3.5 - 5.1 mmol/L    Chloride 104 95 - 110 mmol/L    CO2 25 23 - 29 mmol/L    Glucose 127 (H) 70 - 110 mg/dL    BUN 14 8 - 23 mg/dL    Creatinine 0.8 0.5 - 1.4 mg/dL    Calcium 10.4 8.7 - 10.5 mg/dL    Protein Total 8.3 6.0 - 8.4 gm/dL    Albumin 4.2 3.5 - 5.2 g/dL    Bilirubin Total 0.4 0.1 - 1.0 mg/dL    ALP 91 40 - 150 unit/L    AST 23 11 - 45 unit/L    ALT 12 10 - 44 unit/L    Anion Gap 10 8 - 16 mmol/L    eGFR >60 >60 mL/min/1.73/m2   Lipase    Collection Time: 04/07/25 11:59 PM   Result Value Ref Range    Lipase Level 26 4 - 60 U/L   BNP    Collection Time: 04/07/25 11:59 PM   Result Value Ref Range    BNP 77 0 - 99 pg/mL   CK    Collection Time: 04/07/25 11:59 PM   Result Value Ref Range    CPK 80 20 - 180 U/L   Troponin I    Collection Time: 04/07/25 11:59 PM   Result Value Ref Range    Troponin-I <0.006 <=0.026 ng/mL   CBC with Differential    Collection Time: 04/07/25 11:59 PM   Result Value Ref Range    WBC 12.79 (H) 3.90 - 12.70 K/uL    RBC 4.00 4.00 - 5.40 M/uL    HGB 13.1 12.0 - 16.0 gm/dL    HCT 39.2 37.0 - 48.5 %    MCV 98 82 - 98 fL    MCH 32.8 (H) 27.0 - 31.0 pg    MCHC 33.4 32.0 - 36.0 g/dL    RDW 12.0 11.5 - 14.5 %    Platelet Count 331 150 - 450 K/uL    MPV 9.8 9.2 - 12.9 fL    Nucleated RBC 0 <=0 /100 WBC    Neut % 82.0 (H) 38 - 73 %    Lymph % 12.3 (L) 18 - 48 %    Mono % 5.0 4 - 15 %    Eos % 0.2 <=8 %    Basophil % 0.1 <=1.9 %    Imm Grans % 0.4 0.0 - 0.5 %    Neut # 10.50 (H) 1.8 - 7.7 K/uL    Lymph # 1.57 1 - 4.8 K/uL    Mono # 0.64 0.3 - 1 K/uL    Eos # 0.02 <=0.5 K/uL    Baso # 0.01 <=0.2 K/uL    Imm Grans # 0.05 (H) 0.00 -  0.04 K/uL   Hepatitis C Antibody    Collection Time: 04/07/25 11:59 PM   Result Value Ref Range    Hep C Ab Interp Negative Negative   HIV 1/2 Ag/Ab (4th Gen)    Collection Time: 04/07/25 11:59 PM   Result Value Ref Range    HIV 1/2 Ag/Ab Negative Negative   Light Blue Top Hold    Collection Time: 04/07/25 11:59 PM   Result Value Ref Range    Extra Tube Hold for add-ons.        Significant Imaging: I have reviewed all pertinent imaging results/findings within the past 24 hours.  X-Ray Chest AP Portable    (Results Pending)   CT Abdomen Pelvis With IV Contrast Routine Oral Contrast    (Results Pending)       Assessment/Plan:     Assessment & Plan  Bowel obstruction  Nausea and vomiting  Presented to the ED for evaluation of worsening abdominal pain with associated nausea  which did not alleviate with OTC treatments.  Denies any recent concerns for infection, fever, chills, urinary symptoms.  She reports a prior history of gynecologic surgeries.     ED course notable for hemodynamically stable vitals, labs with WBC 12.79.  CT abdomen pelvis w/ IV contrast(read pending) were ordered.  Evaluation noted concerns for closed loop bowel obstruction and patient also had episodes of vomiting.  General surgery was consulted with recommendations for Xarelto reversal and anticipated surgery.  Patient was admitted to Hospital Medicine for further evaluation.    PLAN:  - General Surgery Consulted, follow-up recs  - NPO, IVF resuscitation  - Follow up pending radiology results        Paroxysmal atrial fibrillation  Patient with paroxysmal (<7 days) atrial fibrillation which is controlled currently with Beta Blocker. Patient is currently in sinus rhythm.    [id number] Score: 3.     The patients heart rate in the last 24 hours is as follows:  Pulse  Min: 68  Max: 105       PLAN:  - Antiarrhythmics: metoprolol tartrate (LOPRESSOR) tablet 25 mg, 2 times daily, Oral  - Anticoagulants:  Xarelto was held and reversed due to surgical  intervention concerns. Resume when safe per surgical team  - Replete electrolytes PRN to  target Magnesium > 2, Potassium > 4  - Continuous telemetry  Primary open-angle glaucoma(365.11) - Both Eyes  Continue home timolol, latanoprost    Primary hypertension  Hypertensive on admission    Home meds for hypertension were reviewed and noted below.   Home Hypertension Medications per chart review             furosemide (LASIX) 20 MG tablet TAKE ONE TABLET BY MOUTH TWICE A WEEK    metoprolol tartrate (LOPRESSOR) 25 MG tablet Take 1 tablet (25 mg total) by mouth 2 (two) times daily.            Patients blood pressure range in the last 24 hours was: BP  Min: 119/68  Max: 188/84.      PLAN:  -While in the hospital, will manage blood pressure as follows; Continue home antihypertensive regimen  -The patient's inpatient anti-hypertensive regimen is listed below:  Current Antihypertensives  hydrALAZINE injection 5 mg, Every 6 hours PRN, Intravenous  metoprolol tartrate (LOPRESSOR) tablet 25 mg, 2 times daily, Oral  timolol maleate 0.5% ophthalmic solution 1 drop, Daily, Both Eyes  -Will utilize p.r.n. blood pressure medication only if patient's blood pressure greater than 180/110 and she develops symptoms such as worsening chest pain or shortness of breath.    Mixed hyperlipidemia  Recent Lipid Panel reviewed-  Lab Results   Component Value Date    HDL 59 09/17/2024    LDLCALC 80.6 09/17/2024    TRIG 137 09/17/2024    CHOL 167 09/17/2024        Home Hyperlipidemia Medications per chart review             atorvastatin (LIPITOR) 10 MG tablet Take 1 tablet (10 mg total) by mouth every evening.            PLAN  -resume home statin when able to tolerate p.o.      Gastroesophageal reflux disease without esophagitis  Chronic.  Stable    Home medication includes omeprazole p.o.    PLAN:  Started on pantoprazole IV, resume oral PPI when able to tolerate p.o.      VTE Risk Mitigation (From admission, onward)           Ordered     IP VTE  HIGH RISK PATIENT  Once         04/08/25 0628     Place sequential compression device  Until discontinued         04/08/25 0628     Reason for No Pharmacological VTE Prophylaxis  Once        Question:  Reasons:  Answer:  Risk of Bleeding  Comment:  Surgical intervention planned    04/08/25 0628                          Dino Broussard DO  Department of Hospital Medicine  [facility] ([facility])      Voice recognition software was used in the creation of this note/communication and any sound-alike/typographical errors which may have occurred despite initial review prior to signing should be taken in context when interpreting.  If such errors prevent a clear understanding of the note/communication, please contact the provider/office for clarification.

## 2025-04-08 NOTE — CONSULTS
[facility] ([facility])  General Surgery  Consult Note    Patient Name: Maria Del Carmen Spence  MRN: [id number]  Code Status: Full Code  Admission Date: 4/7/2025  Hospital Length of Stay: 0 days  Attending Physician: Dino Broussard DO  Primary Care Provider: Rajinder Lutz MD    Patient information was obtained from patient, past medical records, ER records, and primary team.     Inpatient consult to General Surgery  Consult performed by: Sondra Kaur MD  Consult ordered by: Dino Broussard DO  Reason for consult: closed loop bowel obstruction  Assessment/Recommendations:   -- to OR for diagnostic laparoscopy, possible laparotomy, possible bowel resection and any other indicated procedures  -- ancef + flagyl          Subjective:     Principal Problem: Intestinal adhesions with complete obstruction    History of Present Illness: Maria Del Carmen Spence is a 84 y.o. female with a history of atrial fibrillation (on Xarelto), and previous tubal ligation who presented to the emergency department with sudden onset abdominal pain and nausea.  Patient states the pain started around 6:00 p.m. last night out of the blue and she originally thought it was gas pains.  She is passing a little bit of flatus but not much.  The pain is predominantly on the right side of the abdomen but she states she also felt some epigastric pain with radiation around to the back and pelvic pains.  Upon presentation in the ER she was afebrile, vitals were within normal limits.  Labs were notable for leukocytosis of 12 K, otherwise within normal limits.  CT of the abdomen and pelvis was done with IV contrast which was concerning for possible early closed loop obstruction in the pelvis with mesenteric fat stranding and edema, with some fluid in the pelvis.  She was admitted to hospital medicine.      No current facility-administered medications on file prior to encounter.     Current Outpatient Medications on File Prior to Encounter   Medication  Sig    AA/prot/lysine/methio/vit C/B6 (A/G PRO ORAL) Take 2 tablets by mouth 2 (two) times daily.     acetaminophen (TYLENOL) 650 MG TbSR Take 650 mg by mouth as needed. Does not go over 3000mg daily    atorvastatin (LIPITOR) 10 MG tablet Take 1 tablet (10 mg total) by mouth every evening.    CALCIUM PHOSPHATE TRIB/VIT D3 (CITRACAL + D ORAL) Take 1 tablet by mouth once daily at 6am.     cetirizine (ZYRTEC) 10 MG tablet Take 10 mg by mouth as needed for Allergies.    furosemide (LASIX) 20 MG tablet TAKE ONE TABLET BY MOUTH TWICE A WEEK    gabapentin (NEURONTIN) 400 MG capsule Take 1 capsule (400 mg total) by mouth 3 (three) times daily.    latanoprost 0.005 % ophthalmic solution INSTILL ONE DROP IN EACH EYE AT BEDTIME    metoprolol tartrate (LOPRESSOR) 25 MG tablet Take 1 tablet (25 mg total) by mouth 2 (two) times daily.    MULTIVITAMIN W-MINERALS/LUTEIN (CENTRUM SILVER ORAL) Take 1 tablet by mouth once daily.    omeprazole (PRILOSEC) 40 MG capsule Take 1 capsule (40 mg total) by mouth once daily.    ondansetron (ZOFRAN-ODT) 4 MG TbDL Take 1 tablet (4 mg total) by mouth every 6 (six) hours as needed.    RSVPreF3 antigen-AS01E, PF, (AREXVY, PF,) 120 mcg/0.5 mL SusR vaccine Inject into the muscle.    timolol maleate 0.5% (TIMOPTIC) 0.5 % Drop PLACE ONE DROP INTO BOTH EYES EVERY MORNING    XARELTO 15 mg Tab Take 1 tablet (15 mg total) by mouth daily with dinner or evening meal.       Review of patient's allergies indicates:   Allergen Reactions    Voltaren [diclofenac sodium] Edema     Gel formulation caused facial rash and facial swelling    Eliquis [apixaban] Other (See Comments)     Headache, numbness in lip     Medrol [methylprednisolone] Blisters       Past Medical History:   Diagnosis Date    A-fib     Arthritis     Chronic LBP     ESIs x 3 with Dr. Hicks, PT at [facility], chiropractor    Eye pain     Frequent headaches     GERD (gastroesophageal reflux disease)     Glaucoma     Hyperlipemia     Hypertension      Past  Surgical History:   Procedure Laterality Date    ABLATION OF ARRHYTHMOGENIC FOCUS FOR ATRIAL FIBRILLATION N/A 3/6/2019    Procedure: ABLATION, ARRHYTHMOGENIC FOCUS, FOR ATRIAL FIBRILLATION;  Surgeon: Abel Morillo MD;  Location: [facility] EP LAB;  Service: Cardiology;  Laterality: N/A;    CARDIOVERSION N/A 7/9/2018    Procedure: CARDIOVERSION;  Surgeon: Will Rosenthal MD;  Location: [facility] CATH LAB;  Service: Cardiology;  Laterality: N/A;    CATARACT EXTRACTION W/  INTRAOCULAR LENS IMPLANT  OU    COLONOSCOPY N/A 6/13/2024    Procedure: COLONOSCOPY 6/10 xarelto 2 day hold note;  Surgeon: Sayda Ferreira MD;  Location: [facility] ENDO;  Service: Endoscopy;  Laterality: N/A;    ESOPHAGOGASTRODUODENOSCOPY N/A 4/13/2024    Procedure: EGD (ESOPHAGOGASTRODUODENOSCOPY);  Surgeon: Oswald Esteves MD;  Location: [facility] ENDO;  Service: Endoscopy;  Laterality: N/A;    INJECTION OF ANESTHETIC AGENT INTO SACROILIAC JOINT Right 11/3/2020    Procedure: right Sacroiliac Joint Injection;  Surgeon: Ayush Christiansen MD;  Location: [facility] PAIN MGT;  Service: Pain Management;  Laterality: Right;    INJECTION OF ANESTHETIC AGENT INTO SACROILIAC JOINT Right 3/23/2021    Procedure: right Sacroiliac Joint Injection;  Surgeon: Ayush Christiansen MD;  Location: [facility] PAIN MGT;  Service: Pain Management;  Laterality: Right;    INJECTION OF JOINT Right 11/3/2020    Procedure: right GT bursa injection;  Surgeon: Ayush Christiansen MD;  Location: [facility] PAIN MGT;  Service: Pain Management;  Laterality: Right;    INJECTION OF JOINT Bilateral 3/23/2021    Procedure: bilateral GT bursa injection;  Surgeon: Ayush Christiansen MD;  Location: [facility] PAIN MGT;  Service: Pain Management;  Laterality: Bilateral;    TUBAL LIGATION       Family History       Problem Relation (Age of Onset)    Cancer Mother    Cataracts Mother    Diabetes Paternal Aunt    Glaucoma Mother    Hypertension Mother, Father          Tobacco Use    Smoking status: Never    Smokeless tobacco: Never    Substance and Sexual Activity    Alcohol use: No    Drug use: No    Sexual activity: Yes     Partners: Male     Review of Systems   Constitutional:  Negative for activity change, appetite change, fatigue and fever.   Respiratory:  Negative for cough, chest tightness and shortness of breath.    Cardiovascular:  Negative for chest pain.   Gastrointestinal:  Positive for abdominal pain, nausea and vomiting. Negative for diarrhea.   Skin:  Negative for color change, pallor, rash and wound.   Psychiatric/Behavioral:  Negative for confusion.      Objective:     Vital Signs (Most Recent):  Temp: 97.9 °F (36.6 °C) (04/08/25 0535)  Pulse: 97 (04/08/25 0551)  Resp: 18 (04/08/25 0611)  BP: 119/68 (04/08/25 0535)  SpO2: 96 % (04/08/25 0535) Vital Signs (24h Range):  Temp:  [97.9 °F (36.6 °C)-98 °F (36.7 °C)] 97.9 °F (36.6 °C)  Pulse:  [] 97  Resp:  [15-20] 18  SpO2:  [92 %-97 %] 96 %  BP: (119-188)/(61-95) 119/68     Weight: 78.2 kg (172 lb 6.4 oz)  Body mass index is 28.69 kg/m².     Physical Exam  Constitutional:       General: She is not in acute distress.     Appearance: Normal appearance.   HENT:      Head: Normocephalic and atraumatic.   Eyes:      Extraocular Movements: Extraocular movements intact.      Conjunctiva/sclera: Conjunctivae normal.   Cardiovascular:      Rate and Rhythm: Normal rate and regular rhythm.   Pulmonary:      Effort: Pulmonary effort is normal.   Abdominal:      General: Abdomen is flat. There is no distension.      Palpations: Abdomen is soft. There is no mass.      Tenderness: There is abdominal tenderness. There is no guarding or rebound.      Hernia: No hernia is present.   Musculoskeletal:         General: No swelling, tenderness, deformity or signs of injury. Normal range of motion.   Skin:     General: Skin is warm and dry.      Coloration: Skin is not jaundiced.   Neurological:      General: No focal deficit present.      Mental Status: She is alert and oriented to person, place,  and time.   Psychiatric:         Mood and Affect: Mood normal.         Behavior: Behavior normal.         Thought Content: Thought content normal.            I have reviewed all pertinent lab results within the past 24 hours.  CBC:   Recent Labs   Lab 04/07/25  2359   WBC 12.79*   RBC 4.00   HGB 13.1   HCT 39.2      MCV 98   MCH 32.8*   MCHC 33.4     BMP:   Recent Labs   Lab 04/07/25  2359      K 4.1      CO2 25   BUN 14   CREATININE 0.8   CALCIUM 10.4     CMP:   Recent Labs   Lab 04/07/25  2359   CALCIUM 10.4   ALBUMIN 4.2      K 4.1   CO2 25      BUN 14   CREATININE 0.8   ALKPHOS 91   ALT 12   AST 23   BILITOT 0.4       Significant Diagnostics:  I have reviewed all pertinent imaging results/findings within the past 24 hours.  CT: I have reviewed all pertinent results/findings within the past 24 hours and my personal findings are:  No significantly distended bowel however small intestine in the pelvis with significant mesenteric edema and fat stranding with some pelvic fluid    Assessment/Plan:     * Intestinal adhesions with complete obstruction  Imaging concerning for closed loop obstruction in the pelvis.  I discussed the findings with the patient.  We discussed that there is a potential possibility that there is no bowel obstruction although that is less likely, and the plan would be for diagnostic laparoscopy which involves placement of a camera laparoscopic instruments into the abdomen to evaluate with the intestinal viability as well as check for obstruction, and the possibility of need for laparotomy and bowel resection.  Risks and benefits of surgery were discussed with the patient including risk of bleeding, infection, damage to surrounding structures, anastomotic leak, hernia formation, need for additional surgeries or procedures.    -- NPO  -- to OR for diagnostic laparoscopy, possible laparotomy, and possible bowel resection   -- ancef + flagyl  -- Consent signed and in  the chart     Nausea and vomiting  2/2 pain and bowel obstruction     Gastroesophageal reflux disease without esophagitis  -- Management as per primary team     Mixed hyperlipidemia  -- Management as per primary team     Paroxysmal atrial fibrillation  -- Kcentra given for reversal of Xarelto   -- Management as per primary team     Primary hypertension  -- Management as per primary team     Primary open-angle glaucoma(365.11) - Both Eyes  -- Management as per primary team       VTE Risk Mitigation (From admission, onward)           Ordered     IP VTE HIGH RISK PATIENT  Once         04/08/25 0628     Place sequential compression device  Until discontinued         04/08/25 0628     Reason for No Pharmacological VTE Prophylaxis  Once        Question:  Reasons:  Answer:  Risk of Bleeding  Comment:  Surgical intervention planned    04/08/25 0628                    Thank you for your consult. I will follow-up with patient. Please contact us if you have any additional questions.    Sondra Kaur MD  General Surgery  [facility] ([facility])

## 2025-04-08 NOTE — HPI
Maria Del Carmen Spence is a 84 y.o. female with a history of atrial fibrillation (on Xarelto), and previous tubal ligation who presented to the emergency department with sudden onset abdominal pain and nausea.  Patient states the pain started around 6:00 p.m. last night out of the blue and she originally thought it was gas pains.  She is passing a little bit of flatus but not much.  The pain is predominantly on the right side of the abdomen but she states she also felt some epigastric pain with radiation around to the back and pelvic pains.  Upon presentation in the ER she was afebrile, vitals were within normal limits.  Labs were notable for leukocytosis of 12 K, otherwise within normal limits.  CT of the abdomen and pelvis was done with IV contrast which was concerning for possible early closed loop obstruction in the pelvis with mesenteric fat stranding and edema, with some fluid in the pelvis.  She was admitted to hospital medicine.

## 2025-04-08 NOTE — NURSING
Patient was assessed by surgery PA and nurse notified that patient has awakened from surgery and is stating that she cannot open her right eye, patient states that it feels like something is in it and cries in pain. Upon assessment there is some redness noted around the eye and redness noted to the sclera, however nothing is found to be in the eye. MD and NP with hospital medicine notified.

## 2025-04-08 NOTE — ASSESSMENT & PLAN NOTE
Chronic.  Stable    Home medication includes omeprazole p.o.    PLAN:  Started on pantoprazole IV, resume oral PPI when able to tolerate p.o.

## 2025-04-08 NOTE — HPI
Ms. Maria Del Carmen Spence is a 84 y.o. female who  has a medical history of A-fib, Arthritis, Chronic back pain,, Frequent headaches, GERD (gastroesophageal reflux disease), Glaucoma, Hyperlipemia, and Hypertension.    She  presented to the ED for evaluation of worsening abdominal pain with associated nausea since yesterday afternoon which did not alleviate with OTC treatments.  Denies any recent concerns for infection, fever, chills, urinary symptoms.  She reports a prior history of gynecologic surgeries.    ED course notable for hemodynamically stable vitals, labs with WBC 12.79.  CT abdomen pelvis w/ IV contrast(read pending) were ordered.  Evaluation noted concerns for closed loop bowel obstruction and patient also had episodes of vomiting.  General surgery was consulted with recommendations for Xarelto reversal and anticipated surgery.  Patient was admitted to Hospital Medicine for further evaluation.

## 2025-04-08 NOTE — SUBJECTIVE & OBJECTIVE
Past Medical History:   Diagnosis Date    A-fib     Arthritis     Chronic LBP     ESIs x 3 with Dr. Hicks, PT at [facility], chiropractor    Eye pain     Frequent headaches     GERD (gastroesophageal reflux disease)     Glaucoma     Hyperlipemia     Hypertension        Past Surgical History:   Procedure Laterality Date    ABLATION OF ARRHYTHMOGENIC FOCUS FOR ATRIAL FIBRILLATION N/A 3/6/2019    Procedure: ABLATION, ARRHYTHMOGENIC FOCUS, FOR ATRIAL FIBRILLATION;  Surgeon: Abel Morillo MD;  Location: [facility] EP LAB;  Service: Cardiology;  Laterality: N/A;    CARDIOVERSION N/A 7/9/2018    Procedure: CARDIOVERSION;  Surgeon: Will Rosenthal MD;  Location: [facility] CATH LAB;  Service: Cardiology;  Laterality: N/A;    CATARACT EXTRACTION W/  INTRAOCULAR LENS IMPLANT  OU    COLONOSCOPY N/A 6/13/2024    Procedure: COLONOSCOPY 6/10 xarelto 2 day hold note;  Surgeon: Sayda Ferreira MD;  Location: [facility] ENDO;  Service: Endoscopy;  Laterality: N/A;    ESOPHAGOGASTRODUODENOSCOPY N/A 4/13/2024    Procedure: EGD (ESOPHAGOGASTRODUODENOSCOPY);  Surgeon: Oswald Esteves MD;  Location: [facility] ENDO;  Service: Endoscopy;  Laterality: N/A;    INJECTION OF ANESTHETIC AGENT INTO SACROILIAC JOINT Right 11/3/2020    Procedure: right Sacroiliac Joint Injection;  Surgeon: Ayush Christiansen MD;  Location: [facility] PAIN MGT;  Service: Pain Management;  Laterality: Right;    INJECTION OF ANESTHETIC AGENT INTO SACROILIAC JOINT Right 3/23/2021    Procedure: right Sacroiliac Joint Injection;  Surgeon: Ayush Christiansen MD;  Location: [facility] PAIN MGT;  Service: Pain Management;  Laterality: Right;    INJECTION OF JOINT Right 11/3/2020    Procedure: right GT bursa injection;  Surgeon: Ayush Christiansen MD;  Location: [facility] PAIN MGT;  Service: Pain Management;  Laterality: Right;    INJECTION OF JOINT Bilateral 3/23/2021    Procedure: bilateral GT bursa injection;  Surgeon: Ayush Christiansen MD;  Location: [facility] PAIN MGT;  Service: Pain Management;  Laterality:  Bilateral;    TUBAL LIGATION         Review of patient's allergies indicates:   Allergen Reactions    Voltaren [diclofenac sodium] Edema     Gel formulation caused facial rash and facial swelling    Eliquis [apixaban] Other (See Comments)     Headache, numbness in lip     Medrol [methylprednisolone] Blisters       No current facility-administered medications on file prior to encounter.     Current Outpatient Medications on File Prior to Encounter   Medication Sig    AA/prot/lysine/methio/vit C/B6 (A/G PRO ORAL) Take 2 tablets by mouth 2 (two) times daily.     acetaminophen (TYLENOL) 650 MG TbSR Take 650 mg by mouth as needed. Does not go over 3000mg daily    atorvastatin (LIPITOR) 10 MG tablet Take 1 tablet (10 mg total) by mouth every evening.    CALCIUM PHOSPHATE TRIB/VIT D3 (CITRACAL + D ORAL) Take 1 tablet by mouth once daily at 6am.     cetirizine (ZYRTEC) 10 MG tablet Take 10 mg by mouth as needed for Allergies.    furosemide (LASIX) 20 MG tablet TAKE ONE TABLET BY MOUTH TWICE A WEEK    gabapentin (NEURONTIN) 400 MG capsule Take 1 capsule (400 mg total) by mouth 3 (three) times daily.    latanoprost 0.005 % ophthalmic solution INSTILL ONE DROP IN EACH EYE AT BEDTIME    metoprolol tartrate (LOPRESSOR) 25 MG tablet Take 1 tablet (25 mg total) by mouth 2 (two) times daily.    MULTIVITAMIN W-MINERALS/LUTEIN (CENTRUM SILVER ORAL) Take 1 tablet by mouth once daily.    omeprazole (PRILOSEC) 40 MG capsule Take 1 capsule (40 mg total) by mouth once daily.    ondansetron (ZOFRAN-ODT) 4 MG TbDL Take 1 tablet (4 mg total) by mouth every 6 (six) hours as needed.    RSVPreF3 antigen-AS01E, PF, (AREXVY, PF,) 120 mcg/0.5 mL SusR vaccine Inject into the muscle.    timolol maleate 0.5% (TIMOPTIC) 0.5 % Drop PLACE ONE DROP INTO BOTH EYES EVERY MORNING    XARELTO 15 mg Tab Take 1 tablet (15 mg total) by mouth daily with dinner or evening meal.     Family History       Problem Relation (Age of Onset)    Cancer Mother    Cataracts  Mother    Diabetes Paternal Aunt    Glaucoma Mother    Hypertension Mother, Father          Tobacco Use    Smoking status: Never    Smokeless tobacco: Never   Substance and Sexual Activity    Alcohol use: No    Drug use: No    Sexual activity: Yes     Partners: Male     Review of Systems   Constitutional:  Positive for fatigue. Negative for chills and fever.   HENT:  Negative for congestion and rhinorrhea.    Respiratory:  Negative for cough and shortness of breath.    Cardiovascular:  Negative for chest pain and leg swelling.   Gastrointestinal:  Positive for abdominal distention, abdominal pain, nausea and vomiting. Negative for constipation and diarrhea.   Genitourinary:  Negative for difficulty urinating and dysuria.   Musculoskeletal:  Positive for back pain (Chronic).     Objective:     Vital Signs (Most Recent):  Temp: 97.9 °F (36.6 °C) (04/08/25 0535)  Pulse: 97 (04/08/25 0551)  Resp: 18 (04/08/25 0611)  BP: 119/68 (04/08/25 0535)  SpO2: 96 % (04/08/25 0535) Vital Signs (24h Range):  Temp:  [97.9 °F (36.6 °C)-98 °F (36.7 °C)] 97.9 °F (36.6 °C)  Pulse:  [] 97  Resp:  [15-20] 18  SpO2:  [92 %-97 %] 96 %  BP: (119-188)/(61-95) 119/68     Weight: 78.2 kg (172 lb 6.4 oz)  Body mass index is 28.69 kg/m².     Physical Exam  Vitals and nursing note reviewed.   Constitutional:       General: She is not in acute distress.     Appearance: She is ill-appearing. She is not toxic-appearing or diaphoretic.   HENT:      Head: Normocephalic and atraumatic.      Mouth/Throat:      Mouth: Mucous membranes are moist.   Eyes:      General: No scleral icterus.        Right eye: No discharge.         Left eye: No discharge.   Cardiovascular:      Rate and Rhythm: Normal rate and regular rhythm.      Heart sounds: Normal heart sounds.   Pulmonary:      Effort: Pulmonary effort is normal. No respiratory distress.      Breath sounds: Normal breath sounds. No wheezing.   Abdominal:      General: Bowel sounds are decreased. There  is distension.      Tenderness: There is abdominal tenderness. There is guarding.   Musculoskeletal:      Cervical back: No rigidity.      Right lower leg: No edema.      Left lower leg: No edema.   Skin:     General: Skin is warm and dry.      Coloration: Skin is not jaundiced.   Neurological:      Mental Status: She is alert and oriented to person, place, and time. Mental status is at baseline.   Psychiatric:         Mood and Affect: Mood normal.         Behavior: Behavior normal.                Significant Labs: All pertinent labs within the past 24 hours have been reviewed.  Recent Results (from the past 24 hours)   Comprehensive Metabolic Panel    Collection Time: 04/07/25 11:59 PM   Result Value Ref Range    Sodium 139 136 - 145 mmol/L    Potassium 4.1 3.5 - 5.1 mmol/L    Chloride 104 95 - 110 mmol/L    CO2 25 23 - 29 mmol/L    Glucose 127 (H) 70 - 110 mg/dL    BUN 14 8 - 23 mg/dL    Creatinine 0.8 0.5 - 1.4 mg/dL    Calcium 10.4 8.7 - 10.5 mg/dL    Protein Total 8.3 6.0 - 8.4 gm/dL    Albumin 4.2 3.5 - 5.2 g/dL    Bilirubin Total 0.4 0.1 - 1.0 mg/dL    ALP 91 40 - 150 unit/L    AST 23 11 - 45 unit/L    ALT 12 10 - 44 unit/L    Anion Gap 10 8 - 16 mmol/L    eGFR >60 >60 mL/min/1.73/m2   Lipase    Collection Time: 04/07/25 11:59 PM   Result Value Ref Range    Lipase Level 26 4 - 60 U/L   BNP    Collection Time: 04/07/25 11:59 PM   Result Value Ref Range    BNP 77 0 - 99 pg/mL   CK    Collection Time: 04/07/25 11:59 PM   Result Value Ref Range    CPK 80 20 - 180 U/L   Troponin I    Collection Time: 04/07/25 11:59 PM   Result Value Ref Range    Troponin-I <0.006 <=0.026 ng/mL   CBC with Differential    Collection Time: 04/07/25 11:59 PM   Result Value Ref Range    WBC 12.79 (H) 3.90 - 12.70 K/uL    RBC 4.00 4.00 - 5.40 M/uL    HGB 13.1 12.0 - 16.0 gm/dL    HCT 39.2 37.0 - 48.5 %    MCV 98 82 - 98 fL    MCH 32.8 (H) 27.0 - 31.0 pg    MCHC 33.4 32.0 - 36.0 g/dL    RDW 12.0 11.5 - 14.5 %    Platelet Count 331 150 -  450 K/uL    MPV 9.8 9.2 - 12.9 fL    Nucleated RBC 0 <=0 /100 WBC    Neut % 82.0 (H) 38 - 73 %    Lymph % 12.3 (L) 18 - 48 %    Mono % 5.0 4 - 15 %    Eos % 0.2 <=8 %    Basophil % 0.1 <=1.9 %    Imm Grans % 0.4 0.0 - 0.5 %    Neut # 10.50 (H) 1.8 - 7.7 K/uL    Lymph # 1.57 1 - 4.8 K/uL    Mono # 0.64 0.3 - 1 K/uL    Eos # 0.02 <=0.5 K/uL    Baso # 0.01 <=0.2 K/uL    Imm Grans # 0.05 (H) 0.00 - 0.04 K/uL   Hepatitis C Antibody    Collection Time: 04/07/25 11:59 PM   Result Value Ref Range    Hep C Ab Interp Negative Negative   HIV 1/2 Ag/Ab (4th Gen)    Collection Time: 04/07/25 11:59 PM   Result Value Ref Range    HIV 1/2 Ag/Ab Negative Negative   Light Blue Top Hold    Collection Time: 04/07/25 11:59 PM   Result Value Ref Range    Extra Tube Hold for add-ons.        Significant Imaging: I have reviewed all pertinent imaging results/findings within the past 24 hours.  X-Ray Chest AP Portable    (Results Pending)   CT Abdomen Pelvis With IV Contrast Routine Oral Contrast    (Results Pending)

## 2025-04-08 NOTE — SUBJECTIVE & OBJECTIVE
No current facility-administered medications on file prior to encounter.     Current Outpatient Medications on File Prior to Encounter   Medication Sig    AA/prot/lysine/methio/vit C/B6 (A/G PRO ORAL) Take 2 tablets by mouth 2 (two) times daily.     acetaminophen (TYLENOL) 650 MG TbSR Take 650 mg by mouth as needed. Does not go over 3000mg daily    atorvastatin (LIPITOR) 10 MG tablet Take 1 tablet (10 mg total) by mouth every evening.    CALCIUM PHOSPHATE TRIB/VIT D3 (CITRACAL + D ORAL) Take 1 tablet by mouth once daily at 6am.     cetirizine (ZYRTEC) 10 MG tablet Take 10 mg by mouth as needed for Allergies.    furosemide (LASIX) 20 MG tablet TAKE ONE TABLET BY MOUTH TWICE A WEEK    gabapentin (NEURONTIN) 400 MG capsule Take 1 capsule (400 mg total) by mouth 3 (three) times daily.    latanoprost 0.005 % ophthalmic solution INSTILL ONE DROP IN EACH EYE AT BEDTIME    metoprolol tartrate (LOPRESSOR) 25 MG tablet Take 1 tablet (25 mg total) by mouth 2 (two) times daily.    MULTIVITAMIN W-MINERALS/LUTEIN (CENTRUM SILVER ORAL) Take 1 tablet by mouth once daily.    omeprazole (PRILOSEC) 40 MG capsule Take 1 capsule (40 mg total) by mouth once daily.    ondansetron (ZOFRAN-ODT) 4 MG TbDL Take 1 tablet (4 mg total) by mouth every 6 (six) hours as needed.    RSVPreF3 antigen-AS01E, PF, (AREXVY, PF,) 120 mcg/0.5 mL SusR vaccine Inject into the muscle.    timolol maleate 0.5% (TIMOPTIC) 0.5 % Drop PLACE ONE DROP INTO BOTH EYES EVERY MORNING    XARELTO 15 mg Tab Take 1 tablet (15 mg total) by mouth daily with dinner or evening meal.       Review of patient's allergies indicates:   Allergen Reactions    Voltaren [diclofenac sodium] Edema     Gel formulation caused facial rash and facial swelling    Eliquis [apixaban] Other (See Comments)     Headache, numbness in lip     Medrol [methylprednisolone] Blisters       Past Medical History:   Diagnosis Date    A-fib     Arthritis     Chronic LBP     ESIs x 3 with Dr. Hicks, PT at [facility],  chiropractor    Eye pain     Frequent headaches     GERD (gastroesophageal reflux disease)     Glaucoma     Hyperlipemia     Hypertension      Past Surgical History:   Procedure Laterality Date    ABLATION OF ARRHYTHMOGENIC FOCUS FOR ATRIAL FIBRILLATION N/A 3/6/2019    Procedure: ABLATION, ARRHYTHMOGENIC FOCUS, FOR ATRIAL FIBRILLATION;  Surgeon: Abel Morillo MD;  Location: [facility] EP LAB;  Service: Cardiology;  Laterality: N/A;    CARDIOVERSION N/A 7/9/2018    Procedure: CARDIOVERSION;  Surgeon: Will Rosenthal MD;  Location: [facility] CATH LAB;  Service: Cardiology;  Laterality: N/A;    CATARACT EXTRACTION W/  INTRAOCULAR LENS IMPLANT  OU    COLONOSCOPY N/A 6/13/2024    Procedure: COLONOSCOPY 6/10 xarelto 2 day hold note;  Surgeon: Sayda Ferreira MD;  Location: [facility] ENDO;  Service: Endoscopy;  Laterality: N/A;    ESOPHAGOGASTRODUODENOSCOPY N/A 4/13/2024    Procedure: EGD (ESOPHAGOGASTRODUODENOSCOPY);  Surgeon: Oswald Esteves MD;  Location: [facility] ENDO;  Service: Endoscopy;  Laterality: N/A;    INJECTION OF ANESTHETIC AGENT INTO SACROILIAC JOINT Right 11/3/2020    Procedure: right Sacroiliac Joint Injection;  Surgeon: Ayush Christiansen MD;  Location: [facility] PAIN MGT;  Service: Pain Management;  Laterality: Right;    INJECTION OF ANESTHETIC AGENT INTO SACROILIAC JOINT Right 3/23/2021    Procedure: right Sacroiliac Joint Injection;  Surgeon: Ayush Christiansen MD;  Location: [facility] PAIN MGT;  Service: Pain Management;  Laterality: Right;    INJECTION OF JOINT Right 11/3/2020    Procedure: right GT bursa injection;  Surgeon: Ayush Christiansen MD;  Location: [facility] PAIN MGT;  Service: Pain Management;  Laterality: Right;    INJECTION OF JOINT Bilateral 3/23/2021    Procedure: bilateral GT bursa injection;  Surgeon: Ayush Christiansen MD;  Location: [facility] PAIN MGT;  Service: Pain Management;  Laterality: Bilateral;    TUBAL LIGATION       Family History       Problem Relation (Age of Onset)    Cancer Mother    Cataracts Mother     Diabetes Paternal Aunt    Glaucoma Mother    Hypertension Mother, Father          Tobacco Use    Smoking status: Never    Smokeless tobacco: Never   Substance and Sexual Activity    Alcohol use: No    Drug use: No    Sexual activity: Yes     Partners: Male     Review of Systems   Constitutional:  Negative for activity change, appetite change, fatigue and fever.   Respiratory:  Negative for cough, chest tightness and shortness of breath.    Cardiovascular:  Negative for chest pain.   Gastrointestinal:  Positive for abdominal pain, nausea and vomiting. Negative for diarrhea.   Skin:  Negative for color change, pallor, rash and wound.   Psychiatric/Behavioral:  Negative for confusion.      Objective:     Vital Signs (Most Recent):  Temp: 97.9 °F (36.6 °C) (04/08/25 0535)  Pulse: 97 (04/08/25 0551)  Resp: 18 (04/08/25 0611)  BP: 119/68 (04/08/25 0535)  SpO2: 96 % (04/08/25 0535) Vital Signs (24h Range):  Temp:  [97.9 °F (36.6 °C)-98 °F (36.7 °C)] 97.9 °F (36.6 °C)  Pulse:  [] 97  Resp:  [15-20] 18  SpO2:  [92 %-97 %] 96 %  BP: (119-188)/(61-95) 119/68     Weight: 78.2 kg (172 lb 6.4 oz)  Body mass index is 28.69 kg/m².     Physical Exam  Constitutional:       General: She is not in acute distress.     Appearance: Normal appearance.   HENT:      Head: Normocephalic and atraumatic.   Eyes:      Extraocular Movements: Extraocular movements intact.      Conjunctiva/sclera: Conjunctivae normal.   Cardiovascular:      Rate and Rhythm: Normal rate and regular rhythm.   Pulmonary:      Effort: Pulmonary effort is normal.   Abdominal:      General: Abdomen is flat. There is no distension.      Palpations: Abdomen is soft. There is no mass.      Tenderness: There is abdominal tenderness. There is no guarding or rebound.      Hernia: No hernia is present.   Musculoskeletal:         General: No swelling, tenderness, deformity or signs of injury. Normal range of motion.   Skin:     General: Skin is warm and dry.       Coloration: Skin is not jaundiced.   Neurological:      General: No focal deficit present.      Mental Status: She is alert and oriented to person, place, and time.   Psychiatric:         Mood and Affect: Mood normal.         Behavior: Behavior normal.         Thought Content: Thought content normal.            I have reviewed all pertinent lab results within the past 24 hours.  CBC:   Recent Labs   Lab 04/07/25  2359   WBC 12.79*   RBC 4.00   HGB 13.1   HCT 39.2      MCV 98   MCH 32.8*   MCHC 33.4     BMP:   Recent Labs   Lab 04/07/25  2359      K 4.1      CO2 25   BUN 14   CREATININE 0.8   CALCIUM 10.4     CMP:   Recent Labs   Lab 04/07/25  2359   CALCIUM 10.4   ALBUMIN 4.2      K 4.1   CO2 25      BUN 14   CREATININE 0.8   ALKPHOS 91   ALT 12   AST 23   BILITOT 0.4       Significant Diagnostics:  I have reviewed all pertinent imaging results/findings within the past 24 hours.  CT: I have reviewed all pertinent results/findings within the past 24 hours and my personal findings are:  No significantly distended bowel however small intestine in the pelvis with significant mesenteric edema and fat stranding with some pelvic fluid

## 2025-04-08 NOTE — ANESTHESIA PREPROCEDURE EVALUATION
04/08/2025  Maria Del Carmen Spence is a 84 y.o., female    Past Medical History:   Diagnosis Date    A-fib     Arthritis     Chronic LBP     ESIs x 3 with Dr. Hicks, PT at [facility], chiropractor    Eye pain     Frequent headaches     GERD (gastroesophageal reflux disease)     Glaucoma     Hyperlipemia     Hypertension      Past Surgical History:   Procedure Laterality Date    ABLATION OF ARRHYTHMOGENIC FOCUS FOR ATRIAL FIBRILLATION N/A 3/6/2019    Procedure: ABLATION, ARRHYTHMOGENIC FOCUS, FOR ATRIAL FIBRILLATION;  Surgeon: Abel Morillo MD;  Location: [facility] EP LAB;  Service: Cardiology;  Laterality: N/A;    CARDIOVERSION N/A 7/9/2018    Procedure: CARDIOVERSION;  Surgeon: Will Rosenthal MD;  Location: [facility] CATH LAB;  Service: Cardiology;  Laterality: N/A;    CATARACT EXTRACTION W/  INTRAOCULAR LENS IMPLANT  OU    COLONOSCOPY N/A 6/13/2024    Procedure: COLONOSCOPY 6/10 xarelto 2 day hold note;  Surgeon: Sayda Ferreira MD;  Location: [facility] ENDO;  Service: Endoscopy;  Laterality: N/A;    ESOPHAGOGASTRODUODENOSCOPY N/A 4/13/2024    Procedure: EGD (ESOPHAGOGASTRODUODENOSCOPY);  Surgeon: Oswald Esteves MD;  Location: [facility] ENDO;  Service: Endoscopy;  Laterality: N/A;    INJECTION OF ANESTHETIC AGENT INTO SACROILIAC JOINT Right 11/3/2020    Procedure: right Sacroiliac Joint Injection;  Surgeon: Ayush Christiansen MD;  Location: [facility] PAIN MGT;  Service: Pain Management;  Laterality: Right;    INJECTION OF ANESTHETIC AGENT INTO SACROILIAC JOINT Right 3/23/2021    Procedure: right Sacroiliac Joint Injection;  Surgeon: Ayush Christiansen MD;  Location: [facility] PAIN MGT;  Service: Pain Management;  Laterality: Right;    INJECTION OF JOINT Right 11/3/2020    Procedure: right GT bursa injection;  Surgeon: Ayush Christiansen MD;  Location: [facility] PAIN MGT;  Service: Pain Management;  Laterality: Right;    INJECTION OF JOINT Bilateral  3/23/2021    Procedure: bilateral GT bursa injection;  Surgeon: Ayush Christiansen MD;  Location: [facility];  Service: Pain Management;  Laterality: Bilateral;    TUBAL LIGATION         Chemistry        Component Value Date/Time     04/07/2025 2359     11/14/2024 1835    K 4.1 04/07/2025 2359    K 5.1 11/14/2024 1835     04/07/2025 2359     11/14/2024 1835    CO2 25 04/07/2025 2359    CO2 21 (L) 11/14/2024 1835    BUN 14 04/07/2025 2359    CREATININE 0.8 04/07/2025 2359     (H) 11/14/2024 1835        Component Value Date/Time    CALCIUM 10.4 04/07/2025 2359    CALCIUM 10.4 11/14/2024 1835    ALKPHOS 91 04/07/2025 2359    ALKPHOS 88 11/14/2024 1835    AST 23 04/07/2025 2359    AST 38 11/14/2024 1835    ALT 12 04/07/2025 2359    ALT 18 11/14/2024 1835    BILITOT 0.4 04/07/2025 2359    BILITOT 0.8 11/14/2024 1835    ESTGFRAFRICA >60 02/04/2021 1450    EGFRNONAA >60 02/04/2021 1450        Lab Results   Component Value Date    WBC 12.79 (H) 04/07/2025    HGB 13.1 04/07/2025    HCT 39.2 04/07/2025    MCV 98 04/07/2025     04/07/2025       Carotid Doppler (10/25/24):  Interpretation Summary         There is 20-39% right Internal Carotid Stenosis.    There is 0-19% left Internal Carotid Stenosis.    ECHO (10/18/23):  Summary         Left Ventricle: The left ventricle is normal in size. Normal wall thickness. Normal wall motion. There is normal systolic function with a visually estimated ejection fraction of 55 - 60%. There is normal diastolic function.    Right Ventricle: Normal right ventricular cavity size. Wall thickness is normal. Right ventricle wall motion  is normal. Systolic function is normal.    Aortic Valve: There is mild aortic valve sclerosis. There is moderate aortic regurgitation.    Mitral Valve:  There is moderate regurgitation.    Tricuspid Valve: There is moderate regurgitation.    Pulmonary Artery: There is moderate pulmonary hypertension. The estimated pulmonary  artery systolic pressure is 64 mmHg.    IVC/SVC: Normal venous pressure at 3 mmHg.    Pre-op Assessment    I have reviewed the Patient Summary Reports.    I have reviewed the NPO Status.   I have reviewed the Medications.     Review of Systems  Anesthesia Hx:  No problems with previous Anesthesia   History of prior surgery of interest to airway management or planning:            Denies Personal Hx of Anesthesia complications.                    Social:  Non-Smoker       Hematology/Oncology:  Hematology Normal   Oncology Normal    -- Denies Anemia:                                  Cardiovascular:     Hypertension Valvular problems/Murmurs (moderate AI; Pulm HTN), AI   Dysrhythmias atrial fibrillation         ECG has been reviewed. Takes Toprol xarelto for her Afob - Xarelto recently revered with K-Centrapril       NSR; Ant-Lat infarct -  age-undetermined                             Pulmonary:      Shortness of breath   Mod Pulm HTN - PASP 64 mmHg               Renal/:  Renal/ Normal                 Hepatic/GI:    Hiatal Hernia, GERD, well controlled   Daily Omeprazole     Active nausea, vomiting  - Suspected SBO             Musculoskeletal:  Arthritis               Neurological:      Headaches                                 Endocrine:  Endocrine Normal    BMI 29      Denies Obesity / BMI > 30      Physical Exam  General: Well nourished    Airway:  Mallampati: III   Mouth Opening: Normal  TM Distance: Normal  Neck ROM: Normal ROM    Dental:  Intact        Anesthesia Plan  Type of Anesthesia, risks & benefits discussed:    Anesthesia Type: Gen ETT  Intra-op Monitoring Plan: Standard ASA Monitors  Post Op Pain Control Plan: multimodal analgesia and IV/PO Opioids PRN  Induction:  IV and rapid sequence  Airway Plan: Direct, Post-Induction  Informed Consent: Informed consent signed with the Patient and all parties understand the risks and agree with anesthesia plan.  All questions answered.   ASA Score: 3 Emergent  Day  of Surgery Review of History & Physical: H&P Update referred to the surgeon/provider.    Ready For Surgery From Anesthesia Perspective.     .

## 2025-04-08 NOTE — TRANSFER OF CARE
"Anesthesia Transfer of Care Note    Patient: Maria Del Carmen Spence    Procedure(s) Performed: Procedure(s) (LRB):  XI ROBOTIC LAPAROSCOPY,DIAGNOSTIC (N/A)  BLOCK, TRANSVERSUS ABDOMINIS PLANE  EXCISION, SMALL INTESTINE, LAPAROSCOPIC    Patient location: PACU    Anesthesia Type: general    Transport from OR: Transported from OR on room air with adequate spontaneous ventilation    Post pain: adequate analgesia    Post assessment: no apparent anesthetic complications and tolerated procedure well    Post vital signs: stable    Level of consciousness: responds to stimulation and sedated    Nausea/Vomiting: no nausea/vomiting    Complications: none    Transfer of care protocol was followedComments: Report given to PACU RN at bedside. Hand off tool used. RN given opportunity to ask questions or clarify concerns. No Concerns verbalized. RN was asked if ready to assume care of patient. RN verbally confirmed. Pt. left in stable condition. SV. Vital Signs Return to Near Baseline. No s/s of distress noted.       Last vitals: Visit Vitals  /68   Pulse 97   Temp 36.6 °C (97.9 °F)   Resp 18   Ht 5' 5" (1.651 m)   Wt 78.2 kg (172 lb 6.4 oz)   SpO2 96%   Breastfeeding No   BMI 28.69 kg/m²     "

## 2025-04-08 NOTE — ASSESSMENT & PLAN NOTE
Patient with paroxysmal (<7 days) atrial fibrillation which is controlled currently with Beta Blocker. Patient is currently in sinus rhythm.    [id number] Score: 3.     The patients heart rate in the last 24 hours is as follows:  Pulse  Min: 68  Max: 105       PLAN:  - Antiarrhythmics: metoprolol tartrate (LOPRESSOR) tablet 25 mg, 2 times daily, Oral  - Anticoagulants:  Xarelto was held and reversed due to surgical intervention concerns. Resume when safe per surgical team  - Replete electrolytes PRN to  target Magnesium > 2, Potassium > 4  - Continuous telemetry

## 2025-04-08 NOTE — OP NOTE
[facility]  Surgical Oncology  Operative Note           Date of Procedure: 4/8/2025   Time: 10:35 AM    Procedure: Procedure(s) (LRB):  XI ROBOTIC LAPAROSCOPY,DIAGNOSTIC (N/A)  BLOCK, TRANSVERSUS ABDOMINIS PLANE  EXCISION, SMALL INTESTINE, LAPAROSCOPIC     Surgeons and Role:     * Sondra Kaur MD - Primary     * Chadwick Sylvester MD - Assisting      Pre-Operative Diagnosis:   Intestinal obstruction, unspecified cause, unspecified whether partial or complete [K56.609]    Post-Operative Diagnosis:   Closed loop bowel obstruction 2/2 intestinal adhesions    Anesthesia: General    Procedure:  Diagnostic laparoscopy  Robotic small bowel resection with intracorporeal anastomosis  Tap block    Operative Findings:   Significant omental adhesions to the anterior abdominal wall, upon entering the abdomen we are able to visualize the involved loop of bowel in the pelvis, this was very red and inflamed appearing and there was an anti mesenteric portion which was appeared frankly necrotic, this was due to adhesions from the previous tubal ligation.  Anastomosis was widely patent and there were no other injuries, involve segment was approximately mid jejunum         Indications:  Maria Del Carmen Spence is a 84 y.o. year old female with a history of AFib on Xarelto, and a previous tubal ligation who presented to the emergency department with sudden onset abdominal pain around 6:00 p.m. the evening prior.  Upon presentation in the emergency department she was noted to have a leukocytosis of 12.7 K.  CT scan was done which showed concerns for closed loop bowel obstruction in the pelvis with some mesenteric edema and fluid.  Her Xarelto was reversed and she was taken to the operating room for diagnostic laparoscopy, possible laparotomy, possible bowel resection and any other indicated procedures..     Risks and benefits were reviewed including bleeding, infection, pain, scar, damage to surrounding structures, cardiovascular  and pulmonary complications, damage to surrounding structures, anastomotic leak, hernia formation, additional bowel obstructions, ileus, need for additional procedures, death, and imponderables.  She expressed understanding and gave informed consent to proceed.    Procedure in Detail:    Following written informed consent the patient was taken to the operating room and positioned in the supine position.  General endotracheal anesthesia was induced.  An NG-tube was placed.  A Colunga and appropriate IV access were placed sterilely, her arms were then tucked , taking care to pad all bony prominences and IVs.  Her abdomen was then prepped and draped in the usual sterile fashion.  A time-out was performed in keeping with the [facility] guidelines confirming the correct patient, procedure, and other pertinent information.  She was given appropriate perioperative antibiotics.    A stab incision was made in the left upper quadrant at black's point and a Veress needle was advanced.  The saline meniscus test was performed to confirm correct positioning of the Veress in the intra-abdominal cavity and her abdomen was then insufflated to 15 mm of mercury.  She tolerated insufflation well.  An 8 mm robotic trocar was then placed in the mid axillary line just above the umbilicus under direct visualization.  Upon entering her abdomen there was no damage from the original Veress placement and it was removed.  Two additional trocars were then placed supraumbilical and and then a right mid axillary line.  She was noted to have some adhesions of her omentum to her anterior abdominal wall, some of which were taken down bluntly to allow for placement of the robotic trocars.  She was then placed in steep Trendelenburg position and the robot was docked and instruments were placed.  We began by taking down the remainder of the omental adhesions and obtaining hemostasis.  Once this has been performed the bowel was gently grasped and pulled out  of the pelvis.  Upon doing this we were able to note 1 segment of bowel that was adherent in the pelvis and was beefy red consistent with ischemia.  It was noted to have some adhesions from her uterus and her ovaries that were holding in his bowel into place.  These were lysed with the robotic scissors which released the bowel and allowed it to untwist.  The bowel was evaluated and did pink up somewhat but there was still a frankly necrotic appearing area on the anti mesenteric border.  ICG was given and this area of bowel did not perfused as well as the others.  Due to that anti mesenteric border necrosis the decision was made to resect this segment of small bowel.  An additional 5 mm assistant port was placed to grasped the distal bowel during the creation of the anastomosis.  Stay sutures were placed and mesenteric window was made on both the proximal and the distal ends of the bowel at the resection point.  A side-to-side anastomosis was created with 3 fires of the 8 mm robotic stapler using blue loads.  This was widely patent and hemostatic.  The common channel was then closed using 2 fires of the robotic stapler.  The bowel mesentery was then taken with the vessel sealer and this freed up the bowel completely.  The distal common channel was oversewn with a running V lock which was also used to close the window in the small bowel mesentery.  The bowel was then run and there were no injuries from original trocar placement and no other areas of concern.  This area did appear to be in the mid jejunum.  The bowel was then placed in a laparoscopic bag and the robot was undocked.    A Pfannenstiel incision was made using a scalpel and then carried down through the subcutaneous tissues using Bovie electrocautery.  The fascia was incised transversely and then the muscle split and the peritoneum was opened vertically.  This had clearly been an area of previous surgery as there were permanent stitches in this region.   Once the peritoneum was opened we are able to grab the last per laparoscopic bag and remove it through the Pfannenstiel incision.  The peritoneum was then closed with an absorbable 2-0 Vicryl and hemostasis was achieved.  The area was irrigated and then the fascia was closed with a running 2-0 V lock stitch x2.  The abdomen was then re-insufflated and a camera was used to inspect the intra-abdominal cavity.  There was some ascites and some minimal bleeding that was suctioned out.  The anastomosis appeared healthy and hemostatic.  Tap blocks were then performed bilaterally using Exparel and Marcaine combination.  The ports were then removed under direct visualization and the abdomen was allowed to desufflate, doing our best to remove all of the CO2.  The incisions were irrigated and the Pfannenstiel incision was then closed in 2 layers with a running 3-0 Vicryl followed by 4-0 Monocryl and then Dermabond.  The laparoscopic incision sites were closed with 4-0 Monocryl followed by Dermabond.  The NG tube was then brought out into place and the patient was then awoken from general anesthesia and extubated.  She was taken to recovery in stable condition.  She tolerated the procedure well.  All counts were correct x2 at the end of the case.      Portions of the record were created with [email or web address] Direct voice recognition software. This may lead to occasional typographical errors due to the inherent limitations of the software. Read the chart carefully and recognize, using context, where substitutions have occurred. Please do not hesitate to contact me directly if clarification is needed.    Implants: * No implants in log *    Drains: None    Estimated Blood Loss (EBL):   25 mL    Specimens: - jejunum for permanent   Specimen (24h ago, onward)       Start     Ordered    04/08/25 1039  Specimen to Pathology General Surgery  RELEASE UPON ORDERING        References:    Click here for ordering Quick Tip   Question:   Release to patient  Answer:  Immediate    04/08/25 1039                     Condition: Good    Disposition: PACU - hemodynamically stable.               Sondra Kaur MD      Surgical Oncology      4/8/2025, 10:35 AM

## 2025-04-08 NOTE — ANESTHESIA PROCEDURE NOTES
Intubation    Date/Time: 4/8/2025 7:50 AM    Performed by: Wiliam Hay CRNA  Authorized by: Abel Gatica II, MD    Intubation:     Induction:  Rapid sequence induction    Intubated:  Postinduction    Mask Ventilation:  Not attempted    Attempts:  1    Attempted By:  CRNA    Method of Intubation:  Direct    Blade:  Cal 3    Laryngeal View Grade: Grade I - full view of cords      Difficult Airway Encountered?: No      Complications:  None    Airway Device:  Oral endotracheal tube    Airway Device Size:  7.0    Style/Cuff Inflation:  Cuffed (inflated to minimal occlusive pressure)    Tube secured:  18    Secured at:  The lips    Placement Verified By:  Capnometry    Complicating Factors:  None    Findings Post-Intubation:  BS equal bilateral and atraumatic/condition of teeth unchanged  Notes:      Pt to OR via stretcher. All monitors applied. Smooth Induction. RSI Protocol followed. 10 Newtons of Cricoid Pressure was applied by assisting staff member prior to induction. Cricoid Pressure was maintained until proper tube placement was verified via ETCO2 Waveform Visualization, Bilateral Chest Rise, and moisture/fog noted in ETT noted. ETT secured with silk tape after placement of ETT confirmed. Eyelids secured with paper tape prior to intubation. 7.0ETT placed via DL using MAC 3 without issue. No s/s of damage to lips, mouth, teeth or gums noted. No s/s of aspiration noted. Pt. tolerated procedure well.

## 2025-04-08 NOTE — ASSESSMENT & PLAN NOTE
Imaging concerning for closed loop obstruction in the pelvis.  I discussed the findings with the patient.  We discussed that there is a potential possibility that there is no bowel obstruction although that is less likely, and the plan would be for diagnostic laparoscopy which involves placement of a camera laparoscopic instruments into the abdomen to evaluate with the intestinal viability as well as check for obstruction, and the possibility of need for laparotomy and bowel resection.  Risks and benefits of surgery were discussed with the patient including risk of bleeding, infection, damage to surrounding structures, anastomotic leak, hernia formation, need for additional surgeries or procedures.    -- NPO  -- to OR for diagnostic laparoscopy, possible laparotomy, and possible bowel resection   -- ancef + flagyl  -- Consent signed and in the chart

## 2025-04-08 NOTE — ASSESSMENT & PLAN NOTE
Presented to the ED for evaluation of worsening abdominal pain with associated nausea  which did not alleviate with OTC treatments.  Denies any recent concerns for infection, fever, chills, urinary symptoms.  She reports a prior history of gynecologic surgeries.     ED course notable for hemodynamically stable vitals, labs with WBC 12.79.  CT abdomen pelvis w/ IV contrast(read pending) were ordered.  Evaluation noted concerns for closed loop bowel obstruction and patient also had episodes of vomiting.  General surgery was consulted with recommendations for Xarelto reversal and anticipated surgery.  Patient was admitted to Hospital Medicine for further evaluation.    PLAN:  - General Surgery Consulted, follow-up recs  - NPO, IVF resuscitation  - Follow up pending radiology results

## 2025-04-08 NOTE — ASSESSMENT & PLAN NOTE
Hypertensive on admission    Home meds for hypertension were reviewed and noted below.   Home Hypertension Medications per chart review             furosemide (LASIX) 20 MG tablet TAKE ONE TABLET BY MOUTH TWICE A WEEK    metoprolol tartrate (LOPRESSOR) 25 MG tablet Take 1 tablet (25 mg total) by mouth 2 (two) times daily.            Patients blood pressure range in the last 24 hours was: BP  Min: 119/68  Max: 188/84.      PLAN:  -While in the hospital, will manage blood pressure as follows; Continue home antihypertensive regimen  -The patient's inpatient anti-hypertensive regimen is listed below:  Current Antihypertensives  hydrALAZINE injection 5 mg, Every 6 hours PRN, Intravenous  metoprolol tartrate (LOPRESSOR) tablet 25 mg, 2 times daily, Oral  timolol maleate 0.5% ophthalmic solution 1 drop, Daily, Both Eyes  -Will utilize p.r.n. blood pressure medication only if patient's blood pressure greater than 180/110 and she develops symptoms such as worsening chest pain or shortness of breath.

## 2025-04-08 NOTE — ED PROVIDER NOTES
SCRIBE #1 NOTE: ISean, am scribing for, and in the presence of, Janes Lee MD. I have scribed the HPI/ROS/PEx.    SCRIBE #2 NOTE: I, Estefania Krause, am scribing for, and in the presence of,  Ryan De Santiago MD. I have scribed the remaining portions of the note not scribed by Scribe #1.      History     Chief Complaint   Patient presents with    Abdominal Pain    Nausea     Pt c/o epigastric pain with nausea onset of 6p. Pt reports that she took two gas-x pills pta      Review of patient's allergies indicates:   Allergen Reactions    Voltaren [diclofenac sodium] Edema     Gel formulation caused facial rash and facial swelling    Eliquis [apixaban] Other (See Comments)     Headache, numbness in lip     Medrol [methylprednisolone] Blisters         History of Present Illness     HPI    4/7/2025, 11:59 PM  History obtained from the patient and medical records      History of Present Illness: Maria Del Carmen Spence is a 84 y.o. female patient with a PMHx of HTN, HLD, GERD, chronic lower back pain, arthritis, A-fib, anemia, obesity, and PVD who presents to the Emergency Department for evaluation of epigastric pain which began around 6:00 PM today. Symptoms are constant and moderate in severity. No mitigating or exacerbating factors reported. Associated sxs include lower back pain, constipation, and nausea. Patient denies any SOB or fever. Pt also denies emesis but states he was very close. Prior Tx includes 2 Gas-X which provided some relief.  No further complaints or concerns at this time.       Arrival mode: Personal Transportation    PCP: Rajinder Lutz MD        Past Medical History:  Past Medical History:   Diagnosis Date    A-fib     Arthritis     Chronic LBP     ESIs x 3 with Dr. Hicks, PT at [facility], chiropractor    Eye pain     Frequent headaches     GERD (gastroesophageal reflux disease)     Glaucoma     Hyperlipemia     Hypertension        Past Surgical History:  Past Surgical History:   Procedure  Laterality Date    ABLATION OF ARRHYTHMOGENIC FOCUS FOR ATRIAL FIBRILLATION N/A 3/6/2019    Procedure: ABLATION, ARRHYTHMOGENIC FOCUS, FOR ATRIAL FIBRILLATION;  Surgeon: Abel Morillo MD;  Location: [facility] EP LAB;  Service: Cardiology;  Laterality: N/A;    CARDIOVERSION N/A 7/9/2018    Procedure: CARDIOVERSION;  Surgeon: Will Rosenthal MD;  Location: [facility] CATH LAB;  Service: Cardiology;  Laterality: N/A;    CATARACT EXTRACTION W/  INTRAOCULAR LENS IMPLANT  OU    COLONOSCOPY N/A 6/13/2024    Procedure: COLONOSCOPY 6/10 xarelto 2 day hold note;  Surgeon: Sayda Ferreira MD;  Location: [facility] ENDO;  Service: Endoscopy;  Laterality: N/A;    ESOPHAGOGASTRODUODENOSCOPY N/A 4/13/2024    Procedure: EGD (ESOPHAGOGASTRODUODENOSCOPY);  Surgeon: Oswald Esteves MD;  Location: [facility] ENDO;  Service: Endoscopy;  Laterality: N/A;    INJECTION OF ANESTHETIC AGENT INTO SACROILIAC JOINT Right 11/3/2020    Procedure: right Sacroiliac Joint Injection;  Surgeon: Ayush Christiansen MD;  Location: [facility] PAIN MGT;  Service: Pain Management;  Laterality: Right;    INJECTION OF ANESTHETIC AGENT INTO SACROILIAC JOINT Right 3/23/2021    Procedure: right Sacroiliac Joint Injection;  Surgeon: Ayush Christiansen MD;  Location: [facility] PAIN MGT;  Service: Pain Management;  Laterality: Right;    INJECTION OF JOINT Right 11/3/2020    Procedure: right GT bursa injection;  Surgeon: Ayush Christiansen MD;  Location: [facility] PAIN MGT;  Service: Pain Management;  Laterality: Right;    INJECTION OF JOINT Bilateral 3/23/2021    Procedure: bilateral GT bursa injection;  Surgeon: Ayush Christiansen MD;  Location: [facility] PAIN MGT;  Service: Pain Management;  Laterality: Bilateral;    TUBAL LIGATION           Family History:  Family History   Problem Relation Name Age of Onset    Glaucoma Mother      Cancer Mother      Cataracts Mother      Hypertension Mother      Hypertension Father      Diabetes Paternal Aunt      Strabismus Neg Hx      Retinal detachment Neg Hx       Macular degeneration Neg Hx      Blindness Neg Hx      Amblyopia Neg Hx      Stroke Neg Hx      Thyroid disease Neg Hx         Social History:  Social History     Tobacco Use    Smoking status: Never    Smokeless tobacco: Never   Substance and Sexual Activity    Alcohol use: No    Drug use: No    Sexual activity: Yes     Partners: Male        Review of Systems     Review of Systems   Constitutional:  Negative for fever.   HENT:  Negative for sore throat.    Respiratory:  Negative for shortness of breath.    Cardiovascular:  Negative for chest pain.   Gastrointestinal:  Positive for abdominal pain (epigastric), constipation and nausea. Negative for vomiting.   Genitourinary:  Negative for dysuria.   Musculoskeletal:  Positive for back pain (lower).   Skin:  Negative for rash.   Neurological:  Negative for weakness.   Hematological:  Does not bruise/bleed easily.   All other systems reviewed and are negative.       Physical Exam     Initial Vitals [04/07/25 2330]   BP Pulse Resp Temp SpO2   (!) 146/69 73 18 98 °F (36.7 °C) 97 %      MAP       --          Physical Exam  Nursing Notes and Vital Signs Reviewed.  Constitutional: Patient is in no apparent distress. Pt is elderly and frail.  Head: Atraumatic. Normocephalic.  Eyes: PERRL. EOM intact. Conjunctivae are not pale. No scleral icterus.  ENT: Mucous membranes are moist. Oropharynx is clear and symmetric.    Neck: Supple. Full ROM. No lymphadenopathy.  Cardiovascular: Regular rate. Regular rhythm. No murmurs, rubs, or gallops. Distal pulses are 2+ and symmetric.  Pulmonary/Chest: No respiratory distress. Clear to auscultation bilaterally. No wheezing or rales.  Abdominal: Soft and non-distended.  There is no tenderness.  No rebound, guarding, or rigidity. Good bowel sounds.  Genitourinary: No CVA tenderness.  Musculoskeletal: Moves all extremities. No obvious deformities. No edema. No calf tenderness.  Skin: Warm and dry.  Neurological:  Alert, awake, and  "appropriate.  Normal speech.  No acute focal neurological deficits are appreciated.  Psychiatric: Normal affect. Good eye contact. Appropriate in content.     ED Course   Critical Care    Date/Time: 4/8/2025 4:20 AM    Performed by: Ryan De Santiago MD  Authorized by: Ryan De Santiago MD  Direct patient critical care time: 20 minutes  Additional history critical care time: 15 minutes  Ordering / reviewing critical care time: 5 minutes  Documentation critical care time: 5 minutes  Consulting other physicians critical care time: 5 minutes  Total critical care time (exclusive of procedural time) : 50 minutes  Critical care time was exclusive of separately billable procedures and treating other patients and teaching time.  Critical care was necessary to treat or prevent imminent or life-threatening deterioration of the following conditions: Closed loop obstruction requiring emergent surgery.  Critical care was time spent personally by me on the following activities: blood draw for specimens, development of treatment plan with patient or surrogate, discussions with consultants, interpretation of cardiac output measurements, evaluation of patient's response to treatment, examination of patient, obtaining history from patient or surrogate, ordering and performing treatments and interventions, ordering and review of laboratory studies, pulse oximetry, ordering and review of radiographic studies, re-evaluation of patient's condition and review of old charts.        ED Vital Signs:  Vitals:    04/07/25 2330 04/07/25 2350 04/08/25 0030 04/08/25 0102   BP: (!) 146/69 (!) 171/74 (!) 165/78 (!) 174/95   Pulse: 73 72 68 87   Resp: 18 18 19 18   Temp: 98 °F (36.7 °C)      TempSrc: Oral      SpO2: 97% 97% 97% 95%   Weight: 77.1 kg (170 lb)      Height: 5' 5" (1.651 m)       04/08/25 0132 04/08/25 0202 04/08/25 0310 04/08/25 0321   BP: (!) 188/84 (!) 158/73     Pulse: 90 95 68    Resp:  15 20 20   Temp:       TempSrc:       SpO2: 96% " (!) 92% 95%    Weight:       Height:           Abnormal Lab Results:  Labs Reviewed   COMPREHENSIVE METABOLIC PANEL - Abnormal       Result Value    Sodium 139      Potassium 4.1      Chloride 104      CO2 25      Glucose 127 (*)     BUN 14      Creatinine 0.8      Calcium 10.4      Protein Total 8.3      Albumin 4.2      Bilirubin Total 0.4      ALP 91      AST 23      ALT 12      Anion Gap 10      eGFR >60     CBC WITH DIFFERENTIAL - Abnormal    WBC 12.79 (*)     RBC 4.00      HGB 13.1      HCT 39.2      MCV 98      MCH 32.8 (*)     MCHC 33.4      RDW 12.0      Platelet Count 331      MPV 9.8      Nucleated RBC 0      Neut % 82.0 (*)     Lymph % 12.3 (*)     Mono % 5.0      Eos % 0.2      Basophil % 0.1      Imm Grans % 0.4      Neut # 10.50 (*)     Lymph # 1.57      Mono # 0.64      Eos # 0.02      Baso # 0.01      Imm Grans # 0.05 (*)    LIPASE - Normal    Lipase Level 26     B-TYPE NATRIURETIC PEPTIDE - Normal    BNP 77     CK - Normal    CPK 80     TROPONIN I - Normal    Troponin-I <0.006     HEPATITIS C ANTIBODY - Normal    Hep C Ab Interp Negative     HIV 1 / 2 ANTIBODY - Normal    HIV 1/2 Ag/Ab Negative     CBC W/ AUTO DIFFERENTIAL    Narrative:     The following orders were created for panel order CBC Auto Differential.  Procedure                               Abnormality         Status                     ---------                               -----------         ------                     CBC with Differential[[id number]]       Abnormal            Final result                 Please view results for these tests on the individual orders.   EXTRA TUBES    Narrative:     The following orders were created for panel order EXTRA TUBES.  Procedure                               Abnormality         Status                     ---------                               -----------         ------                     Light Blue Top Hold[[id number]]                             Final result                 Please view  results for these tests on the individual orders.   LIGHT BLUE TOP HOLD    Extra Tube Hold for add-ons.     URINALYSIS, REFLEX TO URINE CULTURE   HEP C VIRUS HOLD SPECIMEN   LACTIC ACID, PLASMA        All Lab Results:  Results for orders placed or performed during the hospital encounter of 04/07/25   Comprehensive Metabolic Panel    Collection Time: 04/07/25 11:59 PM   Result Value Ref Range    Sodium 139 136 - 145 mmol/L    Potassium 4.1 3.5 - 5.1 mmol/L    Chloride 104 95 - 110 mmol/L    CO2 25 23 - 29 mmol/L    Glucose 127 (H) 70 - 110 mg/dL    BUN 14 8 - 23 mg/dL    Creatinine 0.8 0.5 - 1.4 mg/dL    Calcium 10.4 8.7 - 10.5 mg/dL    Protein Total 8.3 6.0 - 8.4 gm/dL    Albumin 4.2 3.5 - 5.2 g/dL    Bilirubin Total 0.4 0.1 - 1.0 mg/dL    ALP 91 40 - 150 unit/L    AST 23 11 - 45 unit/L    ALT 12 10 - 44 unit/L    Anion Gap 10 8 - 16 mmol/L    eGFR >60 >60 mL/min/1.73/m2   Lipase    Collection Time: 04/07/25 11:59 PM   Result Value Ref Range    Lipase Level 26 4 - 60 U/L   BNP    Collection Time: 04/07/25 11:59 PM   Result Value Ref Range    BNP 77 0 - 99 pg/mL   CK    Collection Time: 04/07/25 11:59 PM   Result Value Ref Range    CPK 80 20 - 180 U/L   Troponin I    Collection Time: 04/07/25 11:59 PM   Result Value Ref Range    Troponin-I <0.006 <=0.026 ng/mL   CBC with Differential    Collection Time: 04/07/25 11:59 PM   Result Value Ref Range    WBC 12.79 (H) 3.90 - 12.70 K/uL    RBC 4.00 4.00 - 5.40 M/uL    HGB 13.1 12.0 - 16.0 gm/dL    HCT 39.2 37.0 - 48.5 %    MCV 98 82 - 98 fL    MCH 32.8 (H) 27.0 - 31.0 pg    MCHC 33.4 32.0 - 36.0 g/dL    RDW 12.0 11.5 - 14.5 %    Platelet Count 331 150 - 450 K/uL    MPV 9.8 9.2 - 12.9 fL    Nucleated RBC 0 <=0 /100 WBC    Neut % 82.0 (H) 38 - 73 %    Lymph % 12.3 (L) 18 - 48 %    Mono % 5.0 4 - 15 %    Eos % 0.2 <=8 %    Basophil % 0.1 <=1.9 %    Imm Grans % 0.4 0.0 - 0.5 %    Neut # 10.50 (H) 1.8 - 7.7 K/uL    Lymph # 1.57 1 - 4.8 K/uL    Mono # 0.64 0.3 - 1 K/uL    Eos #  0.02 <=0.5 K/uL    Baso # 0.01 <=0.2 K/uL    Imm Grans # 0.05 (H) 0.00 - 0.04 K/uL   Hepatitis C Antibody    Collection Time: 04/07/25 11:59 PM   Result Value Ref Range    Hep C Ab Interp Negative Negative   HIV 1/2 Ag/Ab (4th Gen)    Collection Time: 04/07/25 11:59 PM   Result Value Ref Range    HIV 1/2 Ag/Ab Negative Negative   Light Blue Top Hold    Collection Time: 04/07/25 11:59 PM   Result Value Ref Range    Extra Tube Hold for add-ons.        Imaging Results:  Imaging Results              CT Abdomen Pelvis With IV Contrast Routine Oral Contrast (In process)                      X-Ray Chest AP Portable (In process)                      The EKG was ordered, reviewed, and independently interpreted by the ED provider.  Interpretation time: 23:51  Rate: 72 BPM  Rhythm: normal sinus rhythm  Interpretation: Minimal voltage criteria for LVH, may be normal variant (R in aVL). Anterolateral infarct, age undetermined. No STEMI.         The Emergency Provider reviewed the vital signs and test results, which are outlined above.     ED Discussion     2:00 AM: Dr. Lee transfers care of patient to Dr. De Santiago pending lab and radiology results.     4:16 AM: Discussed pt's case with Dr. Kaur (Surgical Oncology) who recommends a reversal of Xarelto, admit to , and plan for surgery in the morning.    4:21 AM: Discussed case with Dino Broussard DO  (Hospital Medicine). Dr. Broussard agrees with current care and management of pt and accepts admission.   Admitting Service: Hospital Medicine  Admitting Physician: Dr. Broussard  Admit to: Inpatient Med/Tele    4:21 AM: Re-evaluated pt.  I have discussed test results, shared treatment plan, and the need for admission with patient/family/caretaker at bedside. Pt and/or family/caretaker express understanding at this time and agree with all information. All questions answered. Pt/caretaker/family member(s) have no further questions or concerns at this time. Pt is ready for  admit.             Medical Decision Making  84-year-old female presenting with abdominal pain.  Initially evaluated by another ED provider.  Care handed off to me with CT pending.  CT concerning for closed loop bowel obstruction.  Patient did vomit here in the emergency department and is having continued pain and nausea.  Patient treated in the emergency department with pain medication, nausea medication, IV fluids.  Consulted surgery stat. See Dr. Kaur's recommendations above.  Kcentra ordered to reverse Xarelto and anticipation of surgery this morning    Amount and/or Complexity of Data Reviewed  External Data Reviewed: notes.     Details: Past medical history, medications, allergies reviewed.  Patient on Xarelto with a history of atrial fibrillation  Labs: ordered. Decision-making details documented in ED Course.  Radiology: ordered. Decision-making details documented in ED Course.     Details: Type of Interpretation: Outside Written Report  STAT Radiology Procedure Done:  CT abdomen and pelvis without intravenous contrast  Interpretation:  Prominent short segment bowel loops in the inferior pelvis that are not significantly dilated however demonstrate abrupt change in caliber on either side concerning for possible early close loop obstruction versus partially obstructive closed-loop obstruction, there is surrounding mesenteric edema and free fluid.  Radiologist:  Mauri Ayon MD  04/08/2025  02:38  ECG/medicine tests: ordered and independent interpretation performed. Decision-making details documented in ED Course.    Risk  OTC drugs.  Prescription drug management.  Decision regarding hospitalization.                ED Medication(s):  Medications   0.9% NaCl infusion (has no administration in time range)   ondansetron injection 4 mg (has no administration in time range)   ondansetron injection 4 mg (4 mg Intravenous Given 4/7/25 9600)   aluminum-magnesium hydroxide-simethicone 200-200-20 mg/5 mL suspension  30 mL (30 mLs Oral Given 4/8/25 0001)     And   LIDOcaine viscous HCl 2% oral solution 15 mL (15 mLs Oral Given 4/8/25 0001)   iohexoL (OMNIPAQUE 350) injection 100 mL (65 mLs Intravenous Given 4/8/25 0230)   promethazine (PHENERGAN) 12.5 mg in 0.9% NaCl 50 mL IVPB (0 mg Intravenous Stopped 4/8/25 0225)   sodium chloride 0.9% bolus 500 mL 500 mL (500 mLs Intravenous New Bag 4/8/25 0327)   morphine injection 2 mg (2 mg Intravenous Given 4/8/25 0321)       New Prescriptions    No medications on file               Scribe Attestation:   Scribe #1: I performed the above scribed service and the documentation accurately describes the services I performed. I attest to the accuracy of the note.     Attending:   Physician Attestation Statement for Scribe #1: I, Janes Lee MD, personally performed the services described in this documentation, as scribed by Sean Desai, in my presence, and it is both accurate and complete.       Scribe Attestation:   Scribe #2: I performed the above scribed service and the documentation accurately describes the services I performed. I attest to the accuracy of the note.    Attending Attestation:           Physician Attestation for Scribe:    Physician Attestation Statement for Scribe #2: I, Ryan De Santiago MD, reviewed documentation, as scribed by Estefania Krause in my presence, and it is both accurate and complete. I also acknowledge and confirm the content of the note done by Scribe #1.           Clinical Impression       ICD-10-CM ICD-9-CM   1. Intestinal obstruction, unspecified cause, unspecified whether partial or complete  K56.609 560.9   2. Epigastric pain  R10.13 789.06       Disposition:   Disposition: Admitted  Condition: Serious        Ryan De Santiago MD  04/08/25 3751

## 2025-04-08 NOTE — ASSESSMENT & PLAN NOTE
Recent Lipid Panel reviewed-  Lab Results   Component Value Date    HDL 59 09/17/2024    LDLCALC 80.6 09/17/2024    TRIG 137 09/17/2024    CHOL 167 09/17/2024        Home Hyperlipidemia Medications per chart review             atorvastatin (LIPITOR) 10 MG tablet Take 1 tablet (10 mg total) by mouth every evening.            PLAN  -resume home statin when able to tolerate p.o.

## 2025-04-09 LAB
ABSOLUTE EOSINOPHIL (OHS): 0 K/UL
ABSOLUTE MONOCYTE (OHS): 1.91 K/UL (ref 0.3–1)
ABSOLUTE NEUTROPHIL COUNT (OHS): 14.69 K/UL (ref 1.8–7.7)
ALBUMIN SERPL BCP-MCNC: 3 G/DL (ref 3.5–5.2)
ALP SERPL-CCNC: 60 UNIT/L (ref 40–150)
ALT SERPL W/O P-5'-P-CCNC: 11 UNIT/L (ref 10–44)
ANION GAP (OHS): 10 MMOL/L (ref 8–16)
AST SERPL-CCNC: 21 UNIT/L (ref 11–45)
BASOPHILS # BLD AUTO: 0.02 K/UL
BASOPHILS NFR BLD AUTO: 0.1 %
BILIRUB SERPL-MCNC: 0.8 MG/DL (ref 0.1–1)
BUN SERPL-MCNC: 20 MG/DL (ref 8–23)
CALCIUM SERPL-MCNC: 9.2 MG/DL (ref 8.7–10.5)
CHLORIDE SERPL-SCNC: 106 MMOL/L (ref 95–110)
CO2 SERPL-SCNC: 24 MMOL/L (ref 23–29)
CREAT SERPL-MCNC: 0.8 MG/DL (ref 0.5–1.4)
ERYTHROCYTE [DISTWIDTH] IN BLOOD BY AUTOMATED COUNT: 12.6 % (ref 11.5–14.5)
ESTROGEN SERPL-MCNC: NORMAL PG/ML
GFR SERPLBLD CREATININE-BSD FMLA CKD-EPI: >60 ML/MIN/1.73/M2
GLUCOSE SERPL-MCNC: 109 MG/DL (ref 70–110)
HCT VFR BLD AUTO: 36.6 % (ref 37–48.5)
HGB BLD-MCNC: 12 GM/DL (ref 12–16)
IMM GRANULOCYTES # BLD AUTO: 0.12 K/UL (ref 0–0.04)
IMM GRANULOCYTES NFR BLD AUTO: 0.6 % (ref 0–0.5)
INSULIN SERPL-ACNC: NORMAL U[IU]/ML
LAB AP CLINICAL INFORMATION: NORMAL
LAB AP GROSS DESCRIPTION: NORMAL
LAB AP PERFORMING LOCATION(S): NORMAL
LAB AP REPORT FOOTNOTES: NORMAL
LYMPHOCYTES # BLD AUTO: 1.9 K/UL (ref 1–4.8)
MAGNESIUM SERPL-MCNC: 1.7 MG/DL (ref 1.6–2.6)
MCH RBC QN AUTO: 32.3 PG (ref 27–31)
MCHC RBC AUTO-ENTMCNC: 32.8 G/DL (ref 32–36)
MCV RBC AUTO: 98 FL (ref 82–98)
NUCLEATED RBC (/100WBC) (OHS): 0 /100 WBC
PHOSPHATE SERPL-MCNC: 3.6 MG/DL (ref 2.7–4.5)
PLATELET # BLD AUTO: 304 K/UL (ref 150–450)
PMV BLD AUTO: 10.6 FL (ref 9.2–12.9)
POTASSIUM SERPL-SCNC: 3.9 MMOL/L (ref 3.5–5.1)
PROT SERPL-MCNC: 6.8 GM/DL (ref 6–8.4)
RBC # BLD AUTO: 3.72 M/UL (ref 4–5.4)
RELATIVE EOSINOPHIL (OHS): 0 %
RELATIVE LYMPHOCYTE (OHS): 10.2 % (ref 18–48)
RELATIVE MONOCYTE (OHS): 10.2 % (ref 4–15)
RELATIVE NEUTROPHIL (OHS): 78.9 % (ref 38–73)
SODIUM SERPL-SCNC: 140 MMOL/L (ref 136–145)
WBC # BLD AUTO: 18.64 K/UL (ref 3.9–12.7)

## 2025-04-09 PROCEDURE — 80053 COMPREHEN METABOLIC PANEL: CPT | Performed by: SURGERY

## 2025-04-09 PROCEDURE — 63600175 PHARM REV CODE 636 W HCPCS: Performed by: SURGERY

## 2025-04-09 PROCEDURE — 83735 ASSAY OF MAGNESIUM: CPT | Performed by: SURGERY

## 2025-04-09 PROCEDURE — 51798 US URINE CAPACITY MEASURE: CPT

## 2025-04-09 PROCEDURE — 99900035 HC TECH TIME PER 15 MIN (STAT)

## 2025-04-09 PROCEDURE — 63600175 PHARM REV CODE 636 W HCPCS: Performed by: NURSE PRACTITIONER

## 2025-04-09 PROCEDURE — 85025 COMPLETE CBC W/AUTO DIFF WBC: CPT | Performed by: SURGERY

## 2025-04-09 PROCEDURE — 99024 POSTOP FOLLOW-UP VISIT: CPT | Mod: POP,,, | Performed by: PHYSICIAN ASSISTANT

## 2025-04-09 PROCEDURE — 27000221 HC OXYGEN, UP TO 24 HOURS

## 2025-04-09 PROCEDURE — 94799 UNLISTED PULMONARY SVC/PX: CPT

## 2025-04-09 PROCEDURE — 84100 ASSAY OF PHOSPHORUS: CPT | Performed by: SURGERY

## 2025-04-09 PROCEDURE — 25000003 PHARM REV CODE 250: Performed by: SURGERY

## 2025-04-09 PROCEDURE — 25000003 PHARM REV CODE 250: Performed by: FAMILY MEDICINE

## 2025-04-09 PROCEDURE — 63600175 PHARM REV CODE 636 W HCPCS: Performed by: FAMILY MEDICINE

## 2025-04-09 PROCEDURE — 11000001 HC ACUTE MED/SURG PRIVATE ROOM

## 2025-04-09 PROCEDURE — 94761 N-INVAS EAR/PLS OXIMETRY MLT: CPT

## 2025-04-09 PROCEDURE — 36415 COLL VENOUS BLD VENIPUNCTURE: CPT | Performed by: SURGERY

## 2025-04-09 RX ORDER — ENOXAPARIN SODIUM 100 MG/ML
40 INJECTION SUBCUTANEOUS EVERY 24 HOURS
Status: DISCONTINUED | OUTPATIENT
Start: 2025-04-09 | End: 2025-04-11

## 2025-04-09 RX ADMIN — CHLORHEXIDINE GLUCONATE 0.12% ORAL RINSE 10 ML: 1.2 LIQUID ORAL at 09:04

## 2025-04-09 RX ADMIN — LATANOPROST 1 DROP: 50 SOLUTION OPHTHALMIC at 09:04

## 2025-04-09 RX ADMIN — PIPERACILLIN SODIUM AND TAZOBACTAM SODIUM 4.5 G: 4; .5 INJECTION, POWDER, FOR SOLUTION INTRAVENOUS at 04:04

## 2025-04-09 RX ADMIN — MORPHINE SULFATE 2 MG: 4 INJECTION INTRAVENOUS at 09:04

## 2025-04-09 RX ADMIN — METOROPROLOL TARTRATE 5 MG: 5 INJECTION, SOLUTION INTRAVENOUS at 03:04

## 2025-04-09 RX ADMIN — METOROPROLOL TARTRATE 5 MG: 5 INJECTION, SOLUTION INTRAVENOUS at 05:04

## 2025-04-09 RX ADMIN — MINERAL OIL AND WHITE PETROLATUM: 150; 830 OINTMENT OPHTHALMIC at 09:04

## 2025-04-09 RX ADMIN — PANTOPRAZOLE SODIUM 40 MG: 40 INJECTION, POWDER, FOR SOLUTION INTRAVENOUS at 09:04

## 2025-04-09 RX ADMIN — ACETAMINOPHEN 1000 MG: 10 INJECTION, SOLUTION INTRAVENOUS at 05:04

## 2025-04-09 RX ADMIN — TIMOLOL MALEATE 1 DROP: 5 SOLUTION/ DROPS OPHTHALMIC at 09:04

## 2025-04-09 RX ADMIN — PIPERACILLIN SODIUM AND TAZOBACTAM SODIUM 4.5 G: 4; .5 INJECTION, POWDER, FOR SOLUTION INTRAVENOUS at 01:04

## 2025-04-09 RX ADMIN — PIPERACILLIN SODIUM AND TAZOBACTAM SODIUM 4.5 G: 4; .5 INJECTION, POWDER, FOR SOLUTION INTRAVENOUS at 09:04

## 2025-04-09 RX ADMIN — METOROPROLOL TARTRATE 5 MG: 5 INJECTION, SOLUTION INTRAVENOUS at 09:04

## 2025-04-09 RX ADMIN — ENOXAPARIN SODIUM 40 MG: 40 INJECTION SUBCUTANEOUS at 04:04

## 2025-04-09 NOTE — ASSESSMENT & PLAN NOTE
Patient with paroxysmal (<7 days) atrial fibrillation which is controlled currently with Beta Blocker. Patient is currently in sinus rhythm.    [id number] Score: 3.     The patients heart rate in the last 24 hours is as follows:  Pulse  Min: 75  Max: 102    PLAN:  - Antiarrhythmics: metoprolol injection 5 mg, Every 8 hours, Intravenous  - Anticoagulants:  Xarelto was held and reversed due to surgical intervention concerns. Resume when safe per surgical team  - Replete electrolytes PRN to  target Magnesium > 2, Potassium > 4  - Continuous telemetry    4/9/25: HR controlled on IV Metoprolol, will need to re-start anticoagulation when ok with general surgery

## 2025-04-09 NOTE — HOSPITAL COURSE
04/09/2025: POD 1- doing well, no acute complaints. Reports abdominal soreness. Pain controlled, rates 4/10. Denies n/v or flatulence. NGT in place. WBC 18, AF.     04/10/2025 : POD 2- AF, ZEUS. doing well, WBC downtrend to 14. Pain controlled. Denies n/v. Admits to a lot of flatulence and small BM. NGT removed.    04/11/2025: POD 3-AF, ZEUS. doing well, WBC WNL. Pain controlled. Tolerating CLD without N/V, reports some belching. Having bowel function.    04/12/2025.  Postop day 4.  Tolerating a diet.  We would like to be advanced to a regular diet and discharged home.  Pain is controlled.

## 2025-04-09 NOTE — ANESTHESIA POSTPROCEDURE EVALUATION
Anesthesia Post Evaluation    Patient: Maria Del Carmen Spence    Procedure(s) Performed: Procedure(s) (LRB):  XI ROBOTIC LAPAROSCOPY,DIAGNOSTIC (N/A)  BLOCK, TRANSVERSUS ABDOMINIS PLANE  EXCISION, SMALL INTESTINE, LAPAROSCOPIC    Final Anesthesia Type: general      Patient location during evaluation: PACU  Patient participation: Yes- Able to Participate  Level of consciousness: awake and alert  Post-procedure vital signs: reviewed and stable  Pain management: adequate  Airway patency: patent  JUDY mitigation strategies: Verification of full reversal of neuromuscular block  PONV status at discharge: No PONV  Anesthetic complications: no      Cardiovascular status: hemodynamically stable  Respiratory status: spontaneous ventilation  Hydration status: euvolemic  Follow-up not needed.              Vitals Value Taken Time   /65 04/09/25 05:27   Temp 37.1 °C (98.7 °F) 04/09/25 03:40   Pulse 79 04/09/25 05:27   Resp 20 04/09/25 03:40   SpO2 94 % 04/09/25 03:40         Event Time   Out of Recovery 04/08/2025 11:51:49         Pain/Karie Score: Pain Rating Prior to Med Admin: 4 (4/9/2025  5:23 AM)  Pain Rating Post Med Admin: 3 (4/9/2025  5:54 AM)  Karie Score: 8 (4/8/2025 11:45 AM)

## 2025-04-09 NOTE — SUBJECTIVE & OBJECTIVE
Interval History: Afebrile, WBC elevated. Cont Zosyn. No flatus. + bowel sounds. NPO/NGT in place.     Review of Systems   Constitutional:  Positive for activity change.   Gastrointestinal:  Positive for abdominal pain (controlled), nausea (mild nausea this am) and vomiting (resolved).   All other systems reviewed and are negative.    Objective:     Vital Signs (Most Recent):  Temp: 97.9 °F (36.6 °C) (04/09/25 1203)  Pulse: 82 (04/09/25 1203)  Resp: 18 (04/09/25 1203)  BP: 133/63 (04/09/25 1203)  SpO2: (!) 93 % (04/09/25 1203) Vital Signs (24h Range):  Temp:  [97.8 °F (36.6 °C)-98.9 °F (37.2 °C)] 97.9 °F (36.6 °C)  Pulse:  [] 82  Resp:  [16-20] 18  SpO2:  [93 %-98 %] 93 %  BP: (129-147)/(60-67) 133/63     Weight: 78.5 kg (173 lb 1 oz)  Body mass index is 28.8 kg/m².    Intake/Output Summary (Last 24 hours) at 4/9/2025 1224  Last data filed at 4/9/2025 0554  Gross per 24 hour   Intake --   Output 775 ml   Net -775 ml         Physical Exam  Vitals and nursing note reviewed.   Constitutional:       General: She is not in acute distress.     Appearance: She is well-developed. She is not diaphoretic.   HENT:      Head: Normocephalic and atraumatic.      Nose: Nose normal.   Eyes:      General: No scleral icterus.     Conjunctiva/sclera: Conjunctivae normal.   Neck:      Trachea: No tracheal deviation.   Cardiovascular:      Rate and Rhythm: Normal rate and regular rhythm.      Heart sounds: Normal heart sounds. No murmur heard.     No friction rub. No gallop.   Pulmonary:      Effort: Pulmonary effort is normal. No respiratory distress.      Breath sounds: Normal breath sounds. No stridor. No wheezing or rales.   Chest:      Chest wall: No tenderness.   Abdominal:      General: There is no distension.      Palpations: Abdomen is soft. There is no mass.      Tenderness: There is no abdominal tenderness. There is no guarding or rebound.      Comments: + bowel sounds- hypoactive  No abdominal distention  Laparoscopic  incisions and lower abdominal incision C/D/I    Musculoskeletal:         General: No tenderness or deformity. Normal range of motion.      Cervical back: Normal range of motion and neck supple.   Skin:     General: Skin is warm and dry.      Coloration: Skin is not pale.      Findings: No erythema or rash.   Neurological:      General: No focal deficit present.      Mental Status: She is alert and oriented to person, place, and time.   Psychiatric:         Behavior: Behavior normal.         Thought Content: Thought content normal.               Significant Labs: All pertinent labs within the past 24 hours have been reviewed.    Significant Imaging: I have reviewed all pertinent imaging results/findings within the past 24 hours.

## 2025-04-09 NOTE — SUBJECTIVE & OBJECTIVE
Interval History: POD 1- doing well, no acute complaints. Reports abdominal soreness. Pain controlled, rates 4/10. Denies n/v or flatulence. NGT in place. WBC 18, AF.     Medications:  Continuous Infusions:  Scheduled Meds:   chlorhexidine  10 mL Mouth/Throat BID    latanoprost  1 drop Both Eyes QHS    metoprolol  5 mg Intravenous Q8H    pantoprazole  40 mg Intravenous Daily    piperacillin-tazobactam (Zosyn) IV (PEDS and ADULTS) (extended infusion is not appropriate)  4.5 g Intravenous Q8H    timolol maleate 0.5%  1 drop Both Eyes Daily    white petrolatum-mineral oiL   Both Eyes QHS     PRN Meds:  Current Facility-Administered Medications:     artificial tears, 1 drop, Both Eyes, PRN    dextrose 50%, 12.5 g, Intravenous, PRN    dextrose 50%, 25 g, Intravenous, PRN    glucagon (human recombinant), 1 mg, Intramuscular, PRN    glucose, 16 g, Oral, PRN    glucose, 24 g, Oral, PRN    hydrALAZINE, 5 mg, Intravenous, Q6H PRN    melatonin, 6 mg, Oral, Nightly PRN    morphine, 2 mg, Intravenous, Q4H PRN    naloxone, 0.02 mg, Intravenous, PRN    ondansetron, 4 mg, Intravenous, Q6H PRN    prochlorperazine, 2.5 mg, Intravenous, Q8H PRN    sodium chloride 0.9%, 10 mL, Intravenous, Q12H PRN     Review of patient's allergies indicates:   Allergen Reactions    Voltaren [diclofenac sodium] Edema     Gel formulation caused facial rash and facial swelling    Eliquis [apixaban] Other (See Comments)     Headache, numbness in lip     Medrol [methylprednisolone] Blisters     Objective:     Vital Signs (Most Recent):  Temp: 98.9 °F (37.2 °C) (04/09/25 0829)  Pulse: 75 (04/09/25 1022)  Resp: 16 (04/09/25 0924)  BP: 133/61 (04/09/25 0829)  SpO2: 95 % (04/09/25 0829) Vital Signs (24h Range):  Temp:  [97 °F (36.1 °C)-98.9 °F (37.2 °C)] 98.9 °F (37.2 °C)  Pulse:  [] 75  Resp:  [13-20] 16  SpO2:  [94 %-98 %] 95 %  BP: (129-168)/(60-78) 133/61     Weight: 78.5 kg (173 lb 1 oz)  Body mass index is 28.8  kg/m².    Intake/Output - Last 3 Shifts         04/07 0700 04/08 0659 04/08 0700 04/09 0659 04/09 0700  04/10 0659    IV Piggyback 550 100     Total Intake(mL/kg) 550 (7) 100 (1.3)     Urine (mL/kg/hr)  775 (0.4)     Emesis/NG output 700      Total Output 700 775     Net -150 -675                     Physical Exam  Constitutional:       Appearance: Normal appearance.   HENT:      Head: Normocephalic.      Nose: Nose normal.      Comments: NGT in place. 50cc output-brown in color  Eyes:      Pupils: Pupils are equal, round, and reactive to light.   Cardiovascular:      Rate and Rhythm: Normal rate and regular rhythm.   Pulmonary:      Effort: Pulmonary effort is normal.   Abdominal:      Palpations: Abdomen is soft.      Tenderness: There is abdominal tenderness (appropriate TTP). There is no guarding or rebound.      Hernia: No hernia is present.      Comments: Bloated. Incision c/d/I no drainage or erythema   Musculoskeletal:      Cervical back: Normal range of motion.      Right lower leg: No edema.      Left lower leg: No edema.   Skin:     General: Skin is warm and dry.   Neurological:      General: No focal deficit present.      Mental Status: She is alert and oriented to person, place, and time.   Psychiatric:         Mood and Affect: Mood normal.        Significant Labs:  I have reviewed all pertinent lab results within the past 24 hours.  CBC:   Recent Labs   Lab 04/09/25  0429   WBC 18.64*   RBC 3.72*   HGB 12.0   HCT 36.6*      MCV 98   MCH 32.3*   MCHC 32.8     CMP:   Recent Labs   Lab 04/09/25  0429   CALCIUM 9.2   ALBUMIN 3.0*      K 3.9   CO2 24      BUN 20   CREATININE 0.8   ALKPHOS 60   ALT 11   AST 21   BILITOT 0.8       Significant Diagnostics:  I have reviewed all pertinent imaging results/findings within the past 24 hours.  I have reviewed and interpreted all pertinent imaging results/findings within the past 24 hours.

## 2025-04-09 NOTE — ASSESSMENT & PLAN NOTE
Presented to the ED for evaluation of worsening abdominal pain with associated nausea  which did not alleviate with OTC treatments.  Denies any recent concerns for infection, fever, chills, urinary symptoms.  She reports a prior history of gynecologic surgeries.     ED course notable for hemodynamically stable vitals, labs with WBC 12.79.  CT abdomen pelvis w/ IV contrast(read pending) were ordered.  Evaluation noted concerns for closed loop bowel obstruction and patient also had episodes of vomiting.  General surgery was consulted with recommendations for Xarelto reversal and anticipated surgery.  Patient was admitted to Hospital Medicine for further evaluation.    PLAN:  - General Surgery Consulted, follow-up recs  - NPO, IVF resuscitation  - Follow up pending radiology results    4/9/25: s/p Diagnostic laparoscopy and Robotic small bowel resection with intracorporeal anastomosis 4/8/25 per Dr. Kaur.   Pt tolerated surgery well. No flatus yet. +bowel sounds. NPO/NGT in place. C/o of right eye discomfort after surgery- resolved with lubricating eye drops.   Will need to restart anticoagulation when ok with general surgery     Presented to the ED for evaluation of worsening abdominal pain with associated nausea  which did not alleviate with OTC treatments.  Denies any recent concerns for infection, fever, chills, urinary symptoms.  She reports a prior history of gynecologic surgeries.     ED course notable for hemodynamically stable vitals, labs with WBC 12.79.  CT abdomen pelvis w/ IV contrast(read pending) were ordered.  Evaluation noted concerns for closed loop bowel obstruction and patient also had episodes of vomiting.  General surgery was consulted with recommendations for Xarelto reversal and anticipated surgery.  Patient was admitted to Hospital Medicine for further evaluation.    PLAN:  - General Surgery Consulted, follow-up recs  - NPO, IVF resuscitation  - Follow up pending radiology results    4/9/25:  +leukocytosis, cont IV Zosyn for now. +mild nausea this am, NGT in place- scant gastric output, abdomen soft, non distended. +Bowel sounds, no flatus yet

## 2025-04-09 NOTE — ASSESSMENT & PLAN NOTE
Hypertensive on admission    Home meds for hypertension were reviewed and noted below.   Home Hypertension Medications per chart review             furosemide (LASIX) 20 MG tablet TAKE ONE TABLET BY MOUTH TWICE A WEEK    metoprolol tartrate (LOPRESSOR) 25 MG tablet Take 1 tablet (25 mg total) by mouth 2 (two) times daily.            Patients blood pressure range in the last 24 hours was: BP  Min: 119/68  Max: 198/75.      PLAN:  -While in the hospital, will manage blood pressure as follows; Continue home antihypertensive regimen  -The patient's inpatient anti-hypertensive regimen is listed below:  Current Antihypertensives  hydrALAZINE injection 5 mg, Every 6 hours PRN, Intravenous  metoprolol injection 5 mg, Every 8 hours, Intravenous    -Will utilize p.r.n. blood pressure medication only if patient's blood pressure greater than 180/110 and she develops symptoms such as worsening chest pain or shortness of breath.

## 2025-04-09 NOTE — HOSPITAL COURSE
The patient was admitted with SBO. CT of the abdomen and pelvis was done with IV contrast which was concerning for possible early closed loop obstruction in the pelvis with mesenteric fat stranding and edema, with some fluid in the pelvis. +leukocytosis. Pt was placed on IV Zosyn. General surgery was consulted. Pt was given PCC for Xarelto reversal and underwent urgent Diagnostic laparoscopy and Robotic small bowel resection with intracorporeal anastomosis.   Pt tolerated surgery well. No flatus yet. +bowel sounds. NPO/NGT in place. C/o of right eye discomfort after surgery- resolved with lubricating eye drops.   Will need to restart anticoagulation when ok with general surgery     Patient tolerated small-bowel resection.  Diet was initiated advanced as tolerated.  Xarelto resumed.  Zosyn was transitioned to p.o. Augmentin.  Pain controlled.  Patient declined placement.  Home health ordered.  She was discharged home with home health.

## 2025-04-09 NOTE — ASSESSMENT & PLAN NOTE
POD 1 s/p Diagnostic laparoscopy, Robotic small bowel resection with intracorporeal anastomosis    Doing well with no acute complaints. Denies N/V, BM or flatulence    -multimodal pain control  -IVF  -PT/OT, encouraged OOB and ambulation  -IS use  -await BF  -SCDs- DVT PPx

## 2025-04-09 NOTE — PROGRESS NOTES
[facility] - Telemetry ([facility])  General Surgery  Progress Note    Subjective:     History of Present Illness:  Maria Del Carmen Spence is a 84 y.o. female with a history of atrial fibrillation (on Xarelto), and previous tubal ligation who presented to the emergency department with sudden onset abdominal pain and nausea.  Patient states the pain started around 6:00 p.m. last night out of the blue and she originally thought it was gas pains.  She is passing a little bit of flatus but not much.  The pain is predominantly on the right side of the abdomen but she states she also felt some epigastric pain with radiation around to the back and pelvic pains.  Upon presentation in the ER she was afebrile, vitals were within normal limits.  Labs were notable for leukocytosis of 12 K, otherwise within normal limits.  CT of the abdomen and pelvis was done with IV contrast which was concerning for possible early closed loop obstruction in the pelvis with mesenteric fat stranding and edema, with some fluid in the pelvis.  She was admitted to hospital medicine.      Post-Op Info:  Procedure(s) (LRB):  XI ROBOTIC LAPAROSCOPY,DIAGNOSTIC (N/A)  BLOCK, TRANSVERSUS ABDOMINIS PLANE (N/A)  ROBOTIC EXCISION,SMALL INTESTINE (N/A)   1 Day Post-Op     Interval History: POD 1- doing well, no acute complaints. Reports abdominal soreness. Pain controlled, rates 4/10. Denies n/v, BM or flatulence. NGT in place. WBC 18, AF.     Medications:  Continuous Infusions:  Scheduled Meds:   chlorhexidine  10 mL Mouth/Throat BID    latanoprost  1 drop Both Eyes QHS    metoprolol  5 mg Intravenous Q8H    pantoprazole  40 mg Intravenous Daily    piperacillin-tazobactam (Zosyn) IV (PEDS and ADULTS) (extended infusion is not appropriate)  4.5 g Intravenous Q8H    timolol maleate 0.5%  1 drop Both Eyes Daily    white petrolatum-mineral oiL   Both Eyes QHS     PRN Meds:  Current Facility-Administered Medications:     artificial tears, 1 drop, Both Eyes, PRN    dextrose  50%, 12.5 g, Intravenous, PRN    dextrose 50%, 25 g, Intravenous, PRN    glucagon (human recombinant), 1 mg, Intramuscular, PRN    glucose, 16 g, Oral, PRN    glucose, 24 g, Oral, PRN    hydrALAZINE, 5 mg, Intravenous, Q6H PRN    melatonin, 6 mg, Oral, Nightly PRN    morphine, 2 mg, Intravenous, Q4H PRN    naloxone, 0.02 mg, Intravenous, PRN    ondansetron, 4 mg, Intravenous, Q6H PRN    prochlorperazine, 2.5 mg, Intravenous, Q8H PRN    sodium chloride 0.9%, 10 mL, Intravenous, Q12H PRN     Review of patient's allergies indicates:   Allergen Reactions    Voltaren [diclofenac sodium] Edema     Gel formulation caused facial rash and facial swelling    Eliquis [apixaban] Other (See Comments)     Headache, numbness in lip     Medrol [methylprednisolone] Blisters     Objective:     Vital Signs (Most Recent):  Temp: 98.9 °F (37.2 °C) (04/09/25 0829)  Pulse: 75 (04/09/25 1022)  Resp: 16 (04/09/25 0924)  BP: 133/61 (04/09/25 0829)  SpO2: 95 % (04/09/25 0829) Vital Signs (24h Range):  Temp:  [97 °F (36.1 °C)-98.9 °F (37.2 °C)] 98.9 °F (37.2 °C)  Pulse:  [] 75  Resp:  [13-20] 16  SpO2:  [94 %-98 %] 95 %  BP: (129-168)/(60-78) 133/61     Weight: 78.5 kg (173 lb 1 oz)  Body mass index is 28.8 kg/m².    Intake/Output - Last 3 Shifts         04/07 0700 04/08 0659 04/08 0700  04/09 0659 04/09 0700  04/10 0659    IV Piggyback 550 100     Total Intake(mL/kg) 550 (7) 100 (1.3)     Urine (mL/kg/hr)  775 (0.4)     Emesis/NG output 700      Total Output 700 775     Net -150 -675                     Physical Exam  Constitutional:       Appearance: Normal appearance.   HENT:      Head: Normocephalic.      Nose: Nose normal.      Comments: NGT in place. 50cc output-brown in color  Eyes:      Pupils: Pupils are equal, round, and reactive to light.   Cardiovascular:      Rate and Rhythm: Normal rate and regular rhythm.   Pulmonary:      Effort: Pulmonary effort is normal.   Abdominal:      Palpations: Abdomen is soft.      Tenderness:  There is abdominal tenderness (appropriate TTP). There is no guarding or rebound.      Hernia: No hernia is present.      Comments: Bloated. Incision c/d/I no drainage or erythema   Musculoskeletal:      Cervical back: Normal range of motion.      Right lower leg: No edema.      Left lower leg: No edema.   Skin:     General: Skin is warm and dry.   Neurological:      General: No focal deficit present.      Mental Status: She is alert and oriented to person, place, and time.   Psychiatric:         Mood and Affect: Mood normal.        Significant Labs:  I have reviewed all pertinent lab results within the past 24 hours.  CBC:   Recent Labs   Lab 04/09/25 0429   WBC 18.64*   RBC 3.72*   HGB 12.0   HCT 36.6*      MCV 98   MCH 32.3*   MCHC 32.8     CMP:   Recent Labs   Lab 04/09/25 0429   CALCIUM 9.2   ALBUMIN 3.0*      K 3.9   CO2 24      BUN 20   CREATININE 0.8   ALKPHOS 60   ALT 11   AST 21   BILITOT 0.8       Significant Diagnostics:  I have reviewed all pertinent imaging results/findings within the past 24 hours.  I have reviewed and interpreted all pertinent imaging results/findings within the past 24 hours.   Assessment/Plan:     * Intestinal adhesions with complete obstruction  POD 1 s/p Diagnostic laparoscopy, Robotic small bowel resection with intracorporeal anastomosis    Doing well with no acute complaints. Denies N/V, BM or flatulence    -multimodal pain control  -NPO  -IVF  -PT/OT, encouraged OOB and ambulation  -IS use  -await BF  -SCDs- DVT PPx    Nausea and vomiting  2/2 pain and bowel obstruction -resolved    Gastroesophageal reflux disease without esophagitis  -- Management as per primary team     Mixed hyperlipidemia  -- Management as per primary team     Paroxysmal atrial fibrillation  -- Kcentra given for reversal of Xarelto   -- Management as per primary team     Primary hypertension  -- Management as per primary team     Primary open-angle glaucoma(365.11) - Both Eyes  --  Management as per primary team         April B DEMIAN Worley  General Surgery  [facility] - Telemetry ([facility])

## 2025-04-09 NOTE — PROGRESS NOTES
[place] - Telemetry ([facility] Medicine  Progress Note    Patient Name: Maria Del Carmen Spence  MRN: [id number]  Patient Class: IP- Inpatient   Admission Date: 4/7/2025  Length of Stay: 1 days  Attending Physician: Yimi Walker MD  Primary Care Provider: Rajinder Lutz MD        Subjective     Principal Problem:Intestinal adhesions with complete obstruction        HPI:  Ms. Maria Del Carmen Spence is a 84 y.o. female who  has a medical history of A-fib, Arthritis, Chronic back pain,, Frequent headaches, GERD (gastroesophageal reflux disease), Glaucoma, Hyperlipemia, and Hypertension.    She  presented to the ED for evaluation of worsening abdominal pain with associated nausea since yesterday afternoon which did not alleviate with OTC treatments.  Denies any recent concerns for infection, fever, chills, urinary symptoms.  She reports a prior history of gynecologic surgeries.    ED course notable for hemodynamically stable vitals, labs with WBC 12.79.  CT abdomen pelvis w/ IV contrast(read pending) were ordered.  Evaluation noted concerns for closed loop bowel obstruction and patient also had episodes of vomiting.  General surgery was consulted with recommendations for Xarelto reversal and anticipated surgery.  Patient was admitted to Hospital Medicine for further evaluation.    Overview/Hospital Course:  The patient was admitted with SBO. CT of the abdomen and pelvis was done with IV contrast which was concerning for possible early closed loop obstruction in the pelvis with mesenteric fat stranding and edema, with some fluid in the pelvis. +leukocytosis. Pt was placed on IV Zosyn. General surgery was consulted. Pt was given PCC for Xarelto reversal and underwent urgent Diagnostic laparoscopy and Robotic small bowel resection with intracorporeal anastomosis.   Pt tolerated surgery well. No flatus yet. +bowel sounds. NPO/NGT in place. C/o of right eye discomfort after surgery- resolved with lubricating eye drops.   Will need  to restart anticoagulation when ok with general surgery       Interval History: Afebrile, WBC elevated. Cont Zosyn. No flatus. + bowel sounds. NPO/NGT in place.     Review of Systems   Constitutional:  Positive for activity change.   Gastrointestinal:  Positive for abdominal pain (controlled), nausea (mild nausea this am) and vomiting (resolved).   All other systems reviewed and are negative.    Objective:     Vital Signs (Most Recent):  Temp: 97.9 °F (36.6 °C) (04/09/25 1203)  Pulse: 82 (04/09/25 1203)  Resp: 18 (04/09/25 1203)  BP: 133/63 (04/09/25 1203)  SpO2: (!) 93 % (04/09/25 1203) Vital Signs (24h Range):  Temp:  [97.8 °F (36.6 °C)-98.9 °F (37.2 °C)] 97.9 °F (36.6 °C)  Pulse:  [] 82  Resp:  [16-20] 18  SpO2:  [93 %-98 %] 93 %  BP: (129-147)/(60-67) 133/63     Weight: 78.5 kg (173 lb 1 oz)  Body mass index is 28.8 kg/m².    Intake/Output Summary (Last 24 hours) at 4/9/2025 1224  Last data filed at 4/9/2025 0554  Gross per 24 hour   Intake --   Output 775 ml   Net -775 ml         Physical Exam  Vitals and nursing note reviewed.   Constitutional:       General: She is not in acute distress.     Appearance: She is well-developed. She is not diaphoretic.   HENT:      Head: Normocephalic and atraumatic.      Nose: Nose normal.   Eyes:      General: No scleral icterus.     Conjunctiva/sclera: Conjunctivae normal.   Neck:      Trachea: No tracheal deviation.   Cardiovascular:      Rate and Rhythm: Normal rate and regular rhythm.      Heart sounds: Normal heart sounds. No murmur heard.     No friction rub. No gallop.   Pulmonary:      Effort: Pulmonary effort is normal. No respiratory distress.      Breath sounds: Normal breath sounds. No stridor. No wheezing or rales.   Chest:      Chest wall: No tenderness.   Abdominal:      General: There is no distension.      Palpations: Abdomen is soft. There is no mass.      Tenderness: There is no abdominal tenderness. There is no guarding or rebound.      Comments: +  bowel sounds- hypoactive  No abdominal distention  Laparoscopic incisions and lower abdominal incision C/D/I    Musculoskeletal:         General: No tenderness or deformity. Normal range of motion.      Cervical back: Normal range of motion and neck supple.   Skin:     General: Skin is warm and dry.      Coloration: Skin is not pale.      Findings: No erythema or rash.   Neurological:      General: No focal deficit present.      Mental Status: She is alert and oriented to person, place, and time.   Psychiatric:         Behavior: Behavior normal.         Thought Content: Thought content normal.               Significant Labs: All pertinent labs within the past 24 hours have been reviewed.    Significant Imaging: I have reviewed all pertinent imaging results/findings within the past 24 hours.      Assessment & Plan  Intestinal adhesions with complete obstruction  Nausea and vomiting  Presented to the ED for evaluation of worsening abdominal pain with associated nausea  which did not alleviate with OTC treatments.  Denies any recent concerns for infection, fever, chills, urinary symptoms.  She reports a prior history of gynecologic surgeries.     ED course notable for hemodynamically stable vitals, labs with WBC 12.79.  CT abdomen pelvis w/ IV contrast(read pending) were ordered.  Evaluation noted concerns for closed loop bowel obstruction and patient also had episodes of vomiting.  General surgery was consulted with recommendations for Xarelto reversal and anticipated surgery.  Patient was admitted to Hospital Medicine for further evaluation.    PLAN:  - General Surgery Consulted, follow-up recs  - NPO, IVF resuscitation  - Follow up pending radiology results    4/9/25: s/p Diagnostic laparoscopy and Robotic small bowel resection with intracorporeal anastomosis 4/8/25 per Dr. Kaur.   Pt tolerated surgery well. No flatus yet. +bowel sounds. NPO/NGT in place. C/o of right eye discomfort after surgery- resolved with  lubricating eye drops.   Will need to restart anticoagulation when ok with general surgery     Presented to the ED for evaluation of worsening abdominal pain with associated nausea  which did not alleviate with OTC treatments.  Denies any recent concerns for infection, fever, chills, urinary symptoms.  She reports a prior history of gynecologic surgeries.     ED course notable for hemodynamically stable vitals, labs with WBC 12.79.  CT abdomen pelvis w/ IV contrast(read pending) were ordered.  Evaluation noted concerns for closed loop bowel obstruction and patient also had episodes of vomiting.  General surgery was consulted with recommendations for Xarelto reversal and anticipated surgery.  Patient was admitted to Hospital Medicine for further evaluation.    PLAN:  - General Surgery Consulted, follow-up recs  - NPO, IVF resuscitation  - Follow up pending radiology results    4/9/25: +leukocytosis, cont IV Zosyn for now. +mild nausea this am, NGT in place- scant gastric output, abdomen soft, non distended. +Bowel sounds, no flatus yet      Paroxysmal atrial fibrillation  Patient with paroxysmal (<7 days) atrial fibrillation which is controlled currently with Beta Blocker. Patient is currently in sinus rhythm.    [id number] Score: 3.     The patients heart rate in the last 24 hours is as follows:  Pulse  Min: 75  Max: 102    PLAN:  - Antiarrhythmics: metoprolol injection 5 mg, Every 8 hours, Intravenous  - Anticoagulants:  Xarelto was held and reversed due to surgical intervention concerns. Resume when safe per surgical team  - Replete electrolytes PRN to  target Magnesium > 2, Potassium > 4  - Continuous telemetry    4/9/25: HR controlled on IV Metoprolol, will need to re-start anticoagulation when ok with general surgery   Primary open-angle glaucoma(365.11) - Both Eyes  Continue home timolol, latanoprost    Primary hypertension  Hypertensive on admission    Home meds for hypertension were reviewed and noted below.    Home Hypertension Medications per chart review             furosemide (LASIX) 20 MG tablet TAKE ONE TABLET BY MOUTH TWICE A WEEK    metoprolol tartrate (LOPRESSOR) 25 MG tablet Take 1 tablet (25 mg total) by mouth 2 (two) times daily.            Patients blood pressure range in the last 24 hours was: BP  Min: 119/68  Max: 198/75.      PLAN:  -While in the hospital, will manage blood pressure as follows; Continue home antihypertensive regimen  -The patient's inpatient anti-hypertensive regimen is listed below:  Current Antihypertensives  hydrALAZINE injection 5 mg, Every 6 hours PRN, Intravenous  metoprolol injection 5 mg, Every 8 hours, Intravenous    -Will utilize p.r.n. blood pressure medication only if patient's blood pressure greater than 180/110 and she develops symptoms such as worsening chest pain or shortness of breath.    Mixed hyperlipidemia  Recent Lipid Panel reviewed-  Lab Results   Component Value Date    HDL 59 09/17/2024    LDLCALC 80.6 09/17/2024    TRIG 137 09/17/2024    CHOL 167 09/17/2024        Home Hyperlipidemia Medications per chart review             atorvastatin (LIPITOR) 10 MG tablet Take 1 tablet (10 mg total) by mouth every evening.            PLAN  -resume home statin when able to tolerate p.o.      Gastroesophageal reflux disease without esophagitis  Chronic.  Stable    Home medication includes omeprazole p.o.    PLAN:  Started on pantoprazole IV, resume oral PPI when able to tolerate p.o.    VTE Risk Mitigation (From admission, onward)           Ordered     IP VTE HIGH RISK PATIENT  Once         04/08/25 0628     Place sequential compression device  Until discontinued         04/08/25 0628     Reason for No Pharmacological VTE Prophylaxis  Once        Question:  Reasons:  Answer:  Risk of Bleeding  Comment:  Surgical intervention planned    04/08/25 0628                    Discharge Planning   STEF:      Code Status: Full Code   Medical Readiness for Discharge Date:   Discharge Plan  A: Home                        Alka Hoffmann NP  Department of Hospital Medicine   [facility] - Telemetry ([facility])

## 2025-04-10 LAB
ABSOLUTE EOSINOPHIL (OHS): 0.01 K/UL
ABSOLUTE MONOCYTE (OHS): 1.27 K/UL (ref 0.3–1)
ABSOLUTE NEUTROPHIL COUNT (OHS): 11.07 K/UL (ref 1.8–7.7)
ALBUMIN SERPL BCP-MCNC: 2.8 G/DL (ref 3.5–5.2)
ALP SERPL-CCNC: 62 UNIT/L (ref 40–150)
ALT SERPL W/O P-5'-P-CCNC: 9 UNIT/L (ref 10–44)
ANION GAP (OHS): 8 MMOL/L (ref 8–16)
AST SERPL-CCNC: 20 UNIT/L (ref 11–45)
BASOPHILS # BLD AUTO: 0.03 K/UL
BASOPHILS NFR BLD AUTO: 0.2 %
BILIRUB SERPL-MCNC: 0.9 MG/DL (ref 0.1–1)
BUN SERPL-MCNC: 18 MG/DL (ref 8–23)
CALCIUM SERPL-MCNC: 9.3 MG/DL (ref 8.7–10.5)
CHLORIDE SERPL-SCNC: 104 MMOL/L (ref 95–110)
CO2 SERPL-SCNC: 27 MMOL/L (ref 23–29)
CREAT SERPL-MCNC: 0.7 MG/DL (ref 0.5–1.4)
ERYTHROCYTE [DISTWIDTH] IN BLOOD BY AUTOMATED COUNT: 12.9 % (ref 11.5–14.5)
GFR SERPLBLD CREATININE-BSD FMLA CKD-EPI: >60 ML/MIN/1.73/M2
GLUCOSE SERPL-MCNC: 75 MG/DL (ref 70–110)
HCT VFR BLD AUTO: 34 % (ref 37–48.5)
HGB BLD-MCNC: 11 GM/DL (ref 12–16)
IMM GRANULOCYTES # BLD AUTO: 0.08 K/UL (ref 0–0.04)
IMM GRANULOCYTES NFR BLD AUTO: 0.5 % (ref 0–0.5)
LYMPHOCYTES # BLD AUTO: 2.24 K/UL (ref 1–4.8)
MAGNESIUM SERPL-MCNC: 1.9 MG/DL (ref 1.6–2.6)
MCH RBC QN AUTO: 32.4 PG (ref 27–31)
MCHC RBC AUTO-ENTMCNC: 32.4 G/DL (ref 32–36)
MCV RBC AUTO: 100 FL (ref 82–98)
NUCLEATED RBC (/100WBC) (OHS): 0 /100 WBC
OHS QRS DURATION: 78 MS
OHS QTC CALCULATION: 422 MS
PHOSPHATE SERPL-MCNC: 1.7 MG/DL (ref 2.7–4.5)
PLATELET # BLD AUTO: 263 K/UL (ref 150–450)
PMV BLD AUTO: 10.2 FL (ref 9.2–12.9)
POTASSIUM SERPL-SCNC: 3.7 MMOL/L (ref 3.5–5.1)
PROT SERPL-MCNC: 6.7 GM/DL (ref 6–8.4)
RBC # BLD AUTO: 3.39 M/UL (ref 4–5.4)
RELATIVE EOSINOPHIL (OHS): 0.1 %
RELATIVE LYMPHOCYTE (OHS): 15.2 % (ref 18–48)
RELATIVE MONOCYTE (OHS): 8.6 % (ref 4–15)
RELATIVE NEUTROPHIL (OHS): 75.4 % (ref 38–73)
SODIUM SERPL-SCNC: 139 MMOL/L (ref 136–145)
WBC # BLD AUTO: 14.7 K/UL (ref 3.9–12.7)

## 2025-04-10 PROCEDURE — 63600175 PHARM REV CODE 636 W HCPCS: Performed by: NURSE PRACTITIONER

## 2025-04-10 PROCEDURE — 25000003 PHARM REV CODE 250: Performed by: FAMILY MEDICINE

## 2025-04-10 PROCEDURE — 94799 UNLISTED PULMONARY SVC/PX: CPT

## 2025-04-10 PROCEDURE — 11000001 HC ACUTE MED/SURG PRIVATE ROOM

## 2025-04-10 PROCEDURE — 97166 OT EVAL MOD COMPLEX 45 MIN: CPT

## 2025-04-10 PROCEDURE — 97530 THERAPEUTIC ACTIVITIES: CPT

## 2025-04-10 PROCEDURE — 84100 ASSAY OF PHOSPHORUS: CPT | Performed by: SURGERY

## 2025-04-10 PROCEDURE — 94761 N-INVAS EAR/PLS OXIMETRY MLT: CPT

## 2025-04-10 PROCEDURE — 97162 PT EVAL MOD COMPLEX 30 MIN: CPT

## 2025-04-10 PROCEDURE — 27000221 HC OXYGEN, UP TO 24 HOURS

## 2025-04-10 PROCEDURE — 63600175 PHARM REV CODE 636 W HCPCS: Performed by: FAMILY MEDICINE

## 2025-04-10 PROCEDURE — 25000003 PHARM REV CODE 250: Performed by: PHYSICIAN ASSISTANT

## 2025-04-10 PROCEDURE — 83735 ASSAY OF MAGNESIUM: CPT | Performed by: SURGERY

## 2025-04-10 PROCEDURE — 63600175 PHARM REV CODE 636 W HCPCS: Performed by: SURGERY

## 2025-04-10 PROCEDURE — 99024 POSTOP FOLLOW-UP VISIT: CPT | Mod: POP,,, | Performed by: PHYSICIAN ASSISTANT

## 2025-04-10 PROCEDURE — 36415 COLL VENOUS BLD VENIPUNCTURE: CPT | Performed by: SURGERY

## 2025-04-10 PROCEDURE — 80053 COMPREHEN METABOLIC PANEL: CPT | Performed by: SURGERY

## 2025-04-10 PROCEDURE — 85025 COMPLETE CBC W/AUTO DIFF WBC: CPT | Performed by: SURGERY

## 2025-04-10 PROCEDURE — 25000003 PHARM REV CODE 250: Performed by: SURGERY

## 2025-04-10 RX ORDER — TRAMADOL HYDROCHLORIDE 50 MG/1
50 TABLET ORAL EVERY 6 HOURS PRN
Status: DISCONTINUED | OUTPATIENT
Start: 2025-04-10 | End: 2025-04-12 | Stop reason: HOSPADM

## 2025-04-10 RX ORDER — TRAMADOL HYDROCHLORIDE 50 MG/1
100 TABLET ORAL EVERY 6 HOURS PRN
Status: DISCONTINUED | OUTPATIENT
Start: 2025-04-10 | End: 2025-04-12 | Stop reason: HOSPADM

## 2025-04-10 RX ORDER — ACETAMINOPHEN 500 MG
1000 TABLET ORAL EVERY 8 HOURS
Status: DISCONTINUED | OUTPATIENT
Start: 2025-04-10 | End: 2025-04-12 | Stop reason: HOSPADM

## 2025-04-10 RX ORDER — BISACODYL 10 MG/1
10 SUPPOSITORY RECTAL ONCE
Status: DISCONTINUED | OUTPATIENT
Start: 2025-04-10 | End: 2025-04-12 | Stop reason: HOSPADM

## 2025-04-10 RX ORDER — MORPHINE SULFATE 4 MG/ML
2 INJECTION, SOLUTION INTRAMUSCULAR; INTRAVENOUS EVERY 4 HOURS PRN
Status: DISCONTINUED | OUTPATIENT
Start: 2025-04-10 | End: 2025-04-12 | Stop reason: HOSPADM

## 2025-04-10 RX ADMIN — PANTOPRAZOLE SODIUM 40 MG: 40 INJECTION, POWDER, FOR SOLUTION INTRAVENOUS at 09:04

## 2025-04-10 RX ADMIN — ACETAMINOPHEN 1000 MG: 500 TABLET ORAL at 09:04

## 2025-04-10 RX ADMIN — PIPERACILLIN SODIUM AND TAZOBACTAM SODIUM 4.5 G: 4; .5 INJECTION, POWDER, FOR SOLUTION INTRAVENOUS at 09:04

## 2025-04-10 RX ADMIN — METOROPROLOL TARTRATE 5 MG: 5 INJECTION, SOLUTION INTRAVENOUS at 02:04

## 2025-04-10 RX ADMIN — CHLORHEXIDINE GLUCONATE 0.12% ORAL RINSE 10 ML: 1.2 LIQUID ORAL at 09:04

## 2025-04-10 RX ADMIN — MINERAL OIL AND WHITE PETROLATUM: 150; 830 OINTMENT OPHTHALMIC at 09:04

## 2025-04-10 RX ADMIN — LATANOPROST 1 DROP: 50 SOLUTION OPHTHALMIC at 09:04

## 2025-04-10 RX ADMIN — METOROPROLOL TARTRATE 5 MG: 5 INJECTION, SOLUTION INTRAVENOUS at 05:04

## 2025-04-10 RX ADMIN — TIMOLOL MALEATE 1 DROP: 5 SOLUTION/ DROPS OPHTHALMIC at 09:04

## 2025-04-10 RX ADMIN — PIPERACILLIN SODIUM AND TAZOBACTAM SODIUM 4.5 G: 4; .5 INJECTION, POWDER, FOR SOLUTION INTRAVENOUS at 12:04

## 2025-04-10 RX ADMIN — TRAMADOL HYDROCHLORIDE 50 MG: 50 TABLET, COATED ORAL at 12:04

## 2025-04-10 RX ADMIN — METOROPROLOL TARTRATE 5 MG: 5 INJECTION, SOLUTION INTRAVENOUS at 09:04

## 2025-04-10 RX ADMIN — ENOXAPARIN SODIUM 40 MG: 40 INJECTION SUBCUTANEOUS at 04:04

## 2025-04-10 RX ADMIN — PIPERACILLIN SODIUM AND TAZOBACTAM SODIUM 4.5 G: 4; .5 INJECTION, POWDER, FOR SOLUTION INTRAVENOUS at 05:04

## 2025-04-10 NOTE — PT/OT/SLP EVAL
"Physical Therapy Evaluation    Patient Name:  Maria Del Carmen Spence   MRN:  [id number]    Recommendations:     Discharge Recommendations: High Intensity Therapy   Discharge Equipment Recommendations: none   Barriers to discharge: None    Assessment:     Maria Del Carmen Spence is a 84 y.o. female admitted with a medical diagnosis of Intestinal adhesions with complete obstruction.  She presents with the following impairments/functional limitations: weakness, impaired endurance, impaired functional mobility, gait instability, impaired balance, pain, decreased safety awareness, decreased lower extremity function, impaired coordination.    Rehab Prognosis: Good; patient would benefit from acute skilled PT services to address these deficits and reach maximum level of function.    Recent Surgery: Procedure(s) (LRB):  XI ROBOTIC LAPAROSCOPY,DIAGNOSTIC (N/A)  BLOCK, TRANSVERSUS ABDOMINIS PLANE (N/A)  ROBOTIC EXCISION,SMALL INTESTINE (N/A) 2 Days Post-Op    Plan:     During this hospitalization, patient to be seen 3 x/week to address the identified rehab impairments via gait training, therapeutic activities, therapeutic exercises and progress toward the following goals:    Plan of Care Expires:  04/24/25    Subjective     Chief Complaint: "I AM SORE" PT C/O CHRONIC BACK AND KNEE'S PAIN  Patient/Family Comments/goals: "I WILL TRY"  Pain/Comfort:  Pain Rating 1: 3/10  Location 1: abdomen  Pain Addressed 1: Reposition  Pain Rating 2: 5/10  Location 2:  (C/O CHRONIC B KNEE AND BACK PAIN)  Pain Addressed 2: Reposition    Patients cultural, spiritual, Methodist conflicts given the current situation:      Living Environment:  PT LIVES WITH ELDERLY SPOUSE 1 STORY HOUSE 1 STEP TO ENTER, PT AMB WITH RW HOUSEHOLD DISTANCES, USES SPC OR GROCERY CART FOR COMMUNITY DISTANCES, STILL DRIVES, SARITHA WITH ADL'S  Prior to admission, patients level of function was SARITHA.  Equipment used at home: bedside commode, bath bench, cane, straight, walker, rolling.  " DME owned (not currently used): none.  Upon discharge, patient will have assistance from [employer] SPOUSE.    Objective:     Communicated with [facility] prior to session.  Patient found supine with telemetry, peripheral IV, NG tube, oxygen  upon PT entry to room.    General Precautions: Standard, fall, respiratory  Orthopedic Precautions:N/A   Braces: N/A  Respiratory Status: Nasal cannula, flow 2 L/min    Exams:  Cognitive Exam:  Patient is oriented to Person, Place, Time, and Situation  Postural Exam:  Patient presented with the following abnormalities:    -       Rounded shoulders  -       Forward head  Sensation:    -       Intact  RLE ROM: WFL  RLE Strength: GROSSLY 3+/5  LLE ROM: WFL  LLE Strength: GROSSLY 3+/5    Functional Mobility:  Bed Mobility:     Rolling Right: moderate assistance  Scooting: moderate assistance  Supine to Sit: maximal assistance  Transfers:     Sit to Stand:  minimum assistance with rolling walker  Bed to Chair: minimum assistance with  rolling walker  using  Step Transfer  Gait: PT AMB 40' X 2 TRIALS WITH RW AND TERESSA, SLOW PACE, MILDLY UNSTEADY DUE TO INCREASED LATERAL SWAY, BENT FWD OVER RW DUE TO C/O BACK PAIN, CUES FOR UPRIGHT POSTURE AND RW SAFETY-PT STANDING TOO FAR AWAY FOR RW TO ASSIST IN BALANCE, QUICK TO FATIGUE BUT GOOD EFFORT  Balance: FAIR SITTING BALANCE, POOR+ DYNAMIC BALANCE DURING GAIT  PT EDUCATED IN AND PERFORMED SEATED BLE THEREX X 5 REPS AROM WITH REST AS NEEDED: HIP FLEX/EXT, LAQ, QUAD SET, HEEL SLIDES, AP'S  PT TOLERATED UN-ASSISTED SITTING AT EOB FOR APPROXIMATELY 5 MINUTES FOCUSING ON TOLERANCE TO UPRIGHT POSITION    AM-PAC 6 CLICK MOBILITY  Total Score:14     Treatment & Education:  PT EDUCATION:  - ROLE OF P.T. AND POC IN ACUTE CARE HOSPITAL SETTING  - RW USE AND SAFETY DURING TF'S AND GAIT  - ENCOURAGED TO INCREASE TIME OOB IN CHAIR TO TOLERANCE   - TO CONTINUE THERAPUETIC EXERCISES THROUGHOUT THE DAY TO INCREASE ACTIVITY TOLERANCE AND DECREASE RISK FOR PNEUMONIA  "AND BLOOD CLOTS: HIP FLEX/EXT, HIP ABD/ADD, QUAD SET, HEEL SLIDE, AP  - RISK FOR FALLS DUE TO GENERALIZED WEAKNESS, EDUCATED ON "CALL DON'T FALL", ENCOURAGED TO CALL FOR ASSISTANCE WITH ALL NEEDS SUCH AS BED<>CHAIR TRANSFERS OR TRIPS TO BATHROOM, PT AGREEABLE TO SAFETY PRECAUTIONS    Patient left up in chair with all lines intact, call button in reach, chair alarm on, and SPOUSE present.    GOALS:   Multidisciplinary Problems       Physical Therapy Goals          Problem: Physical Therapy    Goal Priority Disciplines Outcome Interventions   Physical Therapy Goal     PT, PT/OT     Description: LTG'S TO BE MET IN 14 DAYS (4-24-25)  PT WILL REQUIRE CGA FOR BED MOBILITY  PT WILL REQUIRE CGA FOR BED<>CHAIR TF'S  PT WILL  FEET WITH RW AND CGA  PT WILL INC AMPAC SCORE BY 2 POINTS TO PROGRESS GROSS FUNC MOBILITY                         DME Justifications:  No DME recommended requiring DME justifications    History:     Past Medical History:   Diagnosis Date    A-fib     Arthritis     Chronic LBP     ESIs x 3 with Dr. Hicks, PT at [facility], chiropractor    Eye pain     Frequent headaches     GERD (gastroesophageal reflux disease)     Glaucoma     Hyperlipemia     Hypertension        Past Surgical History:   Procedure Laterality Date    ABLATION OF ARRHYTHMOGENIC FOCUS FOR ATRIAL FIBRILLATION N/A 3/6/2019    Procedure: ABLATION, ARRHYTHMOGENIC FOCUS, FOR ATRIAL FIBRILLATION;  Surgeon: Abel Morillo MD;  Location: [facility] EP LAB;  Service: Cardiology;  Laterality: N/A;    CARDIOVERSION N/A 7/9/2018    Procedure: CARDIOVERSION;  Surgeon: Will Rosenthal MD;  Location: [facility] CATH LAB;  Service: Cardiology;  Laterality: N/A;    CATARACT EXTRACTION W/  INTRAOCULAR LENS IMPLANT  OU    COLONOSCOPY N/A 6/13/2024    Procedure: COLONOSCOPY 6/10 xarelto 2 day hold note;  Surgeon: Sayda Ferreira MD;  Location: [facility] ENDO;  Service: Endoscopy;  Laterality: N/A;    ESOPHAGOGASTRODUODENOSCOPY N/A 4/13/2024    Procedure: EGD " (ESOPHAGOGASTRODUODENOSCOPY);  Surgeon: Oswald Esteves MD;  Location: [facility] ENDO;  Service: Endoscopy;  Laterality: N/A;    INJECTION OF ANESTHETIC AGENT INTO SACROILIAC JOINT Right 11/3/2020    Procedure: right Sacroiliac Joint Injection;  Surgeon: Ayush Christiansen MD;  Location: [facility] PAIN MGT;  Service: Pain Management;  Laterality: Right;    INJECTION OF ANESTHETIC AGENT INTO SACROILIAC JOINT Right 3/23/2021    Procedure: right Sacroiliac Joint Injection;  Surgeon: Ayush Christiansen MD;  Location: [facility] PAIN MGT;  Service: Pain Management;  Laterality: Right;    INJECTION OF ANESTHETIC AGENT INTO TISSUE PLANE DEFINED BY TRANSVERSUS ABDOMINIS MUSCLE N/A 4/8/2025    Procedure: BLOCK, TRANSVERSUS ABDOMINIS PLANE;  Surgeon: Sondra Kaur MD;  Location: [facility] OR;  Service: General;  Laterality: N/A;    INJECTION OF JOINT Right 11/3/2020    Procedure: right GT bursa injection;  Surgeon: Ayush Christiansen MD;  Location: [facility] PAIN MGT;  Service: Pain Management;  Laterality: Right;    INJECTION OF JOINT Bilateral 3/23/2021    Procedure: bilateral GT bursa injection;  Surgeon: Ayush Christiansen MD;  Location: [facility] PAIN MGT;  Service: Pain Management;  Laterality: Bilateral;    ROBOTIC EXCISION,SMALL INTESTINE N/A 4/8/2025    Procedure: ROBOTIC EXCISION,SMALL INTESTINE;  Surgeon: Sondra Kaur MD;  Location: [facility] OR;  Service: General;  Laterality: N/A;    TUBAL LIGATION      XI ROBOTIC LAPAROSCOPY,DIAGNOSTIC N/A 4/8/2025    Procedure: XI ROBOTIC LAPAROSCOPY,DIAGNOSTIC;  Surgeon: Sondra Kaur MD;  Location: [facility] OR;  Service: General;  Laterality: N/A;       Time Tracking:     PT Received On: 04/10/25  PT Start Time: 0840     PT Stop Time: 0920  PT Total Time (min): 40 min     Billable Minutes: Evaluation 15 and Therapeutic Activity 25    04/10/2025

## 2025-04-10 NOTE — PROGRESS NOTES
[place] - Telemetry ([facility])  General Surgery  Progress Note    Subjective:     History of Present Illness:  Maria Del Carmen Spence is a 84 y.o. female with a history of atrial fibrillation (on Xarelto), and previous tubal ligation who presented to the emergency department with sudden onset abdominal pain and nausea.  Patient states the pain started around 6:00 p.m. last night out of the blue and she originally thought it was gas pains.  She is passing a little bit of flatus but not much.  The pain is predominantly on the right side of the abdomen but she states she also felt some epigastric pain with radiation around to the back and pelvic pains.  Upon presentation in the ER she was afebrile, vitals were within normal limits.  Labs were notable for leukocytosis of 12 K, otherwise within normal limits.  CT of the abdomen and pelvis was done with IV contrast which was concerning for possible early closed loop obstruction in the pelvis with mesenteric fat stranding and edema, with some fluid in the pelvis.  She was admitted to hospital medicine.      Post-Op Info:  Procedure(s) (LRB):  XI ROBOTIC LAPAROSCOPY,DIAGNOSTIC (N/A)  BLOCK, TRANSVERSUS ABDOMINIS PLANE (N/A)  ROBOTIC EXCISION,SMALL INTESTINE (N/A)   2 Days Post-Op     Interval History: POD 2- AF, ZEUS. doing well, WBC downtrend to 14. Pain controlled. Denies n/v. Admits to a lot of flatulence and a small BM. NGT removed.    Medications:  Continuous Infusions:  Scheduled Meds:   chlorhexidine  10 mL Mouth/Throat BID    enoxparin  40 mg Subcutaneous Q24H (prophylaxis, 1700)    latanoprost  1 drop Both Eyes QHS    metoprolol  5 mg Intravenous Q8H    pantoprazole  40 mg Intravenous Daily    piperacillin-tazobactam (Zosyn) IV (PEDS and ADULTS) (extended infusion is not appropriate)  4.5 g Intravenous Q8H    timolol maleate 0.5%  1 drop Both Eyes Daily    white petrolatum-mineral oiL   Both Eyes QHS     PRN Meds:  Current Facility-Administered Medications:     artificial  tears, 1 drop, Both Eyes, PRN    dextrose 50%, 12.5 g, Intravenous, PRN    dextrose 50%, 25 g, Intravenous, PRN    glucagon (human recombinant), 1 mg, Intramuscular, PRN    glucose, 16 g, Oral, PRN    glucose, 24 g, Oral, PRN    hydrALAZINE, 5 mg, Intravenous, Q6H PRN    melatonin, 6 mg, Oral, Nightly PRN    morphine, 2 mg, Intravenous, Q4H PRN    naloxone, 0.02 mg, Intravenous, PRN    ondansetron, 4 mg, Intravenous, Q6H PRN    prochlorperazine, 2.5 mg, Intravenous, Q8H PRN    sodium chloride 0.9%, 10 mL, Intravenous, Q12H PRN     Review of patient's allergies indicates:   Allergen Reactions    Voltaren [diclofenac sodium] Edema     Gel formulation caused facial rash and facial swelling    Eliquis [apixaban] Other (See Comments)     Headache, numbness in lip     Medrol [methylprednisolone] Blisters     Objective:     Vital Signs (Most Recent):  Temp: 97.9 °F (36.6 °C) (04/10/25 0815)  Pulse: 87 (04/10/25 0904)  Resp: 18 (04/10/25 0815)  BP: 132/64 (04/10/25 0815)  SpO2: 97 % (04/10/25 0815) Vital Signs (24h Range):  Temp:  [97.9 °F (36.6 °C)-99.6 °F (37.6 °C)] 97.9 °F (36.6 °C)  Pulse:  [] 87  Resp:  [16-18] 18  SpO2:  [93 %-99 %] 97 %  BP: (127-147)/(57-65) 132/64     Weight: 79.9 kg (176 lb 2.4 oz)  Body mass index is 29.31 kg/m².    Intake/Output - Last 3 Shifts         04/08 0700  04/09 0659 04/09 0700  04/10 0659 04/10 0700  04/11 0659    IV Piggyback 100 392.2     Total Intake(mL/kg) 100 (1.3) 392.2 (4.9)     Urine (mL/kg/hr) 775 (0.4)      Emesis/NG output       Drains  150     Total Output 775 150     Net -675 +242.2            Urine Occurrence  1 x              Physical Exam  Constitutional:       Appearance: Normal appearance.   HENT:      Head: Normocephalic.      Nose: Nose normal.      Comments: NGT removed  Eyes:      Pupils: Pupils are equal, round, and reactive to light.   Cardiovascular:      Rate and Rhythm: Normal rate and regular rhythm.   Pulmonary:      Effort: Pulmonary effort is normal.    Abdominal:      Palpations: Abdomen is soft.      Tenderness: There is abdominal tenderness (appropriate TTP). There is no guarding or rebound.      Hernia: No hernia is present.      Comments: Bloated. Incision c/d/I no drainage or erythema   Musculoskeletal:      Cervical back: Normal range of motion.      Right lower leg: No edema.      Left lower leg: No edema.   Skin:     General: Skin is warm and dry.   Neurological:      General: No focal deficit present.      Mental Status: She is alert and oriented to person, place, and time.   Psychiatric:         Mood and Affect: Mood normal.          Significant Labs:  I have reviewed all pertinent lab results within the past 24 hours.  CBC:   Recent Labs   Lab 04/10/25  0502   WBC 14.70*   RBC 3.39*   HGB 11.0*   HCT 34.0*      *   MCH 32.4*   MCHC 32.4     CMP:   Recent Labs   Lab 04/10/25  0502   CALCIUM 9.3   ALBUMIN 2.8*      K 3.7   CO2 27      BUN 18   CREATININE 0.7   ALKPHOS 62   ALT 9*   AST 20   BILITOT 0.9       Significant Diagnostics:  I have reviewed all pertinent imaging results/findings within the past 24 hours.  Assessment/Plan:     * Intestinal adhesions with complete obstruction  POD 2- s/p Diagnostic laparoscopy, Robotic small bowel resection with intracorporeal anastomosis    Doing well with no acute complaints. Denies n/v. Admits to a lot of flatulence and small BM. NGT removed.    -advance to clear liquid diet  -multimodal pain control-starting oral pain meds  -IVF  -PT/OT, encouraged OOB and ambulation  -IS use 10x/hr while awake  -DVT ppx and GI ppx    Nausea and vomiting  2/2 pain and bowel obstruction -resolved    Gastroesophageal reflux disease without esophagitis  -- Management as per primary team     Mixed hyperlipidemia  -- Management as per primary team     Paroxysmal atrial fibrillation  -- Kcentra given for reversal of Xarelto   -- Management as per primary team     Primary hypertension  -- Management as per  primary team     Primary open-angle glaucoma(365.11) - Both Eyes  -- Management as per primary team         April B DEMIAN Worley  General Surgery  [place] - Telemetry ([facility])

## 2025-04-10 NOTE — PLAN OF CARE
04/10/25 1315   Post-Acute Status   Post-Acute Authorization Placement   Post-Acute Placement Status Patient declined/refused   Discharge Delays None known at this time   Discharge Plan   Discharge Plan A Home;Home with family   Discharge Plan B Home Health     SW met with patient at bedside to discuss PT/OT recs for IRF/HIT. Pt declining rehab at this time; reports that it would be too much for her and spouse. Pt reports ambulating with a RW at home.   SW discussed home health. Pt reports she's not sure she'll need this but will consider.  SW to follow.

## 2025-04-10 NOTE — NURSING
Surgeon at bedside, NG tube removed with minimal discomfort. Pt had small bowel movement with moderate amount of gas 4/10, Md instructed to hold off on bisacodyl suppository.

## 2025-04-10 NOTE — PLAN OF CARE
Problem: Adult Inpatient Plan of Care  Goal: Plan of Care Review  Outcome: Progressing  Goal: Patient-Specific Goal (Individualized)  Outcome: Progressing  Goal: Absence of Hospital-Acquired Illness or Injury  Outcome: Progressing  Goal: Optimal Comfort and Wellbeing  Outcome: Progressing  Goal: Readiness for Transition of Care  Outcome: Progressing     Problem: Infection  Goal: Absence of Infection Signs and Symptoms  Outcome: Progressing     Problem: Wound  Goal: Optimal Coping  Outcome: Progressing  Goal: Optimal Functional Ability  Outcome: Progressing  Goal: Absence of Infection Signs and Symptoms  Outcome: Progressing  Goal: Improved Oral Intake  Outcome: Progressing  Goal: Optimal Pain Control and Function  Outcome: Progressing  Goal: Skin Health and Integrity  Outcome: Progressing  Goal: Optimal Wound Healing  Outcome: Progressing     Problem: Fall Injury Risk  Goal: Absence of Fall and Fall-Related Injury  Outcome: Progressing   POC reviewed with pt. Pt verbalizes understanding of POC. No questions at this time.  AAOx4. NADN.  NSR on cardiac monitor.  Pt remains free of falls.  No complaints at this time.  Safety measures in place. Will continue to monitor.  Informed pt to call for assistance before getting up. Pt verbalizes understanding.  Hourly rounding and chart check complete.

## 2025-04-10 NOTE — SUBJECTIVE & OBJECTIVE
Interval History: POD 2- AF, ZEUS. doing well, WBC downtrend to 14. Pain controlled. Denies n/v. Admits to a lot of flatulence and a small BM. NGT removed.    Medications:  Continuous Infusions:  Scheduled Meds:   chlorhexidine  10 mL Mouth/Throat BID    enoxparin  40 mg Subcutaneous Q24H (prophylaxis, 1700)    latanoprost  1 drop Both Eyes QHS    metoprolol  5 mg Intravenous Q8H    pantoprazole  40 mg Intravenous Daily    piperacillin-tazobactam (Zosyn) IV (PEDS and ADULTS) (extended infusion is not appropriate)  4.5 g Intravenous Q8H    timolol maleate 0.5%  1 drop Both Eyes Daily    white petrolatum-mineral oiL   Both Eyes QHS     PRN Meds:  Current Facility-Administered Medications:     artificial tears, 1 drop, Both Eyes, PRN    dextrose 50%, 12.5 g, Intravenous, PRN    dextrose 50%, 25 g, Intravenous, PRN    glucagon (human recombinant), 1 mg, Intramuscular, PRN    glucose, 16 g, Oral, PRN    glucose, 24 g, Oral, PRN    hydrALAZINE, 5 mg, Intravenous, Q6H PRN    melatonin, 6 mg, Oral, Nightly PRN    morphine, 2 mg, Intravenous, Q4H PRN    naloxone, 0.02 mg, Intravenous, PRN    ondansetron, 4 mg, Intravenous, Q6H PRN    prochlorperazine, 2.5 mg, Intravenous, Q8H PRN    sodium chloride 0.9%, 10 mL, Intravenous, Q12H PRN     Review of patient's allergies indicates:   Allergen Reactions    Voltaren [diclofenac sodium] Edema     Gel formulation caused facial rash and facial swelling    Eliquis [apixaban] Other (See Comments)     Headache, numbness in lip     Medrol [methylprednisolone] Blisters     Objective:     Vital Signs (Most Recent):  Temp: 97.9 °F (36.6 °C) (04/10/25 0815)  Pulse: 87 (04/10/25 0904)  Resp: 18 (04/10/25 0815)  BP: 132/64 (04/10/25 0815)  SpO2: 97 % (04/10/25 0815) Vital Signs (24h Range):  Temp:  [97.9 °F (36.6 °C)-99.6 °F (37.6 °C)] 97.9 °F (36.6 °C)  Pulse:  [] 87  Resp:  [16-18] 18  SpO2:  [93 %-99 %] 97 %  BP: (127-147)/(57-65) 132/64     Weight: 79.9 kg (176 lb 2.4 oz)  Body mass  index is 29.31 kg/m².    Intake/Output - Last 3 Shifts         04/08 0700  04/09 0659 04/09 0700  04/10 0659 04/10 0700 04/11 0659    IV Piggyback 100 392.2     Total Intake(mL/kg) 100 (1.3) 392.2 (4.9)     Urine (mL/kg/hr) 775 (0.4)      Emesis/NG output       Drains  150     Total Output 775 150     Net -675 +242.2            Urine Occurrence  1 x              Physical Exam  Constitutional:       Appearance: Normal appearance.   HENT:      Head: Normocephalic.      Nose: Nose normal.      Comments: NGT in place. 50cc output-brown in color  Eyes:      Pupils: Pupils are equal, round, and reactive to light.   Cardiovascular:      Rate and Rhythm: Normal rate and regular rhythm.   Pulmonary:      Effort: Pulmonary effort is normal.   Abdominal:      Palpations: Abdomen is soft.      Tenderness: There is abdominal tenderness (appropriate TTP). There is no guarding or rebound.      Hernia: No hernia is present.      Comments: Bloated. Incision c/d/I no drainage or erythema   Musculoskeletal:      Cervical back: Normal range of motion.      Right lower leg: No edema.      Left lower leg: No edema.   Skin:     General: Skin is warm and dry.   Neurological:      General: No focal deficit present.      Mental Status: She is alert and oriented to person, place, and time.   Psychiatric:         Mood and Affect: Mood normal.          Significant Labs:  I have reviewed all pertinent lab results within the past 24 hours.  CBC:   Recent Labs   Lab 04/10/25  0502   WBC 14.70*   RBC 3.39*   HGB 11.0*   HCT 34.0*      *   MCH 32.4*   MCHC 32.4     CMP:   Recent Labs   Lab 04/10/25  0502   CALCIUM 9.3   ALBUMIN 2.8*      K 3.7   CO2 27      BUN 18   CREATININE 0.7   ALKPHOS 62   ALT 9*   AST 20   BILITOT 0.9       Significant Diagnostics:  I have reviewed all pertinent imaging results/findings within the past 24 hours.

## 2025-04-10 NOTE — ASSESSMENT & PLAN NOTE
Patient with paroxysmal (<7 days) atrial fibrillation which is controlled currently with Beta Blocker. Patient is currently in sinus rhythm.    [id number] Score: 3.     The patients heart rate in the last 24 hours is as follows:  Pulse  Min: 81  Max: 100    PLAN:  - Antiarrhythmics: metoprolol injection 5 mg, Every 8 hours, Intravenous  - Anticoagulants: enoxaparin injection 40 mg, Every 24 hours, SubcutaneousXarelto was held and reversed due to surgical intervention concerns. Resume when safe per surgical team  - Replete electrolytes PRN to  target Magnesium > 2, Potassium > 4  - Continuous telemetry    4/9/25: HR controlled on IV Metoprolol, will need to re-start anticoagulation when ok with general surgery

## 2025-04-10 NOTE — PLAN OF CARE
Problem: Adult Inpatient Plan of Care  Goal: Plan of Care Review  Outcome: Progressing  Goal: Patient-Specific Goal (Individualized)  Outcome: Progressing  Goal: Absence of Hospital-Acquired Illness or Injury  Outcome: Progressing  Goal: Optimal Comfort and Wellbeing  Outcome: Progressing  Goal: Readiness for Transition of Care  Outcome: Progressing     Problem: Infection  Goal: Absence of Infection Signs and Symptoms  Outcome: Progressing   POC reviewed with pt. Pt verbalizes understanding of POC. No questions at this time.  AAO4. NADN.  NSR on cardiac monitor.  Pt remains free of falls.  No complaints at this time.  Safety measures in place. Will continue to monitor.  Informed pt to call for assistance before getting up. Pt verbalizes understanding.  Hourly rounding and chart check complete.

## 2025-04-10 NOTE — PT/OT/SLP EVAL
"Occupational Therapy Evaluation and Treatment    Name: Maria Del Carmen Spence  MRN: [id number]  Admitting Diagnosis: Intestinal adhesions with complete obstruction  Recent Surgery: Procedure(s) (LRB):  XI ROBOTIC LAPAROSCOPY,DIAGNOSTIC (N/A)  BLOCK, TRANSVERSUS ABDOMINIS PLANE (N/A)  ROBOTIC EXCISION,SMALL INTESTINE (N/A) 2 Days Post-Op    Recommendations:     Discharge Recommendations: High Intensity Therapy  Level of Assistance Recommended: 24 hours light assistance and 24 hours supervision  Discharge Equipment Recommendations: to be determined by next level of care  Barriers to discharge: Decreased caregiver support    Assessment:     Maria Del Carmen Spence is a 84 y.o. female with a medical diagnosis of Intestinal adhesions with complete obstruction. She presents with performance deficits affecting function including weakness, impaired endurance, impaired self care skills, impaired functional mobility, impaired balance, impaired cardiopulmonary response to activity, pain, decreased safety awareness, edema, decreased lower extremity function, decreased upper extremity function, impaired fine motor.    Rehab Prognosis: Good; patient would benefit from acute OT services to address these deficits and reach maximum level of function.    Patient presents with good participation and motivation to return to prior level of function with high intensity therapy. The patient demonstrates appropriate endurance, strength, and pain control required to participate in up to 3 hours of combined therapy per day.     Plan:     Patient to be seen 2 x/week to address the above listed problems via self-care/home management, therapeutic activities, therapeutic exercises  Plan of Care Expires: 04/24/25  Plan of Care Reviewed with: patient    Subjective     Chief Complaint: Reported "I want to get up."  Patient Comments/Goals: increase independence  Pain/Comfort:  Pain Rating 1: 3/10  Location 1: abdomen  Pain Addressed 1:  (activity pacing)  C/o " chronic back and B knee pain- did not rate    Social History:  Living Environment: Patient lives with their spouse in a single story home with  a threshold to enter.  Prior Level of Function: Prior to admission, patient was modified independent with ADLs and community distance ambulation via SPC. Uses RW in her home for general safety.  Roles and Routines: Patient was driving and retired prior to admission.  Equipment Used at Home: bedside commode, bath bench, cane, straight, walker, rolling  DME owned (not currently used): none  Assistance Upon Discharge: significant other    Objective:     Completed EPIC chart review prior to session. Patient found supine with peripheral IV, telemetry, NG tube, oxygen upon OT entry to room.    General Precautions: Standard, fall, respiratory   Orthopedic Precautions: N/A   Braces: N/A    Respiratory Status: Nasal cannula, flow 2 L/min    Occupational Performance    Bed Mobility:   Rolling/Turning to Right with moderate assistance  Supine to sit from right side of bed with maximal assistance  Via log rolling technique    Functional Mobility/Transfers:  Sit <> Stand Transfer with minimum assistance with rolling walker  Bed <> Chair Transfer using Step Transfer technique with minimum assistance with rolling walker  Functional Mobility: Patient completed x40ft x2 trials functional mobility with RW and min A to increase dynamic standing balance and activity tolerance needed for ADL completion.  V/c for technique with transfers to increase safety and independence with completion  Poor upright posturing    Activities of Daily Living:  Upper Body Dressing: minimum assistance  Lower Body Dressing: maximal assistance    Cognitive/Visual Perceptual:  Cognitive/Psychosocial Skills:    -     Oriented to: Person, Place, Time, Situation  -     Follows Commands/attention: Follows one-step commands    Physical Exam:  Balance:    -     Sitting: contact guard assistance  -     Standing: minimum  assistance  Upper Extremity Range of Motion:     -       Right Upper Extremity: WFL  -       Left Upper Extremity: WFL  Upper Extremity Strength:    -       Right Upper Extremity: Deficits: grossly 4/5  -       Left Upper Extremity: Deficits: grossly 4/5   Strength:    -       Right Upper Extremity: Deficits: fair  -       Left Upper Extremity: Deficits: fair    AMPAC 6 Click ADL:  AMPAC Total Score: 14    Treatment & Education:  Therapist provided facilitation and instruction of proper body mechanics, energy conservation, and fall prevention strategies during tasks listed above  Patient educated on role of OT, POC, and goals for therapy  Patient educated on importance of OOB activities with staff member assistance and sitting OOB majority of the day  Encouraged completion of B UE AROM therex throughout the day to increase functional strength and activity tolerance needed for ADL completion.    Patient left up in chair with all lines intact, call button in reach, and chair alarm on.    GOALS:   Multidisciplinary Problems       Occupational Therapy Goals          Problem: Occupational Therapy    Goal Priority Disciplines Outcome Interventions   Occupational Therapy Goal     OT, PT/OT     Description: Goals to be met by: 4/24/25     Patient will increase functional independence with ADLs by performing:    Toileting from toilet with Supervision for hygiene and clothing management.   Toilet transfer to bedside commode with Supervision.  Increased functional strength in B UE grossly by 1/2 MM grade.                       DME Justifications:  No DME recommended requiring DME justifications    History:     Past Medical History:   Diagnosis Date    A-fib     Arthritis     Chronic LBP     ESIs x 3 with Dr. Hicks, PT at [place], chiropractor    Eye pain     Frequent headaches     GERD (gastroesophageal reflux disease)     Glaucoma     Hyperlipemia     Hypertension      Past Surgical History:   Procedure Laterality Date     ABLATION OF ARRHYTHMOGENIC FOCUS FOR ATRIAL FIBRILLATION N/A 3/6/2019    Procedure: ABLATION, ARRHYTHMOGENIC FOCUS, FOR ATRIAL FIBRILLATION;  Surgeon: Abel Morillo MD;  Location: [facility] EP LAB;  Service: Cardiology;  Laterality: N/A;    CARDIOVERSION N/A 7/9/2018    Procedure: CARDIOVERSION;  Surgeon: Will Rosenthal MD;  Location: [facility] CATH LAB;  Service: Cardiology;  Laterality: N/A;    CATARACT EXTRACTION W/  INTRAOCULAR LENS IMPLANT  OU    COLONOSCOPY N/A 6/13/2024    Procedure: COLONOSCOPY 6/10 xarelto 2 day hold note;  Surgeon: Sayda Ferreira MD;  Location: [facility] ENDO;  Service: Endoscopy;  Laterality: N/A;    ESOPHAGOGASTRODUODENOSCOPY N/A 4/13/2024    Procedure: EGD (ESOPHAGOGASTRODUODENOSCOPY);  Surgeon: Oswald Esteves MD;  Location: [facility] ENDO;  Service: Endoscopy;  Laterality: N/A;    INJECTION OF ANESTHETIC AGENT INTO SACROILIAC JOINT Right 11/3/2020    Procedure: right Sacroiliac Joint Injection;  Surgeon: Ayush Christiansen MD;  Location: [facility] PAIN MGT;  Service: Pain Management;  Laterality: Right;    INJECTION OF ANESTHETIC AGENT INTO SACROILIAC JOINT Right 3/23/2021    Procedure: right Sacroiliac Joint Injection;  Surgeon: Ayush Christiansen MD;  Location: [facility] PAIN MGT;  Service: Pain Management;  Laterality: Right;    INJECTION OF ANESTHETIC AGENT INTO TISSUE PLANE DEFINED BY TRANSVERSUS ABDOMINIS MUSCLE N/A 4/8/2025    Procedure: BLOCK, TRANSVERSUS ABDOMINIS PLANE;  Surgeon: Sondra Kaur MD;  Location: [facility] OR;  Service: General;  Laterality: N/A;    INJECTION OF JOINT Right 11/3/2020    Procedure: right GT bursa injection;  Surgeon: Ayush Christiansen MD;  Location: [facility] PAIN MGT;  Service: Pain Management;  Laterality: Right;    INJECTION OF JOINT Bilateral 3/23/2021    Procedure: bilateral GT bursa injection;  Surgeon: Ayush Christiansen MD;  Location: [facility] PAIN MGT;  Service: Pain Management;  Laterality: Bilateral;    ROBOTIC EXCISION,SMALL INTESTINE N/A 4/8/2025    Procedure: ROBOTIC  EXCISION,SMALL INTESTINE;  Surgeon: Sondra Kaur MD;  Location: [facility] OR;  Service: General;  Laterality: N/A;    TUBAL LIGATION      XI ROBOTIC LAPAROSCOPY,DIAGNOSTIC N/A 4/8/2025    Procedure: XI ROBOTIC LAPAROSCOPY,DIAGNOSTIC;  Surgeon: Sondra Kaur MD;  Location: [facility] OR;  Service: General;  Laterality: N/A;       Time Tracking:     OT Date of Treatment: 04/10/25  OT Start Time: 0845  OT Stop Time: 0910  OT Total Time (min): 25 min    Billable Minutes: Evaluation 15 and Therapeutic Activity 10    Stacy Montes OT  4/10/2025

## 2025-04-10 NOTE — PLAN OF CARE
OT gabi completed. Sup>sit max A, sit>stand min A, functional mobility x40ft x2 trials with RW and min A, step>pivot to bedside chair with RW and min A. Recommending high intensity intervention at d/c.

## 2025-04-10 NOTE — PLAN OF CARE
P.T. EVAL COMPLETE.  PT CURRENTLY REQUIRES MAXA FOR SUP>SIT, TERESSA FOR TF'S AND GAIT WITH RW.  P.T. RECOMMENDS HIGH INTENSITY THERAPY UPON DISCHARGE

## 2025-04-10 NOTE — ASSESSMENT & PLAN NOTE
POD 2- s/p Diagnostic laparoscopy, Robotic small bowel resection with intracorporeal anastomosis    Doing well with no acute complaints. Denies n/v. Admits to a lot of flatulence and small BM. NGT removed.    -advance to clear liquid diet  -multimodal pain control-starting oral pain meds  -IVF  -PT/OT, encouraged OOB and ambulation  -IS use 10x/hr while awake  -DVT ppx and GI ppx

## 2025-04-10 NOTE — PROGRESS NOTES
[facility] - Tele[facility] ([facility] Medicine  Progress Note    Patient Name: Maria Del Carmen Spence  MRN: [id number]  Patient Class: IP- Inpatient   Admission Date: 4/7/2025  Length of Stay: 2 days  Attending Physician: Yimi Walker MD  Primary Care Provider: Rajinder Lutz MD        Subjective     Principal Problem:Intestinal adhesions with complete obstruction        HPI:  Ms. Maria Del Carmen Spence is a 84 y.o. female who  has a medical history of A-fib, Arthritis, Chronic back pain,, Frequent headaches, GERD (gastroesophageal reflux disease), Glaucoma, Hyperlipemia, and Hypertension.    She  presented to the ED for evaluation of worsening abdominal pain with associated nausea since yesterday afternoon which did not alleviate with OTC treatments.  Denies any recent concerns for infection, fever, chills, urinary symptoms.  She reports a prior history of gynecologic surgeries.    ED course notable for hemodynamically stable vitals, labs with WBC 12.79.  CT abdomen pelvis w/ IV contrast(read pending) were ordered.  Evaluation noted concerns for closed loop bowel obstruction and patient also had episodes of vomiting.  General surgery was consulted with recommendations for Xarelto reversal and anticipated surgery.  Patient was admitted to Hospital Medicine for further evaluation.    Overview/Hospital Course:  The patient was admitted with SBO. CT of the abdomen and pelvis was done with IV contrast which was concerning for possible early closed loop obstruction in the pelvis with mesenteric fat stranding and edema, with some fluid in the pelvis. +leukocytosis. Pt was placed on IV Zosyn. General surgery was consulted. Pt was given PCC for Xarelto reversal and underwent urgent Diagnostic laparoscopy and Robotic small bowel resection with intracorporeal anastomosis.   Pt tolerated surgery well. No flatus yet. +bowel sounds. NPO/NGT in place. C/o of right eye discomfort after surgery- resolved with lubricating eye drops.   Will need  to restart anticoagulation when ok with general surgery     04/10/2025  Status post small bowel resection.  Clinically improved.  Clear liquid diet initiated.  Will plan to start anticoagulation tomorrow if okay with surgery.  Continue Zosyn.    Interval History:  No acute issues reported overnight.  Pain controlled.  Patient reports passing gas.    Review of Systems   Constitutional:  Negative for fatigue and fever.   HENT:  Negative for sinus pressure.    Eyes:  Negative for visual disturbance.   Respiratory:  Negative for shortness of breath.    Cardiovascular:  Negative for chest pain.   Gastrointestinal:  Negative for nausea and vomiting.   Genitourinary:  Negative for difficulty urinating.   Musculoskeletal:  Negative for back pain.   Skin:  Negative for rash.   Neurological:  Negative for headaches.   Psychiatric/Behavioral:  Negative for confusion.      Objective:     Vital Signs (Most Recent):  Temp: 98 °F (36.7 °C) (04/10/25 1149)  Pulse: 83 (04/10/25 1149)  Resp: 16 (04/10/25 1224)  BP: 129/63 (04/10/25 1149)  SpO2: 98 % (04/10/25 1149) Vital Signs (24h Range):  Temp:  [97.9 °F (36.6 °C)-99.6 °F (37.6 °C)] 98 °F (36.7 °C)  Pulse:  [] 83  Resp:  [16-18] 16  SpO2:  [96 %-99 %] 98 %  BP: (127-147)/(57-65) 129/63     Weight: 79.9 kg (176 lb 2.4 oz)  Body mass index is 29.31 kg/m².    Intake/Output Summary (Last 24 hours) at 4/10/2025 1346  Last data filed at 4/9/2025 2045  Gross per 24 hour   Intake 392.22 ml   Output 150 ml   Net 242.22 ml         Physical Exam  Constitutional:       General: She is not in acute distress.     Appearance: She is well-developed. She is not diaphoretic.   HENT:      Head: Normocephalic and atraumatic.   Eyes:      Pupils: Pupils are equal, round, and reactive to light.   Cardiovascular:      Rate and Rhythm: Normal rate and regular rhythm.      Heart sounds: Normal heart sounds. No murmur heard.     No friction rub. No gallop.   Pulmonary:      Effort: Pulmonary effort is  normal. No respiratory distress.      Breath sounds: Normal breath sounds. No stridor. No wheezing or rales.   Abdominal:      General: Bowel sounds are normal. There is no distension.      Palpations: Abdomen is soft. There is no mass.      Tenderness: There is abdominal tenderness. There is no guarding.   Musculoskeletal:      Right lower leg: No edema.      Left lower leg: No edema.   Skin:     General: Skin is warm.      Findings: No erythema.   Neurological:      Mental Status: She is alert and oriented to person, place, and time.               Significant Labs: All pertinent labs within the past 24 hours have been reviewed.    Significant Imaging: I have reviewed all pertinent imaging results/findings within the past 24 hours.      Assessment & Plan  Intestinal adhesions with complete obstruction  Nausea and vomiting  Presented to the ED for evaluation of worsening abdominal pain with associated nausea  which did not alleviate with OTC treatments.  Denies any recent concerns for infection, fever, chills, urinary symptoms.  She reports a prior history of gynecologic surgeries.     ED course notable for hemodynamically stable vitals, labs with WBC 12.79.  CT abdomen pelvis w/ IV contrast(read pending) were ordered.  Evaluation noted concerns for closed loop bowel obstruction and patient also had episodes of vomiting.  General surgery was consulted with recommendations for Xarelto reversal and anticipated surgery.  Patient was admitted to Hospital Medicine for further evaluation.    PLAN:  - General Surgery Consulted, follow-up recs  - NPO, IVF resuscitation  - Follow up pending radiology results    4/9/25: s/p Diagnostic laparoscopy and Robotic small bowel resection with intracorporeal anastomosis 4/8/25 per Dr. Kaur.   Pt tolerated surgery well. No flatus yet. +bowel sounds. NPO/NGT in place. C/o of right eye discomfort after surgery- resolved with lubricating eye drops.   Will need to restart anticoagulation  when ok with general surgery     Presented to the ED for evaluation of worsening abdominal pain with associated nausea  which did not alleviate with OTC treatments.  Denies any recent concerns for infection, fever, chills, urinary symptoms.  She reports a prior history of gynecologic surgeries.     ED course notable for hemodynamically stable vitals, labs with WBC 12.79.  CT abdomen pelvis w/ IV contrast(read pending) were ordered.  Evaluation noted concerns for closed loop bowel obstruction and patient also had episodes of vomiting.  General surgery was consulted with recommendations for Xarelto reversal and anticipated surgery.  Patient was admitted to Hospital Medicine for further evaluation.    PLAN:  - General Surgery Consulted, follow-up recs  - NPO, IVF resuscitation  - Follow up pending radiology results    4/9/25: +leukocytosis, cont IV Zosyn for now. +mild nausea this am, NGT in place- scant gastric output, abdomen soft, non distended. +Bowel sounds, no flatus yet    4/10/2025  POD1 lysis of adhesions and small-bowel resection   Tolerated procedure well   Pain controlled   NG tube removed   Clear liquid diet initiated   Continue empiric Zosyn      Presented to the ED for evaluation of worsening abdominal pain with associated nausea  which did not alleviate with OTC treatments.  Denies any recent concerns for infection, fever, chills, urinary symptoms.  She reports a prior history of gynecologic surgeries.     ED course notable for hemodynamically stable vitals, labs with WBC 12.79.  CT abdomen pelvis w/ IV contrast(read pending) were ordered.  Evaluation noted concerns for closed loop bowel obstruction and patient also had episodes of vomiting.  General surgery was consulted with recommendations for Xarelto reversal and anticipated surgery.  Patient was admitted to Hospital Medicine for further evaluation.    PLAN:  - General Surgery Consulted, follow-up recs  - NPO, IVF resuscitation  - Follow up pending  radiology results    4/9/25: s/p Diagnostic laparoscopy and Robotic small bowel resection with intracorporeal anastomosis 4/8/25 per Dr. Kaur.   Pt tolerated surgery well. No flatus yet. +bowel sounds. NPO/NGT in place. C/o of right eye discomfort after surgery- resolved with lubricating eye drops.   Will need to restart anticoagulation when ok with general surgery     Presented to the ED for evaluation of worsening abdominal pain with associated nausea  which did not alleviate with OTC treatments.  Denies any recent concerns for infection, fever, chills, urinary symptoms.  She reports a prior history of gynecologic surgeries.     ED course notable for hemodynamically stable vitals, labs with WBC 12.79.  CT abdomen pelvis w/ IV contrast(read pending) were ordered.  Evaluation noted concerns for closed loop bowel obstruction and patient also had episodes of vomiting.  General surgery was consulted with recommendations for Xarelto reversal and anticipated surgery.  Patient was admitted to Hospital Medicine for further evaluation.    PLAN:  - General Surgery Consulted, follow-up recs  - NPO, IVF resuscitation  - Follow up pending radiology results    4/9/25: +leukocytosis, cont IV Zosyn for now. +mild nausea this am, NGT in place- scant gastric output, abdomen soft, non distended. +Bowel sounds, no flatus yet      Paroxysmal atrial fibrillation  Patient with paroxysmal (<7 days) atrial fibrillation which is controlled currently with Beta Blocker. Patient is currently in sinus rhythm.    [id number] Score: 3.     The patients heart rate in the last 24 hours is as follows:  Pulse  Min: 81  Max: 100    PLAN:  - Antiarrhythmics: metoprolol injection 5 mg, Every 8 hours, Intravenous  - Anticoagulants: enoxaparin injection 40 mg, Every 24 hours, SubcutaneousXarelto was held and reversed due to surgical intervention concerns. Resume when safe per surgical team  - Replete electrolytes PRN to  target Magnesium > 2, Potassium >  4  - Continuous telemetry    4/9/25: HR controlled on IV Metoprolol, will need to re-start anticoagulation when ok with general surgery   Primary open-angle glaucoma(365.11) - Both Eyes  Continue home timolol, latanoprost    Primary hypertension  Hypertensive on admission    Home meds for hypertension were reviewed and noted below.   Home Hypertension Medications per chart review             furosemide (LASIX) 20 MG tablet TAKE ONE TABLET BY MOUTH TWICE A WEEK    metoprolol tartrate (LOPRESSOR) 25 MG tablet Take 1 tablet (25 mg total) by mouth 2 (two) times daily.            Patients blood pressure range in the last 24 hours was: BP  Min: 119/68  Max: 198/75.      PLAN:  -While in the hospital, will manage blood pressure as follows; Continue home antihypertensive regimen  -The patient's inpatient anti-hypertensive regimen is listed below:  Current Antihypertensives  hydrALAZINE injection 5 mg, Every 6 hours PRN, Intravenous  metoprolol injection 5 mg, Every 8 hours, Intravenous    -Will utilize p.r.n. blood pressure medication only if patient's blood pressure greater than 180/110 and she develops symptoms such as worsening chest pain or shortness of breath.    Mixed hyperlipidemia  Recent Lipid Panel reviewed-  Lab Results   Component Value Date    HDL 59 09/17/2024    LDLCALC 80.6 09/17/2024    TRIG 137 09/17/2024    CHOL 167 09/17/2024        Home Hyperlipidemia Medications per chart review             atorvastatin (LIPITOR) 10 MG tablet Take 1 tablet (10 mg total) by mouth every evening.            PLAN  -resume home statin when able to tolerate p.o.      Gastroesophageal reflux disease without esophagitis  Chronic.  Stable    Home medication includes omeprazole p.o.    PLAN:  Started on pantoprazole IV, resume oral PPI when able to tolerate p.o.    VTE Risk Mitigation (From admission, onward)           Ordered     enoxaparin injection 40 mg  Every 24 hours         04/09/25 1510     IP VTE HIGH RISK PATIENT  Once          04/08/25 0628     Place sequential compression device  Until discontinued         04/08/25 0628     Reason for No Pharmacological VTE Prophylaxis  Once        Question:  Reasons:  Answer:  Risk of Bleeding  Comment:  Surgical intervention planned    04/08/25 0628                    Discharge Planning   STEF:      Code Status: Full Code   Medical Readiness for Discharge Date:   Discharge Plan A: Home, Home with family   Discharge Delays: None known at this time          Yimi Walker MD  Department of Hospital Medicine   '[facility] ([facility])

## 2025-04-10 NOTE — ASSESSMENT & PLAN NOTE
Presented to the ED for evaluation of worsening abdominal pain with associated nausea  which did not alleviate with OTC treatments.  Denies any recent concerns for infection, fever, chills, urinary symptoms.  She reports a prior history of gynecologic surgeries.     ED course notable for hemodynamically stable vitals, labs with WBC 12.79.  CT abdomen pelvis w/ IV contrast(read pending) were ordered.  Evaluation noted concerns for closed loop bowel obstruction and patient also had episodes of vomiting.  General surgery was consulted with recommendations for Xarelto reversal and anticipated surgery.  Patient was admitted to Hospital Medicine for further evaluation.    PLAN:  - General Surgery Consulted, follow-up recs  - NPO, IVF resuscitation  - Follow up pending radiology results    4/9/25: s/p Diagnostic laparoscopy and Robotic small bowel resection with intracorporeal anastomosis 4/8/25 per Dr. Kaur.   Pt tolerated surgery well. No flatus yet. +bowel sounds. NPO/NGT in place. C/o of right eye discomfort after surgery- resolved with lubricating eye drops.   Will need to restart anticoagulation when ok with general surgery     Presented to the ED for evaluation of worsening abdominal pain with associated nausea  which did not alleviate with OTC treatments.  Denies any recent concerns for infection, fever, chills, urinary symptoms.  She reports a prior history of gynecologic surgeries.     ED course notable for hemodynamically stable vitals, labs with WBC 12.79.  CT abdomen pelvis w/ IV contrast(read pending) were ordered.  Evaluation noted concerns for closed loop bowel obstruction and patient also had episodes of vomiting.  General surgery was consulted with recommendations for Xarelto reversal and anticipated surgery.  Patient was admitted to Hospital Medicine for further evaluation.    PLAN:  - General Surgery Consulted, follow-up recs  - NPO, IVF resuscitation  - Follow up pending radiology results    4/9/25:  +leukocytosis, cont IV Zosyn for now. +mild nausea this am, NGT in place- scant gastric output, abdomen soft, non distended. +Bowel sounds, no flatus yet    4/10/2025  POD1 lysis of adhesions and small-bowel resection   Tolerated procedure well   Pain controlled   NG tube removed   Clear liquid diet initiated   Continue empiric Zosyn      Presented to the ED for evaluation of worsening abdominal pain with associated nausea  which did not alleviate with OTC treatments.  Denies any recent concerns for infection, fever, chills, urinary symptoms.  She reports a prior history of gynecologic surgeries.     ED course notable for hemodynamically stable vitals, labs with WBC 12.79.  CT abdomen pelvis w/ IV contrast(read pending) were ordered.  Evaluation noted concerns for closed loop bowel obstruction and patient also had episodes of vomiting.  General surgery was consulted with recommendations for Xarelto reversal and anticipated surgery.  Patient was admitted to Hospital Medicine for further evaluation.    PLAN:  - General Surgery Consulted, follow-up recs  - NPO, IVF resuscitation  - Follow up pending radiology results    4/9/25: s/p Diagnostic laparoscopy and Robotic small bowel resection with intracorporeal anastomosis 4/8/25 per Dr. Kaur.   Pt tolerated surgery well. No flatus yet. +bowel sounds. NPO/NGT in place. C/o of right eye discomfort after surgery- resolved with lubricating eye drops.   Will need to restart anticoagulation when ok with general surgery     Presented to the ED for evaluation of worsening abdominal pain with associated nausea  which did not alleviate with OTC treatments.  Denies any recent concerns for infection, fever, chills, urinary symptoms.  She reports a prior history of gynecologic surgeries.     ED course notable for hemodynamically stable vitals, labs with WBC 12.79.  CT abdomen pelvis w/ IV contrast(read pending) were ordered.  Evaluation noted concerns for closed loop bowel  obstruction and patient also had episodes of vomiting.  General surgery was consulted with recommendations for Xarelto reversal and anticipated surgery.  Patient was admitted to Hospital Medicine for further evaluation.    PLAN:  - General Surgery Consulted, follow-up recs  - NPO, IVF resuscitation  - Follow up pending radiology results    4/9/25: +leukocytosis, cont IV Zosyn for now. +mild nausea this am, NGT in place- scant gastric output, abdomen soft, non distended. +Bowel sounds, no flatus yet

## 2025-04-10 NOTE — SUBJECTIVE & OBJECTIVE
Interval History:  No acute issues reported overnight.  Pain controlled.  Patient reports passing gas.    Review of Systems   Constitutional:  Negative for fatigue and fever.   HENT:  Negative for sinus pressure.    Eyes:  Negative for visual disturbance.   Respiratory:  Negative for shortness of breath.    Cardiovascular:  Negative for chest pain.   Gastrointestinal:  Negative for nausea and vomiting.   Genitourinary:  Negative for difficulty urinating.   Musculoskeletal:  Negative for back pain.   Skin:  Negative for rash.   Neurological:  Negative for headaches.   Psychiatric/Behavioral:  Negative for confusion.      Objective:     Vital Signs (Most Recent):  Temp: 98 °F (36.7 °C) (04/10/25 1149)  Pulse: 83 (04/10/25 1149)  Resp: 16 (04/10/25 1224)  BP: 129/63 (04/10/25 1149)  SpO2: 98 % (04/10/25 1149) Vital Signs (24h Range):  Temp:  [97.9 °F (36.6 °C)-99.6 °F (37.6 °C)] 98 °F (36.7 °C)  Pulse:  [] 83  Resp:  [16-18] 16  SpO2:  [96 %-99 %] 98 %  BP: (127-147)/(57-65) 129/63     Weight: 79.9 kg (176 lb 2.4 oz)  Body mass index is 29.31 kg/m².    Intake/Output Summary (Last 24 hours) at 4/10/2025 1346  Last data filed at 4/9/2025 2045  Gross per 24 hour   Intake 392.22 ml   Output 150 ml   Net 242.22 ml         Physical Exam  Constitutional:       General: She is not in acute distress.     Appearance: She is well-developed. She is not diaphoretic.   HENT:      Head: Normocephalic and atraumatic.   Eyes:      Pupils: Pupils are equal, round, and reactive to light.   Cardiovascular:      Rate and Rhythm: Normal rate and regular rhythm.      Heart sounds: Normal heart sounds. No murmur heard.     No friction rub. No gallop.   Pulmonary:      Effort: Pulmonary effort is normal. No respiratory distress.      Breath sounds: Normal breath sounds. No stridor. No wheezing or rales.   Abdominal:      General: Bowel sounds are normal. There is no distension.      Palpations: Abdomen is soft. There is no mass.       Tenderness: There is abdominal tenderness. There is no guarding.   Musculoskeletal:      Right lower leg: No edema.      Left lower leg: No edema.   Skin:     General: Skin is warm.      Findings: No erythema.   Neurological:      Mental Status: She is alert and oriented to person, place, and time.               Significant Labs: All pertinent labs within the past 24 hours have been reviewed.    Significant Imaging: I have reviewed all pertinent imaging results/findings within the past 24 hours.

## 2025-04-11 LAB
ABSOLUTE EOSINOPHIL (OHS): 0.15 K/UL
ABSOLUTE MONOCYTE (OHS): 1.03 K/UL (ref 0.3–1)
ABSOLUTE NEUTROPHIL COUNT (OHS): 7.46 K/UL (ref 1.8–7.7)
ALBUMIN SERPL BCP-MCNC: 2.8 G/DL (ref 3.5–5.2)
ALP SERPL-CCNC: 71 UNIT/L (ref 40–150)
ALT SERPL W/O P-5'-P-CCNC: 9 UNIT/L (ref 10–44)
ANION GAP (OHS): 9 MMOL/L (ref 8–16)
AST SERPL-CCNC: 19 UNIT/L (ref 11–45)
BASOPHILS # BLD AUTO: 0.03 K/UL
BASOPHILS NFR BLD AUTO: 0.3 %
BILIRUB SERPL-MCNC: 1 MG/DL (ref 0.1–1)
BUN SERPL-MCNC: 16 MG/DL (ref 8–23)
CALCIUM SERPL-MCNC: 9.2 MG/DL (ref 8.7–10.5)
CHLORIDE SERPL-SCNC: 103 MMOL/L (ref 95–110)
CO2 SERPL-SCNC: 26 MMOL/L (ref 23–29)
CREAT SERPL-MCNC: 0.7 MG/DL (ref 0.5–1.4)
ERYTHROCYTE [DISTWIDTH] IN BLOOD BY AUTOMATED COUNT: 12.3 % (ref 11.5–14.5)
GFR SERPLBLD CREATININE-BSD FMLA CKD-EPI: >60 ML/MIN/1.73/M2
GLUCOSE SERPL-MCNC: 58 MG/DL (ref 70–110)
HCT VFR BLD AUTO: 34.3 % (ref 37–48.5)
HGB BLD-MCNC: 10.9 GM/DL (ref 12–16)
IMM GRANULOCYTES # BLD AUTO: 0.05 K/UL (ref 0–0.04)
IMM GRANULOCYTES NFR BLD AUTO: 0.4 % (ref 0–0.5)
LYMPHOCYTES # BLD AUTO: 2.88 K/UL (ref 1–4.8)
MAGNESIUM SERPL-MCNC: 2 MG/DL (ref 1.6–2.6)
MCH RBC QN AUTO: 32.2 PG (ref 27–31)
MCHC RBC AUTO-ENTMCNC: 31.8 G/DL (ref 32–36)
MCV RBC AUTO: 101 FL (ref 82–98)
NUCLEATED RBC (/100WBC) (OHS): 0 /100 WBC
PHOSPHATE SERPL-MCNC: 2.1 MG/DL (ref 2.7–4.5)
PLATELET # BLD AUTO: 286 K/UL (ref 150–450)
PMV BLD AUTO: 10.4 FL (ref 9.2–12.9)
POTASSIUM SERPL-SCNC: 3.4 MMOL/L (ref 3.5–5.1)
PROT SERPL-MCNC: 7 GM/DL (ref 6–8.4)
RBC # BLD AUTO: 3.39 M/UL (ref 4–5.4)
RELATIVE EOSINOPHIL (OHS): 1.3 %
RELATIVE LYMPHOCYTE (OHS): 24.8 % (ref 18–48)
RELATIVE MONOCYTE (OHS): 8.9 % (ref 4–15)
RELATIVE NEUTROPHIL (OHS): 64.3 % (ref 38–73)
SODIUM SERPL-SCNC: 138 MMOL/L (ref 136–145)
WBC # BLD AUTO: 11.6 K/UL (ref 3.9–12.7)

## 2025-04-11 PROCEDURE — 36415 COLL VENOUS BLD VENIPUNCTURE: CPT | Performed by: SURGERY

## 2025-04-11 PROCEDURE — 25000003 PHARM REV CODE 250: Performed by: PHYSICIAN ASSISTANT

## 2025-04-11 PROCEDURE — 63600175 PHARM REV CODE 636 W HCPCS: Performed by: SURGERY

## 2025-04-11 PROCEDURE — 85025 COMPLETE CBC W/AUTO DIFF WBC: CPT | Performed by: SURGERY

## 2025-04-11 PROCEDURE — 80053 COMPREHEN METABOLIC PANEL: CPT | Performed by: SURGERY

## 2025-04-11 PROCEDURE — 27000221 HC OXYGEN, UP TO 24 HOURS

## 2025-04-11 PROCEDURE — 25000003 PHARM REV CODE 250: Performed by: SURGERY

## 2025-04-11 PROCEDURE — 97110 THERAPEUTIC EXERCISES: CPT

## 2025-04-11 PROCEDURE — 25000003 PHARM REV CODE 250: Performed by: FAMILY MEDICINE

## 2025-04-11 PROCEDURE — 63600175 PHARM REV CODE 636 W HCPCS: Performed by: FAMILY MEDICINE

## 2025-04-11 PROCEDURE — 99024 POSTOP FOLLOW-UP VISIT: CPT | Mod: POP,,, | Performed by: PHYSICIAN ASSISTANT

## 2025-04-11 PROCEDURE — 97530 THERAPEUTIC ACTIVITIES: CPT

## 2025-04-11 PROCEDURE — 97530 THERAPEUTIC ACTIVITIES: CPT | Mod: CQ

## 2025-04-11 PROCEDURE — 83735 ASSAY OF MAGNESIUM: CPT | Performed by: SURGERY

## 2025-04-11 PROCEDURE — 99900035 HC TECH TIME PER 15 MIN (STAT)

## 2025-04-11 PROCEDURE — 94761 N-INVAS EAR/PLS OXIMETRY MLT: CPT

## 2025-04-11 PROCEDURE — 84100 ASSAY OF PHOSPHORUS: CPT | Performed by: SURGERY

## 2025-04-11 PROCEDURE — 11000001 HC ACUTE MED/SURG PRIVATE ROOM

## 2025-04-11 PROCEDURE — 97116 GAIT TRAINING THERAPY: CPT | Mod: CQ

## 2025-04-11 RX ORDER — AMOXICILLIN AND CLAVULANATE POTASSIUM 875; 125 MG/1; MG/1
1 TABLET, FILM COATED ORAL EVERY 12 HOURS
Status: DISCONTINUED | OUTPATIENT
Start: 2025-04-11 | End: 2025-04-12 | Stop reason: HOSPADM

## 2025-04-11 RX ORDER — METOPROLOL TARTRATE 25 MG/1
25 TABLET, FILM COATED ORAL 2 TIMES DAILY
Status: DISCONTINUED | OUTPATIENT
Start: 2025-04-11 | End: 2025-04-12 | Stop reason: HOSPADM

## 2025-04-11 RX ORDER — GABAPENTIN 400 MG/1
400 CAPSULE ORAL 3 TIMES DAILY
Status: DISCONTINUED | OUTPATIENT
Start: 2025-04-11 | End: 2025-04-12 | Stop reason: HOSPADM

## 2025-04-11 RX ADMIN — METOROPROLOL TARTRATE 5 MG: 5 INJECTION, SOLUTION INTRAVENOUS at 05:04

## 2025-04-11 RX ADMIN — ACETAMINOPHEN 1000 MG: 500 TABLET ORAL at 10:04

## 2025-04-11 RX ADMIN — MINERAL OIL AND WHITE PETROLATUM: 150; 830 OINTMENT OPHTHALMIC at 08:04

## 2025-04-11 RX ADMIN — GABAPENTIN 400 MG: 400 CAPSULE ORAL at 03:04

## 2025-04-11 RX ADMIN — GABAPENTIN 400 MG: 400 CAPSULE ORAL at 08:04

## 2025-04-11 RX ADMIN — ACETAMINOPHEN 1000 MG: 500 TABLET ORAL at 03:04

## 2025-04-11 RX ADMIN — TIMOLOL MALEATE 1 DROP: 5 SOLUTION/ DROPS OPHTHALMIC at 09:04

## 2025-04-11 RX ADMIN — AMOXICILLIN AND CLAVULANATE POTASSIUM 1 TABLET: 875; 125 TABLET, FILM COATED ORAL at 08:04

## 2025-04-11 RX ADMIN — RIVAROXABAN 15 MG: 15 TABLET, FILM COATED ORAL at 04:04

## 2025-04-11 RX ADMIN — CHLORHEXIDINE GLUCONATE 0.12% ORAL RINSE 10 ML: 1.2 LIQUID ORAL at 08:04

## 2025-04-11 RX ADMIN — PIPERACILLIN SODIUM AND TAZOBACTAM SODIUM 4.5 G: 4; .5 INJECTION, POWDER, FOR SOLUTION INTRAVENOUS at 05:04

## 2025-04-11 RX ADMIN — CHLORHEXIDINE GLUCONATE 0.12% ORAL RINSE 10 ML: 1.2 LIQUID ORAL at 09:04

## 2025-04-11 RX ADMIN — ACETAMINOPHEN 1000 MG: 500 TABLET ORAL at 05:04

## 2025-04-11 RX ADMIN — PIPERACILLIN SODIUM AND TAZOBACTAM SODIUM 4.5 G: 4; .5 INJECTION, POWDER, FOR SOLUTION INTRAVENOUS at 12:04

## 2025-04-11 RX ADMIN — LATANOPROST 1 DROP: 50 SOLUTION OPHTHALMIC at 08:04

## 2025-04-11 RX ADMIN — METOPROLOL TARTRATE 25 MG: 25 TABLET, FILM COATED ORAL at 08:04

## 2025-04-11 RX ADMIN — PANTOPRAZOLE SODIUM 40 MG: 40 INJECTION, POWDER, FOR SOLUTION INTRAVENOUS at 09:04

## 2025-04-11 NOTE — PT/OT/SLP PROGRESS
Physical Therapy  Treatment    Maria Del Carmen Spence   MRN: [id number]   Admitting Diagnosis: Intestinal adhesions with complete obstruction    PT Received On: 04/11/25  PT Start Time: 0809     PT Stop Time: 0834    PT Total Time (min): 25 min       Billable Minutes:  Gait Training 10 and Therapeutic Activity 15    Treatment Type: Treatment  PT/PTA: PTA     Number of PTA visits since last PT visit: 1       General Precautions: Standard, fall, respiratory  Orthopedic Precautions: N/A  Braces: N/A  Respiratory Status: Nasal cannula, flow 1/2 L/min         Subjective:  Communicated with nurse Dennison and completed Epic chart review prior to session. Pt agreed to session.    Pain/Comfort  Pain Rating 1: 4/10  Location - Orientation 1: generalized  Location 1: abdomen  Pain Addressed 1: Reposition, Distraction    Objective:   Patient found with: peripheral IV, telemetry, oxygen      Rolling Right: SBA  Supine > Sit: CGA  Scoot towards EOB: SBA  STS from EOB: CGA with RW    Pt ambulated 85' with RW with CGA. Required one standing rest break.    Stand Pivot to BSC: CGA with RW    Reviewed HEP     Treatment and Education:  Educated pt on benefits of increasing time spent OOB. Encouraged pt to sit up in BSC for all meals and for a min of 2hr 3x/day. Reviewed HEP and encouraged pt to perform throughout the day to maintain strength. Reviewed call don't fall policy and to call for assistance with all OOB needs. Pt agreed to safety request.     AM-PAC 6 CLICK MOBILITY  How much help from another person does this patient currently need?   1 = Unable, Total/Dependent Assistance  2 = A lot, Maximum/Moderate Assistance  3 = A little, Minimum/Contact Guard/Supervision  4 = None, Modified [place]/Independent    Turning over in bed (including adjusting bedclothes, sheets and blankets)?: 3  Sitting down on and standing up from a chair with arms (e.g., wheelchair, bedside commode, etc.): 3  Moving from lying on back to sitting on the side of  the bed?: 3  Moving to and from a bed to a chair (including a wheelchair)?: 3  Need to walk in hospital room?: 3  Climbing 3-5 steps with a railing?: 1 (NT)  Basic Mobility Total Score: 16    AM-PAC Raw Score CMS G-Code Modifier Level of Impairment Assistance   6 % Total / Unable   7 - 9 CM 80 - 100% Maximal Assist   10 - 14 CL 60 - 80% Moderate Assist   15 - 19 CK 40 - 60% Moderate Assist   20 - 22 CJ 20 - 40% Minimal Assist   23 CI 1-20% SBA / CGA   24 CH 0% Independent/ Mod I     Patient left up in chair with all lines intact, call button in reach, chair alarm on, and nurse notified.    Assessment:  Maria Del Carmen Spence is a 84 y.o. female with a medical diagnosis of Intestinal adhesions with complete obstruction and presents with the following functional limitations. Pt will continue to benefit from skilled PT in order to address the below deficits to return to PLOF.    Rehab identified problem list/impairments: weakness, impaired endurance, impaired functional mobility, gait instability, impaired balance, pain, decreased safety awareness, decreased lower extremity function, impaired coordination    Rehab potential is good.    Activity tolerance: Good    Discharge recommendations: High Intensity Therapy      Barriers to discharge:      Equipment recommendations: to be determined by next level of care     GOALS:   Multidisciplinary Problems       Physical Therapy Goals          Problem: Physical Therapy    Goal Priority Disciplines Outcome Interventions   Physical Therapy Goal     PT, PT/OT     Description: LTG'S TO BE MET IN 14 DAYS (4-24-25)  PT WILL REQUIRE CGA FOR BED MOBILITY  PT WILL REQUIRE CGA FOR BED<>CHAIR TF'S  PT WILL  FEET WITH RW AND CGA  PT WILL INC AMPAC SCORE BY 2 POINTS TO PROGRESS GROSS FUNC MOBILITY                         DME Justifications:  No DME recommended requiring DME justifications    PLAN:    Patient to be seen 3 x/week to address the above listed problems via gait  training, therapeutic activities, therapeutic exercises  Plan of Care expires: 04/24/25  Plan of Care reviewed with: patient         04/11/2025

## 2025-04-11 NOTE — ASSESSMENT & PLAN NOTE
Presented to the ED for evaluation of worsening abdominal pain with associated nausea  which did not alleviate with OTC treatments.  Denies any recent concerns for infection, fever, chills, urinary symptoms.  She reports a prior history of gynecologic surgeries.     ED course notable for hemodynamically stable vitals, labs with WBC 12.79.  CT abdomen pelvis w/ IV contrast(read pending) were ordered.  Evaluation noted concerns for closed loop bowel obstruction and patient also had episodes of vomiting.  General surgery was consulted with recommendations for Xarelto reversal and anticipated surgery.  Patient was admitted to Hospital Medicine for further evaluation.    PLAN:  - General Surgery Consulted, follow-up recs  - NPO, IVF resuscitation  - Follow up pending radiology results    4/9/25: s/p Diagnostic laparoscopy and Robotic small bowel resection with intracorporeal anastomosis 4/8/25 per Dr. Kaur.   Pt tolerated surgery well. No flatus yet. +bowel sounds. NPO/NGT in place. C/o of right eye discomfort after surgery- resolved with lubricating eye drops.   Will need to restart anticoagulation when ok with general surgery     Presented to the ED for evaluation of worsening abdominal pain with associated nausea  which did not alleviate with OTC treatments.  Denies any recent concerns for infection, fever, chills, urinary symptoms.  She reports a prior history of gynecologic surgeries.     ED course notable for hemodynamically stable vitals, labs with WBC 12.79.  CT abdomen pelvis w/ IV contrast(read pending) were ordered.  Evaluation noted concerns for closed loop bowel obstruction and patient also had episodes of vomiting.  General surgery was consulted with recommendations for Xarelto reversal and anticipated surgery.  Patient was admitted to Hospital Medicine for further evaluation.    PLAN:  - General Surgery Consulted, follow-up recs  - NPO, IVF resuscitation  - Follow up pending radiology results    4/9/25:  +leukocytosis, cont IV Zosyn for now. +mild nausea this am, NGT in place- scant gastric output, abdomen soft, non distended. +Bowel sounds, no flatus yet    4/11/2025  POD1 lysis of adhesions and small-bowel resection   Tolerated procedure well   Pain controlled   NG tube removed   Clear liquid diet initiated   Continue empiric Zosyn      Presented to the ED for evaluation of worsening abdominal pain with associated nausea  which did not alleviate with OTC treatments.  Denies any recent concerns for infection, fever, chills, urinary symptoms.  She reports a prior history of gynecologic surgeries.     ED course notable for hemodynamically stable vitals, labs with WBC 12.79.  CT abdomen pelvis w/ IV contrast(read pending) were ordered.  Evaluation noted concerns for closed loop bowel obstruction and patient also had episodes of vomiting.  General surgery was consulted with recommendations for Xarelto reversal and anticipated surgery.  Patient was admitted to Hospital Medicine for further evaluation.    PLAN:  - General Surgery Consulted, follow-up recs  - NPO, IVF resuscitation  - Follow up pending radiology results    4/9/25: s/p Diagnostic laparoscopy and Robotic small bowel resection with intracorporeal anastomosis 4/8/25 per Dr. Kaur.   Pt tolerated surgery well. No flatus yet. +bowel sounds. NPO/NGT in place. C/o of right eye discomfort after surgery- resolved with lubricating eye drops.   Will need to restart anticoagulation when ok with general surgery     Presented to the ED for evaluation of worsening abdominal pain with associated nausea  which did not alleviate with OTC treatments.  Denies any recent concerns for infection, fever, chills, urinary symptoms.  She reports a prior history of gynecologic surgeries.     ED course notable for hemodynamically stable vitals, labs with WBC 12.79.  CT abdomen pelvis w/ IV contrast(read pending) were ordered.  Evaluation noted concerns for closed loop bowel  obstruction and patient also had episodes of vomiting.  General surgery was consulted with recommendations for Xarelto reversal and anticipated surgery.  Patient was admitted to Hospital Medicine for further evaluation.    PLAN:  - General Surgery Consulted, follow-up recs  - NPO, IVF resuscitation  - Follow up pending radiology results    4/9/25: +leukocytosis, cont IV Zosyn for now. +mild nausea this am, NGT in place- scant gastric output, abdomen soft, non distended. +Bowel sounds, no flatus yet    4/11/2025  Tolerating clear liquid diet  Advanced to full liquids today   Will DC Zosyn   Start p.o. Augmentin   Plan to transitioned to regular diet tomorrow   Anticipate DC tomorrow  Patient declining placement for therapy   Will plan for home health  Presented to the ED for evaluation of worsening abdominal pain with associated nausea  which did not alleviate with OTC treatments.  Denies any recent concerns for infection, fever, chills, urinary symptoms.  She reports a prior history of gynecologic surgeries.     ED course notable for hemodynamically stable vitals, labs with WBC 12.79.  CT abdomen pelvis w/ IV contrast(read pending) were ordered.  Evaluation noted concerns for closed loop bowel obstruction and patient also had episodes of vomiting.  General surgery was consulted with recommendations for Xarelto reversal and anticipated surgery.  Patient was admitted to Hospital Medicine for further evaluation.    PLAN:  - General Surgery Consulted, follow-up recs  - NPO, IVF resuscitation  - Follow up pending radiology results    4/9/25: s/p Diagnostic laparoscopy and Robotic small bowel resection with intracorporeal anastomosis 4/8/25 per Dr. Kuar.   Pt tolerated surgery well. No flatus yet. +bowel sounds. NPO/NGT in place. C/o of right eye discomfort after surgery- resolved with lubricating eye drops.   Will need to restart anticoagulation when ok with general surgery     Presented to the ED for evaluation of  worsening abdominal pain with associated nausea  which did not alleviate with OTC treatments.  Denies any recent concerns for infection, fever, chills, urinary symptoms.  She reports a prior history of gynecologic surgeries.     ED course notable for hemodynamically stable vitals, labs with WBC 12.79.  CT abdomen pelvis w/ IV contrast(read pending) were ordered.  Evaluation noted concerns for closed loop bowel obstruction and patient also had episodes of vomiting.  General surgery was consulted with recommendations for Xarelto reversal and anticipated surgery.  Patient was admitted to Hospital Medicine for further evaluation.    PLAN:  - General Surgery Consulted, follow-up recs  - NPO, IVF resuscitation  - Follow up pending radiology results    4/9/25: +leukocytosis, cont IV Zosyn for now. +mild nausea this am, NGT in place- scant gastric output, abdomen soft, non distended. +Bowel sounds, no flatus yet    4/11/2025  POD1 lysis of adhesions and small-bowel resection   Tolerated procedure well   Pain controlled   NG tube removed   Clear liquid diet initiated   Continue empiric Zosyn      Presented to the ED for evaluation of worsening abdominal pain with associated nausea  which did not alleviate with OTC treatments.  Denies any recent concerns for infection, fever, chills, urinary symptoms.  She reports a prior history of gynecologic surgeries.     ED course notable for hemodynamically stable vitals, labs with WBC 12.79.  CT abdomen pelvis w/ IV contrast(read pending) were ordered.  Evaluation noted concerns for closed loop bowel obstruction and patient also had episodes of vomiting.  General surgery was consulted with recommendations for Xarelto reversal and anticipated surgery.  Patient was admitted to Hospital Medicine for further evaluation.    PLAN:  - General Surgery Consulted, follow-up recs  - NPO, IVF resuscitation  - Follow up pending radiology results    4/9/25: s/p Diagnostic laparoscopy and Robotic  small bowel resection with intracorporeal anastomosis 4/8/25 per Dr. Kaur.   Pt tolerated surgery well. No flatus yet. +bowel sounds. NPO/NGT in place. C/o of right eye discomfort after surgery- resolved with lubricating eye drops.   Will need to restart anticoagulation when ok with general surgery     Presented to the ED for evaluation of worsening abdominal pain with associated nausea  which did not alleviate with OTC treatments.  Denies any recent concerns for infection, fever, chills, urinary symptoms.  She reports a prior history of gynecologic surgeries.     ED course notable for hemodynamically stable vitals, labs with WBC 12.79.  CT abdomen pelvis w/ IV contrast(read pending) were ordered.  Evaluation noted concerns for closed loop bowel obstruction and patient also had episodes of vomiting.  General surgery was consulted with recommendations for Xarelto reversal and anticipated surgery.  Patient was admitted to Hospital Medicine for further evaluation.    PLAN:  - General Surgery Consulted, follow-up recs  - NPO, IVF resuscitation  - Follow up pending radiology results    4/9/25: +leukocytosis, cont IV Zosyn for now. +mild nausea this am, NGT in place- scant gastric output, abdomen soft, non distended. +Bowel sounds, no flatus yet

## 2025-04-11 NOTE — ASSESSMENT & PLAN NOTE
Presented to the ED for evaluation of worsening abdominal pain with associated nausea  which did not alleviate with OTC treatments.  Denies any recent concerns for infection, fever, chills, urinary symptoms.  She reports a prior history of gynecologic surgeries.     ED course notable for hemodynamically stable vitals, labs with WBC 12.79.  CT abdomen pelvis w/ IV contrast(read pending) were ordered.  Evaluation noted concerns for closed loop bowel obstruction and patient also had episodes of vomiting.  General surgery was consulted with recommendations for Xarelto reversal and anticipated surgery.  Patient was admitted to Hospital Medicine for further evaluation.    PLAN:  - General Surgery Consulted, follow-up recs  - NPO, IVF resuscitation  - Follow up pending radiology results    4/9/25: s/p Diagnostic laparoscopy and Robotic small bowel resection with intracorporeal anastomosis 4/8/25 per Dr. Kaur.   Pt tolerated surgery well. No flatus yet. +bowel sounds. NPO/NGT in place. C/o of right eye discomfort after surgery- resolved with lubricating eye drops.   Will need to restart anticoagulation when ok with general surgery     Presented to the ED for evaluation of worsening abdominal pain with associated nausea  which did not alleviate with OTC treatments.  Denies any recent concerns for infection, fever, chills, urinary symptoms.  She reports a prior history of gynecologic surgeries.     ED course notable for hemodynamically stable vitals, labs with WBC 12.79.  CT abdomen pelvis w/ IV contrast(read pending) were ordered.  Evaluation noted concerns for closed loop bowel obstruction and patient also had episodes of vomiting.  General surgery was consulted with recommendations for Xarelto reversal and anticipated surgery.  Patient was admitted to Hospital Medicine for further evaluation.    PLAN:  - General Surgery Consulted, follow-up recs  - NPO, IVF resuscitation  - Follow up pending radiology results    4/9/25:  +leukocytosis, cont IV Zosyn for now. +mild nausea this am, NGT in place- scant gastric output, abdomen soft, non distended. +Bowel sounds, no flatus yet    4/11/2025  POD1 lysis of adhesions and small-bowel resection   Tolerated procedure well   Pain controlled   NG tube removed   Clear liquid diet initiated   Continue empiric Zosyn      Presented to the ED for evaluation of worsening abdominal pain with associated nausea  which did not alleviate with OTC treatments.  Denies any recent concerns for infection, fever, chills, urinary symptoms.  She reports a prior history of gynecologic surgeries.     ED course notable for hemodynamically stable vitals, labs with WBC 12.79.  CT abdomen pelvis w/ IV contrast(read pending) were ordered.  Evaluation noted concerns for closed loop bowel obstruction and patient also had episodes of vomiting.  General surgery was consulted with recommendations for Xarelto reversal and anticipated surgery.  Patient was admitted to Hospital Medicine for further evaluation.    PLAN:  - General Surgery Consulted, follow-up recs  - NPO, IVF resuscitation  - Follow up pending radiology results    4/9/25: s/p Diagnostic laparoscopy and Robotic small bowel resection with intracorporeal anastomosis 4/8/25 per Dr. Kaur.   Pt tolerated surgery well. No flatus yet. +bowel sounds. NPO/NGT in place. C/o of right eye discomfort after surgery- resolved with lubricating eye drops.   Will need to restart anticoagulation when ok with general surgery     Presented to the ED for evaluation of worsening abdominal pain with associated nausea  which did not alleviate with OTC treatments.  Denies any recent concerns for infection, fever, chills, urinary symptoms.  She reports a prior history of gynecologic surgeries.     ED course notable for hemodynamically stable vitals, labs with WBC 12.79.  CT abdomen pelvis w/ IV contrast(read pending) were ordered.  Evaluation noted concerns for closed loop bowel  obstruction and patient also had episodes of vomiting.  General surgery was consulted with recommendations for Xarelto reversal and anticipated surgery.  Patient was admitted to Hospital Medicine for further evaluation.    PLAN:  - General Surgery Consulted, follow-up recs  - NPO, IVF resuscitation  - Follow up pending radiology results    4/9/25: +leukocytosis, cont IV Zosyn for now. +mild nausea this am, NGT in place- scant gastric output, abdomen soft, non distended. +Bowel sounds, no flatus yet    4/11/2025  Tolerating clear liquid diet  Advanced to full liquids today   Will DC Zosyn   Start p.o. Augmentin   Plan to transitioned to regular diet tomorrow   Anticipate DC tomorrow  Patient declining placement for therapy   Will plan for home health  Presented to the ED for evaluation of worsening abdominal pain with associated nausea  which did not alleviate with OTC treatments.  Denies any recent concerns for infection, fever, chills, urinary symptoms.  She reports a prior history of gynecologic surgeries.     ED course notable for hemodynamically stable vitals, labs with WBC 12.79.  CT abdomen pelvis w/ IV contrast(read pending) were ordered.  Evaluation noted concerns for closed loop bowel obstruction and patient also had episodes of vomiting.  General surgery was consulted with recommendations for Xarelto reversal and anticipated surgery.  Patient was admitted to Hospital Medicine for further evaluation.    PLAN:  - General Surgery Consulted, follow-up recs  - NPO, IVF resuscitation  - Follow up pending radiology results    4/9/25: s/p Diagnostic laparoscopy and Robotic small bowel resection with intracorporeal anastomosis 4/8/25 per Dr. Kaur.   Pt tolerated surgery well. No flatus yet. +bowel sounds. NPO/NGT in place. C/o of right eye discomfort after surgery- resolved with lubricating eye drops.   Will need to restart anticoagulation when ok with general surgery     Presented to the ED for evaluation of  worsening abdominal pain with associated nausea  which did not alleviate with OTC treatments.  Denies any recent concerns for infection, fever, chills, urinary symptoms.  She reports a prior history of gynecologic surgeries.     ED course notable for hemodynamically stable vitals, labs with WBC 12.79.  CT abdomen pelvis w/ IV contrast(read pending) were ordered.  Evaluation noted concerns for closed loop bowel obstruction and patient also had episodes of vomiting.  General surgery was consulted with recommendations for Xarelto reversal and anticipated surgery.  Patient was admitted to Hospital Medicine for further evaluation.    PLAN:  - General Surgery Consulted, follow-up recs  - NPO, IVF resuscitation  - Follow up pending radiology results    4/9/25: +leukocytosis, cont IV Zosyn for now. +mild nausea this am, NGT in place- scant gastric output, abdomen soft, non distended. +Bowel sounds, no flatus yet    4/11/2025  POD1 lysis of adhesions and small-bowel resection   Tolerated procedure well   Pain controlled   NG tube removed   Clear liquid diet initiated   Continue empiric Zosyn      Presented to the ED for evaluation of worsening abdominal pain with associated nausea  which did not alleviate with OTC treatments.  Denies any recent concerns for infection, fever, chills, urinary symptoms.  She reports a prior history of gynecologic surgeries.     ED course notable for hemodynamically stable vitals, labs with WBC 12.79.  CT abdomen pelvis w/ IV contrast(read pending) were ordered.  Evaluation noted concerns for closed loop bowel obstruction and patient also had episodes of vomiting.  General surgery was consulted with recommendations for Xarelto reversal and anticipated surgery.  Patient was admitted to Hospital Medicine for further evaluation.    PLAN:  - General Surgery Consulted, follow-up recs  - NPO, IVF resuscitation  - Follow up pending radiology results    4/9/25: s/p Diagnostic laparoscopy and Robotic  small bowel resection with intracorporeal anastomosis 4/8/25 per Dr. Kaur.   Pt tolerated surgery well. No flatus yet. +bowel sounds. NPO/NGT in place. C/o of right eye discomfort after surgery- resolved with lubricating eye drops.   Will need to restart anticoagulation when ok with general surgery     Presented to the ED for evaluation of worsening abdominal pain with associated nausea  which did not alleviate with OTC treatments.  Denies any recent concerns for infection, fever, chills, urinary symptoms.  She reports a prior history of gynecologic surgeries.     ED course notable for hemodynamically stable vitals, labs with WBC 12.79.  CT abdomen pelvis w/ IV contrast(read pending) were ordered.  Evaluation noted concerns for closed loop bowel obstruction and patient also had episodes of vomiting.  General surgery was consulted with recommendations for Xarelto reversal and anticipated surgery.  Patient was admitted to Hospital Medicine for further evaluation.    PLAN:  - General Surgery Consulted, follow-up recs  - NPO, IVF resuscitation  - Follow up pending radiology results    4/9/25: +leukocytosis, cont IV Zosyn for now. +mild nausea this am, NGT in place- scant gastric output, abdomen soft, non distended. +Bowel sounds, no flatus yet

## 2025-04-11 NOTE — PROGRESS NOTES
[facility] - Telemetry ([facility])  General Surgery  Progress Note    Subjective:     History of Present Illness:  Maria Del Carmen Spence is a 84 y.o. female with a history of atrial fibrillation (on Xarelto), and previous tubal ligation who presented to the emergency department with sudden onset abdominal pain and nausea.  Patient states the pain started around 6:00 p.m. last night out of the blue and she originally thought it was gas pains.  She is passing a little bit of flatus but not much.  The pain is predominantly on the right side of the abdomen but she states she also felt some epigastric pain with radiation around to the back and pelvic pains.  Upon presentation in the ER she was afebrile, vitals were within normal limits.  Labs were notable for leukocytosis of 12 K, otherwise within normal limits.  CT of the abdomen and pelvis was done with IV contrast which was concerning for possible early closed loop obstruction in the pelvis with mesenteric fat stranding and edema, with some fluid in the pelvis.  She was admitted to hospital medicine.      Post-Op Info:  Procedure(s) (LRB):  XI ROBOTIC LAPAROSCOPY,DIAGNOSTIC (N/A)  BLOCK, TRANSVERSUS ABDOMINIS PLANE (N/A)  ROBOTIC EXCISION,SMALL INTESTINE (N/A)   3 Days Post-Op     Interval History: POD 3-AF, ZEUS. doing well, WBC WNL. Pain controlled. Tolerating CLD without N/V, reports some belching. Having bowel function.      Medications:  Continuous Infusions:  Scheduled Meds:   acetaminophen  1,000 mg Oral Q8H    bisacodyL  10 mg Rectal Once    chlorhexidine  10 mL Mouth/Throat BID    enoxparin  40 mg Subcutaneous Q24H (prophylaxis, 1700)    latanoprost  1 drop Both Eyes QHS    metoprolol  5 mg Intravenous Q8H    pantoprazole  40 mg Intravenous Daily    piperacillin-tazobactam (Zosyn) IV (PEDS and ADULTS) (extended infusion is not appropriate)  4.5 g Intravenous Q8H    timolol maleate 0.5%  1 drop Both Eyes Daily    white petrolatum-mineral oiL   Both Eyes QHS     PRN  Meds:  Current Facility-Administered Medications:     artificial tears, 1 drop, Both Eyes, PRN    dextrose 50%, 12.5 g, Intravenous, PRN    dextrose 50%, 25 g, Intravenous, PRN    glucagon (human recombinant), 1 mg, Intramuscular, PRN    glucose, 16 g, Oral, PRN    glucose, 24 g, Oral, PRN    hydrALAZINE, 5 mg, Intravenous, Q6H PRN    melatonin, 6 mg, Oral, Nightly PRN    morphine, 2 mg, Intravenous, Q4H PRN    naloxone, 0.02 mg, Intravenous, PRN    ondansetron, 4 mg, Intravenous, Q6H PRN    prochlorperazine, 2.5 mg, Intravenous, Q8H PRN    sodium chloride 0.9%, 10 mL, Intravenous, Q12H PRN    traMADoL, 100 mg, Oral, Q6H PRN    traMADoL, 50 mg, Oral, Q6H PRN     Review of patient's allergies indicates:   Allergen Reactions    Voltaren [diclofenac sodium] Edema     Gel formulation caused facial rash and facial swelling    Eliquis [apixaban] Other (See Comments)     Headache, numbness in lip     Medrol [methylprednisolone] Blisters     Objective:     Vital Signs (Most Recent):  Temp: 98.7 °F (37.1 °C) (04/11/25 0808)  Pulse: 69 (04/11/25 0808)  Resp: 19 (04/11/25 0808)  BP: (!) 127/57 (04/11/25 0808)  SpO2: 96 % (04/11/25 0808) Vital Signs (24h Range):  Temp:  [97.7 °F (36.5 °C)-99.4 °F (37.4 °C)] 98.7 °F (37.1 °C)  Pulse:  [54-98] 69  Resp:  [16-19] 19  SpO2:  [96 %-99 %] 96 %  BP: (127-139)/(57-66) 127/57     Weight: 79.5 kg (175 lb 4.3 oz)  Body mass index is 29.17 kg/m².    Intake/Output - Last 3 Shifts         04/09 0700  04/10 0659 04/10 0700 04/11 0659 04/11 0700 04/12 0659    IV Piggyback 392.2      Total Intake(mL/kg) 392.2 (4.9)      Urine (mL/kg/hr)       Drains 150      Total Output 150      Net +242.2             Urine Occurrence 1 x      Stool Occurrence  1 x              Physical Exam  Constitutional:       Appearance: Normal appearance.   HENT:      Head: Normocephalic.      Nose: Nose normal.       Eyes:      Pupils: Pupils are equal, round, and reactive to light.   Cardiovascular:      Rate and  Rhythm: Normal rate and regular rhythm.   Pulmonary:      Effort: Pulmonary effort is normal.   Abdominal:      Palpations: Abdomen is soft.      Tenderness: There is abdominal tenderness (appropriate TTP). There is no guarding or rebound.      Hernia: No hernia is present.      Comments: Bloated. Incision c/d/I no drainage or erythema   Musculoskeletal:      Cervical back: Normal range of motion.      Right lower leg: No edema.      Left lower leg: No edema.   Skin:     General: Skin is warm and dry.   Neurological:      General: No focal deficit present.      Mental Status: She is alert and oriented to person, place, and time.   Psychiatric:         Mood and Affect: Mood normal.          Significant Labs:  I have reviewed all pertinent lab results within the past 24 hours.  CBC:   Recent Labs   Lab 04/11/25  0533   WBC 11.60   RBC 3.39*   HGB 10.9*   HCT 34.3*      *   MCH 32.2*   MCHC 31.8*     CMP:   Recent Labs   Lab 04/11/25  0533   CALCIUM 9.2   ALBUMIN 2.8*      K 3.4*   CO2 26      BUN 16   CREATININE 0.7   ALKPHOS 71   ALT 9*   AST 19   BILITOT 1.0       Significant Diagnostics:  I have reviewed all pertinent imaging results/findings within the past 24 hours.  Assessment/Plan:     * Intestinal adhesions with complete obstruction  POD 3- s/p Diagnostic laparoscopy, Robotic small bowel resection with intracorporeal anastomosis    Doing well with no acute complaints. Denies n/v. Having bowel function.    -advance to full liquid diet. Pt instructed on taking her time and taking small sips.  -multimodal pain control-starting oral pain meds  -IVF  -PT/OT, encouraged OOB and ambulation  -IS use 10x/hr while awake  -DVT ppx and GI ppx  -replete electrolytes prn    Nausea and vomiting  2/2 pain and bowel obstruction -resolved    Gastroesophageal reflux disease without esophagitis  -- Management as per primary team     Mixed hyperlipidemia  -- Management as per primary team     Paroxysmal  atrial fibrillation  -- Kcentra given for reversal of Xarelto   -- Management as per primary team     Primary hypertension  -- Management as per primary team     Primary open-angle glaucoma(365.11) - Both Eyes  -- Management as per primary team         Kimberli Worley PA-C  General Surgery  [facility] - Telemetry ([facility])

## 2025-04-11 NOTE — PT/OT/SLP PROGRESS
Occupational Therapy   Treatment    Name: Maria Del Carmen Spence  MRN: [id number]  Admitting Diagnosis:  Intestinal adhesions with complete obstruction  3 Days Post-Op    Recommendations:     Discharge Recommendations: High Intensity Therapy  Discharge Equipment Recommendations:  to be determined by next level of care  Barriers to discharge:  Decreased caregiver support    Assessment:     Maria Del Carmen Spence is a 84 y.o. female with a medical diagnosis of Intestinal adhesions with complete obstruction.  She presents with the following performance deficits affecting function are weakness, impaired endurance, impaired self care skills, impaired functional mobility, impaired balance, impaired cardiopulmonary response to activity, decreased safety awareness, edema.     Rehab Prognosis:  Good; patient would benefit from acute skilled OT services to address these deficits and reach maximum level of function.       Plan:     Patient to be seen 2 x/week to address the above listed problems via self-care/home management, therapeutic activities, therapeutic exercises  Plan of Care Expires: 04/11/25  Plan of Care Reviewed with: patient    Subjective     Chief Complaint: Lower Abdomen Pain  Patient/Family Comments/goals: Increase independence  Pain/Comfort:  Pain Rating 1: 4/10  Location - Orientation 1: generalized  Location 1: abdomen  Pain Addressed 1: Reposition, Distraction    Objective:     Communicated with: Nurse and EPIC prior to session.  Patient found with bed in chair position with peripheral IV, telemetry, oxygen upon OT entry to room.    General Precautions: Standard, fall, respiratory    Orthopedic Precautions:N/A  Braces: N/A  Respiratory Status: Nasal cannula, flow 2 L/min     Occupational Performance:     Bed Mobility:    Patient completed Rolling/Turning to Left with  contact guard assistance  Patient completed Scooting/Bridging with contact guard assistance  Patient completed Supine to Sit with contact guard  assistance  Patient completed Sit to Supine with contact guard assistance     Functional Mobility/Transfers:  Patient completed Sit <> Stand Transfer with contact guard assistance  with  rolling walker   Patient completed Stand <> Chair Transfer using Stand Pivot technique with contact guard assistance with rolling walker  Functional Mobility: Patient completed x85ft functional mobility CGA w/RW to increase dynamic standing balance and activity tolerance needed for ADL completion.  Pt required 1 resting break of static standing          AMPAC 6 Click ADL:      Treatment & Education:  Pt participated in Tx session well, despite stating she did not sleep well previous night. Pt completed the following Therex 1x10 in order to increase BM and FM:   Shoulder flexion   Hand Squeezes  OT role, plan of care, progression of goals, importance of continued OOB activity, ADL/functional transfer and mobility retraining, call don't fall, safety precautions, fall prevention. Pt educated on sitting in chair for 1 hour during breakfast lunch and dinner in order to change positions, regulate BP and reduce bed sores.   Pt acknowledges and agrees with POC given by OT.      Patient left up in chair with all lines intact, call button in reach, and chair alarm on    GOALS:   Multidisciplinary Problems       Occupational Therapy Goals          Problem: Occupational Therapy    Goal Priority Disciplines Outcome Interventions   Occupational Therapy Goal     OT, PT/OT Progressing    Description: Goals to be met by: 4/24/25     Patient will increase functional independence with ADLs by performing:    Toileting from toilet with Supervision for hygiene and clothing management.   Toilet transfer to bedside commode with Supervision.  Increased functional strength in B UE grossly by 1/2 MM grade.                           Time Tracking:     OT Date of Treatment: 04/11/25  OT Start Time: 0805  OT Stop Time: 0830  OT Total Time (min): 25  min    Billable Minutes:Therapeutic Activity 15  Therapeutic Exercise 10    OT/AMINA: AMINA     Number of AMINA visits since last OT visit: 1  A client care conference was performed between the LOTR and [facility], prior to treatment by [facility], to discuss the patient's status, treatment plan and established goals.  INGA Oretz    4/11/2025

## 2025-04-11 NOTE — ASSESSMENT & PLAN NOTE
POD 3- s/p Diagnostic laparoscopy, Robotic small bowel resection with intracorporeal anastomosis    Doing well with no acute complaints. Denies n/v. Having bowel function.    -advance to full liquid diet. Pt instructed on taking her time and taking small sips.  -multimodal pain control-starting oral pain meds  -IVF  -PT/OT, encouraged OOB and ambulation  -IS use 10x/hr while awake  -DVT ppx and GI ppx  -replete electrolytes prn

## 2025-04-11 NOTE — PROGRESS NOTES
[facility] - Telemetry ([facility]  Progress Note    Patient Name: Maria Del Carmen Spence  MRN: [id number]  Patient Class: IP- Inpatient   Admission Date: 4/7/2025  Length of Stay: 3 days  Attending Physician: Yimi Walker MD  Primary Care Provider: Rajinder Lutz MD        Subjective     Principal Problem:Intestinal adhesions with complete obstruction        HPI:  Ms. Maria Del Carmen Spence is a 84 y.o. female who  has a medical history of A-fib, Arthritis, Chronic back pain,, Frequent headaches, GERD (gastroesophageal reflux disease), Glaucoma, Hyperlipemia, and Hypertension.    She  presented to the ED for evaluation of worsening abdominal pain with associated nausea since yesterday afternoon which did not alleviate with OTC treatments.  Denies any recent concerns for infection, fever, chills, urinary symptoms.  She reports a prior history of gynecologic surgeries.    ED course notable for hemodynamically stable vitals, labs with WBC 12.79.  CT abdomen pelvis w/ IV contrast(read pending) were ordered.  Evaluation noted concerns for closed loop bowel obstruction and patient also had episodes of vomiting.  General surgery was consulted with recommendations for Xarelto reversal and anticipated surgery.  Patient was admitted to Hospital Medicine for further evaluation.    Overview/Hospital Course:  The patient was admitted with SBO. CT of the abdomen and pelvis was done with IV contrast which was concerning for possible early closed loop obstruction in the pelvis with mesenteric fat stranding and edema, with some fluid in the pelvis. +leukocytosis. Pt was placed on IV Zosyn. General surgery was consulted. Pt was given PCC for Xarelto reversal and underwent urgent Diagnostic laparoscopy and Robotic small bowel resection with intracorporeal anastomosis.   Pt tolerated surgery well. No flatus yet. +bowel sounds. NPO/NGT in place. C/o of right eye discomfort after surgery- resolved with lubricating eye drops.   Will need  to restart anticoagulation when ok with general surgery     04/10/2025  Status post small bowel resection.  Clinically improved.  Clear liquid diet initiated.  Will plan to start anticoagulation tomorrow if okay with surgery.  Continue Zosyn.   04/11/2025  Tolerating clear liquid diet.  We will advanced to full liquid.  Patient declining placement for therapy.  Will plan for home health.  Transitioned to Zosyn to p.o. Augmentin.  Will initiate Xarelto if okay with surgery.  Anticipate discharge in the next 24-48 hours.    Interval History:  No acute issues reported overnight.    Review of Systems   Constitutional:  Negative for fatigue and fever.   HENT:  Negative for sinus pressure.    Eyes:  Negative for visual disturbance.   Respiratory:  Negative for shortness of breath.    Cardiovascular:  Negative for chest pain.   Gastrointestinal:  Negative for nausea and vomiting.   Genitourinary:  Negative for difficulty urinating.   Musculoskeletal:  Negative for back pain.   Skin:  Negative for rash.   Neurological:  Negative for headaches.   Psychiatric/Behavioral:  Negative for confusion.      Objective:     Vital Signs (Most Recent):  Temp: 98.6 °F (37 °C) (04/11/25 1149)  Pulse: 70 (04/11/25 1149)  Resp: 19 (04/11/25 1149)  BP: 132/65 (04/11/25 1149)  SpO2: 98 % (04/11/25 1149) Vital Signs (24h Range):  Temp:  [97.7 °F (36.5 °C)-99.4 °F (37.4 °C)] 98.6 °F (37 °C)  Pulse:  [54-98] 70  Resp:  [16-19] 19  SpO2:  [96 %-99 %] 98 %  BP: (127-139)/(57-66) 132/65     Weight: 79.5 kg (175 lb 4.3 oz)  Body mass index is 29.17 kg/m².  No intake or output data in the 24 hours ending 04/11/25 1207      Physical Exam  Constitutional:       General: She is not in acute distress.     Appearance: She is well-developed. She is not diaphoretic.   HENT:      Head: Normocephalic and atraumatic.   Eyes:      Pupils: Pupils are equal, round, and reactive to light.   Cardiovascular:      Rate and Rhythm: Normal rate and regular rhythm.       Heart sounds: Normal heart sounds. No murmur heard.     No friction rub. No gallop.   Pulmonary:      Effort: Pulmonary effort is normal. No respiratory distress.      Breath sounds: Normal breath sounds. No stridor. No wheezing or rales.   Abdominal:      General: Bowel sounds are normal. There is no distension.      Palpations: Abdomen is soft. There is no mass.      Tenderness: There is no abdominal tenderness. There is no guarding.   Musculoskeletal:      Right lower leg: No edema.      Left lower leg: No edema.   Skin:     General: Skin is warm.      Findings: No erythema.   Neurological:      Mental Status: She is alert and oriented to person, place, and time.               Significant Labs: All pertinent labs within the past 24 hours have been reviewed.    Significant Imaging: I have reviewed all pertinent imaging results/findings within the past 24 hours.      Assessment & Plan  Intestinal adhesions with complete obstruction  Nausea and vomiting  Presented to the ED for evaluation of worsening abdominal pain with associated nausea  which did not alleviate with OTC treatments.  Denies any recent concerns for infection, fever, chills, urinary symptoms.  She reports a prior history of gynecologic surgeries.     ED course notable for hemodynamically stable vitals, labs with WBC 12.79.  CT abdomen pelvis w/ IV contrast(read pending) were ordered.  Evaluation noted concerns for closed loop bowel obstruction and patient also had episodes of vomiting.  General surgery was consulted with recommendations for Xarelto reversal and anticipated surgery.  Patient was admitted to Hospital Medicine for further evaluation.    PLAN:  - General Surgery Consulted, follow-up recs  - NPO, IVF resuscitation  - Follow up pending radiology results    4/9/25: s/p Diagnostic laparoscopy and Robotic small bowel resection with intracorporeal anastomosis 4/8/25 per Dr. Kaur.   Pt tolerated surgery well. No flatus yet. +bowel sounds.  NPO/NGT in place. C/o of right eye discomfort after surgery- resolved with lubricating eye drops.   Will need to restart anticoagulation when ok with general surgery     Presented to the ED for evaluation of worsening abdominal pain with associated nausea  which did not alleviate with OTC treatments.  Denies any recent concerns for infection, fever, chills, urinary symptoms.  She reports a prior history of gynecologic surgeries.     ED course notable for hemodynamically stable vitals, labs with WBC 12.79.  CT abdomen pelvis w/ IV contrast(read pending) were ordered.  Evaluation noted concerns for closed loop bowel obstruction and patient also had episodes of vomiting.  General surgery was consulted with recommendations for Xarelto reversal and anticipated surgery.  Patient was admitted to Hospital Medicine for further evaluation.    PLAN:  - General Surgery Consulted, follow-up recs  - NPO, IVF resuscitation  - Follow up pending radiology results    4/9/25: +leukocytosis, cont IV Zosyn for now. +mild nausea this am, NGT in place- scant gastric output, abdomen soft, non distended. +Bowel sounds, no flatus yet    4/11/2025  POD1 lysis of adhesions and small-bowel resection   Tolerated procedure well   Pain controlled   NG tube removed   Clear liquid diet initiated   Continue empiric Zosyn      Presented to the ED for evaluation of worsening abdominal pain with associated nausea  which did not alleviate with OTC treatments.  Denies any recent concerns for infection, fever, chills, urinary symptoms.  She reports a prior history of gynecologic surgeries.     ED course notable for hemodynamically stable vitals, labs with WBC 12.79.  CT abdomen pelvis w/ IV contrast(read pending) were ordered.  Evaluation noted concerns for closed loop bowel obstruction and patient also had episodes of vomiting.  General surgery was consulted with recommendations for Xarelto reversal and anticipated surgery.  Patient was admitted to  Hospital Medicine for further evaluation.    PLAN:  - General Surgery Consulted, follow-up recs  - NPO, IVF resuscitation  - Follow up pending radiology results    4/9/25: s/p Diagnostic laparoscopy and Robotic small bowel resection with intracorporeal anastomosis 4/8/25 per Dr. Kaur.   Pt tolerated surgery well. No flatus yet. +bowel sounds. NPO/NGT in place. C/o of right eye discomfort after surgery- resolved with lubricating eye drops.   Will need to restart anticoagulation when ok with general surgery     Presented to the ED for evaluation of worsening abdominal pain with associated nausea  which did not alleviate with OTC treatments.  Denies any recent concerns for infection, fever, chills, urinary symptoms.  She reports a prior history of gynecologic surgeries.     ED course notable for hemodynamically stable vitals, labs with WBC 12.79.  CT abdomen pelvis w/ IV contrast(read pending) were ordered.  Evaluation noted concerns for closed loop bowel obstruction and patient also had episodes of vomiting.  General surgery was consulted with recommendations for Xarelto reversal and anticipated surgery.  Patient was admitted to Hospital Medicine for further evaluation.    PLAN:  - General Surgery Consulted, follow-up recs  - NPO, IVF resuscitation  - Follow up pending radiology results    4/9/25: +leukocytosis, cont IV Zosyn for now. +mild nausea this am, NGT in place- scant gastric output, abdomen soft, non distended. +Bowel sounds, no flatus yet    4/11/2025  Tolerating clear liquid diet  Advanced to full liquids today   Will DC Zosyn   Start p.o. Augmentin   Plan to transitioned to regular diet tomorrow   Anticipate DC tomorrow  Patient declining placement for therapy   Will plan for home health  Presented to the ED for evaluation of worsening abdominal pain with associated nausea  which did not alleviate with OTC treatments.  Denies any recent concerns for infection, fever, chills, urinary symptoms.  She  reports a prior history of gynecologic surgeries.     ED course notable for hemodynamically stable vitals, labs with WBC 12.79.  CT abdomen pelvis w/ IV contrast(read pending) were ordered.  Evaluation noted concerns for closed loop bowel obstruction and patient also had episodes of vomiting.  General surgery was consulted with recommendations for Xarelto reversal and anticipated surgery.  Patient was admitted to Hospital Medicine for further evaluation.    PLAN:  - General Surgery Consulted, follow-up recs  - NPO, IVF resuscitation  - Follow up pending radiology results    4/9/25: s/p Diagnostic laparoscopy and Robotic small bowel resection with intracorporeal anastomosis 4/8/25 per Dr. Kaur.   Pt tolerated surgery well. No flatus yet. +bowel sounds. NPO/NGT in place. C/o of right eye discomfort after surgery- resolved with lubricating eye drops.   Will need to restart anticoagulation when ok with general surgery     Presented to the ED for evaluation of worsening abdominal pain with associated nausea  which did not alleviate with OTC treatments.  Denies any recent concerns for infection, fever, chills, urinary symptoms.  She reports a prior history of gynecologic surgeries.     ED course notable for hemodynamically stable vitals, labs with WBC 12.79.  CT abdomen pelvis w/ IV contrast(read pending) were ordered.  Evaluation noted concerns for closed loop bowel obstruction and patient also had episodes of vomiting.  General surgery was consulted with recommendations for Xarelto reversal and anticipated surgery.  Patient was admitted to Hospital Medicine for further evaluation.    PLAN:  - General Surgery Consulted, follow-up recs  - NPO, IVF resuscitation  - Follow up pending radiology results    4/9/25: +leukocytosis, cont IV Zosyn for now. +mild nausea this am, NGT in place- scant gastric output, abdomen soft, non distended. +Bowel sounds, no flatus yet    4/11/2025  POD1 lysis of adhesions and small-bowel  resection   Tolerated procedure well   Pain controlled   NG tube removed   Clear liquid diet initiated   Continue empiric Zosyn      Presented to the ED for evaluation of worsening abdominal pain with associated nausea  which did not alleviate with OTC treatments.  Denies any recent concerns for infection, fever, chills, urinary symptoms.  She reports a prior history of gynecologic surgeries.     ED course notable for hemodynamically stable vitals, labs with WBC 12.79.  CT abdomen pelvis w/ IV contrast(read pending) were ordered.  Evaluation noted concerns for closed loop bowel obstruction and patient also had episodes of vomiting.  General surgery was consulted with recommendations for Xarelto reversal and anticipated surgery.  Patient was admitted to [facility] Medicine for further evaluation.    PLAN:  - General Surgery Consulted, follow-up recs  - NPO, IVF resuscitation  - Follow up pending radiology results    4/9/25: s/p Diagnostic laparoscopy and Robotic small bowel resection with intracorporeal anastomosis 4/8/25 per Dr. Karu.   Pt tolerated surgery well. No flatus yet. +bowel sounds. NPO/NGT in place. C/o of right eye discomfort after surgery- resolved with lubricating eye drops.   Will need to restart anticoagulation when ok with general surgery     Presented to the ED for evaluation of worsening abdominal pain with associated nausea  which did not alleviate with OTC treatments.  Denies any recent concerns for infection, fever, chills, urinary symptoms.  She reports a prior history of gynecologic surgeries.     ED course notable for hemodynamically stable vitals, labs with WBC 12.79.  CT abdomen pelvis w/ IV contrast(read pending) were ordered.  Evaluation noted concerns for closed loop bowel obstruction and patient also had episodes of vomiting.  General surgery was consulted with recommendations for Xarelto reversal and anticipated surgery.  Patient was admitted to [facility] Medicine for further  evaluation.    PLAN:  - General Surgery Consulted, follow-up recs  - NPO, IVF resuscitation  - Follow up pending radiology results    4/9/25: +leukocytosis, cont IV Zosyn for now. +mild nausea this am, NGT in place- scant gastric output, abdomen soft, non distended. +Bowel sounds, no flatus yet      Paroxysmal atrial fibrillation  Patient with paroxysmal (<7 days) atrial fibrillation which is controlled currently with Beta Blocker. Patient is currently in sinus rhythm.    [id number] Score: 3.     The patients heart rate in the last 24 hours is as follows:  Pulse  Min: 54  Max: 98    PLAN:  - Antiarrhythmics: metoprolol tartrate (LOPRESSOR) tablet 25 mg, 2 times daily, Oral  - Anticoagulants: enoxaparin injection 40 mg, Every 24 hours, SubcutaneousXarelto was held and reversed due to surgical intervention concerns. Resume when safe per surgical team  - Replete electrolytes PRN to  target Magnesium > 2, Potassium > 4  - Continuous telemetry    4/9/25: HR controlled on IV Metoprolol, will need to re-start anticoagulation when ok with general surgery   Primary open-angle glaucoma(365.11) - Both Eyes  Continue home timolol, latanoprost    Primary hypertension  Hypertensive on admission    Home meds for hypertension were reviewed and noted below.   Home Hypertension Medications per chart review             furosemide (LASIX) 20 MG tablet TAKE ONE TABLET BY MOUTH TWICE A WEEK    metoprolol tartrate (LOPRESSOR) 25 MG tablet Take 1 tablet (25 mg total) by mouth 2 (two) times daily.            Patients blood pressure range in the last 24 hours was: BP  Min: 119/68  Max: 198/75.      PLAN:  -While in the hospital, will manage blood pressure as follows; Continue home antihypertensive regimen  -The patient's inpatient anti-hypertensive regimen is listed below:  Current Antihypertensives  hydrALAZINE injection 5 mg, Every 6 hours PRN, Intravenous  metoprolol injection 5 mg, Every 8 hours, Intravenous    -Will utilize p.r.n. blood  pressure medication only if patient's blood pressure greater than 180/110 and she develops symptoms such as worsening chest pain or shortness of breath.    Mixed hyperlipidemia  Recent Lipid Panel reviewed-  Lab Results   Component Value Date    HDL 59 09/17/2024    LDLCALC 80.6 09/17/2024    TRIG 137 09/17/2024    CHOL 167 09/17/2024        Home Hyperlipidemia Medications per chart review             atorvastatin (LIPITOR) 10 MG tablet Take 1 tablet (10 mg total) by mouth every evening.            PLAN  -resume home statin when able to tolerate p.o.      Gastroesophageal reflux disease without esophagitis  Chronic.  Stable    Home medication includes omeprazole p.o.    PLAN:  Started on pantoprazole IV, resume oral PPI when able to tolerate p.o.    VTE Risk Mitigation (From admission, onward)           Ordered     enoxaparin injection 40 mg  Every 24 hours         04/09/25 1510     IP VTE HIGH RISK PATIENT  Once         04/08/25 0628     Place sequential compression device  Until discontinued         04/08/25 0628     Reason for No Pharmacological VTE Prophylaxis  Once        Question:  Reasons:  Answer:  Risk of Bleeding  Comment:  Surgical intervention planned    04/08/25 0628                    Discharge Planning   STEF:      Code Status: Full Code   Medical Readiness for Discharge Date:   Discharge Plan A: Home, Home with family   Discharge Delays: None known at this time              Yimi Walker MD  Department of Hospital Medicine   [facility] - Telemetry ([facility])

## 2025-04-11 NOTE — SUBJECTIVE & OBJECTIVE
Interval History:  No acute issues reported overnight.    Review of Systems   Constitutional:  Negative for fatigue and fever.   HENT:  Negative for sinus pressure.    Eyes:  Negative for visual disturbance.   Respiratory:  Negative for shortness of breath.    Cardiovascular:  Negative for chest pain.   Gastrointestinal:  Negative for nausea and vomiting.   Genitourinary:  Negative for difficulty urinating.   Musculoskeletal:  Negative for back pain.   Skin:  Negative for rash.   Neurological:  Negative for headaches.   Psychiatric/Behavioral:  Negative for confusion.      Objective:     Vital Signs (Most Recent):  Temp: 98.6 °F (37 °C) (04/11/25 1149)  Pulse: 70 (04/11/25 1149)  Resp: 19 (04/11/25 1149)  BP: 132/65 (04/11/25 1149)  SpO2: 98 % (04/11/25 1149) Vital Signs (24h Range):  Temp:  [97.7 °F (36.5 °C)-99.4 °F (37.4 °C)] 98.6 °F (37 °C)  Pulse:  [54-98] 70  Resp:  [16-19] 19  SpO2:  [96 %-99 %] 98 %  BP: (127-139)/(57-66) 132/65     Weight: 79.5 kg (175 lb 4.3 oz)  Body mass index is 29.17 kg/m².  No intake or output data in the 24 hours ending 04/11/25 1207      Physical Exam  Constitutional:       General: She is not in acute distress.     Appearance: She is well-developed. She is not diaphoretic.   HENT:      Head: Normocephalic and atraumatic.   Eyes:      Pupils: Pupils are equal, round, and reactive to light.   Cardiovascular:      Rate and Rhythm: Normal rate and regular rhythm.      Heart sounds: Normal heart sounds. No murmur heard.     No friction rub. No gallop.   Pulmonary:      Effort: Pulmonary effort is normal. No respiratory distress.      Breath sounds: Normal breath sounds. No stridor. No wheezing or rales.   Abdominal:      General: Bowel sounds are normal. There is no distension.      Palpations: Abdomen is soft. There is no mass.      Tenderness: There is no abdominal tenderness. There is no guarding.   Musculoskeletal:      Right lower leg: No edema.      Left lower leg: No edema.    Skin:     General: Skin is warm.      Findings: No erythema.   Neurological:      Mental Status: She is alert and oriented to person, place, and time.               Significant Labs: All pertinent labs within the past 24 hours have been reviewed.    Significant Imaging: I have reviewed all pertinent imaging results/findings within the past 24 hours.

## 2025-04-11 NOTE — SUBJECTIVE & OBJECTIVE
Interval History: POD 3-AF, ZEUS. doing well, WBC WNL. Pain controlled. Tolerating CLD without N/V, reports some belching. Having bowel function.      Medications:  Continuous Infusions:  Scheduled Meds:   acetaminophen  1,000 mg Oral Q8H    bisacodyL  10 mg Rectal Once    chlorhexidine  10 mL Mouth/Throat BID    enoxparin  40 mg Subcutaneous Q24H (prophylaxis, 1700)    latanoprost  1 drop Both Eyes QHS    metoprolol  5 mg Intravenous Q8H    pantoprazole  40 mg Intravenous Daily    piperacillin-tazobactam (Zosyn) IV (PEDS and ADULTS) (extended infusion is not appropriate)  4.5 g Intravenous Q8H    timolol maleate 0.5%  1 drop Both Eyes Daily    white petrolatum-mineral oiL   Both Eyes QHS     PRN Meds:  Current Facility-Administered Medications:     artificial tears, 1 drop, Both Eyes, PRN    dextrose 50%, 12.5 g, Intravenous, PRN    dextrose 50%, 25 g, Intravenous, PRN    glucagon (human recombinant), 1 mg, Intramuscular, PRN    glucose, 16 g, Oral, PRN    glucose, 24 g, Oral, PRN    hydrALAZINE, 5 mg, Intravenous, Q6H PRN    melatonin, 6 mg, Oral, Nightly PRN    morphine, 2 mg, Intravenous, Q4H PRN    naloxone, 0.02 mg, Intravenous, PRN    ondansetron, 4 mg, Intravenous, Q6H PRN    prochlorperazine, 2.5 mg, Intravenous, Q8H PRN    sodium chloride 0.9%, 10 mL, Intravenous, Q12H PRN    traMADoL, 100 mg, Oral, Q6H PRN    traMADoL, 50 mg, Oral, Q6H PRN     Review of patient's allergies indicates:   Allergen Reactions    Voltaren [diclofenac sodium] Edema     Gel formulation caused facial rash and facial swelling    Eliquis [apixaban] Other (See Comments)     Headache, numbness in lip     Medrol [methylprednisolone] Blisters     Objective:     Vital Signs (Most Recent):  Temp: 98.7 °F (37.1 °C) (04/11/25 0808)  Pulse: 69 (04/11/25 0808)  Resp: 19 (04/11/25 0808)  BP: (!) 127/57 (04/11/25 0808)  SpO2: 96 % (04/11/25 0808) Vital Signs (24h Range):  Temp:  [97.7 °F (36.5 °C)-99.4 °F (37.4 °C)] 98.7 °F (37.1 °C)  Pulse:   [54-98] 69  Resp:  [16-19] 19  SpO2:  [96 %-99 %] 96 %  BP: (127-139)/(57-66) 127/57     Weight: 79.5 kg (175 lb 4.3 oz)  Body mass index is 29.17 kg/m².    Intake/Output - Last 3 Shifts         04/09 0700  04/10 0659 04/10 0700  04/11 0659 04/11 0700  04/12 0659    IV Piggyback 392.2      Total Intake(mL/kg) 392.2 (4.9)      Urine (mL/kg/hr)       Drains 150      Total Output 150      Net +242.2             Urine Occurrence 1 x      Stool Occurrence  1 x              Physical Exam  Constitutional:       Appearance: Normal appearance.   HENT:      Head: Normocephalic.      Nose: Nose normal.      Comments: NGT in place. 50cc output-brown in color  Eyes:      Pupils: Pupils are equal, round, and reactive to light.   Cardiovascular:      Rate and Rhythm: Normal rate and regular rhythm.   Pulmonary:      Effort: Pulmonary effort is normal.   Abdominal:      Palpations: Abdomen is soft.      Tenderness: There is abdominal tenderness (appropriate TTP). There is no guarding or rebound.      Hernia: No hernia is present.      Comments: Bloated. Incision c/d/I no drainage or erythema   Musculoskeletal:      Cervical back: Normal range of motion.      Right lower leg: No edema.      Left lower leg: No edema.   Skin:     General: Skin is warm and dry.   Neurological:      General: No focal deficit present.      Mental Status: She is alert and oriented to person, place, and time.   Psychiatric:         Mood and Affect: Mood normal.          Significant Labs:  I have reviewed all pertinent lab results within the past 24 hours.  CBC:   Recent Labs   Lab 04/11/25  0533   WBC 11.60   RBC 3.39*   HGB 10.9*   HCT 34.3*      *   MCH 32.2*   MCHC 31.8*     CMP:   Recent Labs   Lab 04/11/25  0533   CALCIUM 9.2   ALBUMIN 2.8*      K 3.4*   CO2 26      BUN 16   CREATININE 0.7   ALKPHOS 71   ALT 9*   AST 19   BILITOT 1.0       Significant Diagnostics:  I have reviewed all pertinent imaging results/findings within  the past 24 hours.

## 2025-04-11 NOTE — ASSESSMENT & PLAN NOTE
Patient with paroxysmal (<7 days) atrial fibrillation which is controlled currently with Beta Blocker. Patient is currently in sinus rhythm.    TGWBW1BCSs Score: 3.     The patients heart rate in the last 24 hours is as follows:  Pulse  Min: 54  Max: 98    PLAN:  - Antiarrhythmics: metoprolol tartrate (LOPRESSOR) tablet 25 mg, 2 times daily, Oral  - Anticoagulants: enoxaparin injection 40 mg, Every 24 hours, SubcutaneousXarelto was held and reversed due to surgical intervention concerns. Resume when safe per surgical team  - Replete electrolytes PRN to  target Magnesium > 2, Potassium > 4  - Continuous telemetry    4/9/25: HR controlled on IV Metoprolol, will need to re-start anticoagulation when ok with general surgery

## 2025-04-12 VITALS
OXYGEN SATURATION: 96 % | HEIGHT: 65 IN | TEMPERATURE: 99 F | SYSTOLIC BLOOD PRESSURE: 134 MMHG | RESPIRATION RATE: 16 BRPM | BODY MASS INDEX: 29.2 KG/M2 | WEIGHT: 175.25 LBS | DIASTOLIC BLOOD PRESSURE: 63 MMHG | HEART RATE: 72 BPM

## 2025-04-12 LAB
ABSOLUTE EOSINOPHIL (OHS): 0.19 K/UL
ABSOLUTE MONOCYTE (OHS): 1.04 K/UL (ref 0.3–1)
ABSOLUTE NEUTROPHIL COUNT (OHS): 4.97 K/UL (ref 1.8–7.7)
ALBUMIN SERPL BCP-MCNC: 2.6 G/DL (ref 3.5–5.2)
ALP SERPL-CCNC: 57 UNIT/L (ref 40–150)
ALT SERPL W/O P-5'-P-CCNC: 10 UNIT/L (ref 10–44)
ANION GAP (OHS): 8 MMOL/L (ref 8–16)
AST SERPL-CCNC: 18 UNIT/L (ref 11–45)
BASOPHILS # BLD AUTO: 0.02 K/UL
BASOPHILS NFR BLD AUTO: 0.2 %
BILIRUB SERPL-MCNC: 0.6 MG/DL (ref 0.1–1)
BUN SERPL-MCNC: 12 MG/DL (ref 8–23)
CALCIUM SERPL-MCNC: 9 MG/DL (ref 8.7–10.5)
CHLORIDE SERPL-SCNC: 107 MMOL/L (ref 95–110)
CO2 SERPL-SCNC: 27 MMOL/L (ref 23–29)
CREAT SERPL-MCNC: 0.7 MG/DL (ref 0.5–1.4)
ERYTHROCYTE [DISTWIDTH] IN BLOOD BY AUTOMATED COUNT: 12.2 % (ref 11.5–14.5)
GFR SERPLBLD CREATININE-BSD FMLA CKD-EPI: >60 ML/MIN/1.73/M2
GLUCOSE SERPL-MCNC: 64 MG/DL (ref 70–110)
HCT VFR BLD AUTO: 31.3 % (ref 37–48.5)
HGB BLD-MCNC: 10.1 GM/DL (ref 12–16)
IMM GRANULOCYTES # BLD AUTO: 0.03 K/UL (ref 0–0.04)
IMM GRANULOCYTES NFR BLD AUTO: 0.3 % (ref 0–0.5)
LYMPHOCYTES # BLD AUTO: 2.63 K/UL (ref 1–4.8)
MAGNESIUM SERPL-MCNC: 2 MG/DL (ref 1.6–2.6)
MCH RBC QN AUTO: 32.1 PG (ref 27–31)
MCHC RBC AUTO-ENTMCNC: 32.3 G/DL (ref 32–36)
MCV RBC AUTO: 99 FL (ref 82–98)
NUCLEATED RBC (/100WBC) (OHS): 0 /100 WBC
PHOSPHATE SERPL-MCNC: 2.3 MG/DL (ref 2.7–4.5)
PLATELET # BLD AUTO: 285 K/UL (ref 150–450)
PMV BLD AUTO: 9.9 FL (ref 9.2–12.9)
POTASSIUM SERPL-SCNC: 3.8 MMOL/L (ref 3.5–5.1)
PROT SERPL-MCNC: 6.4 GM/DL (ref 6–8.4)
RBC # BLD AUTO: 3.15 M/UL (ref 4–5.4)
RELATIVE EOSINOPHIL (OHS): 2.1 %
RELATIVE LYMPHOCYTE (OHS): 29.6 % (ref 18–48)
RELATIVE MONOCYTE (OHS): 11.7 % (ref 4–15)
RELATIVE NEUTROPHIL (OHS): 56.1 % (ref 38–73)
SODIUM SERPL-SCNC: 142 MMOL/L (ref 136–145)
WBC # BLD AUTO: 8.88 K/UL (ref 3.9–12.7)

## 2025-04-12 PROCEDURE — 63600175 PHARM REV CODE 636 W HCPCS: Performed by: SURGERY

## 2025-04-12 PROCEDURE — 25000003 PHARM REV CODE 250: Performed by: SURGERY

## 2025-04-12 PROCEDURE — 36415 COLL VENOUS BLD VENIPUNCTURE: CPT | Performed by: SURGERY

## 2025-04-12 PROCEDURE — 25000003 PHARM REV CODE 250: Performed by: FAMILY MEDICINE

## 2025-04-12 PROCEDURE — 84100 ASSAY OF PHOSPHORUS: CPT | Performed by: SURGERY

## 2025-04-12 PROCEDURE — 94761 N-INVAS EAR/PLS OXIMETRY MLT: CPT

## 2025-04-12 PROCEDURE — 85025 COMPLETE CBC W/AUTO DIFF WBC: CPT | Performed by: SURGERY

## 2025-04-12 PROCEDURE — 83735 ASSAY OF MAGNESIUM: CPT | Performed by: SURGERY

## 2025-04-12 PROCEDURE — 80053 COMPREHEN METABOLIC PANEL: CPT | Performed by: SURGERY

## 2025-04-12 PROCEDURE — 25000003 PHARM REV CODE 250: Performed by: PHYSICIAN ASSISTANT

## 2025-04-12 RX ORDER — TRAMADOL HYDROCHLORIDE 50 MG/1
50 TABLET ORAL EVERY 6 HOURS PRN
Qty: 20 EACH | Refills: 0 | Status: SHIPPED | OUTPATIENT
Start: 2025-04-12

## 2025-04-12 RX ORDER — AMOXICILLIN AND CLAVULANATE POTASSIUM 875; 125 MG/1; MG/1
1 TABLET, FILM COATED ORAL EVERY 12 HOURS
Qty: 10 TABLET | Refills: 0 | Status: SHIPPED | OUTPATIENT
Start: 2025-04-12 | End: 2025-04-17

## 2025-04-12 RX ADMIN — AMOXICILLIN AND CLAVULANATE POTASSIUM 1 TABLET: 875; 125 TABLET, FILM COATED ORAL at 09:04

## 2025-04-12 RX ADMIN — PANTOPRAZOLE SODIUM 40 MG: 40 INJECTION, POWDER, FOR SOLUTION INTRAVENOUS at 09:04

## 2025-04-12 RX ADMIN — CHLORHEXIDINE GLUCONATE 0.12% ORAL RINSE 10 ML: 1.2 LIQUID ORAL at 09:04

## 2025-04-12 RX ADMIN — METOPROLOL TARTRATE 25 MG: 25 TABLET, FILM COATED ORAL at 09:04

## 2025-04-12 RX ADMIN — GABAPENTIN 400 MG: 400 CAPSULE ORAL at 09:04

## 2025-04-12 RX ADMIN — TIMOLOL MALEATE 1 DROP: 5 SOLUTION/ DROPS OPHTHALMIC at 09:04

## 2025-04-12 RX ADMIN — ACETAMINOPHEN 1000 MG: 500 TABLET ORAL at 05:04

## 2025-04-12 NOTE — DISCHARGE SUMMARY
[facility] ([facility])  [facility]  Discharge Summary      Patient Name: Maria Del Carmen Spence  MRN: [id number]  JEOVANY: [id number]  Patient Class: IP- Inpatient  Admission Date: 4/7/2025  Hospital Length of Stay: 4 days  Discharge Date and Time: 04/12/2025 10:38 AM  Attending Physician: Yimi Walker MD   Discharging Provider: Yimi Walker MD  Primary Care Provider: Rajinder Lutz MD    Primary Care Team: Networked reference to record PCT     HPI:   Ms. Maria Del Carmen Spence is a 84 y.o. female who  has a medical history of A-fib, Arthritis, Chronic back pain,, Frequent headaches, GERD (gastroesophageal reflux disease), Glaucoma, Hyperlipemia, and Hypertension.    She  presented to the ED for evaluation of worsening abdominal pain with associated nausea since yesterday afternoon which did not alleviate with OTC treatments.  Denies any recent concerns for infection, fever, chills, urinary symptoms.  She reports a prior history of gynecologic surgeries.    ED course notable for hemodynamically stable vitals, labs with WBC 12.79.  CT abdomen pelvis w/ IV contrast(read pending) were ordered.  Evaluation noted concerns for closed loop bowel obstruction and patient also had episodes of vomiting.  General surgery was consulted with recommendations for Xarelto reversal and anticipated surgery.  Patient was admitted to Hospital Medicine for further evaluation.    Procedure(s) (LRB):  XI ROBOTIC LAPAROSCOPY,DIAGNOSTIC (N/A)  BLOCK, TRANSVERSUS ABDOMINIS PLANE (N/A)  ROBOTIC EXCISION,SMALL INTESTINE (N/A)      Hospital Course:   The patient was admitted with SBO. CT of the abdomen and pelvis was done with IV contrast which was concerning for possible early closed loop obstruction in the pelvis with mesenteric fat stranding and edema, with some fluid in the pelvis. +leukocytosis. Pt was placed on IV Zosyn. General surgery was consulted. Pt was given PCC for Xarelto reversal and underwent urgent Diagnostic laparoscopy and Robotic small  bowel resection with intracorporeal anastomosis.   Pt tolerated surgery well. No flatus yet. +bowel sounds. NPO/NGT in place. C/o of right eye discomfort after surgery- resolved with lubricating eye drops.   Will need to restart anticoagulation when ok with general surgery     Patient tolerated small-bowel resection.  Diet was initiated advanced as tolerated.  Xarelto resumed.  Zosyn was transitioned to p.o. Augmentin.  Pain controlled.  Patient declined placement.  Home health ordered.  She was discharged home with home health.       Goals of Care Treatment Preferences:  Code Status: Full Code         Consults:   Consults (From admission, onward)          Status Ordering Provider     Inpatient consult to General Surgery  Once        Provider:  Sondra Kaur MD    Completed GURJIT MORIN            Assessment & Plan  Intestinal adhesions with complete obstruction  Nausea and vomiting  Presented to the ED for evaluation of worsening abdominal pain with associated nausea  which did not alleviate with OTC treatments.  Denies any recent concerns for infection, fever, chills, urinary symptoms.  She reports a prior history of gynecologic surgeries.     ED course notable for hemodynamically stable vitals, labs with WBC 12.79.  CT abdomen pelvis w/ IV contrast(read pending) were ordered.  Evaluation noted concerns for closed loop bowel obstruction and patient also had episodes of vomiting.  General surgery was consulted with recommendations for Xarelto reversal and anticipated surgery.  Patient was admitted to Hospital Medicine for further evaluation.    PLAN:  - General Surgery Consulted, follow-up recs  - NPO, IVF resuscitation  - Follow up pending radiology results    4/9/25: s/p Diagnostic laparoscopy and Robotic small bowel resection with intracorporeal anastomosis 4/8/25 per Dr. Kaur.   Pt tolerated surgery well. No flatus yet. +bowel sounds. NPO/NGT in place. C/o of right eye discomfort after surgery-  resolved with lubricating eye drops.   Will need to restart anticoagulation when ok with general surgery     Presented to the ED for evaluation of worsening abdominal pain with associated nausea  which did not alleviate with OTC treatments.  Denies any recent concerns for infection, fever, chills, urinary symptoms.  She reports a prior history of gynecologic surgeries.     ED course notable for hemodynamically stable vitals, labs with WBC 12.79.  CT abdomen pelvis w/ IV contrast(read pending) were ordered.  Evaluation noted concerns for closed loop bowel obstruction and patient also had episodes of vomiting.  General surgery was consulted with recommendations for Xarelto reversal and anticipated surgery.  Patient was admitted to Hospital Medicine for further evaluation.    PLAN:  - General Surgery Consulted, follow-up recs  - NPO, IVF resuscitation  - Follow up pending radiology results    4/9/25: +leukocytosis, cont IV Zosyn for now. +mild nausea this am, NGT in place- scant gastric output, abdomen soft, non distended. +Bowel sounds, no flatus yet    4/12/2025  POD1 lysis of adhesions and small-bowel resection   Tolerated procedure well   Pain controlled   NG tube removed   Clear liquid diet initiated   Continue empiric Zosyn      Presented to the ED for evaluation of worsening abdominal pain with associated nausea  which did not alleviate with OTC treatments.  Denies any recent concerns for infection, fever, chills, urinary symptoms.  She reports a prior history of gynecologic surgeries.     ED course notable for hemodynamically stable vitals, labs with WBC 12.79.  CT abdomen pelvis w/ IV contrast(read pending) were ordered.  Evaluation noted concerns for closed loop bowel obstruction and patient also had episodes of vomiting.  General surgery was consulted with recommendations for Xarelto reversal and anticipated surgery.  Patient was admitted to Hospital Medicine for further evaluation.    PLAN:  - General  Surgery Consulted, follow-up recs  - NPO, IVF resuscitation  - Follow up pending radiology results    4/9/25: s/p Diagnostic laparoscopy and Robotic small bowel resection with intracorporeal anastomosis 4/8/25 per Dr. Kaur.   Pt tolerated surgery well. No flatus yet. +bowel sounds. NPO/NGT in place. C/o of right eye discomfort after surgery- resolved with lubricating eye drops.   Will need to restart anticoagulation when ok with general surgery     Presented to the ED for evaluation of worsening abdominal pain with associated nausea  which did not alleviate with OTC treatments.  Denies any recent concerns for infection, fever, chills, urinary symptoms.  She reports a prior history of gynecologic surgeries.     ED course notable for hemodynamically stable vitals, labs with WBC 12.79.  CT abdomen pelvis w/ IV contrast(read pending) were ordered.  Evaluation noted concerns for closed loop bowel obstruction and patient also had episodes of vomiting.  General surgery was consulted with recommendations for Xarelto reversal and anticipated surgery.  Patient was admitted to Hospital Medicine for further evaluation.    PLAN:  - General Surgery Consulted, follow-up recs  - NPO, IVF resuscitation  - Follow up pending radiology results    4/9/25: +leukocytosis, cont IV Zosyn for now. +mild nausea this am, NGT in place- scant gastric output, abdomen soft, non distended. +Bowel sounds, no flatus yet    4/12/2025  Tolerating clear liquid diet  Advanced to full liquids today   Will DC Zosyn   Start p.o. Augmentin   Plan to transitioned to regular diet tomorrow   Anticipate DC tomorrow  Patient declining placement for therapy   Will plan for home health  Presented to the ED for evaluation of worsening abdominal pain with associated nausea  which did not alleviate with OTC treatments.  Denies any recent concerns for infection, fever, chills, urinary symptoms.  She reports a prior history of gynecologic surgeries.     ED course  notable for hemodynamically stable vitals, labs with WBC 12.79.  CT abdomen pelvis w/ IV contrast(read pending) were ordered.  Evaluation noted concerns for closed loop bowel obstruction and patient also had episodes of vomiting.  General surgery was consulted with recommendations for Xarelto reversal and anticipated surgery.  Patient was admitted to Hospital Medicine for further evaluation.    PLAN:  - General Surgery Consulted, follow-up recs  - NPO, IVF resuscitation  - Follow up pending radiology results    4/9/25: s/p Diagnostic laparoscopy and Robotic small bowel resection with intracorporeal anastomosis 4/8/25 per Dr. Kaur.   Pt tolerated surgery well. No flatus yet. +bowel sounds. NPO/NGT in place. C/o of right eye discomfort after surgery- resolved with lubricating eye drops.   Will need to restart anticoagulation when ok with general surgery     Presented to the ED for evaluation of worsening abdominal pain with associated nausea  which did not alleviate with OTC treatments.  Denies any recent concerns for infection, fever, chills, urinary symptoms.  She reports a prior history of gynecologic surgeries.     ED course notable for hemodynamically stable vitals, labs with WBC 12.79.  CT abdomen pelvis w/ IV contrast(read pending) were ordered.  Evaluation noted concerns for closed loop bowel obstruction and patient also had episodes of vomiting.  General surgery was consulted with recommendations for Xarelto reversal and anticipated surgery.  Patient was admitted to Hospital Medicine for further evaluation.    PLAN:  - General Surgery Consulted, follow-up recs  - NPO, IVF resuscitation  - Follow up pending radiology results    4/9/25: +leukocytosis, cont IV Zosyn for now. +mild nausea this am, NGT in place- scant gastric output, abdomen soft, non distended. +Bowel sounds, no flatus yet    4/12/2025  POD1 lysis of adhesions and small-bowel resection   Tolerated procedure well   Pain controlled   NG tube  removed   Clear liquid diet initiated   Continue empiric Zosyn      Presented to the ED for evaluation of worsening abdominal pain with associated nausea  which did not alleviate with OTC treatments.  Denies any recent concerns for infection, fever, chills, urinary symptoms.  She reports a prior history of gynecologic surgeries.     ED course notable for hemodynamically stable vitals, labs with WBC 12.79.  CT abdomen pelvis w/ IV contrast(read pending) were ordered.  Evaluation noted concerns for closed loop bowel obstruction and patient also had episodes of vomiting.  General surgery was consulted with recommendations for Xarelto reversal and anticipated surgery.  Patient was admitted to Hospital Medicine for further evaluation.    PLAN:  - General Surgery Consulted, follow-up recs  - NPO, IVF resuscitation  - Follow up pending radiology results    4/9/25: s/p Diagnostic laparoscopy and Robotic small bowel resection with intracorporeal anastomosis 4/8/25 per Dr. Kaur.   Pt tolerated surgery well. No flatus yet. +bowel sounds. NPO/NGT in place. C/o of right eye discomfort after surgery- resolved with lubricating eye drops.   Will need to restart anticoagulation when ok with general surgery     Presented to the ED for evaluation of worsening abdominal pain with associated nausea  which did not alleviate with OTC treatments.  Denies any recent concerns for infection, fever, chills, urinary symptoms.  She reports a prior history of gynecologic surgeries.     ED course notable for hemodynamically stable vitals, labs with WBC 12.79.  CT abdomen pelvis w/ IV contrast(read pending) were ordered.  Evaluation noted concerns for closed loop bowel obstruction and patient also had episodes of vomiting.  General surgery was consulted with recommendations for Xarelto reversal and anticipated surgery.  Patient was admitted to Hospital Medicine for further evaluation.    PLAN:  - General Surgery Consulted, follow-up recs  - NPO,  IVF resuscitation  - Follow up pending radiology results    4/9/25: +leukocytosis, cont IV Zosyn for now. +mild nausea this am, NGT in place- scant gastric output, abdomen soft, non distended. +Bowel sounds, no flatus yet      Paroxysmal atrial fibrillation  Patient with paroxysmal (<7 days) atrial fibrillation which is controlled currently with Beta Blocker. Patient is currently in sinus rhythm.    [id number] Score: 3.     The patients heart rate in the last 24 hours is as follows:  Pulse  Min: 67  Max: 78    PLAN:  - Antiarrhythmics: metoprolol tartrate (LOPRESSOR) tablet 25 mg, 2 times daily, Oral  - Anticoagulants: rivaroxaban tablet 15 mg, with dinner, OralXarelto was held and reversed due to surgical intervention concerns. Resume when safe per surgical team  - Replete electrolytes PRN to  target Magnesium > 2, Potassium > 4  - Continuous telemetry    4/9/25: HR controlled on IV Metoprolol, will need to re-start anticoagulation when ok with general surgery   Primary open-angle glaucoma(365.11) - Both Eyes  Continue home timolol, latanoprost    Primary hypertension  Hypertensive on admission    Home meds for hypertension were reviewed and noted below.   Home Hypertension Medications per chart review             furosemide (LASIX) 20 MG tablet TAKE ONE TABLET BY MOUTH TWICE A WEEK    metoprolol tartrate (LOPRESSOR) 25 MG tablet Take 1 tablet (25 mg total) by mouth 2 (two) times daily.            Patients blood pressure range in the last 24 hours was: BP  Min: 119/68  Max: 198/75.      PLAN:  -While in the hospital, will manage blood pressure as follows; Continue home antihypertensive regimen  -The patient's inpatient anti-hypertensive regimen is listed below:  Current Antihypertensives  hydrALAZINE injection 5 mg, Every 6 hours PRN, Intravenous  metoprolol injection 5 mg, Every 8 hours, Intravenous    -Will utilize p.r.n. blood pressure medication only if patient's blood pressure greater than 180/110 and she  develops symptoms such as worsening chest pain or shortness of breath.    Mixed hyperlipidemia  Recent Lipid Panel reviewed-  Lab Results   Component Value Date    HDL 59 09/17/2024    LDLCALC 80.6 09/17/2024    TRIG 137 09/17/2024    CHOL 167 09/17/2024        Home Hyperlipidemia Medications per chart review             atorvastatin (LIPITOR) 10 MG tablet Take 1 tablet (10 mg total) by mouth every evening.            PLAN  -resume home statin when able to tolerate p.o.      Gastroesophageal reflux disease without esophagitis  Chronic.  Stable    Home medication includes omeprazole p.o.    PLAN:  Started on pantoprazole IV, resume oral PPI when able to tolerate p.o.    Final Active Diagnoses:    Diagnosis Date Noted POA    PRINCIPAL PROBLEM:  Intestinal adhesions with complete obstruction [K56.52] 04/08/2025 Yes    Nausea and vomiting [R11.2] 11/14/2024 Yes    Gastroesophageal reflux disease without esophagitis [K21.9] 08/04/2021 Yes    Mixed hyperlipidemia [E78.2] 12/11/2018 Yes    Paroxysmal atrial fibrillation [I48.0] 06/21/2017 Yes    Primary hypertension [I10] 09/08/2014 Yes    Primary open-angle glaucoma(365.11) - Both Eyes [H40.1190] 10/07/2013 Yes      Problems Resolved During this Admission:       Discharged Condition: good    Disposition:     Follow Up:   Follow-up Information       Sondra Kaur MD Follow up in 2 week(s).    Specialty: Surgical Oncology  Why: post op appt  Contact information:  [address] [facility] [address]  [phone]               Timothy Kaur MD .    Specialty: Gynecology  Contact information:  [address] [place] [id number] [phone]                           Patient Instructions:      Ambulatory referral/consult to Outpatient Case Management   Referral Priority: Routine Referral Type: Consultation   Referral Reason: Specialty Services Required   Number of Visits Requested: 1       Significant Diagnostic Studies: N/A    Pending Diagnostic  Studies:       Procedure Component Value Units Date/Time    HCV Virus Hold Specimen [[id number]] Collected: 04/07/25 4728    Order Status: Sent Lab Status: In process Updated: 04/08/25 0004    Specimen: Blood            Medications:  Reconciled Home Medications:      Medication List        START taking these medications      amoxicillin-clavulanate 875-125mg 875-125 mg per tablet  Commonly known as: AUGMENTIN  Take 1 tablet by mouth every 12 (twelve) hours. for 5 days     traMADoL 50 mg tablet  Commonly known as: ULTRAM  Take 1 tablet (50 mg total) by mouth every 6 (six) hours as needed for Pain.            CONTINUE taking these medications      A/G PRO ORAL  Take 2 tablets by mouth 2 (two) times daily.     acetaminophen 650 MG Tbsr  Commonly known as: TYLENOL  Take 650 mg by mouth as needed. Does not go over 3000mg daily     AREXVY (PF) 120 mcg/0.5 mL Susr vaccine  Generic drug: RSVPreF3 antigen-AS01E (PF)  Inject into the muscle.     atorvastatin 10 MG tablet  Commonly known as: LIPITOR  Take 1 tablet (10 mg total) by mouth every evening.     CENTRUM SILVER ORAL  Take 1 tablet by mouth once daily.     cetirizine 10 MG tablet  Commonly known as: ZYRTEC  Take 10 mg by mouth as needed for Allergies.     CITRACAL + D ORAL  Take 1 tablet by mouth once daily at 6am.     furosemide 20 MG tablet  Commonly known as: LASIX  TAKE ONE TABLET BY MOUTH TWICE A WEEK     gabapentin 400 MG capsule  Commonly known as: NEURONTIN  Take 1 capsule (400 mg total) by mouth 3 (three) times daily.     latanoprost 0.005 % ophthalmic solution  INSTILL ONE DROP IN EACH EYE AT BEDTIME     metoprolol tartrate 25 MG tablet  Commonly known as: LOPRESSOR  Take 1 tablet (25 mg total) by mouth 2 (two) times daily.     omeprazole 40 MG capsule  Commonly known as: PRILOSEC  Take 1 capsule (40 mg total) by mouth once daily.     ondansetron 4 MG Tbdl  Commonly known as: ZOFRAN-ODT  Take 1 tablet (4 mg total) by mouth every 6 (six) hours as needed.      timolol maleate 0.5% 0.5 % Drop  Commonly known as: TIMOPTIC  PLACE ONE DROP INTO BOTH EYES EVERY MORNING     XARELTO 15 mg Tab  Generic drug: rivaroxaban  Take 1 tablet (15 mg total) by mouth daily with dinner or evening meal.              Indwelling Lines/Drains at time of discharge:   Lines/Drains/Airways       None                   Time spent on the discharge of patient: 37 minutes         Yimi Walker MD  Department of Hospital Medicine  [facility] - Tele[facility] ([facility])

## 2025-04-12 NOTE — PLAN OF CARE
Problem: Adult Inpatient Plan of Care  Goal: Plan of Care Review  Outcome: Adequate for Care Transition  Goal: Patient-Specific Goal (Individualized)  Outcome: Adequate for Care Transition  Goal: Absence of Hospital-Acquired Illness or Injury  Outcome: Adequate for Care Transition  Goal: Optimal Comfort and Wellbeing  Outcome: Adequate for Care Transition  Goal: Readiness for Transition of Care  Outcome: Adequate for Care Transition     Problem: Infection  Goal: Absence of Infection Signs and Symptoms  Outcome: Adequate for Care Transition     Problem: Wound  Goal: Optimal Coping  Outcome: Adequate for Care Transition  Goal: Optimal Functional Ability  Outcome: Adequate for Care Transition  Goal: Absence of Infection Signs and Symptoms  Outcome: Adequate for Care Transition  Goal: Improved Oral Intake  Outcome: Adequate for Care Transition  Goal: Optimal Pain Control and Function  Outcome: Adequate for Care Transition  Goal: Skin Health and Integrity  Outcome: Adequate for Care Transition  Goal: Optimal Wound Healing  Outcome: Adequate for Care Transition     Problem: Fall Injury Risk  Goal: Absence of Fall and Fall-Related Injury  Outcome: Adequate for Care Transition     Problem: Skin Injury Risk Increased  Goal: Skin Health and Integrity  Outcome: Adequate for Care Transition

## 2025-04-12 NOTE — SUBJECTIVE & OBJECTIVE
Interval History:  Tolerated clear liquids.  We would like regular food eat.  Patient would like to be discharged home.  If a diet is tolerated and her pain is controlled she will be discharged by hospital Medicine.  Follow up with Dr. Kaur in 2 weeks    Medications:  Continuous Infusions:  Scheduled Meds:   acetaminophen  1,000 mg Oral Q8H    amoxicillin-clavulanate 875-125mg  1 tablet Oral Q12H    bisacodyL  10 mg Rectal Once    chlorhexidine  10 mL Mouth/Throat BID    gabapentin  400 mg Oral TID    latanoprost  1 drop Both Eyes QHS    metoprolol tartrate  25 mg Oral BID    pantoprazole  40 mg Intravenous Daily    rivaroxaban  15 mg Oral with dinner    timolol maleate 0.5%  1 drop Both Eyes Daily    white petrolatum-mineral oiL   Both Eyes QHS     PRN Meds:  Current Facility-Administered Medications:     artificial tears, 1 drop, Both Eyes, PRN    dextrose 50%, 12.5 g, Intravenous, PRN    dextrose 50%, 25 g, Intravenous, PRN    glucagon (human recombinant), 1 mg, Intramuscular, PRN    glucose, 16 g, Oral, PRN    glucose, 24 g, Oral, PRN    hydrALAZINE, 5 mg, Intravenous, Q6H PRN    melatonin, 6 mg, Oral, Nightly PRN    morphine, 2 mg, Intravenous, Q4H PRN    naloxone, 0.02 mg, Intravenous, PRN    ondansetron, 4 mg, Intravenous, Q6H PRN    prochlorperazine, 2.5 mg, Intravenous, Q8H PRN    sodium chloride 0.9%, 10 mL, Intravenous, Q12H PRN    traMADoL, 100 mg, Oral, Q6H PRN    traMADoL, 50 mg, Oral, Q6H PRN     Review of patient's allergies indicates:   Allergen Reactions    Voltaren [diclofenac sodium] Edema     Gel formulation caused facial rash and facial swelling    Eliquis [apixaban] Other (See Comments)     Headache, numbness in lip     Medrol [methylprednisolone] Blisters     Objective:     Vital Signs (Most Recent):  Temp: 98.2 °F (36.8 °C) (04/12/25 0733)  Pulse: 76 (04/12/25 0733)  Resp: 16 (04/12/25 0733)  BP: 133/62 (04/12/25 0733)  SpO2: 95 % (04/12/25 0733) Vital Signs (24h Range):  Temp:  [98.2 °F  (36.8 °C)-99.5 °F (37.5 °C)] 98.2 °F (36.8 °C)  Pulse:  [67-78] 76  Resp:  [16-20] 16  SpO2:  [95 %-98 %] 95 %  BP: (129-146)/(59-68) 133/62     Weight: 79.5 kg (175 lb 4.3 oz)  Body mass index is 29.17 kg/m².    Intake/Output - Last 3 Shifts         04/10 0700  04/11 0659 04/11 0700 04/12 0659 04/12 0700 04/13 0659    IV Piggyback       Total Intake(mL/kg)       Drains       Total Output       Net              Urine Occurrence  2 x     Stool Occurrence 1 x 2 x              Physical Exam  Vitals reviewed.   Constitutional:       Appearance: She is well-developed.      Comments: Slightly frail   HENT:      Head: Normocephalic.   Eyes:      Pupils: Pupils are equal, round, and reactive to light.   Neck:      Thyroid: No thyromegaly.      Vascular: No JVD.      Trachea: No tracheal deviation.   Cardiovascular:      Rate and Rhythm: Normal rate and regular rhythm.      Heart sounds: Normal heart sounds.   Pulmonary:      Breath sounds: Normal breath sounds. No wheezing.   Abdominal:      General: Bowel sounds are normal. There is no distension.      Palpations: Abdomen is soft. Abdomen is not rigid. There is no mass.      Tenderness: There is no abdominal tenderness. There is no guarding or rebound.      Comments: Incisions are clean   Musculoskeletal:         General: Normal range of motion.      Right lower leg: No edema.      Left lower leg: No edema.   Lymphadenopathy:      Cervical: No cervical adenopathy.   Skin:     General: Skin is warm and dry.      Findings: No erythema or rash.   Neurological:      Mental Status: She is oriented to person, place, and time.   Psychiatric:         Mood and Affect: Mood normal.         Behavior: Behavior normal.         Thought Content: Thought content normal.         Judgment: Judgment normal.          Significant Labs:  I have reviewed all pertinent lab results within the past 24 hours.  CBC:   Recent Labs   Lab 04/12/25  0456   WBC 8.88   RBC 3.15*   HGB 10.1*   HCT 31.3*       MCV 99*   MCH 32.1*   MCHC 32.3     BMP:   Recent Labs   Lab 04/12/25  0456      K 3.8      CO2 27   BUN 12   CREATININE 0.7   CALCIUM 9.0   MG 2.0       Significant Diagnostics:  I have reviewed all pertinent imaging results/findings within the past 24 hours.  No new

## 2025-04-12 NOTE — ASSESSMENT & PLAN NOTE
POD 4- s/p Diagnostic laparoscopy, Robotic small bowel resection with intracorporeal anastomosis    Doing well with no acute complaints. Denies n/v. Having bowel function.    -advance to full liquid diet which was tolerated.  She would like a regular diet.      Patient would like to be discharged home.      She can be discharged home if a diet is tolerated.      She would need a low-dose narcotic.      . Pt instructed on taking her time and taking small sips.  -multimodal pain control-starting oral pain meds  -IVF  -PT/OT, encouraged OOB and ambulation  -IS use 10x/hr while awake  -DVT ppx and GI ppx  -replete electrolytes prn    Follow up with Dr. Kaur in 2 weeks

## 2025-04-12 NOTE — ASSESSMENT & PLAN NOTE
Presented to the ED for evaluation of worsening abdominal pain with associated nausea  which did not alleviate with OTC treatments.  Denies any recent concerns for infection, fever, chills, urinary symptoms.  She reports a prior history of gynecologic surgeries.     ED course notable for hemodynamically stable vitals, labs with WBC 12.79.  CT abdomen pelvis w/ IV contrast(read pending) were ordered.  Evaluation noted concerns for closed loop bowel obstruction and patient also had episodes of vomiting.  General surgery was consulted with recommendations for Xarelto reversal and anticipated surgery.  Patient was admitted to Hospital Medicine for further evaluation.    PLAN:  - General Surgery Consulted, follow-up recs  - NPO, IVF resuscitation  - Follow up pending radiology results    4/9/25: s/p Diagnostic laparoscopy and Robotic small bowel resection with intracorporeal anastomosis 4/8/25 per Dr. Kaur.   Pt tolerated surgery well. No flatus yet. +bowel sounds. NPO/NGT in place. C/o of right eye discomfort after surgery- resolved with lubricating eye drops.   Will need to restart anticoagulation when ok with general surgery     Presented to the ED for evaluation of worsening abdominal pain with associated nausea  which did not alleviate with OTC treatments.  Denies any recent concerns for infection, fever, chills, urinary symptoms.  She reports a prior history of gynecologic surgeries.     ED course notable for hemodynamically stable vitals, labs with WBC 12.79.  CT abdomen pelvis w/ IV contrast(read pending) were ordered.  Evaluation noted concerns for closed loop bowel obstruction and patient also had episodes of vomiting.  General surgery was consulted with recommendations for Xarelto reversal and anticipated surgery.  Patient was admitted to Hospital Medicine for further evaluation.    PLAN:  - General Surgery Consulted, follow-up recs  - NPO, IVF resuscitation  - Follow up pending radiology results    4/9/25:  +leukocytosis, cont IV Zosyn for now. +mild nausea this am, NGT in place- scant gastric output, abdomen soft, non distended. +Bowel sounds, no flatus yet    4/12/2025  POD1 lysis of adhesions and small-bowel resection   Tolerated procedure well   Pain controlled   NG tube removed   Clear liquid diet initiated   Continue empiric Zosyn      Presented to the ED for evaluation of worsening abdominal pain with associated nausea  which did not alleviate with OTC treatments.  Denies any recent concerns for infection, fever, chills, urinary symptoms.  She reports a prior history of gynecologic surgeries.     ED course notable for hemodynamically stable vitals, labs with WBC 12.79.  CT abdomen pelvis w/ IV contrast(read pending) were ordered.  Evaluation noted concerns for closed loop bowel obstruction and patient also had episodes of vomiting.  General surgery was consulted with recommendations for Xarelto reversal and anticipated surgery.  Patient was admitted to Hospital Medicine for further evaluation.    PLAN:  - General Surgery Consulted, follow-up recs  - NPO, IVF resuscitation  - Follow up pending radiology results    4/9/25: s/p Diagnostic laparoscopy and Robotic small bowel resection with intracorporeal anastomosis 4/8/25 per Dr. Kaur.   Pt tolerated surgery well. No flatus yet. +bowel sounds. NPO/NGT in place. C/o of right eye discomfort after surgery- resolved with lubricating eye drops.   Will need to restart anticoagulation when ok with general surgery     Presented to the ED for evaluation of worsening abdominal pain with associated nausea  which did not alleviate with OTC treatments.  Denies any recent concerns for infection, fever, chills, urinary symptoms.  She reports a prior history of gynecologic surgeries.     ED course notable for hemodynamically stable vitals, labs with WBC 12.79.  CT abdomen pelvis w/ IV contrast(read pending) were ordered.  Evaluation noted concerns for closed loop bowel  obstruction and patient also had episodes of vomiting.  General surgery was consulted with recommendations for Xarelto reversal and anticipated surgery.  Patient was admitted to Hospital Medicine for further evaluation.    PLAN:  - General Surgery Consulted, follow-up recs  - NPO, IVF resuscitation  - Follow up pending radiology results    4/9/25: +leukocytosis, cont IV Zosyn for now. +mild nausea this am, NGT in place- scant gastric output, abdomen soft, non distended. +Bowel sounds, no flatus yet    4/12/2025  Tolerating clear liquid diet  Advanced to full liquids today   Will DC Zosyn   Start p.o. Augmentin   Plan to transitioned to regular diet tomorrow   Anticipate DC tomorrow  Patient declining placement for therapy   Will plan for home health  Presented to the ED for evaluation of worsening abdominal pain with associated nausea  which did not alleviate with OTC treatments.  Denies any recent concerns for infection, fever, chills, urinary symptoms.  She reports a prior history of gynecologic surgeries.     ED course notable for hemodynamically stable vitals, labs with WBC 12.79.  CT abdomen pelvis w/ IV contrast(read pending) were ordered.  Evaluation noted concerns for closed loop bowel obstruction and patient also had episodes of vomiting.  General surgery was consulted with recommendations for Xarelto reversal and anticipated surgery.  Patient was admitted to Hospital Medicine for further evaluation.    PLAN:  - General Surgery Consulted, follow-up recs  - NPO, IVF resuscitation  - Follow up pending radiology results    4/9/25: s/p Diagnostic laparoscopy and Robotic small bowel resection with intracorporeal anastomosis 4/8/25 per Dr. Kaur.   Pt tolerated surgery well. No flatus yet. +bowel sounds. NPO/NGT in place. C/o of right eye discomfort after surgery- resolved with lubricating eye drops.   Will need to restart anticoagulation when ok with general surgery     Presented to the ED for evaluation of  worsening abdominal pain with associated nausea  which did not alleviate with OTC treatments.  Denies any recent concerns for infection, fever, chills, urinary symptoms.  She reports a prior history of gynecologic surgeries.     ED course notable for hemodynamically stable vitals, labs with WBC 12.79.  CT abdomen pelvis w/ IV contrast(read pending) were ordered.  Evaluation noted concerns for closed loop bowel obstruction and patient also had episodes of vomiting.  General surgery was consulted with recommendations for Xarelto reversal and anticipated surgery.  Patient was admitted to Hospital Medicine for further evaluation.    PLAN:  - General Surgery Consulted, follow-up recs  - NPO, IVF resuscitation  - Follow up pending radiology results    4/9/25: +leukocytosis, cont IV Zosyn for now. +mild nausea this am, NGT in place- scant gastric output, abdomen soft, non distended. +Bowel sounds, no flatus yet    4/12/2025  POD1 lysis of adhesions and small-bowel resection   Tolerated procedure well   Pain controlled   NG tube removed   Clear liquid diet initiated   Continue empiric Zosyn      Presented to the ED for evaluation of worsening abdominal pain with associated nausea  which did not alleviate with OTC treatments.  Denies any recent concerns for infection, fever, chills, urinary symptoms.  She reports a prior history of gynecologic surgeries.     ED course notable for hemodynamically stable vitals, labs with WBC 12.79.  CT abdomen pelvis w/ IV contrast(read pending) were ordered.  Evaluation noted concerns for closed loop bowel obstruction and patient also had episodes of vomiting.  General surgery was consulted with recommendations for Xarelto reversal and anticipated surgery.  Patient was admitted to Hospital Medicine for further evaluation.    PLAN:  - General Surgery Consulted, follow-up recs  - NPO, IVF resuscitation  - Follow up pending radiology results    4/9/25: s/p Diagnostic laparoscopy and Robotic  small bowel resection with intracorporeal anastomosis 4/8/25 per Dr. Kaur.   Pt tolerated surgery well. No flatus yet. +bowel sounds. NPO/NGT in place. C/o of right eye discomfort after surgery- resolved with lubricating eye drops.   Will need to restart anticoagulation when ok with general surgery     Presented to the ED for evaluation of worsening abdominal pain with associated nausea  which did not alleviate with OTC treatments.  Denies any recent concerns for infection, fever, chills, urinary symptoms.  She reports a prior history of gynecologic surgeries.     ED course notable for hemodynamically stable vitals, labs with WBC 12.79.  CT abdomen pelvis w/ IV contrast(read pending) were ordered.  Evaluation noted concerns for closed loop bowel obstruction and patient also had episodes of vomiting.  General surgery was consulted with recommendations for Xarelto reversal and anticipated surgery.  Patient was admitted to Hospital Medicine for further evaluation.    PLAN:  - General Surgery Consulted, follow-up recs  - NPO, IVF resuscitation  - Follow up pending radiology results    4/9/25: +leukocytosis, cont IV Zosyn for now. +mild nausea this am, NGT in place- scant gastric output, abdomen soft, non distended. +Bowel sounds, no flatus yet

## 2025-04-12 NOTE — ASSESSMENT & PLAN NOTE
Patient with paroxysmal (<7 days) atrial fibrillation which is controlled currently with Beta Blocker. Patient is currently in sinus rhythm.    [id number] Score: 3.     The patients heart rate in the last 24 hours is as follows:  Pulse  Min: 67  Max: 78    PLAN:  - Antiarrhythmics: metoprolol tartrate (LOPRESSOR) tablet 25 mg, 2 times daily, Oral  - Anticoagulants: rivaroxaban tablet 15 mg, with dinner, OralXarelto was held and reversed due to surgical intervention concerns. Resume when safe per surgical team  - Replete electrolytes PRN to  target Magnesium > 2, Potassium > 4  - Continuous telemetry    4/9/25: HR controlled on IV Metoprolol, will need to re-start anticoagulation when ok with general surgery

## 2025-04-12 NOTE — PLAN OF CARE
[facility] - Telemetry (Hospital)  Discharge Final Note    Primary Care Provider: Rajinder Lutz MD    Expected Discharge Date: 4/12/2025    Final Discharge Note (most recent)       Final Note - 04/12/25 1145          Final Note    Assessment Type Final Discharge Note (P)      Anticipated Discharge Disposition Home-Health Care Svc (P)    [facility]        Post-Acute Status    Post-Acute Authorization Home Health (P)      Post-Acute Placement Status Patient declined/refused (P)      Home Health Status Referrals Sent (P)      Discharge Delays Personal Transportation (P)    Patient says family has been called & they are on the way to pick her up.                    Important Message from Medicare  Discharge & home health orders are in.  No other orders for either DME or services.  No needs or discharge delays.           Contact Info       Sondra Kaur MD   Specialty: Surgical Oncology    [address] [facility]  [facility] [address]   Phone: [phone]       Next Steps: Follow up in 2 week(s)    Instructions: post op appt    Timothy Kaur MD   Specialty: Gynecology    [facility]  [address] [place] [id number]   Phone: [phone]       Next Steps: Follow up

## 2025-04-12 NOTE — DISCHARGE INSTRUCTIONS
Please call for any fever, increase in pain, nausea or vomiting or redness or drainage from incision(s).    No lifting more than 20 lb for 4 weeks   No driving if taking the pain medicine    May shower     Removed the glue when it becomes loose, this usually takes 10-14 days.      You may want to take a stool softener or Glycolax or MiraLax while taking pain medicine to avoid constipation    If you become constipated from the pain medication you can use a double dose of Miralax or Glycolax, or milk of magnesia for severe constipation.    Our office phone numbers are  [phone] and     Our office fax  number is #[phone]      Our goal at [facility] is to always give you outstanding care and exceptional service. You may receive a survey from [employer] by mail, text or e-mail in the next 24-48 hours asking about the care you received with us. The survey should only take 5-10 minutes to complete and is very important to us.     Your feedback provides us with a way to recognize our staff who work tirelessly to provide the best care! Also, your responses help us learn how to improve when your experience was below our aspiration of excellence. We are always looking for ways to improve your stay. We WILL use your feedback to continue making improvements to help us provide the highest quality care. We keep your personal information and feedback confidential. We appreciate your time completing this survey and can't wait to hear from you!!!    We look forward to your continued care with us! Thanks so much for choosing [facility] for your healthcare needs!

## 2025-04-12 NOTE — PROGRESS NOTES
[facility] ([facility])  General Surgery  Progress Note    Subjective:     History of Present Illness:  Maria Del Carmen Spence is a 84 y.o. female with a history of atrial fibrillation (on Xarelto), and previous tubal ligation who presented to the emergency department with sudden onset abdominal pain and nausea.  Patient states the pain started around 6:00 p.m. last night out of the blue and she originally thought it was gas pains.  She is passing a little bit of flatus but not much.  The pain is predominantly on the right side of the abdomen but she states she also felt some epigastric pain with radiation around to the back and pelvic pains.  Upon presentation in the ER she was afebrile, vitals were within normal limits.  Labs were notable for leukocytosis of 12 K, otherwise within normal limits.  CT of the abdomen and pelvis was done with IV contrast which was concerning for possible early closed loop obstruction in the pelvis with mesenteric fat stranding and edema, with some fluid in the pelvis.  She was admitted to hospital medicine.      Post-Op Info:  Procedure(s) (LRB):  XI ROBOTIC LAPAROSCOPY,DIAGNOSTIC (N/A)  BLOCK, TRANSVERSUS ABDOMINIS PLANE (N/A)  ROBOTIC EXCISION,SMALL INTESTINE (N/A)   4 Days Post-Op     Interval History:  Tolerated clear liquids.  We would like regular food eat.  Patient would like to be discharged home.  If a diet is tolerated and her pain is controlled she will be discharged by hospital Medicine.  Follow up with Dr. Kaur in 2 weeks    Medications:  Continuous Infusions:  Scheduled Meds:   acetaminophen  1,000 mg Oral Q8H    amoxicillin-clavulanate 875-125mg  1 tablet Oral Q12H    bisacodyL  10 mg Rectal Once    chlorhexidine  10 mL Mouth/Throat BID    gabapentin  400 mg Oral TID    latanoprost  1 drop Both Eyes QHS    metoprolol tartrate  25 mg Oral BID    pantoprazole  40 mg Intravenous Daily    rivaroxaban  15 mg Oral with dinner    timolol maleate 0.5%  1 drop Both Eyes Daily     white petrolatum-mineral oiL   Both Eyes QHS     PRN Meds:  Current Facility-Administered Medications:     artificial tears, 1 drop, Both Eyes, PRN    dextrose 50%, 12.5 g, Intravenous, PRN    dextrose 50%, 25 g, Intravenous, PRN    glucagon (human recombinant), 1 mg, Intramuscular, PRN    glucose, 16 g, Oral, PRN    glucose, 24 g, Oral, PRN    hydrALAZINE, 5 mg, Intravenous, Q6H PRN    melatonin, 6 mg, Oral, Nightly PRN    morphine, 2 mg, Intravenous, Q4H PRN    naloxone, 0.02 mg, Intravenous, PRN    ondansetron, 4 mg, Intravenous, Q6H PRN    prochlorperazine, 2.5 mg, Intravenous, Q8H PRN    sodium chloride 0.9%, 10 mL, Intravenous, Q12H PRN    traMADoL, 100 mg, Oral, Q6H PRN    traMADoL, 50 mg, Oral, Q6H PRN     Review of patient's allergies indicates:   Allergen Reactions    Voltaren [diclofenac sodium] Edema     Gel formulation caused facial rash and facial swelling    Eliquis [apixaban] Other (See Comments)     Headache, numbness in lip     Medrol [methylprednisolone] Blisters     Objective:     Vital Signs (Most Recent):  Temp: 98.2 °F (36.8 °C) (04/12/25 0733)  Pulse: 76 (04/12/25 0733)  Resp: 16 (04/12/25 0733)  BP: 133/62 (04/12/25 0733)  SpO2: 95 % (04/12/25 0733) Vital Signs (24h Range):  Temp:  [98.2 °F (36.8 °C)-99.5 °F (37.5 °C)] 98.2 °F (36.8 °C)  Pulse:  [67-78] 76  Resp:  [16-20] 16  SpO2:  [95 %-98 %] 95 %  BP: (129-146)/(59-68) 133/62     Weight: 79.5 kg (175 lb 4.3 oz)  Body mass index is 29.17 kg/m².    Intake/Output - Last 3 Shifts         04/10 0700  04/11 0659 04/11 0700  04/12 0659 04/12 0700  04/13 0659    IV Piggyback       Total Intake(mL/kg)       Drains       Total Output       Net              Urine Occurrence  2 x     Stool Occurrence 1 x 2 x              Physical Exam  Vitals reviewed.   Constitutional:       Appearance: She is well-developed.      Comments: Slightly frail   HENT:      Head: Normocephalic.   Eyes:      Pupils: Pupils are equal, round, and reactive to light.    Neck:      Thyroid: No thyromegaly.      Vascular: No JVD.      Trachea: No tracheal deviation.   Cardiovascular:      Rate and Rhythm: Normal rate and regular rhythm.      Heart sounds: Normal heart sounds.   Pulmonary:      Breath sounds: Normal breath sounds. No wheezing.   Abdominal:      General: Bowel sounds are normal. There is no distension.      Palpations: Abdomen is soft. Abdomen is not rigid. There is no mass.      Tenderness: There is no abdominal tenderness. There is no guarding or rebound.      Comments: Incisions are clean   Musculoskeletal:         General: Normal range of motion.      Right lower leg: No edema.      Left lower leg: No edema.   Lymphadenopathy:      Cervical: No cervical adenopathy.   Skin:     General: Skin is warm and dry.      Findings: No erythema or rash.   Neurological:      Mental Status: She is oriented to person, place, and time.   Psychiatric:         Mood and Affect: Mood normal.         Behavior: Behavior normal.         Thought Content: Thought content normal.         Judgment: Judgment normal.          Significant Labs:  I have reviewed all pertinent lab results within the past 24 hours.  CBC:   Recent Labs   Lab 04/12/25  0456   WBC 8.88   RBC 3.15*   HGB 10.1*   HCT 31.3*      MCV 99*   MCH 32.1*   MCHC 32.3     BMP:   Recent Labs   Lab 04/12/25  0456      K 3.8      CO2 27   BUN 12   CREATININE 0.7   CALCIUM 9.0   MG 2.0       Significant Diagnostics:  I have reviewed all pertinent imaging results/findings within the past 24 hours.  No new  Assessment/Plan:     * Intestinal adhesions with complete obstruction  POD 4- s/p Diagnostic laparoscopy, Robotic small bowel resection with intracorporeal anastomosis    Doing well with no acute complaints. Denies n/v. Having bowel function.    -advance to full liquid diet which was tolerated.  She would like a regular diet.      Patient would like to be discharged home.      She can be discharged home if a  diet is tolerated.      She would need a low-dose narcotic.      . Pt instructed on taking her time and taking small sips.  -multimodal pain control-starting oral pain meds  -IVF  -PT/OT, encouraged OOB and ambulation  -IS use 10x/hr while awake  -DVT ppx and GI ppx  -replete electrolytes prn    Follow up with Dr. Kaur in 2 weeks    Nausea and vomiting  2/2 pain and bowel obstruction -resolved    Gastroesophageal reflux disease without esophagitis  -- Management as per primary team     Mixed hyperlipidemia  -- Management as per primary team     Paroxysmal atrial fibrillation  -- Kcentra given for reversal of Xarelto   -- Management as per primary team     Primary hypertension  -- Management as per primary team     Primary open-angle glaucoma(365.11) - Both Eyes  -- Management as per primary team         Allan Graves MD  General Surgery  [facility] ([facility])

## 2025-04-14 ENCOUNTER — TELEPHONE (OUTPATIENT)
Dept: SURGICAL ONCOLOGY | Facility: CLINIC | Age: 85
End: 2025-04-14
Payer: MEDICARE

## 2025-04-14 ENCOUNTER — OUTPATIENT CASE MANAGEMENT (OUTPATIENT)
Dept: ADMINISTRATIVE | Facility: OTHER | Age: 85
End: 2025-04-14
Payer: MEDICARE

## 2025-04-14 NOTE — LETTER
Maria Del Carmen Spence  [address] [id number]      Dear Maria Del Carmen,    Welcome to [facility] Complex Care Management Program.  It was a pleasure talking with you today.  My name is David Lepe, and I look forward to being your Care Manager.  My goal is to help you function at the healthiest and highest level possible.  You can contact me directly at [phone].    As an [facility] patient, some of the services we may be able to provide include:     Development of an individualized care plan with a Registered Nurse   Connection with a   Connection with available resources and services    Coordinate communication among your care team members   Provide coaching and education   Help you understand your doctors treatment plan  Help you obtain information about your insurance coverage.     All services provided by [facility] Complex Care Managers and other care team members are coordinated with and communicated to your primary care team.      As part of your enrollment, you will be receiving education materials and more information about these services in your [facility] account, by phone or through the mail.  If you do not wish to participate or receive information, please contact our office at [phone].      [facility] Patient Rights and Responsibilities available upon request.    Sincerely,        David Lepe, RN  [facility]   Outpatient RN Complex Care Manager

## 2025-04-14 NOTE — TELEPHONE ENCOUNTER
Called pt to give time date and location of appt. Pt verbalized understanding of all things discussed.

## 2025-04-15 ENCOUNTER — PATIENT OUTREACH (OUTPATIENT)
Dept: ADMINISTRATIVE | Facility: CLINIC | Age: 85
End: 2025-04-15
Payer: MEDICARE

## 2025-04-15 NOTE — PROGRESS NOTES
C3 nurse spoke with Maria Del Carmen Spence for a TCC post hospital discharge follow up call. The patient has a scheduled HOSFU appointment with Mesfin Yadav NP on 4/22/25 @ 8am.

## 2025-04-16 NOTE — PROGRESS NOTES
Outpatient Care Management  Initial Patient Assessment    Patient: Maria Del Carmen Spence  MRN: [id number]  Date of Service: 04/14/2025  Completed by: David Lepe RN  Referral Date: 04/09/2025  Date of Eligibility: 4/10/2025  Program:   High Risk  Status: Ongoing  Effective Dates: 4/16/2025 - present  Responsible Staff: David Lepe RN        Reason for Visit   Patient presents with    Nursing Assessment    OPCM Enrollment Call       Brief Summary:  Maria Del Carmen Spence was referred by Dr. Ricardo for intestinal adhesions. Patient qualifies for program based on her risk score of 65.1%.   Active problem list, medical, surgical and social history reviewed. Active comorbidities include htn, glaucoma, atrial flutter, GERD, and anemia. Areas of need identified by patient include abdominal pain/ post surgical management .   Next steps: send welcome letter  Send adhesion education  Follow up in two weeks per Ms. Spence's request     Disability Status  Is the patient alert and oriented (person, place, time, and situation)?: Alert and oriented x 4  Hearing Difficulty or Deaf: no  Visual Difficulty or Blind: no  Visual and Hearing Conclusion Statement: wears glasses for every day use no hearing needs  Difficulty Concentrating, Remembering or Making Decisions: no  Communication Difficulty: no  Eating/Swallowing Difficulty: no  Walking or Climbing Stairs Difficulty: no  Dressing/Bathing Difficulty: no  Grooming: independent  Transferring (e.g., getting in and out of chairs): independent  Toileting : Independent  Continence : Continence - Not a problem  Difficulty Managing Errands Independently: no  Equipment Currently Used at Home: blood pressure machine  ADL Conclusion Statement: able to complete ADLs independently  Change in Functional Status Since Onset of Current Illness/Injury: no        Spiritual Beliefs  Spiritual, Cultural Beliefs, Taoism Practices, Values that Affect Care: no      Social History     Socioeconomic History     Marital status:     Number of children: 1   Occupational History    Occupation: retired   Tobacco Use    Smoking status: Never    Smokeless tobacco: Never   Substance and Sexual Activity    Alcohol use: No    Drug use: No    Sexual activity: Yes     Partners: Male     Social Drivers of Health     Financial Resource Strain: Low Risk  (4/16/2025)    Overall Financial Resource Strain (CARDIA)     Difficulty of Paying Living Expenses: Not hard at all   Food Insecurity: No Food Insecurity (4/16/2025)    Hunger Vital Sign     Worried About Running Out of Food in the Last Year: Never true     Ran Out of Food in the Last Year: Never true   Transportation Needs: No Transportation Needs (4/16/2025)    PRAPARE - Transportation     Lack of Transportation (Medical): No     Lack of Transportation (Non-Medical): No   Physical Activity: Inactive (4/16/2025)    Exercise Vital Sign     Days of Exercise per Week: 0 days     Minutes of Exercise per Session: 0 min   Stress: No Stress Concern Present (4/16/2025)    [employer] [place] [employer] - Occupational Stress Questionnaire     Feeling of Stress : Not at all   Housing Stability: Low Risk  (4/16/2025)    Housing Stability Vital Sign     Unable to Pay for Housing in the Last Year: No     Number of Times Moved in the Last Year: 0     Homeless in the Last Year: No       Roles and Relationships  Primary Source of Support/Comfort: spouse  Name of Support/Comfort Primary Source: Shiela      Advance Directives (For Healthcare)  Advance Directive  (If Adv Dir status is received, view document under Adv Dir in header or Chart Review Media tab): Patient does not have Advance Directive, declines information.        Patient Reported Insurance  Verified current insurance plan:: Medicare            4/16/2025     9:05 AM 3/18/2025     9:15 AM 2/8/2023    10:11 AM 2/7/2022    11:25 AM 1/25/2022     2:33 PM 3/25/2019     9:52 AM 5/17/2017    10:00 AM   Depression Patient Health  Questionnaire   Over the last two weeks how often have you been bothered by little interest or pleasure in doing things Not at all Several days Not at all  Not at all  Not at all  Several days  Not at all    Over the last two weeks how often have you been bothered by feeling down, depressed or hopeless Not at all Not at all Not at all Not at all  Not at all  Several days  Not at all    PHQ-2 Total Score 0 1 0 0 0 2 0       Data saved with a previous flowsheet row definition       Learning Assessment       04/16/2025 1557 [facility] (4/14/2025 - Present)   Created by David Lepe, RN -  (Nurse) Status: Complete                 PRIMARY LEARNER     Primary Learner Name:  Maria Del Carmen Spence  - 04/16/2025 1557    Relationship:  Patient  - 04/16/2025 1557    Does the primary learner have any barriers to learning?:  Visual  - 04/16/2025 1557    What is the preferred language of the primary learner?:  English  - 04/16/2025 1557    Is an  required?:  No  - 04/16/2025 1557    How does the primary learner prefer to learn new concepts?:  Listening  - 04/16/2025 1557    How often do you need to have someone help you read instructions, pamphlets, or written material from your doctor or pharmacy?:  Never  - 04/16/2025 1557        CO-LEARNER #1     No question answered        CO-LEARNER #2     No question answered        SPECIAL TOPICS     No question answered        ANSWERED BY:     No question answered        Comments         Edit History       David Lepe, RN -  (Nurse)   04/16/2025 1557

## 2025-04-22 ENCOUNTER — OFFICE VISIT (OUTPATIENT)
Dept: HOME HEALTH SERVICES | Facility: CLINIC | Age: 85
End: 2025-04-22
Payer: MEDICARE

## 2025-04-22 DIAGNOSIS — Z09 HOSPITAL DISCHARGE FOLLOW-UP: Primary | ICD-10-CM

## 2025-04-22 DIAGNOSIS — K56.52 INTESTINAL ADHESIONS WITH COMPLETE OBSTRUCTION: ICD-10-CM

## 2025-04-22 DIAGNOSIS — L08.9 SKIN INFECTION: ICD-10-CM

## 2025-04-22 PROCEDURE — 99495 TRANSJ CARE MGMT MOD F2F 14D: CPT | Mod: S$GLB,,,

## 2025-04-22 RX ORDER — DOXYCYCLINE HYCLATE 100 MG
100 TABLET ORAL 2 TIMES DAILY
Qty: 10 TABLET | Refills: 0 | Status: SHIPPED | OUTPATIENT
Start: 2025-04-22 | End: 2025-04-27

## 2025-04-22 NOTE — PROGRESS NOTES
[facility]  Transitional Care Management (TCM) Home Visit    Encounter Provider: Aidan Sanchez   PCP: Rajinder Lutz MD  Consult Requested By: No ref. provider found  Admit Date: 4/7/25   IP Discharge Date: 4/12/25  Hospital Length of Stay:RRHLOS@ days  Days since discharge (from IP or SNF): 10   [facility] Contact Note: 4/15  Hospital Diagnosis: No admission diagnoses are documented for this encounter.     HISTORY OF PRESENT ILLNESS      Patient ID: Maria Del Carmen Spence is a 84 y.o. female was recently admitted to the hospital, this is their TCM encounter.    Hospital Course Synopsis:  Ms. Maria Del Carmen Spence is a 84 y.o. female who  has a medical history of A-fib, Arthritis, Chronic back pain,, Frequent headaches, GERD (gastroesophageal reflux disease), Glaucoma, Hyperlipemia, and Hypertension.     She  presented to the ED for evaluation of worsening abdominal pain with associated nausea since yesterday afternoon which did not alleviate with OTC treatments.  Denies any recent concerns for infection, fever, chills, urinary symptoms.  She reports a prior history of gynecologic surgeries.     ED course notable for hemodynamically stable vitals, labs with WBC 12.79.  CT abdomen pelvis w/ IV contrast(read pending) were ordered.  Evaluation noted concerns for closed loop bowel obstruction and patient also had episodes of vomiting.  General surgery was consulted with recommendations for Xarelto reversal and anticipated surgery.  Patient was admitted to Hospital Medicine for further evaluation.     Procedure(s) (LRB):  XI ROBOTIC LAPAROSCOPY,DIAGNOSTIC (N/A)  BLOCK, TRANSVERSUS ABDOMINIS PLANE (N/A)  ROBOTIC EXCISION,SMALL INTESTINE (N/A)       Hospital Course:   The patient was admitted with SBO. CT of the abdomen and pelvis was done with IV contrast which was concerning for possible early closed loop obstruction in the pelvis with mesenteric fat stranding and edema, with some fluid in the pelvis. +leukocytosis. Pt  was placed on IV Zosyn. General surgery was consulted. Pt was given PCC for Xarelto reversal and underwent urgent Diagnostic laparoscopy and Robotic small bowel resection with intracorporeal anastomosis.     Pt tolerated surgery well. No flatus yet. +bowel sounds. NPO/NGT in place. C/o of right eye discomfort after surgery- resolved with lubricating eye drops.   Will need to restart anticoagulation when ok with general surgery      Patient tolerated small-bowel resection.  Diet was initiated advanced as tolerated.  Xarelto resumed.  Zosyn was transitioned to p.o. Augmentin.  Pain controlled.  Patient declined placement.  Home health ordered.  She was discharged home with home health.    Patient is being seen for a hospital follow up. Spouse is present during visit. [facility]  working with patient in the home. Ambulating with a walker. Reports doing good since discharge. Pain is well controlled. Reports some diarrhea. Appetite is slowly improving. Has erythema, warmth to bilateral flank/abdominal area. Will send abx to pharmacy. Denies further complaints or concerns. Reports compliance with medications. Questions elicited. Time allowed for questions, all issues addressed. Contact info given for any concerns or changes. Has follow up with surgery 4/29.   DECISION MAKING TODAY       Assessment & Plan:  1. Hospital discharge follow-up    2. Intestinal adhesions with complete obstruction  Assessment & Plan:  Presented to the ED for evaluation of worsening abdominal pain with associated nausea   CT AP revealed close loop obstruction   S/P diagnostic laparoscopy and robotic small bowel resection with intracorporeal anastomosis 4/8  Pain well controlled  Having bowel movements  Lap sites healing well, surgical adhesive in place  F/U with Dr. Kaur 4/29      3. Skin infection  Comments:  Has erythema, warmth to bilateral flank/abdominal area  Will send abx to pharmacy  F/U with surgery  Orders:  -     doxycycline  (VIBRA-TABS) 100 MG tablet; Take 1 tablet (100 mg total) by mouth 2 (two) times daily. for 5 days  Dispense: 10 tablet; Refill: 0         Medication List on Discharge:     Medication List            Accurate as of April 22, 2025 11:59 PM. If you have any questions, ask your nurse or doctor.                START taking these medications      doxycycline 100 MG tablet  Commonly known as: VIBRA-TABS  Take 1 tablet (100 mg total) by mouth 2 (two) times daily. for 5 days  Started by: Aidan Sanchez NP            CONTINUE taking these medications      A/G PRO ORAL  Take 2 tablets by mouth 2 (two) times daily.     acetaminophen 650 MG Tbsr  Commonly known as: TYLENOL  Take 650 mg by mouth as needed. Does not go over 3000mg daily     AREXVY (PF) 120 mcg/0.5 mL Susr vaccine  Generic drug: RSVPreF3 antigen-AS01E (PF)  Inject into the muscle.     atorvastatin 10 MG tablet  Commonly known as: LIPITOR  Take 1 tablet (10 mg total) by mouth every evening.     CENTRUM SILVER ORAL  Take 1 tablet by mouth once daily.     cetirizine 10 MG tablet  Commonly known as: ZYRTEC  Take 10 mg by mouth as needed for Allergies.     CITRACAL + D ORAL  Take 1 tablet by mouth once daily at 6am.     furosemide 20 MG tablet  Commonly known as: LASIX  TAKE ONE TABLET BY MOUTH TWICE A WEEK     gabapentin 400 MG capsule  Commonly known as: NEURONTIN  Take 1 capsule (400 mg total) by mouth 3 (three) times daily.     latanoprost 0.005 % ophthalmic solution  INSTILL ONE DROP IN EACH EYE AT BEDTIME     metoprolol tartrate 25 MG tablet  Commonly known as: LOPRESSOR  Take 1 tablet (25 mg total) by mouth 2 (two) times daily.     omeprazole 40 MG capsule  Commonly known as: PRILOSEC  Take 1 capsule (40 mg total) by mouth once daily.     ondansetron 4 MG Tbdl  Commonly known as: ZOFRAN-ODT  Take 1 tablet (4 mg total) by mouth every 6 (six) hours as needed.     timolol maleate 0.5% 0.5 % Drop  Commonly known as: TIMOPTIC  PLACE ONE DROP INTO BOTH EYES EVERY  MORNING     traMADoL 50 mg tablet  Commonly known as: ULTRAM  Take 1 tablet (50 mg total) by mouth every 6 (six) hours as needed for Pain.     XARELTO 15 mg Tab  Generic drug: rivaroxaban  Take 1 tablet (15 mg total) by mouth daily with dinner or evening meal.              Medication Reconciliation:  Were medications changed on discharge? Yes  Were medications in the home? Yes  Is the patient taking the medications as directed? Yes  Does the patient understand the medications and changes? Yes  Does updated med list accurately reflects meds patient is currently taking? Yes    ENVIRONMENT OF CARE   Family and/or Caregiver present at visit?  Yes  Name of Caregiver: spouse  History provided by: patient    Advance Care Planning   Advanced Care Planning Status:  Patient has had an ACP conversation  Living Will: No  Power of : No  [place]: No    Does Caregiver have HCPoA: No  Changes today: none  Is patient hospice appropriate: No  (If needed, use PPS <30 or FAST score >7)  Was referral to hospice placed: No       Impression upon entering the home:  Physical Dwelling: single family home   Appearance of home environment: cleaniness: clean and walking pathways: clear  Functional Status: independent  Mobility: ambulatory with device  Nutritional access: available food but inadequate intake  Home Health: Yes,  Agency [facility]     DME/Supplies: rolling walker     Diagnostic tests reviewed/disposition: I have reviewed all completed as well as pending diagnostic tests at the time of discharge.  Disease/illness education: Take all medication as prescribed. Activity as tolerated. Keep all upcoming appts.   Establishment or re-establishment of referral orders for community resources: No other necessary community resources.   Discussion with other health care providers: No discussion with other health care providers necessary.   Does patient have a PCP at [facility]? Yes   Repatriation plan with PCP? follow-up with PCP within 90d    Does patient have an ostomy (ileostomy, colostomy, suprapubic catheter, nephrostomy tube, tracheostomy, PEG tube, pleurex catheter, cholecystostomy, etc)? No  Were BPAs reviewed? Yes    Social History     Socioeconomic History    Marital status:     Number of children: 1   Occupational History    Occupation: retired   Tobacco Use    Smoking status: Never    Smokeless tobacco: Never   Substance and Sexual Activity    Alcohol use: No    Drug use: No    Sexual activity: Yes     Partners: Male     Social Drivers of Health     Financial Resource Strain: Low Risk  (4/16/2025)    Overall Financial Resource Strain (CARDIA)     Difficulty of Paying Living Expenses: Not hard at all   Food Insecurity: No Food Insecurity (4/16/2025)    Hunger Vital Sign     Worried About Running Out of Food in the Last Year: Never true     Ran Out of Food in the Last Year: Never true   Transportation Needs: No Transportation Needs (4/16/2025)    PRAPARE - Transportation     Lack of Transportation (Medical): No     Lack of Transportation (Non-Medical): No   Physical Activity: Inactive (4/16/2025)    Exercise Vital Sign     Days of Exercise per Week: 0 days     Minutes of Exercise per Session: 0 min   Stress: No Stress Concern Present (4/16/2025)    [employer] [place] [employer] - Occupational Stress Questionnaire     Feeling of Stress : Not at all   Housing Stability: Low Risk  (4/16/2025)    Housing Stability Vital Sign     Unable to Pay for Housing in the Last Year: No     Number of Times Moved in the Last Year: 0     Homeless in the Last Year: No       OBJECTIVE:     Vital Signs:  Vitals:    04/22/25 1252   BP: 124/60   Pulse: 78   Resp: 18       Review of Systems   Constitutional: Negative.    HENT: Negative.     Eyes: Negative.    Respiratory: Negative.  Negative for chest tightness.    Cardiovascular: Negative.  Negative for leg swelling.   Gastrointestinal:  Positive for diarrhea.   Endocrine: Negative.     Genitourinary: Negative.    Musculoskeletal: Negative.    Skin:  Positive for color change.   Allergic/Immunologic: Negative.    Neurological:  Positive for weakness.   Hematological: Negative.    Psychiatric/Behavioral: Negative.  Negative for agitation.    All other systems reviewed and are negative.      Physical Exam:  Physical Exam  Vitals reviewed.   Constitutional:       General: She is not in acute distress.     Appearance: Normal appearance. She is well-developed.   HENT:      Head: Normocephalic and atraumatic.      Nose: Nose normal.      Mouth/Throat:      Mouth: Mucous membranes are dry.      Pharynx: Oropharynx is clear.   Eyes:      Pupils: Pupils are equal, round, and reactive to light.   Cardiovascular:      Rate and Rhythm: Normal rate and regular rhythm.      Pulses: Normal pulses.      Heart sounds: Normal heart sounds.   Pulmonary:      Effort: Pulmonary effort is normal.      Breath sounds: Normal breath sounds.   Abdominal:      General: Bowel sounds are normal.      Palpations: Abdomen is soft.      Comments: Lap sites healing well  no erythema, warmth or drainage  surgical adhesive in place   Musculoskeletal:         General: Normal range of motion.      Cervical back: Normal range of motion and neck supple.   Skin:     General: Skin is warm and dry.      Findings: Erythema present.   Neurological:      General: No focal deficit present.      Mental Status: She is alert and oriented to person, place, and time. Mental status is at baseline.      Motor: Weakness present.   Psychiatric:         Mood and Affect: Mood normal.         Behavior: Behavior normal.         Thought Content: Thought content normal.         Judgment: Judgment normal.         INSTRUCTIONS FOR PATIENT:   - Continue all medications, treatments and therapies as ordered.   - Follow all instructions, recommendations as discussed.  - Maintain Safety Precautions at all times.  - Attend all medical appointments as scheduled.  -  For worsening symptoms: call Primary Care Physician or Nurse Practitioner.  - For emergencies, call 911 or immediately report to the nearest emergency room.   Scheduled Follow-up, Appts Reviewed with Modifications if Needed: Yes  Future Appointments   Date Time Provider Department Center   4/28/2025  9:30 AM FIELDS, VISUAL [facility]   4/28/2025  9:45 AM Tod White OD [facility] OPHTHAL  [facility]   4/29/2025  3:00 PM Sondra [facility]   6/11/2025 10:40 AM Will Rosenthal MD [facility] CARDIO [facility]   9/23/2025  9:30 AM Rajinder Lutz MD ON IM  [facility]       Signature: Aidan Sanchez NP    Transition of Care Visit:  I have reviewed and updated the history and problem list.  I have reconciled the medication list.  I have discussed the hospitalization and current medical issues, prognosis and plans with the patient/family.

## 2025-04-23 VITALS
RESPIRATION RATE: 18 BRPM | DIASTOLIC BLOOD PRESSURE: 60 MMHG | HEART RATE: 78 BPM | SYSTOLIC BLOOD PRESSURE: 124 MMHG | OXYGEN SATURATION: 97 %

## 2025-04-24 NOTE — ASSESSMENT & PLAN NOTE
Presented to the ED for evaluation of worsening abdominal pain with associated nausea   CT AP revealed close loop obstruction   S/P diagnostic laparoscopy and robotic small bowel resection with intracorporeal anastomosis 4/8  Pain well controlled  Having bowel movements  Lap sites healing well, surgical adhesive in place  F/U with Dr. Kaur 4/29

## 2025-04-28 ENCOUNTER — OFFICE VISIT (OUTPATIENT)
Dept: OPHTHALMOLOGY | Facility: CLINIC | Age: 85
End: 2025-04-28
Payer: MEDICARE

## 2025-04-28 DIAGNOSIS — Z96.1 PSEUDOPHAKIA: ICD-10-CM

## 2025-04-28 DIAGNOSIS — H40.1131 PRIMARY OPEN ANGLE GLAUCOMA OF BOTH EYES, MILD STAGE: Primary | ICD-10-CM

## 2025-04-28 DIAGNOSIS — H52.7 REFRACTIVE ERRORS: ICD-10-CM

## 2025-04-28 PROCEDURE — 99999 PR PBB SHADOW E&M-EST. PATIENT-LVL III: CPT | Mod: PBBFAC,,, | Performed by: OPTOMETRIST

## 2025-04-28 PROCEDURE — 92083 EXTENDED VISUAL FIELD XM: CPT | Mod: PBBFAC | Performed by: OPTOMETRIST

## 2025-04-28 PROCEDURE — 92015 DETERMINE REFRACTIVE STATE: CPT | Mod: ,,, | Performed by: OPTOMETRIST

## 2025-04-28 PROCEDURE — 99213 OFFICE O/P EST LOW 20 MIN: CPT | Mod: PBBFAC | Performed by: OPTOMETRIST

## 2025-04-28 PROCEDURE — 92014 COMPRE OPH EXAM EST PT 1/>: CPT | Mod: S$PBB,,, | Performed by: OPTOMETRIST

## 2025-04-28 NOTE — PROGRESS NOTES
SUBJECTIVE  Maria Del Carmen Spence is 84 y.o. female  Corrected distance visual acuity was 20/25 in the right eye and 20/25 in the left eye.   No chief complaint on file.         HPI    Patient states vision is blurred since last visit and is using drops.   Denies any pain or discomfort. No other ocular complaints.     Q 6 months  Likes  am    1. Mild COAG OD>OS (init 24/20) Goal <17  Rhopressa (irritated but exc IOP 11/11)  Dorzolamide OU BID (Not taking, patient States This Eye Drop Makes Her Eye   Burn & Red)  2. PCIOL OU (Sulcus OD) (partial dislocation Od)  3. Dry Eyes      Latanoprost QHS OU  Timolol QAM O  Last edited by Rohan Sharma on 4/28/2025  9:47 AM.         Assessment /Plan :  1. Primary open angle glaucoma of both eyes, mild stage  - Rosado Visual Field - OU - Extended - Both Eyes  Unreliable VF repeat in 6 months  Cotinue Latanoprost and timolol   2. Pseudophakia  Unchanged, stable IOL OU. Monitor annually.     3. Refractive errors  Dispense Final Rx for glasses.  RTC 6 months VF, gOCT  Discussed above and answered questions.

## 2025-04-29 ENCOUNTER — OFFICE VISIT (OUTPATIENT)
Dept: SURGICAL ONCOLOGY | Facility: CLINIC | Age: 85
End: 2025-04-29
Payer: MEDICARE

## 2025-04-29 VITALS
WEIGHT: 166.44 LBS | TEMPERATURE: 99 F | SYSTOLIC BLOOD PRESSURE: 147 MMHG | DIASTOLIC BLOOD PRESSURE: 82 MMHG | HEIGHT: 65 IN | BODY MASS INDEX: 27.73 KG/M2 | HEART RATE: 71 BPM

## 2025-04-29 DIAGNOSIS — K56.52 INTESTINAL ADHESIONS WITH COMPLETE OBSTRUCTION: Primary | ICD-10-CM

## 2025-04-29 PROCEDURE — 99213 OFFICE O/P EST LOW 20 MIN: CPT | Mod: PBBFAC | Performed by: SURGERY

## 2025-04-29 PROCEDURE — 99999 PR PBB SHADOW E&M-EST. PATIENT-LVL III: CPT | Mod: PBBFAC,,, | Performed by: SURGERY

## 2025-04-29 PROCEDURE — 99024 POSTOP FOLLOW-UP VISIT: CPT | Mod: POP,,, | Performed by: SURGERY

## 2025-04-29 NOTE — PROGRESS NOTES
Surgical Oncology Clinic Note    Referring Provider: Aaareferral Self   PCP: Rajinder Lutz MD    Reason For Visit: Postoperative visit    History of Present Illness:  Maria Del Carmen Spence is a 84 y.o. female with a history of atrial fibrillation (on Xarelto), and previous tubal ligation who presented to the emergency department with sudden onset abdominal pain and nausea. Patient states the pain started around 6:00 p.m. last night out of the blue and she originally thought it was gas pains. She is passing a little bit of flatus but not much. The pain is predominantly on the right side of the abdomen but she states she also felt some epigastric pain with radiation around to the back and pelvic pains. Upon presentation in the ER she was afebrile, vitals were within normal limits. Labs were notable for leukocytosis of 12 K, otherwise within normal limits. CT of the abdomen and pelvis was done with IV contrast which was concerning for possible early closed loop obstruction in the pelvis with mesenteric fat stranding and edema, with some fluid in the pelvis. She was admitted to hospital medicine and underwent robotic diagnostic laparoscopy with small bowel resection due to closed loop obstruction 2/2 adhesions.      Interval Since Last Visit:    4/29/2025: Maria Del Carmen Spence returns today for follow-up after undergoing robotic small bowel resection.  She's doing well.  Tolerating a regular diet, not requiring pain medications.  She developed some redness to bilateral flanks and an NP saw her on a home visit and started her on doxycycline.  She is completing that and feels like the redness has gotten better.      Pathology:  Final Diagnosis   1. Small bowel, resection:  Segment of small bowel with congestion and early ischemic changes.  Surgical margins appear viable.       Current Medications[1]    Review of patient's allergies indicates:   Allergen Reactions    Voltaren [diclofenac sodium] Edema     Gel  "formulation caused facial rash and facial swelling    Eliquis [apixaban] Other (See Comments)     Headache, numbness in lip     Medrol [methylprednisolone] Blisters       Past Medical History:   Diagnosis Date    A-fib     Arthritis     Chronic LBP     ESIs x 3 with Dr. Hicks, PT at [facility], chiropractor    Eye pain     Frequent headaches     GERD (gastroesophageal reflux disease)     Glaucoma     Hyperlipemia     Hypertension        Body mass index is 27.7 kg/m².    Physical Exam:  BP (!) 147/82 (BP Location: Left arm, Patient Position: Sitting)   Pulse 71   Temp 98.5 °F (36.9 °C) (Oral)   Ht 5' 5" (1.651 m)   Wt 75.5 kg (166 lb 7.2 oz)   BMI 27.70 kg/m²   General:  well-appearing, ambulatory  Abd:  Soft, non-tender  Incision:  c/d/I, minimal erythema on bilateral flanks.      Lab Results   Component Value Date    WBC 8.88 04/12/2025    HGB 10.1 (L) 04/12/2025    HCT 31.3 (L) 04/12/2025     04/12/2025    CHOL 167 09/17/2024    TRIG 137 09/17/2024    HDL 59 09/17/2024    LDLCALC 80.6 09/17/2024    ALT 10 04/12/2025    AST 18 04/12/2025     04/12/2025    K 3.8 04/12/2025     04/12/2025    CREATININE 0.7 04/12/2025    BUN 12 04/12/2025    CO2 27 04/12/2025    TSH 3.002 02/08/2023    INR 1.1 04/13/2024         ASSESSMENT & PLAN:  1. Intestinal adhesions with complete obstruction       Maria Del Carmen Spence is a 84 y.o. female here for post op appointment following robotic diagnostic laparoscopy with small bowel resection.      Doing well.  No clear explanation for the redness along bilateral flanks, does not look infectious and does not look like true cellulitis and is far away from incision sites, but it seems to be resolving.      -- follow up PRN       Follow-up: No follow-ups on file.                  Sondra Kaur MD MS   Surgical Oncology    [facility] [place], [place]              Office: [phone]         [1]   Current Outpatient " Medications:     AA/prot/lysine/methio/vit C/B6 (A/G PRO ORAL), Take 2 tablets by mouth 2 (two) times daily. , Disp: , Rfl:     acetaminophen (TYLENOL) 650 MG TbSR, Take 650 mg by mouth as needed. Does not go over 3000mg daily, Disp: , Rfl:     atorvastatin (LIPITOR) 10 MG tablet, Take 1 tablet (10 mg total) by mouth every evening., Disp: 90 tablet, Rfl: 1    CALCIUM PHOSPHATE TRIB/VIT D3 (CITRACAL + D ORAL), Take 1 tablet by mouth once daily at 6am. , Disp: , Rfl:     cetirizine (ZYRTEC) 10 MG tablet, Take 10 mg by mouth as needed for Allergies., Disp: , Rfl:     furosemide (LASIX) 20 MG tablet, TAKE ONE TABLET BY MOUTH TWICE A WEEK, Disp: 25 tablet, Rfl: 5    gabapentin (NEURONTIN) 400 MG capsule, Take 1 capsule (400 mg total) by mouth 3 (three) times daily., Disp: 270 capsule, Rfl: 3    latanoprost 0.005 % ophthalmic solution, INSTILL ONE DROP IN EACH EYE AT BEDTIME, Disp: 2.5 mL, Rfl: 12    metoprolol tartrate (LOPRESSOR) 25 MG tablet, Take 1 tablet (25 mg total) by mouth 2 (two) times daily., Disp: 180 tablet, Rfl: 1    MULTIVITAMIN W-MINERALS/LUTEIN (CENTRUM SILVER ORAL), Take 1 tablet by mouth once daily., Disp: , Rfl:     omeprazole (PRILOSEC) 40 MG capsule, Take 1 capsule (40 mg total) by mouth once daily., Disp: 90 capsule, Rfl: 3    ondansetron (ZOFRAN-ODT) 4 MG TbDL, Take 1 tablet (4 mg total) by mouth every 6 (six) hours as needed., Disp: 30 tablet, Rfl: 0    RSVPreF3 antigen-AS01E, PF, (AREXVY, PF,) 120 mcg/0.5 mL SusR vaccine, Inject into the muscle., Disp: 0.5 mL, Rfl: 0    timolol maleate 0.5% (TIMOPTIC) 0.5 % Drop, PLACE ONE DROP INTO BOTH EYES EVERY MORNING, Disp: 10 mL, Rfl: 12    traMADoL (ULTRAM) 50 mg tablet, Take 1 tablet (50 mg total) by mouth every 6 (six) hours as needed for Pain., Disp: 20 each, Rfl: 0    XARELTO 15 mg Tab, Take 1 tablet (15 mg total) by mouth daily with dinner or evening meal., Disp: 30 tablet, Rfl: 5

## 2025-05-01 ENCOUNTER — OUTPATIENT CASE MANAGEMENT (OUTPATIENT)
Dept: ADMINISTRATIVE | Facility: OTHER | Age: 85
End: 2025-05-01
Payer: MEDICARE

## 2025-05-13 RX ORDER — LATANOPROST 50 UG/ML
SOLUTION/ DROPS OPHTHALMIC
Qty: 7.5 ML | Refills: 4 | Status: SHIPPED | OUTPATIENT
Start: 2025-05-13

## 2025-05-20 ENCOUNTER — HOSPITAL ENCOUNTER (INPATIENT)
Facility: HOSPITAL | Age: 85
LOS: 1 days | Discharge: HOME | DRG: 392 | End: 2025-05-22
Attending: EMERGENCY MEDICINE | Admitting: FAMILY MEDICINE
Payer: MEDICARE

## 2025-05-20 DIAGNOSIS — R79.89 ELEVATED TROPONIN: ICD-10-CM

## 2025-05-20 DIAGNOSIS — R07.9 CHEST PAIN: ICD-10-CM

## 2025-05-20 DIAGNOSIS — I21.4 NSTEMI (NON-ST ELEVATED MYOCARDIAL INFARCTION): ICD-10-CM

## 2025-05-20 DIAGNOSIS — R10.13 EPIGASTRIC PAIN: ICD-10-CM

## 2025-05-20 DIAGNOSIS — I10 ELEVATED SYSTOLIC BLOOD PRESSURE READING WITH DIAGNOSIS OF HYPERTENSION: ICD-10-CM

## 2025-05-20 DIAGNOSIS — K44.9 HIATAL HERNIA: Primary | ICD-10-CM

## 2025-05-20 LAB
ABSOLUTE EOSINOPHIL (OHS): 0.02 K/UL
ABSOLUTE MONOCYTE (OHS): 0.77 K/UL (ref 0.3–1)
ABSOLUTE NEUTROPHIL COUNT (OHS): 8.22 K/UL (ref 1.8–7.7)
ALBUMIN SERPL BCP-MCNC: 4.2 G/DL (ref 3.5–5.2)
ALP SERPL-CCNC: 107 UNIT/L (ref 40–150)
ALT SERPL W/O P-5'-P-CCNC: 22 UNIT/L (ref 10–44)
ANION GAP (OHS): 12 MMOL/L (ref 8–16)
AST SERPL-CCNC: 31 UNIT/L (ref 11–45)
BASOPHILS # BLD AUTO: 0.02 K/UL
BASOPHILS NFR BLD AUTO: 0.2 %
BILIRUB SERPL-MCNC: 0.5 MG/DL (ref 0.1–1)
BILIRUB UR QL STRIP.AUTO: NEGATIVE
BNP SERPL-MCNC: 98 PG/ML (ref 0–99)
BUN SERPL-MCNC: 17 MG/DL (ref 8–23)
CALCIUM SERPL-MCNC: 10.7 MG/DL (ref 8.7–10.5)
CHLORIDE SERPL-SCNC: 105 MMOL/L (ref 95–110)
CLARITY UR: CLEAR
CO2 SERPL-SCNC: 24 MMOL/L (ref 23–29)
COLOR UR AUTO: YELLOW
CREAT SERPL-MCNC: 0.7 MG/DL (ref 0.5–1.4)
D DIMER PPP IA.FEU-MCNC: 0.23 MG/L FEU
ERYTHROCYTE [DISTWIDTH] IN BLOOD BY AUTOMATED COUNT: 12.8 % (ref 11.5–14.5)
GFR SERPLBLD CREATININE-BSD FMLA CKD-EPI: >60 ML/MIN/1.73/M2
GLUCOSE SERPL-MCNC: 99 MG/DL (ref 70–110)
GLUCOSE UR QL STRIP: NEGATIVE
HCT VFR BLD AUTO: 40.4 % (ref 37–48.5)
HGB BLD-MCNC: 13.2 GM/DL (ref 12–16)
HGB UR QL STRIP: NEGATIVE
HOLD SPECIMEN: NORMAL
IMM GRANULOCYTES # BLD AUTO: 0.02 K/UL (ref 0–0.04)
IMM GRANULOCYTES NFR BLD AUTO: 0.2 % (ref 0–0.5)
KETONES UR QL STRIP: ABNORMAL
LEUKOCYTE ESTERASE UR QL STRIP: NEGATIVE
LIPASE SERPL-CCNC: 14 U/L (ref 4–60)
LYMPHOCYTES # BLD AUTO: 2.47 K/UL (ref 1–4.8)
MCH RBC QN AUTO: 32 PG (ref 27–31)
MCHC RBC AUTO-ENTMCNC: 32.7 G/DL (ref 32–36)
MCV RBC AUTO: 98 FL (ref 82–98)
NITRITE UR QL STRIP: NEGATIVE
NUCLEATED RBC (/100WBC) (OHS): 0 /100 WBC
PH UR STRIP: 6 [PH]
PLATELET # BLD AUTO: 358 K/UL (ref 150–450)
PMV BLD AUTO: 9.9 FL (ref 9.2–12.9)
POTASSIUM SERPL-SCNC: 4.6 MMOL/L (ref 3.5–5.1)
PROT SERPL-MCNC: 8.6 GM/DL (ref 6–8.4)
PROT UR QL STRIP: NEGATIVE
RBC # BLD AUTO: 4.13 M/UL (ref 4–5.4)
RELATIVE EOSINOPHIL (OHS): 0.2 %
RELATIVE LYMPHOCYTE (OHS): 21.4 % (ref 18–48)
RELATIVE MONOCYTE (OHS): 6.7 % (ref 4–15)
RELATIVE NEUTROPHIL (OHS): 71.3 % (ref 38–73)
SODIUM SERPL-SCNC: 141 MMOL/L (ref 136–145)
SP GR UR STRIP: 1.02
TROPONIN I SERPL DL<=0.01 NG/ML-MCNC: 0.03 NG/ML
UROBILINOGEN UR STRIP-ACNC: NEGATIVE EU/DL
WBC # BLD AUTO: 11.52 K/UL (ref 3.9–12.7)

## 2025-05-20 PROCEDURE — 85025 COMPLETE CBC W/AUTO DIFF WBC: CPT

## 2025-05-20 PROCEDURE — 83690 ASSAY OF LIPASE: CPT

## 2025-05-20 PROCEDURE — 96361 HYDRATE IV INFUSION ADD-ON: CPT

## 2025-05-20 PROCEDURE — 63600175 PHARM REV CODE 636 W HCPCS

## 2025-05-20 PROCEDURE — G0378 HOSPITAL OBSERVATION PER HR: HCPCS

## 2025-05-20 PROCEDURE — 99285 EMERGENCY DEPT VISIT HI MDM: CPT | Mod: 25

## 2025-05-20 PROCEDURE — 85379 FIBRIN DEGRADATION QUANT: CPT | Performed by: EMERGENCY MEDICINE

## 2025-05-20 PROCEDURE — 83880 ASSAY OF NATRIURETIC PEPTIDE: CPT | Performed by: EMERGENCY MEDICINE

## 2025-05-20 PROCEDURE — 81003 URINALYSIS AUTO W/O SCOPE: CPT

## 2025-05-20 PROCEDURE — 93010 ELECTROCARDIOGRAM REPORT: CPT | Mod: ,,, | Performed by: INTERNAL MEDICINE

## 2025-05-20 PROCEDURE — 25000003 PHARM REV CODE 250

## 2025-05-20 PROCEDURE — 96374 THER/PROPH/DIAG INJ IV PUSH: CPT

## 2025-05-20 PROCEDURE — 93005 ELECTROCARDIOGRAM TRACING: CPT

## 2025-05-20 PROCEDURE — 96375 TX/PRO/DX INJ NEW DRUG ADDON: CPT

## 2025-05-20 PROCEDURE — 80053 COMPREHEN METABOLIC PANEL: CPT

## 2025-05-20 PROCEDURE — 84484 ASSAY OF TROPONIN QUANT: CPT | Performed by: EMERGENCY MEDICINE

## 2025-05-20 RX ORDER — METOPROLOL TARTRATE 25 MG/1
25 TABLET, FILM COATED ORAL
Status: COMPLETED | OUTPATIENT
Start: 2025-05-20 | End: 2025-05-21

## 2025-05-20 RX ORDER — LIDOCAINE HYDROCHLORIDE 20 MG/ML
15 SOLUTION OROPHARYNGEAL ONCE
Status: COMPLETED | OUTPATIENT
Start: 2025-05-21 | End: 2025-05-21

## 2025-05-20 RX ORDER — PANTOPRAZOLE SODIUM 40 MG/1
40 TABLET, DELAYED RELEASE ORAL DAILY
Status: DISCONTINUED | OUTPATIENT
Start: 2025-05-21 | End: 2025-05-22 | Stop reason: HOSPADM

## 2025-05-20 RX ORDER — LATANOPROST 50 UG/ML
1 SOLUTION/ DROPS OPHTHALMIC NIGHTLY
Status: DISCONTINUED | OUTPATIENT
Start: 2025-05-21 | End: 2025-05-22 | Stop reason: HOSPADM

## 2025-05-20 RX ORDER — GABAPENTIN 400 MG/1
400 CAPSULE ORAL 3 TIMES DAILY
Status: DISCONTINUED | OUTPATIENT
Start: 2025-05-21 | End: 2025-05-22 | Stop reason: HOSPADM

## 2025-05-20 RX ORDER — TIMOLOL MALEATE 5 MG/ML
1 SOLUTION/ DROPS OPHTHALMIC DAILY
Status: DISCONTINUED | OUTPATIENT
Start: 2025-05-21 | End: 2025-05-22 | Stop reason: HOSPADM

## 2025-05-20 RX ORDER — HYDRALAZINE HYDROCHLORIDE 20 MG/ML
10 INJECTION INTRAMUSCULAR; INTRAVENOUS EVERY 6 HOURS PRN
Status: DISCONTINUED | OUTPATIENT
Start: 2025-05-21 | End: 2025-05-22 | Stop reason: HOSPADM

## 2025-05-20 RX ORDER — METOPROLOL TARTRATE 25 MG/1
25 TABLET, FILM COATED ORAL 2 TIMES DAILY
Status: DISCONTINUED | OUTPATIENT
Start: 2025-05-21 | End: 2025-05-22 | Stop reason: HOSPADM

## 2025-05-20 RX ORDER — SODIUM CHLORIDE 9 MG/ML
1000 INJECTION, SOLUTION INTRAVENOUS
Status: COMPLETED | OUTPATIENT
Start: 2025-05-20 | End: 2025-05-20

## 2025-05-20 RX ORDER — ALUMINUM HYDROXIDE, MAGNESIUM HYDROXIDE, AND SIMETHICONE 1200; 120; 1200 MG/30ML; MG/30ML; MG/30ML
30 SUSPENSION ORAL ONCE
Status: COMPLETED | OUTPATIENT
Start: 2025-05-21 | End: 2025-05-21

## 2025-05-20 RX ORDER — ATORVASTATIN CALCIUM 10 MG/1
10 TABLET, FILM COATED ORAL NIGHTLY
Status: DISCONTINUED | OUTPATIENT
Start: 2025-05-21 | End: 2025-05-22 | Stop reason: HOSPADM

## 2025-05-20 RX ORDER — TRAMADOL HYDROCHLORIDE 50 MG/1
50 TABLET, FILM COATED ORAL EVERY 6 HOURS PRN
Status: DISCONTINUED | OUTPATIENT
Start: 2025-05-20 | End: 2025-05-22 | Stop reason: HOSPADM

## 2025-05-20 RX ORDER — SODIUM CHLORIDE 0.9 % (FLUSH) 0.9 %
10 SYRINGE (ML) INJECTION
Status: DISCONTINUED | OUTPATIENT
Start: 2025-05-20 | End: 2025-05-22 | Stop reason: HOSPADM

## 2025-05-20 RX ORDER — ACETAMINOPHEN 325 MG/1
650 TABLET ORAL EVERY 6 HOURS PRN
Status: DISCONTINUED | OUTPATIENT
Start: 2025-05-21 | End: 2025-05-22 | Stop reason: HOSPADM

## 2025-05-20 RX ORDER — ONDANSETRON HYDROCHLORIDE 2 MG/ML
4 INJECTION, SOLUTION INTRAVENOUS
Status: COMPLETED | OUTPATIENT
Start: 2025-05-20 | End: 2025-05-20

## 2025-05-20 RX ORDER — FAMOTIDINE 10 MG/ML
20 INJECTION, SOLUTION INTRAVENOUS
Status: COMPLETED | OUTPATIENT
Start: 2025-05-20 | End: 2025-05-20

## 2025-05-20 RX ORDER — ONDANSETRON 4 MG/1
4 TABLET, ORALLY DISINTEGRATING ORAL EVERY 6 HOURS PRN
Status: DISCONTINUED | OUTPATIENT
Start: 2025-05-21 | End: 2025-05-22 | Stop reason: HOSPADM

## 2025-05-20 RX ORDER — ASPIRIN 325 MG
325 TABLET ORAL ONCE
Status: COMPLETED | OUTPATIENT
Start: 2025-05-21 | End: 2025-05-21

## 2025-05-20 RX ADMIN — SODIUM CHLORIDE 1000 ML: 0.9 INJECTION, SOLUTION INTRAVENOUS at 08:05

## 2025-05-20 RX ADMIN — ONDANSETRON 4 MG: 2 INJECTION INTRAMUSCULAR; INTRAVENOUS at 08:05

## 2025-05-20 RX ADMIN — FAMOTIDINE 20 MG: 10 INJECTION, SOLUTION INTRAVENOUS at 08:05

## 2025-05-20 NOTE — FIRST PROVIDER EVALUATION
Medical screening examination initiated.  I have conducted a focused provider triage encounter, findings are as follows:    Brief history of present illness:  84-year-old female presenting to the emergency department with complaints of acid reflux, nausea, vomiting x1 day.  Patient reports that she has not been able to hold anything down since yesterday.  She takes omeprazole daily; has not taken her dose today due to symptoms.  Patient reports that her chest is sore from throwing up; she is not having any chest pain.  Denies fever, chills, shortness of breath.    Vitals:    05/20/25 1748   BP: 137/69   BP Location: Right arm   Pulse: 106   Resp: 15   Temp: 99 °F (37.2 °C)   TempSrc: Oral   SpO2: 96%       Pertinent physical exam:  Vital signs stable.  No acute distress.  Respirations even and unlabored.  No adventitious lung sounds.  Regular rate and rhythm.  Abdomen is soft and nondistended; mild tenderness to palpation without guarding or rebound.    Brief workup plan:  Preliminary workup initiated; this workup will be continued and followed by the physician or advanced practice provider that is assigned to the patient when roomed.

## 2025-05-21 LAB
ABSOLUTE EOSINOPHIL (OHS): 0.1 K/UL
ABSOLUTE MONOCYTE (OHS): 1.05 K/UL (ref 0.3–1)
ABSOLUTE NEUTROPHIL COUNT (OHS): 4.28 K/UL (ref 1.8–7.7)
ANION GAP (OHS): 8 MMOL/L (ref 8–16)
AORTIC ROOT ANNULUS: 3.1 CM
AORTIC SIZE INDEX: 1.5 CM/M2
APICAL FOUR CHAMBER EJECTION FRACTION: 73 %
ASCENDING AORTA: 2.7 CM
AV INDEX (PROSTH): 0.76
AV MEAN GRADIENT: 6 MMHG
AV PEAK GRADIENT: 10 MMHG
AV REGURGITATION PRESSURE HALF TIME: 254 MS
AV VALVE AREA BY VELOCITY RATIO: 2.6 CM²
AV VALVE AREA: 2.4 CM²
AV VELOCITY RATIO: 0.81
BASOPHILS # BLD AUTO: 0.02 K/UL
BASOPHILS NFR BLD AUTO: 0.2 %
BSA FOR ECHO PROCEDURE: 1.82 M2
BUN SERPL-MCNC: 17 MG/DL (ref 8–23)
CALCIUM SERPL-MCNC: 9.3 MG/DL (ref 8.7–10.5)
CHLORIDE SERPL-SCNC: 108 MMOL/L (ref 95–110)
CO2 SERPL-SCNC: 22 MMOL/L (ref 23–29)
CREAT SERPL-MCNC: 0.7 MG/DL (ref 0.5–1.4)
CV ECHO LV RWT: 0.38 CM
DOP CALC AO PEAK VEL: 1.6 M/S
DOP CALC AO VTI: 34.7 CM
DOP CALC LVOT AREA: 3.1 CM2
DOP CALC LVOT DIAMETER: 2 CM
DOP CALC LVOT PEAK VEL: 1.3 M/S
DOP CALC LVOT STROKE VOLUME: 83.2 CM3
DOP CALC MV VTI: 28.9 CM
DOP CALC RVOT PEAK VEL: 0.77 M/S
DOP CALC RVOT VTI: 13.5 CM
DOP CALCLVOT PEAK VEL VTI: 26.5 CM
E WAVE DECELERATION TIME: 157 MSEC
E/A RATIO: 1.17
E/E' RATIO: 13 M/S
ECHO LV POSTERIOR WALL: 0.8 CM (ref 0.6–1.1)
EJECTION FRACTION: 60 %
ERYTHROCYTE [DISTWIDTH] IN BLOOD BY AUTOMATED COUNT: 13 % (ref 11.5–14.5)
FRACTIONAL SHORTENING: 42.9 % (ref 28–44)
GFR SERPLBLD CREATININE-BSD FMLA CKD-EPI: >60 ML/MIN/1.73/M2
GLUCOSE SERPL-MCNC: 80 MG/DL (ref 70–110)
HCT VFR BLD AUTO: 33.6 % (ref 37–48.5)
HGB BLD-MCNC: 10.7 GM/DL (ref 12–16)
IMM GRANULOCYTES # BLD AUTO: 0.02 K/UL (ref 0–0.04)
IMM GRANULOCYTES NFR BLD AUTO: 0.2 % (ref 0–0.5)
INTERVENTRICULAR SEPTUM: 0.9 CM (ref 0.6–1.1)
IVC DIAMETER: 1.53 CM
IVRT: 60 MSEC
LA MAJOR: 5.5 CM
LA MINOR: 5.4 CM
LA WIDTH: 4.2 CM
LEFT ATRIUM AREA SYSTOLIC (APICAL 2 CHAMBER): 19.18 CM2
LEFT ATRIUM AREA SYSTOLIC (APICAL 4 CHAMBER): 17.67 CM2
LEFT ATRIUM SIZE: 3.7 CM
LEFT ATRIUM VOLUME INDEX MOD: 29 ML/M2
LEFT ATRIUM VOLUME INDEX: 40 ML/M2
LEFT ATRIUM VOLUME MOD: 53 ML
LEFT ATRIUM VOLUME: 72 CM3
LEFT INTERNAL DIMENSION IN SYSTOLE: 2.4 CM (ref 2.1–4)
LEFT VENTRICLE DIASTOLIC VOLUME INDEX: 45 ML/M2
LEFT VENTRICLE DIASTOLIC VOLUME: 81 ML
LEFT VENTRICLE END DIASTOLIC VOLUME APICAL 4 CHAMBER: 55.15 ML
LEFT VENTRICLE END SYSTOLIC VOLUME APICAL 2 CHAMBER: 55.42 ML
LEFT VENTRICLE END SYSTOLIC VOLUME APICAL 4 CHAMBER: 48.83 ML
LEFT VENTRICLE MASS INDEX: 61 G/M2
LEFT VENTRICLE SYSTOLIC VOLUME INDEX: 10.6 ML/M2
LEFT VENTRICLE SYSTOLIC VOLUME: 19 ML
LEFT VENTRICULAR INTERNAL DIMENSION IN DIASTOLE: 4.2 CM (ref 3.5–6)
LEFT VENTRICULAR MASS: 109.8 G
LV LATERAL E/E' RATIO: 12 M/S
LV SEPTAL E/E' RATIO: 13.3 M/S
LVED V (TEICH): 80.54 ML
LVES V (TEICH): 19.17 ML
LVOT MG: 3.54 MMHG
LVOT MV: 0.88 CM/S
LYMPHOCYTES # BLD AUTO: 3.67 K/UL (ref 1–4.8)
MAGNESIUM SERPL-MCNC: 2 MG/DL (ref 1.6–2.6)
MCH RBC QN AUTO: 31.9 PG (ref 27–31)
MCHC RBC AUTO-ENTMCNC: 31.8 G/DL (ref 32–36)
MCV RBC AUTO: 100 FL (ref 82–98)
MV MEAN GRADIENT: 4 MMHG
MV PEAK A VEL: 1.03 M/S
MV PEAK E VEL: 1.2 M/S
MV PEAK GRADIENT: 6 MMHG
MV STENOSIS PRESSURE HALF TIME: 56.43 MS
MV VALVE AREA BY CONTINUITY EQUATION: 2.88 CM2
MV VALVE AREA P 1/2 METHOD: 3.9 CM2
NUCLEATED RBC (/100WBC) (OHS): 0 /100 WBC
OHS CV RV/LV RATIO: 0.67 CM
OHS QRS DURATION: 78 MS
OHS QTC CALCULATION: 465 MS
PISA AR MAX VEL: 5.27 M/S
PISA MRMAX VEL: 5.77 M/S
PISA TR MAX VEL: 3.3 M/S
PLATELET # BLD AUTO: 291 K/UL (ref 150–450)
PMV BLD AUTO: 10.4 FL (ref 9.2–12.9)
POTASSIUM SERPL-SCNC: 3.8 MMOL/L (ref 3.5–5.1)
PV MEAN GRADIENT: 1 MMHG
PV MV: 0.94 M/S
PV PEAK GRADIENT: 7 MMHG
PV PEAK VELOCITY: 1.28 M/S
RA MAJOR: 4.54 CM
RA PRESSURE ESTIMATED: 3 MMHG
RA WIDTH: 3.5 CM
RBC # BLD AUTO: 3.35 M/UL (ref 4–5.4)
RELATIVE EOSINOPHIL (OHS): 1.1 %
RELATIVE LYMPHOCYTE (OHS): 40.2 % (ref 18–48)
RELATIVE MONOCYTE (OHS): 11.5 % (ref 4–15)
RELATIVE NEUTROPHIL (OHS): 46.8 % (ref 38–73)
RIGHT VENTRICLE DIASTOLIC BASEL DIMENSION: 2.8 CM
RV TB RVSP: 6 MMHG
RV TISSUE DOPPLER FREE WALL SYSTOLIC VELOCITY 1 (APICAL 4 CHAMBER VIEW): 14.48 CM/S
SODIUM SERPL-SCNC: 138 MMOL/L (ref 136–145)
STJ: 2.6 CM
TDI LATERAL: 0.1 M/S
TDI SEPTAL: 0.09 M/S
TDI: 0.1 M/S
TR MAX PG: 73 MMHG
TRICUSPID ANNULAR PLANE SYSTOLIC EXCURSION: 2.6 CM
TROPONIN I SERPL DL<=0.01 NG/ML-MCNC: 0.01 NG/ML
TROPONIN I SERPL DL<=0.01 NG/ML-MCNC: 0.03 NG/ML
TROPONIN I SERPL DL<=0.01 NG/ML-MCNC: 0.04 NG/ML
TV REST PULMONARY ARTERY PRESSURE: 47 MMHG
WBC # BLD AUTO: 9.14 K/UL (ref 3.9–12.7)
Z-SCORE OF LEFT VENTRICULAR DIMENSION IN END DIASTOLE: -1.7
Z-SCORE OF LEFT VENTRICULAR DIMENSION IN END SYSTOLE: -1.96

## 2025-05-21 PROCEDURE — 25000003 PHARM REV CODE 250: Performed by: FAMILY MEDICINE

## 2025-05-21 PROCEDURE — 83735 ASSAY OF MAGNESIUM: CPT | Performed by: PHYSICIAN ASSISTANT

## 2025-05-21 PROCEDURE — 21400001 HC TELEMETRY ROOM

## 2025-05-21 PROCEDURE — 36415 COLL VENOUS BLD VENIPUNCTURE: CPT | Performed by: EMERGENCY MEDICINE

## 2025-05-21 PROCEDURE — 84484 ASSAY OF TROPONIN QUANT: CPT | Mod: 91 | Performed by: FAMILY MEDICINE

## 2025-05-21 PROCEDURE — 80048 BASIC METABOLIC PNL TOTAL CA: CPT | Performed by: FAMILY MEDICINE

## 2025-05-21 PROCEDURE — 25000003 PHARM REV CODE 250: Performed by: EMERGENCY MEDICINE

## 2025-05-21 PROCEDURE — 36415 COLL VENOUS BLD VENIPUNCTURE: CPT | Performed by: FAMILY MEDICINE

## 2025-05-21 PROCEDURE — 36415 COLL VENOUS BLD VENIPUNCTURE: CPT | Performed by: PHYSICIAN ASSISTANT

## 2025-05-21 PROCEDURE — 85025 COMPLETE CBC W/AUTO DIFF WBC: CPT | Performed by: FAMILY MEDICINE

## 2025-05-21 PROCEDURE — 84484 ASSAY OF TROPONIN QUANT: CPT | Mod: 91 | Performed by: PHYSICIAN ASSISTANT

## 2025-05-21 PROCEDURE — 84484 ASSAY OF TROPONIN QUANT: CPT | Performed by: EMERGENCY MEDICINE

## 2025-05-21 PROCEDURE — 99222 1ST HOSP IP/OBS MODERATE 55: CPT | Mod: 25,,, | Performed by: PHYSICIAN ASSISTANT

## 2025-05-21 RX ORDER — ALUMINUM HYDROXIDE, MAGNESIUM HYDROXIDE, AND SIMETHICONE 1200; 120; 1200 MG/30ML; MG/30ML; MG/30ML
30 SUSPENSION ORAL EVERY 6 HOURS PRN
Status: DISCONTINUED | OUTPATIENT
Start: 2025-05-21 | End: 2025-05-22 | Stop reason: HOSPADM

## 2025-05-21 RX ADMIN — ALUMINUM HYDROXIDE, MAGNESIUM HYDROXIDE, AND DIMETHICONE 30 ML: 200; 20; 200 SUSPENSION ORAL at 12:05

## 2025-05-21 RX ADMIN — LATANOPROST 1 DROP: 50 SOLUTION OPHTHALMIC at 10:05

## 2025-05-21 RX ADMIN — ONDANSETRON 4 MG: 4 TABLET, ORALLY DISINTEGRATING ORAL at 09:05

## 2025-05-21 RX ADMIN — ASPIRIN 325 MG ORAL TABLET 325 MG: 325 PILL ORAL at 12:05

## 2025-05-21 RX ADMIN — ATORVASTATIN CALCIUM 10 MG: 10 TABLET, FILM COATED ORAL at 10:05

## 2025-05-21 RX ADMIN — ACETAMINOPHEN 650 MG: 325 TABLET ORAL at 10:05

## 2025-05-21 RX ADMIN — GABAPENTIN 400 MG: 400 CAPSULE ORAL at 02:05

## 2025-05-21 RX ADMIN — PANTOPRAZOLE SODIUM 40 MG: 40 TABLET, DELAYED RELEASE ORAL at 09:05

## 2025-05-21 RX ADMIN — METOPROLOL TARTRATE 25 MG: 25 TABLET, FILM COATED ORAL at 09:05

## 2025-05-21 RX ADMIN — ACETAMINOPHEN 650 MG: 325 TABLET ORAL at 12:05

## 2025-05-21 RX ADMIN — METOPROLOL TARTRATE 25 MG: 25 TABLET, FILM COATED ORAL at 10:05

## 2025-05-21 RX ADMIN — RIVAROXABAN 15 MG: 15 TABLET, FILM COATED ORAL at 05:05

## 2025-05-21 RX ADMIN — METOPROLOL TARTRATE 25 MG: 25 TABLET, FILM COATED ORAL at 12:05

## 2025-05-21 RX ADMIN — LIDOCAINE HYDROCHLORIDE 15 ML: 20 SOLUTION ORAL at 01:05

## 2025-05-21 RX ADMIN — GABAPENTIN 400 MG: 400 CAPSULE ORAL at 10:05

## 2025-05-21 RX ADMIN — GABAPENTIN 400 MG: 400 CAPSULE ORAL at 09:05

## 2025-05-21 RX ADMIN — TIMOLOL MALEATE 1 DROP: 5 SOLUTION/ DROPS OPHTHALMIC at 09:05

## 2025-05-21 RX ADMIN — ALUMINUM HYDROXIDE, MAGNESIUM HYDROXIDE, AND DIMETHICONE 30 ML: 200; 20; 200 SUSPENSION ORAL at 10:05

## 2025-05-21 NOTE — ASSESSMENT & PLAN NOTE
-Will continue Protonix   -GI cocktail   -She was given a dose of Maalox this morning which improved her chest burning post orange juice ingestion   -We discussed the need to follow up with General surgery for hiatal hernia repair.  She is not ready for that at this time and stated that she would follow up as an outpatient with General surgery if she decides to move forward with any intervention.

## 2025-05-21 NOTE — ASSESSMENT & PLAN NOTE
-Troponin flat, 0.032>0.037>0.027, repeat pending  -Denies any cristian CP, heaviness, or tightness  -Symptoms seem GI in nature/related to her hiatal hernia  -TTE with normal EF  -Discussed OP stress test

## 2025-05-21 NOTE — SUBJECTIVE & OBJECTIVE
Interval History:  Follow up for chest pressure likely secondary to gastric reflux but given her elevated troponins cardiology will rule out a cardiac cause for her chest pain.  Plan for stress test tomorrow.  Patient would like to think about having surgery for hiatal hernia so in the event she decides to move forward with surgery she will contact Dr. Graves's his office as she has seen him in the past during her hospitalization for small-bowel obstruction.    Review of Systems  Objective:     Vital Signs (Most Recent):  Temp: 98.2 °F (36.8 °C) (05/21/25 1541)  Pulse: 68 (05/21/25 1545)  Resp: 16 (05/21/25 1541)  BP: 136/60 (05/21/25 1541)  SpO2: (!) 90 % (05/21/25 1541) Vital Signs (24h Range):  Temp:  [97.9 °F (36.6 °C)-99 °F (37.2 °C)] 98.2 °F (36.8 °C)  Pulse:  [] 68  Resp:  [15-18] 16  SpO2:  [90 %-98 %] 90 %  BP: (119-166)/(58-71) 136/60     Weight: 72.6 kg (160 lb 0.9 oz)  Body mass index is 26.63 kg/m².    Intake/Output Summary (Last 24 hours) at 5/21/2025 1653  Last data filed at 5/20/2025 2142  Gross per 24 hour   Intake 1000 ml   Output --   Net 1000 ml         Physical Exam  Vitals and nursing note reviewed.   Constitutional:       Appearance: Normal appearance.   HENT:      Head: Normocephalic and atraumatic.      Nose: Nose normal.      Mouth/Throat:      Mouth: Mucous membranes are moist.   Eyes:      Extraocular Movements: Extraocular movements intact.      Conjunctiva/sclera: Conjunctivae normal.   Cardiovascular:      Rate and Rhythm: Normal rate and regular rhythm.      Pulses: Normal pulses.      Heart sounds: Normal heart sounds.   Pulmonary:      Effort: Pulmonary effort is normal.      Breath sounds: Normal breath sounds.   Abdominal:      General: Abdomen is flat. Bowel sounds are normal.      Palpations: Abdomen is soft.   Musculoskeletal:         General: Normal range of motion.      Cervical back: Normal range of motion and neck supple.   Skin:     General: Skin is warm.       Capillary Refill: Capillary refill takes less than 2 seconds.   Neurological:      Mental Status: She is alert and oriented to person, place, and time. Mental status is at baseline.   Psychiatric:         Mood and Affect: Mood normal.         Behavior: Behavior normal.               Significant Labs: All pertinent labs within the past 24 hours have been reviewed.  Recent Lab Results  (Last 5 results in the past 24 hours)        05/21/25  1143   05/21/25  0946   05/21/25  0540   05/21/25  0009   05/20/25  2142        A4C EF   73             Anion Gap     8           Ao root annulus   3.1             Ascending aorta   2.7             Ao peak lito   1.6             ASI   1.5             Ao VTI   34.7             AR Max Lito   5.27             AV valve area   2.4             [place] by Velocity Ratio   2.6             AV mean gradient   6             AV index (prosthetic)   0.76             AV peak gradient   10             AV regurgitation pressure 1/2 time   254             AV Velocity Ratio   0.81             Baso #     0.02           Basophil %     0.2           BNP         98  Comment: Values of less than 100 pg/ml are consistent with non-CHF populations.        BSA   1.82             BUN     17           Calcium     9.3           Chloride     108           CO2     22           Creatinine     0.7           Left Ventricle Relative Wall Thickness   0.38             D-Dimer         0.23  Comment: The quantitative D-dimer assay should be used as an aid in the diagnosis of deep vein thrombosis and pulmonary embolism in patients with the appropriate presentation and clinical history. The upper limit of the reference interval and the clinical cut off point are identical. Causes of a positive (>0.50 mg/L FEU) D-Dimer test include, but are not limited to: DVT, PE, DIC, thrombolytic therapy, anticoagulant therapy, recent surgery, trauma, or pregnancy, disseminated malignancy, aortic aneurysm, cirrhosis, and severe infection. False  negative results may occur in patients with distal DVT.       E/A ratio   1.17             E/E' ratio   13             eGFR     >60  Comment: Estimated GFR calculated using the CKD-EPI creatinine (2021) equation.           EF   60             Eos #     0.10           Eos %     1.1           E wave deceleration time   157             FS   42.9             Glucose     80           Gran # (ANC)     4.28           Hematocrit     33.6           Hemoglobin     10.7           Immature Grans (Abs)     0.02  Comment: Mild elevation in immature granulocytes is non specific and can be seen in a variety of conditions including stress response, acute inflammation, trauma and pregnancy. Correlation with other laboratory and clinical findings is essential.           Immature Granulocytes     0.2           IVC diameter   1.53             IVRT   60             IVSd   0.9             LA WIDTH   4.2             LA area A2C   19.18             LA area A4C   17.67             Left Atrium Major Axis   5.5             Left Atrium Minor Axis   5.4             LA size   3.7             LA Vol   72                53             LA vol index   40             EM (MOD)   29             LVOT area   3.1             LV LATERAL E/E' RATIO   12.0             LV SEPTAL E/E' RATIO   13.3             LV EDV BP   81             LV Diastolic Volume Index   45.00             Left Ventricular End Diastolic Volume by Teichholz Method   80.54             LV EDV A4C   55.15             Left Ventricular End Systolic Volume by Teichholz Method   19.17             LV ESV A2C   55.42             LV ESV A4C   48.83             LVIDd   4.2             LVIDs   2.4             LV mass   109.8             LV Mass Index   61.0             Left Ventricular Outflow Tract Mean Gradient   3.54             Left Ventricular Outflow Tract Mean Velocity   0.88             LVOT diameter   2.0             LVOT peak tali   1.3             LVOT stroke volume   83.2              LVOT peak VTI   26.5             LV ESV BP   19             LV Systolic Volume Index   10.6             Lymph #     3.67           Lymph %     40.2           Magnesium  2.0               MCH     31.9           MCHC     31.8           MCV     100           Mean e'   0.10             Mono #     1.05           Mono %     11.5           MPV     10.4           Mr max lito   5.77             MV valve area p 1/2 method   3.90             MV valve area by continuity eq   2.88             MV mean gradient   4             MV peak gradient   6             MV Peak A Lito   1.03             MV Peak E Lito   1.20             MV stenosis pressure 1/2 time   56.43             MV VTI   28.9             Neut %     46.8           nRBC     0           Platelet Count     291           Potassium     3.8           Pulmonary Valve Mean Velocity   0.94             PV mean gradient   1             PV peak gradient   7             PV [facility] VELOCITY   1.28             PW   0.8             RA Major Axis   4.54             Est. RA pres   3             RA Width   3.5             RBC     3.35           RDW     13.0           RV S'   14.48             RV TB RVSP   6  [C]             RV/LV Ratio   0.67             RV- newberry basal diam   2.8             RVOT peak lito   0.77             RVOT peak VTI   13.5             Sodium     138           STJ   2.6             TAPSE   2.6             TDI SEPTAL   0.09             TDI LATERAL   0.10             Triscuspid Valve Regurgitation Peak Gradient   73             TR Max Lito   3.3  [C]             Troponin I 0.006  Comment: The reference interval for Troponin I represents the 99th percentile cutoff for our facility and is consistent with 3rd generation assay performance.     0.027  Comment: The reference interval for Troponin I represents the 99th percentile cutoff for our facility and is consistent with 3rd generation assay performance.   0.037  Comment: The reference interval for Troponin I represents the  99th percentile cutoff for our facility and is consistent with 3rd generation assay performance.         TV resting pulmonary artery pressure   47  [C]             WBC     9.14           ZLVIDD   -1.70             ZLVIDS   -1.96                                     [C] - Corrected Result               Significant Imaging: I have reviewed all pertinent imaging results/findings within the past 24 hours.

## 2025-05-21 NOTE — H&P
[facility] Emergency Dept.  [facility] Medicine  History & Physical    Patient Name: Maria Del Carmen Spence  MRN: [id number]  Patient Class: OP- Observation  Admission Date: 5/20/2025  Attending Physician: Yimi Walker MD  Primary Care Provider: Rajinder Lutz MD         Patient information was obtained from patient and ER records.     Subjective:     Principal Problem:Elevated troponin    Chief Complaint:   Chief Complaint   Patient presents with    Heartburn     Pt reports heartburn that started last night and worsened today. Pt reports not taking prescribed omeprazole due to vomiting today.         HPI: Patient is a 84-year-old  female with a PMH of arthritis, GERD, AFib on Xarelto , HTN, and HLD who presents to the ED with complaints of epigastric pain.  Patient reported that the symptoms started last night and has progressively worsened.  Associated symptoms include vomiting.  She reports taking Pepcid x2 without relief.  Was unable to keep liquids or solids down.  Denies any radiation of epigastric pain.  Denies any chest pain or shortness a breath.  No other issues reported at this time.      In the ED, she was given IV Pepcid with relief.  Chest x-ray showed large hiatal hernia otherwise unremarkable.  Troponin 0.032.      Past Medical History:   Diagnosis Date    A-fib     Arthritis     Chronic LBP     ESIs x 3 with Dr. Hicks, PT at [facility], chiropractor    Eye pain     Frequent headaches     GERD (gastroesophageal reflux disease)     Glaucoma     Hyperlipemia     Hypertension        Past Surgical History:   Procedure Laterality Date    ABLATION OF ARRHYTHMOGENIC FOCUS FOR ATRIAL FIBRILLATION N/A 3/6/2019    Procedure: ABLATION, ARRHYTHMOGENIC FOCUS, FOR ATRIAL FIBRILLATION;  Surgeon: Abel Morillo MD;  Location: [facility] EP LAB;  Service: Cardiology;  Laterality: N/A;    CARDIOVERSION N/A 7/9/2018    Procedure: CARDIOVERSION;  Surgeon: Will Rosenthal MD;  Location: [facility] CATH LAB;  Service: Cardiology;  Laterality:  N/A;    CATARACT EXTRACTION W/  INTRAOCULAR LENS IMPLANT  OU    COLONOSCOPY N/A 6/13/2024    Procedure: COLONOSCOPY 6/10 xarelto 2 day hold note;  Surgeon: Sayda Ferreira MD;  Location: [facility] ENDO;  Service: Endoscopy;  Laterality: N/A;    ESOPHAGOGASTRODUODENOSCOPY N/A 4/13/2024    Procedure: EGD (ESOPHAGOGASTRODUODENOSCOPY);  Surgeon: Oswald Esteves MD;  Location: [facility] ENDO;  Service: Endoscopy;  Laterality: N/A;    INJECTION OF ANESTHETIC AGENT INTO SACROILIAC JOINT Right 11/3/2020    Procedure: right Sacroiliac Joint Injection;  Surgeon: Ayush Christiansen MD;  Location: [facility] PAIN MGT;  Service: Pain Management;  Laterality: Right;    INJECTION OF ANESTHETIC AGENT INTO SACROILIAC JOINT Right 3/23/2021    Procedure: right Sacroiliac Joint Injection;  Surgeon: Ayush Christiansen MD;  Location: [facility] PAIN MGT;  Service: Pain Management;  Laterality: Right;    INJECTION OF ANESTHETIC AGENT INTO TISSUE PLANE DEFINED BY TRANSVERSUS ABDOMINIS MUSCLE N/A 4/8/2025    Procedure: BLOCK, TRANSVERSUS ABDOMINIS PLANE;  Surgeon: Sondra Kaur MD;  Location: [facility] OR;  Service: General;  Laterality: N/A;    INJECTION OF JOINT Right 11/3/2020    Procedure: right GT bursa injection;  Surgeon: Ayush Christiansen MD;  Location: [facility] PAIN MGT;  Service: Pain Management;  Laterality: Right;    INJECTION OF JOINT Bilateral 3/23/2021    Procedure: bilateral GT bursa injection;  Surgeon: Ayush Christiansen MD;  Location: [facility] PAIN MGT;  Service: Pain Management;  Laterality: Bilateral;    ROBOTIC EXCISION,SMALL INTESTINE N/A 4/8/2025    Procedure: ROBOTIC EXCISION,SMALL INTESTINE;  Surgeon: Sondra Kaur MD;  Location: [facility] OR;  Service: General;  Laterality: N/A;    TUBAL LIGATION      XI ROBOTIC LAPAROSCOPY,DIAGNOSTIC N/A 4/8/2025    Procedure: XI ROBOTIC LAPAROSCOPY,DIAGNOSTIC;  Surgeon: Sondra Kaur MD;  Location: [facility] OR;  Service: General;  Laterality: N/A;       Review of patient's allergies indicates:   Allergen  Reactions    Voltaren [diclofenac sodium] Edema     Gel formulation caused facial rash and facial swelling    Eliquis [apixaban] Other (See Comments)     Headache, numbness in lip     Medrol [methylprednisolone] Blisters       No current facility-administered medications on file prior to encounter.     Current Outpatient Medications on File Prior to Encounter   Medication Sig    omeprazole (PRILOSEC) 40 MG capsule Take 1 capsule (40 mg total) by mouth once daily.    ondansetron (ZOFRAN-ODT) 4 MG TbDL Take 1 tablet (4 mg total) by mouth every 6 (six) hours as needed.    AA/prot/lysine/methio/vit C/B6 (A/G PRO ORAL) Take 2 tablets by mouth 2 (two) times daily.     acetaminophen (TYLENOL) 650 MG TbSR Take 650 mg by mouth as needed. Does not go over 3000mg daily    atorvastatin (LIPITOR) 10 MG tablet Take 1 tablet (10 mg total) by mouth every evening.    CALCIUM PHOSPHATE TRIB/VIT D3 (CITRACAL + D ORAL) Take 1 tablet by mouth once daily at 6am.     cetirizine (ZYRTEC) 10 MG tablet Take 10 mg by mouth as needed for Allergies.    furosemide (LASIX) 20 MG tablet TAKE ONE TABLET BY MOUTH TWICE A WEEK    gabapentin (NEURONTIN) 400 MG capsule Take 1 capsule (400 mg total) by mouth 3 (three) times daily.    latanoprost 0.005 % ophthalmic solution INSTILL ONE DROP IN EACH EYE AT BEDTIME    metoprolol tartrate (LOPRESSOR) 25 MG tablet Take 1 tablet (25 mg total) by mouth 2 (two) times daily.    MULTIVITAMIN W-MINERALS/LUTEIN (CENTRUM SILVER ORAL) Take 1 tablet by mouth once daily.    RSVPreF3 antigen-AS01E, PF, (AREXVY, PF,) 120 mcg/0.5 mL SusR vaccine Inject into the muscle.    timolol maleate 0.5% (TIMOPTIC) 0.5 % Drop PLACE ONE DROP INTO BOTH EYES EVERY MORNING    traMADoL (ULTRAM) 50 mg tablet Take 1 tablet (50 mg total) by mouth every 6 (six) hours as needed for Pain.    XARELTO 15 mg Tab Take 1 tablet (15 mg total) by mouth daily with dinner or evening meal.     Family History       Problem Relation (Age of Onset)     Cancer Mother    Cataracts Mother    Diabetes Paternal Aunt    Glaucoma Mother    Hypertension Mother, Father          Tobacco Use    Smoking status: Never    Smokeless tobacco: Never   Substance and Sexual Activity    Alcohol use: No    Drug use: No    Sexual activity: Yes     Partners: Male     Review of Systems   Constitutional:  Negative for fatigue and fever.   HENT:  Negative for sinus pressure.    Eyes:  Negative for visual disturbance.   Respiratory:  Negative for shortness of breath.    Cardiovascular:  Negative for chest pain.   Gastrointestinal:  Positive for nausea and vomiting.        Epigastric pain   Genitourinary:  Negative for difficulty urinating.   Musculoskeletal:  Negative for back pain.   Skin:  Negative for rash.   Neurological:  Negative for headaches.   Psychiatric/Behavioral:  Negative for confusion.      Objective:     Vital Signs (Most Recent):  Temp: 99 °F (37.2 °C) (05/20/25 1748)  Pulse: 98 (05/20/25 2302)  Resp: 18 (05/20/25 2302)  BP: (!) 166/71 (05/20/25 2302)  SpO2: 96 % (05/20/25 2302) Vital Signs (24h Range):  Temp:  [99 °F (37.2 °C)] 99 °F (37.2 °C)  Pulse:  [] 98  Resp:  [15-18] 18  SpO2:  [96 %] 96 %  BP: (137-166)/(69-71) 166/71        There is no height or weight on file to calculate BMI.     Physical Exam  Constitutional:       General: She is not in acute distress.     Appearance: She is well-developed. She is not diaphoretic.   HENT:      Head: Normocephalic and atraumatic.   Eyes:      Pupils: Pupils are equal, round, and reactive to light.   Cardiovascular:      Rate and Rhythm: Normal rate and regular rhythm.      Heart sounds: Normal heart sounds. No murmur heard.     No friction rub. No gallop.   Pulmonary:      Effort: Pulmonary effort is normal. No respiratory distress.      Breath sounds: Normal breath sounds. No stridor. No wheezing or rales.   Abdominal:      General: Bowel sounds are normal. There is no distension.      Palpations: Abdomen is soft. There  is no mass.      Tenderness: There is no abdominal tenderness. There is no guarding.   Musculoskeletal:      Right lower leg: No edema.      Left lower leg: No edema.   Skin:     General: Skin is warm.      Findings: No erythema.   Neurological:      Mental Status: She is alert and oriented to person, place, and time.              CRANIAL NERVES     CN III, IV, VI   Pupils are equal, round, and reactive to light.       Significant Labs:   Results for orders placed or performed during the hospital encounter of 05/20/25   Comp. Metabolic Panel    Collection Time: 05/20/25  7:59 PM   Result Value Ref Range    Sodium 141 136 - 145 mmol/L    Potassium 4.6 3.5 - 5.1 mmol/L    Chloride 105 95 - 110 mmol/L    CO2 24 23 - 29 mmol/L    Glucose 99 70 - 110 mg/dL    BUN 17 8 - 23 mg/dL    Creatinine 0.7 0.5 - 1.4 mg/dL    Calcium 10.7 (H) 8.7 - 10.5 mg/dL    Protein Total 8.6 (H) 6.0 - 8.4 gm/dL    Albumin 4.2 3.5 - 5.2 g/dL    Bilirubin Total 0.5 0.1 - 1.0 mg/dL     40 - 150 unit/L    AST 31 11 - 45 unit/L    ALT 22 10 - 44 unit/L    Anion Gap 12 8 - 16 mmol/L    eGFR >60 >60 mL/min/1.73/m2   Lipase    Collection Time: 05/20/25  7:59 PM   Result Value Ref Range    Lipase Level 14 4 - 60 U/L   CBC with Differential    Collection Time: 05/20/25  7:59 PM   Result Value Ref Range    WBC 11.52 3.90 - 12.70 K/uL    RBC 4.13 4.00 - 5.40 M/uL    HGB 13.2 12.0 - 16.0 gm/dL    HCT 40.4 37.0 - 48.5 %    MCV 98 82 - 98 fL    MCH 32.0 (H) 27.0 - 31.0 pg    MCHC 32.7 32.0 - 36.0 g/dL    RDW 12.8 11.5 - 14.5 %    Platelet Count 358 150 - 450 K/uL    MPV 9.9 9.2 - 12.9 fL    Nucleated RBC 0 <=0 /100 WBC    Neut % 71.3 38 - 73 %    Lymph % 21.4 18 - 48 %    Mono % 6.7 4 - 15 %    Eos % 0.2 <=8 %    Basophil % 0.2 <=1.9 %    Imm Grans % 0.2 0.0 - 0.5 %    Neut # 8.22 (H) 1.8 - 7.7 K/uL    Lymph # 2.47 1 - 4.8 K/uL    Mono # 0.77 0.3 - 1 K/uL    Eos # 0.02 <=0.5 K/uL    Baso # 0.02 <=0.2 K/uL    Imm Grans # 0.02 0.00 - 0.04 K/uL    Troponin I #1    Collection Time: 05/20/25  7:59 PM   Result Value Ref Range    Troponin-I 0.032 (H) <=0.026 ng/mL   Urinalysis, Reflex to Urine Culture Urine, Clean Catch    Collection Time: 05/20/25  9:24 PM    Specimen: Urine   Result Value Ref Range    Color, UA Yellow Straw, Morena, Yellow, Light-Orange    Appearance, UA Clear Clear    pH, UA 6.0 5.0 - 8.0    Spec Grav UA 1.020 1.005 - 1.030    Protein, UA Negative Negative    Glucose, UA Negative Negative    Ketones, UA 2+ (A) Negative    Bilirubin, UA Negative Negative    Blood, UA Negative Negative    Nitrites, UA Negative Negative    Urobilinogen, UA Negative <2.0 EU/dL    Leukocyte Esterase, UA Negative Negative   GREY TOP URINE HOLD    Collection Time: 05/20/25  9:24 PM   Result Value Ref Range    Extra Tube Hold for add-ons.    BNP    Collection Time: 05/20/25  9:42 PM   Result Value Ref Range    BNP 98 0 - 99 pg/mL   D dimer, quantitative    Collection Time: 05/20/25  9:42 PM   Result Value Ref Range    D-Dimer 0.23 <0.50 mg/L FEU         Significant Imaging:   Imaging Results              X-Ray Chest AP Portable (Final result)  Result time 05/20/25 21:58:05      Final result by Yoana Mccarthy MD (05/20/25 21:58:05)                   Narrative:    EXAM: XR CHEST AP PORTABLE    CLINICAL HISTORY: Chest pain    PRIOR:  04/08/2025    FINDINGS:   Large anal hernia redemonstrated.  Lungs unchanged well aerated and no pleural effusion    IMPRESSION:  Stable chest exam with large hiatal hernia    Finalized on: 5/20/2025 9:58 PM By:  Yoana Mccarthy MD  [facility]# [id number]      2025-05-20 22:00:06.226     [facility]                                    Assessment/Plan:     Assessment & Plan  Elevated troponin  Elevated troponin noted   No chest pain   EKG negative for ST changes   Will trend troponins   Check echo   Can possibly be DC tomorrow if troponins trend down       Primary hypertension  Patient's blood pressure range in the last 24 hours was: BP  Min: 137/69   Max: 166/71.The patient's inpatient anti-hypertensive regimen is listed below:  Current Antihypertensives  metoprolol tartrate (LOPRESSOR) tablet 25 mg, 2 times daily, Oral  timolol maleate 0.5% ophthalmic solution 1 drop, Daily, Both Eyes  hydrALAZINE injection 10 mg, Every 6 hours PRN, Intravenous  metoprolol tartrate (LOPRESSOR) tablet 25 mg, ED 1 Time, Oral    Plan  - BP is controlled, no changes needed to their regimen  -   Indeterminate stage chronic open angle glaucoma  Continue latanoprost and timolol    A-fib  Patient has paroxysmal (<7 days) atrial fibrillation. Patient is currently in sinus rhythm. [id number] Score: 3. The patients heart rate in the last 24 hours is as follows:  Pulse  Min: 97  Max: 106     Antiarrhythmics  metoprolol tartrate (LOPRESSOR) tablet 25 mg, 2 times daily, Oral  metoprolol tartrate (LOPRESSOR) tablet 25 mg, ED 1 Time, Oral    Anticoagulants  rivaroxaban tablet 15 mg, with dinner, Oral    Plan  - Replete lytes with a goal of K>4, Mg >2  - Patient is anticoagulated, see medications listed above.  - Patient's afib is currently controlled        Gastroesophageal reflux disease without esophagitis  Will continue Protonix   GI cocktail       Nausea and vomiting  Likely secondary to GERD   Currently improved with Pepcid and Zofran   Continue to monitor    VTE Risk Mitigation (From admission, onward)           Ordered     rivaroxaban tablet 15 mg  with dinner         05/20/25 2258                       On 05/20/2025, patient should be placed in hospital observation services under my care.             Yimi Walker MD  Department of Hospital Medicine  '[facility] - Emergency Dept.

## 2025-05-21 NOTE — HPI
Ms. Spence is an 84 year old female patient whose current medical conditions include PAF/PAFL s/p prior ablation (on Xarelto), arthritis, DJD, AI, and GERD who presented to [facility] ED overnight due to GERD that onset yesterday evening. Patient reported she was lying down when she felt the need to belch. She sat up and became very nauseated and had repeated bouts of small volume vomiting. Denied any associated cristian CP, heaviness, or tightness. Tried taking po pepcid at home without any relief and was unable to keep solids or liquids down which prompted her to go to the emergency room. Initial workup in ED revealed troponin of 0.032>0.037. CXR showed large hiatal hernia and patient was subsequently admitted for further evaluation and treatment. Cardiology consulted to assist with management. Patient seen and examined today, resting in bed. Feels ok. Still nauseated. Denies CP. No exertional chest heaviness or tightness. No prior history of CAD or MI. She reports compliance with her medications. Followed in clinic by Dr. Rosenthal. Chart reviewed. Troponin flat/trending down at 0.027. TTE with normal EF. Discussed OP MPI stress test if EF normal.

## 2025-05-21 NOTE — PLAN OF CARE
[facility]  Initial Discharge Assessment       Primary Care Provider: Rajinder Lutz MD    Admission Diagnosis: Epigastric pain [R10.13]  NSTEMI (non-ST elevated myocardial infarction) [I21.4]  Chest pain [R07.9]  Elevated systolic blood pressure reading with diagnosis of hypertension [I10]    Admission Date: 5/20/2025  Expected Discharge Date: 5/21/2025    Transition of Care Barriers: None    Payor: MEDICARE / Plan: MEDICARE PART A & B / Product Type: Government /     Extended Emergency Contact Information  Primary Emergency Contact: Bruce Spence  Address: [address] [place], [place] [id number] [place] [address]  Home Phone: [phone]  Relation: Spouse    Discharge Plan A: Home with family  Discharge Plan B: Home Health      [facility] - [address] [address]  Phone: [phone] Fax: [phone]      Initial Assessment (most recent)       Adult Discharge Assessment - 05/21/25 1713          Discharge Assessment    Assessment Type Discharge Planning Assessment     Confirmed/corrected address, phone number and insurance Yes     Confirmed Demographics Correct on Facesheet     Source of Information patient     People in Home spouse     Facility Arrived From: home     Do you expect to return to your current living situation? Yes     Do you have help at home or someone to help you manage your care at home? Yes     Who are your caregiver(s) and their phone number(s)? spouse     Prior to hospitilization cognitive status: Alert/Oriented     Current cognitive status: Alert/Oriented     Walking or Climbing Stairs Difficulty yes     Walking or Climbing Stairs ambulation difficulty, requires equipment     Dressing/Bathing Difficulty no     Equipment Currently Used at Home bedside commode;walker, rolling     Readmission within 30 days? No     Patient currently being followed by outpatient case management? No     Do you  currently have service(s) that help you manage your care at home? No     Do you take prescription medications? Yes     Do you have prescription coverage? Yes     Do you have any problems affording any of your prescribed medications? TBD     Is the patient taking medications as prescribed? yes     Who is going to help you get home at discharge? spouse     How do you get to doctors appointments? family or friend will provide;car, drives self     Are you on dialysis? No     Discharge Plan A Home with family     Discharge Plan B Home Health     DME Needed Upon Discharge  none     Discharge Plan discussed with: Patient     Transition of Care Barriers None                   Anticipated DC Dispo: home with spouse  Prior Level of Function: independent, use of walker for mobility.  PCP: Dr. Lutz  Comments:  CM met with patient at bedside to introduce role and discuss d/c planning. Patient is independent, lives with spouse who will provide transportation upon discharge. CM following for needs.

## 2025-05-21 NOTE — CONSULTS
[facility]  Cardiology  Consult Note    Patient Name: Maria Del Carmen Spence  MRN: [id number]  Admission Date: 5/20/2025  Hospital Length of Stay: 0 days  Code Status: Full Code   Attending Provider: Eron Ludny MD   Consulting Provider: Khushboo Lorenzo PA-C  Primary Care Physician: Cristian Lutz MD  Principal Problem:Elevated troponin    Patient information was obtained from patient, past medical records, and ER records.     Inpatient consult to Cardiology  Consult performed by: Khushboo Lorenzo PA-C  Consult ordered by: Eron Lundy MD        Subjective:     Chief Complaint:  N/V    HPI:   Ms. Spence is an 84 year old female patient whose current medical conditions include PAF/PAFL s/p prior ablation (on Xarelto), arthritis, DJD, AI, and GERD who presented to [facility] ED overnight due to GERD that onset yesterday evening. Patient reported she was lying down when she felt the need to belch. She sat up and became very nauseated and had repeated bouts of small volume vomiting. Denied any associated cristian CP, heaviness, or tightness. Tried taking po pepcid at home without any relief and was unable to keep solids or liquids down which prompted her to go to the emergency room. Initial workup in ED revealed troponin of 0.032>0.037. CXR showed large hiatal hernia and patient was subsequently admitted for further evaluation and treatment. Cardiology consulted to assist with management. Patient seen and examined today, resting in bed. Feels ok. Still nauseated. Denies CP. No exertional chest heaviness or tightness. No prior history of CAD or MI. She reports compliance with her medications. Followed in clinic by Dr. Rosenthal. Chart reviewed. Troponin flat/trending down at 0.027. TTE with normal EF. Discussed OP MPI stress test if EF normal.        Past Medical History:   Diagnosis Date    A-fib     Arthritis     Chronic LBP     ESIs x 3 with Dr. Hicks, PT at [facility], chiropractor    Eye pain     Frequent headaches      GERD (gastroesophageal reflux disease)     Glaucoma     Hyperlipemia     Hypertension        Past Surgical History:   Procedure Laterality Date    ABLATION OF ARRHYTHMOGENIC FOCUS FOR ATRIAL FIBRILLATION N/A 3/6/2019    Procedure: ABLATION, ARRHYTHMOGENIC FOCUS, FOR ATRIAL FIBRILLATION;  Surgeon: Abel Morillo MD;  Location: [facility] EP LAB;  Service: Cardiology;  Laterality: N/A;    CARDIOVERSION N/A 7/9/2018    Procedure: CARDIOVERSION;  Surgeon: Will Rosenthal MD;  Location: [facility] CATH LAB;  Service: Cardiology;  Laterality: N/A;    CATARACT EXTRACTION W/  INTRAOCULAR LENS IMPLANT  OU    COLONOSCOPY N/A 6/13/2024    Procedure: COLONOSCOPY 6/10 xarelto 2 day hold note;  Surgeon: Sayda Ferreira MD;  Location: [facility] ENDO;  Service: Endoscopy;  Laterality: N/A;    ESOPHAGOGASTRODUODENOSCOPY N/A 4/13/2024    Procedure: EGD (ESOPHAGOGASTRODUODENOSCOPY);  Surgeon: Oswald Esteves MD;  Location: [facility] ENDO;  Service: Endoscopy;  Laterality: N/A;    INJECTION OF ANESTHETIC AGENT INTO SACROILIAC JOINT Right 11/3/2020    Procedure: right Sacroiliac Joint Injection;  Surgeon: Ayush Christiansen MD;  Location: [facility] PAIN MGT;  Service: Pain Management;  Laterality: Right;    INJECTION OF ANESTHETIC AGENT INTO SACROILIAC JOINT Right 3/23/2021    Procedure: right Sacroiliac Joint Injection;  Surgeon: Ayush Christiansen MD;  Location: [facility] PAIN MGT;  Service: Pain Management;  Laterality: Right;    INJECTION OF ANESTHETIC AGENT INTO TISSUE PLANE DEFINED BY TRANSVERSUS ABDOMINIS MUSCLE N/A 4/8/2025    Procedure: BLOCK, TRANSVERSUS ABDOMINIS PLANE;  Surgeon: Sondra Kaur MD;  Location: [facility] OR;  Service: General;  Laterality: N/A;    INJECTION OF JOINT Right 11/3/2020    Procedure: right GT bursa injection;  Surgeon: Ayush Christiansen MD;  Location: [facility] PAIN MGT;  Service: Pain Management;  Laterality: Right;    INJECTION OF JOINT Bilateral 3/23/2021    Procedure: bilateral GT bursa injection;  Surgeon: Ayush Christiansen MD;   Location: [facility] PAIN MGT;  Service: Pain Management;  Laterality: Bilateral;    ROBOTIC EXCISION,SMALL INTESTINE N/A 4/8/2025    Procedure: ROBOTIC EXCISION,SMALL INTESTINE;  Surgeon: Sondra Kaur MD;  Location: [facility] OR;  Service: General;  Laterality: N/A;    TUBAL LIGATION      XI ROBOTIC LAPAROSCOPY,DIAGNOSTIC N/A 4/8/2025    Procedure: XI ROBOTIC LAPAROSCOPY,DIAGNOSTIC;  Surgeon: Sondra Kaur MD;  Location: [facility] OR;  Service: General;  Laterality: N/A;       Review of patient's allergies indicates:   Allergen Reactions    Voltaren [diclofenac sodium] Edema     Gel formulation caused facial rash and facial swelling    Eliquis [apixaban] Other (See Comments)     Headache, numbness in lip     Medrol [methylprednisolone] Blisters       No current facility-administered medications on file prior to encounter.     Current Outpatient Medications on File Prior to Encounter   Medication Sig    AA/prot/lysine/methio/vit C/B6 (A/G PRO ORAL) Take 2 tablets by mouth 2 (two) times daily.     acetaminophen (TYLENOL) 650 MG TbSR Take 650 mg by mouth as needed. Does not go over 3000mg daily    atorvastatin (LIPITOR) 10 MG tablet Take 1 tablet (10 mg total) by mouth every evening.    CALCIUM PHOSPHATE TRIB/VIT D3 (CITRACAL + D ORAL) Take 1 tablet by mouth once daily at 6am.     cetirizine (ZYRTEC) 10 MG tablet Take 10 mg by mouth as needed for Allergies.    furosemide (LASIX) 20 MG tablet TAKE ONE TABLET BY MOUTH TWICE A WEEK    gabapentin (NEURONTIN) 400 MG capsule Take 1 capsule (400 mg total) by mouth 3 (three) times daily.    latanoprost 0.005 % ophthalmic solution INSTILL ONE DROP IN EACH EYE AT BEDTIME    metoprolol tartrate (LOPRESSOR) 25 MG tablet Take 1 tablet (25 mg total) by mouth 2 (two) times daily.    MULTIVITAMIN W-MINERALS/LUTEIN (CENTRUM SILVER ORAL) Take 1 tablet by mouth once daily.    omeprazole (PRILOSEC) 40 MG capsule Take 1 capsule (40 mg total) by mouth once daily.    ondansetron (ZOFRAN-ODT) 4 MG  TbDL Take 1 tablet (4 mg total) by mouth every 6 (six) hours as needed.    timolol maleate 0.5% (TIMOPTIC) 0.5 % Drop PLACE ONE DROP INTO BOTH EYES EVERY MORNING    XARELTO 15 mg Tab Take 1 tablet (15 mg total) by mouth daily with dinner or evening meal.    [DISCONTINUED] traMADoL (ULTRAM) 50 mg tablet Take 1 tablet (50 mg total) by mouth every 6 (six) hours as needed for Pain.    RSVPreF3 antigen-AS01E, PF, (AREXVY, PF,) 120 mcg/0.5 mL SusR vaccine Inject into the muscle.     Family History       Problem Relation (Age of Onset)    Cancer Mother    Cataracts Mother    Diabetes Paternal Aunt    Glaucoma Mother    Hypertension Mother, Father          Tobacco Use    Smoking status: Never    Smokeless tobacco: Never   Substance and Sexual Activity    Alcohol use: No    Drug use: No    Sexual activity: Yes     Partners: Male     Review of Systems   Constitutional: Negative.   HENT: Negative.     Eyes: Negative.    Cardiovascular: Negative.    Respiratory: Negative.     Endocrine: Negative.    Hematologic/Lymphatic: Negative.    Skin: Negative.    Musculoskeletal: Negative.    Gastrointestinal:  Positive for heartburn, nausea and vomiting.   Genitourinary: Negative.    Neurological: Negative.    Psychiatric/Behavioral: Negative.     Allergic/Immunologic: Negative.      Objective:     Vital Signs (Most Recent):  Temp: 98.1 °F (36.7 °C) (05/21/25 0831)  Pulse: 84 (05/21/25 0954)  Resp: 18 (05/21/25 0831)  BP: 129/61 (05/21/25 0831)  SpO2: 98 % (05/21/25 0831) Vital Signs (24h Range):  Temp:  [98 °F (36.7 °C)-99 °F (37.2 °C)] 98.1 °F (36.7 °C)  Pulse:  [] 84  Resp:  [15-18] 18  SpO2:  [96 %-98 %] 98 %  BP: (129-166)/(60-71) 129/61     Weight: 72.6 kg (160 lb 0.9 oz)  Body mass index is 26.63 kg/m².    SpO2: 98 %         Intake/Output Summary (Last 24 hours) at 5/21/2025 1122  Last data filed at 5/20/2025 2142  Gross per 24 hour   Intake 1000 ml   Output --   Net 1000 ml       Lines/Drains/Airways       Peripheral  "Intravenous Line  Duration                  Peripheral IV - Single Lumen 05/20/25 2032 20 G Right Antecubital <1 day                     Physical Exam  Vitals and nursing note reviewed.   Constitutional:       Appearance: Normal appearance.   HENT:      Head: Normocephalic and atraumatic.   Eyes:      Pupils: Pupils are equal, round, and reactive to light.   Cardiovascular:      Rate and Rhythm: Normal rate and regular rhythm.      Heart sounds: S1 normal and S2 normal. No murmur heard.  Pulmonary:      Effort: Pulmonary effort is normal.   Musculoskeletal:      Right lower leg: No edema.      Left lower leg: No edema.   Neurological:      General: No focal deficit present.      Mental Status: She is oriented to person, place, and time.   Psychiatric:         Mood and Affect: Mood normal.         Behavior: Behavior normal.          Significant Labs: CMP   Recent Labs   Lab 05/20/25 1959 05/21/25  0540    138   K 4.6 3.8    108   CO2 24 22*   GLU 99 80   BUN 17 17   CREATININE 0.7 0.7   CALCIUM 10.7* 9.3   PROT 8.6*  --    ALBUMIN 4.2  --    BILITOT 0.5  --    ALKPHOS 107  --    AST 31  --    ALT 22  --    ANIONGAP 12 8   , CBC   Recent Labs   Lab 05/20/25 1959 05/21/25  0540   WBC 11.52 9.14   HGB 13.2 10.7*   HCT 40.4 33.6*    291   , Troponin No results for input(s): "TROPONINIHS" in the last 48 hours., and All pertinent lab results from the last 24 hours have been reviewed.    Significant Imaging: Echocardiogram: Transthoracic echo (TTE) complete (Cupid Only):   Results for orders placed or performed during the hospital encounter of 05/20/25   Echo Saline Bubble? No   Result Value Ref Range    BSA 1.82 m2    A4C EF 73 %    LVOT stroke volume 83.2 cm3    LVIDd 4.2 3.5 - 6.0 cm    LV Systolic Volume 19 mL    LV Systolic Volume Index 10.6 mL/m2    LVIDs 2.4 2.1 - 4.0 cm    LV ESV A2C 55.42 mL    LV Diastolic Volume 81 mL    LV ESV A4C 48.83 mL    LV Diastolic Volume Index 45.00 mL/m2    LV EDV " A4C 55.15 mL    Left Ventricular End Systolic Volume by Teichholz Method 19.17 mL    Left Ventricular End Diastolic Volume by Teichholz Method 80.54 mL    IVS 0.9 0.6 - 1.1 cm    LVOT diameter 2.0 cm    LVOT area 3.1 cm2    FS 42.9 28 - 44 %    Left Ventricle Relative Wall Thickness 0.38 cm    PW 0.8 0.6 - 1.1 cm    LV mass 109.8 g    LV Mass Index 61.0 g/m2    MV [facility] E [place] 1.20 m/s    TDI LATERAL 0.10 m/s    TDI SEPTAL 0.09 m/s    E/E' ratio 13 m/s    MV Peak A Lito 1.03 m/s    TR Max Lito 4.3 m/s    E/A ratio 1.17     IVRT 60 msec    E wave deceleration time 157 msec    LV SEPTAL E/E' RATIO 13.3 m/s    LV LATERAL E/E' RATIO 12.0 m/s    LVOT peak lito 1.3 m/s    Left Ventricular Outflow Tract Mean Velocity 0.88 cm/s    Left Ventricular Outflow Tract Mean Gradient 3.54 mmHg    RV- newberry basal diam 2.8 cm    RV S' 14.48 cm/s    RVOT peak VTI 13.5 cm    TAPSE 2.6 cm    RV/LV Ratio 0.67 cm    LA size 3.7 cm    Left Atrium Minor Axis 5.4 cm    Left Atrium Major Axis 5.5 cm    LA Vol (MOD) 53 mL    EM (MOD) 29 mL/m2    RA Major [place] 4.54 cm    AV regurgitation pressure 1/2 time 254 ms    AR Max Lito 5.27 m/s    AV mean gradient 6 mmHg    AV peak gradient 10 mmHg    Ao peak lito 1.6 m/s    Ao VTI 34.7 cm    LVOT peak VTI 26.5 cm    AV valve area 2.4 cm²    AV Velocity Ratio 0.81     AV index (prosthetic) 0.76     [place] by Velocity Ratio 2.6 cm²    Mr max lito 5.77 m/s    MV mean gradient 4 mmHg    MV peak gradient 6 mmHg    MV stenosis pressure 1/2 time 56.43 ms    MV valve area p 1/2 method 3.90 cm2    MV valve area by continuity eq 2.88 cm2    MV VTI 28.9 cm    Triscuspid Valve Regurgitation Peak Gradient 73 mmHg    PV mean gradient 1 mmHg    PV [facility] VELOCITY 1.28 m/s    PV peak gradient 7 mmHg    Pulmonary Valve Mean Velocity 0.94 m/s    RVOT peak lito 0.77 m/s    Ao root annulus 3.1 cm    STJ 2.6 cm    Ascending aorta 2.7 cm    ASI 1.5 cm/m2    IVC diameter 1.53 cm    Mean e' 0.10 m/s    ZLVIDS -1.96     ZLVIDD -1.70     LA  area A4C 17.67 cm2    LA area A2C 19.18 cm2    EM 40 mL/m2    LA Vol 72 cm3    LA WIDTH 4.2 cm    RA Width 3.5 cm    EF 60 %    TV resting pulmonary artery pressure 77 mmHg    RV TB RVSP 7 mmHg    Est. RA pres 3 mmHg    Narrative      Left Ventricle: The left ventricle is normal in size. Normal wall   thickness. There is normal systolic function. Ejection fraction is   approximately 60%. There is normal diastolic function.    Right Ventricle: The right ventricle is normal in size Wall thickness   is normal. Systolic function is normal.    Left Atrium: The left atrium is mildly dilated    Tricuspid Valve: There is mild regurgitation with a centrally directed   jet.    Pulmonary Artery: The estimated pulmonary artery systolic pressure is   77 mmHg.    IVC/SVC: Normal venous pressure at 3 mmHg.      and EKG: REviewed  Assessment and Plan:   Patient who presents with GERD/N/V, large hiatal hernia noted on CXR. Mild elevation in troponin/flat, not consistent with ACS. TTE with normal EF. Discussed OP MPI stress test.    * Elevated troponin  -Troponin flat, 0.032>0.037>0.027, repeat pending  -Denies any cristian CP, heaviness, or tightness  -Symptoms seem GI in nature/related to her hiatal hernia  -TTE with normal EF  -Discussed OP stress test    Nausea and vomiting  -Per hospital medicine    A-fib  -Prior history, remains in SR  -Continue BB  -Continue Xarelto    Primary hypertension  -Continue home meds        VTE Risk Mitigation (From admission, onward)           Ordered     rivaroxaban tablet 15 mg  with dinner         05/20/25 8257                    Thank you for your consult. I will follow-up with patient. Please contact us if you have any additional questions.    Khushboo Lorenzo PA-C  Cardiology   [facility]

## 2025-05-21 NOTE — ASSESSMENT & PLAN NOTE
Patient has paroxysmal (<7 days) atrial fibrillation. Patient is currently in sinus rhythm. [id number] Score: 3. The patients heart rate in the last 24 hours is as follows:  Pulse  Min: 63  Max: 107     Antiarrhythmics  metoprolol tartrate (LOPRESSOR) tablet 25 mg, 2 times daily, Oral    Anticoagulants  rivaroxaban tablet 15 mg, with dinner, Oral    Plan  - Replete lytes with a goal of K>4, Mg >2  - Patient is anticoagulated, see medications listed above.  - Patient's afib is currently controlled

## 2025-05-21 NOTE — PROGRESS NOTES
[facility]  [facility] Medicine  Progress Note    Patient Name: Maria Del Carmen Spence  MRN: [id number]  Patient Class: IP- Inpatient   Admission Date: 5/20/2025  Length of Stay: 0 days  Attending Physician: Eron Lundy MD  Primary Care Provider: Rajinder Lutz MD        Subjective     Principal Problem:Elevated troponin        HPI:  Patient is a 84-year-old  female with a PMH of arthritis, GERD, AFib on Xarelto , HTN, and HLD who presents to the ED with complaints of epigastric pain.  Patient reported that the symptoms started last night and has progressively worsened.  Associated symptoms include vomiting.  She reports taking Pepcid x2 without relief.  Was unable to keep liquids or solids down.  Denies any radiation of epigastric pain.  Denies any chest pain or shortness a breath.  No other issues reported at this time.      In the ED, she was given IV Pepcid with relief.  Chest x-ray showed large hiatal hernia otherwise unremarkable.  Troponin 0.032.      Overview/Hospital Course:  Ms. Spence was admitted with chest pressure/burning in the midsternal region and elevated troponins.  On chest x-ray it was noted that she has a large hiatal hernia which is likely contributing to acid reflux causing the burning in her chest.  Given the elevated troponins cardiology was consulted who recommended a stress test that will be done on 05/22/2025.  Patient will also need to follow up with General surgery as an outpatient for hiatal hernia repair.  We discussed consulting inpatient General surgery but at this time patient would like to think about having surgery prior to meeting with the surgeons.  Patient has previously seen Dr. Graves during her hospitalization in April and would like to follow up with him if she decides to move forward with surgery.  Patient has also not had a EGD since 2018 and therefore will likely need to undergo a repeat EGD prior to surgery as well as clearance from Cardiology.   Patient did have some midsternal pressure earlier this morning after drinking orange juice.  She was given a dose of Maalox which she stated resolved her symptoms.    Interval History:  Follow up for chest pressure likely secondary to gastric reflux but given her elevated troponins cardiology will rule out a cardiac cause for her chest pain.  Plan for stress test tomorrow.  Patient would like to think about having surgery for hiatal hernia so in the event she decides to move forward with surgery she will contact Dr. Graves's his office as she has seen him in the past during her hospitalization for small-bowel obstruction.    Review of Systems  Objective:     Vital Signs (Most Recent):  Temp: 98.2 °F (36.8 °C) (05/21/25 1541)  Pulse: 68 (05/21/25 1545)  Resp: 16 (05/21/25 1541)  BP: 136/60 (05/21/25 1541)  SpO2: (!) 90 % (05/21/25 1541) Vital Signs (24h Range):  Temp:  [97.9 °F (36.6 °C)-99 °F (37.2 °C)] 98.2 °F (36.8 °C)  Pulse:  [] 68  Resp:  [15-18] 16  SpO2:  [90 %-98 %] 90 %  BP: (119-166)/(58-71) 136/60     Weight: 72.6 kg (160 lb 0.9 oz)  Body mass index is 26.63 kg/m².    Intake/Output Summary (Last 24 hours) at 5/21/2025 1653  Last data filed at 5/20/2025 2142  Gross per 24 hour   Intake 1000 ml   Output --   Net 1000 ml         Physical Exam  Vitals and nursing note reviewed.   Constitutional:       Appearance: Normal appearance.   HENT:      Head: Normocephalic and atraumatic.      Nose: Nose normal.      Mouth/Throat:      Mouth: Mucous membranes are moist.   Eyes:      Extraocular Movements: Extraocular movements intact.      Conjunctiva/sclera: Conjunctivae normal.   Cardiovascular:      Rate and Rhythm: Normal rate and regular rhythm.      Pulses: Normal pulses.      Heart sounds: Normal heart sounds.   Pulmonary:      Effort: Pulmonary effort is normal.      Breath sounds: Normal breath sounds.   Abdominal:      General: Abdomen is flat. Bowel sounds are normal.      Palpations: Abdomen is  soft.   Musculoskeletal:         General: Normal range of motion.      Cervical back: Normal range of motion and neck supple.   Skin:     General: Skin is warm.      Capillary Refill: Capillary refill takes less than 2 seconds.   Neurological:      Mental Status: She is alert and oriented to person, place, and time. Mental status is at baseline.   Psychiatric:         Mood and Affect: Mood normal.         Behavior: Behavior normal.               Significant Labs: All pertinent labs within the past 24 hours have been reviewed.  Recent Lab Results  (Last 5 results in the past 24 hours)        05/21/25  1143   05/21/25  0946   05/21/25  0540   05/21/25  0009   05/20/25  2142        A4C EF   73             Anion Gap     8           Ao root annulus   3.1             Ascending aorta   2.7             Ao peak lito   1.6             ASI   1.5             Ao VTI   34.7             AR Max Lito   5.27             AV valve area   2.4             [place] by Velocity Ratio   2.6             AV mean gradient   6             AV index (prosthetic)   0.76             AV peak gradient   10             AV regurgitation pressure 1/2 time   254             AV Velocity Ratio   0.81             Baso #     0.02           Basophil %     0.2           BNP         98  Comment: Values of less than 100 pg/ml are consistent with non-CHF populations.        BSA   1.82             BUN     17           Calcium     9.3           Chloride     108           CO2     22           Creatinine     0.7           Left Ventricle Relative Wall Thickness   0.38             D-Dimer         0.23  Comment: The quantitative D-dimer assay should be used as an aid in the diagnosis of deep vein thrombosis and pulmonary embolism in patients with the appropriate presentation and clinical history. The upper limit of the reference interval and the clinical cut off point are identical. Causes of a positive (>0.50 mg/L FEU) D-Dimer test include, but are not limited to: DVT, PE,  DIC, thrombolytic therapy, anticoagulant therapy, recent surgery, trauma, or pregnancy, disseminated malignancy, aortic aneurysm, cirrhosis, and severe infection. False negative results may occur in patients with distal DVT.       E/A ratio   1.17             E/E' ratio   13             eGFR     >60  Comment: Estimated GFR calculated using the CKD-EPI creatinine (2021) equation.           EF   60             Eos #     0.10           Eos %     1.1           E wave deceleration time   157             FS   42.9             Glucose     80           Gran # (ANC)     4.28           Hematocrit     33.6           Hemoglobin     10.7           Immature Grans (Abs)     0.02  Comment: Mild elevation in immature granulocytes is non specific and can be seen in a variety of conditions including stress response, acute inflammation, trauma and pregnancy. Correlation with other laboratory and clinical findings is essential.           Immature Granulocytes     0.2           IVC diameter   1.53             IVRT   60             IVSd   0.9             LA WIDTH   4.2             LA area A2C   19.18             LA area A4C   17.67             Left Atrium Major Axis   5.5             Left Atrium Minor Axis   5.4             LA size   3.7             LA Vol   72                53             LA vol index   40             EM (MOD)   29             LVOT area   3.1             LV LATERAL E/E' RATIO   12.0             LV SEPTAL E/E' RATIO   13.3             LV EDV BP   81             LV Diastolic Volume Index   45.00             Left Ventricular End Diastolic Volume by Teichholz Method   80.54             LV EDV A4C   55.15             Left Ventricular End Systolic Volume by Teichholz Method   19.17             LV ESV A2C   55.42             LV ESV A4C   48.83             LVIDd   4.2             LVIDs   2.4             LV mass   109.8             LV Mass Index   61.0             Left Ventricular Outflow Tract Mean Gradient   3.54              Left Ventricular Outflow Tract Mean Velocity   0.88             LVOT diameter   2.0             LVOT peak lito   1.3             LVOT stroke volume   83.2             LVOT peak VTI   26.5             LV ESV BP   19             LV Systolic Volume Index   10.6             Lymph #     3.67           Lymph %     40.2           Magnesium  2.0               MCH     31.9           MCHC     31.8           MCV     100           Mean e'   0.10             Mono #     1.05           Mono %     11.5           MPV     10.4           Mr max lito   5.77             MV valve area p 1/2 method   3.90             MV valve area by continuity eq   2.88             MV mean gradient   4             MV peak gradient   6             MV Peak A Lito   1.03             MV Peak E Lito   1.20             MV stenosis pressure 1/2 time   56.43             MV VTI   28.9             Neut %     46.8           nRBC     0           Platelet Count     291           Potassium     3.8           Pulmonary Valve Mean Velocity   0.94             PV mean gradient   1             PV peak gradient   7             PV PEAK VELOCITY   1.28             PW   0.8             RA Major Axis   4.54             Est. RA pres   3             RA Width   3.5             RBC     3.35           RDW     13.0           RV S'   14.48             RV TB RVSP   6  [C]             RV/LV Ratio   0.67             RV- newberry basal diam   2.8             RVOT peak lito   0.77             RVOT peak VTI   13.5             Sodium     138           STJ   2.6             TAPSE   2.6             TDI SEPTAL   0.09             TDI LATERAL   0.10             Triscuspid Valve Regurgitation Peak Gradient   73             TR Max Lito   3.3  [C]             Troponin I 0.006  Comment: The reference interval for Troponin I represents the 99th percentile cutoff for our facility and is consistent with 3rd generation assay performance.     0.027  Comment: The reference interval for Troponin I represents the 99th  percentile cutoff for our facility and is consistent with 3rd generation assay performance.   0.037  Comment: The reference interval for Troponin I represents the 99th percentile cutoff for our facility and is consistent with 3rd generation assay performance.         TV resting pulmonary artery pressure   47  [C]             WBC     9.14           ZLVIDD   -1.70             ZLVIDS   -1.96                                     [C] - Corrected Result               Significant Imaging: I have reviewed all pertinent imaging results/findings within the past 24 hours.      Assessment & Plan  Elevated troponin  Elevated troponin noted   No chest pain   EKG negative for ST changes   Will trend troponins   Check echo - reviewed  Cardiology planning a stress test on 05/22/2025 - patient will need cardiac clearance prior to any surgical intervention for hiatal hernia.      Primary hypertension  Patient's blood pressure range in the last 24 hours was: BP  Min: 119/58  Max: 166/71.The patient's inpatient anti-hypertensive regimen is listed below:  Current Antihypertensives  metoprolol tartrate (LOPRESSOR) tablet 25 mg, 2 times daily, Oral  timolol maleate 0.5% ophthalmic solution 1 drop, Daily, Both Eyes  hydrALAZINE injection 10 mg, Every 6 hours PRN, Intravenous    Plan  - BP is controlled, no changes needed to their regimen    Indeterminate stage chronic open angle glaucoma  Continue latanoprost and timolol    A-fib  Patient has paroxysmal (<7 days) atrial fibrillation. Patient is currently in sinus rhythm. [id number] Score: 3. The patients heart rate in the last 24 hours is as follows:  Pulse  Min: 63  Max: 107     Antiarrhythmics  metoprolol tartrate (LOPRESSOR) tablet 25 mg, 2 times daily, Oral    Anticoagulants  rivaroxaban tablet 15 mg, with dinner, Oral    Plan  - Replete lytes with a goal of K>4, Mg >2  - Patient is anticoagulated, see medications listed above.  - Patient's afib is currently  controlled        Gastroesophageal reflux disease without esophagitis  -Will continue Protonix   -GI cocktail   -She was given a dose of Maalox this morning which improved her chest burning post orange juice ingestion   -We discussed the need to follow up with General surgery for hiatal hernia repair.  She is not ready for that at this time and stated that she would follow up as an outpatient with General surgery if she decides to move forward with any intervention.    Nausea and vomiting  Likely secondary to GERD   Currently improved with Pepcid and Zofran   Continue to monitor    VTE Risk Mitigation (From admission, onward)           Ordered     rivaroxaban tablet 15 mg  with dinner         05/20/25 2251                    Discharge Planning   STEF: 5/21/2025     Code Status: Full Code   Medical Readiness for Discharge Date: 5/21/2025                           Eron Lundy MD  Department of Hospital Medicine   [facility]

## 2025-05-21 NOTE — HPI
Patient is a 84-year-old  female with a PMH of arthritis, GERD, AFib on Xarelto , HTN, and HLD who presents to the ED with complaints of epigastric pain.  Patient reported that the symptoms started last night and has progressively worsened.  Associated symptoms include vomiting.  She reports taking Pepcid x2 without relief.  Was unable to keep liquids or solids down.  Denies any radiation of epigastric pain.  Denies any chest pain or shortness a breath.  No other issues reported at this time.      In the ED, she was given IV Pepcid with relief.  Chest x-ray showed large hiatal hernia otherwise unremarkable.  Troponin 0.032.

## 2025-05-21 NOTE — PLAN OF CARE
Inpatient Upgrade Note    Maria Del Carmen Spence has warranted treatment spanning two or more midnights of hospital level care for the management of Elevated Troponin, epigastric pain, nausea and vomiting. She continues to require daily labs, further testing/imaging, monitoring of vital signs, advancing diet, medication adjustments, and further evaluation by consultants. Her condition is also complicated by the following comorbidities: Hypertension and A fib, Arthritis, GERD, and HLD.

## 2025-05-21 NOTE — ASSESSMENT & PLAN NOTE
Elevated troponin noted   No chest pain   EKG negative for ST changes   Will trend troponins   Check echo - reviewed  Cardiology planning a stress test on 05/22/2025 - patient will need cardiac clearance prior to any surgical intervention for hiatal hernia.

## 2025-05-21 NOTE — ASSESSMENT & PLAN NOTE
Elevated troponin noted   No chest pain   EKG negative for ST changes   Will trend troponins   Check echo   Can possibly be DC tomorrow if troponins trend down

## 2025-05-21 NOTE — MEDICAL/APP STUDENT
[facility]  Cardiology  Consult Note    Patient Name: Maria Del Carmen Spence  MRN: [id number]  Admission Date: 5/20/2025  Hospital Length of Stay: 0 days  Attending Physician: Eron Lundy MD   Primary Care Provider: Rajinder Lutz MD     Patient information was obtained from patient, spouse/SO, and ER records.     Inpatient consult to Cardiology  Consult performed by: Eron Lundy MD  Consult ordered by: Eron Lundy MD  Reason for consult: Elevated troponin with chest pain    Subjective:   HPI:  Maria Del Carmen Spence is a 84 y.o. female with PMHx of GERD, atrial fibrillation, HTN, and HLD who presented to the ED with complaints of epigastric pain that started a couple nights ago. Reports of N/V and belching. States she was not able to keep liquids or solids down. Patient took Pepcid x2 with no relief.    Initial labs in ED revealed troponin 0.032. EKG showed normal sinus rhythm. CXR showed stable chest exam with large hiatal hernia and no pleural effusion. Patient was admitted by Hospital Medicine for further management and treatment. Cardiology was consulted for elevated troponin with chest pain.     Hospital Course:   5/21/2025 - Patient was seen and examined today. Patient laying in bed with spouse at bedside. Troponin of 0.027. Echo showed EF of 60%, with normal systolic and diastolic function. Denies any chest pain. Denies any Hx of MI or intervention. Reports of Hx of ablation of atrial flutter in 2019 w/ Dr. Morillo. Discussed having a stress test as outpatient upon discharge.    Review of Systems   Respiratory:  Negative for shortness of breath.    Cardiovascular:  Negative for chest pain.   Gastrointestinal:  Positive for nausea and vomiting.     Objective:     Vital Signs (Most Recent):  Temp: 98.1 °F (36.7 °C) (05/21/25 0831)  Pulse: 84 (05/21/25 0954)  Resp: 18 (05/21/25 0831)  BP: 129/61 (05/21/25 0831)  SpO2: 98 % (05/21/25 0831) Vital Signs (24h Range):  Temp:  [98 °F (36.7 °C)-99 °F (37.2  °C)] 98.1 °F (36.7 °C)  Pulse:  [] 84  Resp:  [15-18] 18  SpO2:  [96 %-98 %] 98 %  BP: (129-166)/(60-71) 129/61     Weight: 72.6 kg (160 lb 0.9 oz)  Body mass index is 26.63 kg/m².    Intake/Output Summary (Last 24 hours) at 5/21/2025 1208  Last data filed at 5/20/2025 2142  Gross per 24 hour   Intake 1000 ml   Output --   Net 1000 ml         Physical Exam          Significant Labs: All pertinent labs within the past 24 hours have been reviewed.  CBC:   Recent Labs   Lab 05/20/25 1959 05/21/25  0540   WBC 11.52 9.14   HGB 13.2 10.7*   HCT 40.4 33.6*    291     CMP:   Recent Labs   Lab 05/20/25 1959 05/21/25  0540    138   K 4.6 3.8    108   CO2 24 22*   GLU 99 80   BUN 17 17   CREATININE 0.7 0.7   CALCIUM 10.7* 9.3   PROT 8.6*  --    ALBUMIN 4.2  --    BILITOT 0.5  --    ALKPHOS 107  --    AST 31  --    ALT 22  --    ANIONGAP 12 8     Cardiac Markers:   Recent Labs   Lab 05/20/25  2142   BNP 98     Troponin:   Recent Labs   Lab 05/20/25 1959 05/21/25  0009 05/21/25  0540   TROPONINI 0.032* 0.037* 0.027*       Significant Imaging: I have reviewed all pertinent imaging results/findings within the past 24 hours.  Imaging Results              X-Ray Chest AP Portable (Final result)  Result time 05/20/25 21:58:05      Final result by Yoana Mccarthy MD (05/20/25 21:58:05)                   Narrative:    EXAM: XR CHEST AP PORTABLE    CLINICAL HISTORY: Chest pain    PRIOR:  04/08/2025    FINDINGS:   Large anal hernia redemonstrated.  Lungs unchanged well aerated and no pleural effusion    IMPRESSION:  Stable chest exam with large hiatal hernia    Finalized on: 5/20/2025 9:58 PM By:  Yoana Mccarthy MD  [facility]# [id number]      2025-05-20 22:00:06.226     [facility]                                  Echo Saline Bubble? No  Addendum Date: 5/21/2025      Left Ventricle: The left ventricle is normal in size. Normal wall   thickness. There is normal systolic function. Ejection fraction is   approximately 60%.  There is normal diastolic function.    Right Ventricle: The right ventricle is normal in size Wall thickness   is normal. Systolic function is normal.    Left Atrium: The left atrium is mildly dilated    Tricuspid Valve: There is mild regurgitation with a centrally directed   jet.    Pulmonary Artery: The estimated pulmonary artery systolic pressure is   47 mmHg.    IVC/SVC: Normal venous pressure at 3 mmHg.      EKG 5/20/2025: Normal sinus rhythm, nonspecific ST and T waves abnormality  Assessment/Plan:   Mrs. Spence presents with epigastric pain with PMHx of GERD. CXR revealed large hiatal hernia. Troponin decreased. Echo unremarkable. Will continue to monitor labs.     * Elevated troponin  - Troponin 0.032 -> 0.037 -> 0.027  - Denies chest pain  - Echo with normal EF of 60%, normal systolic and diastolic function  - Symptoms may be exacerbated by large hiatal hernia   - Discussed with patient about having a stress test outpatient     Nausea and vomiting  - Defer to      A-fib  - EKG: Normal sinus rhythm  - Hx of ablation of atrial flutter (typical) in 2019 w/ Dr. Morillo  - Continue metoprolol and rivaroxaban     Primary hypertension  - Continue home medications    VTE Risk Mitigation (From admission, onward)           Ordered     rivaroxaban tablet 15 mg  with dinner         05/20/25 4378                    Thank you for your consult. We will follow-up with patient. Please contact us if you have any additional questions.    MANOJ Michelle  [facility]

## 2025-05-21 NOTE — HOSPITAL COURSE
Ms. Spence was admitted with chest pressure/burning in the midsternal region and elevated troponins.  On chest x-ray it was noted that she has a large hiatal hernia which is likely contributing to acid reflux causing the burning in her chest.  Given the elevated troponins cardiology was consulted who recommended a stress test that will be done on 05/22/2025.  Patient will also need to follow up with General surgery as an outpatient for hiatal hernia repair.  We discussed consulting inpatient General surgery but at this time patient would like to think about having surgery prior to meeting with the surgeons.  Patient has previously seen Dr. Graves during her hospitalization in April and would like to follow up with him if she decides to move forward with surgery.  Patient has also not had a EGD since 2018 and therefore will likely need to undergo a repeat EGD prior to surgery as well as clearance from Cardiology.  Patient did have some midsternal pressure earlier this morning after drinking orange juice.  She was given a dose of Maalox which she stated resolved her symptoms.  Patient underwent a stress test on 05/22/2025 that was negative.  Patient is cleared for discharge at this time.  On day of discharge we discussed need for general surgery follow up as an outpatient the patient states that she is still not sure she wants surgery but understands that she will need to follow up with Gastroenterology as well as General surgery if she decides that she would like to move forward with hernia repair.  She has been counseled on continuing to use her Pepcid twice daily and to use Maalox as needed.  Patient is stable for discharge at this time.

## 2025-05-21 NOTE — ASSESSMENT & PLAN NOTE
Patient's blood pressure range in the last 24 hours was: BP  Min: 119/58  Max: 166/71.The patient's inpatient anti-hypertensive regimen is listed below:  Current Antihypertensives  metoprolol tartrate (LOPRESSOR) tablet 25 mg, 2 times daily, Oral  timolol maleate 0.5% ophthalmic solution 1 drop, Daily, Both Eyes  hydrALAZINE injection 10 mg, Every 6 hours PRN, Intravenous    Plan  - BP is controlled, no changes needed to their regimen

## 2025-05-21 NOTE — ASSESSMENT & PLAN NOTE
Patient has paroxysmal (<7 days) atrial fibrillation. Patient is currently in sinus rhythm. [id number] Score: 3. The patients heart rate in the last 24 hours is as follows:  Pulse  Min: 97  Max: 106     Antiarrhythmics  metoprolol tartrate (LOPRESSOR) tablet 25 mg, 2 times daily, Oral  metoprolol tartrate (LOPRESSOR) tablet 25 mg, ED 1 Time, Oral    Anticoagulants  rivaroxaban tablet 15 mg, with dinner, Oral    Plan  - Replete lytes with a goal of K>4, Mg >2  - Patient is anticoagulated, see medications listed above.  - Patient's afib is currently controlled

## 2025-05-21 NOTE — SUBJECTIVE & OBJECTIVE
Past Medical History:   Diagnosis Date    A-fib     Arthritis     Chronic LBP     ESIs x 3 with Dr. Hicks, PT at [facility], chiropractor    Eye pain     Frequent headaches     GERD (gastroesophageal reflux disease)     Glaucoma     Hyperlipemia     Hypertension        Past Surgical History:   Procedure Laterality Date    ABLATION OF ARRHYTHMOGENIC FOCUS FOR ATRIAL FIBRILLATION N/A 3/6/2019    Procedure: ABLATION, ARRHYTHMOGENIC FOCUS, FOR ATRIAL FIBRILLATION;  Surgeon: Abel Morillo MD;  Location: [facility] EP LAB;  Service: Cardiology;  Laterality: N/A;    CARDIOVERSION N/A 7/9/2018    Procedure: CARDIOVERSION;  Surgeon: Will Rosenthal MD;  Location: [facility] CATH LAB;  Service: Cardiology;  Laterality: N/A;    CATARACT EXTRACTION W/  INTRAOCULAR LENS IMPLANT  OU    COLONOSCOPY N/A 6/13/2024    Procedure: COLONOSCOPY 6/10 xarelto 2 day hold note;  Surgeon: Sayda Ferreira MD;  Location: [facility] ENDO;  Service: Endoscopy;  Laterality: N/A;    ESOPHAGOGASTRODUODENOSCOPY N/A 4/13/2024    Procedure: EGD (ESOPHAGOGASTRODUODENOSCOPY);  Surgeon: Oswald Esteves MD;  Location: [facility] ENDO;  Service: Endoscopy;  Laterality: N/A;    INJECTION OF ANESTHETIC AGENT INTO SACROILIAC JOINT Right 11/3/2020    Procedure: right Sacroiliac Joint Injection;  Surgeon: Ayush Christiansen MD;  Location: [facility] MGT;  Service: Pain Management;  Laterality: Right;    INJECTION OF ANESTHETIC AGENT INTO SACROILIAC JOINT Right 3/23/2021    Procedure: right Sacroiliac Joint Injection;  Surgeon: Ayush Christiansen MD;  Location: [facility] PAIN MGT;  Service: Pain Management;  Laterality: Right;    INJECTION OF ANESTHETIC AGENT INTO TISSUE PLANE DEFINED BY TRANSVERSUS ABDOMINIS MUSCLE N/A 4/8/2025    Procedure: BLOCK, TRANSVERSUS ABDOMINIS PLANE;  Surgeon: Sondra Kaur MD;  Location: [facility] OR;  Service: General;  Laterality: N/A;    INJECTION OF JOINT Right 11/3/2020    Procedure: right GT bursa injection;  Surgeon: Ayush Christiansen MD;  Location: [facility] PAIN  MGT;  Service: Pain Management;  Laterality: Right;    INJECTION OF JOINT Bilateral 3/23/2021    Procedure: bilateral GT bursa injection;  Surgeon: Ayush Christiansen MD;  Location: [facility] PAIN MGT;  Service: Pain Management;  Laterality: Bilateral;    ROBOTIC EXCISION,SMALL INTESTINE N/A 4/8/2025    Procedure: ROBOTIC EXCISION,SMALL INTESTINE;  Surgeon: Sondra Kaur MD;  Location: [facility] OR;  Service: General;  Laterality: N/A;    TUBAL LIGATION      XI ROBOTIC LAPAROSCOPY,DIAGNOSTIC N/A 4/8/2025    Procedure: XI ROBOTIC LAPAROSCOPY,DIAGNOSTIC;  Surgeon: Sondra Kaur MD;  Location: [facility] OR;  Service: General;  Laterality: N/A;       Review of patient's allergies indicates:   Allergen Reactions    Voltaren [diclofenac sodium] Edema     Gel formulation caused facial rash and facial swelling    Eliquis [apixaban] Other (See Comments)     Headache, numbness in lip     Medrol [methylprednisolone] Blisters       No current facility-administered medications on file prior to encounter.     Current Outpatient Medications on File Prior to Encounter   Medication Sig    AA/prot/lysine/methio/vit C/B6 (A/G PRO ORAL) Take 2 tablets by mouth 2 (two) times daily.     acetaminophen (TYLENOL) 650 MG TbSR Take 650 mg by mouth as needed. Does not go over 3000mg daily    atorvastatin (LIPITOR) 10 MG tablet Take 1 tablet (10 mg total) by mouth every evening.    CALCIUM PHOSPHATE TRIB/VIT D3 (CITRACAL + D ORAL) Take 1 tablet by mouth once daily at 6am.     cetirizine (ZYRTEC) 10 MG tablet Take 10 mg by mouth as needed for Allergies.    furosemide (LASIX) 20 MG tablet TAKE ONE TABLET BY MOUTH TWICE A WEEK    gabapentin (NEURONTIN) 400 MG capsule Take 1 capsule (400 mg total) by mouth 3 (three) times daily.    latanoprost 0.005 % ophthalmic solution INSTILL ONE DROP IN EACH EYE AT BEDTIME    metoprolol tartrate (LOPRESSOR) 25 MG tablet Take 1 tablet (25 mg total) by mouth 2 (two) times daily.    MULTIVITAMIN W-MINERALS/LUTEIN (CENTRUM  SILVER ORAL) Take 1 tablet by mouth once daily.    omeprazole (PRILOSEC) 40 MG capsule Take 1 capsule (40 mg total) by mouth once daily.    ondansetron (ZOFRAN-ODT) 4 MG TbDL Take 1 tablet (4 mg total) by mouth every 6 (six) hours as needed.    timolol maleate 0.5% (TIMOPTIC) 0.5 % Drop PLACE ONE DROP INTO BOTH EYES EVERY MORNING    XARELTO 15 mg Tab Take 1 tablet (15 mg total) by mouth daily with dinner or evening meal.    [DISCONTINUED] traMADoL (ULTRAM) 50 mg tablet Take 1 tablet (50 mg total) by mouth every 6 (six) hours as needed for Pain.    RSVPreF3 antigen-AS01E, PF, (AREXVY, PF,) 120 mcg/0.5 mL SusR vaccine Inject into the muscle.     Family History       Problem Relation (Age of Onset)    Cancer Mother    Cataracts Mother    Diabetes Paternal Aunt    Glaucoma Mother    Hypertension Mother, Father          Tobacco Use    Smoking status: Never    Smokeless tobacco: Never   Substance and Sexual Activity    Alcohol use: No    Drug use: No    Sexual activity: Yes     Partners: Male     Review of Systems   Constitutional: Negative.   HENT: Negative.     Eyes: Negative.    Cardiovascular: Negative.    Respiratory: Negative.     Endocrine: Negative.    Hematologic/Lymphatic: Negative.    Skin: Negative.    Musculoskeletal: Negative.    Gastrointestinal:  Positive for heartburn, nausea and vomiting.   Genitourinary: Negative.    Neurological: Negative.    Psychiatric/Behavioral: Negative.     Allergic/Immunologic: Negative.      Objective:     Vital Signs (Most Recent):  Temp: 98.1 °F (36.7 °C) (05/21/25 0831)  Pulse: 84 (05/21/25 0954)  Resp: 18 (05/21/25 0831)  BP: 129/61 (05/21/25 0831)  SpO2: 98 % (05/21/25 0831) Vital Signs (24h Range):  Temp:  [98 °F (36.7 °C)-99 °F (37.2 °C)] 98.1 °F (36.7 °C)  Pulse:  [] 84  Resp:  [15-18] 18  SpO2:  [96 %-98 %] 98 %  BP: (129-166)/(60-71) 129/61     Weight: 72.6 kg (160 lb 0.9 oz)  Body mass index is 26.63 kg/m².    SpO2: 98 %         Intake/Output Summary (Last 24  "hours) at 5/21/2025 1122  Last data filed at 5/20/2025 2142  Gross per 24 hour   Intake 1000 ml   Output --   Net 1000 ml       Lines/Drains/Airways       Peripheral Intravenous Line  Duration                  Peripheral IV - Single Lumen 05/20/25 2032 20 G Right Antecubital <1 day                     Physical Exam  Vitals and nursing note reviewed.   Constitutional:       Appearance: Normal appearance.   HENT:      Head: Normocephalic and atraumatic.   Eyes:      Pupils: Pupils are equal, round, and reactive to light.   Cardiovascular:      Rate and Rhythm: Normal rate and regular rhythm.      Heart sounds: S1 normal and S2 normal. No murmur heard.  Pulmonary:      Effort: Pulmonary effort is normal.   Musculoskeletal:      Right lower leg: No edema.      Left lower leg: No edema.   Neurological:      General: No focal deficit present.      Mental Status: She is oriented to person, place, and time.   Psychiatric:         Mood and Affect: Mood normal.         Behavior: Behavior normal.          Significant Labs: CMP   Recent Labs   Lab 05/20/25 1959 05/21/25  0540    138   K 4.6 3.8    108   CO2 24 22*   GLU 99 80   BUN 17 17   CREATININE 0.7 0.7   CALCIUM 10.7* 9.3   PROT 8.6*  --    ALBUMIN 4.2  --    BILITOT 0.5  --    ALKPHOS 107  --    AST 31  --    ALT 22  --    ANIONGAP 12 8   , CBC   Recent Labs   Lab 05/20/25 1959 05/21/25  0540   WBC 11.52 9.14   HGB 13.2 10.7*   HCT 40.4 33.6*    291   , Troponin No results for input(s): "TROPONINIHS" in the last 48 hours., and All pertinent lab results from the last 24 hours have been reviewed.    Significant Imaging: Echocardiogram: Transthoracic echo (TTE) complete (Cupid Only):   Results for orders placed or performed during the hospital encounter of 05/20/25   Echo Saline Bubble? No   Result Value Ref Range    BSA 1.82 m2    A4C EF 73 %    LVOT stroke volume 83.2 cm3    LVIDd 4.2 3.5 - 6.0 cm    LV Systolic Volume 19 mL    LV Systolic Volume " Index 10.6 mL/m2    LVIDs 2.4 2.1 - 4.0 cm    LV ESV A2C 55.42 mL    LV Diastolic Volume 81 mL    LV ESV A4C 48.83 mL    LV Diastolic Volume Index 45.00 mL/m2    LV EDV A4C 55.15 mL    Left Ventricular End Systolic Volume by Teichholz Method 19.17 mL    Left Ventricular End Diastolic Volume by Teichholz Method 80.54 mL    IVS 0.9 0.6 - 1.1 cm    LVOT diameter 2.0 cm    LVOT area 3.1 cm2    FS 42.9 28 - 44 %    Left Ventricle Relative Wall Thickness 0.38 cm    PW 0.8 0.6 - 1.1 cm    LV mass 109.8 g    LV Mass Index 61.0 g/m2    MV Peak E [place] 1.20 m/s    TDI LATERAL 0.10 m/s    TDI SEPTAL 0.09 m/s    E/E' ratio 13 m/s    MV Peak A [place] 1.03 m/s    TR Max Lito 4.3 m/s    E/A ratio 1.17     IVRT 60 msec    E wave deceleration time 157 msec    LV SEPTAL E/E' RATIO 13.3 m/s    LV LATERAL E/E' RATIO 12.0 m/s    LVOT peak lito 1.3 m/s    Left Ventricular Outflow Tract Mean Velocity 0.88 cm/s    Left Ventricular Outflow Tract Mean Gradient 3.54 mmHg    RV- [place]berry basal diam 2.8 cm    RV S' 14.48 cm/s    RVOT peak VTI 13.5 cm    TAPSE 2.6 cm    RV/LV Ratio 0.67 cm    LA size 3.7 cm    Left Atrium Minor Axis 5.4 cm    Left Atrium Major Axis 5.5 cm    LA Vol (MOD) 53 mL    EM (MOD) 29 mL/m2    RA Major [place] 4.54 cm    AV regurgitation pressure 1/2 time 254 ms    AR Max L[place] 5.27 m/s    AV mean gradient 6 mmHg    AV peak gradient 10 mmHg    Ao peak lito 1.6 m/s    Ao VTI 34.7 cm    LVOT peak VTI 26.5 cm    AV valve area 2.4 cm²    AV Velocity Ratio 0.81     AV index (prosthetic) 0.76     [place] by Velocity Ratio 2.6 cm²    Mr max lito 5.77 m/s    MV mean gradient 4 mmHg    MV peak gradient 6 mmHg    MV stenosis pressure 1/2 time 56.43 ms    MV valve area p 1/2 method 3.90 cm2    MV valve area by continuity eq 2.88 cm2    MV VTI 28.9 cm    Triscuspid Valve Regurgitation Peak Gradient 73 mmHg    PV mean gradient 1 mmHg    PV PEAK VELOCITY 1.28 m/s    PV peak gradient 7 mmHg    Pulmonary Valve Mean Velocity 0.94 m/s    RVOT peak lito 0.77  m/s    Ao root annulus 3.1 cm    STJ 2.6 cm    Ascending aorta 2.7 cm    ASI 1.5 cm/m2    IVC diameter 1.53 cm    Mean e' 0.10 m/s    ZLVIDS -1.96     ZLVIDD -1.70     LA area A4C 17.67 cm2    LA area A2C 19.18 cm2    EM 40 mL/m2    LA Vol 72 cm3    LA WIDTH 4.2 cm    RA Width 3.5 cm    EF 60 %    TV resting pulmonary artery pressure 77 mmHg    RV TB RVSP 7 mmHg    Est. RA pres 3 mmHg    Narrative      Left Ventricle: The left ventricle is normal in size. Normal wall   thickness. There is normal systolic function. Ejection fraction is   approximately 60%. There is normal diastolic function.    Right Ventricle: The right ventricle is normal in size Wall thickness   is normal. Systolic function is normal.    Left Atrium: The left atrium is mildly dilated    Tricuspid Valve: There is mild regurgitation with a centrally directed   jet.    Pulmonary Artery: The estimated pulmonary artery systolic pressure is   77 mmHg.    IVC/SVC: Normal venous pressure at 3 mmHg.      and EKG: REviewed

## 2025-05-21 NOTE — ASSESSMENT & PLAN NOTE
Patient's blood pressure range in the last 24 hours was: BP  Min: 137/69  Max: 166/71.The patient's inpatient anti-hypertensive regimen is listed below:  Current Antihypertensives  metoprolol tartrate (LOPRESSOR) tablet 25 mg, 2 times daily, Oral  timolol maleate 0.5% ophthalmic solution 1 drop, Daily, Both Eyes  hydrALAZINE injection 10 mg, Every 6 hours PRN, Intravenous  metoprolol tartrate (LOPRESSOR) tablet 25 mg, ED 1 Time, Oral    Plan  - BP is controlled, no changes needed to their regimen  -

## 2025-05-21 NOTE — SUBJECTIVE & OBJECTIVE
Past Medical History:   Diagnosis Date    A-fib     Arthritis     Chronic LBP     ESIs x 3 with Dr. Hicks, PT at [facility], chiropractor    Eye pain     Frequent headaches     GERD (gastroesophageal reflux disease)     Glaucoma     Hyperlipemia     Hypertension        Past Surgical History:   Procedure Laterality Date    ABLATION OF ARRHYTHMOGENIC FOCUS FOR ATRIAL FIBRILLATION N/A 3/6/2019    Procedure: ABLATION, ARRHYTHMOGENIC FOCUS, FOR ATRIAL FIBRILLATION;  Surgeon: Abel Morillo MD;  Location: [facility] EP LAB;  Service: Cardiology;  Laterality: N/A;    CARDIOVERSION N/A 7/9/2018    Procedure: CARDIOVERSION;  Surgeon: Will Rosenthal MD;  Location: [facility] CATH LAB;  Service: Cardiology;  Laterality: N/A;    CATARACT EXTRACTION W/  INTRAOCULAR LENS IMPLANT  OU    COLONOSCOPY N/A 6/13/2024    Procedure: COLONOSCOPY 6/10 xarelto 2 day hold note;  Surgeon: Sayda Ferreira MD;  Location: [facility] ENDO;  Service: Endoscopy;  Laterality: N/A;    ESOPHAGOGASTRODUODENOSCOPY N/A 4/13/2024    Procedure: EGD (ESOPHAGOGASTRODUODENOSCOPY);  Surgeon: Oswald Esteves MD;  Location: [facility] ENDO;  Service: Endoscopy;  Laterality: N/A;    INJECTION OF ANESTHETIC AGENT INTO SACROILIAC JOINT Right 11/3/2020    Procedure: right Sacroiliac Joint Injection;  Surgeon: Ayush Christiansen MD;  Location: [facility] MGT;  Service: Pain Management;  Laterality: Right;    INJECTION OF ANESTHETIC AGENT INTO SACROILIAC JOINT Right 3/23/2021    Procedure: right Sacroiliac Joint Injection;  Surgeon: Ayush Christiansen MD;  Location: [facility] PAIN MGT;  Service: Pain Management;  Laterality: Right;    INJECTION OF ANESTHETIC AGENT INTO TISSUE PLANE DEFINED BY TRANSVERSUS ABDOMINIS MUSCLE N/A 4/8/2025    Procedure: BLOCK, TRANSVERSUS ABDOMINIS PLANE;  Surgeon: Sondra Kaur MD;  Location: [facility] OR;  Service: General;  Laterality: N/A;    INJECTION OF JOINT Right 11/3/2020    Procedure: right GT bursa injection;  Surgeon: Ayush Christiansen MD;  Location: [facility] PAIN  MGT;  Service: Pain Management;  Laterality: Right;    INJECTION OF JOINT Bilateral 3/23/2021    Procedure: bilateral GT bursa injection;  Surgeon: Ayush Christiansen MD;  Location: [facility] PAIN MGT;  Service: Pain Management;  Laterality: Bilateral;    ROBOTIC EXCISION,SMALL INTESTINE N/A 4/8/2025    Procedure: ROBOTIC EXCISION,SMALL INTESTINE;  Surgeon: Sondra Kaur MD;  Location: [facility] OR;  Service: General;  Laterality: N/A;    TUBAL LIGATION      XI ROBOTIC LAPAROSCOPY,DIAGNOSTIC N/A 4/8/2025    Procedure: XI ROBOTIC LAPAROSCOPY,DIAGNOSTIC;  Surgeon: Sondra Kaur MD;  Location: [facility] OR;  Service: General;  Laterality: N/A;       Review of patient's allergies indicates:   Allergen Reactions    Voltaren [diclofenac sodium] Edema     Gel formulation caused facial rash and facial swelling    Eliquis [apixaban] Other (See Comments)     Headache, numbness in lip     Medrol [methylprednisolone] Blisters       No current facility-administered medications on file prior to encounter.     Current Outpatient Medications on File Prior to Encounter   Medication Sig    omeprazole (PRILOSEC) 40 MG capsule Take 1 capsule (40 mg total) by mouth once daily.    ondansetron (ZOFRAN-ODT) 4 MG TbDL Take 1 tablet (4 mg total) by mouth every 6 (six) hours as needed.    AA/prot/lysine/methio/vit C/B6 (A/G PRO ORAL) Take 2 tablets by mouth 2 (two) times daily.     acetaminophen (TYLENOL) 650 MG TbSR Take 650 mg by mouth as needed. Does not go over 3000mg daily    atorvastatin (LIPITOR) 10 MG tablet Take 1 tablet (10 mg total) by mouth every evening.    CALCIUM PHOSPHATE TRIB/VIT D3 (CITRACAL + D ORAL) Take 1 tablet by mouth once daily at 6am.     cetirizine (ZYRTEC) 10 MG tablet Take 10 mg by mouth as needed for Allergies.    furosemide (LASIX) 20 MG tablet TAKE ONE TABLET BY MOUTH TWICE A WEEK    gabapentin (NEURONTIN) 400 MG capsule Take 1 capsule (400 mg total) by mouth 3 (three) times daily.    latanoprost 0.005 % ophthalmic  solution INSTILL ONE DROP IN EACH EYE AT BEDTIME    metoprolol tartrate (LOPRESSOR) 25 MG tablet Take 1 tablet (25 mg total) by mouth 2 (two) times daily.    MULTIVITAMIN W-MINERALS/LUTEIN (CENTRUM SILVER ORAL) Take 1 tablet by mouth once daily.    RSVPreF3 antigen-AS01E, PF, (AREXVY, PF,) 120 mcg/0.5 mL SusR vaccine Inject into the muscle.    timolol maleate 0.5% (TIMOPTIC) 0.5 % Drop PLACE ONE DROP INTO BOTH EYES EVERY MORNING    traMADoL (ULTRAM) 50 mg tablet Take 1 tablet (50 mg total) by mouth every 6 (six) hours as needed for Pain.    XARELTO 15 mg Tab Take 1 tablet (15 mg total) by mouth daily with dinner or evening meal.     Family History       Problem Relation (Age of Onset)    Cancer Mother    Cataracts Mother    Diabetes Paternal Aunt    Glaucoma Mother    Hypertension Mother, Father          Tobacco Use    Smoking status: Never    Smokeless tobacco: Never   Substance and Sexual Activity    Alcohol use: No    Drug use: No    Sexual activity: Yes     Partners: Male     Review of Systems   Constitutional:  Negative for fatigue and fever.   HENT:  Negative for sinus pressure.    Eyes:  Negative for visual disturbance.   Respiratory:  Negative for shortness of breath.    Cardiovascular:  Negative for chest pain.   Gastrointestinal:  Positive for nausea and vomiting.        Epigastric pain   Genitourinary:  Negative for difficulty urinating.   Musculoskeletal:  Negative for back pain.   Skin:  Negative for rash.   Neurological:  Negative for headaches.   Psychiatric/Behavioral:  Negative for confusion.      Objective:     Vital Signs (Most Recent):  Temp: 99 °F (37.2 °C) (05/20/25 1748)  Pulse: 98 (05/20/25 2302)  Resp: 18 (05/20/25 2302)  BP: (!) 166/71 (05/20/25 2302)  SpO2: 96 % (05/20/25 2302) Vital Signs (24h Range):  Temp:  [99 °F (37.2 °C)] 99 °F (37.2 °C)  Pulse:  [] 98  Resp:  [15-18] 18  SpO2:  [96 %] 96 %  BP: (137-166)/(69-71) 166/71        There is no height or weight on file to calculate  BMI.     Physical Exam  Constitutional:       General: She is not in acute distress.     Appearance: She is well-developed. She is not diaphoretic.   HENT:      Head: Normocephalic and atraumatic.   Eyes:      Pupils: Pupils are equal, round, and reactive to light.   Cardiovascular:      Rate and Rhythm: Normal rate and regular rhythm.      Heart sounds: Normal heart sounds. No murmur heard.     No friction rub. No gallop.   Pulmonary:      Effort: Pulmonary effort is normal. No respiratory distress.      Breath sounds: Normal breath sounds. No stridor. No wheezing or rales.   Abdominal:      General: Bowel sounds are normal. There is no distension.      Palpations: Abdomen is soft. There is no mass.      Tenderness: There is no abdominal tenderness. There is no guarding.   Musculoskeletal:      Right lower leg: No edema.      Left lower leg: No edema.   Skin:     General: Skin is warm.      Findings: No erythema.   Neurological:      Mental Status: She is alert and oriented to person, place, and time.              CRANIAL NERVES     CN III, IV, VI   Pupils are equal, round, and reactive to light.       Significant Labs:   Results for orders placed or performed during the hospital encounter of 05/20/25   Comp. Metabolic Panel    Collection Time: 05/20/25  7:59 PM   Result Value Ref Range    Sodium 141 136 - 145 mmol/L    Potassium 4.6 3.5 - 5.1 mmol/L    Chloride 105 95 - 110 mmol/L    CO2 24 23 - 29 mmol/L    Glucose 99 70 - 110 mg/dL    BUN 17 8 - 23 mg/dL    Creatinine 0.7 0.5 - 1.4 mg/dL    Calcium 10.7 (H) 8.7 - 10.5 mg/dL    Protein Total 8.6 (H) 6.0 - 8.4 gm/dL    Albumin 4.2 3.5 - 5.2 g/dL    Bilirubin Total 0.5 0.1 - 1.0 mg/dL     40 - 150 unit/L    AST 31 11 - 45 unit/L    ALT 22 10 - 44 unit/L    Anion Gap 12 8 - 16 mmol/L    eGFR >60 >60 mL/min/1.73/m2   Lipase    Collection Time: 05/20/25  7:59 PM   Result Value Ref Range    Lipase Level 14 4 - 60 U/L   CBC with Differential    Collection Time:  05/20/25  7:59 PM   Result Value Ref Range    WBC 11.52 3.90 - 12.70 K/uL    RBC 4.13 4.00 - 5.40 M/uL    HGB 13.2 12.0 - 16.0 gm/dL    HCT 40.4 37.0 - 48.5 %    MCV 98 82 - 98 fL    MCH 32.0 (H) 27.0 - 31.0 pg    MCHC 32.7 32.0 - 36.0 g/dL    RDW 12.8 11.5 - 14.5 %    Platelet Count 358 150 - 450 K/uL    MPV 9.9 9.2 - 12.9 fL    Nucleated RBC 0 <=0 /100 WBC    Neut % 71.3 38 - 73 %    Lymph % 21.4 18 - 48 %    Mono % 6.7 4 - 15 %    Eos % 0.2 <=8 %    Basophil % 0.2 <=1.9 %    Imm Grans % 0.2 0.0 - 0.5 %    Neut # 8.22 (H) 1.8 - 7.7 K/uL    Lymph # 2.47 1 - 4.8 K/uL    Mono # 0.77 0.3 - 1 K/uL    Eos # 0.02 <=0.5 K/uL    Baso # 0.02 <=0.2 K/uL    Imm Grans # 0.02 0.00 - 0.04 K/uL   Troponin I #1    Collection Time: 05/20/25  7:59 PM   Result Value Ref Range    Troponin-I 0.032 (H) <=0.026 ng/mL   Urinalysis, Reflex to Urine Culture Urine, Clean Catch    Collection Time: 05/20/25  9:24 PM    Specimen: Urine   Result Value Ref Range    Color, UA Yellow Straw, [place], Yellow, Light-Orange    Appearance, UA Clear Clear    pH, UA 6.0 5.0 - 8.0    Spec Grav UA 1.020 1.005 - 1.030    Protein, UA Negative Negative    Glucose, UA Negative Negative    Ketones, UA 2+ (A) Negative    Bilirubin, UA Negative Negative    Blood, UA Negative Negative    Nitrites, UA Negative Negative    Urobilinogen, UA Negative <2.0 EU/dL    Leukocyte Esterase, UA Negative Negative   [place] URINE HOLD    Collection Time: 05/20/25  9:24 PM   Result Value Ref Range    Extra Tube Hold for add-ons.    BNP    Collection Time: 05/20/25  9:42 PM   Result Value Ref Range    BNP 98 0 - 99 pg/mL   D dimer, quantitative    Collection Time: 05/20/25  9:42 PM   Result Value Ref Range    D-Dimer 0.23 <0.50 mg/L FEU         Significant Imaging:   Imaging Results              X-Ray Chest AP Portable (Final result)  Result time 05/20/25 21:58:05      Final result by Yoana Mccarthy MD (05/20/25 21:58:05)                   Narrative:    EXAM: XR CHEST AP  PORTABLE    CLINICAL HISTORY: Chest pain    PRIOR:  04/08/2025    FINDINGS:   Large anal hernia redemonstrated.  Lungs unchanged well aerated and no pleural effusion    IMPRESSION:  Stable chest exam with large hiatal hernia    Finalized on: 5/20/2025 9:58 PM By:  Yoana Mccarthy MD  [facility]# [id number]      2025-05-20 22:00:06.226     [facility]

## 2025-05-21 NOTE — ED PROVIDER NOTES
SCRIBE #1 NOTE: I, Connie Blair, am scribing for, and in the presence of, Sherrie Dutton DO. I have scribed the entire note.       History     Chief Complaint   Patient presents with    Heartburn     Pt reports heartburn that started last night and worsened today. Pt reports not taking prescribed omeprazole due to vomiting today.      Review of patient's allergies indicates:   Allergen Reactions    Voltaren [diclofenac sodium] Edema     Gel formulation caused facial rash and facial swelling    Eliquis [apixaban] Other (See Comments)     Headache, numbness in lip     Medrol [methylprednisolone] Blisters         History of Present Illness     HPI    5/20/2025, 9:20 PM  History obtained from the patient      History of Present Illness: Maria Del Carmen Spence is a 84 y.o. female patient with a PMHx of arthritis, chronic LBP, GERD, A-fib, HTN, and HLD who presents to the Emergency Department for evaluation of heartburn which onset gradually last night which worsened today. Symptoms are constant and moderate in severity. No exacerbating factors reported.  Symptoms have resolved with Pepcid and Zofran.  Associated sxs include N/V and belching. Patient denies any CP, abd pain, diarrhea, constipation, dizziness, lightheadedness, SOB, diaphoresis, and palpations. Pt has not taken prescribed omeprazole today due to the vomiting. No prior Tx reported. No further complaints or concerns at this time.       Arrival mode: Personal vehicle    PCP: Rajinder Lutz MD        Past Medical History:  Past Medical History:   Diagnosis Date    A-fib     Arthritis     Chronic LBP     ESIs x 3 with Dr. Hicks, PT at [facility], chiropractor    Eye pain     Frequent headaches     GERD (gastroesophageal reflux disease)     Glaucoma     Hyperlipemia     Hypertension        Past Surgical History:  Past Surgical History:   Procedure Laterality Date    ABLATION OF ARRHYTHMOGENIC FOCUS FOR ATRIAL FIBRILLATION N/A 3/6/2019    Procedure: ABLATION,  ARRHYTHMOGENIC FOCUS, FOR ATRIAL FIBRILLATION;  Surgeon: Abel Morillo MD;  Location: [facility] EP LAB;  Service: Cardiology;  Laterality: N/A;    CARDIOVERSION N/A 7/9/2018    Procedure: CARDIOVERSION;  Surgeon: Will Rosenthal MD;  Location: [facility] CATH LAB;  Service: Cardiology;  Laterality: N/A;    CATARACT EXTRACTION W/  INTRAOCULAR LENS IMPLANT  OU    COLONOSCOPY N/A 6/13/2024    Procedure: COLONOSCOPY 6/10 xarelto 2 day hold note;  Surgeon: Sayda Ferreira MD;  Location: [facility] ENDO;  Service: Endoscopy;  Laterality: N/A;    ESOPHAGOGASTRODUODENOSCOPY N/A 4/13/2024    Procedure: EGD (ESOPHAGOGASTRODUODENOSCOPY);  Surgeon: Oswald Esteves MD;  Location: [facility];  Service: Endoscopy;  Laterality: N/A;    INJECTION OF ANESTHETIC AGENT INTO SACROILIAC JOINT Right 11/3/2020    Procedure: right Sacroiliac Joint Injection;  Surgeon: Ayush Christiansen MD;  Location: [facility] PAIN MGT;  Service: Pain Management;  Laterality: Right;    INJECTION OF ANESTHETIC AGENT INTO SACROILIAC JOINT Right 3/23/2021    Procedure: right Sacroiliac Joint Injection;  Surgeon: Ayush Christiansen MD;  Location: [facility] PAIN MGT;  Service: Pain Management;  Laterality: Right;    INJECTION OF ANESTHETIC AGENT INTO TISSUE PLANE DEFINED BY TRANSVERSUS ABDOMINIS MUSCLE N/A 4/8/2025    Procedure: BLOCK, TRANSVERSUS ABDOMINIS PLANE;  Surgeon: Sondra Kaur MD;  Location: [facility];  Service: General;  Laterality: N/A;    INJECTION OF JOINT Right 11/3/2020    Procedure: right GT bursa injection;  Surgeon: Ayush Christiansen MD;  Location: [facility] PAIN MGT;  Service: Pain Management;  Laterality: Right;    INJECTION OF JOINT Bilateral 3/23/2021    Procedure: bilateral GT bursa injection;  Surgeon: Ayush Christiansen MD;  Location: [facility] PAIN MGT;  Service: Pain Management;  Laterality: Bilateral;    ROBOTIC EXCISION,SMALL INTESTINE N/A 4/8/2025    Procedure: ROBOTIC EXCISION,SMALL INTESTINE;  Surgeon: Sondra Kaur MD;  Location: [facility] OR;  Service:  General;  Laterality: N/A;    TUBAL LIGATION      XI ROBOTIC LAPAROSCOPY,DIAGNOSTIC N/A 4/8/2025    Procedure: XI ROBOTIC LAPAROSCOPY,DIAGNOSTIC;  Surgeon: Sondra Kaur MD;  Location: [facility];  Service: General;  Laterality: N/A;         Family History:  Family History   Problem Relation Name Age of Onset    Glaucoma Mother      Cancer Mother      Cataracts Mother      Hypertension Mother      Hypertension Father      Diabetes Paternal Aunt      Strabismus Neg Hx      Retinal detachment Neg Hx      Macular degeneration Neg Hx      Blindness Neg Hx      Amblyopia Neg Hx      Stroke Neg Hx      Thyroid disease Neg Hx         Social History:  Social History     Tobacco Use    Smoking status: Never    Smokeless tobacco: Never   Substance and Sexual Activity    Alcohol use: No    Drug use: No    Sexual activity: Yes     Partners: Male        Review of Systems     Review of Systems   Constitutional:  Negative for diaphoresis.   Respiratory:  Negative for shortness of breath.    Cardiovascular:  Negative for chest pain and palpitations.   Gastrointestinal:  Positive for nausea and vomiting. Negative for abdominal pain, constipation and diarrhea.        (+) belching   Neurological:  Negative for dizziness and light-headedness.        Physical Exam     Initial Vitals [05/20/25 1748]   BP Pulse Resp Temp SpO2   137/69 106 15 99 °F (37.2 °C) 96 %      MAP       --          Physical Exam  Nursing Notes and Vital Signs Reviewed.  Constitutional: Patient is in no acute distress. Well-developed and well-nourished. Appears stated age.  Head: Atraumatic. Normocephalic.  Eyes: No scleral icterus.  ENT: Mucous membranes are moist.  Cardiovascular: tachycardic. Regular rhythm. No murmurs, rubs, or gallops.   Pulmonary/Chest: No respiratory distress. Clear to auscultation bilaterally. No wheezing or rales.  Abdominal: Soft and non-distended.  There is no tenderness.  No rebound, guarding, or rigidity. Good bowel  sounds  Musculoskeletal: Moves all extremities. No obvious deformities. No edema. No calf tenderness  Skin: Warm and dry.  Neurological:  Alert, awake, and appropriate.  Normal speech.  No acute focal neurological deficits are appreciated.  Psychiatric: Normal affect. Good eye contact. Appropriate in content.     ED Course   Critical Care    Date/Time: 5/20/2025 11:15 PM    Performed by: Sherrie Dutton DO  Authorized by: Sherrie Dutton DO  Direct patient critical care time: 19 minutes  Additional history critical care time: 9 minutes  Ordering / reviewing critical care time: 8 minutes  Documentation critical care time: 7 minutes  Consulting other physicians critical care time: 6 minutes  Total critical care time (exclusive of procedural time) : 49 minutes  Critical care time was exclusive of separately billable procedures and treating other patients and teaching time.  Critical care was necessary to treat or prevent imminent or life-threatening deterioration of the following conditions: cardiac failure.  Critical care was time spent personally by me on the following activities: development of treatment plan with patient or surrogate, discussions with consultants, evaluation of patient's response to treatment, examination of patient, obtaining history from patient or surrogate, ordering and performing treatments and interventions, ordering and review of laboratory studies, ordering and review of radiographic studies, pulse oximetry and re-evaluation of patient's condition.        ED Vital Signs:  Vitals:    05/20/25 1748 05/20/25 2139 05/20/25 2200 05/20/25 2302   BP: 137/69  (!) 165/70 (!) 166/71   Pulse: 106 104 97 98   Resp: 15  16 18   Temp: 99 °F (37.2 °C)      TempSrc: Oral      SpO2: 96%  96% 96%       Abnormal Lab Results:  Labs Reviewed   COMPREHENSIVE METABOLIC PANEL - Abnormal       Result Value    Sodium 141      Potassium 4.6      Chloride 105      CO2 24      Glucose 99      BUN 17      Creatinine  0.7      Calcium 10.7 (*)     Protein Total 8.6 (*)     Albumin 4.2      Bilirubin Total 0.5            AST 31      ALT 22      Anion Gap 12      eGFR >60     URINALYSIS, REFLEX TO URINE CULTURE - Abnormal    Color, UA Yellow      Appearance, UA Clear      pH, UA 6.0      Spec Grav UA 1.020      Protein, UA Negative      Glucose, UA Negative      Ketones, UA 2+ (*)     Bilirubin, UA Negative      Blood, UA Negative      Nitrites, UA Negative      Urobilinogen, UA Negative      Leukocyte Esterase, UA Negative     CBC WITH DIFFERENTIAL - Abnormal    WBC 11.52      RBC 4.13      HGB 13.2      HCT 40.4      MCV 98      MCH 32.0 (*)     MCHC 32.7      RDW 12.8      Platelet Count 358      MPV 9.9      Nucleated RBC 0      Neut % 71.3      Lymph % 21.4      Mono % 6.7      Eos % 0.2      Basophil % 0.2      Imm Grans % 0.2      Neut # 8.22 (*)     Lymph # 2.47      Mono # 0.77      Eos # 0.02      Baso # 0.02      Imm Grans # 0.02     TROPONIN I - Abnormal    Troponin-I 0.032 (*)    LIPASE - Normal    Lipase Level 14     B-TYPE NATRIURETIC PEPTIDE - Normal    BNP 98     D DIMER, QUANTITATIVE - Normal    D-Dimer 0.23     CBC W/ AUTO DIFFERENTIAL    Narrative:     The following orders were created for panel order CBC W/ AUTO DIFFERENTIAL.  Procedure                               Abnormality         Status                     ---------                               -----------         ------                     CBC with Differential[[id number]]       Abnormal            Final result                 Please view results for these tests on the individual orders.   GREY TOP URINE HOLD    Extra Tube Hold for add-ons.     TROPONIN I        All Lab Results:  Results for orders placed or performed during the hospital encounter of 05/20/25   Comp. Metabolic Panel    Collection Time: 05/20/25  7:59 PM   Result Value Ref Range    Sodium 141 136 - 145 mmol/L    Potassium 4.6 3.5 - 5.1 mmol/L    Chloride 105 95 - 110 mmol/L    CO2  24 23 - 29 mmol/L    Glucose 99 70 - 110 mg/dL    BUN 17 8 - 23 mg/dL    Creatinine 0.7 0.5 - 1.4 mg/dL    Calcium 10.7 (H) 8.7 - 10.5 mg/dL    Protein Total 8.6 (H) 6.0 - 8.4 gm/dL    Albumin 4.2 3.5 - 5.2 g/dL    Bilirubin Total 0.5 0.1 - 1.0 mg/dL     40 - 150 unit/L    AST 31 11 - 45 unit/L    ALT 22 10 - 44 unit/L    Anion Gap 12 8 - 16 mmol/L    eGFR >60 >60 mL/min/1.73/m2   Lipase    Collection Time: 05/20/25  7:59 PM   Result Value Ref Range    Lipase Level 14 4 - 60 U/L   CBC with Differential    Collection Time: 05/20/25  7:59 PM   Result Value Ref Range    WBC 11.52 3.90 - 12.70 K/uL    RBC 4.13 4.00 - 5.40 M/uL    HGB 13.2 12.0 - 16.0 gm/dL    HCT 40.4 37.0 - 48.5 %    MCV 98 82 - 98 fL    MCH 32.0 (H) 27.0 - 31.0 pg    MCHC 32.7 32.0 - 36.0 g/dL    RDW 12.8 11.5 - 14.5 %    Platelet Count 358 150 - 450 K/uL    MPV 9.9 9.2 - 12.9 fL    Nucleated RBC 0 <=0 /100 WBC    Neut % 71.3 38 - 73 %    Lymph % 21.4 18 - 48 %    Mono % 6.7 4 - 15 %    Eos % 0.2 <=8 %    Basophil % 0.2 <=1.9 %    Imm Grans % 0.2 0.0 - 0.5 %    Neut # 8.22 (H) 1.8 - 7.7 K/uL    Lymph # 2.47 1 - 4.8 K/uL    Mono # 0.77 0.3 - 1 K/uL    Eos # 0.02 <=0.5 K/uL    Baso # 0.02 <=0.2 K/uL    Imm Grans # 0.02 0.00 - 0.04 K/uL   Troponin I #1    Collection Time: 05/20/25  7:59 PM   Result Value Ref Range    Troponin-I 0.032 (H) <=0.026 ng/mL   Urinalysis, Reflex to Urine Culture Urine, Clean Catch    Collection Time: 05/20/25  9:24 PM    Specimen: Urine   Result Value Ref Range    Color, UA Yellow Straw, Morena, Yellow, Light-Orange    Appearance, UA Clear Clear    pH, UA 6.0 5.0 - 8.0    Spec Grav UA 1.020 1.005 - 1.030    Protein, UA Negative Negative    Glucose, UA Negative Negative    Ketones, UA 2+ (A) Negative    Bilirubin, UA Negative Negative    Blood, UA Negative Negative    Nitrites, UA Negative Negative    Urobilinogen, UA Negative <2.0 EU/dL    Leukocyte Esterase, UA Negative Negative   GREY TOP URINE HOLD    Collection Time:  05/20/25  9:24 PM   Result Value Ref Range    Extra Tube Hold for add-ons.    BNP    Collection Time: 05/20/25  9:42 PM   Result Value Ref Range    BNP 98 0 - 99 pg/mL   D dimer, quantitative    Collection Time: 05/20/25  9:42 PM   Result Value Ref Range    D-Dimer 0.23 <0.50 mg/L FEU         Imaging Results:  Imaging Results              X-Ray Chest AP Portable (Final result)  Result time 05/20/25 21:58:05      Final result by Yoana Mccarthy MD (05/20/25 21:58:05)                   Narrative:    EXAM: XR CHEST AP PORTABLE    CLINICAL HISTORY: Chest pain    PRIOR:  04/08/2025    FINDINGS:   Large anal hernia redemonstrated.  Lungs unchanged well aerated and no pleural effusion    IMPRESSION:  Stable chest exam with large hiatal hernia    Finalized on: 5/20/2025 9:58 PM By:  Yoana Mccarthy MD  [facility]# [id number]      2025-05-20 22:00:06.226     [facility]                                     The EKG was ordered, reviewed, and independently interpreted by the ED provider.  Interpretation time: 20:14  Rate: 93 BPM  Rhythm: normal sinus rhythm  Interpretation: nonspecific ST and T WA. No STEMI.           The Emergency Provider reviewed the vital signs and test results, which are outlined above.     ED Discussion       10:57 PM: Discussed case with Dr. Walker ([facility]). Dr. Walker agrees with current care and management of pt and accepts admission.   Admitting Service:   Admitting Physician: Dr. Walker  Admit to: obs med/tele    11:00 PM: Re-evaluated pt. I have discussed test results, shared treatment plan, and the need for admission with patient and family at bedside. Pt and family express understanding at this time and agree with all information. All questions answered. Pt and family have no further questions or concerns at this time. Pt is ready for admit.      ED Course as of 05/20/25 2317   Tue May 20, 2025   2249 84-year-old female with a history of hypertension, hyperlipidemia, and atrial fibrillation on Xarelto  status post ablation in 2019 presents with chest burning.  Improved with famotidine and Zofran.  EKG shows nonspecific changes and troponin slightly elevated acutely at 0.032.  D-dimer negative.  Chest x-ray shows hiatal hernia otherwise unremarkable.  Lipase is negative for pancreatitis.  No signs of CHF.  BP elevated, has not taken her metoprolol. [NF]      ED Course User Index  [NF] Sherrie Dutton, DO     Medical Decision Making  Amount and/or Complexity of Data Reviewed  Labs: ordered. Decision-making details documented in ED Course.  Radiology: ordered. Decision-making details documented in ED Course.  ECG/medicine tests: ordered and independent interpretation performed. Decision-making details documented in ED Course.    Risk  Prescription drug management.       Additional MDM:   Differential Diagnosis:   GERD, gastritis, hypertensive emergency, ACS, PE, pancreatitis, CHF, pneumonia             ED Medication(s):  Medications   atorvastatin tablet 10 mg (has no administration in time range)   gabapentin capsule 400 mg (has no administration in time range)   metoprolol tartrate (LOPRESSOR) tablet 25 mg (has no administration in time range)   pantoprazole EC tablet 40 mg (has no administration in time range)   traMADoL tablet 50 mg (has no administration in time range)   latanoprost 0.005 % ophthalmic solution 1 drop (has no administration in time range)   timolol maleate 0.5% ophthalmic solution 1 drop (has no administration in time range)   rivaroxaban tablet 15 mg (has no administration in time range)   sodium chloride 0.9% flush 10 mL (has no administration in time range)   acetaminophen tablet 650 mg (has no administration in time range)   ondansetron disintegrating tablet 4 mg (has no administration in time range)   hydrALAZINE injection 10 mg (has no administration in time range)   metoprolol tartrate (LOPRESSOR) tablet 25 mg (has no administration in time range)   ondansetron injection 4 mg (4 mg  Intravenous Given 5/20/25 2032)   famotidine (PF) injection 20 mg (20 mg Intravenous Given 5/20/25 2032)   0.9% NaCl infusion (0 mLs Intravenous Stopped 5/20/25 2142)       New Prescriptions    No medications on file               Scribe Attestation:   Scribe #1: I performed the above scribed service and the documentation accurately describes the services I performed. I attest to the accuracy of the note.     Attending:   Physician Attestation Statement for Scribe #1: I, Sherrie Dutton DO, personally performed the services described in this documentation, as scribed by Connie Blair, in my presence, and it is both accurate and complete.           Clinical Impression       ICD-10-CM ICD-9-CM   1. NSTEMI (non-ST elevated myocardial infarction)  I21.4 410.70   2. Epigastric pain  R10.13 789.06   3. Chest pain  R07.9 786.50   4. Elevated systolic blood pressure reading with diagnosis of hypertension  I10 401.9       Disposition:   Disposition: Placed in Observation  Condition: Stable         Sherrie Dutton DO  05/20/25 7748

## 2025-05-21 NOTE — PLAN OF CARE
Discussed poc with pt, pt verbalized understanding    Purposeful rounding every 2hours    VS wnl  Cardiac monitoring in use, pt is NSR, tele monitor # [id number]  Blood glucose monitoring   Fall precautions in place, remains injury free  Pt denies c/o any nausea or vomiting at this time and patient will continue to be monitored.  Pain and nausea under control with PRN meds    IVFs  Accurate I&Os  Abx given as prescribed  Bed locked at lowest position  Call light within reach    Chart check complete  Will cont with POC

## 2025-05-22 VITALS
RESPIRATION RATE: 17 BRPM | HEIGHT: 65 IN | OXYGEN SATURATION: 94 % | SYSTOLIC BLOOD PRESSURE: 151 MMHG | TEMPERATURE: 98 F | HEART RATE: 69 BPM | WEIGHT: 163 LBS | BODY MASS INDEX: 27.16 KG/M2 | DIASTOLIC BLOOD PRESSURE: 71 MMHG

## 2025-05-22 LAB
ABSOLUTE EOSINOPHIL (OHS): 0.21 K/UL
ABSOLUTE MONOCYTE (OHS): 0.87 K/UL (ref 0.3–1)
ABSOLUTE NEUTROPHIL COUNT (OHS): 3.7 K/UL (ref 1.8–7.7)
ANION GAP (OHS): 6 MMOL/L (ref 8–16)
BASOPHILS # BLD AUTO: 0.03 K/UL
BASOPHILS NFR BLD AUTO: 0.4 %
BUN SERPL-MCNC: 16 MG/DL (ref 8–23)
CALCIUM SERPL-MCNC: 9.3 MG/DL (ref 8.7–10.5)
CHLORIDE SERPL-SCNC: 108 MMOL/L (ref 95–110)
CO2 SERPL-SCNC: 26 MMOL/L (ref 23–29)
CREAT SERPL-MCNC: 0.7 MG/DL (ref 0.5–1.4)
CV STRESS BASE HR: 67 BPM
DIASTOLIC BLOOD PRESSURE: 71 MMHG
EJECTION FRACTION- HIGH: 73 %
END DIASTOLIC INDEX-HIGH: 165 ML/M2
END DIASTOLIC INDEX-LOW: 101 ML/M2
END SYSTOLIC INDEX-HIGH: 64 ML/M2
END SYSTOLIC INDEX-LOW: 28 ML/M2
ERYTHROCYTE [DISTWIDTH] IN BLOOD BY AUTOMATED COUNT: 12.9 % (ref 11.5–14.5)
GFR SERPLBLD CREATININE-BSD FMLA CKD-EPI: >60 ML/MIN/1.73/M2
GLUCOSE SERPL-MCNC: 86 MG/DL (ref 70–110)
HCT VFR BLD AUTO: 33.7 % (ref 37–48.5)
HGB BLD-MCNC: 10.6 GM/DL (ref 12–16)
IMM GRANULOCYTES # BLD AUTO: 0.02 K/UL (ref 0–0.04)
IMM GRANULOCYTES NFR BLD AUTO: 0.3 % (ref 0–0.5)
LYMPHOCYTES # BLD AUTO: 3.01 K/UL (ref 1–4.8)
MCH RBC QN AUTO: 31.7 PG (ref 27–31)
MCHC RBC AUTO-ENTMCNC: 31.5 G/DL (ref 32–36)
MCV RBC AUTO: 101 FL (ref 82–98)
NUC REST EJECTION FRACTION: 96
NUC STRESS EJECTION FRACTION: 93 %
NUCLEATED RBC (/100WBC) (OHS): 0 /100 WBC
OHS CV CPX 85 PERCENT MAX PREDICTED HEART RATE MALE: 115
OHS CV CPX MAX PREDICTED HEART RATE: 135
OHS CV CPX PATIENT IS FEMALE: 1
OHS CV CPX PATIENT IS MALE: 0
OHS CV CPX PEAK DIASTOLIC BLOOD PRESSURE: 71 MMHG
OHS CV CPX PEAK HEAR RATE: 97 BPM
OHS CV CPX PEAK RATE PRESSURE PRODUCT: NORMAL
OHS CV CPX PEAK SYSTOLIC BLOOD PRESSURE: 151 MMHG
OHS CV CPX PERCENT MAX PREDICTED HEART RATE ACHIEVED: 74
OHS CV CPX RATE PRESSURE PRODUCT PRESENTING: NORMAL
OHS CV INITIAL DOSE: 9.7 MCG/KG/MIN
OHS CV PEAK DOSE: 32.5 MCG/KG/MIN
PLATELET # BLD AUTO: 285 K/UL (ref 150–450)
PMV BLD AUTO: 10.2 FL (ref 9.2–12.9)
POTASSIUM SERPL-SCNC: 3.9 MMOL/L (ref 3.5–5.1)
RBC # BLD AUTO: 3.34 M/UL (ref 4–5.4)
RELATIVE EOSINOPHIL (OHS): 2.7 %
RELATIVE LYMPHOCYTE (OHS): 38.4 % (ref 18–48)
RELATIVE MONOCYTE (OHS): 11.1 % (ref 4–15)
RELATIVE NEUTROPHIL (OHS): 47.1 % (ref 38–73)
RETIRED EF AND QEF - SEE NOTES: 59 %
SODIUM SERPL-SCNC: 140 MMOL/L (ref 136–145)
SYSTOLIC BLOOD PRESSURE: 151 MMHG
WBC # BLD AUTO: 7.84 K/UL (ref 3.9–12.7)

## 2025-05-22 PROCEDURE — 80048 BASIC METABOLIC PNL TOTAL CA: CPT | Performed by: FAMILY MEDICINE

## 2025-05-22 PROCEDURE — 36415 COLL VENOUS BLD VENIPUNCTURE: CPT | Performed by: FAMILY MEDICINE

## 2025-05-22 PROCEDURE — 25000003 PHARM REV CODE 250: Performed by: FAMILY MEDICINE

## 2025-05-22 PROCEDURE — 99232 SBSQ HOSP IP/OBS MODERATE 35: CPT | Mod: 25,,, | Performed by: PHYSICIAN ASSISTANT

## 2025-05-22 PROCEDURE — 85025 COMPLETE CBC W/AUTO DIFF WBC: CPT | Performed by: FAMILY MEDICINE

## 2025-05-22 PROCEDURE — 63600175 PHARM REV CODE 636 W HCPCS: Performed by: FAMILY MEDICINE

## 2025-05-22 PROCEDURE — A9502 TC99M TETROFOSMIN: HCPCS | Performed by: FAMILY MEDICINE

## 2025-05-22 RX ORDER — REGADENOSON 0.08 MG/ML
0.4 INJECTION, SOLUTION INTRAVENOUS ONCE
Status: COMPLETED | OUTPATIENT
Start: 2025-05-22 | End: 2025-05-22

## 2025-05-22 RX ORDER — ALUMINUM HYDROXIDE, MAGNESIUM HYDROXIDE, AND SIMETHICONE 2400; 240; 2400 MG/30ML; MG/30ML; MG/30ML
5 SUSPENSION ORAL EVERY 6 HOURS PRN
COMMUNITY
Start: 2025-05-22 | End: 2025-06-21

## 2025-05-22 RX ADMIN — TETROFOSMIN 32.5 MILLICURIE: 1.38 INJECTION, POWDER, LYOPHILIZED, FOR SOLUTION INTRAVENOUS at 12:05

## 2025-05-22 RX ADMIN — GABAPENTIN 400 MG: 400 CAPSULE ORAL at 09:05

## 2025-05-22 RX ADMIN — PANTOPRAZOLE SODIUM 40 MG: 40 TABLET, DELAYED RELEASE ORAL at 09:05

## 2025-05-22 RX ADMIN — METOPROLOL TARTRATE 25 MG: 25 TABLET, FILM COATED ORAL at 09:05

## 2025-05-22 RX ADMIN — TETROFOSMIN 9.7 MILLICURIE: 1.38 INJECTION, POWDER, LYOPHILIZED, FOR SOLUTION INTRAVENOUS at 11:05

## 2025-05-22 RX ADMIN — TIMOLOL MALEATE 1 DROP: 5 SOLUTION/ DROPS OPHTHALMIC at 09:05

## 2025-05-22 RX ADMIN — REGADENOSON 0.4 MG: 0.08 INJECTION, SOLUTION INTRAVENOUS at 12:05

## 2025-05-22 NOTE — PROGRESS NOTES
[facility]  Cardiology  Progress Note    Patient Name: Maria Del Carmen Spence  MRN: [id number]  Admission Date: 5/20/2025  Hospital Length of Stay: 1 days  Code Status: Full Code   Attending Physician: Eron Lundy MD   Primary Care Physician: Cristian Lutz MD  Expected Discharge Date: 5/22/2025  Principal Problem:Elevated troponin    Subjective:   HPI:  Ms. Spence is an 84 year old female patient whose current medical conditions include PAF/PAFL s/p prior ablation (on Xarelto), arthritis, DJD, AI, and GERD who presented to [facility] ED overnight due to GERD that onset yesterday evening. Patient reported she was lying down when she felt the need to belch. She sat up and became very nauseated and had repeated bouts of small volume vomiting. Denied any associated cristian CP, heaviness, or tightness. Tried taking po pepcid at home without any relief and was unable to keep solids or liquids down which prompted her to go to the emergency room. Initial workup in ED revealed troponin of 0.032>0.037. CXR showed large hiatal hernia and patient was subsequently admitted for further evaluation and treatment. Cardiology consulted to assist with management. Patient seen and examined today, resting in bed. Feels ok. Still nauseated. Denies CP. No exertional chest heaviness or tightness. No prior history of CAD or MI. She reports compliance with her medications. Followed in clinic by Dr. Rosenthal. Chart reviewed. Troponin flat/trending down at 0.027. TTE with normal EF. Discussed OP MPI stress test if EF normal.       Hospital Course:   5/22/2025-Patient seen and examined today, resting in bed. Feeling better, less nausea/vomiting. No cristian CP/SOB. TTE with normal EF. MPI stress test pending.        Review of Systems   Constitutional: Negative.   HENT: Negative.     Eyes: Negative.    Cardiovascular: Negative.    Respiratory: Negative.     Endocrine: Negative.    Hematologic/Lymphatic: Negative.    Skin: Negative.     Musculoskeletal: Negative.    Gastrointestinal: Negative.    Genitourinary: Negative.    Neurological: Negative.    Psychiatric/Behavioral: Negative.       Objective:     Vital Signs (Most Recent):  Temp: 98.1 °F (36.7 °C) (05/22/25 0829)  Pulse: 67 (05/22/25 0829)  Resp: 17 (05/22/25 0829)  BP: 125/60 (05/22/25 0829)  SpO2: (!) 94 % (05/22/25 0829) Vital Signs (24h Range):  Temp:  [97.8 °F (36.6 °C)-98.2 °F (36.8 °C)] 98.1 °F (36.7 °C)  Pulse:  [62-82] 67  Resp:  [16-17] 17  SpO2:  [90 %-96 %] 94 %  BP: (119-138)/(58-63) 125/60     Weight: 74 kg (163 lb 2.3 oz)  Body mass index is 27.15 kg/m².     SpO2: (!) 94 %       No intake or output data in the 24 hours ending 05/22/25 1142    Lines/Drains/Airways       Peripheral Intravenous Line  Duration                  Peripheral IV - Single Lumen 05/20/25 2032 20 G Right Antecubital 1 day                       Physical Exam  Vitals and nursing note reviewed.   Constitutional:       General: She is not in acute distress.     Appearance: She is well-developed. She is not diaphoretic.   HENT:      Head: Normocephalic and atraumatic.   Eyes:      General:         Right eye: No discharge.         Left eye: No discharge.      Pupils: Pupils are equal, round, and reactive to light.   Cardiovascular:      Rate and Rhythm: Normal rate and regular rhythm.      Heart sounds: Normal heart sounds, S1 normal and S2 normal. No murmur heard.  Pulmonary:      Effort: Pulmonary effort is normal. No respiratory distress.   Abdominal:      General: There is no distension.   Musculoskeletal:      Right lower leg: No edema.      Left lower leg: No edema.   Skin:     General: Skin is warm and dry.      Findings: No erythema.   Neurological:      General: No focal deficit present.      Mental Status: She is alert and oriented to person, place, and time.   Psychiatric:         Mood and Affect: Mood normal.            Significant Labs: CMP   Recent Labs   Lab 05/20/25 1959 05/21/25  0540  "05/22/25  0525    138 140   K 4.6 3.8 3.9    108 108   CO2 24 22* 26   GLU 99 80 86   BUN 17 17 16   CREATININE 0.7 0.7 0.7   CALCIUM 10.7* 9.3 9.3   PROT 8.6*  --   --    ALBUMIN 4.2  --   --    BILITOT 0.5  --   --    ALKPHOS 107  --   --    AST 31  --   --    ALT 22  --   --    ANIONGAP 12 8 6*   , CBC   Recent Labs   Lab 05/20/25 1959 05/21/25  0540 05/22/25  0525   WBC 11.52 9.14 7.84   HGB 13.2 10.7* 10.6*   HCT 40.4 33.6* 33.7*    291 285   , Troponin No results for input(s): "TROPONINIHS" in the last 48 hours., and All pertinent lab results from the last 24 hours have been reviewed.    Significant Imaging: Echocardiogram: Transthoracic echo (TTE) complete (Cupid Only):   Results for orders placed or performed during the hospital encounter of 05/20/25   Echo Saline Bubble? No   Result Value Ref Range    BSA 1.82 m2    A4C EF 73 %    LVOT stroke volume 83.2 cm3    LVIDd 4.2 3.5 - 6.0 cm    LV Systolic Volume 19 mL    LV Systolic Volume Index 10.6 mL/m2    LVIDs 2.4 2.1 - 4.0 cm    LV ESV A2C 55.42 mL    LV Diastolic Volume 81 mL    LV ESV A4C 48.83 mL    LV Diastolic Volume Index 45.00 mL/m2    LV EDV A4C 55.15 mL    Left Ventricular End Systolic Volume by Teichholz Method 19.17 mL    Left Ventricular End Diastolic Volume by Teichholz Method 80.54 mL    IVS 0.9 0.6 - 1.1 cm    LVOT diameter 2.0 cm    LVOT area 3.1 cm2    FS 42.9 28 - 44 %    Left Ventricle Relative Wall Thickness 0.38 cm    PW 0.8 0.6 - 1.1 cm    LV mass 109.8 g    LV Mass Index 61.0 g/m2    MV Peak E [place] 1.20 m/s    TDI LATERAL 0.10 m/s    TDI SEPTAL 0.09 m/s    E/E' ratio 13 m/s    MV Peak A Lito 1.03 m/s    TR Max Lito 3.3 m/s    E/A ratio 1.17     IVRT 60 msec    E wave deceleration time 157 msec    LV SEPTAL E/E' RATIO 13.3 m/s    LV LATERAL E/E' RATIO 12.0 m/s    LVOT peak lito 1.3 m/s    Left Ventricular Outflow Tract Mean Velocity 0.88 cm/s    Left Ventricular Outflow Tract Mean Gradient 3.54 mmHg    RV- newberry basal " diam 2.8 cm    RV S' 14.48 cm/s    RVOT peak VTI 13.5 cm    TAPSE 2.6 cm    RV/LV Ratio 0.67 cm    LA size 3.7 cm    Left Atrium Minor Axis 5.4 cm    Left Atrium Major Axis 5.5 cm    LA Vol (MOD) 53 mL    EM (MOD) 29 mL/m2    RA Major [place] 4.54 cm    AV regurgitation pressure 1/2 time 254 ms    AR Max Tali 5.27 m/s    AV mean gradient 6 mmHg    AV peak gradient 10 mmHg    Ao peak tali 1.6 m/s    Ao VTI 34.7 cm    LVOT peak VTI 26.5 cm    AV valve area 2.4 cm²    AV Velocity Ratio 0.81     AV index (prosthetic) 0.76     [place] by Velocity Ratio 2.6 cm²    Mr max tali 5.77 m/s    MV mean gradient 4 mmHg    MV peak gradient 6 mmHg    MV stenosis pressure 1/2 time 56.43 ms    MV valve area p 1/2 method 3.90 cm2    MV valve area by continuity eq 2.88 cm2    MV VTI 28.9 cm    Triscuspid Valve Regurgitation Peak Gradient 73 mmHg    PV mean gradient 1 mmHg    PV PEAK VELOCITY 1.28 m/s    PV peak gradient 7 mmHg    Pulmonary Valve Mean Velocity 0.94 m/s    RVOT peak tali 0.77 m/s    Ao root annulus 3.1 cm    STJ 2.6 cm    Ascending aorta 2.7 cm    ASI 1.5 cm/m2    IVC diameter 1.53 cm    Mean e' 0.10 m/s    ZLVIDS -1.96     ZLVIDD -1.70     LA area A4C 17.67 cm2    LA area A2C 19.18 cm2    EM 40 mL/m2    LA Vol 72 cm3    LA WIDTH 4.2 cm    RA Width 3.5 cm    EF 60 %    TV resting pulmonary artery pressure 47 mmHg    RV TB RVSP 6 mmHg    Est. RA pres 3 mmHg    Narrative      Left Ventricle: The left ventricle is normal in size. Normal wall   thickness. There is normal systolic function. Ejection fraction is   approximately 60%. There is normal diastolic function.    Right Ventricle: The right ventricle is normal in size Wall thickness   is normal. Systolic function is normal.    Left Atrium: The left atrium is mildly dilated    Tricuspid Valve: There is mild regurgitation with a centrally directed   jet.    Pulmonary Artery: The estimated pulmonary artery systolic pressure is   77 mmHg.    IVC/SVC: Normal venous pressure at 3  mmHg.      and EKG: Reviewed  Assessment and Plan:   Patient who presents with atypical CP/elevated trop. EF normal. MPI stress test pending. Symptoms likely related to large hiatal hernia.    * Elevated troponin  -Troponin flat, 0.032>0.037>0.027, repeat pending  -Denies any cristian CP, heaviness, or tightness  -Symptoms seem GI in nature/related to her hiatal hernia  -TTE with normal EF  -Discussed OP stress test    5/22/2025  -TTE with normal EF  -MPI stress test pending    Nausea and vomiting  -Per hospital medicine    A-fib  -Prior history, remains in SR  -Continue BB  -Continue Xarelto    Primary hypertension  -Continue home meds        VTE Risk Mitigation (From admission, onward)           Ordered     rivaroxaban tablet 15 mg  with dinner         05/20/25 9821                    Khushboo Lorenzo PA-C  Cardiology  [facility]

## 2025-05-22 NOTE — ASSESSMENT & PLAN NOTE
Patient's blood pressure range in the last 24 hours was: BP  Min: 122/60  Max: 151/71.The patient's inpatient anti-hypertensive regimen is listed below:  Current Antihypertensives  metoprolol tartrate (LOPRESSOR) tablet 25 mg, 2 times daily, Oral  timolol maleate 0.5% ophthalmic solution 1 drop, Daily, Both Eyes  hydrALAZINE injection 10 mg, Every 6 hours PRN, Intravenous    Plan  - BP is controlled, no changes needed to their regimen

## 2025-05-22 NOTE — HOSPITAL COURSE
5/22/2025-Patient seen and examined today, resting in bed. Feeling better, less nausea/vomiting. No cristian CP/SOB. TTE with normal EF. MPI stress test pending.

## 2025-05-22 NOTE — DISCHARGE SUMMARY
[facility]  [facility] Medicine  Discharge Summary      Patient Name: Maria Del Carmen Spence  MRN: [id number]  JEOVANY: [id number]  Patient Class: IP- Inpatient  Admission Date: 5/20/2025  Hospital Length of Stay: 1 days  Discharge Date and Time: No discharge date for patient encounter.  Attending Physician: Eron Lundy MD   Discharging Provider: Eron Lundy MD  Primary Care Provider: Rajinder Lutz MD    Primary Care Team: Networked reference to record PCT     HPI:   Patient is a 84-year-old  female with a PMH of arthritis, GERD, AFib on Xarelto , HTN, and HLD who presents to the ED with complaints of epigastric pain.  Patient reported that the symptoms started last night and has progressively worsened.  Associated symptoms include vomiting.  She reports taking Pepcid x2 without relief.  Was unable to keep liquids or solids down.  Denies any radiation of epigastric pain.  Denies any chest pain or shortness a breath.  No other issues reported at this time.      In the ED, she was given IV Pepcid with relief.  Chest x-ray showed large hiatal hernia otherwise unremarkable.  Troponin 0.032.      * No surgery found *      Hospital Course:   Ms. Spence was admitted with chest pressure/burning in the midsternal region and elevated troponins.  On chest x-ray it was noted that she has a large hiatal hernia which is likely contributing to acid reflux causing the burning in her chest.  Given the elevated troponins cardiology was consulted who recommended a stress test that will be done on 05/22/2025.  Patient will also need to follow up with General surgery as an outpatient for hiatal hernia repair.  We discussed consulting inpatient General surgery but at this time patient would like to think about having surgery prior to meeting with the surgeons.  Patient has previously seen Dr. Graves during her hospitalization in April and would like to follow up with him if she decides to move forward with surgery.   Patient has also not had a EGD since 2018 and therefore will likely need to undergo a repeat EGD prior to surgery as well as clearance from Cardiology.  Patient did have some midsternal pressure earlier this morning after drinking orange juice.  She was given a dose of Maalox which she stated resolved her symptoms.  Patient underwent a stress test on 05/22/2025 that was negative.  Patient is cleared for discharge at this time.  On day of discharge we discussed need for general surgery follow up as an outpatient the patient states that she is still not sure she wants surgery but understands that she will need to follow up with Gastroenterology as well as General surgery if she decides that she would like to move forward with hernia repair.  She has been counseled on continuing to use her Pepcid twice daily and to use Maalox as needed.  Patient is stable for discharge at this time.     Goals of Care Treatment Preferences:  Code Status: Full Code      SDOH Screening:  The patient declined to be screened for utility difficulties, food insecurity, transport difficulties, housing insecurity, and interpersonal safety, so no concerns could be identified this admission.     Consults:   Consults (From admission, onward)          Status Ordering Provider     Inpatient consult to Cardiology  Once        Provider:  Josemanuel Walters MD    Completed CHAZ WOLFF            Assessment & Plan  Elevated troponin  Elevated troponin noted   No chest pain   EKG negative for ST changes   Will trend troponins   Check echo - reviewed  Cardiology planning a stress test on 05/22/2025 - patient will need cardiac clearance prior to any surgical intervention for hiatal hernia.      Primary hypertension  Patient's blood pressure range in the last 24 hours was: BP  Min: 122/60  Max: 151/71.The patient's inpatient anti-hypertensive regimen is listed below:  Current Antihypertensives  metoprolol tartrate (LOPRESSOR) tablet 25 mg, 2 times  daily, Oral  timolol maleate 0.5% ophthalmic solution 1 drop, Daily, Both Eyes  hydrALAZINE injection 10 mg, Every 6 hours PRN, Intravenous    Plan  - BP is controlled, no changes needed to their regimen    Indeterminate stage chronic open angle glaucoma  Continue latanoprost and timolol    A-fib  Patient has paroxysmal (<7 days) atrial fibrillation. Patient is currently in sinus rhythm. [id number] Score: 3. The patients heart rate in the last 24 hours is as follows:  Pulse  Min: 62  Max: 78     Antiarrhythmics  metoprolol tartrate (LOPRESSOR) tablet 25 mg, 2 times daily, Oral    Anticoagulants  rivaroxaban tablet 15 mg, with dinner, Oral    Plan  - Replete lytes with a goal of K>4, Mg >2  - Patient is anticoagulated, see medications listed above.  - Patient's afib is currently controlled        Gastroesophageal reflux disease without esophagitis  -Will continue Protonix   -GI cocktail   -She was given a dose of Maalox this morning which improved her chest burning post orange juice ingestion   -We discussed the need to follow up with General surgery for hiatal hernia repair.  She is not ready for that at this time and stated that she would follow up as an outpatient with General surgery if she decides to move forward with any intervention.    Nausea and vomiting  Likely secondary to GERD   Currently improved with Pepcid and Zofran   Continue to monitor    Final Active Diagnoses:    Diagnosis Date Noted POA    PRINCIPAL PROBLEM:  Elevated troponin [R79.89] 05/20/2025 Yes    Nausea and vomiting [R11.2] 11/14/2024 Yes    Gastroesophageal reflux disease without esophagitis [K21.9] 08/04/2021 Yes    A-fib [I48.91] 03/04/2019 Yes    Indeterminate stage chronic open angle glaucoma [H40.1194] 03/01/2016 Yes    Primary hypertension [I10] 09/08/2014 Yes      Problems Resolved During this Admission:       Discharged Condition: stable    Disposition: Home or Self Care    Follow Up:   Follow-up Information       [facility]  MD Allan. Schedule an appointment as soon as possible for a visit.    Specialty: General Surgery  Why: Follow up for hiatal hernia repair  Contact information:  [address] [place], [place] [address]on [facility] [address]  [phone]               Rajinder Lutz MD Follow up in 1 week(s).    Specialty: Family Medicine  Contact information:  [address] [place] [address] [place]  [place] [id number] [phone]               Josemanuel Walters MD Follow up today.    Specialties: Interventional Cardiology, Cardiology  Why: follow up as needed  Contact information:  [address] [place] [address] Dr Hugo [place] [address]  [phone]                           Patient Instructions:      Diet Cardiac     Activity as tolerated       Significant Diagnostic Studies: Labs: BMP:   Recent Labs   Lab 05/20/25 1959 05/21/25 0540 05/21/25 1143 05/22/25 0525   GLU 99 80  --  86    138  --  140   K 4.6 3.8  --  3.9    108  --  108   CO2 24 22*  --  26   BUN 17 17  --  16   CREATININE 0.7 0.7  --  0.7   CALCIUM 10.7* 9.3  --  9.3   MG  --   --  2.0  --     and CBC   Recent Labs   Lab 05/20/25 1959 05/21/25 0540 05/22/25 0525   WBC 11.52 9.14 7.84   HGB 13.2 10.7* 10.6*   HCT 40.4 33.6* 33.7*    291 285     Radiology:   X-Ray Chest AP Portable   Final Result           Pending Diagnostic Studies:       None           Medications:  Reconciled Home Medications:      Medication List        START taking these medications      aluminum & magnesium hydroxide-simethicone 400-400-40 mg/5 mL suspension  Commonly known as: MAALOX MAXIMUM STRENGTH  Take 5 mLs by mouth every 6 (six) hours as needed for Indigestion.            CONTINUE taking these medications      A/G PRO ORAL  Take 2 tablets by mouth 2 (two) times daily.     acetaminophen 650 MG Tbsr  Commonly known as: TYLENOL  Take 650 mg by mouth as needed. Does not go over 3000mg daily     atorvastatin 10 MG tablet  Commonly known as: LIPITOR  Take 1 tablet (10 mg total) by mouth every  evening.     CENTRUM SILVER ORAL  Take 1 tablet by mouth once daily.     cetirizine 10 MG tablet  Commonly known as: ZYRTEC  Take 10 mg by mouth as needed for Allergies.     CITRACAL + D ORAL  Take 1 tablet by mouth once daily at 6am.     furosemide 20 MG tablet  Commonly known as: LASIX  TAKE ONE TABLET BY MOUTH TWICE A WEEK     gabapentin 400 MG capsule  Commonly known as: NEURONTIN  Take 1 capsule (400 mg total) by mouth 3 (three) times daily.     latanoprost 0.005 % ophthalmic solution  INSTILL ONE DROP IN EACH EYE AT BEDTIME     metoprolol tartrate 25 MG tablet  Commonly known as: LOPRESSOR  Take 1 tablet (25 mg total) by mouth 2 (two) times daily.     omeprazole 40 MG capsule  Commonly known as: PRILOSEC  Take 1 capsule (40 mg total) by mouth once daily.     ondansetron 4 MG Tbdl  Commonly known as: ZOFRAN-ODT  Take 1 tablet (4 mg total) by mouth every 6 (six) hours as needed.     timolol maleate 0.5% 0.5 % Drop  Commonly known as: TIMOPTIC  PLACE ONE DROP INTO BOTH EYES EVERY MORNING     XARELTO 15 mg Tab  Generic drug: rivaroxaban  Take 1 tablet (15 mg total) by mouth daily with dinner or evening meal.            STOP taking these medications      AREXVY (PF) 120 mcg/0.5 mL Susr vaccine  Generic drug: RSVPreF3 antigen-AS01E (PF)              Indwelling Lines/Drains at time of discharge:   Lines/Drains/Airways       None                   Time spent on the discharge of patient: 40 minutes         Eron Lundy MD  Department of Hospital Medicine  [facility]

## 2025-05-22 NOTE — SUBJECTIVE & OBJECTIVE
Review of Systems   Constitutional: Negative.   HENT: Negative.     Eyes: Negative.    Cardiovascular: Negative.    Respiratory: Negative.     Endocrine: Negative.    Hematologic/Lymphatic: Negative.    Skin: Negative.    Musculoskeletal: Negative.    Gastrointestinal: Negative.    Genitourinary: Negative.    Neurological: Negative.    Psychiatric/Behavioral: Negative.       Objective:     Vital Signs (Most Recent):  Temp: 98.1 °F (36.7 °C) (05/22/25 0829)  Pulse: 67 (05/22/25 0829)  Resp: 17 (05/22/25 0829)  BP: 125/60 (05/22/25 0829)  SpO2: (!) 94 % (05/22/25 0829) Vital Signs (24h Range):  Temp:  [97.8 °F (36.6 °C)-98.2 °F (36.8 °C)] 98.1 °F (36.7 °C)  Pulse:  [62-82] 67  Resp:  [16-17] 17  SpO2:  [90 %-96 %] 94 %  BP: (119-138)/(58-63) 125/60     Weight: 74 kg (163 lb 2.3 oz)  Body mass index is 27.15 kg/m².     SpO2: (!) 94 %       No intake or output data in the 24 hours ending 05/22/25 1142    Lines/Drains/Airways       Peripheral Intravenous Line  Duration                  Peripheral IV - Single Lumen 05/20/25 2032 20 G Right Antecubital 1 day                       Physical Exam  Vitals and nursing note reviewed.   Constitutional:       General: She is not in acute distress.     Appearance: She is well-developed. She is not diaphoretic.   HENT:      Head: Normocephalic and atraumatic.   Eyes:      General:         Right eye: No discharge.         Left eye: No discharge.      Pupils: Pupils are equal, round, and reactive to light.   Cardiovascular:      Rate and Rhythm: Normal rate and regular rhythm.      Heart sounds: Normal heart sounds, S1 normal and S2 normal. No murmur heard.  Pulmonary:      Effort: Pulmonary effort is normal. No respiratory distress.   Abdominal:      General: There is no distension.   Musculoskeletal:      Right lower leg: No edema.      Left lower leg: No edema.   Skin:     General: Skin is warm and dry.      Findings: No erythema.   Neurological:      General: No focal deficit  "present.      Mental Status: She is alert and oriented to person, place, and time.   Psychiatric:         Mood and Affect: Mood normal.            Significant Labs: CMP   Recent Labs   Lab 05/20/25 1959 05/21/25 0540 05/22/25 0525    138 140   K 4.6 3.8 3.9    108 108   CO2 24 22* 26   GLU 99 80 86   BUN 17 17 16   CREATININE 0.7 0.7 0.7   CALCIUM 10.7* 9.3 9.3   PROT 8.6*  --   --    ALBUMIN 4.2  --   --    BILITOT 0.5  --   --    ALKPHOS 107  --   --    AST 31  --   --    ALT 22  --   --    ANIONGAP 12 8 6*   , CBC   Recent Labs   Lab 05/20/25 1959 05/21/25 0540 05/22/25 0525   WBC 11.52 9.14 7.84   HGB 13.2 10.7* 10.6*   HCT 40.4 33.6* 33.7*    291 285   , Troponin No results for input(s): "TROPONINIHS" in the last 48 hours., and All pertinent lab results from the last 24 hours have been reviewed.    Significant Imaging: Echocardiogram: Transthoracic echo (TTE) complete (Cupid Only):   Results for orders placed or performed during the hospital encounter of 05/20/25   Echo Saline Bubble? No   Result Value Ref Range    BSA 1.82 m2    A4C EF 73 %    LVOT stroke volume 83.2 cm3    LVIDd 4.2 3.5 - 6.0 cm    LV Systolic Volume 19 mL    LV Systolic Volume Index 10.6 mL/m2    LVIDs 2.4 2.1 - 4.0 cm    LV ESV A2C 55.42 mL    LV Diastolic Volume 81 mL    LV ESV A4C 48.83 mL    LV Diastolic Volume Index 45.00 mL/m2    LV EDV A4C 55.15 mL    Left Ventricular End Systolic Volume by Teichholz Method 19.17 mL    Left Ventricular End Diastolic Volume by Teichholz Method 80.54 mL    IVS 0.9 0.6 - 1.1 cm    LVOT diameter 2.0 cm    LVOT area 3.1 cm2    FS 42.9 28 - 44 %    Left Ventricle Relative Wall Thickness 0.38 cm    PW 0.8 0.6 - 1.1 cm    LV mass 109.8 g    LV Mass Index 61.0 g/m2    MV Peak E Lito 1.20 m/s    TDI LATERAL 0.10 m/s    TDI SEPTAL 0.09 m/s    E/E' ratio 13 m/s    MV Peak A Lito 1.03 m/s    TR Max Lito 3.3 m/s    E/A ratio 1.17     IVRT 60 msec    E wave deceleration time 157 msec    LV " SEPTAL E/E' RATIO 13.3 m/s    LV LATERAL E/E' RATIO 12.0 m/s    LVOT peak tali 1.3 m/s    Left Ventricular Outflow Tract Mean Velocity 0.88 cm/s    Left Ventricular Outflow Tract Mean Gradient 3.54 mmHg    RV- newberry basal diam 2.8 cm    RV S' 14.48 cm/s    RVOT peak VTI 13.5 cm    TAPSE 2.6 cm    RV/LV Ratio 0.67 cm    LA size 3.7 cm    Left Atrium Minor Axis 5.4 cm    Left Atrium Major Axis 5.5 cm    LA Vol (MOD) 53 mL    EM (MOD) 29 mL/m2    RA Major Hixson 4.54 cm    AV regurgitation pressure 1/2 time 254 ms    AR Max Tali 5.27 m/s    AV mean gradient 6 mmHg    AV peak gradient 10 mmHg    Ao peak tali 1.6 m/s    Ao VTI 34.7 cm    LVOT peak VTI 26.5 cm    AV valve area 2.4 cm²    AV Velocity Ratio 0.81     AV index (prosthetic) 0.76     [place] by Velocity Ratio 2.6 cm²    Mr max tali 5.77 m/s    MV mean gradient 4 mmHg    MV peak gradient 6 mmHg    MV stenosis pressure 1/2 time 56.43 ms    MV valve area p 1/2 method 3.90 cm2    MV valve area by continuity eq 2.88 cm2    MV VTI 28.9 cm    Triscuspid Valve Regurgitation Peak Gradient 73 mmHg    PV mean gradient 1 mmHg    PV PEAK VELOCITY 1.28 m/s    PV peak gradient 7 mmHg    Pulmonary Valve Mean Velocity 0.94 m/s    RVOT peak tali 0.77 m/s    Ao root annulus 3.1 cm    STJ 2.6 cm    Ascending aorta 2.7 cm    ASI 1.5 cm/m2    IVC diameter 1.53 cm    Mean e' 0.10 m/s    ZLVIDS -1.96     ZLVIDD -1.70     LA area A4C 17.67 cm2    LA area A2C 19.18 cm2    EM 40 mL/m2    LA Vol 72 cm3    LA WIDTH 4.2 cm    RA Width 3.5 cm    EF 60 %    TV resting pulmonary artery pressure 47 mmHg    RV TB RVSP 6 mmHg    Est. RA pres 3 mmHg    Narrative      Left Ventricle: The left ventricle is normal in size. Normal wall   thickness. There is normal systolic function. Ejection fraction is   approximately 60%. There is normal diastolic function.    Right Ventricle: The right ventricle is normal in size Wall thickness   is normal. Systolic function is normal.    Left Atrium: The left atrium is  mildly dilated    Tricuspid Valve: There is mild regurgitation with a centrally directed   jet.    Pulmonary Artery: The estimated pulmonary artery systolic pressure is   77 mmHg.    IVC/SVC: Normal venous pressure at 3 mmHg.      and EKG: Reviewed

## 2025-05-22 NOTE — ASSESSMENT & PLAN NOTE
-Troponin flat, 0.032>0.037>0.027, repeat pending  -Denies any cristian CP, heaviness, or tightness  -Symptoms seem GI in nature/related to her hiatal hernia  -TTE with normal EF  -Discussed OP stress test    5/22/2025  -TTE with normal EF  -MPI stress test pending   Visit Information Date & Time Provider Department Dept. Phone Encounter #  
 6/27/2017  1:30 PM Amelie Huntley, [address] [facility] [phone] [id number] Upcoming Health Maintenance Date Due  
 PAP AKA CERVICAL CYTOLOGY 3/3/2018 BREAST CANCER SCRN MAMMOGRAM 8/6/2018 COLONOSCOPY 4/12/2019 DTaP/Tdap/Td series (2 - Td) 3/3/2025 Allergies as of 6/27/2017  Review Complete On: 6/27/2017 By: Amelie Huntley MD  
 No Known Allergies Current Immunizations  Never Reviewed Name Date Tdap 3/3/2015 10:17 AM  
  
 Not reviewed this visit You Were Diagnosed With   
  
 Codes Comments Essential hypertension    -  Primary ICD-10-CM: I10 
ICD-9-CM: 401.9 Sleep trouble     ICD-10-CM: G47.9 ICD-9-CM: 780.50 Chronic right shoulder pain     ICD-10-CM: M25.511, G89.29 ICD-9-CM: 719.41, 338.29 Vitals BP Pulse Resp Height(growth percentile) Weight(growth percentile) LMP  
 138/90 (BP 1 Location: Left arm, BP Patient Position: Sitting) 89 16 5' 2\" (1.575 m) 160 lb 6.4 oz (72.8 kg) 06/02/2017 (Approximate) SpO2 BMI OB Status Smoking Status 98% 29.34 kg/m2 Having regular periods Never Smoker Vitals History BMI and BSA Data Body Mass Index Body Surface Area  
 29.34 kg/m 2 1.78 m 2 Preferred Pharmacy Pharmacy Name Phone [id number] [address] [phone] Your Updated Medication List  
  
   
This list is accurate as of: 6/27/17  1:50 PM.  Always use your most recent med list. amLODIPine 5 mg tablet Commonly known as:  Nanda Punter TAKE ONE TABLET BY MOUTH ONE TIME DAILY  
  
 cholecalciferol 1,000 unit Cap Commonly known as:  VITAMIN D3 Take 1,000 Units by mouth two (2) times a day. IRON PO Take 325 mg by mouth three (3) times daily. MULTIVITAMIN WITH IRON PO Take 1 Tab by mouth daily. traZODone 50 mg tablet Commonly known as:  Vipul Maguire Take 1 Tab by mouth nightly. Prescriptions Sent to Pharmacy Refills  
 traZODone (DESYREL) 50 mg tablet 1 Sig: Take 1 Tab by mouth nightly. Class: Normal  
 Pharmacy: [id number] - [place], [address] [facility] #: [phone] Route: Oral  
  
We Performed the Following REFERRAL TO PHYSICAL THERAPY [FKF17 Custom] Comments:  
 Please evaluate patient for rt shoulder pain To-Do List   
 06/27/2017 Imaging:  XR SHOULDER RT AP/LAT MIN 2 V Referral Information Referral ID Referred By Referred To  
  
 [id number] [facility] Not Available Visits Status Start Date End Date 1 New Request 6/27/17 6/27/18 If your referral has a status of pending review or denied, additional information will be sent to support the outcome of this decision. Patient Instructions Shoulder Arthritis: Exercises Your Care Instructions Here are some examples of typical rehabilitation exercises for your condition. Start each exercise slowly. Ease off the exercise if you start to have pain. Your doctor or physical therapist will tell you when you can start these exercises and which ones will work best for you. How to do the exercises Shoulder flexion (lying down) Note: To make a wand for this exercise, use a piece of PVC pipe or a broom handle with the broom removed. Make the wand about a foot wider than your shoulders. 1. Lie on your back, holding a wand with both hands. Your palms should face down as you hold the wand. 2. Keeping your elbows straight, slowly raise your arms over your head. Raise them until you feel a stretch in your shoulders, upper back, and chest. 
3. Hold for 15 to 30 seconds. 4. Repeat 2 to 4 times. Shoulder rotation (lying down) Note: To make a wand for this exercise, use a piece of PVC pipe or a broom handle with the broom removed.  Make the wand about a foot wider than your shoulders. 1. Lie on your back. Hold a wand with both hands with your elbows bent and palms up. 2. Keep your elbows close to your body, and move the wand across your body toward the sore arm. 3. Hold for 8 to 12 seconds. 4. Repeat 2 to 4 times. Shoulder internal rotation with towel 1. Hold a towel above and behind your head with the arm that is not sore. 2. With your sore arm, reach behind your back and grasp the towel. 3. With the arm above your head, pull the towel upward. Do this until you feel a stretch on the front and outside of your sore shoulder. 4. Hold 15 to 30 seconds. 5. Repeat 2 to 4 times. Shoulder blade squeeze 1. Stand with your arms at your sides, and squeeze your shoulder blades together. Do not raise your shoulders up as you squeeze. 2. Hold 6 seconds. 3. Repeat 8 to 12 times. Resisted rows Note: For this exercise, you will need elastic exercise material, such as surgical tubing or Thera-Band. 1. Put the band around a solid object at about waist level. (A bedpost will work well.) Each hand should hold an end of the band. 2. With your elbows at your sides and bent to 90 degrees, pull the band back. Your shoulder blades should move toward each other. Return to the starting position. 3. Repeat 8 to 12 times. External rotator strengthening exercise 1. Start by tying a piece of elastic exercise material to a doorknob. You can use surgical tubing or Thera-Band. (You may also hold one end of the band in each hand.) 2. Stand or sit with your shoulder relaxed and your elbow bent 90 degrees. Your upper arm should rest comfortably against your side. Squeeze a rolled towel between your elbow and your body for comfort. This will help keep your arm at your side. 3. Hold one end of the elastic band with the hand of the painful arm. 4. Start with your forearm across your belly. Slowly rotate the forearm out away from your body.  Keep your elbow and upper arm tucked against the towel roll or the side of your body until you begin to feel tightness in your shoulder. Slowly move your arm back to where you started. 5. Repeat 8 to 12 times. Internal rotator strengthening exercise 1. Start by tying a piece of elastic exercise material to a doorknob. You can use surgical tubing or Thera-Band. 2. Stand or sit with your shoulder relaxed and your elbow bent 90 degrees. Your upper arm should rest comfortably against your side. Squeeze a rolled towel between your elbow and your body for comfort. This will help keep your arm at your side. 3. Hold one end of the elastic band in the hand of the painful arm. 4. Slowly rotate your forearm toward your body until it touches your belly. Slowly move it back to where you started. 5. Keep your elbow and upper arm firmly tucked against the towel roll or at your side. 6. Repeat 8 to 12 times. Pendulum swing Note: If you have pain in your back, do not do this exercise. 1. Hold on to a table or the back of a chair with your good arm. Then bend forward a little and let your sore arm hang straight down. This exercise does not use the arm muscles. Rather, use your legs and your hips to create movement that makes your arm swing freely. 2. Use the movement from your hips and legs to guide the slightly swinging arm back and forth like a pendulum (or elephant trunk). Then guide it in circles that start small (about the size of a dinner plate). Make the circles a bit larger each day, as your pain allows. 3. Do this exercise for 5 minutes, 5 to 7 times each day. 4. As you have less pain, try bending over a little farther to do this exercise. This will increase the amount of movement at your shoulder. Follow-up care is a key part of your treatment and safety. Be sure to make and go to all appointments, and call your doctor if you are having problems. It's also a good idea to know your test results and keep a list of the medicines you take. Where can you learn more? Go to http://kings-junito.info/. Enter H562 in the search box to learn more about \"Shoulder Arthritis: Exercises. \" Current as of: March 21, 2017 Content Version: 11.3 © 8739-3314 WebVisible. Care instructions adapted under license by "Hey[employer] Neighbor!" (which disclaims liability or warranty for this information). If you have questions about a medical condition or this instruction, always ask your healthcare professional. [address] any warranty or liability for your use of this information. Rotator Cuff: Exercises Your Care Instructions Here are some examples of typical rehabilitation exercises for your condition. Start each exercise slowly. Ease off the exercise if you start to have pain. Your doctor or physical therapist will tell you when you can start these exercises and which ones will work best for you. How to do the exercises Pendulum swing Note: If you have pain in your back, do not do this exercise. 5. Hold on to a table or the back of a chair with your good arm. Then bend forward a little and let your sore arm hang straight down. This exercise does not use the arm muscles. Rather, use your legs and your hips to create movement that makes your arm swing freely. 6. Use the movement from your hips and legs to guide the slightly swinging arm back and forth like a pendulum (or elephant trunk). Then guide it in circles that start small (about the size of a dinner plate). Make the circles a bit larger each day, as your pain allows. 7. Do this exercise for 5 minutes, 5 to 7 times each day. 8. As you have less pain, try bending over a little farther to do this exercise. This will increase the amount of movement at your shoulder. Posterior stretching exercise 5. Hold the elbow of your injured arm with your other hand. 6. Use your hand to pull your injured arm gently up and across your body. You will feel a gentle stretch across the back of your injured shoulder. 7. Hold for at least 15 to 30 seconds. Then slowly lower your arm. 8. Repeat 2 to 4 times. Up-the-back stretch Note: Your doctor or physical therapist may want you to wait to do this stretch until you have regained most of your range of motion and strength. You can do this stretch in different ways. Hold any of these stretches for at least 15 to 30 seconds. Repeat them 2 to 4 times. 6. Put your hand in your back pocket. Let it rest there to stretch your shoulder. 7. With your other hand, hold your injured arm (palm outward) behind your back by the wrist. Pull your arm up gently to stretch your shoulder. 8. Next, put a towel over your other shoulder. Put the hand of your injured arm behind your back. Now hold the back end of the towel. With the other hand, hold the front end of the towel in front of your body. Pull gently on the front end of the towel. This will bring your hand farther up your back to stretch your shoulder. Overhead stretch 4. Standing about an arm's length away, grasp onto a solid surface. You could use a countertop, a doorknob, or the back of a sturdy chair. 5. With your knees slightly bent, bend forward with your arms straight. Lower your upper body, and let your shoulders stretch. 6. As your shoulders are able to stretch farther, you may need to take a step or two backward. 7. Hold for at least 15 to 30 seconds. Then stand up and relax. If you had stepped back during your stretch, step forward so you can keep your hands on the solid surface. 8. Repeat 2 to 4 times. Shoulder flexion (lying down) Note: To make a wand for this exercise, use a piece of PVC pipe or a broom handle with the broom removed. Make the wand about a foot wider than your shoulders. 4. Lie on your back, holding a wand with both hands. Your palms should face down as you hold the wand. 5. Keeping your elbows straight, slowly raise your arms over your head. Raise them until you feel a stretch in your shoulders, upper back, and chest. 
6. Hold for 15 to 30 seconds. 7. Repeat 2 to 4 times. Shoulder rotation (lying down) Note: To make a wand for this exercise, use a piece of PVC pipe or a broom handle with the broom removed. Make the wand about a foot wider than your shoulders. 6. Lie on your back. Hold a wand with both hands with your elbows bent and palms up. 7. Keep your elbows close to your body, and move the wand across your body toward the sore arm. 8. Hold for 8 to 12 seconds. 9. Repeat 2 to 4 times. Wall climbing (to the side) Note: Avoid any movement that is straight to your side, and be careful not to arch your back. Your arm should stay about 30 degrees to the front of your side. 7. Stand with your side to a wall so that your fingers can just touch it at an angle about 30 degrees toward the front of your body. 8. Walk the fingers of your injured arm up the wall as high as pain permits. Try not to shrug your shoulder up toward your ear as you move your arm up. 9. Hold that position for a count of at least 15 to 20. 
10. Walk your fingers back down to the starting position. 11. Repeat at least 2 to 4 times. Try to reach higher each time. Wall climbing (to the front) Note: During this stretching exercise, be careful not to arch your back. 5. Face a wall, and stand so your fingers can just touch it. 6. Keeping your shoulder down, walk the fingers of your injured arm up the wall as high as pain permits. (Don't shrug your shoulder up toward your ear.) 7. Hold your arm in that position for at least 15 to 30 seconds. 8. Slowly walk your fingers back down to where you started. 9. Repeat at least 2 to 4 times. Try to reach higher each time. Shoulder blade squeeze 1.  Stand with your arms at your sides, and squeeze your shoulder blades together. Do not raise your shoulders up as you squeeze. 2. Hold 6 seconds. 3. Repeat 8 to 12 times. Scapular exercise: Arm reach 1. Lie flat on your back. This exercise is a very slight motion that starts with your arms raised (elbows straight, arms straight). 2. From this position, reach higher toward the sanchez or ceiling. Keep your elbows straight. All motion should be from your shoulder blade only. 3. Relax your arms back to where you started. 4. Repeat 8 to 12 times. Arm raise to the side Note: During this strengthening exercise, your arm should stay about 30 degrees to the front of your side. 1. Slowly raise your injured arm to the side, with your thumb facing up. Raise your arm 60 degrees at the most (shoulder level is 90 degrees). 2. Hold the position for 3 to 5 seconds. Then lower your arm back to your side. If you need to, bring your \"good\" arm across your body and place it under the elbow as you lower your injured arm. Use your good arm to keep your injured arm from dropping down too fast. 
3. Repeat 8 to 12 times. 4. When you first start out, don't hold any extra weight in your hand. As you get stronger, you may use a 1-pound to 2-pound dumbbell or a small can of food. Shoulder flexor and extensor exercise Note: These are isometric exercises. That means you contract your muscles without actually moving. · Push forward (flex): Stand facing a wall or doorjamb, about 6 inches or less back. Hold your injured arm against your body. Make a closed fist with your thumb on top. Then gently push your hand forward into the wall with about 25% to 50% of your strength. Don't let your body move backward as you push. Hold for about 6 seconds. Relax for a few seconds. Repeat 8 to 12 times. · Push backward (extend): Stand with your back flat against a wall.  Your upper arm should be against the wall, with your elbow bent 90 degrees (your hand straight ahead). Push your elbow gently back against the wall with about 25% to 50% of your strength. Don't let your body move forward as you push. Hold for about 6 seconds. Relax for a few seconds. Repeat 8 to 12 times. Scapular exercise: Wall push-ups Note: This exercise is best done with your fingers somewhat turned out, rather than straight up and down. 1. Stand facing a wall, about 12 inches to 18 inches away. 2. Place your hands on the wall at shoulder height. 3. Slowly bend your elbows and bring your face to the wall. Keep your back and hips straight. 4. Push back to where you started. 5. Repeat 8 to 12 times. 6. When you can do this exercise against a wall comfortably, you can try it against a counter. You can then slowly progress to the end of a couch, then to a sturdy chair, and finally to the floor. Scapular exercise: Retraction Note: For this exercise, you will need elastic exercise material, such as surgical tubing or Thera-Band. 1. Put the band around a solid object at about waist level. (A bedpost will work well.) Each hand should hold an end of the band. 2. With your elbows at your sides and bent to 90 degrees, pull the band back. Your shoulder blades should move toward each other. Then move your arms back where you started. 3. Repeat 8 to 12 times. 4. If you have good range of motion in your shoulders, try this exercise with your arms lifted out to the sides. Keep your elbows at a 90-degree angle. Raise the elastic band up to about shoulder level. Pull the band back to move your shoulder blades toward each other. Then move your arms back where you started. Internal rotator strengthening exercise 1. Start by tying a piece of elastic exercise material to a doorknob. You can use surgical tubing or Thera-Band. 2. Stand or sit with your shoulder relaxed and your elbow bent 90 degrees. Your upper arm should rest comfortably against your side.  Squeeze a rolled towel between your elbow and your body for comfort. This will help keep your arm at your side. 3. Hold one end of the elastic band in the hand of the painful arm. 4. Slowly rotate your forearm toward your body until it touches your belly. Slowly move it back to where you started. 5. Keep your elbow and upper arm firmly tucked against the towel roll or at your side. 6. Repeat 8 to 12 times. External rotator strengthening exercise 1. Start by tying a piece of elastic exercise material to a doorknob. You can use surgical tubing or Thera-Band. (You may also hold one end of the band in each hand.) 2. Stand or sit with your shoulder relaxed and your elbow bent 90 degrees. Your upper arm should rest comfortably against your side. Squeeze a rolled towel between your elbow and your body for comfort. This will help keep your arm at your side. 3. Hold one end of the elastic band with the hand of the painful arm. 4. Start with your forearm across your belly. Slowly rotate the forearm out away from your body. Keep your elbow and upper arm tucked against the towel roll or the side of your body until you begin to feel tightness in your shoulder. Slowly move your arm back to where you started. 5. Repeat 8 to 12 times. Follow-up care is a key part of your treatment and safety. Be sure to make and go to all appointments, and call your doctor if you are having problems. It's also a good idea to know your test results and keep a list of the medicines you take. Where can you learn more? Go to http://kings-junito.info/. Enter Diaz Mcginnis in the search box to learn more about \"Rotator Cuff: Exercises. \" Current as of: March 21, 2017 Content Version: 11.3 © 2191-8074 Mobile Patrol, SkySpecs. Care instructions adapted under license by [employer] (which disclaims liability or warranty for this information).  If you have questions about a medical condition or this instruction, always ask your healthcare professional. [address] any warranty or liability for your use of this information. Insomnia: Care Instructions Your Care Instructions Insomnia is the inability to sleep well. It is a common problem for most people at some time. Insomnia may make it hard for you to get to sleep, stay asleep, or sleep as long as you need to. This can make you tired and grouchy during the day. It can also make you forgetful, less effective at work, and unhappy. Insomnia can be caused by conditions such as depression or anxiety. Pain can also affect your ability to sleep. When these problems are solved, the insomnia usually clears up. But sometimes bad sleep habits can cause insomnia. If insomnia is affecting your work or your enjoyment of life, you can take steps to improve your sleep. Follow-up care is a key part of your treatment and safety. Be sure to make and go to all appointments, and call your doctor if you are having problems. It's also a good idea to know your test results and keep a list of the medicines you take. How can you care for yourself at home? What to avoid · Do not have drinks with caffeine, such as coffee or black tea, for 8 hours before bed. · Do not smoke or use other types of tobacco near bedtime. Nicotine is a stimulant and can keep you awake. · Avoid drinking alcohol late in the evening, because it can cause you to wake in the middle of the night. · Do not eat a big meal close to bedtime. If you are hungry, eat a light snack. · Do not drink a lot of water close to bedtime, because the need to urinate may wake you up during the night. · Do not read or watch TV in bed. Use the bed only for sleeping and sexual activity. What to try · Go to bed at the same time every night, and wake up at the same time every morning. Do not take naps during the day. · Keep your bedroom quiet, dark, and cool. · Sleep on a comfortable pillow and mattress. · If watching the clock makes you anxious, turn it facing away from you so you cannot see the time. · If you worry when you lie down, start a worry book. Well before bedtime, write down your worries, and then set the book and your concerns aside. · Try meditation or other relaxation techniques before you go to bed. · If you cannot fall asleep, get up and go to another room until you feel sleepy. Do something relaxing. Repeat your bedtime routine before you go to bed again. · Make your house quiet and calm about an hour before bedtime. Turn down the lights, turn off the TV, log off the computer, and turn down the volume on music. This can help you relax after a busy day. When should you call for help? Watch closely for changes in your health, and be sure to contact your doctor if: 
· Your efforts to improve your sleep do not work. · Your insomnia gets worse. · You have been feeling down, depressed, or hopeless or have lost interest in things that you usually enjoy. Where can you learn more? Go to http://kingsSirrus Technologyjunito.info/. Enter P513 in the search box to learn more about \"Insomnia: Care Instructions. \" Current as of: July 26, 2016 Content Version: 11.3 © 5939-6379 Promimic, Wavecraft. Care instructions adapted under license by [employer] (which disclaims liability or warranty for this information). If you have questions about a medical condition or this instruction, always ask your healthcare professional. [place] [address] any warranty or liability for your use of this information. Learning About Sleeping Well What does sleeping well mean? Sleeping well means getting enough sleep. How much sleep is enough varies among people. The number of hours you sleep is not as important as how you feel when you wake up. If you do not feel refreshed, you probably need more sleep. Another sign of not getting enough sleep is feeling tired during the day. The average total nightly sleep time is 7½ to 8 hours. Healthy adults may need a little more or a little less than this. Why is getting enough sleep important? Getting enough quality sleep is a basic part of good health. When your sleep suffers, your mood and your thoughts can suffer too. You may find yourself feeling more grumpy or stressed. Not getting enough sleep also can lead to serious problems, including injury, accidents, anxiety, and depression. What might cause poor sleeping? Many things can cause sleep problems, including: · Stress. Stress can be caused by fear about a single event, such as giving a speech. Or you may have ongoing stress, such as worry about work or school. · Depression, anxiety, and other mental or emotional conditions. · Changes in your sleep habits or surroundings. This includes changes that happen where you sleep, such as noise, light, or sleeping in a different bed. It also includes changes in your sleep pattern, such as having jet lag or working a late shift. · Health problems, such as pain, breathing problems, and restless legs syndrome. · Lack of regular exercise. How can you help yourself? Here are some tips that may help you sleep more soundly and wake up feeling more refreshed. Your sleeping area · Use your bedroom only for sleeping and sex. A bit of light reading may help you fall asleep. But if it doesn't, do your reading elsewhere in the house. Don't watch TV in bed. · Be sure your bed is big enough to stretch out comfortably, especially if you have a sleep partner. · Keep your bedroom quiet, dark, and cool. Use curtains, blinds, or a sleep mask to block out light. To block out noise, use earplugs, soothing music, or a \"white noise\" machine. Your evening and bedtime routine · Create a relaxing bedtime routine.  You might want to take a warm shower or bath, listen to soothing music, or drink a cup of noncaffeinated tea. · Go to bed at the same time every night. And get up at the same time every morning, even if you feel tired. What to avoid · Limit caffeine (coffee, tea, caffeinated sodas) during the day, and don't have any for at least 4 to 6 hours before bedtime. · Don't drink alcohol before bedtime. Alcohol can cause you to wake up more often during the night. · Don't smoke or use tobacco, especially in the evening. Nicotine can keep you awake. · Don't take naps during the day, especially close to bedtime. · Don't lie in bed awake for too long. If you can't fall asleep, or if you wake up in the middle of the night and can't get back to sleep within 15 minutes or so, get out of bed and go to another room until you feel sleepy. · Don't take medicine right before bed that may keep you awake or make you feel hyper or energized. Your doctor can tell you if your medicine may do this and if you can take it earlier in the day. If you can't sleep · Imagine yourself in a peaceful, pleasant scene. Focus on the details and feelings of being in a place that is relaxing. · Get up and do a quiet or boring activity until you feel sleepy. · Don't drink any liquids after 6 p.m. if you wake up often because you have to go to the bathroom. Where can you learn more? Go to http://kings-junito.info/. Enter L008 in the search box to learn more about \"Learning About Sleeping Well. \" Current as of: July 26, 2016 Content Version: 11.3 © 2532-1186 [employer]. Care instructions adapted under license by [employer] (which disclaims liability or warranty for this information). If you have questions about a medical condition or this instruction, always ask your healthcare professional. [address] any warranty or liability for your use of this information. Introducing [facility]! Sagar Bates introduces Riiid patient portal. Now you can access parts of your medical record, email your doctor's office, and request medication refills online. 1. In your internet browser, go to https://[employer] [place]/[employer] 2. Click on the First Time User? Click Here link in the Sign In box. You will see the New Member Sign Up page. 3. Enter your Riiid Access Code exactly as it appears below. You will not need to use this code after youve completed the sign-up process. If you do not sign up before the expiration date, you must request a new code. · Riiid Access Code: [email or web address] Expires: 7/2/2017 10:06 AM 
 
4. Enter the last four digits of your Social Security Number (xxxx) and Date of Birth (mm/dd/yyyy) as indicated and click Submit. You will be taken to the next sign-up page. 5. Create a Riiid ID. This will be your Riiid login ID and cannot be changed, so think of one that is secure and easy to remember. 6. Create a Riiid password. You can change your password at any time. 7. Enter your Password Reset Question and Answer. This can be used at a later time if you forget your password. 8. Enter your e-mail address. You will receive e-mail notification when new information is available in [address] 9. Click Sign Up. You can now view and download portions of your medical record. 10. Click the Download Summary menu link to download a portable copy of your medical information. If you have questions, please visit the Frequently Asked Questions section of the Riiid website. Remember, Riiid is NOT to be used for urgent needs. For medical emergencies, dial 911. Now available from your iPhone and Android! Please provide this summary of care documentation to your next provider. Your primary care clinician is listed as Franky Leal. If you have any questions after today's visit, please call [phone].

## 2025-05-22 NOTE — ASSESSMENT & PLAN NOTE
Patient has paroxysmal (<7 days) atrial fibrillation. Patient is currently in sinus rhythm. [id number] Score: 3. The patients heart rate in the last 24 hours is as follows:  Pulse  Min: 62  Max: 78     Antiarrhythmics  metoprolol tartrate (LOPRESSOR) tablet 25 mg, 2 times daily, Oral    Anticoagulants  rivaroxaban tablet 15 mg, with dinner, Oral    Plan  - Replete lytes with a goal of K>4, Mg >2  - Patient is anticoagulated, see medications listed above.  - Patient's afib is currently controlled

## 2025-05-26 ENCOUNTER — TELEPHONE (OUTPATIENT)
Dept: INTERNAL MEDICINE | Facility: CLINIC | Age: 85
End: 2025-05-26
Payer: MEDICARE

## 2025-05-26 NOTE — TELEPHONE ENCOUNTER
Copied from CRM #[id number]. Topic: Appointments - Appointment Access  >> May 26, 2025  8:41 AM Soni wrote:  .Type:  Sooner Apoointment Request    Caller is requesting a sooner appointment.  Caller declined first available appointment listed below.  Caller will not accept being placed on the waitlist and is requesting a message be sent to doctor.  Name of Caller:.Maria Del Carmen Spence  When is the first available appointment? June 17  Symptoms: hospital f/u  Would the patient rather a call back or a response via [facility]? Call back  Best Call Back Number:.[phone]  Additional Information: pt states she was discharged from hospital on 5/22  needs a f/u within one week but nothing generated until 6/17.

## 2025-05-29 ENCOUNTER — OUTPATIENT CASE MANAGEMENT (OUTPATIENT)
Dept: ADMINISTRATIVE | Facility: OTHER | Age: 85
End: 2025-05-29
Payer: MEDICARE

## 2025-05-30 ENCOUNTER — OFFICE VISIT (OUTPATIENT)
Dept: INTERNAL MEDICINE | Facility: CLINIC | Age: 85
End: 2025-05-30
Payer: MEDICARE

## 2025-05-30 ENCOUNTER — OUTPATIENT CASE MANAGEMENT (OUTPATIENT)
Dept: ADMINISTRATIVE | Facility: OTHER | Age: 85
End: 2025-05-30
Payer: MEDICARE

## 2025-05-30 VITALS
BODY MASS INDEX: 27.25 KG/M2 | OXYGEN SATURATION: 98 % | HEIGHT: 65 IN | DIASTOLIC BLOOD PRESSURE: 70 MMHG | HEART RATE: 71 BPM | WEIGHT: 163.56 LBS | SYSTOLIC BLOOD PRESSURE: 138 MMHG | TEMPERATURE: 98 F

## 2025-05-30 DIAGNOSIS — Z79.01 ANTICOAGULANT LONG-TERM USE: ICD-10-CM

## 2025-05-30 DIAGNOSIS — R79.89 ELEVATED TROPONIN: ICD-10-CM

## 2025-05-30 DIAGNOSIS — K44.9 HIATAL HERNIA: ICD-10-CM

## 2025-05-30 DIAGNOSIS — Z09 HOSPITAL DISCHARGE FOLLOW-UP: Primary | ICD-10-CM

## 2025-05-30 DIAGNOSIS — I48.0 PAF (PAROXYSMAL ATRIAL FIBRILLATION): ICD-10-CM

## 2025-05-30 DIAGNOSIS — I10 PRIMARY HYPERTENSION: ICD-10-CM

## 2025-05-30 DIAGNOSIS — R11.2 NAUSEA AND VOMITING, UNSPECIFIED VOMITING TYPE: ICD-10-CM

## 2025-05-30 PROCEDURE — 99999 PR PBB SHADOW E&M-EST. PATIENT-LVL V: CPT | Mod: PBBFAC,,,

## 2025-05-30 PROCEDURE — 99215 OFFICE O/P EST HI 40 MIN: CPT | Mod: PBBFAC

## 2025-05-30 NOTE — PROGRESS NOTES
Outpatient Care Management   - Patient Assessment    Patient: Maria Del Carmen Spence  MRN:  [id number]  Date of Service:  5/30/2025  Completed by:  Lucia Chowdhury LMSW  Referral Date: 04/09/2025    Reason for Visit   Patient presents with    OPCM Post Discharge     05/30/2025      Goals Complete     05/30/2025         Brief Summary:  received a referral from OPCM RN David Lepe RN for post d/c assessment. OPCM Sw completed post d/c with patient. Patient lives at home with her spouse. Both drive. Pt's spouse assists patient as needed. Pt has attended her follow up appointment and plans to follow up with GI, like instructed Patient denies any needs or concerns for Sw to address. Provided pt with social workers contact information. Pt denies any other needs or concerns. OPCM RN notified. RN in agreement w/ sw closing case.

## 2025-05-30 NOTE — PATIENT INSTRUCTIONS
1.Take Acid Reflux Medication on an empty stomach. Take the medication 1-2 hours before eating and taking other medications. Wait at least 2 hours before laying flat  2.  Weight Loss:  If you are overweight, losing weight can significantly reduce your reflux.  3. Diet Control:  It is important to determine what your Trigger foods for reflux are.  Limiting your intake of these foods will significantly improve your reflux.  Examples of foods known to increase reflux are listed below  Carbonated beverages  Caffeine (this includes Coffee, soda, iced tea, energy drinks etc.)  Alcohol  Fried/ fatty/ greasy foods  Acidic foods (citrus, tomato etc.)  Chocolate  Spearmint/Peppermint  4. Elevating the head of your bed:  This doesn't mean more pillows.  You need to actually elevate the head of your bed.  Some patients have found something to put under the legs of the head of their bed.  Other patients have employed a wedge or an air bladder of some sort to elevate the head of their bed.  This makes a significant difference with reflux and can also help with nasal congestion.  5. Eating before bedtime:  Try not to eat anything for at least 2 hours before bedtime.  This allows for less material to remain in your stomach when you lay down at night which equals less severe reflux.  6. More information:  If you would like to read more about diet changes and lifestyle changes that you can employ that will help with your reflux, the books below may be helpful.  Dropping Acid:  The Reflux diet Cookbook & Cure by Shine Flores M.D. and David Sullivan M.D.  The Acid Watcher Diet:  A 28 Day Reflux Prevention and Healing Program by Everett Fernandez M.D.

## 2025-05-30 NOTE — PROGRESS NOTES
Maria Del Carmen Spence  05/30/2025  [id number]    Rajinder Lutz MD  Patient Care Team:  Rajinder Lutz MD as PCP - General (Family Medicine)  Ed Denise MD as Consulting Physician (Ophthalmology)  Will Rosenthal MD as Consulting Physician (Cardiology)  Ayush Christiansen MD as Consulting Physician (Pain Medicine)  Melva Jimenez as ED Navigator  David Lepe RN as Outpatient           Visit Type:a scheduled visit following a recent hospitalization    Chief Complaint:  Chief Complaint   Patient presents with    Hospital Follow Up          History of Present Illness:    History of Present Illness        History of Present Illness    CHIEF COMPLAINT:  Ms. Spence presents today for follow up after hospital discharge    HOSPITAL COURSE:  She reports feeling better since hospital discharge and denies needing home health services at this time.    GASTROINTESTINAL:  She reports symptoms consistent with a hiatal hernia, including gradually decreasing eating speed and difficulty eating normally. She denies current nausea. She experienced an episode of coffee ground emesis and black stools in November of last year requiring hospitalization. Denies any further episodes of coffee ground emesis or black tarry stools.     PAST SURGICAL HISTORY:  History notable for small intestine reconstruction with subsequent home health care for approximately 2 weeks.    CURRENT MEDICATIONS:  She continues Xarelto for anticoagulation and has been advised against taking aspirin or Aleve due to increased bleeding risk. She also takes Prilosec and has anti-nausea medication as needed.      ROS:  General: -fever, -chills, -fatigue, -weight gain, -weight loss  Eyes: -vision changes, -redness, -discharge  ENT: -ear pain, -nasal congestion, -sore throat  Cardiovascular: -chest pain, -palpitations, -lower extremity edema  Respiratory: -cough, -shortness of breath  Gastrointestinal: -abdominal pain, -nausea, -vomiting,  -diarrhea, -constipation, -blood in stool, +feeding difficulties  Genitourinary: -dysuria, -hematuria, -frequency  Musculoskeletal: +joint pain, -muscle pain  Skin: -rash, -lesion  Neurological: -headache, -dizziness, -numbness, -tingling  Psychiatric: -anxiety, -depression, -sleep difficulty             Patient is a 84-year-old  female with a PMH of arthritis, GERD, AFib on Xarelto , HTN, and HLD who presents to the ED with complaints of epigastric pain.  Patient reported that the symptoms started last night and has progressively worsened.  Associated symptoms include vomiting.  She reports taking Pepcid x2 without relief.  Was unable to keep liquids or solids down.  Denies any radiation of epigastric pain.  Denies any chest pain or shortness a breath.  No other issues reported at this time.       In the ED, she was given IV Pepcid with relief.  Chest x-ray showed large hiatal hernia otherwise unremarkable.  Troponin 0.032.          Hospital Course:   Ms. Spence was admitted with chest pressure/burning in the midsternal region and elevated troponins.  On chest x-ray it was noted that she has a large hiatal hernia which is likely contributing to acid reflux causing the burning in her chest.  Given the elevated troponins cardiology was consulted who recommended a stress test that will be done on 05/22/2025.  Patient will also need to follow up with General surgery as an outpatient for hiatal hernia repair.  We discussed consulting inpatient General surgery but at this time patient would like to think about having surgery prior to meeting with the surgeons.  Patient has previously seen Dr. Graves during her hospitalization in April and would like to follow up with him if she decides to move forward with surgery.  Patient has also not had a EGD since 2018 and therefore will likely need to undergo a repeat EGD prior to surgery as well as clearance from Cardiology.  Patient did have some midsternal pressure  earlier this morning after drinking orange juice.  She was given a dose of Maalox which she stated resolved her symptoms.  Patient underwent a stress test on 05/22/2025 that was negative.  Patient is cleared for discharge at this time.  On day of discharge we discussed need for general surgery follow up as an outpatient the patient states that she is still not sure she wants surgery but understands that she will need to follow up with Gastroenterology as well as General surgery if she decides that she would like to move forward with hernia repair.  She has been counseled on continuing to use her Pepcid twice daily and to use Maalox as needed.  Patient is stable for discharge at this time.     Transitional Care Note    Family and/or Caretaker present at visit?  No.  Diagnostic tests reviewed/disposition: I have reviewed all completed as well as pending diagnostic tests at the time of discharge.  Disease/illness education:   Home health/community services discussion/referrals: Patient does not have home health established from hospital visit.  They do not need home health.  If needed, we will set up home health for the patient.   Establishment or re-establishment of referral orders for community resources: No other necessary community resources.   Discussion with other health care providers: No discussion with other health care providers necessary.          History:  Past Medical History:   Diagnosis Date    A-fib     Arthritis     Chronic LBP     ESIs x 3 with Dr. Hicks, PT at [facility], chiropractor    Eye pain     Frequent headaches     GERD (gastroesophageal reflux disease)     Glaucoma     Hyperlipemia     Hypertension      Past Surgical History:   Procedure Laterality Date    ABLATION OF ARRHYTHMOGENIC FOCUS FOR ATRIAL FIBRILLATION N/A 3/6/2019    Procedure: ABLATION, ARRHYTHMOGENIC FOCUS, FOR ATRIAL FIBRILLATION;  Surgeon: Abel Morillo MD;  Location: [facility];  Service: Cardiology;  Laterality: N/A;     CARDIOVERSION N/A 7/9/2018    Procedure: CARDIOVERSION;  Surgeon: Will Rosenthal MD;  Location: [facility] CATH LAB;  Service: Cardiology;  Laterality: N/A;    CATARACT EXTRACTION W/  INTRAOCULAR LENS IMPLANT  OU    COLONOSCOPY N/A 6/13/2024    Procedure: COLONOSCOPY 6/10 xarelto 2 day hold note;  Surgeon: Sayda Ferreira MD;  Location: [facility] ENDO;  Service: Endoscopy;  Laterality: N/A;    ESOPHAGOGASTRODUODENOSCOPY N/A 4/13/2024    Procedure: EGD (ESOPHAGOGASTRODUODENOSCOPY);  Surgeon: Oswald Esteves MD;  Location: [facility] ENDO;  Service: Endoscopy;  Laterality: N/A;    INJECTION OF ANESTHETIC AGENT INTO SACROILIAC JOINT Right 11/3/2020    Procedure: right Sacroiliac Joint Injection;  Surgeon: Ayush Christiansen MD;  Location: [facility] PAIN MGT;  Service: Pain Management;  Laterality: Right;    INJECTION OF ANESTHETIC AGENT INTO SACROILIAC JOINT Right 3/23/2021    Procedure: right Sacroiliac Joint Injection;  Surgeon: Ayush Christiansen MD;  Location: [facility] PAIN MGT;  Service: Pain Management;  Laterality: Right;    INJECTION OF ANESTHETIC AGENT INTO TISSUE PLANE DEFINED BY TRANSVERSUS ABDOMINIS MUSCLE N/A 4/8/2025    Procedure: BLOCK, TRANSVERSUS ABDOMINIS PLANE;  Surgeon: Sondra Kaur MD;  Location: [facility];  Service: General;  Laterality: N/A;    INJECTION OF JOINT Right 11/3/2020    Procedure: right GT bursa injection;  Surgeon: Ayush Christiansen MD;  Location: [facility] PAIN MGT;  Service: Pain Management;  Laterality: Right;    INJECTION OF JOINT Bilateral 3/23/2021    Procedure: bilateral GT bursa injection;  Surgeon: Ayush Christiansen MD;  Location: [facility] PAIN MGT;  Service: Pain Management;  Laterality: Bilateral;    ROBOTIC EXCISION,SMALL INTESTINE N/A 4/8/2025    Procedure: ROBOTIC EXCISION,SMALL INTESTINE;  Surgeon: Sondra Kaur MD;  Location: [facility] OR;  Service: General;  Laterality: N/A;    TUBAL LIGATION      XI ROBOTIC LAPAROSCOPY,DIAGNOSTIC N/A 4/8/2025    Procedure: XI ROBOTIC LAPAROSCOPY,DIAGNOSTIC;  Surgeon:  Sondra Kaur MD;  Location: [facility] ER;  Service: General;  Laterality: N/A;     Family History   Problem Relation Name Age of Onset    Glaucoma Mother      Cancer Mother      Cataracts Mother      Hypertension Mother      Hypertension Father      Diabetes Paternal Aunt      Strabismus Neg Hx      Retinal detachment Neg Hx      Macular degeneration Neg Hx      Blindness Neg Hx      Amblyopia Neg Hx      Stroke Neg Hx      Thyroid disease Neg Hx       Social History[1]  Problem List[2]  Review of patient's allergies indicates:   Allergen Reactions    Voltaren [diclofenac sodium] Edema     Gel formulation caused facial rash and facial swelling    Eliquis [apixaban] Other (See Comments)     Headache, numbness in lip     Medrol [methylprednisolone] Blisters       The following were reviewed at this visit: active problem list, medication list, allergies, family history, social history, and health maintenance.    Medications:  Medications Ordered Prior to Encounter[3]    Medications have been reviewed and reconciled with patient at this visit.  Barriers to medications reviewed with patient.    Adverse reactions to current medications reviewed with patient..    Over the counter medications reviewed and reconciled with patient.    Exam:  Wt Readings from Last 3 Encounters:   05/22/25 73.9 kg (163 lb)   04/29/25 75.5 kg (166 lb 7.2 oz)   04/10/25 79.5 kg (175 lb 4.3 oz)     Temp Readings from Last 3 Encounters:   05/22/25 98.1 °F (36.7 °C) (Oral)   04/29/25 98.5 °F (36.9 °C) (Oral)   04/12/25 98.7 °F (37.1 °C) (Oral)     BP Readings from Last 3 Encounters:   05/22/25 (!) 151/71   04/29/25 (!) 147/82   04/22/25 124/60     Pulse Readings from Last 3 Encounters:   05/22/25 69   04/29/25 71   04/22/25 78     There is no height or weight on file to calculate BMI.    Physical Exam  Nursing note reviewed.   Cardiovascular:      Rate and Rhythm: Normal rate and regular rhythm.      Heart sounds: Normal heart sounds.   Pulmonary:       Effort: Pulmonary effort is normal. No respiratory distress.      Breath sounds: Normal breath sounds.   Abdominal:      General: Bowel sounds are normal.      Tenderness: There is no abdominal tenderness.   Musculoskeletal:      Comments: The patient is using an assistive device during the visit     Neurological:      Mental Status: She is alert and oriented to person, place, and time.      Motor: Weakness present.      Gait: Gait abnormal.   Psychiatric:         Mood and Affect: Mood normal.         Behavior: Behavior normal.         Thought Content: Thought content normal.         Judgment: Judgment normal.           Laboratory Reviewed ({Yes)  Lab Results   Component Value Date    WBC 7.84 05/22/2025    HGB 10.6 (L) 05/22/2025    HCT 33.7 (L) 05/22/2025     05/22/2025    CHOL 167 09/17/2024    TRIG 137 09/17/2024    HDL 59 09/17/2024    ALT 22 05/20/2025    AST 31 05/20/2025     05/22/2025    K 3.9 05/22/2025     05/22/2025    CREATININE 0.7 05/22/2025    BUN 16 05/22/2025    CO2 26 05/22/2025    TSH 3.002 02/08/2023    INR 1.1 04/13/2024       There are no diagnoses linked to this encounter.      Assessment & Plan           Maria Del Carmen was seen today for hospital follow up.    Diagnoses and all orders for this visit:    Hospital discharge follow-up    Hiatal hernia  -     Ambulatory referral/consult to Endo Procedure ; Future    Nausea and vomiting, unspecified vomiting type    Primary hypertension  At visit, Blood pressure is at goal. Continue current medications      Elevated troponin    PAF (paroxysmal atrial fibrillation)    Anticoagulant long-term use      Assessment & Plan    DIAPHRAGMATIC HERNIA (HIATAL HERNIA):  - Recommend consultation with Dr. Graves for evaluation and possible EGD procedure.  - Ms. Spence instructed to contact office at the end of June to schedule EGD appointment after Dr. Graves consultation, and to follow up with Dr. Graves when personal  schedule is available.    Pt would like to wait until she talks to Dr. Graves before scheduling EGD    ATRIAL FIBRILLATION:  - Ms. Spence is on Xarelto for atrial fibrillation management.  - Explained that aspirin alone is insufficient for anticoagulation in this condition.  - Scheduled follow up on June 11 with Dr. Arias for cardiology post-hospital discharge follow-up.    Previously underwent an ablation     GERD  1.Take Acid Reflux Medication on an empty stomach. Take the medication 1-2 hours before eating and taking other medications. Wait at least 2 hours before laying flat  2.  Weight Loss:  If you are overweight, losing weight can significantly reduce your reflux.  3. Diet Control:  It is important to determine what your Trigger foods for reflux are.  Limiting your intake of these foods will significantly improve your reflux.  Examples of foods known to increase reflux are listed below  Carbonated beverages  Caffeine (this includes Coffee, soda, iced tea, energy drinks etc.)  Alcohol  Fried/ fatty/ greasy foods  Acidic foods (citrus, tomato etc.)  Chocolate  Spearmint/Peppermint  4. Elevating the head of your bed:  This doesn't mean more pillows.  You need to actually elevate the head of your bed.  Some patients have found something to put under the legs of the head of their bed.  Other patients have employed a wedge or an air bladder of some sort to elevate the head of their bed.  This makes a significant difference with reflux and can also help with nasal congestion.  5. Eating before bedtime:  Try not to eat anything for at least 2 hours before bedtime.  This allows for less material to remain in your stomach when you lay down at night which equals less severe reflux.  6. More information:  If you would like to read more about diet changes and lifestyle changes that you can employ that will help with your reflux, the books below may be helpful.  Dropping Acid:  The Reflux diet Cookbook & Cure by [employer]  Mark MYRICK and David Sullivan M.D.  The Acid Watcher Diet:  A 28 Day Reflux Prevention and Healing Program by Everett Fernandez M.D.      HISTORY OF DIGESTIVE SYSTEM DISEASES:  - Ms. Spence has history of small intestine reconstruction and previous GI bleeding.  - Reviewed history of GI bleeding (coffee ground emesis, melena) from November last year.  Denies recent coffee ground emesis or blood in stool     LONG-TERM USE OF ANTICOAGULANTS:  - Ms. Spence currently on Xarelto.  - Clarified that anticoagulants have a ceiling effect and will not cause excessive blood thinning.  - Advised against using NSAIDs, including Aleve, due to interaction risk with Xarelto.  - Recommend to continue Xarelto as prescribed.    FOLLOW-UP:  - Assessed post-discharge status, noting improvement since stopping nausea/vomiting.      Has PRN nausea meds    Pt states that she will call to schedule an appt with gen surgery at later time    Visit today included increased complexity associated with the care of the episodic problem Hospital follow up  , which was addressed while instituting co-management of the longitudinal care of the patient due to the serious and/or complex managed problem(s) .    I have evaluated and discussed management associated with medical care services that serve as the continuing focal point for all needed health care services and/or with medical care services that are part of ongoing care related to my patient's single, serious condition or a complex condition(s).    I am providing ongoing care and I am the primary care provider for this patient, and they are being managed, monitored, and/or observed for their chronic conditions over time.     I have addressed their ongoing health maintenance requirements and needs for all health care services and reviewed co-management plans provided by specialty providers when available.    Health Maintenance Due   Topic Date Due    DEXA Scan  08/18/2023        Care Plan/Goals:  Reviewed    Goals    None         Follow up: No follow-ups on file.    After visit summary was printed and given to patient upon discharge today.  Patient goals and care plan are included in After Visit Summary.    This note was generated with the assistance of ambient listening technology. Verbal consent was obtained by the patient and accompanying visitor(s) for the recording of patient appointment to facilitate this note. I attest to having reviewed and edited the generated note for accuracy, though some syntax or spelling errors may persist. Please contact the author of this note for any clarification.            [1]   Social History  Socioeconomic History    Marital status:     Number of children: 1   Occupational History    Occupation: retired   Tobacco Use    Smoking status: Never    Smokeless tobacco: Never   Substance and Sexual Activity    Alcohol use: No    Drug use: No    Sexual activity: Yes     Partners: Male     Social Drivers of Health     Financial Resource Strain: Patient Declined (5/21/2025)    Overall Financial Resource Strain (CARDIA)     Difficulty of Paying Living Expenses: Patient declined   Food Insecurity: Patient Declined (5/21/2025)    Hunger Vital Sign     Worried About Running Out of Food in the Last Year: Patient declined     Ran Out of Food in the Last Year: Patient declined   Transportation Needs: Patient Declined (5/21/2025)    PRAPARE - Transportation     Lack of Transportation (Medical): Patient declined     Lack of Transportation (Non-Medical): Patient declined   Physical Activity: Inactive (4/16/2025)    Exercise Vital Sign     Days of Exercise per Week: 0 days     Minutes of Exercise per Session: 0 min   Stress: Patient Declined (5/21/2025)    [employer] - Occupational Stress Questionnaire     Feeling of Stress : Patient declined   Housing Stability: Patient Declined (5/21/2025)    Housing Stability Vital Sign     Unable to Pay for Housing  in the Last Year: Patient declined     Number of Times Moved in the Last Year: 0     Homeless in the Last Year: Patient declined   [2]   Patient Active Problem List  Diagnosis    Primary open-angle glaucoma(365.11) - Both Eyes    Lens replaced by other means - Both Eyes    Dyspnea on exertion    Primary hypertension    Primary open angle glaucoma of both eyes, indeterminate stage    Allergic conjunctivitis of both eyes    Indeterminate stage chronic open angle glaucoma    Pseudophakia    Paroxysmal atrial fibrillation    Primary open angle glaucoma of both eyes, mild stage    Arthritis of knee, right    Atrial flutter    Symptomatic bradycardia    Mixed hyperlipidemia    A-fib    PAF (paroxysmal atrial fibrillation)    Primary osteoarthritis of right knee    History of cardiac radiofrequency ablation (RFA)    Sacroiliitis    Greater trochanteric bursitis of both hips    PVD (peripheral vascular disease)    Gastroesophageal reflux disease without esophagitis    Anemia    Anal fissure    Osteopenia    Obesity (BMI 30.0-34.9)    Sciatica of right side    Osteoarthritis    Acute cystitis with hematuria    Melena    Hiatal hernia    Positive fecal immunochemical test    Internal hemorrhoids    Diverticulosis of colon    Hematemesis with nausea    Nonrheumatic aortic valve insufficiency    Pulmonary HTN    Nausea and vomiting    Intestinal adhesions with complete obstruction    Elevated troponin   [3]   Current Outpatient Medications on File Prior to Visit   Medication Sig Dispense Refill    AA/prot/lysine/methio/vit C/B6 (A/G PRO ORAL) Take 2 tablets by mouth 2 (two) times daily.       acetaminophen (TYLENOL) 650 MG TbSR Take 650 mg by mouth as needed. Does not go over 3000mg daily      aluminum & magnesium hydroxide-simethicone (MAALOX MAXIMUM STRENGTH) 400-400-40 mg/5 mL suspension Take 5 mLs by mouth every 6 (six) hours as needed for Indigestion.      atorvastatin (LIPITOR) 10 MG tablet Take 1 tablet (10 mg total) by  mouth every evening. 90 tablet 1    CALCIUM PHOSPHATE TRIB/VIT D3 (CITRACAL + D ORAL) Take 1 tablet by mouth once daily at 6am.       cetirizine (ZYRTEC) 10 MG tablet Take 10 mg by mouth as needed for Allergies.      furosemide (LASIX) 20 MG tablet TAKE ONE TABLET BY MOUTH TWICE A WEEK 25 tablet 5    gabapentin (NEURONTIN) 400 MG capsule Take 1 capsule (400 mg total) by mouth 3 (three) times daily. 270 capsule 3    latanoprost 0.005 % ophthalmic solution INSTILL ONE DROP IN EACH EYE AT BEDTIME 7.5 mL 4    metoprolol tartrate (LOPRESSOR) 25 MG tablet Take 1 tablet (25 mg total) by mouth 2 (two) times daily. 180 tablet 1    MULTIVITAMIN W-MINERALS/LUTEIN (CENTRUM SILVER ORAL) Take 1 tablet by mouth once daily.      omeprazole (PRILOSEC) 40 MG capsule Take 1 capsule (40 mg total) by mouth once daily. 90 capsule 3    ondansetron (ZOFRAN-ODT) 4 MG TbDL Take 1 tablet (4 mg total) by mouth every 6 (six) hours as needed. 30 tablet 0    timolol maleate 0.5% (TIMOPTIC) 0.5 % Drop PLACE ONE DROP INTO BOTH EYES EVERY MORNING 10 mL 12    XARELTO 15 mg Tab Take 1 tablet (15 mg total) by mouth daily with dinner or evening meal. 30 tablet 5     No current facility-administered medications on file prior to visit.

## 2025-06-03 RX ORDER — RIVAROXABAN 15 MG/1
15 TABLET, FILM COATED ORAL
Qty: 30 TABLET | Refills: 5 | Status: SHIPPED | OUTPATIENT
Start: 2025-06-03

## 2025-06-11 ENCOUNTER — OFFICE VISIT (OUTPATIENT)
Dept: CARDIOLOGY | Facility: CLINIC | Age: 85
End: 2025-06-11
Payer: MEDICARE

## 2025-06-11 VITALS
BODY MASS INDEX: 27.66 KG/M2 | HEART RATE: 73 BPM | WEIGHT: 166 LBS | DIASTOLIC BLOOD PRESSURE: 74 MMHG | SYSTOLIC BLOOD PRESSURE: 132 MMHG | OXYGEN SATURATION: 95 % | HEIGHT: 65 IN

## 2025-06-11 DIAGNOSIS — I35.1 NONRHEUMATIC AORTIC VALVE INSUFFICIENCY: ICD-10-CM

## 2025-06-11 DIAGNOSIS — I10 PRIMARY HYPERTENSION: ICD-10-CM

## 2025-06-11 DIAGNOSIS — I77.89 OTHER SPECIFIED DISORDERS OF ARTERIES AND ARTERIOLES: ICD-10-CM

## 2025-06-11 DIAGNOSIS — I73.9 PVD (PERIPHERAL VASCULAR DISEASE): Primary | ICD-10-CM

## 2025-06-11 DIAGNOSIS — I65.23 BILATERAL CAROTID ARTERY STENOSIS: ICD-10-CM

## 2025-06-11 DIAGNOSIS — R79.89 ELEVATED TROPONIN: ICD-10-CM

## 2025-06-11 DIAGNOSIS — I27.20 PULMONARY HTN: ICD-10-CM

## 2025-06-11 DIAGNOSIS — I48.0 PAROXYSMAL ATRIAL FIBRILLATION: ICD-10-CM

## 2025-06-11 DIAGNOSIS — Z98.890 HISTORY OF CARDIAC RADIOFREQUENCY ABLATION (RFA): ICD-10-CM

## 2025-06-11 PROCEDURE — 99999 PR PBB SHADOW E&M-EST. PATIENT-LVL IV: CPT | Mod: PBBFAC,,, | Performed by: INTERNAL MEDICINE

## 2025-06-11 PROCEDURE — 99214 OFFICE O/P EST MOD 30 MIN: CPT | Mod: PBBFAC,PO | Performed by: INTERNAL MEDICINE

## 2025-06-11 RX ORDER — POTASSIUM CHLORIDE 750 MG/1
10 TABLET, EXTENDED RELEASE ORAL DAILY PRN
Qty: 30 TABLET | Refills: 5 | Status: SHIPPED | OUTPATIENT
Start: 2025-06-11

## 2025-06-11 RX ORDER — FUROSEMIDE 20 MG/1
20 TABLET ORAL DAILY PRN
Qty: 25 TABLET | Refills: 5 | Status: SHIPPED | OUTPATIENT
Start: 2025-06-11

## 2025-06-11 NOTE — PROGRESS NOTES
Subjective:   Patient ID:  Maria Del Carmen Spence is a 84 y.o. female who presents for follow up of No chief complaint on file.      83 yo female, routine HFU  PMH PAF/AFL, s/p PVI cryoballon and CVI in  by Dr. Morillo, HTN, mild anemia, DJD, glaucoma, s/p knee surgery. Can not climb the stairs due to knee pain.   Echo in  normal EF  Repeat EKG in  sinus  EKG NSR low ARS voltage  Continue on Xeralto, BB and Lipitor  No chest pain, palpitation, dizziness and faint. Occasionally could not lie on left side due to chest discomfort  No fall and active bleeding  Shoulder, knee and hip joints pain. On tylenol 500 mg 3 times a day. Most activity limited by lower back pain.  Occasional Leg swelling  Did nerve block for the pain control  Pt states that her BP controlled at home  Lives in a big house and does a lot of house keeping work.     10-22 visit  Ekg NSR and BP high,  Pt states that her BP controlled at home. No active bleeding. Chronic joint pain. Four tylenol 500 mg daily for pain.  No falls. Does house work    10.23 visit  Chronic joint pain and Tylenol works. No palpitation dizziness chest pain faint dyspnea orthopnea   Ekg NSR    10/24 visit  EKG reviewed by myself today reveals NSR nonspecific STT change  04/24 question GI bleeding and endoscopy and colonoscopy unremarkable except large hiatal hernia  Anemia HGB at 10 and no PRBC. Now HGB 12  No orthopnea PND and chest pain  no palpitation and dizziness  LDL 81 BP C  10/23 The Echo showed normal function and mold valvular leaking. Mod pulm HTN     Interval history  05/25 admitted for hiatal hernia and MPI showed no ischemia and echo EF nml PASP 47 mmHG   10/24 carotid US showed  ·  There is 20-39% right Internal Carotid Stenosis.  ·  There is 0-19% left Internal Carotid Stenosis.  Some ankle swelling                History of Present Illness    CHIEF COMPLAINT:  - Presents for follow-up after recent hospitalizations for epigastric pain and small  bowel reconstruction, and to discuss management of ongoing medical issues including hiatal hernia and ankle swelling.    HPI:  - Reports history of acid reflux for an extended period  - Hospitalized for epigastric pain in April and May, initially presenting as GERD or heartburn  - Administered Pepcid during hospitalization, with improvement of symptoms  - Experienced emesis starting at 2 or 3 AM, leading to ED visit around 4 AM  - Emesis resolved after arriving at ED  - Underwent small bowel reconstruction in April  - Received home health care and PT for 2 weeks post-procedure, discontinued due to improved ambulation and ability to perform ADLs  - Currently uses a walker at home  - Reports ankle swelling in last 3-4 days, attributed to increased activity  - Scheduling appointment with Dr. Graves to discuss procedure for hiatal hernia, awaiting referral from Dr. Lutz's office or NP Shikha Harris  - Seen by cardiologist during recent hospitalization for cardiac clearance due to labs suggesting slight heart muscle damage, possibly demand ischemia  - Underwent cardiac tests including US, EKG, nuclear stress test, and echo  - Found to have pulmonary HTN with PA pressure of 47  - Denies current chest pain, dyspnea, dizziness, or light-headedness  - Reports minimal salt intake    CARDIAC HISTORY:  - Echo 05/2025: Normal heart function and structure, PA pressure 47 (pulmonary HTN)  - Carotid US 10/2024: Mild disease  - Nuclear stress test 05/2025: No stenosis in heart  - EKG 05/2025: No acute changes, NSR    MEDICATIONS:  - Atorvastatin 10 mg daily for cholesterol control  - Lasix 20 mg daily PRN for ankle swelling  - Toprol 15 mg  - Potassium supplement with Lasix    TEST RESULTS:  - Troponin: 2 months ago, slightly elevated, suggesting possible heart muscle damage  - Cholesterol: Recent, LDL 81, controlled  - Renal function: Recent, okay  - Liver function: Recent, okay  - Glucose: Recent, okay  - Hemoglobin: Recent,  10.6, indicating mild anemia  - Potassium: Last month, 3.9  - Potassium: Recent, 5.1    MEDICAL HISTORY:  - Acid reflux  - Hiatal hernia  - Pulmonary HTN  - Congestive heart failure    SURGICAL HISTORY:  - Small bowel reconstruction: April (current year)          Past Medical History:   Diagnosis Date    A-fib     Arthritis     Chronic LBP     ESIs x 3 with Dr. Hicks, PT at [facility], chiropractor    Eye pain     Frequent headaches     GERD (gastroesophageal reflux disease)     Glaucoma     Hyperlipemia     Hypertension        Past Surgical History:   Procedure Laterality Date    ABLATION OF ARRHYTHMOGENIC FOCUS FOR ATRIAL FIBRILLATION N/A 3/6/2019    Procedure: ABLATION, ARRHYTHMOGENIC FOCUS, FOR ATRIAL FIBRILLATION;  Surgeon: Abel Morillo MD;  Location: [facility] EP LAB;  Service: Cardiology;  Laterality: N/A;    CARDIOVERSION N/A 7/9/2018    Procedure: CARDIOVERSION;  Surgeon: Will Rosenthal MD;  Location: [facility] CATH LAB;  Service: Cardiology;  Laterality: N/A;    CATARACT EXTRACTION W/  INTRAOCULAR LENS IMPLANT  OU    COLONOSCOPY N/A 6/13/2024    Procedure: COLONOSCOPY 6/10 xarelto 2 day hold note;  Surgeon: Sayda Ferreira MD;  Location: [facility] ENDO;  Service: Endoscopy;  Laterality: N/A;    ESOPHAGOGASTRODUODENOSCOPY N/A 4/13/2024    Procedure: EGD (ESOPHAGOGASTRODUODENOSCOPY);  Surgeon: Oswald Esteves MD;  Location: [facility];  Service: Endoscopy;  Laterality: N/A;    INJECTION OF ANESTHETIC AGENT INTO SACROILIAC JOINT Right 11/3/2020    Procedure: right Sacroiliac Joint Injection;  Surgeon: Ayush Christiansen MD;  Location: [facility] PAIN MGT;  Service: Pain Management;  Laterality: Right;    INJECTION OF ANESTHETIC AGENT INTO SACROILIAC JOINT Right 3/23/2021    Procedure: right Sacroiliac Joint Injection;  Surgeon: Ayush Christiansen MD;  Location: [facility] PAIN MGT;  Service: Pain Management;  Laterality: Right;    INJECTION OF ANESTHETIC AGENT INTO TISSUE PLANE DEFINED BY TRANSVERSUS ABDOMINIS MUSCLE N/A 4/8/2025     Procedure: BLOCK, TRANSVERSUS ABDOMINIS PLANE;  Surgeon: Sondra Kaur MD;  Location: [facility] OR;  Service: General;  Laterality: N/A;    INJECTION OF JOINT Right 11/3/2020    Procedure: right GT bursa injection;  Surgeon: Ayush Christiansen MD;  Location: [facility] PAIN MGT;  Service: Pain Management;  Laterality: Right;    INJECTION OF JOINT Bilateral 3/23/2021    Procedure: bilateral GT bursa injection;  Surgeon: Ayush Christiansen MD;  Location: [facility] PAIN MGT;  Service: Pain Management;  Laterality: Bilateral;    ROBOTIC EXCISION,SMALL INTESTINE N/A 4/8/2025    Procedure: ROBOTIC EXCISION,SMALL INTESTINE;  Surgeon: Sondra Kaur MD;  Location: [facility] OR;  Service: General;  Laterality: N/A;    TUBAL LIGATION      XI ROBOTIC LAPAROSCOPY,DIAGNOSTIC N/A 4/8/2025    Procedure: XI ROBOTIC LAPAROSCOPY,DIAGNOSTIC;  Surgeon: Sondra Kaur MD;  Location: [facility] OR;  Service: General;  Laterality: N/A;       Social History[1]    Family History   Problem Relation Name Age of Onset    Glaucoma Mother      Cancer Mother      Cataracts Mother      Hypertension Mother      Hypertension Father      Diabetes Paternal Aunt      Strabismus Neg Hx      Retinal detachment Neg Hx      Macular degeneration Neg Hx      Blindness Neg Hx      Amblyopia Neg Hx      Stroke Neg Hx      Thyroid disease Neg Hx           ROS    Objective:   Physical Exam  HENT:      Head: Normocephalic.   Eyes:      Pupils: Pupils are equal, round, and reactive to light.   Neck:      Thyroid: No thyromegaly.      Vascular: Normal carotid pulses. No carotid bruit or JVD.   Cardiovascular:      Rate and Rhythm: Normal rate and regular rhythm. No extrasystoles are present.     Chest Wall: PMI is not displaced.      Pulses: Normal pulses.      Heart sounds: Normal heart sounds. No murmur heard.     No gallop. No S3 sounds.   Pulmonary:      Effort: No respiratory distress.      Breath sounds: Normal breath sounds. No stridor.   Abdominal:      General: Bowel sounds  "are normal.      Palpations: Abdomen is soft.      Tenderness: There is no abdominal tenderness. There is no rebound.   Musculoskeletal:         General: Swelling (trace edema) present. Normal range of motion.   Skin:     Findings: No rash.   Neurological:      Mental Status: She is alert and oriented to person, place, and time.   Psychiatric:         Behavior: Behavior normal.         Lab Results   Component Value Date    CHOL 167 09/17/2024    CHOL 159 02/08/2023    CHOL 165 02/07/2022     Lab Results   Component Value Date    HDL 59 09/17/2024    HDL 65 02/08/2023    HDL 72 02/07/2022     Lab Results   Component Value Date    LDLCALC 80.6 09/17/2024    LDLCALC 77.6 02/08/2023    LDLCALC 74.8 02/07/2022     Lab Results   Component Value Date    TRIG 137 09/17/2024    TRIG 82 02/08/2023    TRIG 91 02/07/2022     Lab Results   Component Value Date    CHOLHDL 35.3 09/17/2024    CHOLHDL 40.9 02/08/2023    CHOLHDL 43.6 02/07/2022       Chemistry        Component Value Date/Time     05/22/2025 0525     11/14/2024 1835    K 3.9 05/22/2025 0525    K 5.1 11/14/2024 1835     05/22/2025 0525     11/14/2024 1835    CO2 26 05/22/2025 0525    CO2 21 (L) 11/14/2024 1835    BUN 16 05/22/2025 0525    CREATININE 0.7 05/22/2025 0525    GLU 86 05/22/2025 0525     (H) 11/14/2024 1835        Component Value Date/Time    CALCIUM 9.3 05/22/2025 0525    CALCIUM 10.4 11/14/2024 1835    ALKPHOS 107 05/20/2025 1959    ALKPHOS 88 11/14/2024 1835    AST 31 05/20/2025 1959    AST 38 11/14/2024 1835    ALT 22 05/20/2025 1959    ALT 18 11/14/2024 1835    BILITOT 0.5 05/20/2025 1959    BILITOT 0.8 11/14/2024 1835    ESTGFRAFRICA >60 02/04/2021 1450    EGFRNONAA >60 02/04/2021 1450          No results found for: "LABA1C", "HGBA1C"  Lab Results   Component Value Date    TSH 3.002 02/08/2023     Lab Results   Component Value Date    INR 1.1 04/13/2024    INR 1.1 02/27/2019    INR 1.5 (H) 07/23/2018     Lab Results "   Component Value Date    WBC 7.84 05/22/2025    HGB 10.6 (L) 05/22/2025    HCT 33.7 (L) 05/22/2025     (H) 05/22/2025     05/22/2025     BMP  Sodium   Date Value Ref Range Status   05/22/2025 140 136 - 145 mmol/L Final   11/14/2024 142 136 - 145 mmol/L Final     Potassium   Date Value Ref Range Status   05/22/2025 3.9 3.5 - 5.1 mmol/L Final   11/14/2024 5.1 3.5 - 5.1 mmol/L Final     Chloride   Date Value Ref Range Status   05/22/2025 108 95 - 110 mmol/L Final   11/14/2024 106 95 - 110 mmol/L Final     CO2   Date Value Ref Range Status   05/22/2025 26 23 - 29 mmol/L Final   11/14/2024 21 (L) 23 - 29 mmol/L Final     BUN   Date Value Ref Range Status   05/22/2025 16 8 - 23 mg/dL Final     Creatinine   Date Value Ref Range Status   05/22/2025 0.7 0.5 - 1.4 mg/dL Final     Calcium   Date Value Ref Range Status   05/22/2025 9.3 8.7 - 10.5 mg/dL Final   11/14/2024 10.4 8.7 - 10.5 mg/dL Final     Anion Gap   Date Value Ref Range Status   05/22/2025 6 (L) 8 - 16 mmol/L Final     eGFR if    Date Value Ref Range Status   02/04/2021 >60 >60 mL/min/1.73 m^2 Final     eGFR if non    Date Value Ref Range Status   02/04/2021 >60 >60 mL/min/1.73 m^2 Final     Comment:     Calculation used to obtain the estimated glomerular filtration  rate (eGFR) is the CKD-EPI equation.        BNP  @LABRCNTIP(BNP,BNPTRIAGEBLO)@  @LABRCNTIP(troponini)@  CrCl cannot be calculated (Patient's most recent lab result is older than the maximum 7 days allowed.).  No results found in the last 24 hours.  No results found in the last 24 hours.  No results found in the last 24 hours.    Assessment:      1. PVD (peripheral vascular disease)    2. Pulmonary HTN    3. Other specified disorders of arteries and arterioles    4. Primary hypertension    5. Paroxysmal atrial fibrillation    6. Nonrheumatic aortic valve insufficiency    7. History of cardiac radiofrequency ablation (RFA)    8. Elevated troponin    9.  Bilateral carotid artery stenosis        Plan:     Assessment & Plan    IMPRESSION:  - Changed Lasix from 20 mg twice weekly to 20 mg daily as needed for ankle swelling.  - Initiated potassium supplementation to be taken with each Lasix dose to maintain electrolyte balance.    HEART FAILURE:  - Changed Lasix from 20 mg twice weekly to 20 mg daily as needed for ankle swelling.  - Monitor for ankle swelling.  - Explained the relationship between Lasix use and potassium levels.  - Initiated potassium supplementation to be taken with each Lasix dose to maintain electrolyte balance.    HYPERLIPIDEMIA:  - Continued atorvastatin 10 mg for cholesterol.    GENERAL MANAGEMENT:  - Maintain current level of physical activity as tolerated.    FOLLOW-UP:  - Follow up in 6 months, follow up with office nurse in 3 months for blood test if needed, contact the office if any concerns arise before scheduled follow-up.          Increase lasix to 20mg daily prn for PVD  Add KCL 10m meq daily prn with lasix  BMP in 4 weeks  RTC in 2 m MARYLOU    This note was generated with the assistance of ambient listening technology. Verbal consent was obtained by the patient and accompanying visitor(s) for the recording of patient appointment to facilitate this note. I attest to having reviewed and edited the generated note for accuracy, though some syntax or spelling errors may persist. Please contact the author of this note for any clarification.            [1]   Social History  Tobacco Use    Smoking status: Never    Smokeless tobacco: Never   Substance Use Topics    Alcohol use: No    Drug use: No

## 2025-06-12 ENCOUNTER — TELEPHONE (OUTPATIENT)
Dept: INTERNAL MEDICINE | Facility: CLINIC | Age: 85
End: 2025-06-12
Payer: MEDICARE

## 2025-06-12 NOTE — TELEPHONE ENCOUNTER
----- Message from Sheila Harris NP sent at 6/12/2025  8:06 AM CDT -----  Regarding: RE: ADRI  For the hernia repair, she should see gen surgery and I placed an order for an EGD during the visit  ----- Message -----  From: Sayda Chavez LPN  Sent: 6/10/2025   9:40 AM CDT  To: Sheila Harris NP  Subject: FW: ADRI                                        ----- Message -----  From: Dulce Maria Pelaez MA  Sent: 6/10/2025   9:27 AM CDT  To: Nelda JORDAN Staff  Subject: FW: ADRI                                        ----- Message -----  From: Katerine Amaral  Sent: 6/10/2025   8:44 AM CDT  To: Steven Sosa Staff  Subject: ADRI                                          Who Called:Jacqueline to What Specialty: GastroReason for Referral: Hernia repairDoes the patient want the referral with a specific physician?: YesIs the specialist an [facility] the patient rather a call back or a response via [facility]? [facility] Call Back Number:.[phone] Information:

## 2025-06-19 ENCOUNTER — TELEPHONE (OUTPATIENT)
Dept: OPHTHALMOLOGY | Facility: CLINIC | Age: 85
End: 2025-06-19
Payer: MEDICARE

## 2025-06-30 ENCOUNTER — OUTPATIENT CASE MANAGEMENT (OUTPATIENT)
Dept: ADMINISTRATIVE | Facility: OTHER | Age: 85
End: 2025-06-30
Payer: MEDICARE

## 2025-07-01 ENCOUNTER — EXTERNAL HOME HEALTH (OUTPATIENT)
Dept: HOME HEALTH SERVICES | Facility: HOSPITAL | Age: 85
End: 2025-07-01
Payer: MEDICARE

## 2025-07-01 ENCOUNTER — TELEPHONE (OUTPATIENT)
Dept: OPHTHALMOLOGY | Facility: CLINIC | Age: 85
End: 2025-07-01
Payer: MEDICARE

## 2025-07-01 NOTE — TELEPHONE ENCOUNTER
----- Message from Anupama sent at 7/1/2025  9:49 AM CDT -----  Pt would like to be worked in for eyeglass re check states she can not wait see out of new glasses [phone]

## 2025-07-03 ENCOUNTER — OFFICE VISIT (OUTPATIENT)
Dept: OPHTHALMOLOGY | Facility: CLINIC | Age: 85
End: 2025-07-03
Payer: MEDICARE

## 2025-07-03 DIAGNOSIS — H52.7 REFRACTIVE ERRORS: Primary | ICD-10-CM

## 2025-07-03 PROCEDURE — 99999 PR PBB SHADOW E&M-EST. PATIENT-LVL III: CPT | Mod: PBBFAC,,, | Performed by: OPTOMETRIST

## 2025-07-03 PROCEDURE — 99213 OFFICE O/P EST LOW 20 MIN: CPT | Mod: PBBFAC | Performed by: OPTOMETRIST

## 2025-07-03 PROCEDURE — 99499 UNLISTED E&M SERVICE: CPT | Mod: S$PBB,,, | Performed by: OPTOMETRIST

## 2025-07-03 NOTE — PROGRESS NOTES
SUBJECTIVE  Maria Del Carmen Spence is 84 y.o. female  Corrected distance visual acuity was 20/25 -2 in the right eye and 20/20 -1 in the left eye.   Chief Complaint   Patient presents with    recheck glasses     [facility] is stable. Patient is here for her bifocal but was prescribed single vision. No pain or irritation. Compliant with gtts.          HPI     recheck glasses     Additional comments: VA is stable. Patient is here for her bifocal but   was prescribed single vision. No pain or irritation. Compliant with gtts.           Comments      1. Mild COAG OD>OS (init 24/20) Goal <17   Rhopressa (irritated but exc IOP 11/11)   Dorzolamide OU BID (Not taking, patient States This Eye Drop Makes Her Eye   Burn & Red)   2. PCIOL OU (Sulcus OD) (partial dislocation Od)   3. Dry Eyes       Latanoprost QHS OU   Timolol QAM OU             Last edited by Dick Walker on 7/3/2025  8:40 AM.         Assessment /Plan :  1. Refractive errors  Rewrote rx         RTC PRN

## 2025-07-09 ENCOUNTER — LAB VISIT (OUTPATIENT)
Dept: LAB | Facility: HOSPITAL | Age: 85
End: 2025-07-09
Attending: INTERNAL MEDICINE
Payer: MEDICARE

## 2025-07-09 DIAGNOSIS — I73.9 PVD (PERIPHERAL VASCULAR DISEASE): ICD-10-CM

## 2025-07-09 LAB
ANION GAP (OHS): 8 MMOL/L (ref 8–16)
BUN SERPL-MCNC: 12 MG/DL (ref 8–23)
CALCIUM SERPL-MCNC: 10 MG/DL (ref 8.7–10.5)
CHLORIDE SERPL-SCNC: 107 MMOL/L (ref 95–110)
CO2 SERPL-SCNC: 27 MMOL/L (ref 23–29)
CREAT SERPL-MCNC: 0.7 MG/DL (ref 0.5–1.4)
GFR SERPLBLD CREATININE-BSD FMLA CKD-EPI: >60 ML/MIN/1.73/M2
GLUCOSE SERPL-MCNC: 87 MG/DL (ref 70–110)
POTASSIUM SERPL-SCNC: 4.4 MMOL/L (ref 3.5–5.1)
SODIUM SERPL-SCNC: 142 MMOL/L (ref 136–145)

## 2025-07-09 PROCEDURE — 80048 BASIC METABOLIC PNL TOTAL CA: CPT

## 2025-07-09 PROCEDURE — 36415 COLL VENOUS BLD VENIPUNCTURE: CPT | Mod: PO

## 2025-07-10 ENCOUNTER — TELEPHONE (OUTPATIENT)
Dept: CARDIOLOGY | Facility: CLINIC | Age: 85
End: 2025-07-10
Payer: MEDICARE

## 2025-07-10 NOTE — TELEPHONE ENCOUNTER
Pt notified of   Will Rosenthal MD  7/10/2025  2:27 PM CDT Back to Top      The lab is stable.  Continue current Rx.   F/U as scheduled  Pt verbalized understanding pbr           CRM # [id number]  Owner: None  Status: Unresolved  Open  Priority: Routine Created on: 07/10/2025 03:23 PM By: Lili Lam     Primary Information    Source   Maria Del Carmen Spence (Patient)    Subject   Maria Del Carmen Spence (Patient)    Topic   General Inquiry - Return Call      Summary   Return Call   Communication   Type:  Patient Returning Call            Who Called:Maria Del Carmen Spence      Who Left Message for Patient:CESAR      Does the patient know what this is regarding?:unsure      Would the patient rather a call back or a response via [facility]? Call back      Best Call Back Number:[phone]      Additional Information: n/a

## 2025-07-10 NOTE — TELEPHONE ENCOUNTER
I have attempted without success to contact this patient by phone to review results. Pt was instructed via VM to return call to clinic.   ----- Message from Will Rosenthal MD sent at 7/10/2025  2:27 PM CDT -----  The lab is stable.  Continue current Rx.   F/U as scheduled    ----- Message -----  From: Lab, Background User  Sent: 7/9/2025   7:40 PM CDT  To: Will Rosenthal MD

## 2025-07-21 ENCOUNTER — OUTPATIENT CASE MANAGEMENT (OUTPATIENT)
Dept: ADMINISTRATIVE | Facility: OTHER | Age: 85
End: 2025-07-21
Payer: MEDICARE

## 2025-08-06 ENCOUNTER — OUTPATIENT CASE MANAGEMENT (OUTPATIENT)
Dept: ADMINISTRATIVE | Facility: OTHER | Age: 85
End: 2025-08-06
Payer: MEDICARE

## 2025-08-21 ENCOUNTER — OFFICE VISIT (OUTPATIENT)
Dept: CARDIOLOGY | Facility: CLINIC | Age: 85
End: 2025-08-21
Payer: MEDICARE

## 2025-08-21 VITALS
OXYGEN SATURATION: 98 % | WEIGHT: 161.19 LBS | HEIGHT: 65 IN | HEART RATE: 76 BPM | SYSTOLIC BLOOD PRESSURE: 108 MMHG | BODY MASS INDEX: 26.85 KG/M2 | DIASTOLIC BLOOD PRESSURE: 72 MMHG

## 2025-08-21 DIAGNOSIS — Z98.890 HISTORY OF CARDIAC RADIOFREQUENCY ABLATION (RFA): ICD-10-CM

## 2025-08-21 DIAGNOSIS — I35.1 NONRHEUMATIC AORTIC VALVE INSUFFICIENCY: ICD-10-CM

## 2025-08-21 DIAGNOSIS — E78.2 MIXED HYPERLIPIDEMIA: ICD-10-CM

## 2025-08-21 DIAGNOSIS — I48.0 PAROXYSMAL ATRIAL FIBRILLATION: Primary | ICD-10-CM

## 2025-08-21 DIAGNOSIS — I65.23 BILATERAL CAROTID ARTERY STENOSIS: ICD-10-CM

## 2025-08-21 DIAGNOSIS — I27.20 PULMONARY HTN: ICD-10-CM

## 2025-08-21 DIAGNOSIS — I73.9 PVD (PERIPHERAL VASCULAR DISEASE): ICD-10-CM

## 2025-08-21 DIAGNOSIS — I10 PRIMARY HYPERTENSION: ICD-10-CM

## 2025-08-21 PROCEDURE — 99214 OFFICE O/P EST MOD 30 MIN: CPT | Mod: PBBFAC,PO

## 2025-08-21 PROCEDURE — 99999 PR PBB SHADOW E&M-EST. PATIENT-LVL IV: CPT | Mod: PBBFAC,,,

## 2025-09-04 ENCOUNTER — HOSPITAL ENCOUNTER (OUTPATIENT)
Dept: CARDIOLOGY | Facility: HOSPITAL | Age: 85
Discharge: HOME | End: 2025-09-04
Payer: MEDICARE

## 2025-09-04 DIAGNOSIS — I48.0 PAROXYSMAL ATRIAL FIBRILLATION: ICD-10-CM

## (undated) DEVICE — SHEATH HEMOSTASIS 8.5FR

## (undated) DEVICE — KIT PROBE COVER WITH GEL

## (undated) DEVICE — DRAPE CORETEMP FLD WRM 56X62IN

## (undated) DEVICE — CATH BIDIRECTIONAL DF CRV 7FR

## (undated) DEVICE — INTRO SWARTZ SL2 8.5FR 63CM

## (undated) DEVICE — SYR 3CC LUER LOC

## (undated) DEVICE — APPLICATOR CHLORAPREP ORN 26ML

## (undated) DEVICE — SPONGE COTTON TRAY 4X4IN

## (undated) DEVICE — INTRODUCER HEMOSTASIS 7.5F

## (undated) DEVICE — SET TUBING COOL POINT IRR

## (undated) DEVICE — OBTURATOR BLADELESS 8MM XI CLR

## (undated) DEVICE — TAPE SURG MEDIPORE 6X72IN

## (undated) DEVICE — DRAPE LAPSCP CHOLE 122X102X78

## (undated) DEVICE — SUT VICRYL CTD 2-0 GI 27 SH

## (undated) DEVICE — ELECTRODE BLD EXT 6.50 ST DISP

## (undated) DEVICE — IRRIGATOR ENDOSCOPY DISP.

## (undated) DEVICE — GOWN POLY REINF BRTH SLV XL

## (undated) DEVICE — PAD PINK TRENDELENBURG POS XL

## (undated) DEVICE — INTRO AGILIS MED CRL 8.5F 71CM

## (undated) DEVICE — GLOVE SIGNATURE ESSNTL LTX 6.5

## (undated) DEVICE — ELECTRODE BLADE INSULATED 1 IN

## (undated) DEVICE — PAD DEFIB CADENCE ADULT R2

## (undated) DEVICE — CONNECTOR TUBING STR 5 IN 1

## (undated) DEVICE — TROCAR ENDOPATH XCEL 5X100MM

## (undated) DEVICE — PATCH ENSITE PRECISION NAVX SE

## (undated) DEVICE — LINE PRESSURE MONITORING 96IN

## (undated) DEVICE — PAD CURAD NONADH W/ TAB 3X4IN

## (undated) DEVICE — ELECTRODE REM PLYHSV RETURN 9

## (undated) DEVICE — DRAPE ARM DAVINCI XI

## (undated) DEVICE — ELECTRODE BLD EXT 6.50 ST MDFD

## (undated) DEVICE — GLOVE SENSICARE PI MICRO 6.5

## (undated) DEVICE — COVER LIGHT HANDLE 80/CA

## (undated) DEVICE — COVER DRAPE ACUSON STERILE

## (undated) DEVICE — CATH ADVISOR FI SENS ENBL 20MM

## (undated) DEVICE — PACK BASIC SETUP SC BR

## (undated) DEVICE — BAG TISSUE RETRIEVAL 5MM

## (undated) DEVICE — NDL ECLIPSE SAF REG 25GX1.5IN

## (undated) DEVICE — DRAPE LAP T SHT W/ INSTR PAD

## (undated) DEVICE — SEAL CANN UNIVERSAL 5-12MM

## (undated) DEVICE — SYR 10CC LUER LOCK

## (undated) DEVICE — SEALER VESSEL EXTEND

## (undated) DEVICE — SUT VICRYL 3-0 27 SH

## (undated) DEVICE — DRAPE COLUMN DAVINCI XI

## (undated) DEVICE — ELECTRODE POLYHESIVEPRE-ATTACH

## (undated) DEVICE — SUT VIOL GS-22 2-0 30CM 12IN

## (undated) DEVICE — Device

## (undated) DEVICE — DRAPE THREE-QTR REINF 53X77IN

## (undated) DEVICE — SUT VICRYL 2-0 SH VCP317H

## (undated) DEVICE — CATH TRICUSPID HALO XP 7FRX110

## (undated) DEVICE — DRESSING GAUZE XEROFORM 5X9

## (undated) DEVICE — DRAPE UTILITY STRL 15X26IN

## (undated) DEVICE — TOWEL OR DISP STRL BLUE 4/PK

## (undated) DEVICE — SUT MCRYL PLUS 4-0 PS2 27IN

## (undated) DEVICE — KIT ANTIFOG W/SPONG & FLUID

## (undated) DEVICE — CATH ACUSON ACUNAV 8FR

## (undated) DEVICE — SET PNEUMOCLEAR HEAT HUM SE HF

## (undated) DEVICE — PACK EP DRAPE

## (undated) DEVICE — TIP GRASPER FENESTRATED DISP

## (undated) DEVICE — ADHESIVE DERMABOND ADVANCED

## (undated) DEVICE — NDL PNEUMO INSUFFLATI 120MM

## (undated) DEVICE — NDL TRANSEPTAL ADULT 71.0

## (undated) DEVICE — SOL NACL IRR 1000ML BTL

## (undated) DEVICE — SET TRI-LUMEN FILTERED TUBE

## (undated) DEVICE — MANIFOLD 4 PORT

## (undated) DEVICE — PAD ABDOMINAL STERILE 8X10IN

## (undated) DEVICE — SPONGE LAP 18X18 PREWASHED

## (undated) DEVICE — HEADREST ROUND DISP FOAM 9IN